# Patient Record
Sex: FEMALE | Race: WHITE | Employment: OTHER | URBAN - METROPOLITAN AREA
[De-identification: names, ages, dates, MRNs, and addresses within clinical notes are randomized per-mention and may not be internally consistent; named-entity substitution may affect disease eponyms.]

---

## 2017-01-09 ENCOUNTER — TELEPHONE (OUTPATIENT)
Dept: NEUROSURGERY | Facility: CLINIC | Age: 82
End: 2017-01-09

## 2017-01-09 ENCOUNTER — OFFICE VISIT (OUTPATIENT)
Dept: NEUROSURGERY | Facility: CLINIC | Age: 82
End: 2017-01-09
Attending: INTERNAL MEDICINE
Payer: COMMERCIAL

## 2017-01-09 VITALS
WEIGHT: 188 LBS | OXYGEN SATURATION: 92 % | HEART RATE: 66 BPM | SYSTOLIC BLOOD PRESSURE: 144 MMHG | DIASTOLIC BLOOD PRESSURE: 66 MMHG | BODY MASS INDEX: 34.38 KG/M2 | TEMPERATURE: 97.9 F

## 2017-01-09 DIAGNOSIS — M54.16 LUMBAR RADICULAR PAIN: Primary | ICD-10-CM

## 2017-01-09 PROCEDURE — 99203 OFFICE O/P NEW LOW 30 MIN: CPT | Performed by: NURSE PRACTITIONER

## 2017-01-09 PROCEDURE — 99211 OFF/OP EST MAY X REQ PHY/QHP: CPT | Performed by: NURSE PRACTITIONER

## 2017-01-09 ASSESSMENT — PAIN SCALES - GENERAL: PAINLEVEL: NO PAIN (0)

## 2017-01-09 NOTE — TELEPHONE ENCOUNTER
Tried to call [facility] to make an appt, but they state they don't have an order.   Can order be faxed to [facility] in [place].   Thanks

## 2017-01-09 NOTE — PATIENT INSTRUCTIONS
1.  Please call [facility] to have lumbar MRI completed. Call [phone].   I will contact you with results.

## 2017-01-09 NOTE — MR AVS SNAPSHOT
After Visit Summary   1/9/2017    Elizabeth Li    MRN: [id number]           Patient Information     Date Of Birth          1935        Visit Information        Provider Department      1/9/2017 11:00 AM Gail Whaley APRN CNP [facility]        Today's Diagnoses     Lumbar radicular pain    -  1       Care Instructions    1.  Please call [facility] to have lumbar MRI completed. Call [phone].   I will contact you with results.         Follow-ups after your visit        Your next 10 appointments already scheduled     Jan 11, 2017   Procedure with Nghia Moss MD   [facility] ([facility])    [address] [place] [address] [phone]           [facility] [place]            Jan 23, 2017 10:10 AM   Return Visit with ISMAEL Franco CNP   [facility] [place] ([facility])    [address] [place] [address] [address]  [facility] [address] [phone]              Future tests that were ordered for you today     Open Future Orders        Priority Expected Expires Ordered    MR Lumbar Spine w/o Contrast Routine  1/9/2018 1/9/2017            Who to contact     If you have questions or need follow up information about today's clinic visit or your schedule please contact [facility] directly at [phone].  Normal or non-critical lab and imaging results will be communicated to you by MyChart, letter or phone within 4 business days after the clinic has received the results. If you do not hear from us within 7 days, please contact the clinic through MyChart or phone. If you have a critical or abnormal lab result, we will notify you by phone as soon as possible.  Submit refill requests through [employer] or call your pharmacy and they will forward the refill request to us. Please allow 3 business days  for your refill to be completed.          Additional Information About Your Visit        [employer] Information     [employer] gives you secure access to your electronic health record. If you see a primary care provider, you can also send messages to your care team and make appointments. If you have questions, please call your primary care clinic.  If you do not have a primary care provider, please call [phone] and they will assist you.        Care EveryWhere ID     This is your Care EveryWhere ID. This could be used by other organizations to access your [facility] medical records  [id number]        Your Vitals Were     Pulse Temperature Pulse Oximetry             66 97.9  F (36.6  C) (Oral) 92%          Blood Pressure from Last 3 Encounters:   01/09/17 144/66   12/29/16 126/54   12/27/16 161/98    Weight from Last 3 Encounters:   01/09/17 188 lb (85.276 kg)   12/29/16 187 lb 6.4 oz (85.004 kg)   12/27/16 185 lb (83.915 kg)               Primary Care Provider Office Phone # Fax #    Kasi Watkins [phone]       [facility] [address] [place] [address]        Thank you!     Thank you for choosing [facility]  for your care. Our goal is always to provide you with excellent care. Hearing back from our patients is one way we can continue to improve our services. Please take a few minutes to complete the written survey that you may receive in the mail after your visit with us. Thank you!             Your Updated Medication List - Protect others around you: Learn how to safely use, store and throw away your medicines at www.disposemymeds.org.          This list is accurate as of: 1/9/17 11:05 AM.  Always use your most recent med list.                   Brand Name Dispense Instructions for use    ascorbic acid 500 MG tablet    VITAMIN C     Take 500 mg by mouth daily.       aspirin 81 MG tablet     90 tablet    Take 1 tablet (81 mg) by mouth daily        folic acid 400 MCG tablet    FOLVITE     Take 400 mcg by mouth daily.       HYDROcodone-acetaminophen 5-325 MG per tablet    NORCO    30 tablet    1 tab every 4-6 hr if needed for pain.       isosorbide mononitrate 30 MG 24 hr tablet    IMDUR    90 tablet    Take 1 tablet (30 mg) by mouth daily       ketoconazole 2 % shampoo    NIZORAL    120 mL    Apply to the affected area and wash off after 5 minutes.       levothyroxine 75 MCG tablet    SYNTHROID/LEVOTHROID    135 tablet    Take 1.5 tablets (112.5 mcg) by mouth daily       metoprolol 25 MG tablet    LOPRESSOR    90 tablet    Take 0.5 tablets (12.5 mg) by mouth 2 times daily       simvastatin 20 MG tablet    ZOCOR    90 tablet    Take 1 tablet (20 mg) by mouth At Bedtime       vitamin D 1000 UNITS capsule      Take 1 capsule by mouth daily.

## 2017-01-09 NOTE — NURSING NOTE
"Elizabeth Li is a 81 year old female who presents for:  Chief Complaint   Patient presents with     Neurologic Problem     Right hip pain and leg pain, low back only hurts when she bends over to much        Initial Vitals:  /66 mmHg  Pulse 66  Temp(Src) 97.9  F (36.6  C) (Oral)  Wt 188 lb (85.276 kg)  SpO2 92% Estimated body mass index is 34.38 kg/(m^2) as calculated from the following:    Height as of 12/29/16: 5' 2\" (1.575 m).    Weight as of this encounter: 188 lb (85.276 kg).. There is no height on file to calculate BSA. BP completed using cuff size: large  No Pain (0)    Do you feel safe in your environment?  Yes  Do you need any refills today? No    Nursing Comments: Right hip pain and leg pain, low back only hurts when she bends over to much.  Patient rates her pain today as 0      5 min. nursing intake time  Lana Valencia MA       Discharge plan: 1.  Please call [facility] to have lumbar MRI completed. Call [phone].   I will contact you with results.   2 min. nursing discharge time  Lana Valencia MA            "

## 2017-01-12 ENCOUNTER — TRANSFERRED RECORDS (OUTPATIENT)
Dept: HEALTH INFORMATION MANAGEMENT | Facility: CLINIC | Age: 82
End: 2017-01-12

## 2017-01-18 DIAGNOSIS — I25.10 CORONARY ARTERY DISEASE INVOLVING NATIVE CORONARY ARTERY WITHOUT ANGINA PECTORIS: Primary | ICD-10-CM

## 2017-01-18 RX ORDER — METOPROLOL TARTRATE 25 MG/1
12.5 TABLET, FILM COATED ORAL 2 TIMES DAILY
Qty: 90 TABLET | Refills: 3 | Status: SHIPPED | OUTPATIENT
Start: 2017-01-18 | End: 2018-01-10

## 2017-01-19 ENCOUNTER — TELEPHONE (OUTPATIENT)
Dept: INTERNAL MEDICINE | Facility: CLINIC | Age: 82
End: 2017-01-19

## 2017-01-19 NOTE — TELEPHONE ENCOUNTER
Reason for Call:  Other call back    Detailed comments: Pt had many things that she wanted to discuss with Dr. Watkins or his nurse (results info/questions, questions about making an appointment or not, thyroid questions, etc) Pt will only be home this morning and wanted us to call her back as soon as possible    Phone Number Patient can be reached at: Home number on file [phone] (home)    Best Time: hoping still this morning    Can we leave a detailed message on this number? YES    Call taken on 1/19/2017 at 8:36 AM by Josselyn Reddy

## 2017-01-20 ENCOUNTER — OFFICE VISIT (OUTPATIENT)
Dept: INTERNAL MEDICINE | Facility: CLINIC | Age: 82
End: 2017-01-20
Payer: COMMERCIAL

## 2017-01-20 VITALS
BODY MASS INDEX: 33.65 KG/M2 | OXYGEN SATURATION: 95 % | HEART RATE: 80 BPM | SYSTOLIC BLOOD PRESSURE: 130 MMHG | DIASTOLIC BLOOD PRESSURE: 60 MMHG | WEIGHT: 184 LBS | TEMPERATURE: 97.7 F

## 2017-01-20 DIAGNOSIS — M25.551 HIP PAIN, RIGHT: Primary | ICD-10-CM

## 2017-01-20 DIAGNOSIS — I25.10 CORONARY ARTERY DISEASE INVOLVING NATIVE CORONARY ARTERY OF NATIVE HEART WITHOUT ANGINA PECTORIS: ICD-10-CM

## 2017-01-20 DIAGNOSIS — E78.5 HYPERLIPIDEMIA LDL GOAL <100: ICD-10-CM

## 2017-01-20 DIAGNOSIS — Z98.61 STATUS POST CORONARY ANGIOPLASTY: ICD-10-CM

## 2017-01-20 DIAGNOSIS — M51.369 DDD (DEGENERATIVE DISC DISEASE), LUMBAR: ICD-10-CM

## 2017-01-20 DIAGNOSIS — E03.9 ACQUIRED HYPOTHYROIDISM: ICD-10-CM

## 2017-01-20 PROCEDURE — 99215 OFFICE O/P EST HI 40 MIN: CPT | Performed by: INTERNAL MEDICINE

## 2017-01-20 PROCEDURE — 84439 ASSAY OF FREE THYROXINE: CPT | Performed by: INTERNAL MEDICINE

## 2017-01-20 PROCEDURE — 84443 ASSAY THYROID STIM HORMONE: CPT | Performed by: INTERNAL MEDICINE

## 2017-01-20 PROCEDURE — 36415 COLL VENOUS BLD VENIPUNCTURE: CPT | Performed by: INTERNAL MEDICINE

## 2017-01-20 RX ORDER — ISOSORBIDE MONONITRATE 30 MG/1
30 TABLET, EXTENDED RELEASE ORAL DAILY
Qty: 90 TABLET | Refills: 3 | Status: SHIPPED | OUTPATIENT
Start: 2017-01-20 | End: 2018-03-09

## 2017-01-20 RX ORDER — LEVOTHYROXINE SODIUM 75 UG/1
112.5 TABLET ORAL DAILY
Qty: 135 TABLET | Refills: 3 | Status: SHIPPED | OUTPATIENT
Start: 2017-01-20 | End: 2017-03-11 | Stop reason: DRUGHIGH

## 2017-01-20 RX ORDER — HYDROCODONE BITARTRATE AND ACETAMINOPHEN 5; 325 MG/1; MG/1
TABLET ORAL
Qty: 80 TABLET | Refills: 0 | Status: SHIPPED | OUTPATIENT
Start: 2017-01-20 | End: 2017-03-06

## 2017-01-20 NOTE — NURSING NOTE
"Chief Complaint   Patient presents with     Musculoskeletal Problem       Initial /60 mmHg  Pulse 80  Temp(Src) 97.7  F (36.5  C) (Oral)  Wt 184 lb (83.462 kg)  SpO2 95% Estimated body mass index is 33.65 kg/(m^2) as calculated from the following:    Height as of 12/29/16: 5' 2\" (1.575 m).    Weight as of this encounter: 184 lb (83.462 kg).  BP completed using cuff size: large    "

## 2017-01-20 NOTE — TELEPHONE ENCOUNTER
Pt is very anxious, she is under so much stress and extremely sleep deprived from here pain in her back and right hip.  She is having a rough time keeping track of anything.  Pt given appt for today at 3:20 pm, need to track down the MRI results.    She saw Dr. Abernathy on 12/29/16 and had some Xray's but she doesn't understand the results.  She was referred to a spine center (not sure of the name).  They sent her for an open sided MRI of Lumbar Spine again not sure where it was done (? at [facility]).  The MRI didn't turn out as she was so painful, she couldn't still.  It was stopped 1/2 way through.    Pt was seen by [facility] at [phone] on 1/9/17.  The OV note from that appt is in this pt chart.    Called to this office to see if I could get MRI results.  They don't have the result either.    Called to [facility] and they are faxing the results to me.    Radu GIRALDO RN

## 2017-01-20 NOTE — TELEPHONE ENCOUNTER
Received copy of MRI results.  Original sent to scan  Copy to MD for appt today.    Radu GIRALDO RN

## 2017-01-20 NOTE — PROGRESS NOTES
SUBJECTIVE:                                                    Elizabeth Li is a 81 year old female who presents to clinic today for the following health issues:      LBP  HTN  CAD  Hypothyroidism   Anxiety / depression     Patient is seen for a follow up visit.  Has had significant LBP with right sided sciatica. Affects ambulation, sleep.   Seen ortho spine, MRI showed DDD and spinal stenosis. Pending follow up. On Norco, Ibuprofen. Helps with pain. No side effects. Had PT.     Has h/o HTN. on medical treatment. BP has been controlled. No side effects from medications. No CP, HA, dizziness. good compliance with medications and low salt diet.  Has h/o ischemic heart disease, asymptomatic regarding chest pains, SOB,palpitations. Has good compliance with treatment, diet and exercise.  Has hypothyroidism. On Synthroid. No heat /cold intolerance, heart palpitations, weight loss/ gain , heart palpitations, change in bowel habits.  Last TSH was suppressed. Needs recheck.   Has h/o depression. On medical treatment, controlled, no side effects. No depressive symptoms or suicidal ideation.      Amount of exercise or physical activity: None    Problems taking medications regularly: No    Medication side effects: none    Diet: low salt and low fat/cholesterol        Problem list and histories reviewed & adjusted, as indicated.  Additional history: as documented    Problem list, Medication list, Allergies, and Medical/Social/Surgical histories reviewed in [facility] and updated as appropriate.    ROS:  Constitutional, HEENT, cardiovascular, pulmonary, GI, , musculoskeletal, neuro, skin, endocrine and psych systems are negative, except as otherwise noted.    OBJECTIVE:                                                    /60 mmHg  Pulse 80  Temp(Src) 97.7  F (36.5  C) (Oral)  Wt 184 lb (83.462 kg)  SpO2 95%  Body mass index is 33.65 kg/(m^2).  GENERAL: healthy, alert and no distress  EYES: Eyes grossly normal to  inspection, PERRL and conjunctivae and sclerae normal  HENT: ear canals and TM's normal, nose and mouth without ulcers or lesions  NECK: no adenopathy, no asymmetry, masses, or scars and thyroid normal to palpation  RESP: lungs clear to auscultation - no rales, rhonchi or wheezes  CV: regular rate and rhythm, normal S1 S2, no S3 or S4, no murmur, click or rub, no peripheral edema and peripheral pulses strong  ABDOMEN: soft, nontender, no hepatosplenomegaly, no masses and bowel sounds normal  MS: no gross musculoskeletal defects noted, no edema, LS paravertebral and right SI area pain , worse with ambulation   SKIN: no suspicious lesions or rashes  NEURO: Normal strength and tone, mentation intact and speech normal    Diagnostic Test Results:  none      ASSESSMENT/PLAN:                                                      Problem List Items Addressed This Visit     Hypothyroidism    Relevant Medications    levothyroxine (SYNTHROID/LEVOTHROID) 75 MCG tablet    Other Relevant Orders    TSH with free T4 reflex (Completed)    Hyperlipidemia LDL goal <100    CAD (coronary artery disease)    Relevant Medications    isosorbide mononitrate (IMDUR) 30 MG 24 hr tablet    DDD (degenerative disc disease), lumbar    Relevant Medications    HYDROcodone-acetaminophen (NORCO) 5-325 MG per tablet      Other Visit Diagnoses     Hip pain, right    -  Primary     Relevant Medications     HYDROcodone-acetaminophen (NORCO) 5-325 MG per tablet     Status post coronary angioplasty         Relevant Medications     isosorbide mononitrate (IMDUR) 30 MG 24 hr tablet            Follow up with ortho spine   Cont treatment   Assess lab work   Adjust treatment   Consider Gabapentin  , reassess in one month     Follow-Up:in 1 month     Kasi Watkins MD  [facility]

## 2017-01-21 LAB
T4 FREE SERPL-MCNC: 1.3 NG/DL (ref 0.76–1.46)
TSH SERPL DL<=0.005 MIU/L-ACNC: 0.19 MU/L (ref 0.4–4)

## 2017-01-21 ASSESSMENT — PATIENT HEALTH QUESTIONNAIRE - PHQ9: SUM OF ALL RESPONSES TO PHQ QUESTIONS 1-9: 11

## 2017-01-23 ENCOUNTER — OFFICE VISIT (OUTPATIENT)
Dept: CARDIOLOGY | Facility: CLINIC | Age: 82
End: 2017-01-23
Attending: INTERNAL MEDICINE
Payer: COMMERCIAL

## 2017-01-23 ENCOUNTER — TELEPHONE (OUTPATIENT)
Dept: NEUROSURGERY | Facility: CLINIC | Age: 82
End: 2017-01-23

## 2017-01-23 VITALS
HEIGHT: 62 IN | HEART RATE: 68 BPM | WEIGHT: 186 LBS | DIASTOLIC BLOOD PRESSURE: 56 MMHG | SYSTOLIC BLOOD PRESSURE: 130 MMHG | BODY MASS INDEX: 34.23 KG/M2

## 2017-01-23 DIAGNOSIS — E78.5 HYPERLIPIDEMIA WITH TARGET LDL LESS THAN 70: Primary | ICD-10-CM

## 2017-01-23 DIAGNOSIS — I25.10 CORONARY ARTERY DISEASE INVOLVING NATIVE CORONARY ARTERY OF NATIVE HEART WITHOUT ANGINA PECTORIS: ICD-10-CM

## 2017-01-23 PROCEDURE — 99214 OFFICE O/P EST MOD 30 MIN: CPT | Performed by: NURSE PRACTITIONER

## 2017-01-23 NOTE — MR AVS SNAPSHOT
After Visit Summary   1/23/2017    Elizabeth Li    MRN: [id number]           Patient Information     Date Of Birth          1935        Visit Information        Provider Department      1/23/2017 10:10 AM Felicita Garcia APRN CNP [facility]        Today's Diagnoses     Hyperlipidemia with target LDL less than 70    -  1     Coronary artery disease involving native coronary artery of native heart without angina pectoris           Care Instructions    Fasting cholesterol labs prior to seeing Dr. Oakley in May        Follow-ups after your visit        Future tests that were ordered for you today     Open Future Orders        Priority Expected Expires Ordered    Lipid Profile Routine 5/23/2017 1/23/2018 1/23/2017    ALT Routine 5/23/2017 1/23/2018 1/23/2017            Who to contact     If you have questions or need follow up information about today's clinic visit or your schedule please contact [facility] directly at [phone].  Normal or non-critical lab and imaging results will be communicated to you by [employer], letter or phone within 4 business days after the clinic has received the results. If you do not hear from us within 7 days, please contact the clinic through [employer] or phone. If you have a critical or abnormal lab result, we will notify you by phone as soon as possible.  Submit refill requests through [employer] or call your pharmacy and they will forward the refill request to us. Please allow 3 business days for your refill to be completed.          Additional Information About Your Visit        MyChart Information     [employer] gives you secure access to your electronic health record. If you see a primary care provider, you can also send messages to your care team and make appointments. If you have questions, please call your primary care clinic.  If you do not have a primary care provider, please call  "[phone] and they will assist you.        Care EveryWhere ID     This is your Care EveryWhere ID. This could be used by other organizations to access your [place] medical records  [id number]        Your Vitals Were     Pulse Height BMI (Body Mass Index)             68 1.575 m (5' 2.01\") 34.01 kg/m2          Blood Pressure from Last 3 Encounters:   01/23/17 130/56   01/20/17 130/60   01/11/17 114/64    Weight from Last 3 Encounters:   01/23/17 84.369 kg (186 lb)   01/20/17 83.462 kg (184 lb)   01/09/17 85.276 kg (188 lb)              We Performed the Following     Follow-Up with Cardiac Advanced Practice Provider        Primary Care Provider Office Phone # Fax #    Kasi Watkins [phone]       [facility] [address] [place] [address]        Thank you!     Thank you for choosing [facility] [place]  for your care. Our goal is always to provide you with excellent care. Hearing back from our patients is one way we can continue to improve our services. Please take a few minutes to complete the written survey that you may receive in the mail after your visit with us. Thank you!             Your Updated Medication List - Protect others around you: Learn how to safely use, store and throw away your medicines at www.disposemymeds.org.          This list is accurate as of: 1/23/17 10:33 AM.  Always use your most recent med list.                   Brand Name Dispense Instructions for use    ascorbic acid 500 MG tablet    VITAMIN C     Take 500 mg by mouth daily.       aspirin 81 MG tablet     90 tablet    Take 1 tablet (81 mg) by mouth daily       folic acid 400 MCG tablet    FOLVITE     Take 400 mcg by mouth daily.       HYDROcodone-acetaminophen 5-325 MG per tablet    NORCO    80 tablet    1 tab every 4-6 hr if needed for pain.       isosorbide mononitrate 30 MG 24 hr tablet    IMDUR    90 tablet    Take 1 tablet (30 mg) by mouth daily    "    ketoconazole 2 % shampoo    NIZORAL    120 mL    Apply to the affected area and wash off after 5 minutes.       levothyroxine 75 MCG tablet    SYNTHROID/LEVOTHROID    135 tablet    Take 1.5 tablets (112.5 mcg) by mouth daily       metoprolol 25 MG tablet    LOPRESSOR    90 tablet    Take 0.5 tablets (12.5 mg) by mouth 2 times daily       simvastatin 20 MG tablet    ZOCOR    90 tablet    Take 1 tablet (20 mg) by mouth At Bedtime       vitamin D 1000 UNITS capsule      Take 1 capsule by mouth daily.

## 2017-01-23 NOTE — PROGRESS NOTES
Cardiology Clinic Progress Note  Elizabeth Li MRN# [id number]   YOB: 1935 Age: 81 year old     Reason for visit: Follow up           Assessment and Plan:     1. Coronary artery disease    Status post drug-eluting stents x 2 to the LAD in March 2015    Continue Metoprolol, ASA, Zocor, Imdur    Follow up with Dr. Oakley in May    2. Hypertension    Stable    Continue current medical therapy    3. Hyperlipidemia, LDL goal <70    Last LDL 98 on Zocor 20 mg daily    Recommend high intensity statin. Patient declined increase in Zocor or changing statin at this time.    Repeat FLP in May prior to office visit with Dr. Oakley         History of Presenting Illness:    Elizabeth Li is a very pleasant 81 year old patient of Dr. Oakley who presents today for a follow up. She has a past medical history of degenerative disc disease, anxiety, depression, hypertension, hyperlipidemia, coronary artery disease status post 2 drug-eluting stenting to the LAD and a  of the RCA.     She initially saw Dr. Oakley for progressive dyspnea on exertion..  She underwent a stress test that showed EKG abnormalities of horizontal ST depression in the inferolateral leads that were concerning of ischemia.  Upon review of the stress test Dr. Oakley ordered a CT coronary angiogram.  That showed subtotal occlusion of the proximal RCA and severe stenosis of the mid LAD and moderate stenosis of the large OM branch.  It was then recommended that she undergo coronary angiography.  This was completed on March 27, 2015 and she had two drug-eluting stents to her mid LAD and was found to have a chronically occluded RCA that was not intervened upon.    Her most recent LDL was 98 in April of 2016. She is currently on Zocor 20 mg daily. Discussed increasing Zocor or changing to a high intensity statin to meet LDL goal. Patient is hesitant to do either as her father had issues with statin therapy.  She unfortunately is limited in her exercising  "abilities at the moment due to sciatica. She denies chest pain, shortness of breath, PND, orthopnea, presyncope, edema, heart racing, or palpitations.          This note was completed in part using Dragon voice recognition software. Although reviewed after completion, some word and grammatical errors may occur       Review of Systems:   Review of Systems:  Skin:  Positive for     Eyes:  Positive for glasses  ENT:  Negative    Respiratory:  Positive for dyspnea on exertion;sleep apnea;cough;CPAP  Cardiovascular:  Negative  (weakness occurs with normal ADLS)  Gastroenterology: Positive for heartburn  Genitourinary:  Positive for urinary frequency  Musculoskeletal:  Positive for joint pain  Neurologic:  Positive for numbness or tingling of hands  Psychiatric:  Positive for sleep disturbances  Heme/Lymph/Imm:  Positive for allergies;easy bruising  Endocrine:  Positive for thyroid disorder              Physical Exam:     Vitals: /56 mmHg  Pulse 68  Ht 1.575 m (5' 2.01\")  Wt 84.369 kg (186 lb)  BMI 34.01 kg/m2  Constitutional:  cooperative;in no acute distress        Skin:  warm and dry to the touch;no apparent skin lesions or masses noted        Head:  normocephalic        Eyes:  conjunctivae and lids unremarkable;sclera white        ENT:  no pallor or cyanosis        Chest:  clear to auscultation;normal symmetry        Cardiac: regular rhythm;normal S1 and S2;apical impulse not displaced                  Abdomen:  abdomen soft;non-tender obese      Extremities and Back:  no edema        Neurological:  no gross motor deficits;affect appropriate, oriented to time, person and place                 Medications:     Current Outpatient Prescriptions   Medication Sig Dispense Refill     HYDROcodone-acetaminophen (NORCO) 5-325 MG per tablet 1 tab every 4-6 hr if needed for pain. 80 tablet 0     isosorbide mononitrate (IMDUR) 30 MG 24 hr tablet Take 1 tablet (30 mg) by mouth daily 90 tablet 3     levothyroxine " (SYNTHROID/LEVOTHROID) 75 MCG tablet Take 1.5 tablets (112.5 mcg) by mouth daily 135 tablet 3     metoprolol (LOPRESSOR) 25 MG tablet Take 0.5 tablets (12.5 mg) by mouth 2 times daily 90 tablet 3     ketoconazole (NIZORAL) 2 % shampoo Apply to the affected area and wash off after 5 minutes. 120 mL 1     simvastatin (ZOCOR) 20 MG tablet Take 1 tablet (20 mg) by mouth At Bedtime 90 tablet 0     aspirin 81 MG tablet Take 1 tablet (81 mg) by mouth daily 90 tablet 3     ascorbic acid (VITAMIN C) 500 MG tablet Take 500 mg by mouth daily.       Cholecalciferol (VITAMIN D) 1000 UNIT capsule Take 1 capsule by mouth daily.       folic acid (FOLVITE) 400 MCG tablet Take 400 mcg by mouth daily.       [DISCONTINUED] gabapentin (NEURONTIN) 300 MG capsule Take 1 capsule (300 mg) by mouth At Bedtime Take 1 tablet (300 mg) every night 30 capsule 0           Family History   Problem Relation Age of Onset     Blood Disease Mother      pernious anemia     HEART DISEASE Father      HEART DISEASE Sister      HEART DISEASE Brother      Colon Cancer No family hx of        Social History     Social History     Marital Status:      Spouse Name: N/A     Number of Children: 4     Years of Education: N/A     Occupational History     Not on file.     Social History Main Topics     Smoking status: Former Smoker     Quit date: 01/01/1984     Smokeless tobacco: Never Used     Alcohol Use: 0.0 oz/week     0 Standard drinks or equivalent per week      Comment: social drinker     Drug Use: No     Sexual Activity: No     Other Topics Concern     Caffeine Concern Yes     coffee      Occupational Exposure No     Hobby Hazards No     Sleep Concern No     Stress Concern No     Weight Concern No     Special Diet Yes     gluten free and dairy free     Back Care No     Exercise No     on hold , 4/7 starting cardiac rehab     Seat Belt Yes     Social History Narrative            Past Medical History:     Past Medical History   Diagnosis Date      Hypothyroidism      Hyperlipidemia LDL goal <100      IHD (ischemic heart disease)      KUSUM (obstructive sleep apnea)      CPAP     Abnormal cardiovascular stress test      EKG changes with exercise on Exercise stress test 1/26/2015     CAD (coronary artery disease)      Severe 2 vessel CAD. PCI with drug-eluting stents x2 to mid LAD on 3/27/15     HTN (hypertension)               Past Surgical History:     Past Surgical History   Procedure Laterality Date     Tubal ligation       Heart cath, angioplasty  3/27/15     2.5 X 18 mm & 3.0 X mm LUCILA to LAD     Colonoscopy       Cardiac surgery       2 heart stents     Tonsillectomy       T&A as a child     Head & neck surgery       wisdom teeth removed     Colonoscopy N/A 1/11/2017     Procedure: COLONOSCOPY;  Surgeon: Nghia Moss MD;  Location:  GI              Allergies:   Flu virus vaccine       Data:   All laboratory data reviewed:    LAST CHOLESTEROL:  CHOL      167   4/4/2016  HDL       52   4/4/2016  LDL       98   4/4/2016  TRIG       85   4/4/2016  CHOLHDLRATIO      2.6   4/30/2015    LAST BMP:  NA      143   11/2/2016   POTASSIUM      4.3   11/2/2016  CHLORIDE      111   11/2/2016  FRANKLIN      9.3   11/2/2016  CO2       25   11/2/2016  BUN       12   11/2/2016  CR     0.75   11/2/2016  GLC      102   11/2/2016    LAST CBC:  WBC      6.4   11/8/2016  RBC     4.13   11/8/2016  HGB     13.3   11/8/2016  HCT     40.7   11/8/2016  MCV       99   11/8/2016  MCH     32.2   11/8/2016  MCHC     32.7   11/8/2016  RDW     12.8   11/8/2016  PLT      183   11/8/2016      QUIRINO KAYE, APRN, CNP

## 2017-01-23 NOTE — Clinical Note
1/23/2017    Kasi Watkins MD  [facility]   [address] [facility] [address]    RE: Elizabeth Li       Dear Colleague,    I had the pleasure of seeing Elizabeth Li in the [facility].    Cardiology Clinic Progress Note  Elizabeth Li MRN# [id number]   YOB: 1935 Age: 81 year old     Reason for visit: Follow up           Assessment and Plan:     1. Coronary artery disease    Status post drug-eluting stents x 2 to the LAD in March 2015    Continue Metoprolol, ASA, Zocor, Imdur    Follow up with Dr. Oakley in May    2. Hypertension    Stable    Continue current medical therapy    3. Hyperlipidemia, LDL goal <70    Last LDL 98 on Zocor 20 mg daily    Recommend high intensity statin. Patient declined increase in Zocor or changing statin at this time.    Repeat FLP in May prior to office visit with Dr. Oakley         History of Presenting Illness:    Elizabeth Li is a very pleasant 81 year old patient of Dr. Oakley who presents today for a follow up. She has a past medical history of degenerative disc disease, anxiety, depression, hypertension, hyperlipidemia, coronary artery disease status post 2 drug-eluting stenting to the LAD and a  of the RCA.     She initially saw Dr. Oakley for progressive dyspnea on exertion..  She underwent a stress test that showed EKG abnormalities of horizontal ST depression in the inferolateral leads that were concerning of ischemia.  Upon review of the stress test Dr. Oakley ordered a CT coronary angiogram.  That showed subtotal occlusion of the proximal RCA and severe stenosis of the mid LAD and moderate stenosis of the large OM branch.  It was then recommended that she undergo coronary angiography.  This was completed on March 27, 2015 and she had two drug-eluting stents to her mid LAD and was found to have a chronically occluded RCA that was not intervened upon.    Her most recent LDL was 98 in April of  "2016. She is currently on Zocor 20 mg daily. Discussed increasing Zocor or changing to a high intensity statin to meet LDL goal. Patient is hesitant to do either as her father had issues with statin therapy.  She unfortunately is limited in her exercising abilities at the moment due to sciatica. She denies chest pain, shortness of breath, PND, orthopnea, presyncope, edema, heart racing, or palpitations.          This note was completed in part using Dragon voice recognition software. Although reviewed after completion, some word and grammatical errors may occur       Review of Systems:   Review of Systems:  Skin:  Positive for     Eyes:  Positive for glasses  ENT:  Negative    Respiratory:  Positive for dyspnea on exertion;sleep apnea;cough;CPAP  Cardiovascular:  Negative  (weakness occurs with normal ADLS)  Gastroenterology: Positive for heartburn  Genitourinary:  Positive for urinary frequency  Musculoskeletal:  Positive for joint pain  Neurologic:  Positive for numbness or tingling of hands  Psychiatric:  Positive for sleep disturbances  Heme/Lymph/Imm:  Positive for allergies;easy bruising  Endocrine:  Positive for thyroid disorder              Physical Exam:     Vitals: /56 mmHg  Pulse 68  Ht 1.575 m (5' 2.01\")  Wt 84.369 kg (186 lb)  BMI 34.01 kg/m2  Constitutional:  cooperative;in no acute distress        Skin:  warm and dry to the touch;no apparent skin lesions or masses noted        Head:  normocephalic        Eyes:  conjunctivae and lids unremarkable;sclera white        ENT:  no pallor or cyanosis        Chest:  clear to auscultation;normal symmetry        Cardiac: regular rhythm;normal S1 and S2;apical impulse not displaced                  Abdomen:  abdomen soft;non-tender obese      Extremities and Back:  no edema        Neurological:  no gross motor deficits;affect appropriate, oriented to time, person and place                 Medications:     Current Outpatient Prescriptions   Medication " Sig Dispense Refill     HYDROcodone-acetaminophen (NORCO) 5-325 MG per tablet 1 tab every 4-6 hr if needed for pain. 80 tablet 0     isosorbide mononitrate (IMDUR) 30 MG 24 hr tablet Take 1 tablet (30 mg) by mouth daily 90 tablet 3     levothyroxine (SYNTHROID/LEVOTHROID) 75 MCG tablet Take 1.5 tablets (112.5 mcg) by mouth daily 135 tablet 3     metoprolol (LOPRESSOR) 25 MG tablet Take 0.5 tablets (12.5 mg) by mouth 2 times daily 90 tablet 3     ketoconazole (NIZORAL) 2 % shampoo Apply to the affected area and wash off after 5 minutes. 120 mL 1     simvastatin (ZOCOR) 20 MG tablet Take 1 tablet (20 mg) by mouth At Bedtime 90 tablet 0     aspirin 81 MG tablet Take 1 tablet (81 mg) by mouth daily 90 tablet 3     ascorbic acid (VITAMIN C) 500 MG tablet Take 500 mg by mouth daily.       Cholecalciferol (VITAMIN D) 1000 UNIT capsule Take 1 capsule by mouth daily.       folic acid (FOLVITE) 400 MCG tablet Take 400 mcg by mouth daily.       [DISCONTINUED] gabapentin (NEURONTIN) 300 MG capsule Take 1 capsule (300 mg) by mouth At Bedtime Take 1 tablet (300 mg) every night 30 capsule 0           Family History   Problem Relation Age of Onset     Blood Disease Mother      pernious anemia     HEART DISEASE Father      HEART DISEASE Sister      HEART DISEASE Brother      Colon Cancer No family hx of        Social History     Social History     Marital Status:      Spouse Name: N/A     Number of Children: 4     Years of Education: N/A     Occupational History     Not on file.     Social History Main Topics     Smoking status: Former Smoker     Quit date: 01/01/1984     Smokeless tobacco: Never Used     Alcohol Use: 0.0 oz/week     0 Standard drinks or equivalent per week      Comment: social drinker     Drug Use: No     Sexual Activity: No     Other Topics Concern     Caffeine Concern Yes     coffee      Occupational Exposure No     Hobby Hazards No     Sleep Concern No     Stress Concern No     Weight Concern No      Special Diet Yes     gluten free and dairy free     Back Care No     Exercise No     on hold , 4/7 starting cardiac rehab     Seat Belt Yes     Social History Narrative            Past Medical History:     Past Medical History   Diagnosis Date     Hypothyroidism      Hyperlipidemia LDL goal <100      IHD (ischemic heart disease)      KUSUM (obstructive sleep apnea)      CPAP     Abnormal cardiovascular stress test      EKG changes with exercise on Exercise stress test 1/26/2015     CAD (coronary artery disease)      Severe 2 vessel CAD. PCI with drug-eluting stents x2 to mid LAD on 3/27/15     HTN (hypertension)               Past Surgical History:     Past Surgical History   Procedure Laterality Date     Tubal ligation       Heart cath, angioplasty  3/27/15     2.5 X 18 mm & 3.0 X mm LUCILA to LAD     Colonoscopy       Cardiac surgery       2 heart stents     Tonsillectomy       T&A as a child     Head & neck surgery       wisdom teeth removed     Colonoscopy N/A 1/11/2017     Procedure: COLONOSCOPY;  Surgeon: Nghia Moss MD;  Location:  GI              Allergies:   Flu virus vaccine       Data:   All laboratory data reviewed:    LAST CHOLESTEROL:  CHOL      167   4/4/2016  HDL       52   4/4/2016  LDL       98   4/4/2016  TRIG       85   4/4/2016  CHOLHDLRATIO      2.6   4/30/2015    LAST BMP:  NA      143   11/2/2016   POTASSIUM      4.3   11/2/2016  CHLORIDE      111   11/2/2016  FRANKLIN      9.3   11/2/2016  CO2       25   11/2/2016  BUN       12   11/2/2016  CR     0.75   11/2/2016  GLC      102   11/2/2016    LAST CBC:  WBC      6.4   11/8/2016  RBC     4.13   11/8/2016  HGB     13.3   11/8/2016  HCT     40.7   11/8/2016  MCV       99   11/8/2016  MCH     32.2   11/8/2016  MCHC     32.7   11/8/2016  RDW     12.8   11/8/2016  PLT      183   11/8/2016    Thank you for allowing me to participate in the care of your patient.    Sincerely,     ISMAEL SANDOVAL [facility]  Care

## 2017-01-24 ENCOUNTER — TELEPHONE (OUTPATIENT)
Dept: NEUROSURGERY | Facility: CLINIC | Age: 82
End: 2017-01-24

## 2017-01-24 ENCOUNTER — TELEPHONE (OUTPATIENT)
Dept: CARDIOLOGY | Facility: CLINIC | Age: 82
End: 2017-01-24

## 2017-01-24 DIAGNOSIS — I25.10 CAD (CORONARY ARTERY DISEASE): Primary | ICD-10-CM

## 2017-01-24 NOTE — TELEPHONE ENCOUNTER
Patient called to state that she had an MRI of the lumbar spine for pain ordered by Gail Whaley CNP through  [facility].  On the MRI is was found the yojanatinet has a mild infrarenal abdominal aortic aneurysm 2.6 cm. 1/23/17 /53, HR 68. Patient is concerned she needs to be seen for this.  Informed patient this is very mild and that Dr. Oakley will likely manage it with echocardiograms but no interventions at this time.  However, writer will check with Dr. Oakley to be sure. MRI in [facility].     Pt saw Felicita GOMES 1/23/17:  1. Coronary artery disease    Status post drug-eluting stents x 2 to the LAD in March 2015    Continue Metoprolol, ASA, Zocor, Imdur    Follow up with Dr. Oakley in May      2. Hypertension    Stable    Continue current medical therapy    Patient is due to f/u with Dr. Oakley May. Will route to Dr. Oakley to see if he has any recommendations.

## 2017-01-24 NOTE — TELEPHONE ENCOUNTER
Spoke to pt. Regarding her lumbar MRI.  It was noted that she has mild abdominal aortic aneurysm measuring up to 2.6 cm.  Recommend she follow up with cardiology at this time. Her lumbar MRI shows spinal stenosis and degeneration. Recommend injection therapy to see if this helps. She would like to know why she is having hip pain. It was explained that her hip pain may be radicular. If she would like to know the cause of the hip pain she would have to get a work up. She would like to talk to her PCP before proceeding.

## 2017-01-25 NOTE — TELEPHONE ENCOUNTER
Called pt back and informed her of Dr. Oakley response. Pt verbalized understanding and has no further questions or concerns at this time.

## 2017-01-25 NOTE — TELEPHONE ENCOUNTER
Attempted to call pt back. Pt  answered. Left number for pt to call team 4 back directly.  Writer ordered US

## 2017-01-31 ENCOUNTER — TELEPHONE (OUTPATIENT)
Dept: INTERNAL MEDICINE | Facility: CLINIC | Age: 82
End: 2017-01-31

## 2017-01-31 NOTE — TELEPHONE ENCOUNTER
Pt states she is having records faxed over from [facility] for her hip and back pain. She states she had MRI done and the Neurosurg NP recommended injection, but she is not sure if she agrees with this. She would like Dr Watkins to look at [facility] records when they come in, along with her MRI, to get his opinion.  She will be out of town for February so can wait until then.

## 2017-03-06 DIAGNOSIS — E78.5 HYPERLIPIDEMIA LDL GOAL <100: ICD-10-CM

## 2017-03-06 DIAGNOSIS — M25.551 HIP PAIN, RIGHT: ICD-10-CM

## 2017-03-06 RX ORDER — HYDROCODONE BITARTRATE AND ACETAMINOPHEN 5; 325 MG/1; MG/1
TABLET ORAL
Qty: 80 TABLET | Refills: 0 | Status: SHIPPED | OUTPATIENT
Start: 2017-03-06 | End: 2017-05-09

## 2017-03-06 NOTE — TELEPHONE ENCOUNTER
Call from pt requesting refill for Zocor. Last OV 1/20/17 stated to f/u in 1 month. Pt also due for lipids beginning of April. Apts scheduled.  Requesting refill for [place]. States she takes 2 tabs at bed time which allows her to sleep and wake up pain free.    Norco      Last Written Prescription Date:  1/20/17  Last Fill Quantity: 80,   # refills: 0  Last Office Visit with [facility] prescribing provider: 1/20/17  Future Office visit:    Next 5 appointments (look out 90 days)     Mar 08, 2017  8:40 AM CST   SHORT with Kasi Watkins MD   [facility]    [address]  [facility] [address] [phone]                   Routing refill request to provider for review/approval because:  Drug not on the [facility], [facility] or [facility] refill protocol or controlled substance

## 2017-03-08 ENCOUNTER — OFFICE VISIT (OUTPATIENT)
Dept: INTERNAL MEDICINE | Facility: CLINIC | Age: 82
End: 2017-03-08
Payer: COMMERCIAL

## 2017-03-08 VITALS
SYSTOLIC BLOOD PRESSURE: 122 MMHG | DIASTOLIC BLOOD PRESSURE: 50 MMHG | HEART RATE: 82 BPM | HEIGHT: 62 IN | WEIGHT: 182.3 LBS | TEMPERATURE: 98.9 F | BODY MASS INDEX: 33.55 KG/M2 | OXYGEN SATURATION: 97 %

## 2017-03-08 DIAGNOSIS — M51.369 DDD (DEGENERATIVE DISC DISEASE), LUMBAR: ICD-10-CM

## 2017-03-08 DIAGNOSIS — I10 BENIGN ESSENTIAL HYPERTENSION: ICD-10-CM

## 2017-03-08 DIAGNOSIS — E03.9 ACQUIRED HYPOTHYROIDISM: Primary | ICD-10-CM

## 2017-03-08 DIAGNOSIS — E78.5 HYPERLIPIDEMIA LDL GOAL <100: ICD-10-CM

## 2017-03-08 PROCEDURE — 99214 OFFICE O/P EST MOD 30 MIN: CPT | Performed by: INTERNAL MEDICINE

## 2017-03-08 PROCEDURE — 80061 LIPID PANEL: CPT | Performed by: INTERNAL MEDICINE

## 2017-03-08 PROCEDURE — 84439 ASSAY OF FREE THYROXINE: CPT | Performed by: INTERNAL MEDICINE

## 2017-03-08 PROCEDURE — 36415 COLL VENOUS BLD VENIPUNCTURE: CPT | Performed by: INTERNAL MEDICINE

## 2017-03-08 PROCEDURE — 84443 ASSAY THYROID STIM HORMONE: CPT | Performed by: INTERNAL MEDICINE

## 2017-03-08 PROCEDURE — 80053 COMPREHEN METABOLIC PANEL: CPT | Performed by: INTERNAL MEDICINE

## 2017-03-08 RX ORDER — GABAPENTIN 100 MG/1
100 CAPSULE ORAL 3 TIMES DAILY
Qty: 90 CAPSULE | Refills: 3 | Status: SHIPPED | OUTPATIENT
Start: 2017-03-08 | End: 2017-05-16

## 2017-03-08 RX ORDER — SIMVASTATIN 20 MG
20 TABLET ORAL AT BEDTIME
Qty: 90 TABLET | Refills: 3 | Status: SHIPPED | OUTPATIENT
Start: 2017-03-08 | End: 2018-03-09

## 2017-03-08 NOTE — MR AVS SNAPSHOT
After Visit Summary   3/8/2017    Elizabeth Li    MRN: [id number]           Patient Information     Date Of Birth          1935        Visit Information        Provider Department      3/8/2017 8:40 AM Kasi Watkins MD [facility]        Today's Diagnoses     Acquired hypothyroidism    -  1    Hyperlipidemia LDL goal <100        DDD (degenerative disc disease), lumbar        Benign essential hypertension           Follow-ups after your visit        Your next 10 appointments already scheduled     Mar 08, 2017  9:30 AM CST   LAB with [facility] LAB   [facility] ([facility])    [address]  [facility] [address]   [phone]           Patient must bring picture ID.  Patient should be prepared to give a urine specimen  Please do not eat 10-12 hours before your appointment if you are coming in fasting for labs on lipids, cholesterol, or glucose (sugar).  Pregnant women should follow their Care Team instructions. Water with medications is okay. Do not drink coffee or other fluids.   If you have concerns about taking  your medications, please ask at office or if scheduling via [employer], send a message by clicking on Secure Messaging, Message Your Care Team.              Who to contact     If you have questions or need follow up information about today's clinic visit or your schedule please contact [facility] directly at [phone].  Normal or non-critical lab and imaging results will be communicated to you by MyChart, letter or phone within 4 business days after the clinic has received the results. If you do not hear from us within 7 days, please contact the clinic through [employer] or phone. If you have a critical or abnormal lab result, we will notify you by phone as soon as possible.  Submit refill requests through [employer] or call your pharmacy and they will forward the refill request to us. Please allow 3 business  "days for your refill to be completed.          Additional Information About Your Visit        [employer] Information     [employer] gives you secure access to your electronic health record. If you see a primary care provider, you can also send messages to your care team and make appointments. If you have questions, please call your primary care clinic.  If you do not have a primary care provider, please call [phone] and they will assist you.        Care EveryWhere ID     This is your Care EveryWhere ID. This could be used by other organizations to access your [place] medical records  [id number]        Your Vitals Were     Pulse Temperature Height Pulse Oximetry Breastfeeding? BMI (Body Mass Index)    82 98.9  F (37.2  C) (Oral) 5' 2\" (1.575 m) 97% No 33.34 kg/m2       Blood Pressure from Last 3 Encounters:   03/08/17 122/50   01/23/17 130/56   01/20/17 130/60    Weight from Last 3 Encounters:   03/08/17 182 lb 4.8 oz (82.7 kg)   01/23/17 186 lb (84.4 kg)   01/20/17 184 lb (83.5 kg)              We Performed the Following     Comprehensive metabolic panel     Lipid panel reflex to direct LDL     TSH with free T4 reflex          Today's Medication Changes          These changes are accurate as of: 3/8/17  9:17 AM.  If you have any questions, ask your nurse or doctor.               Start taking these medicines.        Dose/Directions    gabapentin 100 MG capsule   Commonly known as:  NEURONTIN   Used for:  DDD (degenerative disc disease), lumbar   Started by:  Kasi Watkins MD        Dose:  100 mg   Take 1 capsule (100 mg) by mouth 3 times daily Start one at bedtime , increase by 1 capsule every week up to 4 capsules daily.   Quantity:  90 capsule   Refills:  3            Where to get your medicines      These medications were sent to [facility] [place], [place] - [address]  [address] [facility] [address]     Phone:  [phone]     gabapentin 100 MG capsule    " simvastatin 20 MG tablet                Primary Care Provider Office Phone # Fax #    Kasi Watkins [phone]       [place]  [address] [facility] [address]        Thank you!     Thank you for choosing [facility]  for your care. Our goal is always to provide you with excellent care. Hearing back from our patients is one way we can continue to improve our services. Please take a few minutes to complete the written survey that you may receive in the mail after your visit with us. Thank you!             Your Updated Medication List - Protect others around you: Learn how to safely use, store and throw away your medicines at www.disposemymeds.org.          This list is accurate as of: 3/8/17  9:17 AM.  Always use your most recent med list.                   Brand Name Dispense Instructions for use    ascorbic acid 500 MG tablet    VITAMIN C     Take 500 mg by mouth daily.       aspirin 81 MG tablet     90 tablet    Take 1 tablet (81 mg) by mouth daily       folic acid 400 MCG tablet    FOLVITE     Take 400 mcg by mouth daily.       gabapentin 100 MG capsule    NEURONTIN    90 capsule    Take 1 capsule (100 mg) by mouth 3 times daily Start one at bedtime , increase by 1 capsule every week up to 4 capsules daily.       HYDROcodone-acetaminophen 5-325 MG per tablet    NORCO    80 tablet    1 tab every 4-6 hr if needed for pain.       isosorbide mononitrate 30 MG 24 hr tablet    IMDUR    90 tablet    Take 1 tablet (30 mg) by mouth daily       ketoconazole 2 % shampoo    NIZORAL    120 mL    Apply to the affected area and wash off after 5 minutes.       levothyroxine 75 MCG tablet    SYNTHROID/LEVOTHROID    135 tablet    Take 1.5 tablets (112.5 mcg) by mouth daily       metoprolol 25 MG tablet    LOPRESSOR    90 tablet    Take 0.5 tablets (12.5 mg) by mouth 2 times daily       simvastatin 20 MG tablet    ZOCOR    90 tablet    Take 1 tablet (20 mg) by mouth At  Bedtime       vitamin D 1000 UNITS capsule      Take 1 capsule by mouth daily.

## 2017-03-08 NOTE — NURSING NOTE
"Chief Complaint   Patient presents with     RECHECK       Initial /50 (BP Location: Left arm, Patient Position: Chair, Cuff Size: Adult Large)  Pulse 82  Temp 98.9  F (37.2  C) (Oral)  Ht 5' 2\" (1.575 m)  Wt 182 lb 4.8 oz (82.7 kg)  SpO2 97%  Breastfeeding? No  BMI 33.34 kg/m2 Estimated body mass index is 33.34 kg/(m^2) as calculated from the following:    Height as of this encounter: 5' 2\" (1.575 m).    Weight as of this encounter: 182 lb 4.8 oz (82.7 kg).  Medication Reconciliation: complete   Dwayne Rosenberg MA    "

## 2017-03-08 NOTE — PROGRESS NOTES
SUBJECTIVE:                                                    Elizabeth Li is a 81 year old female who presents to clinic today for the following health issues:      Hyperlipidemia Follow-Up      Rate your low fat/cholesterol diet?: good    Taking statin?  Yes, no muscle aches from statin    Other lipid medications/supplements?:  none   Has H/O hyperlipidemia. On medical treatment and diet. No side effects. No muscle weakness, myalgias or upset stomach.   Has h/o ischemic heart disease, asymptomatic regarding chest pains, SOB,palpitations. Has good compliance with treatment, diet and exercise.  Has h/o HTN. on medical treatment. BP has been controlled. No side effects from medications. No CP, HA, dizziness. good compliance with medications and low salt diet.  Has hypothyroidism. On Synthroid. No heat /cold intolerance, heart palpitations, weight loss/ gain , heart palpitations, change in bowel habits.  Has chronic LBP. Has DDD with lumbar spinal stenosis. Noted some LE weakness developing. No numbness. Pain worse with ambulation. Takes Norco 2 tabs at night to help with sleep.   Seen neurosurgery. MRI is done, but has not been seen after it for follow up.       Amount of exercise or physical activity: 2-3 days/week for an average of 15-30 minutes    Problems taking medications regularly: No    Medication side effects: none  Diet: low salt        Problem list and histories reviewed & adjusted, as indicated.  Additional history: none    Patient Active Problem List   Diagnosis     Advanced directives, counseling/discussion     Hypothyroidism     Hyperlipidemia LDL goal <100     KUSUM (obstructive sleep apnea)     IHD (ischemic heart disease)     Mild major depression (H)     Abnormal cardiovascular stress test     Anxiety     Status post coronary angiogram     CAD (coronary artery disease)     DDD (degenerative disc disease), lumbar     Benign essential hypertension     Past Surgical History   Procedure  "Laterality Date     Tubal ligation       Heart cath, angioplasty  3/27/15     2.5 X 18 mm & 3.0 X mm ULCILA to LAD     Colonoscopy       Cardiac surgery       2 heart stents     Tonsillectomy       T&A as a child     Head & neck surgery       wisdom teeth removed     Colonoscopy N/A 1/11/2017     Procedure: COLONOSCOPY;  Surgeon: Nghia Moss MD;  Location:  GI       Social History   Substance Use Topics     Smoking status: Former Smoker     Quit date: 1/1/1984     Smokeless tobacco: Never Used     Alcohol use 0.0 oz/week     0 Standard drinks or equivalent per week      Comment: social drinker     Family History   Problem Relation Age of Onset     Blood Disease Mother      pernious anemia     HEART DISEASE Father      HEART DISEASE Sister      HEART DISEASE Brother      Colon Cancer No family hx of            Reviewed and updated as needed this visit by clinical staff  Tobacco  Allergies  Meds  Med Hx  Surg Hx  Fam Hx  Soc Hx      Reviewed and updated as needed this visit by Provider         ROS:  Constitutional, HEENT, cardiovascular, pulmonary, gi and gu systems are negative, except as otherwise noted.    OBJECTIVE:                                                    /50 (BP Location: Left arm, Patient Position: Chair, Cuff Size: Adult Large)  Pulse 82  Temp 98.9  F (37.2  C) (Oral)  Ht 5' 2\" (1.575 m)  Wt 182 lb 4.8 oz (82.7 kg)  SpO2 97%  Breastfeeding? No  BMI 33.34 kg/m2  Body mass index is 33.34 kg/(m^2).  GENERAL: healthy, alert and no distress  NECK: no adenopathy, no asymmetry, masses, or scars and thyroid normal to palpation  RESP: lungs clear to auscultation - no rales, rhonchi or wheezes  CV: regular rate and rhythm, normal S1 S2, no S3 or S4, no murmur, click or rub, no peripheral edema and peripheral pulses strong  ABDOMEN: soft, nontender, no hepatosplenomegaly, no masses and bowel sounds normal  MS: no gross musculoskeletal defects noted, no edema. DD changes in the LS spine "     Diagnostic Test Results:  Results for orders placed or performed in visit on 01/20/17   TSH with free T4 reflex   Result Value Ref Range    TSH 0.19 (L) 0.40 - 4.00 mU/L   T4 free   Result Value Ref Range    T4 Free 1.30 0.76 - 1.46 ng/dL        ASSESSMENT/PLAN:                                                      Problem List Items Addressed This Visit     Hypothyroidism - Primary    Relevant Orders    TSH with free T4 reflex    Comprehensive metabolic panel    Hyperlipidemia LDL goal <100    Relevant Medications    simvastatin (ZOCOR) 20 MG tablet    Other Relevant Orders    Lipid panel reflex to direct LDL    Comprehensive metabolic panel    DDD (degenerative disc disease), lumbar    Relevant Medications    gabapentin (NEURONTIN) 100 MG capsule    Other Relevant Orders    Comprehensive metabolic panel    Benign essential hypertension           Recheck lab work   Cont treatment   Start on Gabapentin, advised for side effects   Follow up with neurosurgery     Follow-Up:in 6 months     Kasi Watkins MD  [facility]

## 2017-03-09 LAB
ALBUMIN SERPL-MCNC: 4 G/DL (ref 3.4–5)
ALP SERPL-CCNC: 78 U/L (ref 40–150)
ALT SERPL W P-5'-P-CCNC: 22 U/L (ref 0–50)
ANION GAP SERPL CALCULATED.3IONS-SCNC: 7 MMOL/L (ref 3–14)
AST SERPL W P-5'-P-CCNC: 17 U/L (ref 0–45)
BILIRUB SERPL-MCNC: 0.6 MG/DL (ref 0.2–1.3)
BUN SERPL-MCNC: 10 MG/DL (ref 7–30)
CALCIUM SERPL-MCNC: 9.1 MG/DL (ref 8.5–10.1)
CHLORIDE SERPL-SCNC: 112 MMOL/L (ref 94–109)
CHOLEST SERPL-MCNC: 149 MG/DL
CO2 SERPL-SCNC: 26 MMOL/L (ref 20–32)
CREAT SERPL-MCNC: 0.69 MG/DL (ref 0.52–1.04)
GFR SERPL CREATININE-BSD FRML MDRD: 82 ML/MIN/1.7M2
GLUCOSE SERPL-MCNC: 85 MG/DL (ref 70–99)
HDLC SERPL-MCNC: 53 MG/DL
LDLC SERPL CALC-MCNC: 74 MG/DL
NONHDLC SERPL-MCNC: 96 MG/DL
POTASSIUM SERPL-SCNC: 4.2 MMOL/L (ref 3.4–5.3)
PROT SERPL-MCNC: 6.9 G/DL (ref 6.8–8.8)
SODIUM SERPL-SCNC: 145 MMOL/L (ref 133–144)
T4 FREE SERPL-MCNC: 1.33 NG/DL (ref 0.76–1.46)
TRIGL SERPL-MCNC: 110 MG/DL
TSH SERPL DL<=0.005 MIU/L-ACNC: 0.05 MU/L (ref 0.4–4)

## 2017-03-11 RX ORDER — LEVOTHYROXINE SODIUM 100 UG/1
100 TABLET ORAL DAILY
Qty: 90 TABLET | Refills: 3 | Status: SHIPPED | OUTPATIENT
Start: 2017-03-11 | End: 2018-03-09

## 2017-05-09 DIAGNOSIS — M25.551 HIP PAIN, RIGHT: ICD-10-CM

## 2017-05-09 RX ORDER — HYDROCODONE BITARTRATE AND ACETAMINOPHEN 5; 325 MG/1; MG/1
TABLET ORAL
Qty: 80 TABLET | Refills: 0 | Status: SHIPPED | OUTPATIENT
Start: 2017-05-09 | End: 2017-06-19

## 2017-05-09 NOTE — TELEPHONE ENCOUNTER
Norco      Last Written Prescription Date:  3/6/17  Last Fill Quantity: 80,   # refills: 0  Last Office Visit with [facility] prescribing provider: 3/8/17  Future Office visit:    Next 5 appointments (look out 90 days)     May 16, 2017  8:30 AM CDT   Return Visit with Francisco Oakley MD   [facility] ([facility]    [id number] [place] [address] [phone]                   Routing refill request to provider for review/approval because:  Drug not on the [facility], [facility] refill protocol or controlled substance

## 2017-05-16 ENCOUNTER — OFFICE VISIT (OUTPATIENT)
Dept: CARDIOLOGY | Facility: CLINIC | Age: 82
End: 2017-05-16
Attending: INTERNAL MEDICINE
Payer: COMMERCIAL

## 2017-05-16 VITALS
HEART RATE: 68 BPM | SYSTOLIC BLOOD PRESSURE: 132 MMHG | HEIGHT: 62 IN | BODY MASS INDEX: 35.43 KG/M2 | WEIGHT: 192.5 LBS | DIASTOLIC BLOOD PRESSURE: 64 MMHG

## 2017-05-16 DIAGNOSIS — I25.10 CORONARY ARTERY DISEASE INVOLVING NATIVE CORONARY ARTERY OF NATIVE HEART WITHOUT ANGINA PECTORIS: ICD-10-CM

## 2017-05-16 DIAGNOSIS — E78.5 HYPERLIPIDEMIA WITH TARGET LDL LESS THAN 70: ICD-10-CM

## 2017-05-16 DIAGNOSIS — R06.09 DYSPNEA ON EXERTION: Primary | ICD-10-CM

## 2017-05-16 LAB
ALT SERPL W P-5'-P-CCNC: 18 U/L (ref 0–50)
CHOLEST SERPL-MCNC: 142 MG/DL
HDLC SERPL-MCNC: 54 MG/DL
LDLC SERPL CALC-MCNC: 63 MG/DL
NONHDLC SERPL-MCNC: 88 MG/DL
TRIGL SERPL-MCNC: 127 MG/DL

## 2017-05-16 PROCEDURE — 84460 ALANINE AMINO (ALT) (SGPT): CPT | Performed by: NURSE PRACTITIONER

## 2017-05-16 PROCEDURE — 36415 COLL VENOUS BLD VENIPUNCTURE: CPT | Performed by: INTERNAL MEDICINE

## 2017-05-16 PROCEDURE — 99214 OFFICE O/P EST MOD 30 MIN: CPT | Performed by: INTERNAL MEDICINE

## 2017-05-16 PROCEDURE — 80061 LIPID PANEL: CPT | Performed by: NURSE PRACTITIONER

## 2017-05-16 NOTE — PROGRESS NOTES
REASON FOR VISIT:  Followup for coronary artery disease.      HISTORY OF PRESENT ILLNESS:  I had the pleasure of seeing Elizabeth Li, at the [facility] in [place] this morning.      She is a very pleasant 81-year-old female with coronary artery disease, hypertension and hyperlipidemia.  We saw her a few years ago for an evaluation of progressive dyspnea on exertion.  At that time she had a stress test as well as CTA which were abnormal.  She subsequently had coronary angiogram which demonstrated severe 3-vessel coronary disease.  Specifically, she had a high-grade stenosis in her mid LAD.  The right coronary was chronically occluded.  She subsequently underwent PCI with drug-eluting stent placements in the mid LAD.  Given the chronic nature of the RCA, we decided to not intervene on it.      Her dyspnea on exertion has improved significantly after her PCI.  However, over the last month or so.  She has been noticing more shortness of breath with activity.  This is in the setting of significant hip and lower back pain due to sciatica.  She has been essentially sedentary because of pain related to sciatica.  She was initiated on gabapentin.  She tells me this medication made her legs swell.  She also noticed shortness of breath with this medication.  She thinks the shortness of breath is worse because of the gabapentin.  She reports improved symptoms since she discontinued the gabapentin.  She denies exertional chest pain or chest pressure.  She denies palpitations, presyncope or syncope.      IMPRESSION AND PLAN:   1.  Coronary artery disease, status post prior PCI with drug-eluting stent placement in the LAD.   2.  Chronically occluded right coronary artery.   3.  Hyperlipidemia.   4.  Hypertension.   5.  Chronic dyspnea on exertion, worse over the last month.      She is endorsing a little more shortness of breath over the last month which coincides with the initiation of  gabapentin.  The gabapentin has been discontinued and her symptoms are currently improving.  She also has not been as active lately because of hip and lower back pain.  The dyspnea could be due to deconditioning or perhaps related to the gabapentin, however, given her underlying coronary artery disease, it is possible that this is related to ischemia.     We discussed potential diagnostic and therapeutic options moving forward.  We will obtain an echocardiogram to reassess her LV function and to look for new wall motion abnormality and assess her PA pressures.  She will continue to monitor her symptoms.  If her symptoms completely resolve and if the echo shows no significant abnormality, then no further cardiac testing is warranted.  However, if the symptoms persist or the echo shows significant change, then we would likely proceed with a stress test or coronary angiogram to make sure her coronary disease has not progressed.      Her risk factors are well controlled with the current medical program.  I have recommended that she continue the current medical program.  I appreciate the opportunity to be part of this patient's care.      cc:      Kasi Watkins MD   [facility]   [address]   [place] [id number]         ELIZABETH VAZQUEZ MD             D: 2017 09:48   T: 2017 17:03   MT: KOTA      Name:     AMARI AVILEZ   MRN:      [id number]        Account:      [id number]   :      1935           Service Date: 2017      Document: [id number]

## 2017-05-16 NOTE — PROGRESS NOTES
HPI and Plan:   See dictation    Orders Placed This Encounter   Procedures     Echocardiogram       No orders of the defined types were placed in this encounter.      Medications Discontinued During This Encounter   Medication Reason     folic acid (FOLVITE) 400 MCG tablet Stopped by Patient     gabapentin (NEURONTIN) 100 MG capsule Discontinued by another Health Care Provider         Encounter Diagnoses   Name Primary?     Coronary artery disease involving native coronary artery of native heart without angina pectoris      Dyspnea on exertion Yes       CURRENT MEDICATIONS:  Current Outpatient Prescriptions   Medication Sig Dispense Refill     HYDROcodone-acetaminophen (NORCO) 5-325 MG per tablet 1 tab every 4-6 hr if needed for pain. (Patient taking differently: Take 2 tablets by mouth nightly as needed ) 80 tablet 0     levothyroxine (SYNTHROID/LEVOTHROID) 100 MCG tablet Take 1 tablet (100 mcg) by mouth daily 90 tablet 3     simvastatin (ZOCOR) 20 MG tablet Take 1 tablet (20 mg) by mouth At Bedtime 90 tablet 3     isosorbide mononitrate (IMDUR) 30 MG 24 hr tablet Take 1 tablet (30 mg) by mouth daily 90 tablet 3     metoprolol (LOPRESSOR) 25 MG tablet Take 0.5 tablets (12.5 mg) by mouth 2 times daily 90 tablet 3     ketoconazole (NIZORAL) 2 % shampoo Apply to the affected area and wash off after 5 minutes. 120 mL 1     aspirin 81 MG tablet Take 1 tablet (81 mg) by mouth daily 90 tablet 3     ascorbic acid (VITAMIN C) 500 MG tablet Take 500 mg by mouth daily.       Cholecalciferol (VITAMIN D) 1000 UNIT capsule Take 1 capsule by mouth daily.         ALLERGIES     Allergies   Allergen Reactions     Flu Virus Vaccine Other (See Comments)     Viral SX       PAST MEDICAL HISTORY:  Past Medical History:   Diagnosis Date     Abnormal cardiovascular stress test     EKG changes with exercise on Exercise stress test 1/26/2015     CAD (coronary artery disease)     Severe 2 vessel CAD. PCI with drug-eluting stents x2 to mid LAD  on 3/27/15     HTN (hypertension)      Hyperlipidemia LDL goal <100      Hypothyroidism      IHD (ischemic heart disease)      KUSUM (obstructive sleep apnea)     CPAP       PAST SURGICAL HISTORY:  Past Surgical History:   Procedure Laterality Date     CARDIAC SURGERY      2 heart stents     COLONOSCOPY       COLONOSCOPY N/A 1/11/2017    Procedure: COLONOSCOPY;  Surgeon: Nghia Moss MD;  Location:  GI     HEAD & NECK SURGERY      wisdom teeth removed     HEART CATH, ANGIOPLASTY  3/27/15    2.5 X 18 mm & 3.0 X mm LUCILA to LAD     TONSILLECTOMY      T&A as a child     TUBAL LIGATION         FAMILY HISTORY:  Family History   Problem Relation Age of Onset     Blood Disease Mother      pernious anemia     HEART DISEASE Father      HEART DISEASE Sister      HEART DISEASE Brother      Colon Cancer No family hx of        SOCIAL HISTORY:  Social History     Social History     Marital status:      Spouse name: N/A     Number of children: 4     Years of education: N/A     Social History Main Topics     Smoking status: Former Smoker     Quit date: 1/1/1984     Smokeless tobacco: Never Used     Alcohol use 0.0 oz/week     0 Standard drinks or equivalent per week      Comment: social drinker     Drug use: No     Sexual activity: No     Other Topics Concern     Caffeine Concern Yes     coffee      Occupational Exposure No     Hobby Hazards No     Sleep Concern No     Stress Concern No     Weight Concern No     Special Diet Yes     gluten free and dairy free     Back Care No     Exercise No     on hold , 4/7 starting cardiac rehab     Seat Belt Yes     Social History Narrative       Review of Systems:  Skin:  Positive for itching;rash dry and thin skin; itching/rash on forehead and nose   Eyes:  Positive for glasses    ENT:  Negative      Respiratory:  Positive for dyspnea on exertion;sleep apnea;CPAP dyspnea has improved since stopping gabapentin   Cardiovascular:    Positive for;edema;fatigue    Gastroenterology:  "Positive for excessive gas or bloating    Genitourinary:  Negative      Musculoskeletal:  Positive for joint pain;joint stiffness hips, knees  Neurologic:  Positive for numbness or tingling of hands finger tips  Psychiatric:  Positive for sleep disturbances due to hip pain  Heme/Lymph/Imm:  Positive for allergies;easy bruising    Endocrine:  Positive for thyroid disorder;hot flashes      Physical Exam:  Vitals: /64 (BP Location: Right arm, Patient Position: Chair, Cuff Size: Adult Large)  Pulse 68  Ht 1.575 m (5' 2\")  Wt 87.3 kg (192 lb 8 oz)  BMI 35.21 kg/m2    Constitutional:  cooperative;in no acute distress        Skin:  warm and dry to the touch;no apparent skin lesions or masses noted        Head:  normocephalic        Eyes:           ENT:  no pallor or cyanosis        Neck:  carotid pulses are full and equal bilaterally;JVP normal;no carotid bruit        Chest:  clear to auscultation          Cardiac: regular rhythm;normal S1 and S2;apical impulse not displaced                  Abdomen:  abdomen soft;non-tender obese      Vascular: pulses full and equal;pulses full and equal, no bruits auscultated                                        Extremities and Back:  no edema;no deformities, clubbing, cyanosis, erythema observed              Neurological:  no gross motor deficits;affect appropriate, oriented to time, person and place              CC  Francisco Oakley MD   [facility]  [address]  [place] [address]              "

## 2017-05-16 NOTE — MR AVS SNAPSHOT
After Visit Summary   5/16/2017    Elizabeth Li    MRN: [id number]           Patient Information     Date Of Birth          1935        Visit Information        Provider Department      5/16/2017 8:30 AM Francisco Oakley MD [facility] [place]        Today's Diagnoses     Dyspnea on exertion    -  1    Coronary artery disease involving native coronary artery of native heart without angina pectoris           Follow-ups after your visit        Your next 10 appointments already scheduled     Jun 01, 2017  1:00 PM CDT   Ech Complete with [id number] ([facility])    [address] [facility] [address]  [facility] [id number] [phone]           1.  Please bring or wear a comfortable two-piece outfit. 2.  You may eat, drink and take your normal medicines. 3.  For any questions that cannot be answered, please contact the ordering physician ***Please check-in at the [place] Registration Office located in [address] in the [facility] building. When you are finished registering, please go to [address] and have a seat. The technician will call your name for the test.              Future tests that were ordered for you today     Open Future Orders        Priority Expected Expires Ordered    Echocardiogram Routine 5/23/2017 5/16/2018 5/16/2017            Who to contact     If you have questions or need follow up information about today's clinic visit or your schedule please contact [facility] [place] directly at [phone].  Normal or non-critical lab and imaging results will be communicated to you by MyChart, letter or phone within 4 business days after the clinic has received the results. If you do not hear from us within 7 days, please contact the clinic through MyChart or phone. If you have a critical or abnormal lab result, we will notify you by phone as soon as  "possible.  Submit refill requests through [employer] or call your pharmacy and they will forward the refill request to us. Please allow 3 business days for your refill to be completed.          Additional Information About Your Visit        luma-idhart Information     [employer] gives you secure access to your electronic health record. If you see a primary care provider, you can also send messages to your care team and make appointments. If you have questions, please call your primary care clinic.  If you do not have a primary care provider, please call [phone] and they will assist you.        Care EveryWhere ID     This is your Care EveryWhere ID. This could be used by other organizations to access your [place] medical records  [id number]        Your Vitals Were     Pulse Height BMI (Body Mass Index)             68 1.575 m (5' 2\") 35.21 kg/m2          Blood Pressure from Last 3 Encounters:   05/16/17 132/64   03/08/17 122/50   01/23/17 130/56    Weight from Last 3 Encounters:   05/16/17 87.3 kg (192 lb 8 oz)   03/08/17 82.7 kg (182 lb 4.8 oz)   01/23/17 84.4 kg (186 lb)              We Performed the Following     Follow-Up with Cardiologist          Today's Medication Changes          These changes are accurate as of: 5/16/17  9:07 AM.  If you have any questions, ask your nurse or doctor.               These medicines have changed or have updated prescriptions.        Dose/Directions    HYDROcodone-acetaminophen 5-325 MG per tablet   Commonly known as:  NORCO   This may have changed:    - how much to take  - how to take this  - when to take this  - reasons to take this  - additional instructions   Used for:  Hip pain, right        1 tab every 4-6 hr if needed for pain.   Quantity:  80 tablet   Refills:  0                Primary Care Provider Office Phone # Fax #    Kasi Watkins [phone]       [place] [address] [facility] [address]        Thank you!     Thank you for " choosing [facility] [place]  for your care. Our goal is always to provide you with excellent care. Hearing back from our patients is one way we can continue to improve our services. Please take a few minutes to complete the written survey that you may receive in the mail after your visit with us. Thank you!             Your Updated Medication List - Protect others around you: Learn how to safely use, store and throw away your medicines at www.disposemymeds.org.          This list is accurate as of: 5/16/17  9:07 AM.  Always use your most recent med list.                   Brand Name Dispense Instructions for use    ascorbic acid 500 MG tablet    VITAMIN C     Take 500 mg by mouth daily.       aspirin 81 MG tablet     90 tablet    Take 1 tablet (81 mg) by mouth daily       HYDROcodone-acetaminophen 5-325 MG per tablet    NORCO    80 tablet    1 tab every 4-6 hr if needed for pain.       isosorbide mononitrate 30 MG 24 hr tablet    IMDUR    90 tablet    Take 1 tablet (30 mg) by mouth daily       ketoconazole 2 % shampoo    NIZORAL    120 mL    Apply to the affected area and wash off after 5 minutes.       levothyroxine 100 MCG tablet    SYNTHROID/LEVOTHROID    90 tablet    Take 1 tablet (100 mcg) by mouth daily       metoprolol 25 MG tablet    LOPRESSOR    90 tablet    Take 0.5 tablets (12.5 mg) by mouth 2 times daily       simvastatin 20 MG tablet    ZOCOR    90 tablet    Take 1 tablet (20 mg) by mouth At Bedtime       vitamin D 1000 UNITS capsule      Take 1 capsule by mouth daily.

## 2017-05-16 NOTE — LETTER
5/16/2017    Kasi Watkins MD  [address] [facility] [address]    RE: Elizabeth Li       Dear Colleague,    REASON FOR VISIT:  Followup for coronary artery disease.      I had the pleasure of seeing Elizabeth Li, at the [facility] in [place] this morning.      She is a very pleasant 81-year-old female with coronary artery disease, hypertension and hyperlipidemia.  We saw her a few years ago for an evaluation of progressive dyspnea on exertion.  At that time she had a stress test as well as CTA which were abnormal.  She subsequently had coronary angiogram which demonstrated severe 3-vessel coronary disease.  Specifically, she had a high-grade stenosis in her mid LAD.  The right coronary was chronically occluded.  She subsequently underwent PCI with drug-eluting stent placements in the mid LAD.  Given the chronic nature of the RCA, we decided to not intervene on it.      Her dyspnea on exertion has improved significantly after her PCI.  However, over the last month or so.  She has been noticing more shortness of breath with activity.  This is in the setting of significant hip and lower back pain due to sciatica.  She has been essentially sedentary because of pain related to sciatica.  She was initiated on gabapentin.  She tells me this medication made her legs swell.  She also noticed shortness of breath with this medication.  She thinks the shortness of breath is worse because of the gabapentin.  She reports improved symptoms since she discontinued the gabapentin.  She denies exertional chest pain or chest pressure.  She denies palpitations, presyncope or syncope.     Outpatient Encounter Prescriptions as of 5/16/2017   Medication Sig Dispense Refill     [DISCONTINUED] HYDROcodone-acetaminophen (NORCO) 5-325 MG per tablet 1 tab every 4-6 hr if needed for pain. (Patient taking differently: Take 2 tablets by mouth nightly as needed ) 80 tablet 0     levothyroxine  (SYNTHROID/LEVOTHROID) 100 MCG tablet Take 1 tablet (100 mcg) by mouth daily 90 tablet 3     simvastatin (ZOCOR) 20 MG tablet Take 1 tablet (20 mg) by mouth At Bedtime 90 tablet 3     isosorbide mononitrate (IMDUR) 30 MG 24 hr tablet Take 1 tablet (30 mg) by mouth daily 90 tablet 3     metoprolol (LOPRESSOR) 25 MG tablet Take 0.5 tablets (12.5 mg) by mouth 2 times daily 90 tablet 3     ketoconazole (NIZORAL) 2 % shampoo Apply to the affected area and wash off after 5 minutes. 120 mL 1     aspirin 81 MG tablet Take 1 tablet (81 mg) by mouth daily 90 tablet 3     ascorbic acid (VITAMIN C) 500 MG tablet Take 500 mg by mouth daily.       Cholecalciferol (VITAMIN D) 1000 UNIT capsule Take 1 capsule by mouth daily.       [DISCONTINUED] gabapentin (NEURONTIN) 100 MG capsule Take 1 capsule (100 mg) by mouth 3 times daily Start one at bedtime , increase by 1 capsule every week up to 4 capsules daily. 90 capsule 3     [DISCONTINUED] folic acid (FOLVITE) 400 MCG tablet Take 400 mcg by mouth daily.       No facility-administered encounter medications on file as of 5/16/2017.       IMPRESSION AND PLAN:   1.  Coronary artery disease, status post prior PCI with drug-eluting stent placement in the LAD.   2.  Chronically occluded right coronary artery.   3.  Hyperlipidemia.   4.  Hypertension.   5.  Chronic dyspnea on exertion, worse over the last month.      She is endorsing a little more shortness of breath over the last month which coincides with the initiation of gabapentin.  The gabapentin has been discontinued and her symptoms are currently improving.  She also has not been as active lately because of hip and lower back pain.  The dyspnea could be due to deconditioning or perhaps related to the gabapentin, however, given her underlying coronary artery disease, it is possible that this is related to ischemia.     We discussed potential diagnostic and therapeutic options moving forward.  We will obtain an echocardiogram to  reassess her LV function and to look for new wall motion abnormality and assess her PA pressures.  She will continue to monitor her symptoms.  If her symptoms completely resolve and if the echo shows no significant abnormality, then no further cardiac testing is warranted.  However, if the symptoms persist or the echo shows significant change, then we would likely proceed with a stress test or coronary angiogram to make sure her coronary disease has not progressed.      Her risk factors are well controlled with the current medical program.  I have recommended that she continue the current medical program.  I appreciate the opportunity to be part of this patient's care.     Sincerely,    Francisco Oakley MD    [facility]

## 2017-06-01 ENCOUNTER — HOSPITAL ENCOUNTER (OUTPATIENT)
Dept: CARDIOLOGY | Facility: CLINIC | Age: 82
Discharge: HOME | End: 2017-06-01
Attending: INTERNAL MEDICINE | Admitting: INTERNAL MEDICINE
Payer: COMMERCIAL

## 2017-06-01 DIAGNOSIS — R06.09 DYSPNEA ON EXERTION: ICD-10-CM

## 2017-06-01 DIAGNOSIS — I25.10 CORONARY ARTERY DISEASE INVOLVING NATIVE CORONARY ARTERY OF NATIVE HEART WITHOUT ANGINA PECTORIS: ICD-10-CM

## 2017-06-01 PROCEDURE — 93306 TTE W/DOPPLER COMPLETE: CPT

## 2017-06-01 PROCEDURE — 93306 TTE W/DOPPLER COMPLETE: CPT | Mod: 26 | Performed by: INTERNAL MEDICINE

## 2017-06-19 DIAGNOSIS — M25.551 HIP PAIN, RIGHT: ICD-10-CM

## 2017-06-19 RX ORDER — HYDROCODONE BITARTRATE AND ACETAMINOPHEN 5; 325 MG/1; MG/1
2 TABLET ORAL
Qty: 30 TABLET | Refills: 0 | Status: SHIPPED | OUTPATIENT
Start: 2017-06-19 | End: 2017-08-17

## 2017-06-19 NOTE — TELEPHONE ENCOUNTER
Norco 5-325      Last Written Prescription Date: 05/09/2017  Last Fill Quantity: 80,  # refills: 0   Last Office Visit with [facility], [facility] or [facility] prescribing provider: 03/08/2017    Thanks!    Alia Pranke, Float Technician   [place] for [facility]

## 2017-07-19 ENCOUNTER — TRANSFERRED RECORDS (OUTPATIENT)
Dept: HEALTH INFORMATION MANAGEMENT | Facility: CLINIC | Age: 82
End: 2017-07-19

## 2017-08-01 ENCOUNTER — TELEPHONE (OUTPATIENT)
Dept: INTERNAL MEDICINE | Facility: CLINIC | Age: 82
End: 2017-08-01

## 2017-08-01 DIAGNOSIS — R79.89 LOW TSH LEVEL: Primary | ICD-10-CM

## 2017-08-01 NOTE — TELEPHONE ENCOUNTER
"Per 03/08/17 T4 free and TSH with reflex to T4 free result note: \"recheck in 3 months TSH\". TSH ordered per note. Per note below pt does not request call back.  "

## 2017-08-01 NOTE — TELEPHONE ENCOUNTER
Reason for Call: Request for an order or referral:    Order or referral being requested: thyroid lab orders    Date needed: as soon as possible appt 08/03    Has the patient been seen by the PCP for this problem? YES    Additional comments: no need to call pt    Phone number Patient can be reached at:  Home number on file [phone] (home)    Best Time:  n/a    Can we leave a detailed message on this number?  Not Applicable    Call taken on 8/1/2017 at 10:21 AM by Sharon Valadez

## 2017-08-03 DIAGNOSIS — R79.89 LOW TSH LEVEL: ICD-10-CM

## 2017-08-03 LAB — TSH SERPL DL<=0.005 MIU/L-ACNC: 0.53 MU/L (ref 0.4–4)

## 2017-08-03 PROCEDURE — 36415 COLL VENOUS BLD VENIPUNCTURE: CPT | Performed by: INTERNAL MEDICINE

## 2017-08-03 PROCEDURE — 84443 ASSAY THYROID STIM HORMONE: CPT | Performed by: INTERNAL MEDICINE

## 2017-08-17 DIAGNOSIS — M25.551 HIP PAIN, RIGHT: ICD-10-CM

## 2017-08-17 NOTE — TELEPHONE ENCOUNTER
"Norco 5-325 mg      Last Written Prescription Date: 06/19/2017  Last Fill Quantity: 30,  # refills: 0   Last Office Visit with [facility], P or [facility] prescribing provider: 05/16/2017    Patient states she hasn't been able to get in the for \"pain shot\" and would like a refill on this medication until she can get in at the end of the month    Alia Pranke, PharmacyTechnician   [facility]                                                   "

## 2017-08-18 RX ORDER — HYDROCODONE BITARTRATE AND ACETAMINOPHEN 5; 325 MG/1; MG/1
2 TABLET ORAL
Qty: 30 TABLET | Refills: 0 | Status: SHIPPED | OUTPATIENT
Start: 2017-08-18 | End: 2017-09-12

## 2017-09-12 DIAGNOSIS — M25.551 HIP PAIN, RIGHT: ICD-10-CM

## 2017-09-12 RX ORDER — HYDROCODONE BITARTRATE AND ACETAMINOPHEN 5; 325 MG/1; MG/1
2 TABLET ORAL
Qty: 30 TABLET | Refills: 0 | Status: SHIPPED | OUTPATIENT
Start: 2017-09-12 | End: 2017-11-14

## 2017-09-12 NOTE — TELEPHONE ENCOUNTER
norco 5/325mg      Last Written Prescription Date: 08/18/17  Last Fill Quantity: 30,  # refills: 0   Last Office Visit with G, P or [facility] prescribing provider: 03/08/17

## 2017-10-13 ENCOUNTER — TRANSFERRED RECORDS (OUTPATIENT)
Dept: HEALTH INFORMATION MANAGEMENT | Facility: CLINIC | Age: 82
End: 2017-10-13

## 2017-11-14 ENCOUNTER — OFFICE VISIT (OUTPATIENT)
Dept: INTERNAL MEDICINE | Facility: CLINIC | Age: 82
End: 2017-11-14
Payer: COMMERCIAL

## 2017-11-14 VITALS
BODY MASS INDEX: 36.16 KG/M2 | SYSTOLIC BLOOD PRESSURE: 124 MMHG | TEMPERATURE: 98.3 F | DIASTOLIC BLOOD PRESSURE: 56 MMHG | RESPIRATION RATE: 16 BRPM | HEIGHT: 62 IN | HEART RATE: 71 BPM | OXYGEN SATURATION: 95 % | WEIGHT: 196.5 LBS

## 2017-11-14 DIAGNOSIS — H69.90 DYSFUNCTION OF EUSTACHIAN TUBE, UNSPECIFIED LATERALITY: Primary | ICD-10-CM

## 2017-11-14 PROCEDURE — 99213 OFFICE O/P EST LOW 20 MIN: CPT | Performed by: NURSE PRACTITIONER

## 2017-11-14 RX ORDER — FLUTICASONE PROPIONATE 50 MCG
1-2 SPRAY, SUSPENSION (ML) NASAL DAILY
Qty: 1 BOTTLE | Refills: 11 | Status: SHIPPED | OUTPATIENT
Start: 2017-11-14 | End: 2018-03-09

## 2017-11-14 RX ORDER — CALCIUM CARBONATE 500(1250)
1 TABLET ORAL DAILY
COMMUNITY
End: 2018-03-09

## 2017-11-14 NOTE — PROGRESS NOTES
SUBJECTIVE:   Elizabeth Li is a 82 year old female who presents to clinic today for the following health issues:      Dizziness      Duration: 2 weeks    Description   Feeling faint:  no   Feeling like the surroundings are moving: no,  A moment of dizziness when turning over in bed  Loss of consciousness or falls: no     Intensity:  mild    Accompanying signs and symptoms:   Nausea/vomitting: no   Palpitations: no   Weakness in arms or legs: no   Vision or speech changes: no   Ringing in ears (Tinnitus): no   Hearing loss related to dizziness: no   Other (fevers/chills/sweating/dyspnea): no     History (similar episodes/head trauma/previous evaluation/recent bleeding): None    Precipitating or alleviating factors (new meds/chemicals): None  Worse with activity/head movement: YES    Therapies tried and outcome: None      Patient Active Problem List   Diagnosis     Advanced directives, counseling/discussion     Hypothyroidism     Hyperlipidemia LDL goal <100     KUSUM (obstructive sleep apnea)     IHD (ischemic heart disease)     Mild major depression (H)     Abnormal cardiovascular stress test     Anxiety     Status post coronary angiogram     CAD (coronary artery disease)     DDD (degenerative disc disease), lumbar     Benign essential hypertension     Past Surgical History:   Procedure Laterality Date     CARDIAC SURGERY      2 heart stents     COLONOSCOPY       COLONOSCOPY N/A 1/11/2017    Procedure: COLONOSCOPY;  Surgeon: Nghia Moss MD;  Location:  GI     HEAD & NECK SURGERY      wisdom teeth removed     HEART CATH, ANGIOPLASTY  3/27/15    2.5 X 18 mm & 3.0 X mm LUCILA to LAD     TONSILLECTOMY      T&A as a child     TUBAL LIGATION         Social History   Substance Use Topics     Smoking status: Former Smoker     Quit date: 1/1/1984     Smokeless tobacco: Never Used     Alcohol use 0.0 oz/week     0 Standard drinks or equivalent per week      Comment: social drinker     Family History   Problem  Relation Age of Onset     Blood Disease Mother      pernious anemia     HEART DISEASE Father      HEART DISEASE Sister      HEART DISEASE Brother      Colon Cancer No family hx of          Current Outpatient Prescriptions   Medication Sig Dispense Refill     calcium carbonate (OS-FRANKLIN 500 MG [place]. [place]) 1250 MG tablet Take 1 tablet by mouth daily       fluticasone (FLONASE) 50 MCG/ACT spray Spray 1-2 sprays into both nostrils daily 1 Bottle 11     levothyroxine (SYNTHROID/LEVOTHROID) 100 MCG tablet Take 1 tablet (100 mcg) by mouth daily 90 tablet 3     simvastatin (ZOCOR) 20 MG tablet Take 1 tablet (20 mg) by mouth At Bedtime 90 tablet 3     isosorbide mononitrate (IMDUR) 30 MG 24 hr tablet Take 1 tablet (30 mg) by mouth daily 90 tablet 3     metoprolol (LOPRESSOR) 25 MG tablet Take 0.5 tablets (12.5 mg) by mouth 2 times daily 90 tablet 3     ketoconazole (NIZORAL) 2 % shampoo Apply to the affected area and wash off after 5 minutes. 120 mL 1     aspirin 81 MG tablet Take 1 tablet (81 mg) by mouth daily 90 tablet 3     ascorbic acid (VITAMIN C) 500 MG tablet Take 500 mg by mouth daily.       Cholecalciferol (VITAMIN D) 1000 UNIT capsule Take 1 capsule by mouth 2 times daily        BP Readings from Last 3 Encounters:   11/14/17 124/56   05/16/17 132/64   03/08/17 122/50    Wt Readings from Last 3 Encounters:   11/14/17 196 lb 8 oz (89.1 kg)   05/16/17 192 lb 8 oz (87.3 kg)   03/08/17 182 lb 4.8 oz (82.7 kg)                        Reviewed and updated as needed this visit by clinical staffTobacco  Allergies  Meds  Med Hx  Surg Hx  Fam Hx  Soc Hx      Reviewed and updated as needed this visit by Provider         ROS:  C: NEGATIVE for fever, chills, change in weight  ENT/MOUTH: POSITIVE for nasal congestion  R: NEGATIVE for significant cough or SOB  CV: NEGATIVE for chest pain, palpitations or peripheral edema    OBJECTIVE:     /56 (BP Location: Right arm, Patient Position: Sitting, Cuff Size: Adult Large)   "Pulse 71  Temp 98.3  F (36.8  C) (Oral)  Resp 16  Ht 5' 2\" (1.575 m)  Wt 196 lb 8 oz (89.1 kg)  SpO2 95%  BMI 35.94 kg/m2  Body mass index is 35.94 kg/(m^2).  GENERAL: healthy, alert and no distress  HENT: ear canals and TM's normal, nose and mouth without ulcers or lesions  HENT: nasal mucosa edematous   NECK: no adenopathy, no asymmetry, masses, or scars and thyroid normal to palpation  RESP: lungs clear to auscultation - no rales, rhonchi or wheezes  CV: regular rate and rhythm, normal S1 S2, no S3 or S4, no murmur, click or rub, no peripheral edema and peripheral pulses strong        ASSESSMENT/PLAN:               ICD-10-CM    1. Dysfunction of Eustachian tube, unspecified laterality H69.80 fluticasone (FLONASE) 50 MCG/ACT spray       Patient Instructions   OTC meclazine as needed for dizziness  Bethany Seth, ELISEO  [facility]    "

## 2017-11-14 NOTE — MR AVS SNAPSHOT
"              After Visit Summary   11/14/2017    Elizabeth Li    MRN: [id number]           Patient Information     Date Of Birth          1935        Visit Information        Provider Department      11/14/2017 7:40 AM Bethany Seth NP [facility]        Today's Diagnoses     Dysfunction of Eustachian tube, unspecified laterality    -  1      Care Instructions    OTC meclazine as needed for dizziness  Bethany Seth CNP            Follow-ups after your visit        Who to contact     If you have questions or need follow up information about today's clinic visit or your schedule please contact [facility] directly at [phone].  Normal or non-critical lab and imaging results will be communicated to you by Int[employer] Solutionshart, letter or phone within 4 business days after the clinic has received the results. If you do not hear from us within 7 days, please contact the clinic through [employer] or phone. If you have a critical or abnormal lab result, we will notify you by phone as soon as possible.  Submit refill requests through [employer] or call your pharmacy and they will forward the refill request to us. Please allow 3 business days for your refill to be completed.          Additional Information About Your Visit        MyChart Information     [employer] gives you secure access to your electronic health record. If you see a primary care provider, you can also send messages to your care team and make appointments. If you have questions, please call your primary care clinic.  If you do not have a primary care provider, please call [phone] and they will assist you.        Care EveryWhere ID     This is your Care EveryWhere ID. This could be used by other organizations to access your [place] medical records  [id number]        Your Vitals Were     Pulse Temperature Respirations Height Pulse Oximetry BMI (Body Mass Index)    71 98.3  F (36.8  C) (Oral) 16 5' 2\" (1.575 m) 95% 35.94 " kg/m2       Blood Pressure from Last 3 Encounters:   11/14/17 124/56   05/16/17 132/64   03/08/17 122/50    Weight from Last 3 Encounters:   11/14/17 196 lb 8 oz (89.1 kg)   05/16/17 192 lb 8 oz (87.3 kg)   03/08/17 182 lb 4.8 oz (82.7 kg)              Today, you had the following     No orders found for display         Today's Medication Changes          These changes are accurate as of: 11/14/17  8:08 AM.  If you have any questions, ask your nurse or doctor.               Start taking these medicines.        Dose/Directions    fluticasone 50 MCG/ACT spray   Commonly known as:  FLONASE   Used for:  Dysfunction of Eustachian tube, unspecified laterality   Started by:  Bethany Seth, NP        Dose:  1-2 spray   Spray 1-2 sprays into both nostrils daily   Quantity:  1 Bottle   Refills:  11            Where to get your medicines      These medications were sent to [facility] [place] [address]  [facility] [address] [facility] [address]     Phone:  [phone]     fluticasone 50 MCG/ACT spray                Primary Care Provider Office Phone # Fax #    Kasi Watkins [phone]       [address]  [facility] [address]        Equal Access to Services     ANGELINE ROTH AH: Hadii aad ku hadasho Soomaali, waaxda luqadaha, qaybta kaalmada adeegyada, tommy urbina. So [facility] [phone].    ATENCIÓN: Si habla español, tiene a hopper disposición servicios gratuitos de asistencia lingüística. [place] al [phone].    We comply with applicable federal civil rights laws and [place] laws. We do not discriminate on the basis of race, color, national origin, age, disability, sex, sexual orientation, or gender identity.            Thank you!     Thank you for choosing [facility]  for your care. Our goal is always to provide you with excellent care. Hearing back from our patients is one way we can continue to improve  our services. Please take a few minutes to complete the written survey that you may receive in the mail after your visit with us. Thank you!             Your Updated Medication List - Protect others around you: Learn how to safely use, store and throw away your medicines at www.disposemymeds.org.          This list is accurate as of: 11/14/17  8:08 AM.  Always use your most recent med list.                   Brand Name Dispense Instructions for use Diagnosis    ascorbic acid 500 MG tablet    VITAMIN C     Take 500 mg by mouth daily.        aspirin 81 MG tablet     90 tablet    Take 1 tablet (81 mg) by mouth daily    Abnormal cardiovascular stress test       calcium carbonate 1250 MG tablet    OS-FRANKLIN 500 mg [place]     Take 1 tablet by mouth daily        fluticasone 50 MCG/ACT spray    FLONASE    1 Bottle    Spray 1-2 sprays into both nostrils daily    Dysfunction of Eustachian tube, unspecified laterality       isosorbide mononitrate 30 MG 24 hr tablet    IMDUR    90 tablet    Take 1 tablet (30 mg) by mouth daily    Coronary artery disease involving native coronary artery of native heart without angina pectoris, Status post coronary angioplasty       ketoconazole 2 % shampoo    NIZORAL    120 mL    Apply to the affected area and wash off after 5 minutes.    Dandruff       levothyroxine 100 MCG tablet    SYNTHROID/LEVOTHROID    90 tablet    Take 1 tablet (100 mcg) by mouth daily    Acquired hypothyroidism       metoprolol 25 MG tablet    LOPRESSOR    90 tablet    Take 0.5 tablets (12.5 mg) by mouth 2 times daily    Coronary artery disease involving native coronary artery without angina pectoris       simvastatin 20 MG tablet    ZOCOR    90 tablet    Take 1 tablet (20 mg) by mouth At Bedtime    Hyperlipidemia LDL goal <100       vitamin D 1000 UNITS capsule      Take 1 capsule by mouth 2 times daily

## 2017-11-14 NOTE — NURSING NOTE
"Chief Complaint   Patient presents with     Dizziness     dizzy at night for 2 week now getting worse.       Initial /56 (BP Location: Right arm, Patient Position: Sitting, Cuff Size: Adult Large)  Pulse 71  Temp 98.3  F (36.8  C) (Oral)  Resp 16  Ht 5' 2\" (1.575 m)  Wt 196 lb 8 oz (89.1 kg)  SpO2 95%  BMI 35.94 kg/m2 Estimated body mass index is 35.94 kg/(m^2) as calculated from the following:    Height as of this encounter: 5' 2\" (1.575 m).    Weight as of this encounter: 196 lb 8 oz (89.1 kg).  Medication Reconciliation: complete    "

## 2017-11-21 ENCOUNTER — TELEPHONE (OUTPATIENT)
Dept: NEUROSURGERY | Facility: CLINIC | Age: 82
End: 2017-11-21

## 2017-11-21 DIAGNOSIS — M54.16 LUMBAR RADICULAR PAIN: Primary | ICD-10-CM

## 2017-11-21 NOTE — TELEPHONE ENCOUNTER
REASON FOR CALL:  Pt saw Gail in Jan. 2017; was referred to get an MRI and injection. She went to [facility] for another opinion, and was recommended the same thing. After injections and PT with [facility] - the surgeon at [facility] recommended she have hip surgery and not any kind of back related surgery. She does not agree and would like to come to see Dr. Yost for another second opinion as to if she needs hip or back surgery- she feels she needs back surgery.   Her MRI and injections were done at [facility].     Detailed message can be left:  YES

## 2017-11-21 NOTE — TELEPHONE ENCOUNTER
"Per Gail Whaley, CNP  \" Since its a 2nd opinion she should get new scan and see Dr dolan. Make sure ALL [facility] notes are obtained.\"     New MRI ordered (ordered open since patient is claustrophobic) and faxed to [facility] in [place]. Our  scheduling staff will contact patient to schedule an appt with Dr Dolan and coordinate her getting MRI prior to that appt. Our office will work on obtaining all pertinent records from [facility].   "

## 2017-11-22 DIAGNOSIS — M54.50 BILATERAL LOW BACK PAIN WITHOUT SCIATICA, UNSPECIFIED CHRONICITY: Primary | ICD-10-CM

## 2017-11-22 RX ORDER — HYDROCODONE BITARTRATE AND ACETAMINOPHEN 5; 325 MG/1; MG/1
1-2 TABLET ORAL EVERY 4 HOURS PRN
Qty: 20 TABLET | Refills: 0 | Status: SHIPPED | OUTPATIENT
Start: 2017-11-22 | End: 2018-01-10

## 2017-11-22 NOTE — TELEPHONE ENCOUNTER
Pt called. Stated that she is having an open sided [facility]. Last time pt had an MRI it was so painful pt could not finish. Wants to know if June would give her a couple Vicodin to get her thru the MRI.

## 2017-11-28 ENCOUNTER — TRANSFERRED RECORDS (OUTPATIENT)
Dept: HEALTH INFORMATION MANAGEMENT | Facility: CLINIC | Age: 82
End: 2017-11-28

## 2018-01-02 ENCOUNTER — HOSPITAL ENCOUNTER (OUTPATIENT)
Dept: ULTRASOUND IMAGING | Facility: CLINIC | Age: 83
Discharge: HOME | End: 2018-01-02
Attending: INTERNAL MEDICINE | Admitting: INTERNAL MEDICINE
Payer: MEDICARE

## 2018-01-02 DIAGNOSIS — I25.10 CAD (CORONARY ARTERY DISEASE): ICD-10-CM

## 2018-01-02 PROCEDURE — 76775 US EXAM ABDO BACK WALL LIM: CPT

## 2018-01-10 ENCOUNTER — OFFICE VISIT (OUTPATIENT)
Dept: NEUROSURGERY | Facility: CLINIC | Age: 83
End: 2018-01-10
Attending: NEUROLOGICAL SURGERY
Payer: COMMERCIAL

## 2018-01-10 VITALS
HEIGHT: 62 IN | SYSTOLIC BLOOD PRESSURE: 147 MMHG | DIASTOLIC BLOOD PRESSURE: 73 MMHG | BODY MASS INDEX: 36.1 KG/M2 | OXYGEN SATURATION: 94 % | WEIGHT: 196.2 LBS | HEART RATE: 66 BPM

## 2018-01-10 DIAGNOSIS — M54.16 LUMBAR RADICULOPATHY: Primary | ICD-10-CM

## 2018-01-10 DIAGNOSIS — M43.10 ACQUIRED SPONDYLOLISTHESIS: ICD-10-CM

## 2018-01-10 DIAGNOSIS — M54.41 CHRONIC BILATERAL LOW BACK PAIN WITH RIGHT-SIDED SCIATICA: ICD-10-CM

## 2018-01-10 DIAGNOSIS — G89.29 CHRONIC BILATERAL LOW BACK PAIN WITH RIGHT-SIDED SCIATICA: ICD-10-CM

## 2018-01-10 DIAGNOSIS — M25.551 RIGHT HIP PAIN: ICD-10-CM

## 2018-01-10 DIAGNOSIS — I25.10 CORONARY ARTERY DISEASE INVOLVING NATIVE CORONARY ARTERY WITHOUT ANGINA PECTORIS: ICD-10-CM

## 2018-01-10 PROCEDURE — 99214 OFFICE O/P EST MOD 30 MIN: CPT | Performed by: NEUROLOGICAL SURGERY

## 2018-01-10 PROCEDURE — G0463 HOSPITAL OUTPT CLINIC VISIT: HCPCS | Performed by: NEUROLOGICAL SURGERY

## 2018-01-10 RX ORDER — METOPROLOL TARTRATE 25 MG/1
12.5 TABLET, FILM COATED ORAL 2 TIMES DAILY
Qty: 90 TABLET | Refills: 1 | Status: SHIPPED | OUTPATIENT
Start: 2018-01-10 | End: 2018-07-19

## 2018-01-10 ASSESSMENT — PAIN SCALES - GENERAL: PAINLEVEL: NO PAIN (1)

## 2018-01-10 NOTE — PROGRESS NOTES
Neurosurgery Follow Up Clinic Visit      Elizabeth Li returns for follow up visit regarding her LBP and hip and R > L leg pain    Currently the patient describes having LBP and bilateral hip and leg pain. She has been seen by Dr. Acosta and told that she needed a right hip arthroplasty and possibly lumbar surgery after. She is a poor historian and is unable to clearly describe her pain. She says it is worse at night and goes to her foot and she has had several cortisone injections. She has tried PT and spinal RAJAN have made it worse.    Exam is stable    MRI lumbar L3-4 DDD and L4-5 grade 1 spondylolisthesis without severe stenosis. She also has multilevel lumbar spondylosis      Plan:   Will obtain a EMG/NCV of her RLE  Will call patient with results  I do not recommend surgery at this time

## 2018-01-10 NOTE — LETTER
1/10/2018         RE: Elizabeth Li  [address]  [address]  [place] [address]        Dear Colleague,    Thank you for referring your patient, Elizabeth Li, to the [facility]. Please see a copy of my visit note below.    Neurosurgery Follow Up Clinic Visit      Elizabeth Li returns for follow up visit regarding her LBP and hip and R > L leg pain    Currently the patient describes having LBP and bilateral hip and leg pain. She has been seen by Dr. Acosta and told that she needed a right hip arthroplasty and possibly lumbar surgery after. She is a poor historian and is unable to clearly describe her pain. She says it is worse at night and goes to her foot and she has had several cortisone injections. She has tried PT and spinal RAJAN have made it worse.    Exam is stable    MRI lumbar L3-4 DDD and L4-5 grade 1 spondylolisthesis without severe stenosis. She also has multilevel lumbar spondylosis      Plan:   Will obtain a EMG/NCV of her RLE  Will call patient with results  I do not recommend surgery at this time            Again, thank you for allowing me to participate in the care of your patient.        Sincerely,        Rad Yost MD

## 2018-01-10 NOTE — MR AVS SNAPSHOT
After Visit Summary   1/10/2018    Elizabeth Li    MRN: [id number]           Patient Information     Date Of Birth          1935        Visit Information        Provider Department      1/10/2018 9:00 AM Rad Yost MD [facility]        Today's Diagnoses     Lumbar radiculopathy    -  1    Right hip pain        Low back pain          Care Instructions    -EMG right leg with Dr Cota. His office will contact you to schedule appt.     -Dr. Yost' Nurse Practitioner will contact you with results.               Follow-ups after your visit        Future tests that were ordered for you today     Open Future Orders        Priority Expected Expires Ordered    NEEDLE EMG SINGLE FIBER Routine  1/10/2019 1/10/2018            Who to contact     If you have questions or need follow up information about today's clinic visit or your schedule please contact [facility] directly at [phone].  Normal or non-critical lab and imaging results will be communicated to you by MyChart, letter or phone within 4 business days after the clinic has received the results. If you do not hear from us within 7 days, please contact the clinic through [facility] or phone. If you have a critical or abnormal lab result, we will notify you by phone as soon as possible.  Submit refill requests through [employer] or call your pharmacy and they will forward the refill request to us. Please allow 3 business days for your refill to be completed.          Additional Information About Your Visit        MyChart Information     [employer] gives you secure access to your electronic health record. If you see a primary care provider, you can also send messages to your care team and make appointments. If you have questions, please call your primary care clinic.  If you do not have a primary care provider, please call [phone] and they will assist you.        Care EveryWhere ID     This is  "your Care EveryWhere ID. This could be used by other organizations to access your [place] medical records  [id number]        Your Vitals Were     Pulse Height Pulse Oximetry BMI (Body Mass Index)          66 5' 2\" (1.575 m) 94% 35.89 kg/m2         Blood Pressure from Last 3 Encounters:   01/10/18 147/73   11/14/17 124/56   05/16/17 132/64    Weight from Last 3 Encounters:   01/10/18 196 lb 3.2 oz (89 kg)   11/14/17 196 lb 8 oz (89.1 kg)   05/16/17 192 lb 8 oz (87.3 kg)               Primary Care Provider Office Phone # Fax #    Kasi Watkins [phone]       [address]  [place] [facility] [address]        Equal Access to Services     [facility]: Hadii wanda novoa hadasho Soomaali, waaxda luqadaha, qaybta kaalmada adeegyada, tommy wilson . So [facility] [phone].    ATENCIÓN: Si habla español, tiene a hopper disposición servicios gratuitos de asistencia lingüística. [facility] [phone].    We comply with applicable federal civil rights laws and [place] laws. We do not discriminate on the basis of race, color, national origin, age, disability, sex, sexual orientation, or gender identity.            Thank you!     Thank you for choosing [facility]  for your care. Our goal is always to provide you with excellent care. Hearing back from our patients is one way we can continue to improve our services. Please take a few minutes to complete the written survey that you may receive in the mail after your visit with us. Thank you!             Your Updated Medication List - Protect others around you: Learn how to safely use, store and throw away your medicines at www.disposemymeds.org.          This list is accurate as of: 1/10/18  9:39 AM.  Always use your most recent med list.                   Brand Name Dispense Instructions for use Diagnosis    ascorbic acid 500 MG tablet    VITAMIN C     Take 500 mg by mouth daily.        aspirin 81 MG tablet     90 " tablet    Take 1 tablet (81 mg) by mouth daily    Abnormal cardiovascular stress test       calcium carbonate 1250 MG tablet    OS-FRANKLIN 500 mg [place]. Ca     Take 1 tablet by mouth daily        fluticasone 50 MCG/ACT spray    FLONASE    1 Bottle    Spray 1-2 sprays into both nostrils daily    Dysfunction of Eustachian tube, unspecified laterality       isosorbide mononitrate 30 MG 24 hr tablet    IMDUR    90 tablet    Take 1 tablet (30 mg) by mouth daily    Coronary artery disease involving native coronary artery of native heart without angina pectoris, Status post coronary angioplasty       ketoconazole 2 % shampoo    NIZORAL    120 mL    Apply to the affected area and wash off after 5 minutes.    Dandruff       levothyroxine 100 MCG tablet    SYNTHROID/LEVOTHROID    90 tablet    Take 1 tablet (100 mcg) by mouth daily    Acquired hypothyroidism       metoprolol 25 MG tablet    LOPRESSOR    90 tablet    Take 0.5 tablets (12.5 mg) by mouth 2 times daily    Coronary artery disease involving native coronary artery without angina pectoris       simvastatin 20 MG tablet    ZOCOR    90 tablet    Take 1 tablet (20 mg) by mouth At Bedtime    Hyperlipidemia LDL goal <100       vitamin D 1000 UNITS capsule      Take 1 capsule by mouth 2 times daily

## 2018-01-10 NOTE — PATIENT INSTRUCTIONS
-EMG right leg with Dr Cota. His office will contact you to schedule appt.     -Dr. Yost' Nurse Practitioner will contact you with results.

## 2018-01-10 NOTE — NURSING NOTE
"Elizabeth Li is a 82 year old female who presents for:  Chief Complaint   Patient presents with     Neurologic Problem     Low back pain the pain has been hurting constant since the last injection done in         Initial Vitals:  /73 (BP Location: Right arm, Patient Position: Sitting, Cuff Size: Adult Large)  Pulse 66  Ht 5' 2\" (1.575 m)  Wt 196 lb 3.2 oz (89 kg)  SpO2 94%  BMI 35.89 kg/m2 Estimated body mass index is 35.89 kg/(m^2) as calculated from the following:    Height as of this encounter: 5' 2\" (1.575 m).    Weight as of this encounter: 196 lb 3.2 oz (89 kg).. Body surface area is 1.97 meters squared. BP completed using cuff size: large  No Pain (1)    Do you feel safe in your environment?  Yes  Do you need any refills today? No    Nursing Comments: Low back pain the pain has been hurting constant since the last injection done in.        5 min. nursing intake time  Lana Valencia MA       Discharge plan: -EMG right leg with Dr Cota. His office will contact you to schedule appt.     -Dr. Yost' Nurse Practitioner will contact you with results.  2 min. nursing discharge time  Lana Valencia MA        "

## 2018-01-24 ENCOUNTER — TRANSFERRED RECORDS (OUTPATIENT)
Dept: HEALTH INFORMATION MANAGEMENT | Facility: CLINIC | Age: 83
End: 2018-01-24

## 2018-01-30 ENCOUNTER — TELEPHONE (OUTPATIENT)
Dept: NEUROSURGERY | Facility: CLINIC | Age: 83
End: 2018-01-30

## 2018-01-30 DIAGNOSIS — M54.16 LUMBAR RADICULAR PAIN: Primary | ICD-10-CM

## 2018-01-30 NOTE — TELEPHONE ENCOUNTER
EMG negative. No surgery indicated per Dr. Rad Yost. Referred to PT. Recc. Pain care if pain persists.

## 2018-02-01 ENCOUNTER — THERAPY VISIT (OUTPATIENT)
Dept: PHYSICAL THERAPY | Facility: CLINIC | Age: 83
End: 2018-02-01
Payer: MEDICARE

## 2018-02-01 DIAGNOSIS — M54.50 LUMBAGO: Primary | ICD-10-CM

## 2018-02-01 PROCEDURE — G8981 BODY POS CURRENT STATUS: HCPCS | Mod: GP | Performed by: PHYSICAL THERAPIST

## 2018-02-01 PROCEDURE — 97162 PT EVAL MOD COMPLEX 30 MIN: CPT | Mod: GP | Performed by: PHYSICAL THERAPIST

## 2018-02-01 PROCEDURE — G8982 BODY POS GOAL STATUS: HCPCS | Mod: GP | Performed by: PHYSICAL THERAPIST

## 2018-02-01 PROCEDURE — 97535 SELF CARE MNGMENT TRAINING: CPT | Mod: GP | Performed by: PHYSICAL THERAPIST

## 2018-02-01 PROCEDURE — 97110 THERAPEUTIC EXERCISES: CPT | Mod: GP | Performed by: PHYSICAL THERAPIST

## 2018-02-01 NOTE — MR AVS SNAPSHOT
After Visit Summary   2/1/2018    Elizabeth Li    MRN: [id number]           Patient Information     Date Of Birth          1935        Visit Information        Provider Department      2/1/2018 1:50 PM Claudia Guerra, PT [facility]        Today's Diagnoses     Lumbago    -  1       Follow-ups after your visit        Your next 10 appointments already scheduled     Feb 06, 2018  1:10 PM CST   [facility] Sp[facility] with Claudia Guerra, PT   [facility] ([facility]  )    [id number] [place] [address]   [phone]            Feb 14, 2018  9:45 AM CST   [facility] with Claudia Guerra, PT   [facility] ([facility]  )    [address] [place] [address]   [phone]            Feb 21, 2018 11:05 AM CST   [facility] with Claudia Guerra, PT   [facility] ([facility]  )    [address] [place] [address]   [phone]            Feb 28, 2018  9:45 AM CST   [facility] with Claudia Guerra, PT   [facility] ([facility]  )    [address] [place] [address]  [address] [place] [address]   [phone]            Mar 07, 2018 10:25 AM CST   [facility] with Claudia Guerra, PT   [facility] ([facility]  )    [address] [place] [address]   [phone]              Who to contact     If you have questions or need follow up information about today's clinic visit or your schedule please contact [facility] directly at [phone].  Normal or non-critical lab and imaging results will be communicated to you by MyChart, letter or phone within 4 business days after the clinic has received the results. If you do not hear from us within 7 days, please contact the clinic through MyChart or phone. If you have a critical or abnormal lab result, we will notify you by phone as soon as possible.  Submit refill requests through [facility] or  call your pharmacy and they will forward the refill request to us. Please allow 3 business days for your refill to be completed.          Additional Information About Your Visit        Dog Digital[employer] Information     [facility] [employer] gives you secure access to your electronic health record. If you see a primary care provider, you can also send messages to your care team and make appointments. If you have questions, please call your primary care clinic.  If you do not have a primary care provider, please call [phone] and they will assist you.        Care EveryWhere ID     This is your Care EveryWhere ID. This could be used by other organizations to access your [place] medical records  [id number]         Blood Pressure from Last 3 Encounters:   01/10/18 147/73   11/14/17 124/56   05/16/17 132/64    Weight from Last 3 Encounters:   01/10/18 89 kg (196 lb 3.2 oz)   11/14/17 89.1 kg (196 lb 8 oz)   05/16/17 87.3 kg (192 lb 8 oz)              We Performed the Following     HC PT EVAL, MODERATE COMPLEXITY     [facility] INITIAL EVAL REPORT     SELF CARE MNGMENT TRAINING     THERAPEUTIC EXERCISES        Primary Care Provider Office Phone # Fax #    Kasi Watkins [phone]       [address] [facility] [address]        Equal Access to Services     [facility] : Hadii aad ku hadasho Soomaali, waaxda luqadaha, qaybta kaalmada adeegyada, waxay idiin hayaan adeeg kharasanti la'sharonn ah. So [facility] [phone].    ATENCIÓN: Si habla español, tiene a hopper disposición servicios gratuitos de asistencia lingüística. Llame al [phone].    We comply with applicable federal civil rights laws and [place] laws. We do not discriminate on the basis of race, color, national origin, age, disability, sex, sexual orientation, or gender identity.            Thank you!     Thank you for choosing [facility]  for your care. Our goal is always to provide you with excellent care. Hearing back from our patients is one way we can  continue to improve our services. Please take a few minutes to complete the written survey that you may receive in the mail after your visit with us. Thank you!             Your Updated Medication List - Protect others around you: Learn how to safely use, store and throw away your medicines at www.disposemymeds.org.          This list is accurate as of 2/1/18  3:27 PM.  Always use your most recent med list.                   Brand Name Dispense Instructions for use Diagnosis    ascorbic acid 500 MG tablet    VITAMIN C     Take 500 mg by mouth daily.        aspirin 81 MG tablet     90 tablet    Take 1 tablet (81 mg) by mouth daily    Abnormal cardiovascular stress test       calcium carbonate 1250 MG tablet    OS-FRANKLIN 500 mg [place]. Ca     Take 1 tablet by mouth daily        fluticasone 50 MCG/ACT spray    FLONASE    1 Bottle    Spray 1-2 sprays into both nostrils daily    Dysfunction of Eustachian tube, unspecified laterality       isosorbide mononitrate 30 MG 24 hr tablet    IMDUR    90 tablet    Take 1 tablet (30 mg) by mouth daily    Coronary artery disease involving native coronary artery of native heart without angina pectoris, Status post coronary angioplasty       ketoconazole 2 % shampoo    NIZORAL    120 mL    Apply to the affected area and wash off after 5 minutes.    Dandruff       levothyroxine 100 MCG tablet    SYNTHROID/LEVOTHROID    90 tablet    Take 1 tablet (100 mcg) by mouth daily    Acquired hypothyroidism       metoprolol tartrate 25 MG tablet    LOPRESSOR    90 tablet    Take 0.5 tablets (12.5 mg) by mouth 2 times daily    Coronary artery disease involving native coronary artery without angina pectoris       simvastatin 20 MG tablet    ZOCOR    90 tablet    Take 1 tablet (20 mg) by mouth At Bedtime    Hyperlipidemia LDL goal <100       vitamin D 1000 UNITS capsule      Take 1 capsule by mouth 2 times daily

## 2018-02-01 NOTE — LETTER
DEPARTMENT OF HEALTH AND HUMAN SERVICES  CENTERS FOR MEDICARE & MEDICAID SERVICES    PLAN/UPDATED PLAN OF PROGRESS FOR OUTPATIENT REHABILITATION    PATIENTS NAME:  Elizabeth Li   : 1935  PROVIDER NUMBER:    [id number]  [id number]  PROVIDER NAME: [facility]  MEDICAL RECORD NUMBER: [id number]   START OF CARE DATE:  SOC Date: 18   TYPE:  PT    PRIMARY/TREATMENT DIAGNOSIS: (Pertinent Medical Diagnosis)  Lumbago    VISITS FROM START OF CARE:  Rxs Used: 1     [facility] Initial Evaluation  Subjective:  Patient is a 82 year old female presenting with rehab back hpi. The history is provided by the patient. No  was used.   Elizabeth Li is a 82 year old female with a lumbar condition.  Condition occurred with:  Insidious onset.  Condition occurred: for unknown reasons.  This is a chronic condition  2 years since not slept at night more than 2-3 hours due to LBP and B LE's but sometimes moves from leg to leg.Referred to PT on 18. Describes as deep ache, no numbness/tingling. Starts sleeping on L side but moves to other side, can't sleep supine. History of R hip injection and LB injections but actually got worse in LB. WORSE with sleeping, bending over for a period of time/making bed, prolonged standing, prolonged walking. BETTER with sitting.Has had numerous X-rays, MRI's, scans (see EPIC). Goal is to be able to sleep better, stand and walk bettter. .    Patient reports pain:  Lumbar spine right and lumbar spine left.  Radiates to:  Gluteals right (occasionally lower legs).  Pain is described as aching and is constant and reported as 10/10 and 3/10 (10/10 at night).   Pain is worse during the night.     Since onset symptoms are gradually worsening.  Special tests:  X-ray, MRI and CT scan.  Previous treatment includes physical therapy (R hip injection and lumbar injection no change with either).    General health as reported by patient is  martina.  Pertinent medical history includes:  Overweight, depression, thyroid problems and sleep disorder/apnea.    Other surgeries include:  Heart surgery (2 heart stents in 2015).  Current medications:  Anti-inflammatory and sleep medication.  Current occupation is retired.      Objective:  Gait:  Decreased stride length R LE  Gait Type:  Antalgic     Flexibility/Screens:   Lower Extremity:  Decreased left lower extremity flexibility:Hip Flexors and Hamstrings  Decreased right lower extremity flexibility:  Adductors; Piriformis; Hip Flexors and Hamstrings  Lumbar/SI Evaluation  ROM:    AROM Lumbar:   Flexion:          To knees, decreased LBP and R buttock pain x 5 reps  Ext:                    Max loss, sharp central LBP and R iliac crest, NW   Side Bend:        Left:  Mod loss    Right:  Mod loss  Rotation:           Left:     Right:   Side Glide:        Left:     Right:     Strength: prone on elbows sharp central LBP but resolved nicely. Pull on R anterior hip.   Lumbar Myotomes:  Lumbar myotomes: WNL toe and heel walking B.  Lumbar Dermtomes:  normal  Neural Tension/Mobility:  Lumbar:  Normal    PATIENTS NAME:  Elizabeth Li   : 1935    Lumbar Palpation:  Palpation (lumbar): R posterior hip//buttock.  Tenderness present at Left:    Quadratus Lumborum; Erector Spinae and PSIS  Tenderness present at Right: Quadratus Lumborum; Erector Spinae and PSIS  Hip Evaluation  Hip PROM:  : Flexion ~ 110, IR 10, ER 25. : significant loss PROM (flexion ~ 90, IR 0, ER 10 deg).   Hip Special Testing:   Special tests hip not assessed: Unable to get in proper test position for WAYNE on R. Pulling only R posterior lateral hip with PROM, no groin or anterior hip pain.   Left hip negative for the following special tests:  Kraig or Fadir/Labrum  Right hip negative for the following special tests:  Kraig or Fadir/Labrum      Assessment/Plan:    Patient is a 82 year old female with lumbar and right side hip complaints.     Patient has the following significant findings with corresponding treatment plan.                Diagnosis 1:  LBP/B LE pain and Rhip pain  Pain -  hot/cold therapy, manual therapy, splint/taping/bracing/orthotics, self management, education, directional preference exercise and home program  Decreased ROM/flexibility - manual therapy and therapeutic exercise  Decreased joint mobility - manual therapy and therapeutic exercise  Decreased strength - therapeutic exercise and therapeutic activities  Decreased proprioception - neuro re-education and therapeutic activities  Inflammation - cold therapy and self management/home program  Impaired gait - gait training  Impaired muscle performance - neuro re-education  Decreased function - therapeutic activities  Impaired posture - neuro re-education    Therapy Evaluation Codes:   1) History comprised of:   Personal factors that impact the plan of care:      Time since onset of symptoms.    Comorbidity factors that impact the plan of care are:      Sleep disorder/apnea.     Medications impacting care: Sleep.  2) Examination of Body Systems comprised of:   Body structures and functions that impact the plan of care:      Hip and Lumbar spine.   Activity limitations that impact the plan of care are:      Bending, Sitting, Standing, Walking, Sleeping and Laying down.  3) Clinical presentation characteristics are:   Evolving/Changing.  4) Decision-Making    Moderate complexity using standardized patient assessment instrument and/or measureable assessment of functional outcome.  Cumulative Therapy Evaluation is: Moderate complexity.    Previous and current functional limitations:  (See Goal Flow Sheet for this information)    Short term and Long term goals: (See Goal Flow Sheet for this information)     Communication ability:  Patient appears to be able to clearly communicate and understand verbal and written communication and follow directions correctly.  Treatment Explanation -  "The following has been discussed with the patient:   RX ordered/plan of care  Anticipated outcomes  Possible risks and side effects  PATIENTS NAME:  Elizabeth Li   : 1935    This patient would benefit from PT intervention to resume normal activities.   Rehab potential is questionable.    Frequency:  1 X week, once daily  Duration:  for 6 weeks  Discharge Plan:  Achieve all LTG.  Independent in home treatment program.  Reach maximal therapeutic benefit.    Caregiver Signature/Credentials _____________________________ Date ________      Treating Provider: Claudia Guerra PT, OCS   I have reviewed and certified the need for these services and plan of treatment while under my care.        PHYSICIAN'S SIGNATURE:   _____________________________________  Date___________                    ISMAEL Cowart CNP    Certification period:  Beginning of Cert date period: 18 to  End of Cert period date: 18     Functional Level Progress Report: Please see attached \"Goal Flow sheet for Functional level.\"    ____X____ Continue Services or       ________ DC Services                Service dates: From  SOC Date: 18 date to present                         "

## 2018-02-01 NOTE — PROGRESS NOTES
[facility] Initial Evaluation  Subjective:  Patient is a 82 year old female presenting with rehab back hpi. The history is provided by the patient. No  was used.   Elizabeth Li is a 82 year old female with a lumbar condition.  Condition occurred with:  Insidious onset.  Condition occurred: for unknown reasons.  This is a chronic condition  2 years since not slept at night more than 2-3 hours/night due to LBP and B LE's (R>L).Referred to PT on 01/30/18. 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  Describes pain as deep ache in R buttock/lateral hip and occasional numbness/tingling into B lower legs. Starts sleeping on L side but moves to other side, can't sleep supine. History of R hip injection and LB injections but actually got worse in LB. WORSE with sleeping, bending over for a period of time/making bed, prolonged standing, prolonged walking. BETTER with sitting.Has had numerous X-rays, MRI's, scans (see EPIC). Goal is to be able to sleep better, stand and walk bettter. .    Patient reports pain:  Lumbar spine right and lumbar spine left.  Radiates to:  Gluteals right (occasionally lower legs).  Pain is described as aching and is constant and reported as 10/10 and 3/10 (10/10 at night).   Pain is worse during the night.     Since onset symptoms are gradually worsening.  Special tests:  X-ray, MRI and CT scan.  Previous treatment includes physical therapy (R hip injection and lumbar injection no change with either).    General health as reported by patient is fair.  Pertinent medical history includes:  Overweight, depression, thyroid problems and sleep disorder/apnea.    Other surgeries include:  Heart surgery (2 heart stents in 2015).  Current medications:  Anti-inflammatory and sleep medication.  Current occupation is retired.                                    Objective:  Standing Alignment:                  General deviations alignment: stands in  slight flexed posture with R knee and hip flexed.  Gait:  Decreased stride length R LE  Gait Type:  Antalgic         Flexibility/Screens:       Lower Extremity:  Decreased left lower extremity flexibility:Hip Flexors and Hamstrings    Decreased right lower extremity flexibility:  Adductors; Piriformis; Hip Flexors and Hamstrings               Lumbar/SI Evaluation  ROM:    AROM Lumbar:   Flexion:          To knees, decreased LBP and R buttock pain x 5 reps  Ext:                    Max loss, sharp central LBP and R iliac crest, NW   Side Bend:        Left:  Mod loss    Right:  Mod loss  Rotation:           Left:     Right:   Side Glide:        Left:     Right:         Strength: prone on elbows sharp central LBP but resolved nicely. Pull on R anterior hip.   Lumbar Myotomes:  Lumbar myotomes: WNL toe and heel walking B.                Lumbar Dermtomes:  normal                Neural Tension/Mobility:  Lumbar:  Normal        Lumbar Palpation:  Palpation (lumbar): R posterior hip//buttock.  Tenderness present at Left:    Quadratus Lumborum; Erector Spinae and PSIS  Tenderness present at Right: Quadratus Lumborum; Erector Spinae and PSIS                                          Hip Evaluation  Hip PROM:  : L hip Flexion ~ 110, IR 10, ER 25. : R hip: significant loss PROM (flexion ~80- 90, IR 0, ER 10 deg).                            Hip Special Testing:   Special tests hip not assessed: Unable to get in proper test position for WAYNE on R. Pulling only R posterior lateral hip with PROM, no groin or anterior hip pain.     Left hip negative for the following special tests:  Kraig or Fadir/Labrum  Right hip negative for the following special tests:  Kraig or Fadir/Labrum                 General     ROS    Assessment/Plan:    Patient is a 82 year old female with lumbar and right side hip complaints.    Patient has the following significant findings with corresponding treatment plan.                Diagnosis 1:  LBP/B LE pain  and Rhip pain  Pain -  hot/cold therapy, manual therapy, splint/taping/bracing/orthotics, self management, education, directional preference exercise and home program  Decreased ROM/flexibility - manual therapy and therapeutic exercise  Decreased joint mobility - manual therapy and therapeutic exercise  Decreased strength - therapeutic exercise and therapeutic activities  Decreased proprioception - neuro re-education and therapeutic activities  Inflammation - cold therapy and self management/home program  Impaired gait - gait training  Impaired muscle performance - neuro re-education  Decreased function - therapeutic activities  Impaired posture - neuro re-education    Therapy Evaluation Codes:   1) History comprised of:   Personal factors that impact the plan of care:      Time since onset of symptoms.    Comorbidity factors that impact the plan of care are:      Sleep disorder/apnea.     Medications impacting care: Sleep.  2) Examination of Body Systems comprised of:   Body structures and functions that impact the plan of care:      Hip and Lumbar spine.   Activity limitations that impact the plan of care are:      Bending, Sitting, Standing, Walking, Sleeping and Laying down.  3) Clinical presentation characteristics are:   Evolving/Changing.  4) Decision-Making    Moderate complexity using standardized patient assessment instrument and/or measureable assessment of functional outcome.  Cumulative Therapy Evaluation is: Moderate complexity.    Previous and current functional limitations:  (See Goal Flow Sheet for this information)    Short term and Long term goals: (See Goal Flow Sheet for this information)     Communication ability:  Patient appears to be able to clearly communicate and understand verbal and written communication and follow directions correctly.  Treatment Explanation - The following has been discussed with the patient:   RX ordered/plan of care  Anticipated outcomes  Possible risks and side  effects  This patient would benefit from PT intervention to resume normal activities.   Rehab potential is questionable.    Frequency:  1 X week, once daily  Duration:  for 6 weeks  Discharge Plan:  Achieve all LTG.  Independent in home treatment program.  Reach maximal therapeutic benefit.    Please refer to the daily flowsheet for treatment today, total treatment time and time spent performing 1:1 timed codes.

## 2018-02-06 ENCOUNTER — THERAPY VISIT (OUTPATIENT)
Dept: PHYSICAL THERAPY | Facility: CLINIC | Age: 83
End: 2018-02-06
Payer: MEDICARE

## 2018-02-06 DIAGNOSIS — M54.50 LUMBAGO: ICD-10-CM

## 2018-02-06 PROCEDURE — 97112 NEUROMUSCULAR REEDUCATION: CPT | Mod: GP | Performed by: PHYSICAL THERAPIST

## 2018-02-06 PROCEDURE — 97110 THERAPEUTIC EXERCISES: CPT | Mod: GP | Performed by: PHYSICAL THERAPIST

## 2018-02-14 ENCOUNTER — THERAPY VISIT (OUTPATIENT)
Dept: PHYSICAL THERAPY | Facility: CLINIC | Age: 83
End: 2018-02-14
Payer: MEDICARE

## 2018-02-14 DIAGNOSIS — M54.50 LUMBAGO: ICD-10-CM

## 2018-02-14 PROCEDURE — 97110 THERAPEUTIC EXERCISES: CPT | Mod: GP | Performed by: PHYSICAL THERAPIST

## 2018-02-14 PROCEDURE — 97535 SELF CARE MNGMENT TRAINING: CPT | Mod: GP | Performed by: PHYSICAL THERAPIST

## 2018-02-21 ENCOUNTER — THERAPY VISIT (OUTPATIENT)
Dept: PHYSICAL THERAPY | Facility: CLINIC | Age: 83
End: 2018-02-21
Payer: MEDICARE

## 2018-02-21 DIAGNOSIS — M54.50 LUMBAGO: ICD-10-CM

## 2018-02-21 PROCEDURE — 97110 THERAPEUTIC EXERCISES: CPT | Mod: GP | Performed by: PHYSICAL THERAPIST

## 2018-02-21 PROCEDURE — 97112 NEUROMUSCULAR REEDUCATION: CPT | Mod: GP | Performed by: PHYSICAL THERAPIST

## 2018-02-26 ENCOUNTER — TELEPHONE (OUTPATIENT)
Dept: INTERNAL MEDICINE | Facility: CLINIC | Age: 83
End: 2018-02-26

## 2018-02-26 DIAGNOSIS — E78.5 HYPERLIPIDEMIA LDL GOAL <100: ICD-10-CM

## 2018-02-26 DIAGNOSIS — Z00.00 ROUTINE GENERAL MEDICAL EXAMINATION AT A HEALTH CARE FACILITY: Primary | ICD-10-CM

## 2018-02-26 DIAGNOSIS — I10 BENIGN ESSENTIAL HYPERTENSION: ICD-10-CM

## 2018-02-26 DIAGNOSIS — E03.9 ACQUIRED HYPOTHYROIDISM: ICD-10-CM

## 2018-02-26 NOTE — TELEPHONE ENCOUNTER
Call received from patient asking if she could have blood work done ahead of her appointment. States typically she has it done after the appointment and then she is trying to read the results on the computer and figure out what they mean and she isn't very good at using the computer. States she is due for appointment to have meds refilled. Please advise.

## 2018-02-28 ENCOUNTER — THERAPY VISIT (OUTPATIENT)
Dept: PHYSICAL THERAPY | Facility: CLINIC | Age: 83
End: 2018-02-28
Payer: MEDICARE

## 2018-02-28 DIAGNOSIS — M54.50 LUMBAGO: ICD-10-CM

## 2018-02-28 PROCEDURE — 97110 THERAPEUTIC EXERCISES: CPT | Mod: GP | Performed by: PHYSICAL THERAPIST

## 2018-02-28 PROCEDURE — 97112 NEUROMUSCULAR REEDUCATION: CPT | Mod: GP | Performed by: PHYSICAL THERAPIST

## 2018-03-07 ENCOUNTER — THERAPY VISIT (OUTPATIENT)
Dept: PHYSICAL THERAPY | Facility: CLINIC | Age: 83
End: 2018-03-07
Payer: MEDICARE

## 2018-03-07 DIAGNOSIS — E03.9 ACQUIRED HYPOTHYROIDISM: ICD-10-CM

## 2018-03-07 DIAGNOSIS — Z00.00 ROUTINE GENERAL MEDICAL EXAMINATION AT A HEALTH CARE FACILITY: ICD-10-CM

## 2018-03-07 DIAGNOSIS — M54.50 LUMBAGO: ICD-10-CM

## 2018-03-07 DIAGNOSIS — E78.5 HYPERLIPIDEMIA LDL GOAL <100: ICD-10-CM

## 2018-03-07 DIAGNOSIS — I10 BENIGN ESSENTIAL HYPERTENSION: ICD-10-CM

## 2018-03-07 LAB
ALBUMIN SERPL-MCNC: 3.8 G/DL (ref 3.4–5)
ALP SERPL-CCNC: 71 U/L (ref 40–150)
ALT SERPL W P-5'-P-CCNC: 14 U/L (ref 0–50)
ANION GAP SERPL CALCULATED.3IONS-SCNC: 6 MMOL/L (ref 3–14)
AST SERPL W P-5'-P-CCNC: 19 U/L (ref 0–45)
BILIRUB SERPL-MCNC: 0.5 MG/DL (ref 0.2–1.3)
BUN SERPL-MCNC: 13 MG/DL (ref 7–30)
CALCIUM SERPL-MCNC: 9.4 MG/DL (ref 8.5–10.1)
CHLORIDE SERPL-SCNC: 110 MMOL/L (ref 94–109)
CHOLEST SERPL-MCNC: 138 MG/DL
CO2 SERPL-SCNC: 24 MMOL/L (ref 20–32)
CREAT SERPL-MCNC: 0.81 MG/DL (ref 0.52–1.04)
ERYTHROCYTE [DISTWIDTH] IN BLOOD BY AUTOMATED COUNT: 12.9 % (ref 10–15)
GFR SERPL CREATININE-BSD FRML MDRD: 68 ML/MIN/1.7M2
GLUCOSE SERPL-MCNC: 94 MG/DL (ref 70–99)
HCT VFR BLD AUTO: 40.1 % (ref 35–47)
HDLC SERPL-MCNC: 51 MG/DL
HGB BLD-MCNC: 13.1 G/DL (ref 11.7–15.7)
LDLC SERPL CALC-MCNC: 58 MG/DL
MCH RBC QN AUTO: 31.3 PG (ref 26.5–33)
MCHC RBC AUTO-ENTMCNC: 32.7 G/DL (ref 31.5–36.5)
MCV RBC AUTO: 96 FL (ref 78–100)
NONHDLC SERPL-MCNC: 87 MG/DL
PLATELET # BLD AUTO: 184 10E9/L (ref 150–450)
POTASSIUM SERPL-SCNC: 4.6 MMOL/L (ref 3.4–5.3)
PROT SERPL-MCNC: 7 G/DL (ref 6.8–8.8)
RBC # BLD AUTO: 4.18 10E12/L (ref 3.8–5.2)
SODIUM SERPL-SCNC: 140 MMOL/L (ref 133–144)
T4 FREE SERPL-MCNC: 1.07 NG/DL (ref 0.76–1.46)
TRIGL SERPL-MCNC: 144 MG/DL
TSH SERPL DL<=0.005 MIU/L-ACNC: 0.25 MU/L (ref 0.4–4)
WBC # BLD AUTO: 6.7 10E9/L (ref 4–11)

## 2018-03-07 PROCEDURE — 80053 COMPREHEN METABOLIC PANEL: CPT | Performed by: INTERNAL MEDICINE

## 2018-03-07 PROCEDURE — 36415 COLL VENOUS BLD VENIPUNCTURE: CPT | Performed by: INTERNAL MEDICINE

## 2018-03-07 PROCEDURE — 84443 ASSAY THYROID STIM HORMONE: CPT | Performed by: INTERNAL MEDICINE

## 2018-03-07 PROCEDURE — 97110 THERAPEUTIC EXERCISES: CPT | Mod: GP | Performed by: PHYSICAL THERAPIST

## 2018-03-07 PROCEDURE — 97530 THERAPEUTIC ACTIVITIES: CPT | Mod: GP | Performed by: PHYSICAL THERAPIST

## 2018-03-07 PROCEDURE — 97140 MANUAL THERAPY 1/> REGIONS: CPT | Mod: GP | Performed by: PHYSICAL THERAPIST

## 2018-03-07 PROCEDURE — 84439 ASSAY OF FREE THYROXINE: CPT | Performed by: INTERNAL MEDICINE

## 2018-03-07 PROCEDURE — 85027 COMPLETE CBC AUTOMATED: CPT | Performed by: INTERNAL MEDICINE

## 2018-03-07 PROCEDURE — 80061 LIPID PANEL: CPT | Performed by: INTERNAL MEDICINE

## 2018-03-09 ENCOUNTER — OFFICE VISIT (OUTPATIENT)
Dept: INTERNAL MEDICINE | Facility: CLINIC | Age: 83
End: 2018-03-09
Payer: COMMERCIAL

## 2018-03-09 VITALS
HEART RATE: 72 BPM | TEMPERATURE: 98.3 F | BODY MASS INDEX: 36.73 KG/M2 | DIASTOLIC BLOOD PRESSURE: 66 MMHG | SYSTOLIC BLOOD PRESSURE: 128 MMHG | WEIGHT: 200.8 LBS | RESPIRATION RATE: 16 BRPM | OXYGEN SATURATION: 95 %

## 2018-03-09 DIAGNOSIS — L71.9 ROSACEA: ICD-10-CM

## 2018-03-09 DIAGNOSIS — I25.10 CORONARY ARTERY DISEASE INVOLVING NATIVE CORONARY ARTERY OF NATIVE HEART WITHOUT ANGINA PECTORIS: ICD-10-CM

## 2018-03-09 DIAGNOSIS — E03.9 ACQUIRED HYPOTHYROIDISM: Primary | ICD-10-CM

## 2018-03-09 DIAGNOSIS — E66.01 MORBID OBESITY (H): ICD-10-CM

## 2018-03-09 DIAGNOSIS — E78.5 HYPERLIPIDEMIA LDL GOAL <100: ICD-10-CM

## 2018-03-09 DIAGNOSIS — I10 BENIGN ESSENTIAL HYPERTENSION: ICD-10-CM

## 2018-03-09 DIAGNOSIS — Z98.61 STATUS POST CORONARY ANGIOPLASTY: ICD-10-CM

## 2018-03-09 PROCEDURE — 99214 OFFICE O/P EST MOD 30 MIN: CPT | Performed by: INTERNAL MEDICINE

## 2018-03-09 RX ORDER — IBUPROFEN 200 MG
800 TABLET ORAL 2 TIMES DAILY
Status: ON HOLD | COMMUNITY
End: 2019-01-01

## 2018-03-09 RX ORDER — LEVOTHYROXINE SODIUM 100 UG/1
100 TABLET ORAL DAILY
Qty: 90 TABLET | Refills: 3 | Status: SHIPPED | OUTPATIENT
Start: 2018-03-09 | End: 2018-07-19

## 2018-03-09 RX ORDER — METRONIDAZOLE 10 MG/G
GEL TOPICAL DAILY
Qty: 60 G | Refills: 11 | Status: SHIPPED | OUTPATIENT
Start: 2018-03-09 | End: 2018-07-19

## 2018-03-09 RX ORDER — SIMVASTATIN 20 MG
20 TABLET ORAL AT BEDTIME
Qty: 90 TABLET | Refills: 3 | Status: SHIPPED | OUTPATIENT
Start: 2018-03-09 | End: 2019-03-11

## 2018-03-09 RX ORDER — ISOSORBIDE MONONITRATE 30 MG/1
30 TABLET, EXTENDED RELEASE ORAL DAILY
Qty: 90 TABLET | Refills: 3 | Status: SHIPPED | OUTPATIENT
Start: 2018-03-09 | End: 2018-09-21

## 2018-03-09 NOTE — NURSING NOTE
"Chief Complaint   Patient presents with     RECHECK     LOV 03/08/2017: HTN, LDL. Discuss hypothyroidism weight gain and edema     Referral     rash on fore head       Initial /66  Pulse 72  Temp 98.3  F (36.8  C) (Oral)  Resp 16  Wt 200 lb 12.8 oz (91.1 kg)  SpO2 95%  BMI 36.73 kg/m2 Estimated body mass index is 36.73 kg/(m^2) as calculated from the following:    Height as of 1/10/18: 5' 2\" (1.575 m).    Weight as of this encounter: 200 lb 12.8 oz (91.1 kg).  Medication Reconciliation: complete      Victor Hugo Adam, ARACELY      "

## 2018-03-09 NOTE — PROGRESS NOTES
SUBJECTIVE:   Elizabeth Li is a 82 year old female who presents to clinic today for the following health issues:    Patient is seen for a follow up visit.    Concern for weight gain. 20 lbs for a year. Has not been able to exercise because of back pain. Working with ortho, PT. Improving.       Hyperlipidemia Follow-Up      Rate your low fat/cholesterol diet?: gluten free and lactate     Taking statin?  Yes, muscle aches, possibly from other cause    Other lipid medications/supplements?:  none  Has H/O hyperlipidemia. On medical treatment and diet. No side effects. No muscle weakness, myalgias or upset stomach.       Hypertension Follow-up  Has h/o HTN. on medical treatment. BP has been controlled. No side effects from medications. No CP, HA, dizziness. good compliance with medications and low salt diet.      Outpatient blood pressures are not being checked.    Low Salt Diet: low salt    Hypothyroidism Follow-up  Has hypothyroidism. On Synthroid. No heat /cold intolerance,has had weight  gain ,no heart palpitations, change in bowel habits.      Since last visit, patient describes the following symptoms: weight gain of 170-196 lbs      Amount of exercise or physical activity: limited due to back pain but goes to physical therapy    Problems taking medications regularly: No    Medication side effects: none    Diet: low salt and gluten free and lactate free.         PROBLEMS TO ADD ON...  Has h/o ischemic heart disease, asymptomatic regarding chest pains, SOB,palpitations. Has good compliance with treatment, diet and exercise.  Reports mild ankles edema.   Has had red spots on the face, uses OTC Hydrocortisone     Problem list and histories reviewed & adjusted, as indicated.  Additional history: as documented    Patient Active Problem List   Diagnosis     Advanced directives, counseling/discussion     Hypothyroidism     Hyperlipidemia LDL goal <100     KUSUM (obstructive sleep apnea)     IHD (ischemic heart  disease)     Mild major depression (H)     Abnormal cardiovascular stress test     Anxiety     Status post coronary angiogram     CAD (coronary artery disease)     DDD (degenerative disc disease), lumbar     Benign essential hypertension     Lumbago     Morbid obesity (H)     Past Surgical History:   Procedure Laterality Date     CARDIAC SURGERY      2 heart stents     COLONOSCOPY       COLONOSCOPY N/A 1/11/2017    Procedure: COLONOSCOPY;  Surgeon: Nghia Moss MD;  Location:  GI     HEAD & NECK SURGERY      wisdom teeth removed     HEART CATH, ANGIOPLASTY  3/27/15    2.5 X 18 mm & 3.0 X mm LUCILA to LAD     TONSILLECTOMY      T&A as a child     TUBAL LIGATION         Social History   Substance Use Topics     Smoking status: Former Smoker     Quit date: 1/1/1984     Smokeless tobacco: Never Used     Alcohol use 0.0 oz/week     0 Standard drinks or equivalent per week      Comment: social drinker     Family History   Problem Relation Age of Onset     Blood Disease Mother      pernious anemia     HEART DISEASE Father      HEART DISEASE Sister      HEART DISEASE Brother      Colon Cancer No family hx of          Current Outpatient Prescriptions   Medication Sig Dispense Refill     ibuprofen (ADVIL/MOTRIN) 200 MG tablet Take 200 mg by mouth every 4 hours as needed for mild pain Patient takes 4 tablet at night. Occasionally takes 4 tablet when goes out.       levothyroxine (SYNTHROID/LEVOTHROID) 100 MCG tablet Take 1 tablet (100 mcg) by mouth daily 90 tablet 3     simvastatin (ZOCOR) 20 MG tablet Take 1 tablet (20 mg) by mouth At Bedtime 90 tablet 3     isosorbide mononitrate (IMDUR) 30 MG 24 hr tablet Take 1 tablet (30 mg) by mouth daily 90 tablet 3     metroNIDAZOLE (METROGEL) 1 % gel Apply topically daily 60 g 11     metoprolol (LOPRESSOR) 25 MG tablet Take 0.5 tablets (12.5 mg) by mouth 2 times daily 90 tablet 1     ketoconazole (NIZORAL) 2 % shampoo Apply to the affected area and wash off after 5 minutes. 120  mL 1     aspirin 81 MG tablet Take 1 tablet (81 mg) by mouth daily 90 tablet 3     ascorbic acid (VITAMIN C) 500 MG tablet Take 500 mg by mouth daily.       Cholecalciferol (VITAMIN D) 1000 UNIT capsule Take 1 capsule by mouth 2 times daily        [DISCONTINUED] levothyroxine (SYNTHROID/LEVOTHROID) 100 MCG tablet Take 1 tablet (100 mcg) by mouth daily 90 tablet 3     [DISCONTINUED] simvastatin (ZOCOR) 20 MG tablet Take 1 tablet (20 mg) by mouth At Bedtime 90 tablet 3     [DISCONTINUED] isosorbide mononitrate (IMDUR) 30 MG 24 hr tablet Take 1 tablet (30 mg) by mouth daily 90 tablet 3       Reviewed and updated as needed this visit by clinical staff       Reviewed and updated as needed this visit by Provider         ROS:  Constitutional, HEENT, cardiovascular, pulmonary, gi and gu systems are negative, except as otherwise noted.    OBJECTIVE:     /66  Pulse 72  Temp 98.3  F (36.8  C) (Oral)  Resp 16  Wt 200 lb 12.8 oz (91.1 kg)  SpO2 95%  BMI 36.73 kg/m2  Body mass index is 36.73 kg/(m^2).   GENERAL: obese, alert and no distress  NECK: no adenopathy, no asymmetry, masses, or scars and thyroid normal to palpation  RESP: lungs clear to auscultation - no rales, rhonchi or wheezes  CV: regular rate and rhythm, normal S1 S2, no S3 or S4, no murmur, click or rub, no peripheral edema and peripheral pulses strong  ABDOMEN: soft, nontender, no hepatosplenomegaly, no masses and bowel sounds normal  MS: no gross musculoskeletal defects noted, no edema  Skin : teleangiectasia on the forehead, cheeks and chin     Diagnostic Test Results:  Results for orders placed or performed in visit on 03/07/18   **CBC with platelets FUTURE anytime   Result Value Ref Range    WBC 6.7 4.0 - 11.0 10e9/L    RBC Count 4.18 3.8 - 5.2 10e12/L    Hemoglobin 13.1 11.7 - 15.7 g/dL    Hematocrit 40.1 35.0 - 47.0 %    MCV 96 78 - 100 fl    MCH 31.3 26.5 - 33.0 pg    MCHC 32.7 31.5 - 36.5 g/dL    RDW 12.9 10.0 - 15.0 %    Platelet Count 184 150  - 450 10e9/L   **Comprehensive metabolic panel FUTURE anytime   Result Value Ref Range    Sodium 140 133 - 144 mmol/L    Potassium 4.6 3.4 - 5.3 mmol/L    Chloride 110 (H) 94 - 109 mmol/L    Carbon Dioxide 24 20 - 32 mmol/L    Anion Gap 6 3 - 14 mmol/L    Glucose 94 70 - 99 mg/dL    Urea Nitrogen 13 7 - 30 mg/dL    Creatinine 0.81 0.52 - 1.04 mg/dL    GFR Estimate 68 >60 mL/min/1.7m2    GFR Estimate If Black 82 >60 mL/min/1.7m2    Calcium 9.4 8.5 - 10.1 mg/dL    Bilirubin Total 0.5 0.2 - 1.3 mg/dL    Albumin 3.8 3.4 - 5.0 g/dL    Protein Total 7.0 6.8 - 8.8 g/dL    Alkaline Phosphatase 71 40 - 150 U/L    ALT 14 0 - 50 U/L    AST 19 0 - 45 U/L   Lipid panel reflex to direct LDL Fasting   Result Value Ref Range    Cholesterol 138 <200 mg/dL    Triglycerides 144 <150 mg/dL    HDL Cholesterol 51 >49 mg/dL    LDL Cholesterol Calculated 58 <100 mg/dL    Non HDL Cholesterol 87 <130 mg/dL   **TSH with free T4 reflex FUTURE anytime   Result Value Ref Range    TSH 0.25 (L) 0.40 - 4.00 mU/L   T4 free   Result Value Ref Range    T4 Free 1.07 0.76 - 1.46 ng/dL       ASSESSMENT/PLAN:     Problem List Items Addressed This Visit     Hypothyroidism - Primary    Relevant Medications    levothyroxine (SYNTHROID/LEVOTHROID) 100 MCG tablet    Other Relevant Orders    ENDOCRINOLOGY ADULT REFERRAL    Hyperlipidemia LDL goal <100    Relevant Medications    simvastatin (ZOCOR) 20 MG tablet    CAD (coronary artery disease)    Relevant Medications    isosorbide mononitrate (IMDUR) 30 MG 24 hr tablet    Benign essential hypertension    Morbid obesity (H)      Other Visit Diagnoses     Status post coronary angioplasty        Relevant Medications    isosorbide mononitrate (IMDUR) 30 MG 24 hr tablet    Rosacea        Relevant Medications    metroNIDAZOLE (METROGEL) 1 % gel           Refer to endocrinology - suppressed TSH, has normal FT4, weight gain   Cont medications  Start ambulation as tolerated   Topical metrogel , advised for side effects      Follow-Up:in 6 months     Kasi Watkins MD  [facility]

## 2018-03-09 NOTE — MR AVS SNAPSHOT
After Visit Summary   3/9/2018    Elizabeth Li    MRN: [id number]           Patient Information     Date Of Birth          1935        Visit Information        Provider Department      3/9/2018 2:00 PM Kasi Watkins MD [facility]        Today's Diagnoses     Acquired hypothyroidism    -  1       Follow-ups after your visit        Additional Services     ENDOCRINOLOGY ADULT REFERRAL       Your provider has referred you to: [facility]: [facility] [phone]   [email or web address]      Please be aware that coverage of these services is subject to the terms and limitations of your health insurance plan.  Call member services at your health plan with any benefit or coverage questions.      Please bring the following to your appointment:    >>   Any x-rays, CTs or MRIs which have been performed.  Contact the facility where they were done to arrange for  prior to your scheduled appointment.    >>   List of current medications   >>   This referral request   >>   Any documents/labs given to you for this referral                  Your next 10 appointments already scheduled     Mar 14, 2018 12:00 PM CDT   [facility] Debbie Yee, PT   [facility] ([facility]  )    [address]  [address] [place] [address]   [phone]              Who to contact     If you have questions or need follow up information about today's clinic visit or your schedule please contact [facility] directly at [phone].  Normal or non-critical lab and imaging results will be communicated to you by MyChart, letter or phone within 4 business days after the clinic has received the results. If you do not hear from us within 7 days, please contact the clinic through MyChart or phone. If you have a critical or abnormal lab result, we will notify you by phone as soon as possible.  Submit refill requests through  [employer] or call your pharmacy and they will forward the refill request to us. Please allow 3 business days for your refill to be completed.          Additional Information About Your Visit        [employer] Information     [employer] gives you secure access to your electronic health record. If you see a primary care provider, you can also send messages to your care team and make appointments. If you have questions, please call your primary care clinic.  If you do not have a primary care provider, please call [phone] and they will assist you.        Care EveryWhere ID     This is your Care EveryWhere ID. This could be used by other organizations to access your [facility] medical records  [id number]        Your Vitals Were     Pulse Temperature Respirations Pulse Oximetry BMI (Body Mass Index)       72 98.3  F (36.8  C) (Oral) 16 95% 36.73 kg/m2        Blood Pressure from Last 3 Encounters:   03/09/18 128/66   01/10/18 147/73   11/14/17 124/56    Weight from Last 3 Encounters:   03/09/18 200 lb 12.8 oz (91.1 kg)   01/10/18 196 lb 3.2 oz (89 kg)   11/14/17 196 lb 8 oz (89.1 kg)              We Performed the Following     ENDOCRINOLOGY ADULT REFERRAL          Today's Medication Changes          These changes are accurate as of 3/9/18  2:34 PM.  If you have any questions, ask your nurse or doctor.               Stop taking these medicines if you haven't already. Please contact your care team if you have questions.     calcium carbonate 1250 MG tablet   Commonly known as:  OS-FRANKLIN 500 mg [place]. [place]   Stopped by:  Kasi Watkins MD           fluticasone 50 MCG/ACT spray   Commonly known as:  FLONASE   Stopped by:  Kasi Watkins MD                    Primary Care Provider Office Phone # Fax #    Kasi Watkins [phone]       [address]  [facility] [address]        Equal Access to Services     MARCIAL ROTH AH: Merry Barrera, armen petty, qaallie tim,  tommy gormanfabiana cunningham'aan ah. So [facility] [phone].    ATENCIÓN: Si eldonla mayela, tiene a hopper disposición servicios gratuitos de asistencia lingüística. Murray pretty [phone].    We comply with applicable federal civil rights laws and [place] laws. We do not discriminate on the basis of race, color, national origin, age, disability, sex, sexual orientation, or gender identity.            Thank you!     Thank you for choosing [facility]  for your care. Our goal is always to provide you with excellent care. Hearing back from our patients is one way we can continue to improve our services. Please take a few minutes to complete the written survey that you may receive in the mail after your visit with us. Thank you!             Your Updated Medication List - Protect others around you: Learn how to safely use, store and throw away your medicines at www.disposemymeds.org.          This list is accurate as of 3/9/18  2:34 PM.  Always use your most recent med list.                   Brand Name Dispense Instructions for use Diagnosis    ascorbic acid 500 MG tablet    VITAMIN C     Take 500 mg by mouth daily.        aspirin 81 MG tablet     90 tablet    Take 1 tablet (81 mg) by mouth daily    Abnormal cardiovascular stress test       ibuprofen 200 MG tablet    ADVIL/MOTRIN     Take 200 mg by mouth every 4 hours as needed for mild pain Patient takes 4 tablet at night. Occasionally takes 4 tablet when goes out.        isosorbide mononitrate 30 MG 24 hr tablet    IMDUR    90 tablet    Take 1 tablet (30 mg) by mouth daily    Coronary artery disease involving native coronary artery of native heart without angina pectoris, Status post coronary angioplasty       ketoconazole 2 % shampoo    NIZORAL    120 mL    Apply to the affected area and wash off after 5 minutes.    Dandruff       levothyroxine 100 MCG tablet    SYNTHROID/LEVOTHROID    90 tablet    Take 1 tablet (100 mcg) by mouth daily    Acquired  hypothyroidism       metoprolol tartrate 25 MG tablet    LOPRESSOR    90 tablet    Take 0.5 tablets (12.5 mg) by mouth 2 times daily    Coronary artery disease involving native coronary artery without angina pectoris       simvastatin 20 MG tablet    ZOCOR    90 tablet    Take 1 tablet (20 mg) by mouth At Bedtime    Hyperlipidemia LDL goal <100       vitamin D 1000 UNITS capsule      Take 1 capsule by mouth 2 times daily

## 2018-03-14 ENCOUNTER — THERAPY VISIT (OUTPATIENT)
Dept: PHYSICAL THERAPY | Facility: CLINIC | Age: 83
End: 2018-03-14
Payer: MEDICARE

## 2018-03-14 DIAGNOSIS — M54.50 LUMBAGO: ICD-10-CM

## 2018-03-14 PROCEDURE — G8982 BODY POS GOAL STATUS: HCPCS | Mod: GP | Performed by: PHYSICAL THERAPIST

## 2018-03-14 PROCEDURE — 97530 THERAPEUTIC ACTIVITIES: CPT | Mod: GP | Performed by: PHYSICAL THERAPIST

## 2018-03-14 PROCEDURE — G8983 BODY POS D/C STATUS: HCPCS | Mod: GP | Performed by: PHYSICAL THERAPIST

## 2018-03-14 NOTE — PROGRESS NOTES
Subjective:  HPI                    Objective:  System    Physical Exam    General     ROS    Assessment/Plan:    DISCHARGE REPORT    Progress reporting period is from 2-1-18 to 3-14-18.       SUBJECTIVE  Subjective changes noted by patient:  .  Subjective: Today seems to be better today, however, no significant changes from last visit. Still not sleeping well and still having standing/walking issues.      Current pain level is 3/10 Current Pain level: 3/10.     Previous pain level was  10/10 Initial Pain level: 10/10 (at night).   Changes in function:  None  Adverse reaction to treatment or activity: None    OBJECTIVE  Changes noted in objective findings:  The objective findings below are from DOS 3-14-18.  Objective: no objective changes.  Standing/walking are still limited, sleeping is painful and disturbed.       ASSESSMENT/PLAN  Updated problem list and treatment plan: Diagnosis 1:  LBP/R hip  Pain -  self management, education, directional preference exercise and home program  Decreased ROM/flexibility - manual therapy and therapeutic exercise  Decreased joint mobility - manual therapy and therapeutic exercise  Impaired gait - gait training  Decreased function - therapeutic activities  STG/LTGs have been met or progress has been made towards goals:  None  Assessment of Progress: The patient's condition is unchanged.  Self Management Plans:  Patient has been instructed in a home treatment program.  Patient  has been instructed in self management of symptoms.  I have re-evaluated this patient and find that the nature, scope, duration and intensity of the therapy is appropriate for the medical condition of the patient.  Elizabeth continues to require the following intervention to meet STG and LTG's:  PT intervention is no longer required to meet STG/LTG.    Recommendations:  This patient would benefit from further evaluation.    Please refer to the daily flowsheet for treatment today, total treatment time and time  spent performing 1:1 timed codes.

## 2018-03-14 NOTE — MR AVS SNAPSHOT
After Visit Summary   3/14/2018    Elizabeth Li    MRN: [id number]           Patient Information     Date Of Birth          1935        Visit Information        Provider Department      3/14/2018 12:00 PM Debbie Yee, PT [facility]        Today's Diagnoses     Lumbago           Follow-ups after your visit        Your next 10 appointments already scheduled     Apr 04, 2018 10:00 AM CDT   Return Visit with Rad Yost MD   [facility] ([facility] [place] [facility])    [address]  [place] [id number] [phone]            Apr 11, 2018  2:30 PM CDT   New Visit with Olena Manzanares MD   [facility] ([facility])    [address] [facility] [address]  [place] [id number] [phone]              Who to contact     If you have questions or need follow up information about today's clinic visit or your schedule please contact [facility] directly at [phone].  Normal or non-critical lab and imaging results will be communicated to you by MDconnectMEhart, letter or phone within 4 business days after the clinic has received the results. If you do not hear from us within 7 days, please contact the clinic through MDconnectMEhart or phone. If you have a critical or abnormal lab result, we will notify you by phone as soon as possible.  Submit refill requests through [employer] or call your pharmacy and they will forward the refill request to us. Please allow 3 business days for your refill to be completed.          Additional Information About Your Visit        MyChart Information     [employer] gives you secure access to your electronic health record. If you see a primary care provider, you can also send messages to your care team and make appointments. If you have questions, please call your primary care clinic.  If you do not have a primary care provider, please call [phone] and they will  assist you.        Care EveryWhere ID     This is your Care EveryWhere ID. This could be used by other organizations to access your [place] medical records  [id number]         Blood Pressure from Last 3 Encounters:   03/09/18 128/66   01/10/18 147/73   11/14/17 124/56    Weight from Last 3 Encounters:   03/09/18 91.1 kg (200 lb 12.8 oz)   01/10/18 89 kg (196 lb 3.2 oz)   11/14/17 89.1 kg (196 lb 8 oz)              We Performed the Following     THERAPEUTIC ACTIVITIES        Primary Care Provider Office Phone # Fax #    Kasi Watkins [phone]       [address]  [facility] [address]        Equal Access to Services     ANGELINE ROTH : Hadii aad ku hadasho Soomaali, waaxda luqadaha, qaybta kaalmada adeegyada, waxay idiin haysharonn aury wilson . So [facility] [phone].    ATENCIÓN: Si habla español, tiene a hopper disposición servicios gratuitos de asistencia lingüística. [facility] [phone].    We comply with applicable federal civil rights laws and [place] laws. We do not discriminate on the basis of race, color, national origin, age, disability, sex, sexual orientation, or gender identity.            Thank you!     Thank you for choosing [facility]   for your care. Our goal is always to provide you with excellent care. Hearing back from our patients is one way we can continue to improve our services. Please take a few minutes to complete the written survey that you may receive in the mail after your visit with us. Thank you!             Your Updated Medication List - Protect others around you: Learn how to safely use, store and throw away your medicines at www.disposemymeds.org.          This list is accurate as of 3/14/18  2:34 PM.  Always use your most recent med list.                   Brand Name Dispense Instructions for use Diagnosis    ascorbic acid 500 MG tablet    VITAMIN C     Take 500 mg by mouth daily.        aspirin 81 MG tablet     90 tablet    Take 1 tablet  (81 mg) by mouth daily    Abnormal cardiovascular stress test       ibuprofen 200 MG tablet    ADVIL/MOTRIN     Take 200 mg by mouth every 4 hours as needed for mild pain Patient takes 4 tablet at night. Occasionally takes 4 tablet when goes out.        isosorbide mononitrate 30 MG 24 hr tablet    IMDUR    90 tablet    Take 1 tablet (30 mg) by mouth daily    Coronary artery disease involving native coronary artery of native heart without angina pectoris, Status post coronary angioplasty       ketoconazole 2 % shampoo    NIZORAL    120 mL    Apply to the affected area and wash off after 5 minutes.    Dandruff       levothyroxine 100 MCG tablet    SYNTHROID/LEVOTHROID    90 tablet    Take 1 tablet (100 mcg) by mouth daily    Acquired hypothyroidism       metoprolol tartrate 25 MG tablet    LOPRESSOR    90 tablet    Take 0.5 tablets (12.5 mg) by mouth 2 times daily    Coronary artery disease involving native coronary artery without angina pectoris       metroNIDAZOLE 1 % gel    METROGEL    60 g    Apply topically daily    Rosacea       simvastatin 20 MG tablet    ZOCOR    90 tablet    Take 1 tablet (20 mg) by mouth At Bedtime    Hyperlipidemia LDL goal <100       vitamin D 1000 UNITS capsule      Take 1 capsule by mouth 2 times daily

## 2018-03-29 DIAGNOSIS — M54.16 LUMBAR RADICULOPATHY: Primary | ICD-10-CM

## 2018-03-29 NOTE — PROGRESS NOTES
Per Dr Yost patient needs flex/ext lumbar xray prior to appt with him on 4/4/18. Called and discussed with patient. She verbalized understanding. Order in EPIC. Will have [facility] scheduling call patient to set it up.

## 2018-04-04 ENCOUNTER — RADIANT APPOINTMENT (OUTPATIENT)
Dept: GENERAL RADIOLOGY | Facility: CLINIC | Age: 83
End: 2018-04-04
Attending: NEUROLOGICAL SURGERY
Payer: COMMERCIAL

## 2018-04-04 ENCOUNTER — OFFICE VISIT (OUTPATIENT)
Dept: NEUROSURGERY | Facility: CLINIC | Age: 83
End: 2018-04-04
Attending: NEUROLOGICAL SURGERY
Payer: MEDICARE

## 2018-04-04 VITALS
SYSTOLIC BLOOD PRESSURE: 125 MMHG | WEIGHT: 200 LBS | BODY MASS INDEX: 36.8 KG/M2 | DIASTOLIC BLOOD PRESSURE: 71 MMHG | HEART RATE: 62 BPM | OXYGEN SATURATION: 95 % | HEIGHT: 62 IN

## 2018-04-04 DIAGNOSIS — G89.29 CHRONIC BILATERAL LOW BACK PAIN WITH RIGHT-SIDED SCIATICA: ICD-10-CM

## 2018-04-04 DIAGNOSIS — M54.16 LUMBAR RADICULOPATHY: ICD-10-CM

## 2018-04-04 DIAGNOSIS — M25.551 BILATERAL HIP PAIN: Primary | ICD-10-CM

## 2018-04-04 DIAGNOSIS — M54.41 CHRONIC BILATERAL LOW BACK PAIN WITH RIGHT-SIDED SCIATICA: ICD-10-CM

## 2018-04-04 DIAGNOSIS — M25.552 BILATERAL HIP PAIN: Primary | ICD-10-CM

## 2018-04-04 PROCEDURE — 72100 X-RAY EXAM L-S SPINE 2/3 VWS: CPT

## 2018-04-04 PROCEDURE — G0463 HOSPITAL OUTPT CLINIC VISIT: HCPCS | Performed by: NEUROLOGICAL SURGERY

## 2018-04-04 PROCEDURE — 99213 OFFICE O/P EST LOW 20 MIN: CPT | Performed by: NEUROLOGICAL SURGERY

## 2018-04-04 ASSESSMENT — PAIN SCALES - GENERAL: PAINLEVEL: NO PAIN (0)

## 2018-04-04 NOTE — PROGRESS NOTES
Neurosurgery Follow Up Clinic Visit      Elizabeth Li returns for follow up visit regarding her low back pain and bilateral lower extremity pain right greater than left.    Currently the patient describes having improvement in her pain says her back pain does not hurt nearly as much as did her last visit.  She just describes the pain as being worse at night while laying in bed.  She also has difficulty with prolonged standing she says her right leg bothers her more than her left.  She has difficult time ambulating.    Exam is stable    We reviewed the MRI of her lumbar spine  EMG nerve conduction study of her bilateral lower extremities is normal  Flexion-extension lumbar spine shows grade 1 spondylolisthesis at L3-4 and L4-5 that appears to be stable  Pelvic and hip x-ray show right greater than left severe and moderately severe degenerative changes in the hip      Plan:  I discussed with the patient that I feel she needs to be seen by Dr. Louise to evaluate her right hip prior to considering any surgical intervention.  The patient is somewhat resistant to that because she feels that the pain is more in her back then in her hip, but, she is willing to see Dr. Carroll to discuss her hips.  She may return to clinic if her symptoms are deemed nonsurgical.

## 2018-04-04 NOTE — NURSING NOTE
"Elizabeth Li is a 82 year old female who presents for:  Chief Complaint   Patient presents with     Neurologic Problem     Follow up low back pain review xray         Initial Vitals:  /71 (BP Location: Right arm, Patient Position: Sitting, Cuff Size: Adult Large)  Pulse 62  Ht 5' 2\" (1.575 m)  Wt 200 lb (90.7 kg)  SpO2 95%  BMI 36.58 kg/m2 Estimated body mass index is 36.58 kg/(m^2) as calculated from the following:    Height as of this encounter: 5' 2\" (1.575 m).    Weight as of this encounter: 200 lb (90.7 kg).. Body surface area is 1.99 meters squared. BP completed using cuff size: large  No Pain (0)    Do you feel safe in your environment?  Yes  Do you need any refills today? No    Nursing Comments: Follow up low back pain review xray.        5 min. nursing intake time  Lana Valencia MA       Discharge plan: -Referral to see Dr Zhu for bilateral hip evaluation.   2 min. nursing discharge time  Lana Valencia MA        "

## 2018-04-04 NOTE — LETTER
4/4/2018         RE: Elizabeth Li  [address]  [address]  [place] [address]        Dear Colleague,    Thank you for referring your patient, Elizabeth Li, to the [facility]. Please see a copy of my visit note below.    Neurosurgery Follow Up Clinic Visit      Elizabeth Li returns for follow up visit regarding her low back pain and bilateral lower extremity pain right greater than left.    Currently the patient describes having improvement in her pain says her back pain does not hurt nearly as much as did her last visit.  She just describes the pain as being worse at night while laying in bed.  She also has difficulty with prolonged standing she says her right leg bothers her more than her left.  She has difficult time ambulating.    Exam is stable    We reviewed the MRI of her lumbar spine  EMG nerve conduction study of her bilateral lower extremities is normal  Flexion-extension lumbar spine shows grade 1 spondylolisthesis at L3-4 and L4-5 that appears to be stable  Pelvic and hip x-ray show right greater than left severe and moderately severe degenerative changes in the hip      Plan:  I discussed with the patient that I feel she needs to be seen by Dr. Louise to evaluate her right hip prior to considering any surgical intervention.  The patient is somewhat resistant to that because she feels that the pain is more in her back then in her hip, but, she is willing to see Dr. Carroll to discuss her hips.  She may return to clinic if her symptoms are deemed nonsurgical.              Again, thank you for allowing me to participate in the care of your patient.        Sincerely,        Rad Yost MD

## 2018-04-04 NOTE — MR AVS SNAPSHOT
After Visit Summary   4/4/2018    Elizabeth Li    MRN: [id number]           Patient Information     Date Of Birth          1935        Visit Information        Provider Department      4/4/2018 10:00 AM Rad Yost MD [facility]        Today's Diagnoses     Bilateral hip pain    -  1      Care Instructions    -Referral to see Dr Zhu for bilateral hip evaluation.             Follow-ups after your visit        Additional Services     ORTHOPEDICS ADULT REFERRAL       Your provider has referred you to: [facility]: [facility] -  [facility]  [phone]   [email or web address]    Dr Zhu for bilateral hip eval     Please be aware that coverage of these services is subject to the terms and limitations of your health insurance plan.  Call member services at your health plan with any benefit or coverage questions.      Please bring the following to your appointment:    >>   Any x-rays, CTs or MRIs which have been performed.  Contact the facility where they were done to arrange for  prior to your scheduled appointment.    >>   List of current medications   >>   This referral request   >>   Any documents/labs given to you for this referral                  Your next 10 appointments already scheduled     Apr 11, 2018  2:30 PM CDT   New Visit with Olena Manzanares MD   [facility]    [address]  [facility] [address] [phone]              Who to contact     If you have questions or need follow up information about today's clinic visit or your schedule please contact [facility] directly at [phone].  Normal or non-critical lab and imaging results will be communicated to you by MyChart, letter or phone within 4 business days after the clinic has  received the results. If you do not hear from us within 7 days, please contact the clinic through WhoisEDI or phone. If you have a critical or abnormal lab result, we will notify you by phone as soon as possible.  Submit refill requests through WhoisEDI or call your pharmacy and they will forward the refill request to us. Please allow 3 business days for your refill to be completed.          Additional Information About Your Visit        [employer] Information     Whois[employer]I gives you secure access to your electronic health record. If you see a primary care provider, you can also send messages to your care team and make appointments. If you have questions, please call your primary care clinic.  If you do not have a primary care provider, please call [phone] and they will assist you.        Care EveryWhere ID     This is your Care EveryWhere ID. This could be used by other organizations to access your [place] medical records  [id number]         Blood Pressure from Last 3 Encounters:   03/09/18 128/66   01/10/18 147/73   11/14/17 124/56    Weight from Last 3 Encounters:   03/09/18 200 lb 12.8 oz (91.1 kg)   01/10/18 196 lb 3.2 oz (89 kg)   11/14/17 196 lb 8 oz (89.1 kg)              We Performed the Following     ORTHOPEDICS ADULT REFERRAL        Primary Care Provider Office Phone # Fax #    Kasi Watkins [phone]       [address] [facility] [address]        Equal Access to Services     [facility] Hospital[facility]CIE AH: Hadii aad ku hadasho Soomaali, waaxda luqadaha, qaybta kaalmada adeegyada, tommy wilson . So [facility] [phone].    ATENCIÓN: Si habla español, tiene a hopper disposición servicios gratuitos de asistencia lingüística. Llame al [phone].    We comply with applicable federal civil rights laws and [place] laws. We do not discriminate on the basis of race, color, national origin, age, disability, sex, sexual orientation, or gender identity.            Thank you!      Thank you for choosing [facility]  for your care. Our goal is always to provide you with excellent care. Hearing back from our patients is one way we can continue to improve our services. Please take a few minutes to complete the written survey that you may receive in the mail after your visit with us. Thank you!             Your Updated Medication List - Protect others around you: Learn how to safely use, store and throw away your medicines at www.disposemymeds.org.          This list is accurate as of 4/4/18 10:24 AM.  Always use your most recent med list.                   Brand Name Dispense Instructions for use Diagnosis    ascorbic acid 500 MG tablet    VITAMIN C     Take 500 mg by mouth daily.        aspirin 81 MG tablet     90 tablet    Take 1 tablet (81 mg) by mouth daily    Abnormal cardiovascular stress test       ibuprofen 200 MG tablet    ADVIL/MOTRIN     Take 200 mg by mouth every 4 hours as needed for mild pain Patient takes 4 tablet at night. Occasionally takes 4 tablet when goes out.        isosorbide mononitrate 30 MG 24 hr tablet    IMDUR    90 tablet    Take 1 tablet (30 mg) by mouth daily    Coronary artery disease involving native coronary artery of native heart without angina pectoris, Status post coronary angioplasty       ketoconazole 2 % shampoo    NIZORAL    120 mL    Apply to the affected area and wash off after 5 minutes.    Dandruff       levothyroxine 100 MCG tablet    SYNTHROID/LEVOTHROID    90 tablet    Take 1 tablet (100 mcg) by mouth daily    Acquired hypothyroidism       metoprolol tartrate 25 MG tablet    LOPRESSOR    90 tablet    Take 0.5 tablets (12.5 mg) by mouth 2 times daily    Coronary artery disease involving native coronary artery without angina pectoris       metroNIDAZOLE 1 % gel    METROGEL    60 g    Apply topically daily    Rosacea       simvastatin 20 MG tablet    ZOCOR    90 tablet    Take 1 tablet (20 mg) by mouth At Bedtime     Hyperlipidemia LDL goal <100       vitamin D 1000 UNITS capsule      Take 1 capsule by mouth 2 times daily

## 2018-04-06 ENCOUNTER — OFFICE VISIT (OUTPATIENT)
Dept: ORTHOPEDICS | Facility: CLINIC | Age: 83
End: 2018-04-06
Payer: COMMERCIAL

## 2018-04-06 ENCOUNTER — TELEPHONE (OUTPATIENT)
Dept: ORTHOPEDICS | Facility: CLINIC | Age: 83
End: 2018-04-06

## 2018-04-06 VITALS
SYSTOLIC BLOOD PRESSURE: 132 MMHG | WEIGHT: 200 LBS | HEIGHT: 62 IN | DIASTOLIC BLOOD PRESSURE: 64 MMHG | BODY MASS INDEX: 36.8 KG/M2

## 2018-04-06 DIAGNOSIS — M79.672 LEFT FOOT PAIN: Primary | ICD-10-CM

## 2018-04-06 PROCEDURE — 99203 OFFICE O/P NEW LOW 30 MIN: CPT | Performed by: ORTHOPAEDIC SURGERY

## 2018-04-06 NOTE — PROGRESS NOTES
HISTORY OF PRESENT ILLNESS:    Elizabeth Li is a 82 year old female who is seen in consultation at the request of Dr. Yost for bilateral hip pain. Right is worse than left. Patient notes hip pain for the past 10-12 years with worsening over the past 2 years.     Present symptoms: Pain located posterior, lateral, and occasionally anterior. Pain with laying down, walking, bending. Pain is better with sitting.   Treatments tried to this point: OTC Medication:  Ibuprofen (Advil), wedge between legs at night, formal physical therapy for back pain  Orthopedic PMH: Seeing Dr. Yost for lumbago    Past Medical History:   Diagnosis Date     Abnormal cardiovascular stress test     EKG changes with exercise on Exercise stress test 1/26/2015     CAD (coronary artery disease)     Severe 2 vessel CAD. PCI with drug-eluting stents x2 to mid LAD on 3/27/15     HTN (hypertension)      Hyperlipidemia LDL goal <100      Hypothyroidism      IHD (ischemic heart disease)      KUSUM (obstructive sleep apnea)     CPAP       Past Surgical History:   Procedure Laterality Date     CARDIAC SURGERY      2 heart stents     COLONOSCOPY       COLONOSCOPY N/A 1/11/2017    Procedure: COLONOSCOPY;  Surgeon: Nghia Moss MD;  Location:  GI     HEAD & NECK SURGERY      wisdom teeth removed     HEART CATH, ANGIOPLASTY  3/27/15    2.5 X 18 mm & 3.0 X mm LUCILA to LAD     TONSILLECTOMY      T&A as a child     TUBAL LIGATION         Family History   Problem Relation Age of Onset     Blood Disease Mother      pernious anemia     HEART DISEASE Father      HEART DISEASE Sister      HEART DISEASE Brother      Colon Cancer No family hx of        Social History     Social History     Marital status:      Spouse name: N/A     Number of children: 4     Years of education: N/A     Occupational History     Not on file.     Social History Main Topics     Smoking status: Former Smoker     Quit date: 1/1/1984     Smokeless tobacco: Never Used      Alcohol use 0.0 oz/week     0 Standard drinks or equivalent per week      Comment: social drinker     Drug use: No     Sexual activity: No     Other Topics Concern     Caffeine Concern Yes     coffee      Occupational Exposure No     Hobby Hazards No     Sleep Concern No     Stress Concern No     Weight Concern No     Special Diet Yes     gluten free and dairy free     Back Care No     Exercise No     on hold , 4/7 starting cardiac rehab     Seat Belt Yes     Social History Narrative       Current Outpatient Prescriptions   Medication Sig Dispense Refill     ibuprofen (ADVIL/MOTRIN) 200 MG tablet Take 200 mg by mouth every 4 hours as needed for mild pain Patient takes 4 tablet at night. Occasionally takes 4 tablet when goes out.       levothyroxine (SYNTHROID/LEVOTHROID) 100 MCG tablet Take 1 tablet (100 mcg) by mouth daily 90 tablet 3     simvastatin (ZOCOR) 20 MG tablet Take 1 tablet (20 mg) by mouth At Bedtime 90 tablet 3     isosorbide mononitrate (IMDUR) 30 MG 24 hr tablet Take 1 tablet (30 mg) by mouth daily 90 tablet 3     metroNIDAZOLE (METROGEL) 1 % gel Apply topically daily 60 g 11     metoprolol (LOPRESSOR) 25 MG tablet Take 0.5 tablets (12.5 mg) by mouth 2 times daily 90 tablet 1     ketoconazole (NIZORAL) 2 % shampoo Apply to the affected area and wash off after 5 minutes. 120 mL 1     aspirin 81 MG tablet Take 1 tablet (81 mg) by mouth daily 90 tablet 3     ascorbic acid (VITAMIN C) 500 MG tablet Take 500 mg by mouth daily.       Cholecalciferol (VITAMIN D) 1000 UNIT capsule Take 1 capsule by mouth 2 times daily          Allergies   Allergen Reactions     Flu Virus Vaccine Other (See Comments)     Viral SX       REVIEW OF SYSTEMS:  CONSTITUTIONAL:  NEGATIVE for fever, chills, change in weight  INTEGUMENTARY/SKIN:  NEGATIVE for worrisome rashes, moles or lesions  EYES:  NEGATIVE for vision changes or irritation  ENT/MOUTH:  NEGATIVE for ear, mouth and throat problems  RESP:  NEGATIVE for significant  cough or SOB  BREAST:  NEGATIVE for masses, tenderness or discharge  CV:  NEGATIVE for chest pain, palpitations or peripheral edema  GI:  NEGATIVE for nausea, abdominal pain, heartburn, or change in bowel habits  :  Negative   MUSCULOSKELETAL:  See HPI above  NEURO:  NEGATIVE for weakness, dizziness or paresthesias  ENDOCRINE:  NEGATIVE for temperature intolerance, skin/hair changes  HEME/ALLERGY/IMMUNE:  NEGATIVE for bleeding problems  PSYCHIATRIC:  NEGATIVE for changes in mood or affect      PHYSICAL EXAM:  There were no vitals taken for this visit.  There is no height or weight on file to calculate BMI.   GENERAL APPEARANCE: healthy, alert and no distress   SKIN: no suspicious lesions or rashes  NEURO: Normal strength and tone, mentation intact and speech normal  VASCULAR: Good pulses, and capillary refill   LYMPH: no lymphadenopathy   PSYCH:  mentation appears normal and affect normal/bright    MSK:  A&OX3, NAD  Neck supple, no lymphadenopathy  The patient ambulates without an antalgic gait.  The patient is able to get on and off the exam table without difficulty.    Examination of the spine reveals a normal lordosis to the cervical and lumbar spine, and a normal kyphosis to the thoracic spine.  There is no clinical evidence of scoliosis.    The pelvis is clinically level.  Trendelenberg is negative.  The patient is non- tender to palpation over the greater trochanteric bursal region or piriformis fossa.  With the hip flexed to 90 degrees, internal and external rotation is 15/30 respectively,  with pain at extremes primary osteoarthritis.  The calves and thighs are symmetric, without atrophy and non-tender to palpation. Rachel's sign is negative, bilaterally.   CMS is intact to the toes.      ASSESSMENT / PLAN: Bilateral hips right greater than left.  I had a long discussion with the patient regarding the pathology and treatment options.  I went through the osteoarthritis treatment rhythm with her and she is  going to decide whether to start with an intra-articular corticosteroid injection or proceed to a total hip arthroplasty.    Imaging Interpretation:         Beni Zhu MD  Department of Orthopedic Surgery        Disclaimer: This note consists of symbols derived from keyboarding, dictation and/or voice recognition software. As a result, there may be errors in the script that have gone undetected. Please consider this when interpreting information found in this chart.

## 2018-04-06 NOTE — MR AVS SNAPSHOT
After Visit Summary   4/6/2018    Elizabeth Li    MRN: [id number]           Patient Information     Date Of Birth          1935        Visit Information        Provider Department      4/6/2018 10:00 AM Moise Zhu MD [facility] ORTHOPEDIC SURGERY        Today's Diagnoses     Left foot pain    -  1       Follow-ups after your visit        Additional Services     ORTHO  REFERRAL       [facility] is referring you to the Orthopedic  Services at [facility].       The  Representative will assist you in the coordination of your Orthopedic and Musculoskeletal Care as prescribed by your physician.    The  Representative will call you within 1 business day to help schedule your appointment, or you may contact the  Representative at:    All areas ~ [phone]     Type of Referral : [facility]       Timeframe requested: Routine    Coverage of these services is subject to the terms and limitations of your health insurance plan.  Please call member services at your health plan with any benefit or coverage questions.      If X-rays, CT or MRI's have been performed, please contact the facility where they were done to arrange for , prior to your scheduled appointment.  Please bring this referral request to your appointment and present it to your specialist.                  Your next 10 appointments already scheduled     Jul 09, 2018 10:00 AM CDT   LAB with [facility]   [facility] ([facility])    [address]  [facility] [id number] [phone]           Please do not eat 10-12 hours before your appointment if you are coming in fasting for labs on lipids, cholesterol, or glucose (sugar). This does not apply to pregnant women. Water, hot tea and black coffee (with nothing added) are okay. Do not drink other fluids, diet soda  "or chew gum.            Jul 19, 2018  9:30 AM CDT   Return Visit with Olena Manzanares MD   [facility]    [address]  [facility] [address] [phone]              Who to contact     If you have questions or need follow up information about today's clinic visit or your schedule please contact [facility] ORTHOPEDIC SURGERY directly at [phone].  Normal or non-critical lab and imaging results will be communicated to you by Farehelperhart, letter or phone within 4 business days after the clinic has received the results. If you do not hear from us within 7 days, please contact the clinic through AmigoCATt or phone. If you have a critical or abnormal lab result, we will notify you by phone as soon as possible.  Submit refill requests through HydroNovation or call your pharmacy and they will forward the refill request to us. Please allow 3 business days for your refill to be completed.          Additional Information About Your Visit        HydroNovation Information     [employer] gives you secure access to your electronic health record. If you see a primary care provider, you can also send messages to your care team and make appointments. If you have questions, please call your primary care clinic.  If you do not have a primary care provider, please call [phone] and they will assist you.        Care EveryWhere ID     This is your Care EveryWhere ID. This could be used by other organizations to access your [facility] medical records  [id number]        Your Vitals Were     Height BMI (Body Mass Index)                5' 2\" (1.575 m) 36.58 kg/m2           Blood Pressure from Last 3 Encounters:   04/11/18 132/78   04/10/18 124/64   04/06/18 132/64    Weight from Last 3 Encounters:   04/11/18 200 lb 12.8 oz (91.1 kg)   04/10/18 200 lb (90.7 kg)   04/06/18 200 lb (90.7 kg)              We Performed the Following     ORTHO  REFERRAL        Primary Care " Provider Office Phone # Fax #    Kasi Watkins [phone]       [address] [facility] [address]        Equal Access to Services     GERMAINANGELINE GONZALEZ : Hadelena wanda novoa candido Barrera, waaxda luqadaha, qaybta kaalmada meeta, tommy liflorencio urbina. So [facility] [phone].    ATENCIÓN: Si habla español, tiene a hopper disposición servicios gratuitos de asistencia lingüística. Llame al [phone].    We comply with applicable federal civil rights laws and [place] laws. We do not discriminate on the basis of race, color, national origin, age, disability, sex, sexual orientation, or gender identity.            Thank you!     Thank you for choosing [facility] ORTHOPEDIC SURGERY  for your care. Our goal is always to provide you with excellent care. Hearing back from our patients is one way we can continue to improve our services. Please take a few minutes to complete the written survey that you may receive in the mail after your visit with us. Thank you!             Your Updated Medication List - Protect others around you: Learn how to safely use, store and throw away your medicines at www.disposemymeds.org.          This list is accurate as of 4/6/18 11:59 PM.  Always use your most recent med list.                   Brand Name Dispense Instructions for use Diagnosis    ascorbic acid 500 MG tablet    VITAMIN C     Take 500 mg by mouth daily.        aspirin 81 MG tablet     90 tablet    Take 1 tablet (81 mg) by mouth daily    Abnormal cardiovascular stress test       ibuprofen 200 MG tablet    ADVIL/MOTRIN     Take 200 mg by mouth every 4 hours as needed for mild pain Patient takes 4 tablet at night. Occasionally takes 4 tablet when goes out.        isosorbide mononitrate 30 MG 24 hr tablet    IMDUR    90 tablet    Take 1 tablet (30 mg) by mouth daily    Coronary artery disease involving native coronary artery of native heart without angina pectoris, Status post coronary  angioplasty       ketoconazole 2 % shampoo    NIZORAL    120 mL    Apply to the affected area and wash off after 5 minutes.    Dandruff       levothyroxine 100 MCG tablet    SYNTHROID/LEVOTHROID    90 tablet    Take 1 tablet (100 mcg) by mouth daily    Acquired hypothyroidism       metoprolol tartrate 25 MG tablet    LOPRESSOR    90 tablet    Take 0.5 tablets (12.5 mg) by mouth 2 times daily    Coronary artery disease involving native coronary artery without angina pectoris       metroNIDAZOLE 1 % gel    METROGEL    60 g    Apply topically daily    Rosacea       simvastatin 20 MG tablet    ZOCOR    90 tablet    Take 1 tablet (20 mg) by mouth At Bedtime    Hyperlipidemia LDL goal <100       vitamin D 1000 units capsule      Take 1 capsule by mouth 2 times daily

## 2018-04-06 NOTE — TELEPHONE ENCOUNTER
Called and spoke to patient to schedule US guided corticosteroid injection with Dr. Gaspar on 4/12/18 per Dr. Zhu instructions. Also informed of ortho  referral placed for Podiatry evaluation of left foot pain.

## 2018-04-10 ENCOUNTER — OFFICE VISIT (OUTPATIENT)
Dept: PODIATRY | Facility: CLINIC | Age: 83
End: 2018-04-10
Payer: COMMERCIAL

## 2018-04-10 VITALS
DIASTOLIC BLOOD PRESSURE: 64 MMHG | BODY MASS INDEX: 36.8 KG/M2 | WEIGHT: 200 LBS | HEIGHT: 62 IN | SYSTOLIC BLOOD PRESSURE: 124 MMHG

## 2018-04-10 DIAGNOSIS — G57.62 NEUROMA OF SECOND INTERSPACE OF LEFT FOOT: Primary | ICD-10-CM

## 2018-04-10 PROCEDURE — 64455 NJX AA&/STRD PLTR COM DG NRV: CPT | Mod: T1 | Performed by: PODIATRIST

## 2018-04-10 PROCEDURE — 99203 OFFICE O/P NEW LOW 30 MIN: CPT | Mod: 25 | Performed by: PODIATRIST

## 2018-04-10 NOTE — PATIENT INSTRUCTIONS
Thank you for choosing [facility] / [facility]le [facility]!    DR. TAVARES'S CLINIC LOCATIONS:   MONDAY - [place] TUESDAY - [place]   [address] [facility]  [address]   [place] [address] [place] [address]   [phone]       THURSDAY AM - [place] THURSDAY PM - [place]   [address]   [place] [address] [place], [place] [address] [phone]       FRIDAY AM - [place] SET UP SURGERY: [phone] [address] APPOINTMENTS: [phone]   [place] [address] BILLING QUESTIONS: [phone] FAX NUMBER: [phone]     Follow Up: as needed    PRICE THERAPY  Many aches and pains throughout the foot and ankle can be helped with many simple treatments. This is usually described as PRICE Therapy.      P - Protection - often times, inflammation/pain in the lower extremity is not able to improve simply because the areas involved are never allowed to rest. Every step we take can bother the problematic area. Protecting those areas is an important step in the healing process. This may involve a walking cast boot, a special insert/orthotic device, an ankle brace, or simply avoiding barefoot walking.    R - Rest - in addition to protecting the foot/ankle, resting is an important, but often times difficult, treatment option. Getting off your feet when they bother you, and specifically avoiding activities that cause pain/discomfort, are very beneficial to prevent, and treat, foot/ankle pain.      I - Ice - icing regularly can help to decrease inflammation and swelling in the foot, thus decreasing pain. Using an ice pack or a bag of frozen veggies works very well. Ice for 20 minutes multiple times per day as needed.  Do not place the ice directly on the skin as this can cause tissue damage.    C - Compression - using a compression wrap or an ACE wrap can help to decrease swelling, which can help to decrease pain. Wearing  the wraps is generally not needed at night, but they should be worn on a regular basis when you are going to be on your feet for prolonged periods as gravity tends to pull fluids down to your feet/ankles.    E - Elevation - elevating your lower extremities multiple times daily for 15-20 minutes can help to decrease swelling, which works well in decreasing pain levels.    NSAID/Tylenol - Anti-inflammatories like Aleve or ibuprofen, and/or a pain medication, such as Tylenol, can help to improve pain levels and get the issue resolved sooner rather than later. Anyone with liver issues should be careful with Tylenol, and anyone with high blood pressure or heart, stomach or kidney issues should be careful with anti-inflammatories. Please ask if you have questions about these medications, including dosage.    Over the Counter Inserts    Super Feet are the most common and easiest to find.    Locations include any [employer], StumbleUpon in [place] on [address]  and in [place] on [address], NetMovies in [facility] on [address], [facility] Running Room in [facility] on [address], [facility] Running Room in [place] on [address], [employer] in [place] on [address] and [employer] in [facility] on [address] and in [place] on [address].    Sofsole Fit can be found at [employer]Upon in [place].  You can also find them online at Sofsole.com    Spenco can be found online and at Ruth Kunstadter â€“ [employer] in [facility] on [address], Run N' Fun in [facility] on [address], Gear Running Store in [place] on [address], NetMovies in [place] on [address] and [employer] in [place] on [address].    Power Step can be a little harder to find.  Locations include [employer]' Fun in [place] on [address], [place] in [facility], Stop[employer]over Store in [place] on [address] and online    Walk-Fit - [employer]     [facility]    **  A good  high quality over the counter insert can cost around $40-$60.      MEJIA'S NEUROMA   Mejia's neuroma is an enlargement or thickening of a nerve in the foot. It is also sometimes referred to as an intermetatarsal neuroma, interdigital neuroma, Mejia's metatarsalgia (pain in the metatarsal head area), carissa-neural fibrosis (scar tissue around a nerve) or entrapment neuropathy (abnormal nerve due to compression). A Mejia's neuroma most commonly occurs in the third interspace between the third and fourth toes, followed by the second interspace between the second and third toes. Mejia's neuromas have also occurred in the fourth and first interspaces, but these are rare. If you have a Mejia's neuroma, there is a 15% chance it will occur bilaterally (on both feet). Mejia's neuromas occur most commonly in women who are between 30 to 50 years old. The reason they are more common in women is thought to be due to the shoes women wear.   CAUSES: A Mejia's neuroma is thought to be caused by trauma to the nerve, but scientists are still not sure about the exact cause of the trauma. The trauma may be caused by the metatarsal heads, the deep transverse intermetatarsal ligament (holds the metatarsal heads together) or an intermetatarsal bursa (fluid-filled sac). All of these structures can cause compression/trauma on the nerve which initially causes swelling and injury in the nerve. Over time if the compression/trauma continues, the nerve repairs itself with very fibrous tissue that leads to enlargement and thickening of the nerve. Other causes of trauma to the nerve may include; overpronation (foot rolls inward), hypermobility (too much motion), cavo varus (high arch foot) and excessive dorsiflexion (toes bend upward) of the toes. These biomechanical (howthe foot moves) factors may cause trauma to the nerve with every step. If the nerve becomes irritated and enlarged then it takes up more space and gets even more compressed  "and irritated. It becomes a vicious cycle.   SIGNS & SYMPTOMS  - Pain (sharp, stabbing, throbbing, shooting)   - Numbness   - Tingling or \"pins & needles\"   - Burning   - Cramping   - Feeling that you are stepping on something or that something is in your shoe   - Initially the symptoms may happen once in a while, but as the condition gets     worse, the symptoms may happen all of the time   - It usually feels better by taking off your shoe and massaging your foot     DIAGNOSIS: Your podiatrist will ask many questions about your signs and symptoms and will perform a physical exam. Some of the exams may include a web space compression test. This is done by squeezing the metatarsals together with one hand and using the thumb and index finger of the other hand to compress the affected web space to reproduce the pain/symptoms. A palpable click (Otto's click) is usually present. This test may also cause pain to shoot into the toes and that is called a Tinel's sign. Anu's test involves squeezing the metatarsals together and moving the toes up and down for 30 seconds. This will usually cause pain or it will bring on your other symptoms. Ewing's sign is positive when you stand and the affected toes spread apart. A Mejia's neuroma is usually diagnosed based on the history and physical exam findings, but sometimes other tests such as an x-ray, ultrasound or an MRI are needed.   TREATMENT  1.  Footwear Changes: Wear shoes that are wide and deep in the toe box so they  do not put pressure on your toes and metatarsals. Avoid wearing high heels because they cause increased pressure on the ball of your foot (forefoot).    2.  Metatarsal Pads: These help to lift and separate the metatarsal heads to take pressure off of the nerve. They are placed just behind where you feel the pain, not on top of the painful spot.   3.  Activity modification: For example, you may try swimming instead of running until your symptoms go " away.   4.  Taping   5.  Icing   6.  NSAIDs (anti-inflammatories): aleve, ibuprofen, etc.   7.  Arch Supports or Orthotics: These help to control some of the abnormal motion in your feet. The abnormal motion can lead to extra torque and pressure on the nerve.   8.  Physical Therapy  9.  Cortisone Injection: Helps to decrease the size of the irritated, enlarged nerve.   10.  Sclerosing Alcohol Injection: Helps to destroy the nerve chemically, which causes permanent numbness    SURGERY  If conservative treatment does not help surgery may be needed. Surgery may involve cutting out the nerve or cutting the intermetatarsalligament. Studies have shown surgery has an 80-85% success rate.  Will result in numbness    PREVENTION  -Avoid wearing narrow, pointed toe shoes, or high heels      Body Mass Index (BMI)  Many things can cause foot and ankle problems. Foot structure, activity level, foot mechanics and injuries are common causes of pain. One very important issue that often goes unmentioned, is body weight. Extra weight can cause increased stress on muscles, ligaments, bones and tendons. Sometimes just a few extra pounds is all it takes to put one over her/his threshold. Without reducing that stress, it can be difficult to alleviate pain. Some people are uncomfortable addressing this issue, but we feel it is important for you to think about it. As Foot &  Ankle specialists, our job is addressing the lower extremity problem and possible causes. Regarding extra body weight, we encourage patients to discuss diet and weight management plans with their primary care doctors. It is this team approach that gives you the best opportunity for pain relief and getting you back on your feet.

## 2018-04-10 NOTE — MR AVS SNAPSHOT
After Visit Summary   4/10/2018    Elizabeth Li    MRN: [id number]           Patient Information     Date Of Birth          1935        Visit Information        Provider Department      4/10/2018 1:30 PM Azael Paris DPM [facility] PODIATRY        Care Instructions    Thank you for choosing [facility]!    DR. PARIS'S CLINIC LOCATIONS:   MONDAY - [place] TUESDAY - [place]   [address] [facility]  [address]   [place] [address] [place] [address]   [phone]       THURSDAY AM - [place] THURSDAY PM - [place]   [address]   [place] [address] [place], [place] [address] [phone]       FRIDAY AM - [place] SET UP SURGERY: [phone] [address] APPOINTMENTS: [phone]   [place] [address] BILLING QUESTIONS: [phone] FAX NUMBER: [phone]     Follow Up: as needed    PRICE THERAPY  Many aches and pains throughout the foot and ankle can be helped with many simple treatments. This is usually described as PRICE Therapy.      P - Protection - often times, inflammation/pain in the lower extremity is not able to improve simply because the areas involved are never allowed to rest. Every step we take can bother the problematic area. Protecting those areas is an important step in the healing process. This may involve a walking cast boot, a special insert/orthotic device, an ankle brace, or simply avoiding barefoot walking.    R - Rest - in addition to protecting the foot/ankle, resting is an important, but often times difficult, treatment option. Getting off your feet when they bother you, and specifically avoiding activities that cause pain/discomfort, are very beneficial to prevent, and treat, foot/ankle pain.      I - Ice - icing regularly can help to decrease inflammation and swelling in the foot, thus decreasing pain. Using an ice pack or  a bag of frozen veggies works very well. Ice for 20 minutes multiple times per day as needed.  Do not place the ice directly on the skin as this can cause tissue damage.    C - Compression - using a compression wrap or an ACE wrap can help to decrease swelling, which can help to decrease pain. Wearing the wraps is generally not needed at night, but they should be worn on a regular basis when you are going to be on your feet for prolonged periods as gravity tends to pull fluids down to your feet/ankles.    E - Elevation - elevating your lower extremities multiple times daily for 15-20 minutes can help to decrease swelling, which works well in decreasing pain levels.    NSAID/Tylenol - Anti-inflammatories like Aleve or ibuprofen, and/or a pain medication, such as Tylenol, can help to improve pain levels and get the issue resolved sooner rather than later. Anyone with liver issues should be careful with Tylenol, and anyone with high blood pressure or heart, stomach or kidney issues should be careful with anti-inflammatories. Please ask if you have questions about these medications, including dosage.    Over the Counter Inserts    Super Feet are the most common and easiest to find.    Locations include any [employer] Store, Live[employer] in [place] on [address] [address] and in [place] on [address], [employer] in [facility] on [address], [facility] Running Room in [facility] on [address], [facility] Running Room in [place] on [address], [employer] in [place] on [address] and [employer] in [facility] on [address] and in [place] on [address].    Sofsole Fit can be found at [employer] in [place].  You can also find them online at Sofsole.com    Spenco can be found online and at [employer] in [facility] on [address], [employer]' [employer] in [facility] on [address], Gear Running Store in [place] on [address], [employer]eker in [place] on [address] and  [employer] in [place] on [address].    Power Step can be a little harder to find.  Locations include [employer] in [place] on [address], [place] in [facility], [employer] Store in [place] on [address] and online    Walk-Fit - [employer]     [facility] [place]    **  A good high quality over the counter insert can cost around $40-$60.      MEJIA'S NEUROMA   Mejia's neuroma is an enlargement or thickening of a nerve in the foot. It is also sometimes referred to as an intermetatarsal neuroma, interdigital neuroma, Mejia's metatarsalgia (pain in the metatarsal head area), carissa-neural fibrosis (scar tissue around a nerve) or entrapment neuropathy (abnormal nerve due to compression). A Mejia's neuroma most commonly occurs in the third interspace between the third and fourth toes, followed by the second interspace between the second and third toes. Mejia's neuromas have also occurred in the fourth and first interspaces, but these are rare. If you have a Mejia's neuroma, there is a 15% chance it will occur bilaterally (on both feet). Mejia's neuromas occur most commonly in women who are between 30 to 50 years old. The reason they are more common in women is thought to be due to the shoes women wear.   CAUSES: A Mejia's neuroma is thought to be caused by trauma to the nerve, but scientists are still not sure about the exact cause of the trauma. The trauma may be caused by the metatarsal heads, the deep transverse intermetatarsal ligament (holds the metatarsal heads together) or an intermetatarsal bursa (fluid-filled sac). All of these structures can cause compression/trauma on the nerve which initially causes swelling and injury in the nerve. Over time if the compression/trauma continues, the nerve repairs itself with very fibrous tissue that leads to enlargement and thickening of the nerve. Other causes of trauma to the nerve may include; overpronation (foot rolls inward), hypermobility  "(too much motion), cavo varus (high arch foot) and excessive dorsiflexion (toes bend upward) of the toes. These biomechanical (howthe foot moves) factors may cause trauma to the nerve with every step. If the nerve becomes irritated and enlarged then it takes up more space and gets even more compressed and irritated. It becomes a vicious cycle.   SIGNS & SYMPTOMS  - Pain (sharp, stabbing, throbbing, shooting)   - Numbness   - Tingling or \"pins & needles\"   - Burning   - Cramping   - Feeling that you are stepping on something or that something is in your shoe   - Initially the symptoms may happen once in a while, but as the condition gets     worse, the symptoms may happen all of the time   - It usually feels better by taking off your shoe and massaging your foot     DIAGNOSIS: Your podiatrist will ask many questions about your signs and symptoms and will perform a physical exam. Some of the exams may include a web space compression test. This is done by squeezing the metatarsals together with one hand and using the thumb and index finger of the other hand to compress the affected web space to reproduce the pain/symptoms. A palpable click (Otto's click) is usually present. This test may also cause pain to shoot into the toes and that is called a Tinel's sign. Anu's test involves squeezing the metatarsals together and moving the toes up and down for 30 seconds. This will usually cause pain or it will bring on your other symptoms. Ewing's sign is positive when you stand and the affected toes spread apart. A Mejia's neuroma is usually diagnosed based on the history and physical exam findings, but sometimes other tests such as an x-ray, ultrasound or an MRI are needed.   TREATMENT  1.  Footwear Changes: Wear shoes that are wide and deep in the toe box so they  do not put pressure on your toes and metatarsals. Avoid wearing high heels because they cause increased pressure on the ball of your foot (forefoot). "    2.  Metatarsal Pads: These help to lift and separate the metatarsal heads to take pressure off of the nerve. They are placed just behind where you feel the pain, not on top of the painful spot.   3.  Activity modification: For example, you may try swimming instead of running until your symptoms go away.   4.  Taping   5.  Icing   6.  NSAIDs (anti-inflammatories): aleve, ibuprofen, etc.   7.  Arch Supports or Orthotics: These help to control some of the abnormal motion in your feet. The abnormal motion can lead to extra torque and pressure on the nerve.   8.  Physical Therapy  9.  Cortisone Injection: Helps to decrease the size of the irritated, enlarged nerve.   10.  Sclerosing Alcohol Injection: Helps to destroy the nerve chemically, which causes permanent numbness    SURGERY  If conservative treatment does not help surgery may be needed. Surgery may involve cutting out the nerve or cutting the intermetatarsalligament. Studies have shown surgery has an 80-85% success rate.  Will result in numbness    PREVENTION  -Avoid wearing narrow, pointed toe shoes, or high heels      Body Mass Index (BMI)  Many things can cause foot and ankle problems. Foot structure, activity level, foot mechanics and injuries are common causes of pain. One very important issue that often goes unmentioned, is body weight. Extra weight can cause increased stress on muscles, ligaments, bones and tendons. Sometimes just a few extra pounds is all it takes to put one over her/his threshold. Without reducing that stress, it can be difficult to alleviate pain. Some people are uncomfortable addressing this issue, but we feel it is important for you to think about it. As Foot &  Ankle specialists, our job is addressing the lower extremity problem and possible causes. Regarding extra body weight, we encourage patients to discuss diet and weight management plans with their primary care doctors. It is this team approach that gives you the best  "opportunity for pain relief and getting you back on your feet.            Follow-ups after your visit        Your next 10 appointments already scheduled     Apr 11, 2018  2:30 PM CDT   [facility]    [address]  [facility] [id number] [phone]            Apr 12, 2018  9:20 AM CDT   New Visit with Dorothea Gaspar DO   [facility] ([facility])    [address]  [address]  [facility] [address]   [phone]              Who to contact     If you have questions or need follow up information about today's clinic visit or your schedule please contact [facility] PODIAT[facility] directly at [phone].  Normal or non-critical lab and imaging results will be communicated to you by MyChart, letter or phone within 4 business days after the clinic has received the results. If you do not hear from us within 7 days, please contact the clinic through [employer] or phone. If you have a critical or abnormal lab result, we will notify you by phone as soon as possible.  Submit refill requests through [employer] or call your pharmacy and they will forward the refill request to us. Please allow 3 business days for your refill to be completed.          Additional Information About Your Visit        MyChart Information     [employer] gives you secure access to your electronic health record. If you see a primary care provider, you can also send messages to your care team and make appointments. If you have questions, please call your primary care clinic.  If you do not have a primary care provider, please call [phone] and they will assist you.        Care EveryWhere ID     This is your Care EveryWhere ID. This could be used by other organizations to access your [facility] records  [id number]        Your Vitals Were     Height BMI (Body Mass Index)                5' 2\" (1.575 " m) 36.58 kg/m2           Blood Pressure from Last 3 Encounters:   04/10/18 124/64   04/06/18 132/64   04/04/18 125/71    Weight from Last 3 Encounters:   04/10/18 200 lb (90.7 kg)   04/06/18 200 lb (90.7 kg)   04/04/18 200 lb (90.7 kg)              Today, you had the following     No orders found for display       Primary Care Provider Office Phone # Fax #    Kasi Watkins [phone]       [address] [facility] [address]        Equal Access to Services     [facility]: Hadii aad ku hadasho Soshi, waaxda luqadaha, qaybta kaalmada adefabianayada, tommy wilson . So [facility] [phone].    ATENCIÓN: Si habla español, tiene a hopper disposición servicios gratuitos de asistencia lingüística. Llame al [phone].    We comply with applicable federal civil rights laws and [place] laws. We do not discriminate on the basis of race, color, national origin, age, disability, sex, sexual orientation, or gender identity.            Thank you!     Thank you for choosing [facility]OD[facility]  for your care. Our goal is always to provide you with excellent care. Hearing back from our patients is one way we can continue to improve our services. Please take a few minutes to complete the written survey that you may receive in the mail after your visit with us. Thank you!             Your Updated Medication List - Protect others around you: Learn how to safely use, store and throw away your medicines at www.disposemymeds.org.          This list is accurate as of 4/10/18  1:43 PM.  Always use your most recent med list.                   Brand Name Dispense Instructions for use Diagnosis    ascorbic acid 500 MG tablet    VITAMIN C     Take 500 mg by mouth daily.        aspirin 81 MG tablet     90 tablet    Take 1 tablet (81 mg) by mouth daily    Abnormal cardiovascular stress test       ibuprofen 200 MG tablet    ADVIL/MOTRIN     Take 200 mg by mouth every 4 hours as needed for  mild pain Patient takes 4 tablet at night. Occasionally takes 4 tablet when goes out.        isosorbide mononitrate 30 MG 24 hr tablet    IMDUR    90 tablet    Take 1 tablet (30 mg) by mouth daily    Coronary artery disease involving native coronary artery of native heart without angina pectoris, Status post coronary angioplasty       ketoconazole 2 % shampoo    NIZORAL    120 mL    Apply to the affected area and wash off after 5 minutes.    Dandruff       levothyroxine 100 MCG tablet    SYNTHROID/LEVOTHROID    90 tablet    Take 1 tablet (100 mcg) by mouth daily    Acquired hypothyroidism       metoprolol tartrate 25 MG tablet    LOPRESSOR    90 tablet    Take 0.5 tablets (12.5 mg) by mouth 2 times daily    Coronary artery disease involving native coronary artery without angina pectoris       metroNIDAZOLE 1 % gel    METROGEL    60 g    Apply topically daily    Rosacea       simvastatin 20 MG tablet    ZOCOR    90 tablet    Take 1 tablet (20 mg) by mouth At Bedtime    Hyperlipidemia LDL goal <100       vitamin D 1000 units capsule      Take 1 capsule by mouth 2 times daily

## 2018-04-10 NOTE — PROGRESS NOTES
"Foot & Ankle Surgery  April 10, 2018    CC: \"shot left foot - between toes\"    I was asked to see Elizabeth Li regarding the chief complaint by:  Dr. Zhu    HPI:  Pt is a 82 year old female who presents with above complaint.  Left foot pain x 2-3 years.  Describes deep ache.  Pain 7/10 when standing, worse with standing.  Has had \"previous shots\".  L forefoot pain, had a shot about 2 years ago outside of [place].  doesn't recall reason for shot/diagnosis.  Symptoms just started to return.  No other treatment.  2nd toe wasn't displaced before shot but now is deviated.      ROS:   Pos for CC.  The patient denies current nausea, vomiting, chills, fevers, belly pain, calf pain, chest pain or SOB.  Complete remainder of ROS is otherwise neg.    VITALS:    Vitals:    04/10/18 1327   BP: 124/64   Weight: 200 lb (90.7 kg)   Height: 5' 2\" (1.575 m)       PMH:    Past Medical History:   Diagnosis Date     Abnormal cardiovascular stress test     EKG changes with exercise on Exercise stress test 1/26/2015     CAD (coronary artery disease)     Severe 2 vessel CAD. PCI with drug-eluting stents x2 to mid LAD on 3/27/15     HTN (hypertension)      Hyperlipidemia LDL goal <100      Hypothyroidism      IHD (ischemic heart disease)      KUSUM (obstructive sleep apnea)     CPAP       SXHX:    Past Surgical History:   Procedure Laterality Date     CARDIAC SURGERY      2 heart stents     COLONOSCOPY       COLONOSCOPY N/A 1/11/2017    Procedure: COLONOSCOPY;  Surgeon: Nghia Moss MD;  Location:  GI     HEAD & NECK SURGERY      wisdom teeth removed     HEART CATH, ANGIOPLASTY  3/27/15    2.5 X 18 mm & 3.0 X mm LUCILA to LAD     TONSILLECTOMY      T&A as a child     TUBAL LIGATION          MEDS:    Current Outpatient Prescriptions   Medication     ibuprofen (ADVIL/MOTRIN) 200 MG tablet     levothyroxine (SYNTHROID/LEVOTHROID) 100 MCG tablet     simvastatin (ZOCOR) 20 MG tablet     isosorbide mononitrate (IMDUR) 30 MG 24 hr " tablet     metroNIDAZOLE (METROGEL) 1 % gel     metoprolol (LOPRESSOR) 25 MG tablet     ketoconazole (NIZORAL) 2 % shampoo     aspirin 81 MG tablet     ascorbic acid (VITAMIN C) 500 MG tablet     Cholecalciferol (VITAMIN D) 1000 UNIT capsule     No current facility-administered medications for this visit.        ALL:     Allergies   Allergen Reactions     Flu Virus Vaccine Other (See Comments)     Viral SX       FMH:    Family History   Problem Relation Age of Onset     Blood Disease Mother      pernious anemia     HEART DISEASE Father      HEART DISEASE Sister      HEART DISEASE Brother      Colon Cancer No family hx of        SocHx:    Social History     Social History     Marital status:      Spouse name: N/A     Number of children: 4     Years of education: N/A     Occupational History     Not on file.     Social History Main Topics     Smoking status: Former Smoker     Quit date: 1/1/1984     Smokeless tobacco: Never Used     Alcohol use 0.0 oz/week     0 Standard drinks or equivalent per week      Comment: social drinker     Drug use: No     Sexual activity: No     Other Topics Concern     Caffeine Concern Yes     coffee      Occupational Exposure No     Hobby Hazards No     Sleep Concern No     Stress Concern No     Weight Concern No     Special Diet Yes     gluten free and dairy free     Back Care No     Exercise No     on hold , 4/7 starting cardiac rehab     Seat Belt Yes     Social History Narrative           EXAMINATION:  Gen:   No apparent distress  Neuro:   A&Ox3, no deficits  Psych:    Answering questions appropriately for age and situation with normal affect  Head:    NCAT  Eye:    Visual scanning without deficit  Ear:    Response to auditory stimuli wnl  Lung:    Non-labored breathing on RA noted  Abd:    NTND per patient report  Lymph:    Neg for pitting/non-pitting edema BLE  Vasc:    Pulses palpable, CFT minimally delayed  Neuro:    Light touch sensation intact to all sensory nerve  distributions without paresthesias  Derm:    Neg for nodules, lesions or ulcerations  MSK:    Left forefoot - divergent toes 2 and 3.  tender distal 2nd interspace.  2nd toe displacing dorsally without MPJ pain.  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  Neuroma L 2nd interspace  -steroid injection, see procedure note.  Patient advised to monitor %, location, duration of improvement.  -comfortable accommodative shoe gear  -OTC inserts  -RICE/NSAID prn    After obtaining written consent, the skin was prepped with alcohol.  The needle was advance to the underlying left 2nd common digital plantar nerve, aspiration was done with position change.  1 1/2cc mixture of 2:1 kenalog 40:0.25% marcaine plain was injected.  The patient tolerated the procedure without complication.  Risks that were discussed included possible joint/soft tissue damage, neuritis/numbness, infection, pigment change, steroid flare.       2.  Dislocating 2nd MPJ L foot  -discussed 2nd MPJ pathology and how this can lead to subluxation of toe/MPJ.  Not sure if previous injection was interspace injection for neuroma or MPJ injection for capsulitis  -comfortable accommodative shoe gear  -OTC inserts  -RICE/NSAID prn  -will have her simply monitor for now as this is minimally symptomatic.       Follow up:  Prn based on injection results or sooner with acute issues      Patient's medical history was reviewed today    Body mass index is 36.58 kg/(m^2).  Weight management plan: Patient was referred to their PCP to discuss a diet and exercise plan.        Azael Paris DPM   Podiatric Foot & Ankle Surgeon  [facility] ([place], [place])  [phone]

## 2018-04-11 ENCOUNTER — OFFICE VISIT (OUTPATIENT)
Dept: ENDOCRINOLOGY | Facility: CLINIC | Age: 83
End: 2018-04-11
Payer: COMMERCIAL

## 2018-04-11 VITALS
SYSTOLIC BLOOD PRESSURE: 132 MMHG | DIASTOLIC BLOOD PRESSURE: 78 MMHG | OXYGEN SATURATION: 96 % | HEART RATE: 84 BPM | HEIGHT: 62 IN | WEIGHT: 200.8 LBS | TEMPERATURE: 96 F | BODY MASS INDEX: 36.95 KG/M2

## 2018-04-11 DIAGNOSIS — E03.9 ACQUIRED HYPOTHYROIDISM: Primary | ICD-10-CM

## 2018-04-11 LAB — T3FREE SERPL-MCNC: 1.9 PG/ML (ref 2.3–4.2)

## 2018-04-11 PROCEDURE — 99204 OFFICE O/P NEW MOD 45 MIN: CPT | Performed by: INTERNAL MEDICINE

## 2018-04-11 PROCEDURE — 86376 MICROSOMAL ANTIBODY EACH: CPT | Performed by: INTERNAL MEDICINE

## 2018-04-11 PROCEDURE — 84439 ASSAY OF FREE THYROXINE: CPT | Performed by: INTERNAL MEDICINE

## 2018-04-11 PROCEDURE — 84443 ASSAY THYROID STIM HORMONE: CPT | Performed by: INTERNAL MEDICINE

## 2018-04-11 PROCEDURE — 84481 FREE ASSAY (FT-3): CPT | Performed by: INTERNAL MEDICINE

## 2018-04-11 PROCEDURE — 36415 COLL VENOUS BLD VENIPUNCTURE: CPT | Performed by: INTERNAL MEDICINE

## 2018-04-11 NOTE — PROGRESS NOTES
ENDOCRINOLOGY CLINIC NOTE:    Name: Elizabeth Li  Seen in consultation with Kasi Watkins for Hypothyroidism.  HPI:  Elizabeth Li is a 82 year old female who presents for the evaluation of :    #1 Hypothyroidism.  Diagnosed in 1989 at [place].  Currently taking levothyroxine 100 mcg/day.  Difficult to get stable levels.    Lost wt: lost wt over 2 years. From 231 in 2015.  Lost 40 lbs and was upto 180 lbs.   With gluten free and dairy free.    Then gained back.  Still following diet.  Not much activity 2/2 to pain. Has bloating and has heart burn.    Is wondering if GI s/s, bloating and weight gain related to thyroid problems.    Wt Readings from Last 2 Encounters:   04/11/18 91.1 kg (200 lb 12.8 oz)   04/10/18 90.7 kg (200 lb)     Changes to hair or skin: No  Diarrhea/Constipation:No  Dysphagia or Shortness of breath:No  Tremors:No  Changes in weight: as above  Heat or cold intolerance: + cold intolerance.  History of Lithium or Amiodarone use:No  Head or neck surgery/radiation:No  IV Contrast: No  Family History of Thyroid Problems: No  PMH/PSH:  Past Medical History:   Diagnosis Date     Abnormal cardiovascular stress test     EKG changes with exercise on Exercise stress test 1/26/2015     CAD (coronary artery disease)     Severe 2 vessel CAD. PCI with drug-eluting stents x2 to mid LAD on 3/27/15     HTN (hypertension)      Hyperlipidemia LDL goal <100      Hypothyroidism      IHD (ischemic heart disease)      KUSUM (obstructive sleep apnea)     CPAP     Past Surgical History:   Procedure Laterality Date     CARDIAC SURGERY      2 heart stents     COLONOSCOPY       COLONOSCOPY N/A 1/11/2017    Procedure: COLONOSCOPY;  Surgeon: Nghia Moss MD;  Location:  GI     HEAD & NECK SURGERY      wisdom teeth removed     HEART CATH, ANGIOPLASTY  3/27/15    2.5 X 18 mm & 3.0 X mm LUCILA to LAD     TONSILLECTOMY      T&A as a child     TUBAL LIGATION       Family Hx:  Family History   Problem Relation Age of  "Onset     Blood Disease Mother      pernious anemia     HEART DISEASE Father      HEART DISEASE Sister      HEART DISEASE Brother      Colon Cancer No family hx of      Social Hx:  Social History     Social History     Marital status:      Spouse name: N/A     Number of children: 4     Years of education: N/A     Occupational History     Not on file.     Social History Main Topics     Smoking status: Former Smoker     Quit date: 1/1/1984     Smokeless tobacco: Never Used     Alcohol use 0.0 oz/week     0 Standard drinks or equivalent per week      Comment: social drinker     Drug use: No     Sexual activity: No     Other Topics Concern     Caffeine Concern Yes     coffee      Occupational Exposure No     Hobby Hazards No     Sleep Concern No     Stress Concern No     Weight Concern No     Special Diet Yes     gluten free and dairy free     Back Care No     Exercise No     on hold , 4/7 starting cardiac rehab     Seat Belt Yes     Social History Narrative          MEDICATIONS:  has a current medication list which includes the following prescription(s): ibuprofen, levothyroxine, simvastatin, isosorbide mononitrate, metoprolol tartrate, ketoconazole, aspirin, ascorbic acid, vitamin d, and metronidazole.    ROS   ROS: 10 point ROS neg other than the symptoms noted above in the HPI.    Physical Exam   VS: /78 (BP Location: Right arm, Patient Position: Chair, Cuff Size: Adult Large)  Pulse 84  Temp 96  F (35.6  C) (Oral)  Ht 1.575 m (5' 2\")  Wt 91.1 kg (200 lb 12.8 oz)  SpO2 96%  BMI 36.73 kg/m2  GENERAL: AXOX3, NAD, well dressed, answering questions appropriately, appears stated age.  HEENT: No exopthalmous, no proptosis, EOMI, no lig lag, no retraction  NECK: Thyroid normal in size, non tender, no nodules were palpated.  CV: RRR, no rubs, gallops, no murmurs  LUNGS: CTAB, no wheezes, rales, or ronchi  ABDOMEN:+BS  EXTREMITIES: no edema, +pulses, no rashes, no lesions  NEUROLOGY: CN grossly intact,  + " DTR upper and lower extremity, no tremors  MSK: grossly intact  SKIN: no rashes, no lesions    LABS:  TFTs:  ENDO THYROID LABS-[facility] Latest Ref Rng & Units 3/7/2018 8/3/2017   TSH 0.40 - 4.00 mU/L 0.25 (L) 0.53   T4 FREE 0.76 - 1.46 ng/dL 1.07    FREE T3 2.3 - 4.2 pg/mL       ENDO THYROID LABS-[facility] Latest Ref Rng & Units 3/8/2017 1/20/2017   TSH 0.40 - 4.00 mU/L 0.05 (L) 0.19 (L)   T4 FREE 0.76 - 1.46 ng/dL 1.33 1.30   FREE T3 2.3 - 4.2 pg/mL       ENDO THYROID LABS-[facility] Latest Ref Rng & Units 11/2/2016 4/4/2016   TSH 0.40 - 4.00 mU/L 0.04 (L) 0.02 (L)   T4 FREE 0.76 - 1.46 ng/dL 1.22 1.16   FREE T3 2.3 - 4.2 pg/mL  2.2 (L)     ENDO THYROID LABS-[facility] Latest Ref Rng & Units 1/6/2016 1/20/2015   TSH 0.40 - 4.00 mU/L 0.04 (L) 0.14 (L)   T4 FREE 0.76 - 1.46 ng/dL 1.62 (H) 1.23   FREE T3 2.3 - 4.2 pg/mL 2.3      TG/TPO:    All pertinent notes, labs, and images personally reviewed by me.     A/P  Ms.Arlene OSIRIS Li is a 82 year old here for the evaluation of hypothyroidism:    #1 Hypothyroidism. Differential includes: autoimmune disease (Hashimoto's thyroiditis), treatment for hyperthyroidism, radiation therapy, thyroid surgery, medications, congenital disease, pituitary disorder, pregnancy, and iodine deficiency.  Persons with Hashimoto's thyroiditis have serum antibodies reacting with TG, TPO, and against an unidentified protein present in colloid.   Difficult to get thyroid labs  + wt gain  Clinically has some GI s/s which I do not think are related to thyroid problem as labs are OK. TSH slightly low with normal FT4.  Wt gain can be multi factorail including inactivity.  I discussed that as labs are acceptable dose change is not indicated.  Plan to repeat labs and screen for hashimoto.  Plan: TSH, T4 free, Thyroid peroxidase antibody, T3         Free       Standard treatment for hypothyroidism involves daily use of the synthetic thyroid hormone levothyroxine (Levothroid, Synthroid, others).  The dosage of thyroxine should  normally be that required to bring the serum TSH level to the low normal range, such as 0.3 - 1 uU/ml. This is typically achieved with 1 ug L-T4/lb body weight/day, ranges from 75 - 125 ug/day in women, and 125 - 200 ug/day in men. Once thyroxine treatment is initiated, it is required indefinitely in most patients.     Symptoms should improve one to two weeks after starting treatment. Treatment with levothyroxine is usually lifelong.  Doseage may need to be adjusted based on body weight, medications, or pregnancy.  To determine the right dosage of levothyroxine initially we will repeat TSH and free T4 after two months.     Excessive amounts of the hormone can cause side effects, such as: Increased appetite, insomnia, heart palpitations, and shakiness.  Patients with CAD will be started on a lower dose.  Levothyroxine causes virtually no side effects when used in the appropriate dose.  Certain medications, supplements and foods can affect the body s ability to absorb levothyroxine. Medications include: Iron supplements, Cholestyramine and Calcium supplements.     Follow-up:  Based on labs    Olena Manzanares MD  Endocrinology  [facility]  CC: Kasi Watkins    More than 50% of face to face time spent with Ms. Li on counseling / coordinating her care.       All questions were answered.  The patient indicates understanding of the above issues and agrees with the plan set forth.     Addendum to above note and clinic visit:    Labs reviewed.    See result note/telephone encounter.

## 2018-04-11 NOTE — MR AVS SNAPSHOT
After Visit Summary   2018    Elizabeth Li    MRN: [id number]           Patient Information     Date Of Birth          1935        Visit Information        Provider Department      2018 2:30 PM Olena Manzanares MD [facility]        Today's Diagnoses     Acquired hypothyroidism    -  1      Care Instructions    [facility] & [place] locations   Dr Manzanares, Endocrinology Department      [facility]   [address]  [place] [address]  Appointment [phone]  Fax: [phone]  [place]: Monday and Tuesday         [facility] [address] [facility]  [place] [address]  Appointment [phone]  Fax: [phone]  [place]: Wednesday and Thursday            Continue current dose of levothyroxine  Labs today  Dose change based on that.              Follow-ups after your visit        Your next 10 appointments already scheduled     2018  9:20 AM CDT   New Visit with Dorothea Gaspar DO   [facility] ([facility] [place])    [address]  [address]  [place] [address]   [phone]              Who to contact     If you have questions or need follow up information about today's clinic visit or your schedule please contact [facility] directly at [phone].  Normal or non-critical lab and imaging results will be communicated to you by MyChart, letter or phone within 4 business days after the clinic has received the results. If you do not hear from us within 7 days, please contact the clinic through MyChart or phone. If you have a critical or abnormal lab result, we will notify you by phone as soon as possible.  Submit refill requests through [employer] or call your pharmacy and they will forward the refill request to us. Please allow 3 business days for your refill to be completed.           "Additional Information About Your Visit        MyChart Information     [employer] gives you secure access to your electronic health record. If you see a primary care provider, you can also send messages to your care team and make appointments. If you have questions, please call your primary care clinic.  If you do not have a primary care provider, please call [phone] and they will assist you.        Care EveryWhere ID     This is your Care EveryWhere ID. This could be used by other organizations to access your [facility] medical records  [id number]        Your Vitals Were     Pulse Temperature Height Pulse Oximetry BMI (Body Mass Index)       84 96  F (35.6  C) (Oral) 1.575 m (5' 2\") 96% 36.73 kg/m2        Blood Pressure from Last 3 Encounters:   04/11/18 132/78   04/10/18 124/64   04/06/18 132/64    Weight from Last 3 Encounters:   04/11/18 91.1 kg (200 lb 12.8 oz)   04/10/18 90.7 kg (200 lb)   04/06/18 90.7 kg (200 lb)              We Performed the Following     T3 Free     T4 free     Thyroid peroxidase antibody     TSH        Primary Care Provider Office Phone # Fax #    Kasi Watkins [phone]       [address] [facility] [address]        Equal Access to Services     MARCIAL ROTH : Hadii aad ku hadasho Soomaali, waaxda luqadaha, qaybta kaalmada adeegyada, waxay idiin hayaan aury khnikki laluca . So [facility] [phone].    ATENCIÓN: Si habla español, tiene a hopper disposición servicios gratuitos de asistencia lingüística. Llame al [phone].    We comply with applicable federal civil rights laws and [place] laws. We do not discriminate on the basis of race, color, national origin, age, disability, sex, sexual orientation, or gender identity.            Thank you!     Thank you for choosing [facility]  for your care. Our goal is always to provide you with excellent care. Hearing back from our patients is one way we can continue to improve our services. Please " take a few minutes to complete the written survey that you may receive in the mail after your visit with us. Thank you!             Your Updated Medication List - Protect others around you: Learn how to safely use, store and throw away your medicines at www.disposemymeds.org.          This list is accurate as of 4/11/18  2:58 PM.  Always use your most recent med list.                   Brand Name Dispense Instructions for use Diagnosis    ascorbic acid 500 MG tablet    VITAMIN C     Take 500 mg by mouth daily.        aspirin 81 MG tablet     90 tablet    Take 1 tablet (81 mg) by mouth daily    Abnormal cardiovascular stress test       ibuprofen 200 MG tablet    ADVIL/MOTRIN     Take 200 mg by mouth every 4 hours as needed for mild pain Patient takes 4 tablet at night. Occasionally takes 4 tablet when goes out.        isosorbide mononitrate 30 MG 24 hr tablet    IMDUR    90 tablet    Take 1 tablet (30 mg) by mouth daily    Coronary artery disease involving native coronary artery of native heart without angina pectoris, Status post coronary angioplasty       ketoconazole 2 % shampoo    NIZORAL    120 mL    Apply to the affected area and wash off after 5 minutes.    Dandruff       levothyroxine 100 MCG tablet    SYNTHROID/LEVOTHROID    90 tablet    Take 1 tablet (100 mcg) by mouth daily    Acquired hypothyroidism       metoprolol tartrate 25 MG tablet    LOPRESSOR    90 tablet    Take 0.5 tablets (12.5 mg) by mouth 2 times daily    Coronary artery disease involving native coronary artery without angina pectoris       metroNIDAZOLE 1 % gel    METROGEL    60 g    Apply topically daily    Rosacea       simvastatin 20 MG tablet    ZOCOR    90 tablet    Take 1 tablet (20 mg) by mouth At Bedtime    Hyperlipidemia LDL goal <100       vitamin D 1000 units capsule      Take 1 capsule by mouth 2 times daily

## 2018-04-11 NOTE — NURSING NOTE
"Chief Complaint   Patient presents with     Referral     Dr Wtakins for hypothyroidism        Initial /78 (BP Location: Right arm, Patient Position: Chair, Cuff Size: Adult Large)  Pulse 84  Temp 96  F (35.6  C) (Oral)  Ht 1.575 m (5' 2\")  Wt 91.1 kg (200 lb 12.8 oz)  SpO2 96%  BMI 36.73 kg/m2 Estimated body mass index is 36.73 kg/(m^2) as calculated from the following:    Height as of this encounter: 1.575 m (5' 2\").    Weight as of this encounter: 91.1 kg (200 lb 12.8 oz).  Medication Reconciliation: complete     ENDOCRINOLOGY INTAKE FORM    Patient Name:  Elizabeth Li  :  1935    Is patient Diabetic?   No  Does patient have non-diabetic or other endocrine issues?  Yes: hypothyroidism     Vitals: /78 (BP Location: Right arm, Patient Position: Chair, Cuff Size: Adult Large)  Pulse 84  Temp 96  F (35.6  C) (Oral)  Ht 1.575 m (5' 2\")  Wt 91.1 kg (200 lb 12.8 oz)  SpO2 96%  BMI 36.73 kg/m2  BMI= Body mass index is 36.73 kg/(m^2).    Flu vaccine:  No  Pneumonia vaccine:  Yes: 16, 08    Smoking and Alcohol use:  Social History   Substance Use Topics     Smoking status: Former Smoker     Quit date: 1984     Smokeless tobacco: Never Used     Alcohol use 0.0 oz/week     0 Standard drinks or equivalent per week      Comment: social drinker     Staff Signature:  Madeline Atwood CMA ([facility])        "

## 2018-04-11 NOTE — LETTER
4/11/2018         RE: Elizabeth Li  [address]  [address]  [place] [address]        Dear Colleague,    Thank you for referring your patient, Elizabeth Li, to the [facility]. Please see a copy of my visit note below.    ENDOCRINOLOGY CLINIC NOTE:    Name: Elizabeth Li  Seen in consultation with Kasi Watkins for Hypothyroidism.  HPI:  Elizabeth Li is a 82 year old female who presents for the evaluation of :    #1 Hypothyroidism.  Diagnosed in 1989 at [place].  Currently taking levothyroxine 100 mcg/day.  Difficult to get stable levels.    Lost wt: lost wt over 2 years. From 231 in 2015.  Lost 40 lbs and was upto 180 lbs.   With gluten free and dairy free.    Then gained back.  Still following diet.  Not much activity 2/2 to pain. Has bloating and has heart burn.    Is wondering if GI s/s, bloating and weight gain related to thyroid problems.    Wt Readings from Last 2 Encounters:   04/11/18 91.1 kg (200 lb 12.8 oz)   04/10/18 90.7 kg (200 lb)     Changes to hair or skin: No  Diarrhea/Constipation:No  Dysphagia or Shortness of breath:No  Tremors:No  Changes in weight: as above  Heat or cold intolerance: + cold intolerance.  History of Lithium or Amiodarone use:No  Head or neck surgery/radiation:No  IV Contrast: No  Family History of Thyroid Problems: No  PMH/PSH:  Past Medical History:   Diagnosis Date     Abnormal cardiovascular stress test     EKG changes with exercise on Exercise stress test 1/26/2015     CAD (coronary artery disease)     Severe 2 vessel CAD. PCI with drug-eluting stents x2 to mid LAD on 3/27/15     HTN (hypertension)      Hyperlipidemia LDL goal <100      Hypothyroidism      IHD (ischemic heart disease)      KUSUM (obstructive sleep apnea)     CPAP     Past Surgical History:   Procedure Laterality Date     CARDIAC SURGERY      2 heart stents     COLONOSCOPY       COLONOSCOPY N/A 1/11/2017    Procedure: COLONOSCOPY;  Surgeon: Nghia Moss  "MD;  Location:  GI     HEAD & NECK SURGERY      wisdom teeth removed     HEART CATH, ANGIOPLASTY  3/27/15    2.5 X 18 mm & 3.0 X mm LUCILA to LAD     TONSILLECTOMY      T&A as a child     TUBAL LIGATION       Family Hx:  Family History   Problem Relation Age of Onset     Blood Disease Mother      pernious anemia     HEART DISEASE Father      HEART DISEASE Sister      HEART DISEASE Brother      Colon Cancer No family hx of      Social Hx:  Social History     Social History     Marital status:      Spouse name: N/A     Number of children: 4     Years of education: N/A     Occupational History     Not on file.     Social History Main Topics     Smoking status: Former Smoker     Quit date: 1/1/1984     Smokeless tobacco: Never Used     Alcohol use 0.0 oz/week     0 Standard drinks or equivalent per week      Comment: social drinker     Drug use: No     Sexual activity: No     Other Topics Concern     Caffeine Concern Yes     coffee      Occupational Exposure No     Hobby Hazards No     Sleep Concern No     Stress Concern No     Weight Concern No     Special Diet Yes     gluten free and dairy free     Back Care No     Exercise No     on hold , 4/7 starting cardiac rehab     Seat Belt Yes     Social History Narrative          MEDICATIONS:  has a current medication list which includes the following prescription(s): ibuprofen, levothyroxine, simvastatin, isosorbide mononitrate, metoprolol tartrate, ketoconazole, aspirin, ascorbic acid, vitamin d, and metronidazole.    ROS   ROS: 10 point ROS neg other than the symptoms noted above in the HPI.    Physical Exam   VS: /78 (BP Location: Right arm, Patient Position: Chair, Cuff Size: Adult Large)  Pulse 84  Temp 96  F (35.6  C) (Oral)  Ht 1.575 m (5' 2\")  Wt 91.1 kg (200 lb 12.8 oz)  SpO2 96%  BMI 36.73 kg/m2  GENERAL: AXOX3, NAD, well dressed, answering questions appropriately, appears stated age.  HEENT: No exopthalmous, no proptosis, EOMI, no lig lag, no " retraction  NECK: Thyroid normal in size, non tender, no nodules were palpated.  CV: RRR, no rubs, gallops, no murmurs  LUNGS: CTAB, no wheezes, rales, or ronchi  ABDOMEN:+BS  EXTREMITIES: no edema, +pulses, no rashes, no lesions  NEUROLOGY: CN grossly intact,  + DTR upper and lower extremity, no tremors  MSK: grossly intact  SKIN: no rashes, no lesions    LABS:  TFTs:  ENDO THYROID LABS-[facility] Latest Ref Rng & Units 3/7/2018 8/3/2017   TSH 0.40 - 4.00 mU/L 0.25 (L) 0.53   T4 FREE 0.76 - 1.46 ng/dL 1.07    FREE T3 2.3 - 4.2 pg/mL       ENDO THYROID LABS-[facility] Latest Ref Rng & Units 3/8/2017 1/20/2017   TSH 0.40 - 4.00 mU/L 0.05 (L) 0.19 (L)   T4 FREE 0.76 - 1.46 ng/dL 1.33 1.30   FREE T3 2.3 - 4.2 pg/mL       ENDO THYROID LABS-[facility] Latest Ref Rng & Units 11/2/2016 4/4/2016   TSH 0.40 - 4.00 mU/L 0.04 (L) 0.02 (L)   T4 FREE 0.76 - 1.46 ng/dL 1.22 1.16   FREE T3 2.3 - 4.2 pg/mL  2.2 (L)     ENDO THYROID LABS-[facility] Latest Ref Rng & Units 1/6/2016 1/20/2015   TSH 0.40 - 4.00 mU/L 0.04 (L) 0.14 (L)   T4 FREE 0.76 - 1.46 ng/dL 1.62 (H) 1.23   FREE T3 2.3 - 4.2 pg/mL 2.3      TG/TPO:    All pertinent notes, labs, and images personally reviewed by me.     A/P  Ms.Arlene OSIRIS Li is a 82 year old here for the evaluation of hypothyroidism:    #1 Hypothyroidism. Differential includes: autoimmune disease (Hashimoto's thyroiditis), treatment for hyperthyroidism, radiation therapy, thyroid surgery, medications, congenital disease, pituitary disorder, pregnancy, and iodine deficiency.  Persons with Hashimoto's thyroiditis have serum antibodies reacting with TG, TPO, and against an unidentified protein present in colloid.   Difficult to get thyroid labs  + wt gain  Clinically has some GI s/s which I do not think are related to thyroid problem as labs are OK. TSH slightly low with normal FT4.  Wt gain can be multi factorail including inactivity.  I discussed that as labs are acceptable dose change is not indicated.  Plan to repeat labs  and screen for hashimoto.  Plan: TSH, T4 free, Thyroid peroxidase antibody, T3         Free       Standard treatment for hypothyroidism involves daily use of the synthetic thyroid hormone levothyroxine (Levothroid, Synthroid, others).  The dosage of thyroxine should normally be that required to bring the serum TSH level to the low normal range, such as 0.3 - 1 uU/ml. This is typically achieved with 1 ug L-T4/lb body weight/day, ranges from 75 - 125 ug/day in women, and 125 - 200 ug/day in men. Once thyroxine treatment is initiated, it is required indefinitely in most patients.     Symptoms should improve one to two weeks after starting treatment. Treatment with levothyroxine is usually lifelong.  Doseage may need to be adjusted based on body weight, medications, or pregnancy.  To determine the right dosage of levothyroxine initially we will repeat TSH and free T4 after two months.     Excessive amounts of the hormone can cause side effects, such as: Increased appetite, insomnia, heart palpitations, and shakiness.  Patients with CAD will be started on a lower dose.  Levothyroxine causes virtually no side effects when used in the appropriate dose.  Certain medications, supplements and foods can affect the body s ability to absorb levothyroxine. Medications include: Iron supplements, Cholestyramine and Calcium supplements.     Follow-up:  Based on labs    Olena Manzanares MD  Endocrinology  [facility]  CC: Kasi Watkins    More than 50% of face to face time spent with Ms. Li on counseling / coordinating her care.       All questions were answered.  The patient indicates understanding of the above issues and agrees with the plan set forth.     Addendum to above note and clinic visit:    Labs reviewed.    See result note/telephone encounter.            Again, thank you for allowing me to participate in the care of your patient.        Sincerely,        Olena Manzanares MD

## 2018-04-11 NOTE — PATIENT INSTRUCTIONS
[facility]   Dr Manzanares, Endocrinology Department      [facility]   [address]  [place] [address]  Appointment [phone]  Fax: [phone]  [place]: Monday and Tuesday         [place] [address] [facility] [address]  [place] [address]  Appointment [phone]  Fax: [phone]  [place]: Wednesday and Thursday            Continue current dose of levothyroxine  Labs today  Dose change based on that.

## 2018-04-12 ENCOUNTER — OFFICE VISIT (OUTPATIENT)
Dept: ORTHOPEDICS | Facility: CLINIC | Age: 83
End: 2018-04-12
Payer: COMMERCIAL

## 2018-04-12 DIAGNOSIS — M16.11 PRIMARY OSTEOARTHRITIS OF RIGHT HIP: Primary | ICD-10-CM

## 2018-04-12 LAB
T4 FREE SERPL-MCNC: 1.07 NG/DL (ref 0.76–1.46)
THYROPEROXIDASE AB SERPL-ACNC: <10 IU/ML
TSH SERPL DL<=0.005 MIU/L-ACNC: 0.32 MU/L (ref 0.4–4)

## 2018-04-12 PROCEDURE — 20611 DRAIN/INJ JOINT/BURSA W/US: CPT | Mod: RT | Performed by: FAMILY MEDICINE

## 2018-04-12 RX ORDER — METHYLPREDNISOLONE ACETATE 40 MG/ML
40 INJECTION, SUSPENSION INTRA-ARTICULAR; INTRALESIONAL; INTRAMUSCULAR; SOFT TISSUE ONCE
Qty: 1 ML | Refills: 0 | OUTPATIENT
Start: 2018-04-12 | End: 2018-04-12

## 2018-04-12 NOTE — PATIENT INSTRUCTIONS
1. Primary osteoarthritis of right hip      Steroid injection of the right hip: intra-articular  was performed today in clinic  - Ok to shower  - No bathtub, hot tub or swimming for 2 days  - The lidocaine (what is giving you pain relief right now) will likely stop working in 1-2 hours.  You will then have pain again, similar to before you received the injection. The corticosteroid will not start working until approximately 1-2 weeks from now.  - Ice today and  limit weight bearing exercise for 1-2 weeks. Thereafter you can resume your normal activity.    Follow up with Dr. Zhu if pain is not well controlled in 2-3 weeks.

## 2018-04-12 NOTE — LETTER
4/12/2018         RE: Elizabeth Li  [id number] [address]  [facility] [address]        Dear Colleague,    Thank you for referring your patient, Elizabeth Li, to the [facility]. Please see a copy of my visit note below.    Right Hip Intra-Articular Corticosteroid Injection     Diagnoses (preoperative and postoperative):  right hip pain / Primary osteoarthritis of right hip  (primary encounter diagnosis)  Current Procedure (include preoperative):  Sonographically guided right hip intra-articular corticosteroid injection  Current Indication (include preoperative):  Alleviation of pain  REFERRED BY:  Dr. Zhu  REASON FOR PROCEDURE: Dr. Zhu has requested a right hip joint corticosteroid injection for modulation of pain. Sonographic guidance will be used to ensure accurate placement of the medication within the joint space and avoid nearby neurovascular structures.  PATIENT EDUCATION:  Ready to learn with no apparent learning barriers identified.  Learning preferences include listening. Explained diagnosis and treatment plan as well as treatment alternatives. Patient expressed understanding of the content.  Following denial of allergy and review of potential side effects and complications including but not necessarily limited to infection, bleeding, allergic reaction, post-injection flare, local tissue breakdown (including but not limited to potential for skin depigmentation and/or subcutaneous fat atrophy), systemic effects of corticosteroids, elevation of blood glucose, injury to soft tissue and/or nerves and seizure, patient indicated their understanding and agreed to proceed. Written and signed consent was obtained and is scanned into the chart.  PROCEDURE: Prior to the procedure, the right anterior hip joint was examined with a 4 MHz curvilinear transducer to visualize the joint space and determine the approach for the procedure.  Procedure was carried out using  sterile technique including Chloraprep, a sterile transducer cover, and a sterile transducer gel. A simple surgical tray was used.  PROCEDURAL PAUSE:  Procedural pause conducted to verify correct patient identity, procedure to be performed, and as applicable, correct side/site, correct patient position, availability of implants, special equipment, or special requirements.  Patient position: Supine  Transducer type: 4 MHz curvilinear  Approach: Lateral to medial parallel to long axis of transducer  Local Anesthesia: Sonographically guided 22-gauge 3.5 inch needle was used to anesthetize the skin, subcutaneous tissue and advanced down to the Right hip joint with 5 ml of 1% Lidocaine  Injectate: After confirming needle tip position, syringe was replaced with one containing a solution of 1 ml of 40 mg/ml Depo Medrol and 4 ml of 1% Lidocaine which was injected and seen filling the hip joint.  AFTERCARE:  Patient tolerated the procedure without complication. After a short observation period, patient was discharged under their own power and in excellent condition.    Patient noted to have 2/10 pain before the procedure and 0/10 after completion of the procedure with walking - no further groin pain. Walking more comfortably.  DO [facility]      Again, thank you for allowing me to participate in the care of your patient.        Sincerely,        Dorothea Gaspar DO

## 2018-04-12 NOTE — MR AVS SNAPSHOT
After Visit Summary   4/12/2018    Elizabeth Li    MRN: [id number]           Patient Information     Date Of Birth          1935        Visit Information        Provider Department      4/12/2018 9:20 AM Dorothea Gaspar,  [facility]        Today's Diagnoses     Primary osteoarthritis of right hip    -  1      Care Instructions    1. Primary osteoarthritis of right hip      Steroid injection of the right hip: intra-articular  was performed today in clinic  - Ok to shower  - No bathtub, hot tub or swimming for 2 days  - The lidocaine (what is giving you pain relief right now) will likely stop working in 1-2 hours.  You will then have pain again, similar to before you received the injection. The corticosteroid will not start working until approximately 1-2 weeks from now.  - Ice today and  limit weight bearing exercise for 1-2 weeks. Thereafter you can resume your normal activity.    Follow up with Dr. Zhu if pain is not well controlled in 2-3 weeks.                  Follow-ups after your visit        Who to contact     If you have questions or need follow up information about today's clinic visit or your schedule please contact [facility] directly at [phone].  Normal or non-critical lab and imaging results will be communicated to you by MyChart, letter or phone within 4 business days after the clinic has received the results. If you do not hear from us within 7 days, please contact the clinic through [employer] or phone. If you have a critical or abnormal lab result, we will notify you by phone as soon as possible.  Submit refill requests through [employer] or call your pharmacy and they will forward the refill request to us. Please allow 3 business days for your refill to be completed.          Additional Information About Your Visit        [employer] Information     [employer] gives you secure access to your electronic health record. If you see a  primary care provider, you can also send messages to your care team and make appointments. If you have questions, please call your primary care clinic.  If you do not have a primary care provider, please call [phone] and they will assist you.        Care EveryWhere ID     This is your Care EveryWhere ID. This could be used by other organizations to access your [place] medical records  [id number]         Blood Pressure from Last 3 Encounters:   04/11/18 132/78   04/10/18 124/64   04/06/18 132/64    Weight from Last 3 Encounters:   04/11/18 200 lb 12.8 oz (91.1 kg)   04/10/18 200 lb (90.7 kg)   04/06/18 200 lb (90.7 kg)              Today, you had the following     No orders found for display       Primary Care Provider Office Phone # Fax #    Kasi Watkins [phone]       [address] [facility] [address]        Equal Access to Services     [facility]: Hadii aad ku hadasho Soomaali, waaxda luqadaha, qaybta kaalmada adeegyada, waxay idiin hayaan vitaeg kharasanti la'jackelyn . So [facility] [phone].    ATENCIÓN: Si habla español, tiene a hopper disposición servicios gratuitos de asistencia lingüística. Llame al [phone].    We comply with applicable federal civil rights laws and [place] laws. We do not discriminate on the basis of race, color, national origin, age, disability, sex, sexual orientation, or gender identity.            Thank you!     Thank you for choosing [facility]  for your care. Our goal is always to provide you with excellent care. Hearing back from our patients is one way we can continue to improve our services. Please take a few minutes to complete the written survey that you may receive in the mail after your visit with us. Thank you!             Your Updated Medication List - Protect others around you: Learn how to safely use, store and throw away your medicines at www.disposemymeds.org.          This list is accurate as of 4/12/18  9:55 AM.   Always use your most recent med list.                   Brand Name Dispense Instructions for use Diagnosis    ascorbic acid 500 MG tablet    VITAMIN C     Take 500 mg by mouth daily.        aspirin 81 MG tablet     90 tablet    Take 1 tablet (81 mg) by mouth daily    Abnormal cardiovascular stress test       ibuprofen 200 MG tablet    ADVIL/MOTRIN     Take 200 mg by mouth every 4 hours as needed for mild pain Patient takes 4 tablet at night. Occasionally takes 4 tablet when goes out.        isosorbide mononitrate 30 MG 24 hr tablet    IMDUR    90 tablet    Take 1 tablet (30 mg) by mouth daily    Coronary artery disease involving native coronary artery of native heart without angina pectoris, Status post coronary angioplasty       ketoconazole 2 % shampoo    NIZORAL    120 mL    Apply to the affected area and wash off after 5 minutes.    Dandruff       levothyroxine 100 MCG tablet    SYNTHROID/LEVOTHROID    90 tablet    Take 1 tablet (100 mcg) by mouth daily    Acquired hypothyroidism       metoprolol tartrate 25 MG tablet    LOPRESSOR    90 tablet    Take 0.5 tablets (12.5 mg) by mouth 2 times daily    Coronary artery disease involving native coronary artery without angina pectoris       metroNIDAZOLE 1 % gel    METROGEL    60 g    Apply topically daily    Rosacea       simvastatin 20 MG tablet    ZOCOR    90 tablet    Take 1 tablet (20 mg) by mouth At Bedtime    Hyperlipidemia LDL goal <100       vitamin D 1000 units capsule      Take 1 capsule by mouth 2 times daily

## 2018-04-12 NOTE — PROGRESS NOTES
Right Hip Intra-Articular Corticosteroid Injection     Diagnoses (preoperative and postoperative):  right hip pain / Primary osteoarthritis of right hip  (primary encounter diagnosis)  Current Procedure (include preoperative):  Sonographically guided right hip intra-articular corticosteroid injection  Current Indication (include preoperative):  Alleviation of pain  REFERRED BY:  Dr. Zhu  REASON FOR PROCEDURE: Dr. Zhu has requested a right hip joint corticosteroid injection for modulation of pain. Sonographic guidance will be used to ensure accurate placement of the medication within the joint space and avoid nearby neurovascular structures.  PATIENT EDUCATION:  Ready to learn with no apparent learning barriers identified.  Learning preferences include listening. Explained diagnosis and treatment plan as well as treatment alternatives. Patient expressed understanding of the content.  Following denial of allergy and review of potential side effects and complications including but not necessarily limited to infection, bleeding, allergic reaction, post-injection flare, local tissue breakdown (including but not limited to potential for skin depigmentation and/or subcutaneous fat atrophy), systemic effects of corticosteroids, elevation of blood glucose, injury to soft tissue and/or nerves and seizure, patient indicated their understanding and agreed to proceed. Written and signed consent was obtained and is scanned into the chart.  PROCEDURE: Prior to the procedure, the right anterior hip joint was examined with a 4 MHz curvilinear transducer to visualize the joint space and determine the approach for the procedure.  Procedure was carried out using sterile technique including Chloraprep, a sterile transducer cover, and a sterile transducer gel. A simple surgical tray was used.  PROCEDURAL PAUSE:  Procedural pause conducted to verify correct patient identity, procedure to be performed, and as applicable, correct  side/site, correct patient position, availability of implants, special equipment, or special requirements.  Patient position: Supine  Transducer type: 4 MHz curvilinear  Approach: Lateral to medial parallel to long axis of transducer  Local Anesthesia: Sonographically guided 22-gauge 3.5 inch needle was used to anesthetize the skin, subcutaneous tissue and advanced down to the Right hip joint with 5 ml of 1% Lidocaine  Injectate: After confirming needle tip position, syringe was replaced with one containing a solution of 1 ml of 40 mg/ml Depo Medrol and 4 ml of 1% Lidocaine which was injected and seen filling the hip joint.  AFTERCARE:  Patient tolerated the procedure without complication. After a short observation period, patient was discharged under their own power and in excellent condition.    Patient noted to have 2/10 pain before the procedure and 0/10 after completion of the procedure with walking - no further groin pain. Walking more comfortably.  Dorothea Gaspar DO [facility]

## 2018-04-19 ENCOUNTER — TELEPHONE (OUTPATIENT)
Dept: ENDOCRINOLOGY | Facility: CLINIC | Age: 83
End: 2018-04-19

## 2018-04-19 DIAGNOSIS — E03.9 ACQUIRED HYPOTHYROIDISM: Primary | ICD-10-CM

## 2018-04-19 RX ORDER — TRIAMCINOLONE ACETONIDE 40 MG/ML
40 INJECTION, SUSPENSION INTRA-ARTICULAR; INTRAMUSCULAR ONCE
Qty: 1 ML | Refills: 0 | OUTPATIENT
Start: 2018-04-19 | End: 2018-04-19

## 2018-04-19 NOTE — TELEPHONE ENCOUNTER
ENDO THYROID LABS-[facility] Latest Ref Rng & Units 4/11/2018   TSH 0.40 - 4.00 mU/L 0.32 (L)   T4 FREE 0.76 - 1.46 ng/dL 1.07   FREE T3 2.3 - 4.2 pg/mL 1.9 (L)   THYR PEROXIDASE LAWANDA <35 IU/mL <10   Thyroid labs mildly abnormal.  TPO antibodies are negative. This means that you DO NOT have Hashimots thyroiditis which is associated with underactive thyroid. This is reassuring.    I recommend to continue follow.  Repeat labs in 3-4 months  Please make a lab appointment for blood work and follow up clinic appointment in 1 week after that to discuss results.    Please call patient with above information.    Olena Manzanares MD  Endocrinology   [facility]  April 19, 2018

## 2018-07-11 DIAGNOSIS — E03.9 ACQUIRED HYPOTHYROIDISM: ICD-10-CM

## 2018-07-11 LAB — T3FREE SERPL-MCNC: 2.2 PG/ML (ref 2.3–4.2)

## 2018-07-11 PROCEDURE — 36415 COLL VENOUS BLD VENIPUNCTURE: CPT | Performed by: INTERNAL MEDICINE

## 2018-07-11 PROCEDURE — 84481 FREE ASSAY (FT-3): CPT | Performed by: INTERNAL MEDICINE

## 2018-07-11 PROCEDURE — 84439 ASSAY OF FREE THYROXINE: CPT | Performed by: INTERNAL MEDICINE

## 2018-07-11 PROCEDURE — 84443 ASSAY THYROID STIM HORMONE: CPT | Performed by: INTERNAL MEDICINE

## 2018-07-12 LAB
T4 FREE SERPL-MCNC: 1.29 NG/DL (ref 0.76–1.46)
TSH SERPL DL<=0.005 MIU/L-ACNC: 0.09 MU/L (ref 0.4–4)

## 2018-07-19 ENCOUNTER — OFFICE VISIT (OUTPATIENT)
Dept: ENDOCRINOLOGY | Facility: CLINIC | Age: 83
End: 2018-07-19
Payer: COMMERCIAL

## 2018-07-19 VITALS
TEMPERATURE: 98 F | WEIGHT: 195.3 LBS | RESPIRATION RATE: 18 BRPM | HEIGHT: 62 IN | DIASTOLIC BLOOD PRESSURE: 72 MMHG | HEART RATE: 68 BPM | BODY MASS INDEX: 35.94 KG/M2 | OXYGEN SATURATION: 96 % | SYSTOLIC BLOOD PRESSURE: 132 MMHG

## 2018-07-19 DIAGNOSIS — I25.10 CORONARY ARTERY DISEASE INVOLVING NATIVE CORONARY ARTERY WITHOUT ANGINA PECTORIS: ICD-10-CM

## 2018-07-19 DIAGNOSIS — E03.9 ACQUIRED HYPOTHYROIDISM: Primary | ICD-10-CM

## 2018-07-19 PROCEDURE — 99214 OFFICE O/P EST MOD 30 MIN: CPT | Performed by: INTERNAL MEDICINE

## 2018-07-19 RX ORDER — METOPROLOL TARTRATE 25 MG/1
12.5 TABLET, FILM COATED ORAL 2 TIMES DAILY
Qty: 90 TABLET | Refills: 0 | Status: SHIPPED | OUTPATIENT
Start: 2018-07-19 | End: 2018-10-11

## 2018-07-19 RX ORDER — LEVOTHYROXINE SODIUM 88 UG/1
88 TABLET ORAL DAILY
Qty: 90 TABLET | Refills: 1 | Status: SHIPPED | OUTPATIENT
Start: 2018-07-19 | End: 2018-09-26

## 2018-07-19 NOTE — PROGRESS NOTES
ENDOCRINOLOGY CLINIC NOTE:    Name: Elizabeth Li  Seen in consultation with Kasi Watkins for Hypothyroidism.  HPI:  Elizabeth Li is a 83 year old female who presents for the evaluation of :    #1 Hypothyroidism.  Diagnosed in 1989 at [place].  Currently taking levothyroxine 100 mcg/day.  Difficult to get stable levels.    Lost wt: intenstional- lost wt over 2 years. From 231 in 2015.  Lost 40 lbs and was upto 180 lbs.   Trying  gluten free and dairy free.  Since last clinic visit + wt loss- trying to loose wt.    Wt Readings from Last 2 Encounters:   07/19/18 88.6 kg (195 lb 4.8 oz)   04/11/18 91.1 kg (200 lb 12.8 oz)       Not much activity 2/2 to pain. Has bloating and has heart burn.    Is wondering if GI s/s, bloating and weight gain related to thyroid problems.    No palpitations    Changes to hair or skin: No  Diarrhea/Constipation: + constipation. Sometimes loose stools  Dysphagia or Shortness of breath:No  Tremors; sometimes  Changes in weight: as above  Heat or cold intolerance: + cold intolerance.  History of Lithium or Amiodarone use:No  Head or neck surgery/radiation:No  IV Contrast: No  Family History of Thyroid Problems: No  PMH/PSH:  Past Medical History:   Diagnosis Date     Abnormal cardiovascular stress test     EKG changes with exercise on Exercise stress test 1/26/2015     CAD (coronary artery disease)     Severe 2 vessel CAD. PCI with drug-eluting stents x2 to mid LAD on 3/27/15     HTN (hypertension)      Hyperlipidemia LDL goal <100      Hypothyroidism      IHD (ischemic heart disease)      KUSUM (obstructive sleep apnea)     CPAP     Past Surgical History:   Procedure Laterality Date     CARDIAC SURGERY      2 heart stents     COLONOSCOPY       COLONOSCOPY N/A 1/11/2017    Procedure: COLONOSCOPY;  Surgeon: Nghia Moss MD;  Location:  GI     HEAD & NECK SURGERY      wisdom teeth removed     HEART CATH, ANGIOPLASTY  3/27/15    2.5 X 18 mm & 3.0 X mm LUCILA to LAD      "TONSILLECTOMY      T&A as a child     TUBAL LIGATION       Family Hx:  Family History   Problem Relation Age of Onset     Blood Disease Mother      pernious anemia     HEART DISEASE Father      HEART DISEASE Sister      HEART DISEASE Brother      Colon Cancer No family hx of      Social Hx:  Social History     Social History     Marital status:      Spouse name: N/A     Number of children: 4     Years of education: N/A     Occupational History     Not on file.     Social History Main Topics     Smoking status: Former Smoker     Quit date: 1/1/1984     Smokeless tobacco: Never Used     Alcohol use 0.0 oz/week     0 Standard drinks or equivalent per week      Comment: social drinker     Drug use: No     Sexual activity: No     Other Topics Concern     Caffeine Concern Yes     coffee      Occupational Exposure No     Hobby Hazards No     Sleep Concern No     Stress Concern No     Weight Concern No     Special Diet Yes     gluten free and dairy free     Back Care No     Exercise No     on hold , 4/7 starting cardiac rehab     Seat Belt Yes     Social History Narrative          MEDICATIONS:  has a current medication list which includes the following prescription(s): ascorbic acid, aspirin, cholecalciferol, ibuprofen, isosorbide mononitrate, ketoconazole, levothyroxine, metoprolol tartrate, and simvastatin.    ROS   ROS: 10 point ROS neg other than the symptoms noted above in the HPI.    Physical Exam   VS: /72 (BP Location: Left arm, Patient Position: Chair, Cuff Size: Adult Large)  Pulse 68  Temp 98  F (36.7  C) (Oral)  Resp 18  Ht 1.575 m (5' 2\")  Wt 88.6 kg (195 lb 4.8 oz)  SpO2 96%  BMI 35.72 kg/m2  GENERAL: AXOX3, NAD, well dressed, answering questions appropriately, appears stated age.  HEENT: No exopthalmous, no proptosis, EOMI, no lig lag, no retraction  NECK: Thyroid normal in size, non tender, no nodules were palpated.  CV: RRR  LUNGS: CTAB  ABDOMEN: +BS  NEUROLOGY: CN grossly intact, no " tremors  PSYCH: normal affect and mood      LABS:  TFTs:  ENDO THYROID LABS-[facility] Latest Ref Rng & Units 7/11/2018 4/11/2018   TSH 0.40 - 4.00 mU/L 0.09 (L) 0.32 (L)   T4 FREE 0.76 - 1.46 ng/dL 1.29 1.07   FREE T3 2.3 - 4.2 pg/mL 2.2 (L) 1.9 (L)   THYR PEROXIDASE LAWANDA <35 IU/mL  <10     ENDO THYROID LABS-[facility] Latest Ref Rng & Units 3/7/2018 8/3/2017   TSH 0.40 - 4.00 mU/L 0.25 (L) 0.53   T4 FREE 0.76 - 1.46 ng/dL 1.07    FREE T3 2.3 - 4.2 pg/mL       ENDO THYROID LABS-[facility] Latest Ref Rng & Units 3/8/2017 1/20/2017   TSH 0.40 - 4.00 mU/L 0.05 (L) 0.19 (L)   T4 FREE 0.76 - 1.46 ng/dL 1.33 1.30   FREE T3 2.3 - 4.2 pg/mL       ENDO THYROID LABS-[facility] Latest Ref Rng & Units 11/2/2016 4/4/2016   TSH 0.40 - 4.00 mU/L 0.04 (L) 0.02 (L)   T4 FREE 0.76 - 1.46 ng/dL 1.22 1.16   FREE T3 2.3 - 4.2 pg/mL  2.2 (L)     ENDO THYROID LABS-[facility] Latest Ref Rng & Units 1/6/2016 1/20/2015   TSH 0.40 - 4.00 mU/L 0.04 (L) 0.14 (L)   T4 FREE 0.76 - 1.46 ng/dL 1.62 (H) 1.23   FREE T3 2.3 - 4.2 pg/mL 2.3      All pertinent notes, labs, and images personally reviewed by me.     A/P  Ms.Arlene OSIRIS Li is a 82 year old here for the evaluation of hypothyroidism:    #1 Hypothyroidism (TPO neg):  Recent labs showing suppressed TSH along with normal free T4 and low T3.  TSH has decreased as compared to previous labs  Clinically she has some complaints like weight loss, sometimes loose stools and occasional tremors.  Plan  Decrease dose of levothyroxine to 88 mcg per day (7/19/2018)  Labs in 2 months  Follow-up after that  Discussed s/s of hypothyroidism and hyperthyroidism to watch for.  The patient indicates understanding of these issues and agrees with the plan.    Standard treatment for hypothyroidism involves daily use of the synthetic thyroid hormone levothyroxine (Levothroid, Synthroid, others).  The dosage of thyroxine should normally be that required to bring the serum TSH level to the low normal range, such as 0.3 - 1 uU/ml. This is typically  achieved with 1 ug L-T4/lb body weight/day, ranges from 75 - 125 ug/day in women, and 125 - 200 ug/day in men. Once thyroxine treatment is initiated, it is required indefinitely in most patients.     Symptoms should improve one to two weeks after starting treatment. Treatment with levothyroxine is usually lifelong.  Doseage may need to be adjusted based on body weight, medications, or pregnancy.  To determine the right dosage of levothyroxine initially we will repeat TSH and free T4 after two months.     Excessive amounts of the hormone can cause side effects, such as: Increased appetite, insomnia, heart palpitations, and shakiness.  Patients with CAD will be started on a lower dose.  Levothyroxine causes virtually no side effects when used in the appropriate dose.  Certain medications, supplements and foods can affect the body s ability to absorb levothyroxine. Medications include: Iron supplements, Cholestyramine and Calcium supplements.     Follow-up:  2 months    Olena Manzanares MD  Endocrinology  [facility]  CC: Kasi Watkins    More than 50% of face to face time spent with Ms. Li on counseling / coordinating her care.       All questions were answered.  The patient indicates understanding of the above issues and agrees with the plan set forth.     Addendum to above note and clinic visit:    Labs reviewed.    See result note/telephone encounter.

## 2018-07-19 NOTE — LETTER
7/19/2018         RE: Elizabeth Li  [address]  [address]  [place] [address]        Dear Colleague,    Thank you for referring your patient, Elizabeth Li, to the [facility]. Please see a copy of my visit note below.    ENDOCRINOLOGY CLINIC NOTE:    Name: Elizabeth Li  Seen in consultation with Kasi Watkins for Hypothyroidism.  HPI:  Elizabeth Li is a 83 year old female who presents for the evaluation of :    #1 Hypothyroidism.  Diagnosed in 1989 at [place].  Currently taking levothyroxine 100 mcg/day.  Difficult to get stable levels.    Lost wt: intenstional- lost wt over 2 years. From 231 in 2015.  Lost 40 lbs and was upto 180 lbs.   Trying  gluten free and dairy free.  Since last clinic visit + wt loss- trying to loose wt.    Wt Readings from Last 2 Encounters:   07/19/18 88.6 kg (195 lb 4.8 oz)   04/11/18 91.1 kg (200 lb 12.8 oz)       Not much activity 2/2 to pain. Has bloating and has heart burn.    Is wondering if GI s/s, bloating and weight gain related to thyroid problems.    No palpitations    Changes to hair or skin: No  Diarrhea/Constipation: + constipation. Sometimes loose stools  Dysphagia or Shortness of breath:No  Tremors; sometimes  Changes in weight: as above  Heat or cold intolerance: + cold intolerance.  History of Lithium or Amiodarone use:No  Head or neck surgery/radiation:No  IV Contrast: No  Family History of Thyroid Problems: No  PMH/PSH:  Past Medical History:   Diagnosis Date     Abnormal cardiovascular stress test     EKG changes with exercise on Exercise stress test 1/26/2015     CAD (coronary artery disease)     Severe 2 vessel CAD. PCI with drug-eluting stents x2 to mid LAD on 3/27/15     HTN (hypertension)      Hyperlipidemia LDL goal <100      Hypothyroidism      IHD (ischemic heart disease)      KUSUM (obstructive sleep apnea)     CPAP     Past Surgical History:   Procedure Laterality Date     CARDIAC SURGERY      2 heart stents  "    COLONOSCOPY       COLONOSCOPY N/A 1/11/2017    Procedure: COLONOSCOPY;  Surgeon: Nghia Moss MD;  Location:  GI     HEAD & NECK SURGERY      wisdom teeth removed     HEART CATH, ANGIOPLASTY  3/27/15    2.5 X 18 mm & 3.0 X mm LUCILA to LAD     TONSILLECTOMY      T&A as a child     TUBAL LIGATION       Family Hx:  Family History   Problem Relation Age of Onset     Blood Disease Mother      pernious anemia     HEART DISEASE Father      HEART DISEASE Sister      HEART DISEASE Brother      Colon Cancer No family hx of      Social Hx:  Social History     Social History     Marital status:      Spouse name: N/A     Number of children: 4     Years of education: N/A     Occupational History     Not on file.     Social History Main Topics     Smoking status: Former Smoker     Quit date: 1/1/1984     Smokeless tobacco: Never Used     Alcohol use 0.0 oz/week     0 Standard drinks or equivalent per week      Comment: social drinker     Drug use: No     Sexual activity: No     Other Topics Concern     Caffeine Concern Yes     coffee      Occupational Exposure No     Hobby Hazards No     Sleep Concern No     Stress Concern No     Weight Concern No     Special Diet Yes     gluten free and dairy free     Back Care No     Exercise No     on hold , 4/7 starting cardiac rehab     Seat Belt Yes     Social History Narrative          MEDICATIONS:  has a current medication list which includes the following prescription(s): ascorbic acid, aspirin, cholecalciferol, ibuprofen, isosorbide mononitrate, ketoconazole, levothyroxine, metoprolol tartrate, and simvastatin.    ROS   ROS: 10 point ROS neg other than the symptoms noted above in the HPI.    Physical Exam   VS: /72 (BP Location: Left arm, Patient Position: Chair, Cuff Size: Adult Large)  Pulse 68  Temp 98  F (36.7  C) (Oral)  Resp 18  Ht 1.575 m (5' 2\")  Wt 88.6 kg (195 lb 4.8 oz)  SpO2 96%  BMI 35.72 kg/m2  GENERAL: AXOX3, NAD, well dressed, answering " questions appropriately, appears stated age.  HEENT: No exopthalmous, no proptosis, EOMI, no lig lag, no retraction  NECK: Thyroid normal in size, non tender, no nodules were palpated.  CV: RRR  LUNGS: CTAB  ABDOMEN: +BS  NEUROLOGY: CN grossly intact, no tremors  PSYCH: normal affect and mood      LABS:  TFTs:  ENDO THYROID LABS-[facility] Latest Ref Rng & Units 7/11/2018 4/11/2018   TSH 0.40 - 4.00 mU/L 0.09 (L) 0.32 (L)   T4 FREE 0.76 - 1.46 ng/dL 1.29 1.07   FREE T3 2.3 - 4.2 pg/mL 2.2 (L) 1.9 (L)   THYR PEROXIDASE LAWANDA <35 IU/mL  <10     ENDO THYROID LABS-[facility] Latest Ref Rng & Units 3/7/2018 8/3/2017   TSH 0.40 - 4.00 mU/L 0.25 (L) 0.53   T4 FREE 0.76 - 1.46 ng/dL 1.07    FREE T3 2.3 - 4.2 pg/mL       ENDO THYROID LABS-[facility] Latest Ref Rng & Units 3/8/2017 1/20/2017   TSH 0.40 - 4.00 mU/L 0.05 (L) 0.19 (L)   T4 FREE 0.76 - 1.46 ng/dL 1.33 1.30   FREE T3 2.3 - 4.2 pg/mL       ENDO THYROID LABS-[facility] Latest Ref Rng & Units 11/2/2016 4/4/2016   TSH 0.40 - 4.00 mU/L 0.04 (L) 0.02 (L)   T4 FREE 0.76 - 1.46 ng/dL 1.22 1.16   FREE T3 2.3 - 4.2 pg/mL  2.2 (L)     ENDO THYROID LABS-[facility] Latest Ref Rng & Units 1/6/2016 1/20/2015   TSH 0.40 - 4.00 mU/L 0.04 (L) 0.14 (L)   T4 FREE 0.76 - 1.46 ng/dL 1.62 (H) 1.23   FREE T3 2.3 - 4.2 pg/mL 2.3      All pertinent notes, labs, and images personally reviewed by me.     A/P  Ms.Arlene OSIRIS Li is a 82 year old here for the evaluation of hypothyroidism:    #1 Hypothyroidism (TPO neg):  Recent labs showing suppressed TSH along with normal free T4 and low T3.  TSH has decreased as compared to previous labs  Clinically she has some complaints like weight loss, sometimes loose stools and occasional tremors.  Plan  Decrease dose of levothyroxine to 88 mcg per day (7/19/2018)  Labs in 2 months  Follow-up after that  Discussed s/s of hypothyroidism and hyperthyroidism to watch for.  The patient indicates understanding of these issues and agrees with the plan.    Standard treatment for hypothyroidism  involves daily use of the synthetic thyroid hormone levothyroxine (Levothroid, Synthroid, others).  The dosage of thyroxine should normally be that required to bring the serum TSH level to the low normal range, such as 0.3 - 1 uU/ml. This is typically achieved with 1 ug L-T4/lb body weight/day, ranges from 75 - 125 ug/day in women, and 125 - 200 ug/day in men. Once thyroxine treatment is initiated, it is required indefinitely in most patients.     Symptoms should improve one to two weeks after starting treatment. Treatment with levothyroxine is usually lifelong.  Doseage may need to be adjusted based on body weight, medications, or pregnancy.  To determine the right dosage of levothyroxine initially we will repeat TSH and free T4 after two months.     Excessive amounts of the hormone can cause side effects, such as: Increased appetite, insomnia, heart palpitations, and shakiness.  Patients with CAD will be started on a lower dose.  Levothyroxine causes virtually no side effects when used in the appropriate dose.  Certain medications, supplements and foods can affect the body s ability to absorb levothyroxine. Medications include: Iron supplements, Cholestyramine and Calcium supplements.     Follow-up:  2 months    Olena Manzanares MD  Endocrinology  [facility]  CC: Kasi Watkins    More than 50% of face to face time spent with Ms. Li on counseling / coordinating her care.       All questions were answered.  The patient indicates understanding of the above issues and agrees with the plan set forth.     Addendum to above note and clinic visit:    Labs reviewed.    See result note/telephone encounter.            Again, thank you for allowing me to participate in the care of your patient.        Sincerely,        Olena Manzanares MD

## 2018-07-19 NOTE — MR AVS SNAPSHOT
After Visit Summary   2018    Elizabeth Li    MRN: [id number]           Patient Information     Date Of Birth          1935        Visit Information        Provider Department      2018 9:30 AM Olena Manzanares MD [facility]        Today's Diagnoses     Acquired hypothyroidism    -  1      Care Instructions    [facility] & [place] locations   Dr Manzanares, Endocrinology Department      [facility]   [address]  [place] [address]  Appointment [phone]  Fax: [phone]  [place]: Monday and Tuesday         [facility] [address]  [place] [address]  Appointment [phone]  Fax: [phone]  [place]: Wednesday and Thursday          Decrease dose of levothyroxine to 88 mcg/day  Labs in 2 months  Please make a lab appointment for blood work and follow up clinic appointment in 1 week after that to discuss results.              Follow-ups after your visit        Future tests that were ordered for you today     Open Future Orders        Priority Expected Expires Ordered    T4 free Routine  5/15/2019 2018    T3 Free Routine  5/15/2019 2018    TSH Routine  5/15/2019 2018            Who to contact     If you have questions or need follow up information about today's clinic visit or your schedule please contact [facility] directly at [phone].  Normal or non-critical lab and imaging results will be communicated to you by MyChart, letter or phone within 4 business days after the clinic has received the results. If you do not hear from us within 7 days, please contact the clinic through MyChart or phone. If you have a critical or abnormal lab result, we will notify you by phone as soon as possible.  Submit refill requests through [employer] or call your pharmacy and they will forward the refill request to us.  "Please allow 3 business days for your refill to be completed.          Additional Information About Your Visit        MyChart Information     [email or web address] gives you secure access to your electronic health record. If you see a primary care provider, you can also send messages to your care team and make appointments. If you have questions, please call your primary care clinic.  If you do not have a primary care provider, please call [phone] and they will assist you.        Care EveryWhere ID     This is your Care EveryWhere ID. This could be used by other organizations to access your [facility] medical records  [id number]        Your Vitals Were     Pulse Temperature Respirations Height Pulse Oximetry BMI (Body Mass Index)    68 98  F (36.7  C) (Oral) 18 1.575 m (5' 2\") 96% 35.72 kg/m2       Blood Pressure from Last 3 Encounters:   07/19/18 132/72   04/11/18 132/78   04/10/18 124/64    Weight from Last 3 Encounters:   07/19/18 88.6 kg (195 lb 4.8 oz)   04/11/18 91.1 kg (200 lb 12.8 oz)   04/10/18 90.7 kg (200 lb)                 Today's Medication Changes          These changes are accurate as of 7/19/18  9:54 AM.  If you have any questions, ask your nurse or doctor.               These medicines have changed or have updated prescriptions.        Dose/Directions    levothyroxine 88 MCG tablet   Commonly known as:  SYNTHROID/LEVOTHROID   This may have changed:    - medication strength  - how much to take   Used for:  Acquired hypothyroidism   Changed by:  Olena Manzanares MD        Dose:  88 mcg   Take 1 tablet (88 mcg) by mouth daily   Quantity:  90 tablet   Refills:  1            Where to get your medicines      These medications were sent to [facility] [place], [place] - [address]  [address] [facility] [address]     Phone:  [phone]     levothyroxine 88 MCG tablet                Primary Care Provider Office Phone # Fax #    Kasi Watkins [phone] " [phone]       [address] [facility] [address]        Equal Access to Services     MARCIAL ROTH : Hadii wanda novoa hadloveo Soshi, waaxda luqadaha, qaybta kaalmada meeta, tommy estellain hayaaflorencio gormanfabiana miller katie urbina. So [facility] [phone].    ATENCIÓN: Si habla español, tiene a hopper disposición servicios gratuitos de asistencia lingüística. Llame al [phone].    We comply with applicable federal civil rights laws and [place] laws. We do not discriminate on the basis of race, color, national origin, age, disability, sex, sexual orientation, or gender identity.            Thank you!     Thank you for choosing [facility]  for your care. Our goal is always to provide you with excellent care. Hearing back from our patients is one way we can continue to improve our services. Please take a few minutes to complete the written survey that you may receive in the mail after your visit with us. Thank you!             Your Updated Medication List - Protect others around you: Learn how to safely use, store and throw away your medicines at www.disposemymeds.org.          This list is accurate as of 7/19/18  9:54 AM.  Always use your most recent med list.                   Brand Name Dispense Instructions for use Diagnosis    ascorbic acid 500 MG tablet    VITAMIN C     Take 500 mg by mouth daily.        aspirin 81 MG tablet     90 tablet    Take 1 tablet (81 mg) by mouth daily    Abnormal cardiovascular stress test       cholecalciferol 1000 UNIT tablet    vitamin D3     Take 1,000 Units by mouth 2 times daily        ibuprofen 200 MG tablet    ADVIL/MOTRIN     Take 200 mg by mouth every 4 hours as needed for mild pain Patient takes 4 tablet at night. Occasionally takes 4 tablet when goes out.        isosorbide mononitrate 30 MG 24 hr tablet    IMDUR    90 tablet    Take 1 tablet (30 mg) by mouth daily    Coronary artery disease involving native coronary artery of native heart without angina  pectoris, Status post coronary angioplasty       ketoconazole 2 % shampoo    NIZORAL    120 mL    Apply to the affected area and wash off after 5 minutes.    Dandruff       levothyroxine 88 MCG tablet    SYNTHROID/LEVOTHROID    90 tablet    Take 1 tablet (88 mcg) by mouth daily    Acquired hypothyroidism       metoprolol tartrate 25 MG tablet    LOPRESSOR    90 tablet    Take 0.5 tablets (12.5 mg) by mouth 2 times daily    Coronary artery disease involving native coronary artery without angina pectoris       simvastatin 20 MG tablet    ZOCOR    90 tablet    Take 1 tablet (20 mg) by mouth At Bedtime    Hyperlipidemia LDL goal <100

## 2018-08-08 ENCOUNTER — OFFICE VISIT (OUTPATIENT)
Dept: ORTHOPEDICS | Facility: CLINIC | Age: 83
End: 2018-08-08
Payer: COMMERCIAL

## 2018-08-08 VITALS — SYSTOLIC BLOOD PRESSURE: 128 MMHG | BODY MASS INDEX: 35.67 KG/M2 | WEIGHT: 195 LBS | DIASTOLIC BLOOD PRESSURE: 58 MMHG

## 2018-08-08 DIAGNOSIS — M16.11 PRIMARY OSTEOARTHRITIS OF RIGHT HIP: Primary | ICD-10-CM

## 2018-08-08 PROCEDURE — 99213 OFFICE O/P EST LOW 20 MIN: CPT | Performed by: ORTHOPAEDIC SURGERY

## 2018-08-08 NOTE — PROGRESS NOTES
"HISTORY OF PRESENT ILLNESS:    Elizabeth Li is a 83 year old female who is seen in follow up for bilateral hip pain. Patient reports right hip worse than left.  Patient had US guided right hip intra-articular injction on 4/12/18 offering. Patient reports   Present symptoms: Patient states that she continues to have posterior, lateral hip pains. Patient states she has intermittent pain of tightening or a band around the top of her leg. Patient states she also has intermittent lateral proximal lower leg pain, \"below the knee, but not the knee\".   Patient reports she has been able to sleep since having the injection on 4/12/18, she has been utilizing wedge, but is now noticing lower leg pain. She states that notices tenderness laying on right side if she is positioned just right and also feels warmth in the area while applying topical cream.   Treatments tried to this point: OTC topical cream, Aleve, Ibuprofen as needed.     PHYSICAL EXAM:  /58 (BP Location: Right arm, Patient Position: Chair, Cuff Size: Adult Regular)  Wt 195 lb (88.5 kg)  BMI 35.67 kg/m2  Body mass index is 35.67 kg/(m^2).   GENERAL APPEARANCE: healthy, alert and no distress   SKIN: no suspicious lesions or rashes  NEURO: Normal strength and tone, mentation intact and speech normal  VASCULAR:  good pulses, and cappillary refill   LYMPH: no lymphadenopathy   PSYCH:  mentation appears normal and affect normal/bright    MSK:  The patient ambulates without an antalgic gait.  The patient is able to get on and off the exam table without difficulty.    Examination of the spine reveals a normal lordosis to the cervical and lumbar spine, and a normal kyphosis to the thoracic spine.  There is no clinical evidence of scoliosis.    The pelvis is clinically level.  Trendelenberg is negative.  The patient is none tender to palpation over the greater trochanteric bursal region or piriformis fossa.  With the hip flexed to 90 degrees, internal and " external rotation is 20/40 respectively,  with pain at extremes.  The calves and thighs are symmetric, without atrophy and non-tender to palpation. Rachel's sign is negative, bilaterally.   CMS is intact to the toes.       IMAGING INTERPRETATION:       ASSESSMENT / PLAN: Primary osteoarthritis bilateral hips right more symptomatic than left.  She would like to pursue total hip arthroplasty.  We discussed the potential risks as well as benefits of this and she will schedule this at her convenience.      Beni Zhu MD  Dept. Orthopedic Surgery  [facility]

## 2018-08-08 NOTE — LETTER
"    8/8/2018         RE: Elizabeth Li  [address]  [place] [address]        Dear Colleague,    Thank you for referring your patient, Elizabeth Li, to the [facility] ORTHOPEDIC SURGERY. Please see a copy of my visit note below.    HISTORY OF PRESENT ILLNESS:    Elizabeth Li is a 83 year old female who is seen in follow up for bilateral hip pain. Patient reports right hip worse than left.  Patient had US guided right hip intra-articular injction on 4/12/18 offering. Patient reports   Present symptoms: Patient states that she continues to have posterior, lateral hip pains. Patient states she has intermittent pain of tightening or a band around the top of her leg. Patient states she also has intermittent lateral proximal lower leg pain, \"below the knee, but not the knee\".   Patient reports she has been able to sleep since having the injection on 4/12/18, she has been utilizing wedge, but is now noticing lower leg pain. She states that notices tenderness laying on right side if she is positioned just right and also feels warmth in the area while applying topical cream.   Treatments tried to this point: OTC topical cream, Aleve, Ibuprofen as needed.     PHYSICAL EXAM:  /58 (BP Location: Right arm, Patient Position: Chair, Cuff Size: Adult Regular)  Wt 195 lb (88.5 kg)  BMI 35.67 kg/m2  Body mass index is 35.67 kg/(m^2).   GENERAL APPEARANCE: healthy, alert and no distress   SKIN: no suspicious lesions or rashes  NEURO: Normal strength and tone, mentation intact and speech normal  VASCULAR:  good pulses, and cappillary refill   LYMPH: no lymphadenopathy   PSYCH:  mentation appears normal and affect normal/bright    MSK:  The patient ambulates without an antalgic gait.  The patient is able to get on and off the exam table without difficulty.    Examination of the spine reveals a normal lordosis to the cervical and lumbar spine, and a normal kyphosis to the thoracic spine.  " There is no clinical evidence of scoliosis.    The pelvis is clinically level.  Trendelenberg is negative.  The patient is none tender to palpation over the greater trochanteric bursal region or piriformis fossa.  With the hip flexed to 90 degrees, internal and external rotation is 20/40 respectively,  with pain at extremes.  The calves and thighs are symmetric, without atrophy and non-tender to palpation. Rachel's sign is negative, bilaterally.   CMS is intact to the toes.       IMAGING INTERPRETATION:       ASSESSMENT / PLAN: Primary osteoarthritis bilateral hips right more symptomatic than left.  She would like to pursue total hip arthroplasty.  We discussed the potential risks as well as benefits of this and she will schedule this at her convenience.      Beni Zhu MD  Dept. Orthopedic Surgery  [facility]           Again, thank you for allowing me to participate in the care of your patient.        Sincerely,        Moise Zhu MD

## 2018-09-17 ENCOUNTER — OFFICE VISIT (OUTPATIENT)
Dept: CARDIOLOGY | Facility: CLINIC | Age: 83
End: 2018-09-17
Payer: COMMERCIAL

## 2018-09-17 VITALS
HEIGHT: 62 IN | SYSTOLIC BLOOD PRESSURE: 124 MMHG | WEIGHT: 193 LBS | DIASTOLIC BLOOD PRESSURE: 58 MMHG | BODY MASS INDEX: 35.51 KG/M2 | HEART RATE: 60 BPM

## 2018-09-17 DIAGNOSIS — E03.9 ACQUIRED HYPOTHYROIDISM: ICD-10-CM

## 2018-09-17 DIAGNOSIS — I25.10 CORONARY ARTERY DISEASE INVOLVING NATIVE CORONARY ARTERY OF NATIVE HEART WITHOUT ANGINA PECTORIS: Primary | ICD-10-CM

## 2018-09-17 DIAGNOSIS — R06.09 DYSPNEA ON EXERTION: ICD-10-CM

## 2018-09-17 DIAGNOSIS — M79.605 PAIN OF LEFT LOWER EXTREMITY: ICD-10-CM

## 2018-09-17 LAB
T3FREE SERPL-MCNC: 1.9 PG/ML (ref 2.3–4.2)
T4 FREE SERPL-MCNC: 1.11 NG/DL (ref 0.76–1.46)
TSH SERPL DL<=0.005 MIU/L-ACNC: 1.22 MU/L (ref 0.4–4)

## 2018-09-17 PROCEDURE — 84439 ASSAY OF FREE THYROXINE: CPT | Performed by: INTERNAL MEDICINE

## 2018-09-17 PROCEDURE — 84481 FREE ASSAY (FT-3): CPT | Performed by: INTERNAL MEDICINE

## 2018-09-17 PROCEDURE — 36415 COLL VENOUS BLD VENIPUNCTURE: CPT | Performed by: INTERNAL MEDICINE

## 2018-09-17 PROCEDURE — 99214 OFFICE O/P EST MOD 30 MIN: CPT | Performed by: INTERNAL MEDICINE

## 2018-09-17 PROCEDURE — 84443 ASSAY THYROID STIM HORMONE: CPT | Performed by: INTERNAL MEDICINE

## 2018-09-17 NOTE — LETTER
9/17/2018      Kasi Watkins MD  [address] [facility] [address]      RE: Elizabeth Li       Dear Colleague,    I had the pleasure of seeing Elizabeth Li in the [facility].    Service Date: 09/17/2018      REASON FOR VISIT:  Followup for coronary artery disease.      HISTORY OF PRESENT ILLNESS:  I had the pleasure of seeing Elizabeth Li at the [facility] in [place] this morning.      She is a very pleasant 83-year-old female with coronary disease, hypertension, hyperlipidemia, chronic back and hip pain.  We saw her several years ago for an evaluation of progressive dyspnea on exertion.  At that time, she had a stress test as well as CTA which were abnormal.  She subsequently had coronary angiogram which demonstrated severe 3-vessel coronary disease.  Specifically, she had high-grade stenosis in the mid LAD.  Her right coronary was chronically occluded.  She subsequently underwent PCI with drug-eluting stent placement in the mid LAD.  Given the chronic nature of her RCA, we had decided to not intervene on that vessel.      Her dyspnea has improved significantly after the PCI.  She continues to have baseline dyspnea on exertion.  This is in the setting of significant hip and lower back pain due to sciatica.  She has been essentially sedentary because of pain related to sciatica.  She was offered both hip and back surgery but there was concern of recovery from back surgery due to her inability to walk.  She denies exertional chest pain or chest pressure.  She denies palpitations, presyncope or syncope.      IMPRESSION, REPORT AND PLAN:   1.  Coronary disease status post prior PCI with drug-eluting stent placement in the LAD.   2.  Chronically occluded RCA and moderate circumflex disease.   3.  Hyperlipidemia.   4.  Hypertension.   5.  Chronic dyspnea on exertion, stable.    6.  Chronic low back pain and hip pain.      She is  still having shortness of breath which has not worsened over the last year.  She continues to be inactive because of her hip and lower back pain.  Dyspnea could be due to deconditioning or perhaps related to underlying coronary artery disease plus or minus diastolic dysfunction.      Her echo from a year ago demonstrated preserved LV function.  We discussed potentially pursuing a stress test to rule out significant underlying ischemia.  However, she felt her symptoms are unchanged over the past year and she would rather continue to monitor.  If the dyspnea worsens or if she decides to pursue back and hip surgery, then I believe it would be prudent for us to rule out significant ischemia.      Although her lower extremity symptoms are most likely neurogenic and orthopedic in nature, we will perform baseline ABIs to make sure she does not have a component of PAD.      Her risk factors are currently well controlled.  She will continue her current medical program.      We will see her in 6-12 months but sooner if she develops symptoms.        I appreciate the opportunity to be part of her care.         ELIZABETH VAZQUEZ MD             D: 2018   T: 2018   MT: LIBERTAD      Name:     AMARI AVILEZ   MRN:      [id number]        Account:      [id number]   :      1935           Service Date: 2018      Document: [id number]         Outpatient Encounter Prescriptions as of 2018   Medication Sig Dispense Refill     ascorbic acid (VITAMIN C) 500 MG tablet Take 500 mg by mouth daily.       cholecalciferol (VITAMIN D3) 1000 UNIT tablet Take 1,000 Units by mouth 2 times daily       ibuprofen (ADVIL/MOTRIN) 200 MG tablet Take 200 mg by mouth every 4 hours as needed for mild pain Patient takes 4 tablet at night. Occasionally takes 4 tablet when goes out.       isosorbide mononitrate (IMDUR) 30 MG 24 hr tablet Take 1 tablet (30 mg) by mouth daily 90 tablet 3     ketoconazole (NIZORAL) 2 % shampoo Apply to  the affected area and wash off after 5 minutes. 120 mL 1     levothyroxine (SYNTHROID/LEVOTHROID) 88 MCG tablet Take 1 tablet (88 mcg) by mouth daily 90 tablet 1     metoprolol tartrate (LOPRESSOR) 25 MG tablet Take 0.5 tablets (12.5 mg) by mouth 2 times daily 90 tablet 0     simvastatin (ZOCOR) 20 MG tablet Take 1 tablet (20 mg) by mouth At Bedtime 90 tablet 3     aspirin 81 MG tablet Take 1 tablet (81 mg) by mouth daily 90 tablet 3     No facility-administered encounter medications on file as of 9/17/2018.        Again, thank you for allowing me to participate in the care of your patient.      Sincerely,    Francisco Oakley MD, MD     [facility]

## 2018-09-17 NOTE — MR AVS SNAPSHOT
After Visit Summary   9/17/2018    Elizabeth Li    MRN: [id number]           Patient Information     Date Of Birth          1935        Visit Information        Provider Department      9/17/2018 8:00 AM Francisco Oakley MD [facility]        Today's Diagnoses     Coronary artery disease involving native coronary artery of native heart without angina pectoris    -  1    Dyspnea on exertion        Pain of left lower extremity           Follow-ups after your visit        Your next 10 appointments already scheduled     Sep 17, 2018  9:00 AM CDT   LAB with [facility] LAB   [facility]    [address]  [facility] [address]   [phone]           Please do not eat 10-12 hours before your appointment if you are coming in fasting for labs on lipids, cholesterol, or glucose (sugar). This does not apply to pregnant women. Water, hot tea and black coffee (with nothing added) are okay. Do not drink other fluids, diet soda or chew gum.            Sep 18, 2018 10:30 AM CDT   US JANE DOPPLER NO EXERCISE 1-2 LVLS BILAT with [facility] ([facility])    [address]  [facility] [id number] [phone]           How do I prepare for my exam? (Food and drink instructions) No caffeine or tobacco for 1 hour prior to exam.  What should I wear: Wear comfortable clothes.  How long does the exam take: Most ultrasounds take 30 to 60 minutes.  What should I bring: Bring a list of your medicines, including vitamins, minerals and over-the-counter drugs. It is safest to leave personal items at home.  Do I need a :  No  is needed.  What do I need to tell my doctor: Tell your doctor about any allergies you may have.  What should I do after the exam: No restrictions, You may resume normal activities.  What is this test: An ultrasound uses  sound waves to make pictures of the body. Sound waves do not cause pain. The only discomfort may be the pressure of the wand against your skin or full bladder.  Who should I call with questions: If you have any questions, please call the Imaging Department where you will have your exam. Directions, parking instructions, and other information is available on our website, [email or web address].            Sep 21, 2018 11:20 AM CDT   SHORT with Rebekah Rausch MD   [facility] ([facility])    [address]  [facility] [id number] [phone]            Sep 26, 2018  9:00 AM CDT   Return Visit with Olena Manzanares MD   [facility] ([facility])    [address]  [facility] [id number] [phone]              Future tests that were ordered for you today     Open Future Orders        Priority Expected Expires Ordered    US JANE Doppler No Exercise (JANE at rest) Routine 9/24/2018 9/17/2019 9/17/2018            Who to contact     If you have questions or need follow up information about today's clinic visit or your schedule please contact [facility] directly at [phone].  Normal or non-critical lab and imaging results will be communicated to you by PayPlughart, letter or phone within 4 business days after the clinic has received the results. If you do not hear from us within 7 days, please contact the clinic through PayPlughart or phone. If you have a critical or abnormal lab result, we will notify you by phone as soon as possible.  Submit refill requests through [employer] or call your pharmacy and they will forward the refill request to us. Please allow 3 business days for your refill to be completed.          Additional Information About Your Visit        B[employer] Information     [employer] gives you secure access to your electronic health record. If you see a primary care provider,  "you can also send messages to your care team and make appointments. If you have questions, please call your primary care clinic.  If you do not have a primary care provider, please call [phone] and they will assist you.        Care EveryWhere ID     This is your Care EveryWhere ID. This could be used by other organizations to access your [facility] medical records  [id number]        Your Vitals Were     Pulse Height BMI (Body Mass Index)             60 1.575 m (5' 2\") 35.3 kg/m2          Blood Pressure from Last 3 Encounters:   09/17/18 124/58   08/08/18 128/58   07/19/18 132/72    Weight from Last 3 Encounters:   09/17/18 87.5 kg (193 lb)   08/08/18 88.5 kg (195 lb)   07/19/18 88.6 kg (195 lb 4.8 oz)               Primary Care Provider Office Phone # Fax #    Kasi Watkins [phone]       [address]  [facility] [address]        Equal Access to Services     [facility]: Hadii aad ku hadasho Soomaali, waaxda luqadaha, qaybta kaalmada adeegyada, waxay estellain hayjackelyn wilson . So [facility] [phone].    ATENCIÓN: Si habla español, tiene a hopper disposición servicios gratuitos de asistencia lingüística. Llame al [phone].    We comply with applicable federal civil rights laws and [place] laws. We do not discriminate on the basis of race, color, national origin, age, disability, sex, sexual orientation, or gender identity.            Thank you!     Thank you for choosing [facility]  for your care. Our goal is always to provide you with excellent care. Hearing back from our patients is one way we can continue to improve our services. Please take a few minutes to complete the written survey that you may receive in the mail after your visit with us. Thank you!             Your Updated Medication List - Protect others around you: Learn how to safely use, store and throw away your medicines at www.disposemymeds.org.          This " list is accurate as of 9/17/18  8:39 AM.  Always use your most recent med list.                   Brand Name Dispense Instructions for use Diagnosis    ascorbic acid 500 MG tablet    VITAMIN C     Take 500 mg by mouth daily.        aspirin 81 MG tablet     90 tablet    Take 1 tablet (81 mg) by mouth daily    Abnormal cardiovascular stress test       cholecalciferol 1000 UNIT tablet    vitamin D3     Take 1,000 Units by mouth 2 times daily        ibuprofen 200 MG tablet    ADVIL/MOTRIN     Take 200 mg by mouth every 4 hours as needed for mild pain Patient takes 4 tablet at night. Occasionally takes 4 tablet when goes out.        isosorbide mononitrate 30 MG 24 hr tablet    IMDUR    90 tablet    Take 1 tablet (30 mg) by mouth daily    Coronary artery disease involving native coronary artery of native heart without angina pectoris, Status post coronary angioplasty       ketoconazole 2 % shampoo    NIZORAL    120 mL    Apply to the affected area and wash off after 5 minutes.    Dandruff       levothyroxine 88 MCG tablet    SYNTHROID/LEVOTHROID    90 tablet    Take 1 tablet (88 mcg) by mouth daily    Acquired hypothyroidism       metoprolol tartrate 25 MG tablet    LOPRESSOR    90 tablet    Take 0.5 tablets (12.5 mg) by mouth 2 times daily    Coronary artery disease involving native coronary artery without angina pectoris       simvastatin 20 MG tablet    ZOCOR    90 tablet    Take 1 tablet (20 mg) by mouth At Bedtime    Hyperlipidemia LDL goal <100

## 2018-09-17 NOTE — LETTER
9/17/2018    Kasi Watkins MD  [address] [facility] [address]    RE: Elizabeth Li       Dear Colleague,    I had the pleasure of seeing Elizabeth Li in the [facility].    HPI and Plan:   See dictation    Orders Placed This Encounter   Procedures     US JANE Doppler No Exercise (JANE at rest)       No orders of the defined types were placed in this encounter.      There are no discontinued medications.      Encounter Diagnoses   Name Primary?     Coronary artery disease involving native coronary artery of native heart without angina pectoris Yes     Dyspnea on exertion      Pain of left lower extremity        CURRENT MEDICATIONS:  Current Outpatient Prescriptions   Medication Sig Dispense Refill     ascorbic acid (VITAMIN C) 500 MG tablet Take 500 mg by mouth daily.       cholecalciferol (VITAMIN D3) 1000 UNIT tablet Take 1,000 Units by mouth 2 times daily       ibuprofen (ADVIL/MOTRIN) 200 MG tablet Take 200 mg by mouth every 4 hours as needed for mild pain Patient takes 4 tablet at night. Occasionally takes 4 tablet when goes out.       isosorbide mononitrate (IMDUR) 30 MG 24 hr tablet Take 1 tablet (30 mg) by mouth daily 90 tablet 3     ketoconazole (NIZORAL) 2 % shampoo Apply to the affected area and wash off after 5 minutes. 120 mL 1     levothyroxine (SYNTHROID/LEVOTHROID) 88 MCG tablet Take 1 tablet (88 mcg) by mouth daily 90 tablet 1     metoprolol tartrate (LOPRESSOR) 25 MG tablet Take 0.5 tablets (12.5 mg) by mouth 2 times daily 90 tablet 0     simvastatin (ZOCOR) 20 MG tablet Take 1 tablet (20 mg) by mouth At Bedtime 90 tablet 3     aspirin 81 MG tablet Take 1 tablet (81 mg) by mouth daily 90 tablet 3       ALLERGIES     Allergies   Allergen Reactions     Flu Virus Vaccine Other (See Comments)     Viral SX       PAST MEDICAL HISTORY:  Past Medical History:   Diagnosis Date     Abnormal cardiovascular stress test     EKG changes with exercise on  Exercise stress test 1/26/2015     CAD (coronary artery disease)     Severe 2 vessel CAD. PCI with drug-eluting stents x2 to mid LAD on 3/27/15     HTN (hypertension)      Hyperlipidemia LDL goal <100      Hypothyroidism      IHD (ischemic heart disease)      KUSUM (obstructive sleep apnea)     CPAP       PAST SURGICAL HISTORY:  Past Surgical History:   Procedure Laterality Date     CARDIAC SURGERY      2 heart stents     COLONOSCOPY       COLONOSCOPY N/A 1/11/2017    Procedure: COLONOSCOPY;  Surgeon: Nghia Moss MD;  Location:  GI     HEAD & NECK SURGERY      wisdom teeth removed     HEART CATH, ANGIOPLASTY  3/27/15    2.5 X 18 mm & 3.0 X mm LUCILA to LAD     TONSILLECTOMY      T&A as a child     TUBAL LIGATION         FAMILY HISTORY:  Family History   Problem Relation Age of Onset     Blood Disease Mother      pernious anemia     HEART DISEASE Father      HEART DISEASE Sister      HEART DISEASE Brother      Colon Cancer No family hx of        SOCIAL HISTORY:  Social History     Social History     Marital status:      Spouse name: N/A     Number of children: 4     Years of education: N/A     Social History Main Topics     Smoking status: Former Smoker     Quit date: 1/1/1984     Smokeless tobacco: Never Used     Alcohol use 0.0 oz/week     0 Standard drinks or equivalent per week      Comment: social drinker     Drug use: No     Sexual activity: No     Other Topics Concern     Caffeine Concern Yes     coffee      Occupational Exposure No     Hobby Hazards No     Sleep Concern No     Stress Concern No     Weight Concern No     Special Diet Yes     gluten free and dairy free     Back Care No     Exercise No     on hold , 4/7 starting cardiac rehab     Seat Belt Yes     Social History Narrative       Review of Systems:  Skin:  Positive for   dry and thin skin; itching/rash on forehead and nose   Eyes:  Positive for glasses    ENT:  Negative for      Respiratory:    dyspnea on exertion;sleep apnea;CPAP    "  Cardiovascular:    edema;Positive for    Gastroenterology: Positive for constipation    Genitourinary:  Negative for      Musculoskeletal:  Positive for back pain;neck pain;joint pain    Neurologic:  Positive for numbness or tingling of hands    Psychiatric:  Positive for sleep disturbances    Heme/Lymph/Imm:  Positive for allergies    Endocrine:  Positive for thyroid disorder      Physical Exam:  Vitals: /58  Pulse 60  Ht 1.575 m (5' 2\")  Wt 87.5 kg (193 lb)  BMI 35.3 kg/m2    Constitutional:  cooperative;in no acute distress        Skin:  warm and dry to the touch;no apparent skin lesions or masses noted          Head:  normocephalic        Eyes:  conjunctivae and lids unremarkable;sclera white        Lymph:      ENT:  no pallor or cyanosis        Neck:  carotid pulses are full and equal bilaterally;JVP normal;no carotid bruit        Respiratory:  clear to auscultation         Cardiac: regular rhythm;normal S1 and S2;apical impulse not displaced                pulses full and equal;pulses full and equal, no bruits auscultated                                   Right radial puncture site clean dry and intact without bruit.    GI:  abdomen soft;non-tender obese      Extremities and Muscular Skeletal:  no edema;no deformities, clubbing, cyanosis, erythema observed              Neurological:           Psych:           CC  No referring provider defined for this encounter.                Thank you for allowing me to participate in the care of your patient.      Sincerely,     Francisco Oakley MD, MD     [facility]    cc:   No referring provider defined for this encounter.        "

## 2018-09-18 ENCOUNTER — HOSPITAL ENCOUNTER (OUTPATIENT)
Dept: CARDIOLOGY | Facility: CLINIC | Age: 83
Discharge: HOME | End: 2018-09-18
Attending: INTERNAL MEDICINE | Admitting: INTERNAL MEDICINE
Payer: MEDICARE

## 2018-09-18 PROCEDURE — 93922 UPR/L XTREMITY ART 2 LEVELS: CPT

## 2018-09-18 PROCEDURE — 93922 UPR/L XTREMITY ART 2 LEVELS: CPT | Performed by: INTERNAL MEDICINE

## 2018-09-18 NOTE — PROGRESS NOTES
Service Date: 09/17/2018      REASON FOR VISIT:  Followup for coronary artery disease.      HISTORY OF PRESENT ILLNESS:  I had the pleasure of seeing Elizabeth Li at the [facility] in [place] this morning.      She is a very pleasant 83-year-old female with coronary disease, hypertension, hyperlipidemia, chronic back and hip pain.  We saw her several years ago for an evaluation of progressive dyspnea on exertion.  At that time, she had a stress test as well as CTA which were abnormal.  She subsequently had coronary angiogram which demonstrated severe 3-vessel coronary disease.  Specifically, she had high-grade stenosis in the mid LAD.  Her right coronary was chronically occluded.  She subsequently underwent PCI with drug-eluting stent placement in the mid LAD.  Given the chronic nature of her RCA, we had decided to not intervene on that vessel.      Her dyspnea has improved significantly after the PCI.  She continues to have baseline dyspnea on exertion.  This is in the setting of significant hip and lower back pain due to sciatica.  She has been essentially sedentary because of pain related to sciatica.  She was offered both hip and back surgery but there was concern of recovery from back surgery due to her inability to walk.  She denies exertional chest pain or chest pressure.  She denies palpitations, presyncope or syncope.      IMPRESSION, REPORT AND PLAN:   1.  Coronary disease status post prior PCI with drug-eluting stent placement in the LAD.   2.  Chronically occluded RCA and moderate circumflex disease.   3.  Hyperlipidemia.   4.  Hypertension.   5.  Chronic dyspnea on exertion, stable.    6.  Chronic low back pain and hip pain.      She is still having shortness of breath which has not worsened over the last year.  She continues to be inactive because of her hip and lower back pain.  Dyspnea could be due to deconditioning or perhaps related to underlying coronary artery  disease plus or minus diastolic dysfunction.      Her echo from a year ago demonstrated preserved LV function.  We discussed potentially pursuing a stress test to rule out significant underlying ischemia.  However, she felt her symptoms are unchanged over the past year and she would rather continue to monitor.  If the dyspnea worsens or if she decides to pursue back and hip surgery, then I believe it would be prudent for us to rule out significant ischemia.      Although her lower extremity symptoms are most likely neurogenic and orthopedic in nature, we will perform baseline ABIs to make sure she does not have a component of PAD.      Her risk factors are currently well controlled.  She will continue her current medical program.      We will see her in 6-12 months but sooner if she develops symptoms.        I appreciate the opportunity to be part of her care.         ELIZABETH VAZQUEZ MD             D: 2018   T: 2018   MT: LIBERTAD      Name:     AMARI AVILEZ   MRN:      [id number]        Account:      [id number]   :      1935           Service Date: 2018      Document: [id number]

## 2018-09-18 NOTE — PROGRESS NOTES
HPI and Plan:   See dictation    Orders Placed This Encounter   Procedures     US JANE Doppler No Exercise (JANE at rest)       No orders of the defined types were placed in this encounter.      There are no discontinued medications.      Encounter Diagnoses   Name Primary?     Coronary artery disease involving native coronary artery of native heart without angina pectoris Yes     Dyspnea on exertion      Pain of left lower extremity        CURRENT MEDICATIONS:  Current Outpatient Prescriptions   Medication Sig Dispense Refill     ascorbic acid (VITAMIN C) 500 MG tablet Take 500 mg by mouth daily.       cholecalciferol (VITAMIN D3) 1000 UNIT tablet Take 1,000 Units by mouth 2 times daily       ibuprofen (ADVIL/MOTRIN) 200 MG tablet Take 200 mg by mouth every 4 hours as needed for mild pain Patient takes 4 tablet at night. Occasionally takes 4 tablet when goes out.       isosorbide mononitrate (IMDUR) 30 MG 24 hr tablet Take 1 tablet (30 mg) by mouth daily 90 tablet 3     ketoconazole (NIZORAL) 2 % shampoo Apply to the affected area and wash off after 5 minutes. 120 mL 1     levothyroxine (SYNTHROID/LEVOTHROID) 88 MCG tablet Take 1 tablet (88 mcg) by mouth daily 90 tablet 1     metoprolol tartrate (LOPRESSOR) 25 MG tablet Take 0.5 tablets (12.5 mg) by mouth 2 times daily 90 tablet 0     simvastatin (ZOCOR) 20 MG tablet Take 1 tablet (20 mg) by mouth At Bedtime 90 tablet 3     aspirin 81 MG tablet Take 1 tablet (81 mg) by mouth daily 90 tablet 3       ALLERGIES     Allergies   Allergen Reactions     Flu Virus Vaccine Other (See Comments)     Viral SX       PAST MEDICAL HISTORY:  Past Medical History:   Diagnosis Date     Abnormal cardiovascular stress test     EKG changes with exercise on Exercise stress test 1/26/2015     CAD (coronary artery disease)     Severe 2 vessel CAD. PCI with drug-eluting stents x2 to mid LAD on 3/27/15     HTN (hypertension)      Hyperlipidemia LDL goal <100      Hypothyroidism      IHD  (ischemic heart disease)      KUSUM (obstructive sleep apnea)     CPAP       PAST SURGICAL HISTORY:  Past Surgical History:   Procedure Laterality Date     CARDIAC SURGERY      2 heart stents     COLONOSCOPY       COLONOSCOPY N/A 1/11/2017    Procedure: COLONOSCOPY;  Surgeon: Nghia Moss MD;  Location:  GI     HEAD & NECK SURGERY      wisdom teeth removed     HEART CATH, ANGIOPLASTY  3/27/15    2.5 X 18 mm & 3.0 X mm LUCILA to LAD     TONSILLECTOMY      T&A as a child     TUBAL LIGATION         FAMILY HISTORY:  Family History   Problem Relation Age of Onset     Blood Disease Mother      pernious anemia     HEART DISEASE Father      HEART DISEASE Sister      HEART DISEASE Brother      Colon Cancer No family hx of        SOCIAL HISTORY:  Social History     Social History     Marital status:      Spouse name: N/A     Number of children: 4     Years of education: N/A     Social History Main Topics     Smoking status: Former Smoker     Quit date: 1/1/1984     Smokeless tobacco: Never Used     Alcohol use 0.0 oz/week     0 Standard drinks or equivalent per week      Comment: social drinker     Drug use: No     Sexual activity: No     Other Topics Concern     Caffeine Concern Yes     coffee      Occupational Exposure No     Hobby Hazards No     Sleep Concern No     Stress Concern No     Weight Concern No     Special Diet Yes     gluten free and dairy free     Back Care No     Exercise No     on hold , 4/7 starting cardiac rehab     Seat Belt Yes     Social History Narrative       Review of Systems:  Skin:  Positive for   dry and thin skin; itching/rash on forehead and nose   Eyes:  Positive for glasses    ENT:  Negative for      Respiratory:    dyspnea on exertion;sleep apnea;CPAP     Cardiovascular:    edema;Positive for    Gastroenterology: Positive for constipation    Genitourinary:  Negative for      Musculoskeletal:  Positive for back pain;neck pain;joint pain    Neurologic:  Positive for numbness or  "tingling of hands    Psychiatric:  Positive for sleep disturbances    Heme/Lymph/Imm:  Positive for allergies    Endocrine:  Positive for thyroid disorder      Physical Exam:  Vitals: /58  Pulse 60  Ht 1.575 m (5' 2\")  Wt 87.5 kg (193 lb)  BMI 35.3 kg/m2    Constitutional:  cooperative;in no acute distress        Skin:  warm and dry to the touch;no apparent skin lesions or masses noted          Head:  normocephalic        Eyes:  conjunctivae and lids unremarkable;sclera white        Lymph:      ENT:  no pallor or cyanosis        Neck:  carotid pulses are full and equal bilaterally;JVP normal;no carotid bruit        Respiratory:  clear to auscultation         Cardiac: regular rhythm;normal S1 and S2;apical impulse not displaced                pulses full and equal;pulses full and equal, no bruits auscultated                                   Right radial puncture site clean dry and intact without bruit.    GI:  abdomen soft;non-tender obese      Extremities and Muscular Skeletal:  no edema;no deformities, clubbing, cyanosis, erythema observed              Neurological:           Psych:           CC  No referring provider defined for this encounter.              "

## 2018-09-21 ENCOUNTER — TELEPHONE (OUTPATIENT)
Dept: ORTHOPEDICS | Facility: CLINIC | Age: 83
End: 2018-09-21

## 2018-09-21 ENCOUNTER — OFFICE VISIT (OUTPATIENT)
Dept: INTERNAL MEDICINE | Facility: CLINIC | Age: 83
End: 2018-09-21
Payer: COMMERCIAL

## 2018-09-21 VITALS
DIASTOLIC BLOOD PRESSURE: 72 MMHG | BODY MASS INDEX: 35.51 KG/M2 | HEART RATE: 70 BPM | OXYGEN SATURATION: 95 % | TEMPERATURE: 98 F | HEIGHT: 62 IN | SYSTOLIC BLOOD PRESSURE: 128 MMHG | WEIGHT: 193 LBS

## 2018-09-21 DIAGNOSIS — I25.9 IHD (ISCHEMIC HEART DISEASE): ICD-10-CM

## 2018-09-21 DIAGNOSIS — Z98.61 STATUS POST CORONARY ANGIOPLASTY: ICD-10-CM

## 2018-09-21 DIAGNOSIS — I10 BENIGN ESSENTIAL HYPERTENSION: Primary | ICD-10-CM

## 2018-09-21 DIAGNOSIS — G47.33 OSA (OBSTRUCTIVE SLEEP APNEA): ICD-10-CM

## 2018-09-21 DIAGNOSIS — M16.11 PRIMARY OSTEOARTHRITIS OF RIGHT HIP: Primary | ICD-10-CM

## 2018-09-21 DIAGNOSIS — E78.5 HYPERLIPIDEMIA LDL GOAL <100: ICD-10-CM

## 2018-09-21 DIAGNOSIS — I25.10 CORONARY ARTERY DISEASE INVOLVING NATIVE CORONARY ARTERY OF NATIVE HEART WITHOUT ANGINA PECTORIS: ICD-10-CM

## 2018-09-21 PROBLEM — E66.01 MORBID OBESITY (H): Status: RESOLVED | Noted: 2018-03-09 | Resolved: 2018-09-21

## 2018-09-21 PROCEDURE — 99214 OFFICE O/P EST MOD 30 MIN: CPT | Performed by: INTERNAL MEDICINE

## 2018-09-21 RX ORDER — NAPROXEN SODIUM 220 MG
220 TABLET ORAL PRN
COMMUNITY
End: 2018-11-29

## 2018-09-21 RX ORDER — ISOSORBIDE MONONITRATE 30 MG/1
30 TABLET, EXTENDED RELEASE ORAL DAILY
Qty: 90 TABLET | Refills: 4 | Status: SHIPPED | OUTPATIENT
Start: 2018-09-21 | End: 2019-01-01

## 2018-09-21 NOTE — TELEPHONE ENCOUNTER
Elizabeth Li is a 83 year old female who left a voicemail stating she saw Dr. Zhu on 8/8/18 and was to schedule a shot for her hip. Not sure why it didn't get scheduled.     Phone call to patient and clarified she was asking for an injection of her hip. informd Dr. Zhu's note from 8/8/18 stated he recommended LYUBOV.   She states at the time she was confused and did not realize that is what he meant.   She states she does have back pain and has seen Dr. Yost, who referred her to Dr. Zhu for her hip.   She has been doing well up until the last couple weeks as groin pain has returned and wakes her up at night.   She states it is the same pain as before and moves around a lot.   At times it travel from the groin to the side of her right hip and down her leg to the lateral knee.   She will also have pain in the other leg in different areas at different times.   Denies numbness, tingling, or weakness of legs.  Denies loss of bowel or bladder control.   She would like another ultrasound guided injection for her hip by Dr. Gaspar.     Informed Dr. Zhu is already gone for the day. Will discuss with him on Monday and get back with her. .    Patient expecting call back: YES      Preferred contact number:  [phone] (home)    Can we leave a detailed message on this number: YES     Please advise.     GUZMAN Patel RN

## 2018-09-21 NOTE — PROGRESS NOTES
SUBJECTIVE:   Elizabeth iL is a 83 year old female who presents to clinic today for the following health issues:    Lower back pain radiating down her right leg/right hip pain.   She has seen a spine specialist.  Also has seen ortho.  Hip injections have helped the pain in the past.  She has seen physical therapy in past.     Hyperlipidemia Follow-Up      Rate your low fat/cholesterol diet?: fair    Taking statin?  Yes, no muscle aches from statin    Other lipid medications/supplements?:  none    Hypertension Follow-up      Outpatient blood pressures are not being checked.    Low Salt Diet: low salt    Vascular Disease Follow-up:  Coronary Artery Disease (CAD)-sees cardiology(last OV 9/17/18)      Chest pain or pressure, left side neck or arm pain: No    Shortness of breath/increased sweats/nausea with exertion: Yes-discussed w/ cardiologist 9/17/18    Pain in calves walking 1-2 blocks: No    Worsened or new symptoms since last visit: Yes     Nitroglycerin use: no    Daily aspirin use: Yes    Depression Followup    Status since last visit: Worsened, pt noted related to how she's feeling with thyroid/health issues    See PHQ-9 for current symptoms.  Other associated symptoms: None    Complicating factors:   Significant life event:  No   Current substance abuse:  None  Anxiety or Panic symptoms:  No    PHQ-9 1/20/2017   Total Score 11   Q9: Suicide Ideation Not at all       PHQ-9  English  PHQ-9   Any Language  Suicide Assessment Five-step Evaluation and Treatment (SAFE-T)  Hypothyroidism Follow-up      Since last visit, patient describes the following symptoms: Weight stable, no hair loss, no skin changes, no constipation, no loose stools      Amount of exercise or physical activity: unable to due to back/hip pain    Problems taking medications regularly: No    Medication side effects: none    Diet: gluten and dairy free          Problem list and histories reviewed & adjusted, as indicated.  Additional  "history: as documented    Labs reviewed in EPIC    Reviewed and updated as needed this visit by clinical staff  Tobacco  Allergies  Meds  Med Hx  Surg Hx  Fam Hx  Soc Hx      Reviewed and updated as needed this visit by Provider         ROS:  CONSTITUTIONAL: NEGATIVE for fever, chills, change in weight  RESP: NEGATIVE for significant cough or SOB  CV: NEGATIVE for chest pain, palpitations or peripheral edema    OBJECTIVE:     /72  Pulse 70  Temp 98  F (36.7  C) (Oral)  Ht 5' 2\" (1.575 m)  Wt 193 lb (87.5 kg)  SpO2 95%  Breastfeeding? No  BMI 35.3 kg/m2  Body mass index is 35.3 kg/(m^2).  GENERAL: healthy, alert and no distress  RESP: lungs clear to auscultation - no rales, rhonchi or wheezes  CV: regular rate and rhythm, normal S1 S2, no S3 or S4, no murmur, click or rub  Extr: no edema bilaterally    ASSESSMENT/PLAN:       (I10) Benign essential hypertension  Comment: at goal  Plan: continue on current regimen    (E78.5) Hyperlipidemia LDL goal <100  Comment:  Plan: statin    (G47.33) KUSUM (obstructive sleep apnea)  Comment:   Plan: CPAP    (I25.9) IHD (ischemic heart disease)  Comment: stable  Plan: follows with cardiology    (I25.10) Coronary artery disease involving native coronary artery of native heart without angina pectoris  Comment:needs refill  Plan: isosorbide mononitrate (IMDUR) 30 MG 24 hr         tablet            (Z98.61) Status post coronary angioplasty  Comment:   Plan: isosorbide mononitrate (IMDUR) 30 MG 24 hr         tablet          Right lower back and hip pain.   Patient to follow with orthopedics, as planned    Rebekah Rausch MD  [facility]    "

## 2018-09-21 NOTE — NURSING NOTE
"/72  Pulse 70  Temp 98  F (36.7  C) (Oral)  Ht 5' 2\" (1.575 m)  Wt 193 lb (87.5 kg)  SpO2 95%  Breastfeeding? No  BMI 35.3 kg/m2    "

## 2018-09-21 NOTE — MR AVS SNAPSHOT
After Visit Summary   9/21/2018    Elizabeth Li    MRN: [id number]           Patient Information     Date Of Birth          1935        Visit Information        Provider Department      9/21/2018 11:20 AM Rebekah Rausch MD [facility]        Today's Diagnoses     Morbid obesity (H)    -  1    Benign essential hypertension        Hyperlipidemia LDL goal <100        KUSUM (obstructive sleep apnea)        IHD (ischemic heart disease)           Follow-ups after your visit        Follow-up notes from your care team     Return in about 6 months (around 3/21/2019) for Routine Visit.      Your next 10 appointments already scheduled     Sep 26, 2018  9:00 AM CDT   Return Visit with Olena Manzanares MD   [facility]    [address] [facility] [address]  [facility] [address] [phone]              Who to contact     If you have questions or need follow up information about today's clinic visit or your schedule please contact [facility] directly at [phone].  Normal or non-critical lab and imaging results will be communicated to you by Solxhart, letter or phone within 4 business days after the clinic has received the results. If you do not hear from us within 7 days, please contact the clinic through Solxhart or phone. If you have a critical or abnormal lab result, we will notify you by phone as soon as possible.  Submit refill requests through ZummZumm or call your pharmacy and they will forward the refill request to us. Please allow 3 business days for your refill to be completed.          Additional Information About Your Visit        Solxhart Information     ZummZumm gives you secure access to your electronic health record. If you see a primary care provider, you can also send messages to your care team and make appointments. If you have questions, please call your primary care clinic.  If you do not have a  "primary care provider, please call [phone] and they will assist you.        Care EveryWhere ID     This is your Care EveryWhere ID. This could be used by other organizations to access your [place] medical records  [id number]        Your Vitals Were     Pulse Temperature Height Pulse Oximetry Breastfeeding? BMI (Body Mass Index)    70 98  F (36.7  C) (Oral) 5' 2\" (1.575 m) 95% No 35.3 kg/m2       Blood Pressure from Last 3 Encounters:   09/21/18 128/72   09/17/18 124/58   08/08/18 128/58    Weight from Last 3 Encounters:   09/21/18 193 lb (87.5 kg)   09/17/18 193 lb (87.5 kg)   08/08/18 195 lb (88.5 kg)              Today, you had the following     No orders found for display       Primary Care Provider Office Phone # Fax #    Kasi Watkins [phone]       [address] [facility] [address]        Equal Access to Services     [facility]: Hadii wanda ku hadasho Soomaali, waaxda luqadaha, qaybta kaalmada adeegyada, tommy wilson . So [facility] [phone].    ATENCIÓN: Si habla español, tiene a hopper disposición servicios gratuitos de asistencia lingüística. Llame al [phone].    We comply with applicable federal civil rights laws and [place] laws. We do not discriminate on the basis of race, color, national origin, age, disability, sex, sexual orientation, or gender identity.            Thank you!     Thank you for choosing [facility]  for your care. Our goal is always to provide you with excellent care. Hearing back from our patients is one way we can continue to improve our services. Please take a few minutes to complete the written survey that you may receive in the mail after your visit with us. Thank you!             Your Updated Medication List - Protect others around you: Learn how to safely use, store and throw away your medicines at www.disposemymeds.org.          This list is accurate as of 9/21/18 12:01 PM.  Always use your " most recent med list.                   Brand Name Dispense Instructions for use Diagnosis    ALEVE 220 MG tablet   Generic drug:  naproxen sodium      Take 220 mg by mouth as needed for moderate pain        ascorbic acid 500 MG tablet    VITAMIN C     Take 500 mg by mouth daily.        aspirin 81 MG tablet     90 tablet    Take 1 tablet (81 mg) by mouth daily    Abnormal cardiovascular stress test       cholecalciferol 1000 UNIT tablet    vitamin D3     Take 1,000 Units by mouth 2 times daily        ibuprofen 200 MG tablet    ADVIL/MOTRIN     Take 200 mg by mouth every 4 hours as needed for mild pain Patient takes 4 tablet at night. Occasionally takes 4 tablet when goes out.        isosorbide mononitrate 30 MG 24 hr tablet    IMDUR    90 tablet    Take 1 tablet (30 mg) by mouth daily    Coronary artery disease involving native coronary artery of native heart without angina pectoris, Status post coronary angioplasty       ketoconazole 2 % shampoo    NIZORAL    120 mL    Apply to the affected area and wash off after 5 minutes.    Dandruff       levothyroxine 88 MCG tablet    SYNTHROID/LEVOTHROID    90 tablet    Take 1 tablet (88 mcg) by mouth daily    Acquired hypothyroidism       metoprolol tartrate 25 MG tablet    LOPRESSOR    90 tablet    Take 0.5 tablets (12.5 mg) by mouth 2 times daily    Coronary artery disease involving native coronary artery without angina pectoris       simvastatin 20 MG tablet    ZOCOR    90 tablet    Take 1 tablet (20 mg) by mouth At Bedtime    Hyperlipidemia LDL goal <100

## 2018-09-22 ASSESSMENT — PATIENT HEALTH QUESTIONNAIRE - PHQ9: SUM OF ALL RESPONSES TO PHQ QUESTIONS 1-9: 6

## 2018-09-26 ENCOUNTER — OFFICE VISIT (OUTPATIENT)
Dept: ENDOCRINOLOGY | Facility: CLINIC | Age: 83
End: 2018-09-26
Payer: COMMERCIAL

## 2018-09-26 VITALS
OXYGEN SATURATION: 95 % | DIASTOLIC BLOOD PRESSURE: 62 MMHG | HEART RATE: 77 BPM | BODY MASS INDEX: 35.79 KG/M2 | HEIGHT: 62 IN | TEMPERATURE: 97.7 F | SYSTOLIC BLOOD PRESSURE: 136 MMHG | WEIGHT: 194.5 LBS

## 2018-09-26 DIAGNOSIS — E03.9 ACQUIRED HYPOTHYROIDISM: ICD-10-CM

## 2018-09-26 PROCEDURE — 99214 OFFICE O/P EST MOD 30 MIN: CPT | Performed by: INTERNAL MEDICINE

## 2018-09-26 RX ORDER — LIOTHYRONINE SODIUM 5 UG/1
10 TABLET ORAL DAILY
Qty: 180 TABLET | Refills: 1 | Status: SHIPPED | OUTPATIENT
Start: 2018-09-26 | End: 2018-11-29

## 2018-09-26 RX ORDER — LEVOTHYROXINE SODIUM 88 UG/1
88 TABLET ORAL DAILY
Qty: 90 TABLET | Refills: 1 | Status: SHIPPED | OUTPATIENT
Start: 2018-09-26 | End: 2018-11-29

## 2018-09-26 NOTE — NURSING NOTE
"ENDOCRINOLOGY INTAKE FORM    Patient Name:  Elizabeth Li  :  1935    Is patient Diabetic?   No  Does patient have non-diabetic or other endocrine issues?  Yes: hypothyroidism    Vitals: /62 (BP Location: Left arm, Patient Position: Chair, Cuff Size: Adult Large)  Pulse 77  Temp 97.7  F (36.5  C) (Oral)  Ht 1.575 m (5' 2\")  Wt 88.2 kg (194 lb 8 oz)  SpO2 95%  Breastfeeding? No  BMI 35.57 kg/m2  BMI= Body mass index is 35.57 kg/(m^2).    Flu vaccine:  No  Pneumonia vaccine:  Yes: both    Smoking and Alcohol use:  Social History   Substance Use Topics     Smoking status: Former Smoker     Quit date: 1984     Smokeless tobacco: Never Used     Alcohol use 0.0 oz/week     0 Standard drinks or equivalent per week      Comment: social drinker       Patience Arellano [facility]    "

## 2018-09-26 NOTE — PATIENT INSTRUCTIONS
[facility]   Dr Manzanares, Endocrinology Department      [facility]   [address]  [place] [address]  Appointment [phone]  Fax: [phone]  [place]: Monday and Tuesday         [place] [address] [facility]  [place] [address]  Appointment [phone]  Fax: [phone]  [place]: Wednesday and Thursday            Continue levothyroxine 88 mcg/day  Start Cytomel 10 mcg/day  Labs in 2 months  Please make a lab appointment for blood work and follow up clinic appointment in 1 week after that to discuss results.

## 2018-09-26 NOTE — LETTER
9/26/2018         RE: Elizabeth Li  [address] [facility] [address]  [address]  [facility] [address]        Dear Colleague,    Thank you for referring your patient, Elizabeth Li, to the [facility]. Please see a copy of my visit note below.    ENDOCRINOLOGY CLINIC NOTE:    Name: Elizabeth Li  Seen in consultation with Kasi Watkins for Hypothyroidism.  HPI:  Elizabeth Li is a 83 year old female who presents for the evaluation of :    #1 Hypothyroidism.  Diagnosed in 1989 at [place].  Currently taking levothyroxine 88 mcg/day. On this dose since 6/2018. At that time dose was decreased based on labs.  Difficult to get stable levels.    Lost wt: intenstional- lost wt over 2 years. From 231 in 2015.  Lost 40 lbs and was upto 180 lbs.   Trying  gluten free and dairy free.  Since last clinic visit + wt loss- trying to loose wt.    Wt Readings from Last 2 Encounters:   09/26/18 88.2 kg (194 lb 8 oz)   09/21/18 87.5 kg (193 lb)     Not much activity 2/2 to pain. Has bloating and has heart burn.    Is wondering if GI s/s, bloating and weight gain related to thyroid problems.    No palpitations.    Changes to hair or skin: No  Diarrhea/Constipation: + constipation. Takes miralax.  Dysphagia or Shortness of breath:No  Tremors; sometimes  Changes in weight: as above  Heat or cold intolerance: + cold intolerance.  History of Lithium or Amiodarone use:No  Head or neck surgery/radiation:No  IV Contrast: No  Family History of Thyroid Problems: No  PMH/PSH:  Past Medical History:   Diagnosis Date     Abnormal cardiovascular stress test     EKG changes with exercise on Exercise stress test 1/26/2015     CAD (coronary artery disease)     Severe 2 vessel CAD. PCI with drug-eluting stents x2 to mid LAD on 3/27/15     HTN (hypertension)      Hyperlipidemia LDL goal <100      Hypothyroidism      IHD (ischemic heart disease)      KUSUM (obstructive sleep apnea)     CPAP     Past Surgical History:   Procedure  "Laterality Date     CARDIAC SURGERY      2 heart stents     COLONOSCOPY       COLONOSCOPY N/A 1/11/2017    Procedure: COLONOSCOPY;  Surgeon: Nghia Moss MD;  Location:  GI     HEAD & NECK SURGERY      wisdom teeth removed     HEART CATH, ANGIOPLASTY  3/27/15    2.5 X 18 mm & 3.0 X mm LUCILA to LAD     TONSILLECTOMY      T&A as a child     TUBAL LIGATION       Family Hx:  Family History   Problem Relation Age of Onset     Blood Disease Mother      pernious anemia     HEART DISEASE Father      HEART DISEASE Sister      HEART DISEASE Brother      Colon Cancer No family hx of      Social Hx:  Social History     Social History     Marital status:      Spouse name: N/A     Number of children: 4     Years of education: N/A     Occupational History     Not on file.     Social History Main Topics     Smoking status: Former Smoker     Quit date: 1/1/1984     Smokeless tobacco: Never Used     Alcohol use 0.0 oz/week     0 Standard drinks or equivalent per week      Comment: social drinker     Drug use: No     Sexual activity: No     Other Topics Concern     Caffeine Concern Yes     coffee      Occupational Exposure No     Hobby Hazards No     Sleep Concern No     Stress Concern No     Weight Concern No     Special Diet Yes     gluten free and dairy free     Back Care No     Exercise No     on hold , 4/7 starting cardiac rehab     Seat Belt Yes     Social History Narrative          MEDICATIONS:  has a current medication list which includes the following prescription(s): ascorbic acid, aspirin, cholecalciferol, ibuprofen, isosorbide mononitrate, ketoconazole, levothyroxine, metoprolol tartrate, naproxen sodium, and simvastatin.    ROS   ROS: 10 point ROS neg other than the symptoms noted above in the HPI.    Physical Exam   VS: /62 (BP Location: Left arm, Patient Position: Chair, Cuff Size: Adult Large)  Pulse 77  Temp 97.7  F (36.5  C) (Oral)  Ht 1.575 m (5' 2\")  Wt 88.2 kg (194 lb 8 oz)  SpO2 95%  " Breastfeeding? No  BMI 35.57 kg/m2  GENERAL: AXOX3, NAD, well dressed, answering questions appropriately, appears stated age.  HEENT: No exopthalmous, no proptosis, EOMI, no lig lag, no retraction  NECK: Thyroid normal in size, non tender, no nodules were palpated.  CV: RRR  LUNGS: CTAB  ABDOMEN: +BS  NEUROLOGY: CN grossly intact, no tremors  PSYCH: normal affect and mood      LABS:  TFTs:  ENDO THYROID LABS-[facility] Latest Ref Rng & Units 9/17/2018   TSH 0.40 - 4.00 mU/L 1.22   T4 FREE 0.76 - 1.46 ng/dL 1.11   FREE T3 2.3 - 4.2 pg/mL 1.9 (L)     ENDO THYROID LABS-[facility] Latest Ref Rng & Units 7/11/2018 4/11/2018   TSH 0.40 - 4.00 mU/L 0.09 (L) 0.32 (L)   T4 FREE 0.76 - 1.46 ng/dL 1.29 1.07   FREE T3 2.3 - 4.2 pg/mL 2.2 (L) 1.9 (L)   THYR PEROXIDASE LAWANDA <35 IU/mL  <10     ENDO THYROID LABS-[facility] Latest Ref Rng & Units 3/7/2018 8/3/2017   TSH 0.40 - 4.00 mU/L 0.25 (L) 0.53   T4 FREE 0.76 - 1.46 ng/dL 1.07    FREE T3 2.3 - 4.2 pg/mL       ENDO THYROID LABS-[facility] Latest Ref Rng & Units 3/8/2017 1/20/2017   TSH 0.40 - 4.00 mU/L 0.05 (L) 0.19 (L)   T4 FREE 0.76 - 1.46 ng/dL 1.33 1.30   FREE T3 2.3 - 4.2 pg/mL       ENDO THYROID LABS-[facility] Latest Ref Rng & Units 11/2/2016 4/4/2016   TSH 0.40 - 4.00 mU/L 0.04 (L) 0.02 (L)   T4 FREE 0.76 - 1.46 ng/dL 1.22 1.16   FREE T3 2.3 - 4.2 pg/mL  2.2 (L)     ENDO THYROID LABS-[facility] Latest Ref Rng & Units 1/6/2016 1/20/2015   TSH 0.40 - 4.00 mU/L 0.04 (L) 0.14 (L)   T4 FREE 0.76 - 1.46 ng/dL 1.62 (H) 1.23   FREE T3 2.3 - 4.2 pg/mL 2.3      All pertinent notes, labs, and images personally reviewed by me.     A/P  Ms.Arlene OSIRIS Li is a 82 year old here for the evaluation of hypothyroidism:    #1 Hypothyroidism (TPO neg):  Recent labs showing normal TSH along with normal free T4 and low T3.  Has multiple questions about labs and T3.  Plan:  Discussed diagnosis, pathophysiology, management and treatment options of condition with pt.  Contineu levothyroxine to 88 mcg per day (on this dose since  7/19/2018)  Add cytomel 10 mcg/day (9/26/2018)  Labs in 2 months  Follow-up after that  Discussed s/s of hypothyroidism and hyperthyroidism to watch for.  The patient indicates understanding of these issues and agrees with the plan.  Discussed indications, risks and benefits of all medications prescribed, and answered questions to patient's satisfaction.    Standard treatment for hypothyroidism involves daily use of the synthetic thyroid hormone levothyroxine (Levothroid, Synthroid, others).  The dosage of thyroxine should normally be that required to bring the serum TSH level to the low normal range, such as 0.3 - 1 uU/ml. This is typically achieved with 1 ug L-T4/lb body weight/day, ranges from 75 - 125 ug/day in women, and 125 - 200 ug/day in men. Once thyroxine treatment is initiated, it is required indefinitely in most patients.     Symptoms should improve one to two weeks after starting treatment. Treatment with levothyroxine is usually lifelong.  Doseage may need to be adjusted based on body weight, medications, or pregnancy.  To determine the right dosage of levothyroxine initially we will repeat TSH and free T4 after two months.     Excessive amounts of the hormone can cause side effects, such as: Increased appetite, insomnia, heart palpitations, and shakiness.  Patients with CAD will be started on a lower dose.  Levothyroxine causes virtually no side effects when used in the appropriate dose.  Certain medications, supplements and foods can affect the body s ability to absorb levothyroxine. Medications include: Iron supplements, Cholestyramine and Calcium supplements.     Follow-up:  2 months    Olena Manzanares MD  Endocrinology  [facility]  CC: Kasi Watkins    More than 50% of face to face time spent with Ms. Li on counseling / coordinating her care.       All questions were answered.  The patient indicates understanding of the above issues and agrees with the plan set  forth.     Addendum to above note and clinic visit:    Labs reviewed.    See result note/telephone encounter.            Again, thank you for allowing me to participate in the care of your patient.        Sincerely,        Olena Manzanares MD

## 2018-09-26 NOTE — PROGRESS NOTES
ENDOCRINOLOGY CLINIC NOTE:    Name: Elizabeth Li  Seen in consultation with Kasi Watkins for Hypothyroidism.  HPI:  Elizabeth Li is a 83 year old female who presents for the evaluation of :    #1 Hypothyroidism.  Diagnosed in 1989 at [place].  Currently taking levothyroxine 88 mcg/day. On this dose since 6/2018. At that time dose was decreased based on labs.  Difficult to get stable levels.    Lost wt: intenstional- lost wt over 2 years. From 231 in 2015.  Lost 40 lbs and was upto 180 lbs.   Trying  gluten free and dairy free.  Since last clinic visit + wt loss- trying to loose wt.    Wt Readings from Last 2 Encounters:   09/26/18 88.2 kg (194 lb 8 oz)   09/21/18 87.5 kg (193 lb)     Not much activity 2/2 to pain. Has bloating and has heart burn.    Is wondering if GI s/s, bloating and weight gain related to thyroid problems.    No palpitations.    Changes to hair or skin: No  Diarrhea/Constipation: + constipation. Takes miralax.  Dysphagia or Shortness of breath:No  Tremors; sometimes  Changes in weight: as above  Heat or cold intolerance: + cold intolerance.  History of Lithium or Amiodarone use:No  Head or neck surgery/radiation:No  IV Contrast: No  Family History of Thyroid Problems: No  PMH/PSH:  Past Medical History:   Diagnosis Date     Abnormal cardiovascular stress test     EKG changes with exercise on Exercise stress test 1/26/2015     CAD (coronary artery disease)     Severe 2 vessel CAD. PCI with drug-eluting stents x2 to mid LAD on 3/27/15     HTN (hypertension)      Hyperlipidemia LDL goal <100      Hypothyroidism      IHD (ischemic heart disease)      KUSUM (obstructive sleep apnea)     CPAP     Past Surgical History:   Procedure Laterality Date     CARDIAC SURGERY      2 heart stents     COLONOSCOPY       COLONOSCOPY N/A 1/11/2017    Procedure: COLONOSCOPY;  Surgeon: Nghia Moss MD;  Location:  GI     HEAD & NECK SURGERY      wisdom teeth removed     HEART CATH, ANGIOPLASTY   "3/27/15    2.5 X 18 mm & 3.0 X mm LUCILA to LAD     TONSILLECTOMY      T&A as a child     TUBAL LIGATION       Family Hx:  Family History   Problem Relation Age of Onset     Blood Disease Mother      pernious anemia     HEART DISEASE Father      HEART DISEASE Sister      HEART DISEASE Brother      Colon Cancer No family hx of      Social Hx:  Social History     Social History     Marital status:      Spouse name: N/A     Number of children: 4     Years of education: N/A     Occupational History     Not on file.     Social History Main Topics     Smoking status: Former Smoker     Quit date: 1/1/1984     Smokeless tobacco: Never Used     Alcohol use 0.0 oz/week     0 Standard drinks or equivalent per week      Comment: social drinker     Drug use: No     Sexual activity: No     Other Topics Concern     Caffeine Concern Yes     coffee      Occupational Exposure No     Hobby Hazards No     Sleep Concern No     Stress Concern No     Weight Concern No     Special Diet Yes     gluten free and dairy free     Back Care No     Exercise No     on hold , 4/7 starting cardiac rehab     Seat Belt Yes     Social History Narrative          MEDICATIONS:  has a current medication list which includes the following prescription(s): ascorbic acid, aspirin, cholecalciferol, ibuprofen, isosorbide mononitrate, ketoconazole, levothyroxine, metoprolol tartrate, naproxen sodium, and simvastatin.    ROS   ROS: 10 point ROS neg other than the symptoms noted above in the HPI.    Physical Exam   VS: /62 (BP Location: Left arm, Patient Position: Chair, Cuff Size: Adult Large)  Pulse 77  Temp 97.7  F (36.5  C) (Oral)  Ht 1.575 m (5' 2\")  Wt 88.2 kg (194 lb 8 oz)  SpO2 95%  Breastfeeding? No  BMI 35.57 kg/m2  GENERAL: AXOX3, NAD, well dressed, answering questions appropriately, appears stated age.  HEENT: No exopthalmous, no proptosis, EOMI, no lig lag, no retraction  NECK: Thyroid normal in size, non tender, no nodules were " palpated.  CV: RRR  LUNGS: CTAB  ABDOMEN: +BS  NEUROLOGY: CN grossly intact, no tremors  PSYCH: normal affect and mood      LABS:  TFTs:  ENDO THYROID LABS-[facility] Latest Ref Rng & Units 9/17/2018   TSH 0.40 - 4.00 mU/L 1.22   T4 FREE 0.76 - 1.46 ng/dL 1.11   FREE T3 2.3 - 4.2 pg/mL 1.9 (L)     ENDO THYROID LABS-[facility] Latest Ref Rng & Units 7/11/2018 4/11/2018   TSH 0.40 - 4.00 mU/L 0.09 (L) 0.32 (L)   T4 FREE 0.76 - 1.46 ng/dL 1.29 1.07   FREE T3 2.3 - 4.2 pg/mL 2.2 (L) 1.9 (L)   THYR PEROXIDASE LAWANDA <35 IU/mL  <10     ENDO THYROID LABS-[facility] Latest Ref Rng & Units 3/7/2018 8/3/2017   TSH 0.40 - 4.00 mU/L 0.25 (L) 0.53   T4 FREE 0.76 - 1.46 ng/dL 1.07    FREE T3 2.3 - 4.2 pg/mL       ENDO THYROID LABS-[facility] Latest Ref Rng & Units 3/8/2017 1/20/2017   TSH 0.40 - 4.00 mU/L 0.05 (L) 0.19 (L)   T4 FREE 0.76 - 1.46 ng/dL 1.33 1.30   FREE T3 2.3 - 4.2 pg/mL       ENDO THYROID LABS-[facility] Latest Ref Rng & Units 11/2/2016 4/4/2016   TSH 0.40 - 4.00 mU/L 0.04 (L) 0.02 (L)   T4 FREE 0.76 - 1.46 ng/dL 1.22 1.16   FREE T3 2.3 - 4.2 pg/mL  2.2 (L)     ENDO THYROID LABS-[facility] Latest Ref Rng & Units 1/6/2016 1/20/2015   TSH 0.40 - 4.00 mU/L 0.04 (L) 0.14 (L)   T4 FREE 0.76 - 1.46 ng/dL 1.62 (H) 1.23   FREE T3 2.3 - 4.2 pg/mL 2.3      All pertinent notes, labs, and images personally reviewed by me.     A/P  Ms.Arlene OSIRIS Li is a 82 year old here for the evaluation of hypothyroidism:    #1 Hypothyroidism (TPO neg):  Recent labs showing normal TSH along with normal free T4 and low T3.  Has multiple questions about labs and T3.  Plan:  Discussed diagnosis, pathophysiology, management and treatment options of condition with pt.  Contineu levothyroxine to 88 mcg per day (on this dose since 7/19/2018)  Add cytomel 10 mcg/day (9/26/2018)  Labs in 2 months  Follow-up after that  Discussed s/s of hypothyroidism and hyperthyroidism to watch for.  The patient indicates understanding of these issues and agrees with the plan.  Discussed indications, risks  and benefits of all medications prescribed, and answered questions to patient's satisfaction.    Standard treatment for hypothyroidism involves daily use of the synthetic thyroid hormone levothyroxine (Levothroid, Synthroid, others).  The dosage of thyroxine should normally be that required to bring the serum TSH level to the low normal range, such as 0.3 - 1 uU/ml. This is typically achieved with 1 ug L-T4/lb body weight/day, ranges from 75 - 125 ug/day in women, and 125 - 200 ug/day in men. Once thyroxine treatment is initiated, it is required indefinitely in most patients.     Symptoms should improve one to two weeks after starting treatment. Treatment with levothyroxine is usually lifelong.  Doseage may need to be adjusted based on body weight, medications, or pregnancy.  To determine the right dosage of levothyroxine initially we will repeat TSH and free T4 after two months.     Excessive amounts of the hormone can cause side effects, such as: Increased appetite, insomnia, heart palpitations, and shakiness.  Patients with CAD will be started on a lower dose.  Levothyroxine causes virtually no side effects when used in the appropriate dose.  Certain medications, supplements and foods can affect the body s ability to absorb levothyroxine. Medications include: Iron supplements, Cholestyramine and Calcium supplements.     Follow-up:  2 months    Olena Manzanares MD  Endocrinology  [facility]  CC: Kasi Watkins    More than 50% of face to face time spent with Ms. Li on counseling / coordinating her care.       All questions were answered.  The patient indicates understanding of the above issues and agrees with the plan set forth.     Addendum to above note and clinic visit:    Labs reviewed.    See result note/telephone encounter.

## 2018-09-26 NOTE — TELEPHONE ENCOUNTER
Per Dr. Zhu, ok for patient to have repeat right hip US guided intra-articular injection. Patient would like to work on strengthening before surgery.  Patient was called and notified that Ortho  will call her to schedule the procedure with Dr. Gaspar.   She verbalized understanding and wanted to note that she will be out of town on 9/29/18-10/6/18.       Gely Rowell, ATC

## 2018-09-26 NOTE — MR AVS SNAPSHOT
After Visit Summary   2018    Elizabeth Li    MRN: [id number]           Patient Information     Date Of Birth          1935        Visit Information        Provider Department      2018 9:00 AM Olena Manzanares MD [facility]        Today's Diagnoses     Acquired hypothyroidism          Care Instructions    [facility] & [place] locations   Dr Manzanares, Endocrinology Department      [facility]   [address]  [place] [address]  Appointment [phone]  Fax: [phone]  [place]: Monday and Tuesday         [facility]   [address]  [place] [address]  Appointment [phone]  Fax: [phone]  [place]: Wednesday and Thursday            Continue levothyroxine 88 mcg/day  Start Cytomel 10 mcg/day  Labs in 2 months  Please make a lab appointment for blood work and follow up clinic appointment in 1 week after that to discuss results.              Follow-ups after your visit        Future tests that were ordered for you today     Open Future Orders        Priority Expected Expires Ordered    T4 free Routine  2019    T3 Free Routine  2019    TSH Routine  2019            Who to contact     If you have questions or need follow up information about today's clinic visit or your schedule please contact [facility] directly at [phone].  Normal or non-critical lab and imaging results will be communicated to you by MyChart, letter or phone within 4 business days after the clinic has received the results. If you do not hear from us within 7 days, please contact the clinic through MyChart or phone. If you have a critical or abnormal lab result, we will notify you by phone as soon as possible.  Submit refill requests through [employer] or call your pharmacy and they will forward the refill  "request to us. Please allow 3 business days for your refill to be completed.          Additional Information About Your Visit        [employer] Information     [employer] gives you secure access to your electronic health record. If you see a primary care provider, you can also send messages to your care team and make appointments. If you have questions, please call your primary care clinic.  If you do not have a primary care provider, please call [phone] and they will assist you.        Care EveryWhere ID     This is your Care EveryWhere ID. This could be used by other organizations to access your [facility] records  [id number]        Your Vitals Were     Pulse Temperature Height Pulse Oximetry Breastfeeding? BMI (Body Mass Index)    77 97.7  F (36.5  C) (Oral) 1.575 m (5' 2\") 95% No 35.57 kg/m2       Blood Pressure from Last 3 Encounters:   09/26/18 136/62   09/21/18 128/72   09/17/18 124/58    Weight from Last 3 Encounters:   09/26/18 88.2 kg (194 lb 8 oz)   09/21/18 87.5 kg (193 lb)   09/17/18 87.5 kg (193 lb)                 Today's Medication Changes          These changes are accurate as of 9/26/18  9:19 AM.  If you have any questions, ask your nurse or doctor.               Start taking these medicines.        Dose/Directions    liothyronine 5 MCG tablet   Commonly known as:  CYTOMEL   Used for:  Acquired hypothyroidism   Started by:  Olena Manzanares MD        Dose:  10 mcg   Take 2 tablets (10 mcg) by mouth daily   Quantity:  180 tablet   Refills:  1            Where to get your medicines      These medications were sent to [facility] [place], [place] - [address]  [address] [facility] [address]     Phone:  [phone]     levothyroxine 88 MCG tablet    liothyronine 5 MCG tablet                Primary Care Provider Office Phone # Fax #    Rebekah Rausch [phone]       [address]  [facility] [address]        Equal Access to " Services     [facility]: Hadii aad ku hadlovedonnie Barrera, waaxda luqadaha, qaybta kaalmada meeta, tommy urbina. So [facility] [phone].    ATENCIÓN: Si habla mayela, tiene a hopper disposición servicios gratuitos de asistencia lingüística. Llame al [phone].    We comply with applicable federal civil rights laws and [place] laws. We do not discriminate on the basis of race, color, national origin, age, disability, sex, sexual orientation, or gender identity.            Thank you!     Thank you for choosing [facility]  for your care. Our goal is always to provide you with excellent care. Hearing back from our patients is one way we can continue to improve our services. Please take a few minutes to complete the written survey that you may receive in the mail after your visit with us. Thank you!             Your Updated Medication List - Protect others around you: Learn how to safely use, store and throw away your medicines at www.disposemymeds.org.          This list is accurate as of 9/26/18  9:19 AM.  Always use your most recent med list.                   Brand Name Dispense Instructions for use Diagnosis    ALEVE 220 MG tablet   Generic drug:  naproxen sodium      Take 220 mg by mouth as needed for moderate pain        ascorbic acid 500 MG tablet    VITAMIN C     Take 500 mg by mouth daily.        aspirin 81 MG tablet     90 tablet    Take 1 tablet (81 mg) by mouth daily    Abnormal cardiovascular stress test       cholecalciferol 1000 UNIT tablet    vitamin D3     Take 1,000 Units by mouth 2 times daily        ibuprofen 200 MG tablet    ADVIL/MOTRIN     Take 200 mg by mouth every 4 hours as needed for mild pain Patient takes 4 tablet at night. Occasionally takes 4 tablet when goes out.        isosorbide mononitrate 30 MG 24 hr tablet    IMDUR    90 tablet    Take 1 tablet (30 mg) by mouth daily    Coronary artery disease involving native coronary artery of  native heart without angina pectoris, Status post coronary angioplasty       ketoconazole 2 % shampoo    NIZORAL    120 mL    Apply to the affected area and wash off after 5 minutes.    Dandruff       levothyroxine 88 MCG tablet    SYNTHROID/LEVOTHROID    90 tablet    Take 1 tablet (88 mcg) by mouth daily    Acquired hypothyroidism       liothyronine 5 MCG tablet    CYTOMEL    180 tablet    Take 2 tablets (10 mcg) by mouth daily    Acquired hypothyroidism       metoprolol tartrate 25 MG tablet    LOPRESSOR    90 tablet    Take 0.5 tablets (12.5 mg) by mouth 2 times daily    Coronary artery disease involving native coronary artery without angina pectoris       simvastatin 20 MG tablet    ZOCOR    90 tablet    Take 1 tablet (20 mg) by mouth At Bedtime    Hyperlipidemia LDL goal <100

## 2018-10-10 ENCOUNTER — OFFICE VISIT (OUTPATIENT)
Dept: ORTHOPEDICS | Facility: CLINIC | Age: 83
End: 2018-10-10
Payer: COMMERCIAL

## 2018-10-10 DIAGNOSIS — M16.11 PRIMARY OSTEOARTHRITIS OF RIGHT HIP: Primary | ICD-10-CM

## 2018-10-10 PROCEDURE — 20611 DRAIN/INJ JOINT/BURSA W/US: CPT | Mod: RT | Performed by: FAMILY MEDICINE

## 2018-10-10 RX ADMIN — METHYLPREDNISOLONE ACETATE 40 MG: 40 INJECTION, SUSPENSION INTRA-ARTICULAR; INTRALESIONAL; INTRAMUSCULAR; SOFT TISSUE at 09:00

## 2018-10-10 NOTE — PROGRESS NOTES
SUBJECTIVE:  Elizabeth Li is a 83 year old female who is seen in follow-up for right hip intra-articular  corticosteroid injection as ordered by Dr. Henderson.patient had previously had a right hip intra-articular corticosteroid injection completed on April 12, 2018 and reports that the injection gave her 5 months of moderate relief.    Resting hip flexion ~ 15 degrees. Xrays from 12/2016 shows advanced arthritis.    The patient is seen by themselves.    Large Joint Injection/Arthocentesis  Date/Time: 10/10/2018 9:00 AM  Performed by: WOO GUZMÁN  Authorized by: CASSANDRA HENDERSON     Indications:  Pain and osteoarthritis  Needle Size:  22 G  Guidance: ultrasound    Approach:  Anterior  Location:  Hip  Site:  R hip joint  Medications:  40 mg methylPREDNISolone acetate 40 MG/ML  Outcome:  Tolerated well, no immediate complications  Procedure discussed: discussed risks, benefits, and alternatives    Consent Given by:  Patient  Timeout: timeout called immediately prior to procedure    Prep: patient was prepped and draped in usual sterile fashion       Pain noted to be a 4/10 before completion of the procedure and 2/10 after completion of the procedure.    Woo Guzmán DO [facility]

## 2018-10-10 NOTE — MR AVS SNAPSHOT
After Visit Summary   10/10/2018    Elizabeth Li    MRN: [id number]           Patient Information     Date Of Birth          1935        Visit Information        Provider Department      10/10/2018 9:00 AM Dorothea Gaspar DO [facility]        Today's Diagnoses     Primary osteoarthritis of right hip    -  1      Care Instructions    1. Primary osteoarthritis of right hip      Steroid injection of the right hip: intra-articular  was performed today in clinic    - Would not soak in a hot tub, bath or swimming pool for 48 hours  - Ok to shower  - The lidocaine (what is giving you pain relief right now) will likely stop working in 1-2 hours.  You will then have pain again, similar to before you received the injection. The corticosteroid will not start working until approximately 1-2 weeks from now.  - Ice today and only do your normal amounts of activity    Follow up with Dr. Zhu in 2-3 weeks if not getting relief from the cortisone injection.    Official Walk with a Doc Chapter  Join me downstairs in the lobby of the [facility] the 1st Saturday of every month at 1pm for a free health talk. Come, listen and walk at your own pace!            Follow-ups after your visit        Your next 10 appointments already scheduled     Nov 21, 2018  1:00 PM CST   [facility]    [address]  [facility] [id number] [phone]           Please do not eat 10-12 hours before your appointment if you are coming in fasting for labs on lipids, cholesterol, or glucose (sugar). This does not apply to pregnant women. Water, hot tea and black coffee (with nothing added) are okay. Do not drink other fluids, diet soda or chew gum.            Nov 29, 2018  9:30 AM CST   [facility]   [facility]    [address]  [facility] [address] [phone]            Jan 04, 2019  9:20 AM CST   SHORT with Rebekah Rausch MD   [facility]    [address]  [facility] [address] [phone]              Who to contact     If you have questions or need follow up information about today's clinic visit or your schedule please contact [facility] directly at [phone].  Normal or non-critical lab and imaging results will be communicated to you by Whisbihart, letter or phone within 4 business days after the clinic has received the results. If you do not hear from us within 7 days, please contact the clinic through .[employer] Network[employer] or phone. If you have a critical or abnormal lab result, we will notify you by phone as soon as possible.  Submit refill requests through myseekit or call your pharmacy and they will forward the refill request to us. Please allow 3 business days for your refill to be completed.          Additional Information About Your Visit        myseekit Information     myseekit gives you secure access to your electronic health record. If you see a primary care provider, you can also send messages to your care team and make appointments. If you have questions, please call your primary care clinic.  If you do not have a primary care provider, please call [phone] and they will assist you.        Care EveryWhere ID     This is your Care EveryWhere ID. This could be used by other organizations to access your [facility] medical records  [id number]         Blood Pressure from Last 3 Encounters:   09/26/18 136/62   09/21/18 128/72   09/17/18 124/58    Weight from Last 3 Encounters:   09/26/18 194 lb 8 oz (88.2 kg)   09/21/18 193 lb (87.5 kg)   09/17/18 193 lb (87.5 kg)              We Performed the Following     Large Joint Injection/Arthocentesis        Primary Care Provider Office Phone # Fax #    Rebekah Rausch [phone]        [address] [facility] [place] [address]        Equal Access to Services     MARCIAL ROTH : Hadii wanda ku hadlovedonnie Somedhatali, waaxda luqadaha, qaybta kaalmada aurycharlesdelio, waxay idiin haysharonflorencio zhangradhasanti urbina. So [facility] [phone].    ATENCIÓN: Si habla español, tiene a hopper disposición servicios gratuitos de asistencia lingüística. Llame al [phone].    We comply with applicable federal civil rights laws and [place] laws. We do not discriminate on the basis of race, color, national origin, age, disability, sex, sexual orientation, or gender identity.            Thank you!     Thank you for choosing [facility]  for your care. Our goal is always to provide you with excellent care. Hearing back from our patients is one way we can continue to improve our services. Please take a few minutes to complete the written survey that you may receive in the mail after your visit with us. Thank you!             Your Updated Medication List - Protect others around you: Learn how to safely use, store and throw away your medicines at www.disposemymeds.org.          This list is accurate as of 10/10/18  9:25 AM.  Always use your most recent med list.                   Brand Name Dispense Instructions for use Diagnosis    ALEVE 220 MG tablet   Generic drug:  naproxen sodium      Take 220 mg by mouth as needed for moderate pain        ascorbic acid 500 MG tablet    VITAMIN C     Take 500 mg by mouth daily.        aspirin 81 MG tablet     90 tablet    Take 1 tablet (81 mg) by mouth daily    Abnormal cardiovascular stress test       cholecalciferol 1000 UNIT tablet    vitamin D3     Take 1,000 Units by mouth 2 times daily        ibuprofen 200 MG tablet    ADVIL/MOTRIN     Take 200 mg by mouth every 4 hours as needed for mild pain Patient takes 4 tablet at night. Occasionally takes 4 tablet when goes out.        isosorbide mononitrate 30 MG 24 hr tablet    IMDUR    90 tablet    Take 1 tablet (30 mg) by mouth  daily    Coronary artery disease involving native coronary artery of native heart without angina pectoris, Status post coronary angioplasty       ketoconazole 2 % shampoo    NIZORAL    120 mL    Apply to the affected area and wash off after 5 minutes.    Dandruff       levothyroxine 88 MCG tablet    SYNTHROID/LEVOTHROID    90 tablet    Take 1 tablet (88 mcg) by mouth daily    Acquired hypothyroidism       liothyronine 5 MCG tablet    CYTOMEL    180 tablet    Take 2 tablets (10 mcg) by mouth daily    Acquired hypothyroidism       metoprolol tartrate 25 MG tablet    LOPRESSOR    90 tablet    Take 0.5 tablets (12.5 mg) by mouth 2 times daily    Coronary artery disease involving native coronary artery without angina pectoris       simvastatin 20 MG tablet    ZOCOR    90 tablet    Take 1 tablet (20 mg) by mouth At Bedtime    Hyperlipidemia LDL goal <100

## 2018-10-10 NOTE — LETTER
10/10/2018         RE: Elizabeth Li  [address]  [place] [address]        Dear Colleague,    Thank you for referring your patient, Elizabeth Li, to the [facility]. Please see a copy of my visit note below.    SUBJECTIVE:  Elizabeth Li is a 83 year old female who is seen in follow-up for right hip intra-articular  corticosteroid injection as ordered by Dr. Henderson.patient had previously had a right hip intra-articular corticosteroid injection completed on April 12, 2018 and reports that the injection gave her 5 months of moderate relief.    Resting hip flexion ~ 15 degrees. Xrays from 12/2016 shows advanced arthritis.    The patient is seen by themselves.    Large Joint Injection/Arthocentesis  Date/Time: 10/10/2018 9:00 AM  Performed by: WOO GUZMÁN  Authorized by: CASSANDRA HENDERSON     Indications:  Pain and osteoarthritis  Needle Size:  22 G  Guidance: ultrasound    Approach:  Anterior  Location:  Hip  Site:  R hip joint  Medications:  40 mg methylPREDNISolone acetate 40 MG/ML  Outcome:  Tolerated well, no immediate complications  Procedure discussed: discussed risks, benefits, and alternatives    Consent Given by:  Patient  Timeout: timeout called immediately prior to procedure    Prep: patient was prepped and draped in usual sterile fashion       Pain noted to be a 4/10 before completion of the procedure and 2/10 after completion of the procedure.    Woo Guzmán DO [facility]              Again, thank you for allowing me to participate in the care of your patient.        Sincerely,        Woo Guzmán DO

## 2018-10-10 NOTE — PATIENT INSTRUCTIONS
1. Primary osteoarthritis of right hip      Steroid injection of the right hip: intra-articular  was performed today in clinic    - Would not soak in a hot tub, bath or swimming pool for 48 hours  - Ok to shower  - The lidocaine (what is giving you pain relief right now) will likely stop working in 1-2 hours.  You will then have pain again, similar to before you received the injection. The corticosteroid will not start working until approximately 1-2 weeks from now.  - Ice today and only do your normal amounts of activity    Follow up with Dr. Zhu in 2-3 weeks if not getting relief from the cortisone injection.    Official Walk with a Doc Chapter  Join me downstairs in the lobby of the [facility] the 1st Saturday of every month at 1pm for a free health talk. Come, listen and walk at your own pace!

## 2018-10-11 DIAGNOSIS — I25.10 CORONARY ARTERY DISEASE INVOLVING NATIVE CORONARY ARTERY WITHOUT ANGINA PECTORIS: ICD-10-CM

## 2018-10-11 RX ORDER — METOPROLOL TARTRATE 25 MG/1
12.5 TABLET, FILM COATED ORAL 2 TIMES DAILY
Qty: 90 TABLET | Refills: 1 | Status: SHIPPED | OUTPATIENT
Start: 2018-10-11 | End: 2019-01-04

## 2018-10-11 RX ORDER — METHYLPREDNISOLONE ACETATE 40 MG/ML
40 INJECTION, SUSPENSION INTRA-ARTICULAR; INTRALESIONAL; INTRAMUSCULAR; SOFT TISSUE
Status: DISCONTINUED | OUTPATIENT
Start: 2018-10-10 | End: 2019-01-18

## 2018-10-17 NOTE — PROGRESS NOTES
Dr. Rad Yost  [facility]  Neurosurgery Clinic Visit      CC: low back and right leg pain     Primary care Provider: Kasi Watkins      Reason For Visit:   I was asked by Dr. Watkins to consult on the patient for lumbar radicular pain.      HPI: Elizabeth Li is a 81 year old female with lumbar radicular pain.  She notes right hip pain and pain that radiates to her right lateral thigh and calf to the foot. She notes some knee pain and notes back pain only with bending.  She states that her right hip pain is the worse.  She has had PT in July of 2016.  She has had hip injections but has not had back injections.  She has been treating with [facility] for her hip pain. She did not have a good experience with the last visit and does not want to go back.      Pain at its worst 10  Pain right now:  0    Past Medical History   Diagnosis Date     Hypothyroidism      Hyperlipidemia LDL goal <100      IHD (ischemic heart disease)      KUSUM (obstructive sleep apnea)      CPAP     Abnormal cardiovascular stress test      EKG changes with exercise on Exercise stress test 1/26/2015     CAD (coronary artery disease)      Severe 2 vessel CAD. PCI with drug-eluting stents x2 to mid LAD on 3/27/15     HTN (hypertension)        Past Medical History reviewed with patient during visit.    Past Surgical History   Procedure Laterality Date     Tonsillectomy       Tubal ligation       Heart cath, angioplasty  3/27/15     2.5 X 18 mm & 3.0 X mm LUCILA to LAD     Past Surgical History reviewed with patient during visit.    Current Outpatient Prescriptions   Medication     HYDROcodone-acetaminophen (NORCO) 5-325 MG per tablet     ketoconazole (NIZORAL) 2 % shampoo     simvastatin (ZOCOR) 20 MG tablet     isosorbide mononitrate (IMDUR) 30 MG 24 hr tablet     levothyroxine (SYNTHROID, LEVOTHROID) 75 MCG tablet     metoprolol (LOPRESSOR) 25 MG tablet     aspirin 81 MG tablet     ascorbic acid (VITAMIN C) 500 MG tablet      Cholecalciferol (VITAMIN D) 1000 UNIT capsule     folic acid (FOLVITE) 400 MCG tablet     [DISCONTINUED] gabapentin (NEURONTIN) 300 MG capsule     No current facility-administered medications for this visit.       Allergies   Allergen Reactions     Flu Virus Vaccine Other (See Comments)     Viral SX       Social History     Social History     Marital Status:      Spouse Name: N/A     Number of Children: 4     Years of Education: N/A     Social History Main Topics     Smoking status: Former Smoker     Quit date: 01/01/1984     Smokeless tobacco: Never Used     Alcohol Use: 0.0 oz/week     0 Standard drinks or equivalent per week      Comment: social drinker     Drug Use: No     Sexual Activity: No     Other Topics Concern     Caffeine Concern Yes     coffee      Occupational Exposure No     Hobby Hazards No     Sleep Concern No     Stress Concern No     Weight Concern No     Special Diet Yes     gluten free and dairy free     Back Care No     Exercise No     on hold , 4/7 starting cardiac rehab     Seat Belt Yes     Social History Narrative       Family History   Problem Relation Age of Onset     Blood Disease Mother      pernious anemia     HEART DISEASE Father      HEART DISEASE Sister      HEART DISEASE Brother          Review Of Systems  Skin: negative  Eyes: negative  Ears/Nose/Throat: negative  Respiratory: No shortness of breath, dyspnea on exertion, cough, or hemoptysis  Cardiovascular: negative  Gastrointestinal: negative  Genitourinary: negative  Musculoskeletal: back pain and hip pain   Neurologic: right leg pain   Psychiatric: negative  Hematologic/Lymphatic/Immunologic: negative  Endocrine: negative     ROS: 10 point ROS neg other than the symptoms noted above in the HPI.      Vital Signs: /66 mmHg  Pulse 66  Temp(Src) 97.9  F (36.6  C) (Oral)  Wt 188 lb (85.276 kg)  SpO2 92%    Examination:  Constitutional:  Alert, well nourished, NAD.  HEENT: Normocephalic, atraumatic.   Pulm:   Without shortness of breath   CV:  No pitting edema of BLE.    Neurological:  Awake  Alert  Oriented x 3  Speech clear  Cranial nerves II - XII intact  PERRL  EOMI  Face symmetric  Tongue midline  Motor exam   Shoulder Abduction:  Right:  5/5   Left:  5/5  Biceps:                      Right:  5/5   Left:  5/5  Triceps:                     Right:  5/5   Left:  5/5  Wrist Extensors:       Right:  5/5   Left:  5/5  Wrist Flexors:           Right:  5/5   Left:  5/5  Intrinsics:                   Right:  5/5   Left:  5/5   Hip Flexor:                Right: 5/5  Left:  5/5  Hip Adductor:             Right:  5/5  Left:  5/5  Hip Abductor:             Right:  5/5  Left:  5/5  Gastroc Soleus:        Right:  5/5  Left:  5/5  Tib/Ant:                      Right:  5/5  Left:  5/5  EHL:                          Right:  5/5  Left:  5/5   Sensation normal to bilateral upper and lower extremities    Gait: Able to stand from a seated position. Normal non-antalgic, non-myelopathic gait.      Lumbar examination reveals no tenderness of the spine or paraspinous muscles.  Hip height is symmetrical. Negative SI joint, sciatic notch or greater trochanteric tenderness to palpation bilaterally.  Straight leg raise is negative bilaterally.      Imaging: NONE    Assessment/Plan:   Elizabeth Li is a 81 year old female with lumbar radicular pain.  She notes right hip pain and pain that radiates to her right lateral thigh and calf to the foot. She notes some knee pain and notes back pain only with bending.  She states that her right hip pain is the worse.  She has had PT in July of 2016.  She has had hip injections but has not had back injections.  She has been treating with [facility] for her hip pain. She did not have a good experience with the last visit and does not want to go back.  The pt is neurologically intact and I could not reproduce her pain to the hip. It was explained that this may be more of a radicular pain. We will obtain a  lumbar MRI at this time.     Patient Instructions   1.  Please call [facility] to have lumbar MRI completed. Call [phone].   I will contact you with results.                  Gail Whaley [facility]  [facility]  [address] [address]    Tel [phone]  Pager [phone]       No joint pain, swelling or deformity; no limitation of movement

## 2018-11-02 ENCOUNTER — MYC MEDICAL ADVICE (OUTPATIENT)
Dept: ORTHOPEDICS | Facility: CLINIC | Age: 83
End: 2018-11-02

## 2018-11-21 DIAGNOSIS — E03.9 ACQUIRED HYPOTHYROIDISM: ICD-10-CM

## 2018-11-21 LAB
T3FREE SERPL-MCNC: 2.4 PG/ML (ref 2.3–4.2)
T4 FREE SERPL-MCNC: 1.12 NG/DL (ref 0.76–1.46)
TSH SERPL DL<=0.005 MIU/L-ACNC: 0.08 MU/L (ref 0.4–4)

## 2018-11-21 PROCEDURE — 84439 ASSAY OF FREE THYROXINE: CPT | Performed by: INTERNAL MEDICINE

## 2018-11-21 PROCEDURE — 84481 FREE ASSAY (FT-3): CPT | Performed by: INTERNAL MEDICINE

## 2018-11-21 PROCEDURE — 36415 COLL VENOUS BLD VENIPUNCTURE: CPT | Performed by: INTERNAL MEDICINE

## 2018-11-21 PROCEDURE — 84443 ASSAY THYROID STIM HORMONE: CPT | Performed by: INTERNAL MEDICINE

## 2018-11-29 ENCOUNTER — OFFICE VISIT (OUTPATIENT)
Dept: ENDOCRINOLOGY | Facility: CLINIC | Age: 83
End: 2018-11-29
Payer: COMMERCIAL

## 2018-11-29 VITALS
WEIGHT: 191.8 LBS | HEART RATE: 78 BPM | TEMPERATURE: 97.9 F | HEIGHT: 62 IN | DIASTOLIC BLOOD PRESSURE: 54 MMHG | OXYGEN SATURATION: 94 % | BODY MASS INDEX: 35.3 KG/M2 | SYSTOLIC BLOOD PRESSURE: 124 MMHG

## 2018-11-29 DIAGNOSIS — E03.9 ACQUIRED HYPOTHYROIDISM: ICD-10-CM

## 2018-11-29 PROBLEM — E66.01 MORBID OBESITY (H): Status: ACTIVE | Noted: 2018-11-29

## 2018-11-29 PROCEDURE — 99214 OFFICE O/P EST MOD 30 MIN: CPT | Performed by: INTERNAL MEDICINE

## 2018-11-29 RX ORDER — LEVOTHYROXINE SODIUM 75 UG/1
75 TABLET ORAL DAILY
Qty: 90 TABLET | Refills: 1 | Status: SHIPPED | OUTPATIENT
Start: 2018-11-29 | End: 2019-01-24

## 2018-11-29 RX ORDER — LIOTHYRONINE SODIUM 5 UG/1
5 TABLET ORAL DAILY
Qty: 180 TABLET | Refills: 1
Start: 2018-11-29 | End: 2018-11-29

## 2018-11-29 RX ORDER — LIOTHYRONINE SODIUM 5 UG/1
5 TABLET ORAL DAILY
Qty: 90 TABLET | Refills: 1 | Status: SHIPPED | OUTPATIENT
Start: 2018-11-29 | End: 2019-01-24

## 2018-11-29 NOTE — PATIENT INSTRUCTIONS
[facility]   Dr Manzanares, Endocrinology Department      [facility]   [address]  [place] [address]  Appointment [phone]  Fax: [phone]  [place]: Monday and Tuesday         [facility]   [address]  [place] [address]  Appointment [phone]  Fax: [phone]  [place]: Wednesday and Thursday            Decrease dose of cytomel to 5 mcg/day  Decrease levothyroxine to 75 mcg/day  Labs in 2 months  Please make a lab appointment for blood work and follow up clinic appointment in 1 week after that to discuss results.    Take Levothyroxine on an empty stomach. Take it with a full glass of water at least 30 minutes to 1 hour before eating breakfast.   This medicine should be taken at least 4 hours before or 4 hours after these medicines: antacids (Maalox , Mylanta , Tums ), calcium supplements, cholestyramine (Prevalite , Questran ), colestipol (Colestid ), iron supplements, orlistat (Golden , Xenical ), simethicone (Gas-X , Mylicon ), and sucralfate (Carafate ).   Swallow the capsule whole. Do not cut or crush it.       Common Thyroid Problems    The thyroid is a gland in the neck. It makes thyroid hormone. A hormone is a chemical messenger for the body. If there is a problem with the thyroid, the level of thyroid hormone may change. This can lead to symptoms.  Hypothyroidism  This is when the thyroid gland doesn t make enough hormone. The most common cause of hypothyroidism is Hashimoto thyroiditis. This occurs when the body s immune system attacks the thyroid gland. Hypothyroidism may also occur if there s a severe deficiency of iodine in the body. The thyroid needs iodine to make hormone. Problems with the pituitary gland can lead to not enough production of thyroid hormone. Hypothyroidism will also occur if the thyroid gland is removed during surgery.  Common  symptoms include:    Low energy and tiredness    Depression    Feeling cold    Muscle pain    Slowed thinking        Constipation    Heavier menstrual periods with prolonged bleeding     Weight gain    Dry and brittle skin, hair, nails    Excessive sleepiness  Hyperthyroidism  This is when the thyroid gland makes too much hormone. The most common cause is Graves disease. This is due to the body s immune system telling the thyroid to make too much hormone. Another cause is a small bump (nodule) in the thyroid gland. This can cause hyperthyroidism if the cells in the nodule make too much thyroid hormone and stop hormone production in the rest of the thyroid gland.  Common symptoms include:    Shaking, nervousness, irritability    Feeling hot    A rapid, irregular heartbeat    Muscle weakness, fatigue    More frequent bowel movements    [place], lighter menstrual periods    Weight loss    Hair loss    Bulging eyes  Nodules  Nodules are lumps of tissue in the thyroid gland. Most often, the cause of nodules isn t known. But they may be more common in people who ve had medical radiation to the head or neck. Sometimes nodules can be felt on the outside of the neck. Most of the time, cause no symptoms and don t affect the amount of thyroid hormone. Most nodules are not cancer. But sometimes a nodule may be cancer.  What is a goiter?  A goiter is the enlargement of the thyroid gland. When the gland enlarges, you may see or feel a swelling on your neck. A goiter can develop in someone with a normal thyroid, overactive thyroid, or underactive thyroid.   Date Last Reviewed: 11/1/2016 2000-2018 [employer]. [address] [place] [address], [place] [address]. All rights reserved. This information is not intended as a substitute for professional medical care. Always follow your healthcare professional's instructions.        Treating Thyroid Problems    Your healthcare provider has diagnosed a thyroid problem and will work  with you to create a treatment plan. Even if you don t have symptoms, getting proper care is important. These are the most common types of thyroid disorders and their treatments.  Treating hypothyroidism  Hypothyroidism is an underactive thyroid. There is no cure for this condition. But treatment can relieve most or all of your symptoms caused by the low thyroid hormone levels. Treatment is done with daily thyroid hormone pills. Thyroid hormone pills replace the hormone your thyroid doesn t make. You will likely need to take a daily pill for the rest of your life.  Your healthcare provider will adjust your dose to achieve the right hormone levels. Take the thyroid hormone pill on an empty stomach, without other medicines. This is to make sure it works as it should.  Over time, your dose may be adjusted. The medicine has minimal side effects if the dosage is correct. But if the dosage is too high, you may have symptoms of an overactive thyroid. These include nervousness, irritability, fast heartbeat, tremors, trouble sleeping, and brittle hair. If the dosage is too low, you may have symptoms of an underactive thyroid. These include dry skin, low energy, sleepiness, and memory problems. Tell your healthcare provider if you notice any symptoms of thyroid problems.  Treating hyperthyroidism  Hyperthyroidism is an overactive thyroid. The treatments include:    Anti-thyroid medicine. This can reduce the amount of thyroid hormone made by your thyroid gland. Reactions from this medicine are rare. But in some cases, it can cause a dangerous drop in white blood cells, and liver damage. Talk with your healthcare provider for more information. If you have a fever or sore throat while taking this medicine, tell your healthcare provider right away.    Radioactive iodine ablation. This is the most common treatment for hyperthyroidism. It s done by taking a pill or liquid dose of radioactive iodine. This treatment destroys the  thyroid cells that are making too much hormone. You may need daily thyroid hormone pills after this treatment.    Surgery. This is done by removing part or all of your thyroid gland. After surgery, you may need to take daily thyroid hormone pills.    Beta-blockers. The treatments above may be used alone or with beta-blockers. These are medicines that can reduce symptoms caused by too much thyroid hormone. In some case, symptoms from too much thyroid hormone can be controlled by beta-blockers alone.  Treating nodules  If you have benign nodules, you may not need treatment right away. Instead, your healthcare provider may advise regular exams and ultrasound tests to see if the nodules grow. If treatment is needed, it may include:    Anti-thyroid medicine. If a benign thyroid nodule is causing an overactive thyroid, your healthcare provider may first treat it with anti-thyroid medicine. If this doesn t work, you may need one of the below treatments.    Radioactive iodine ablation. This is the most common treatment for hyperthyroidism. It s done by taking a pill or liquid dose of radioactive iodine. This treatment destroys the thyroid cells that are making too much hormone. You may need daily thyroid hormone pills after this treatment.    Surgery. This may be done to treat nodules that are causing symptoms, such as choking, or trouble swallowing. Surgery is done by removing part or all of your thyroid gland. Surgery to remove cancerous nodules may be followed by radioiodine iodine ablation. After surgery, you may need to take daily thyroid hormone pills.  If you have radioactive iodine ablation  Even though it uses tiny amounts of radiation, radioactive iodine ablation is a safe treatment. Your healthcare provider will talk with you about any risks and possible complications. You will likely receive the iodine at the hospital and go home the same day. The risk from the radiation to yourself and others is very small.  However, you may need to stay away from other people for several days. It is most important to avoid children and pregnant women during this time.   Date Last Reviewed: 11/1/2016 2000-2018 [employer]. [address] [place] [address], [place] [address]. All rights reserved. This information is not intended as a substitute for professional medical care. Always follow your healthcare professional's instructions.

## 2018-11-29 NOTE — MR AVS SNAPSHOT
After Visit Summary   2018    Elizabeth Li    MRN: [id number]           Patient Information     Date Of Birth          1935        Visit Information        Provider Department      2018 9:30 AM Olena Manzanares MD [facility]        Today's Diagnoses     Acquired hypothyroidism          Care Instructions    [facility] & [place] locations   Dr Manzanares, Endocrinology Department      [facility]   [address]  [place] [address]  Appointment [phone]  Fax: [phone]  [place]: Monday and Tuesday         [facility] [address] [facility]  [place] [address]  Appointment [phone]  Fax: [phone]  [place]: Wednesday and Thursday            Decrease dose of cytomel to 5 mcg/day  Decrease levothyroxine to 75 mcg/day  Labs in 2 months  Please make a lab appointment for blood work and follow up clinic appointment in 1 week after that to discuss results.    Take Levothyroxine on an empty stomach. Take it with a full glass of water at least 30 minutes to 1 hour before eating breakfast.   This medicine should be taken at least 4 hours before or 4 hours after these medicines: antacids (Maalox , Mylanta , Tums ), calcium supplements, cholestyramine (Prevalite , Questran ), colestipol (Colestid ), iron supplements, orlistat (Golden , Xenical ), simethicone (Gas-X , Mylicon ), and sucralfate (Carafate ).   Swallow the capsule whole. Do not cut or crush it.       Common Thyroid Problems    The thyroid is a gland in the neck. It makes thyroid hormone. A hormone is a chemical messenger for the body. If there is a problem with the thyroid, the level of thyroid hormone may change. This can lead to symptoms.  Hypothyroidism  This is when the thyroid gland doesn t make enough hormone. The most common cause of hypothyroidism is Hashimoto thyroiditis. This occurs  when the body s immune system attacks the thyroid gland. Hypothyroidism may also occur if there s a severe deficiency of iodine in the body. The thyroid needs iodine to make hormone. Problems with the pituitary gland can lead to not enough production of thyroid hormone. Hypothyroidism will also occur if the thyroid gland is removed during surgery.  Common symptoms include:    Low energy and tiredness    Depression    Feeling cold    Muscle pain    Slowed thinking        Constipation    Heavier menstrual periods with prolonged bleeding     Weight gain    Dry and brittle skin, hair, nails    Excessive sleepiness  Hyperthyroidism  This is when the thyroid gland makes too much hormone. The most common cause is Graves disease. This is due to the body s immune system telling the thyroid to make too much hormone. Another cause is a small bump (nodule) in the thyroid gland. This can cause hyperthyroidism if the cells in the nodule make too much thyroid hormone and stop hormone production in the rest of the thyroid gland.  Common symptoms include:    Shaking, nervousness, irritability    Feeling hot    A rapid, irregular heartbeat    Muscle weakness, fatigue    More frequent bowel movements    [place], lighter menstrual periods    Weight loss    Hair loss    Bulging eyes  Nodules  Nodules are lumps of tissue in the thyroid gland. Most often, the cause of nodules isn t known. But they may be more common in people who ve had medical radiation to the head or neck. Sometimes nodules can be felt on the outside of the neck. Most of the time, cause no symptoms and don t affect the amount of thyroid hormone. Most nodules are not cancer. But sometimes a nodule may be cancer.  What is a goiter?  A goiter is the enlargement of the thyroid gland. When the gland enlarges, you may see or feel a swelling on your neck. A goiter can develop in someone with a normal thyroid, overactive thyroid, or underactive thyroid.   Date Last Reviewed:  11/1/2016 2000-2018 [employer]. [address] [place] [address], [place] [address]. All rights reserved. This information is not intended as a substitute for professional medical care. Always follow your healthcare professional's instructions.        Treating Thyroid Problems    Your healthcare provider has diagnosed a thyroid problem and will work with you to create a treatment plan. Even if you don t have symptoms, getting proper care is important. These are the most common types of thyroid disorders and their treatments.  Treating hypothyroidism  Hypothyroidism is an underactive thyroid. There is no cure for this condition. But treatment can relieve most or all of your symptoms caused by the low thyroid hormone levels. Treatment is done with daily thyroid hormone pills. Thyroid hormone pills replace the hormone your thyroid doesn t make. You will likely need to take a daily pill for the rest of your life.  Your healthcare provider will adjust your dose to achieve the right hormone levels. Take the thyroid hormone pill on an empty stomach, without other medicines. This is to make sure it works as it should.  Over time, your dose may be adjusted. The medicine has minimal side effects if the dosage is correct. But if the dosage is too high, you may have symptoms of an overactive thyroid. These include nervousness, irritability, fast heartbeat, tremors, trouble sleeping, and brittle hair. If the dosage is too low, you may have symptoms of an underactive thyroid. These include dry skin, low energy, sleepiness, and memory problems. Tell your healthcare provider if you notice any symptoms of thyroid problems.  Treating hyperthyroidism  Hyperthyroidism is an overactive thyroid. The treatments include:    Anti-thyroid medicine. This can reduce the amount of thyroid hormone made by your thyroid gland. Reactions from this medicine are rare. But in some cases, it can cause a dangerous drop in white blood cells, and  liver damage. Talk with your healthcare provider for more information. If you have a fever or sore throat while taking this medicine, tell your healthcare provider right away.    Radioactive iodine ablation. This is the most common treatment for hyperthyroidism. It s done by taking a pill or liquid dose of radioactive iodine. This treatment destroys the thyroid cells that are making too much hormone. You may need daily thyroid hormone pills after this treatment.    Surgery. This is done by removing part or all of your thyroid gland. After surgery, you may need to take daily thyroid hormone pills.    Beta-blockers. The treatments above may be used alone or with beta-blockers. These are medicines that can reduce symptoms caused by too much thyroid hormone. In some case, symptoms from too much thyroid hormone can be controlled by beta-blockers alone.  Treating nodules  If you have benign nodules, you may not need treatment right away. Instead, your healthcare provider may advise regular exams and ultrasound tests to see if the nodules grow. If treatment is needed, it may include:    Anti-thyroid medicine. If a benign thyroid nodule is causing an overactive thyroid, your healthcare provider may first treat it with anti-thyroid medicine. If this doesn t work, you may need one of the below treatments.    Radioactive iodine ablation. This is the most common treatment for hyperthyroidism. It s done by taking a pill or liquid dose of radioactive iodine. This treatment destroys the thyroid cells that are making too much hormone. You may need daily thyroid hormone pills after this treatment.    Surgery. This may be done to treat nodules that are causing symptoms, such as choking, or trouble swallowing. Surgery is done by removing part or all of your thyroid gland. Surgery to remove cancerous nodules may be followed by radioiodine iodine ablation. After surgery, you may need to take daily thyroid hormone pills.  If you have  radioactive iodine ablation  Even though it uses tiny amounts of radiation, radioactive iodine ablation is a safe treatment. Your healthcare provider will talk with you about any risks and possible complications. You will likely receive the iodine at the hospital and go home the same day. The risk from the radiation to yourself and others is very small. However, you may need to stay away from other people for several days. It is most important to avoid children and pregnant women during this time.   Date Last Reviewed: 11/1/2016 2000-2018 [employer]. [address] [place] [address]. All rights reserved. This information is not intended as a substitute for professional medical care. Always follow your healthcare professional's instructions.                Follow-ups after your visit        Your next 10 appointments already scheduled     Jan 04, 2019  9:20 AM CST   SHORT with Rebekah Rausch MD   [facility] ([facility])    [address] [facility] [address]   [phone]              Future tests that were ordered for you today     Open Future Orders        Priority Expected Expires Ordered    T3 Free Routine  9/25/2019 11/29/2018    T4 free Routine  9/25/2019 11/29/2018    TSH Routine  9/25/2019 11/29/2018            Who to contact     If you have questions or need follow up information about today's clinic visit or your schedule please contact [facility] directly at [phone].  Normal or non-critical lab and imaging results will be communicated to you by MyChart, letter or phone within 4 business days after the clinic has received the results. If you do not hear from us within 7 days, please contact the clinic through MyChart or phone. If you have a critical or abnormal lab result, we will notify you by phone as soon as possible.  Submit refill requests through [employer] or call your pharmacy and they will forward the  "refill request to us. Please allow 3 business days for your refill to be completed.          Additional Information About Your Visit        [employer] Information     [employer] gives you secure access to your electronic health record. If you see a primary care provider, you can also send messages to your care team and make appointments. If you have questions, please call your primary care clinic.  If you do not have a primary care provider, please call [phone] and they will assist you.        Care EveryWhere ID     This is your Care EveryWhere ID. This could be used by other organizations to access your [facility] medical records  [id number]        Your Vitals Were     Pulse Temperature Height Pulse Oximetry Breastfeeding? BMI (Body Mass Index)    78 97.9  F (36.6  C) (Oral) 1.575 m (5' 2\") 94% No 35.08 kg/m2       Blood Pressure from Last 3 Encounters:   11/29/18 124/54   09/26/18 136/62   09/21/18 128/72    Weight from Last 3 Encounters:   11/29/18 87 kg (191 lb 12.8 oz)   09/26/18 88.2 kg (194 lb 8 oz)   09/21/18 87.5 kg (193 lb)                 Today's Medication Changes          These changes are accurate as of 11/29/18 10:11 AM.  If you have any questions, ask your nurse or doctor.               Start taking these medicines.        Dose/Directions    liothyronine 5 MCG tablet   Commonly known as:  CYTOMEL   Used for:  Acquired hypothyroidism   Started by:  Olena Manzanares MD        Dose:  5 mcg   Take 1 tablet (5 mcg) by mouth daily   Quantity:  90 tablet   Refills:  1         These medicines have changed or have updated prescriptions.        Dose/Directions    levothyroxine 75 MCG tablet   Commonly known as:  SYNTHROID/LEVOTHROID   This may have changed:    - medication strength  - how much to take   Used for:  Acquired hypothyroidism   Changed by:  Olena Manzanares MD        Dose:  75 mcg   Take 1 tablet (75 mcg) by mouth daily   Quantity:  90 tablet   Refills:  1         Stop taking these " medicines if you haven't already. Please contact your care team if you have questions.     ALEVE 220 MG tablet   Generic drug:  naproxen sodium   Stopped by:  Olena Manzanares MD                Where to get your medicines      These medications were sent to [facility] [place], [place] - [address]  [address] [address]     Phone:  [phone]     levothyroxine 75 MCG tablet    liothyronine 5 MCG tablet                Primary Care Provider Office Phone # Fax #    Rebekah Francisco Javier Rausch [phone]       [address] [facility] [address]        Equal Access to Services     [facility]: Hadii aad ku hadasho Soomaali, waaxda luqadaha, qaybta kaalmada adeegyada, waxay estellain shahriarn aury wilson . So [facility] [phone].    ATENCIÓN: Si habla español, tiene a hopper disposición servicios gratuitos de asistencia lingüística. [facility] [phone].    We comply with applicable federal civil rights laws and [place] laws. We do not discriminate on the basis of race, color, national origin, age, disability, sex, sexual orientation, or gender identity.            Thank you!     Thank you for choosing [facility]  for your care. Our goal is always to provide you with excellent care. Hearing back from our patients is one way we can continue to improve our services. Please take a few minutes to complete the written survey that you may receive in the mail after your visit with us. Thank you!             Your Updated Medication List - Protect others around you: Learn how to safely use, store and throw away your medicines at www.disposemymeds.org.          This list is accurate as of 11/29/18 10:11 AM.  Always use your most recent med list.                   Brand Name Dispense Instructions for use Diagnosis    aspirin 81 MG tablet    ASA    90 tablet    Take 1 tablet (81 mg) by mouth daily    Abnormal cardiovascular stress test        ibuprofen 200 MG tablet    ADVIL/MOTRIN     Take 200 mg by mouth every 4 hours as needed for mild pain Patient takes 4 tablet at night. Occasionally takes 4 tablet when goes out.        isosorbide mononitrate 30 MG 24 hr tablet    IMDUR    90 tablet    Take 1 tablet (30 mg) by mouth daily    Coronary artery disease involving native coronary artery of native heart without angina pectoris, Status post coronary angioplasty       ketoconazole 2 % external shampoo    NIZORAL    120 mL    Apply to the affected area and wash off after 5 minutes.    Dandruff       levothyroxine 75 MCG tablet    SYNTHROID/LEVOTHROID    90 tablet    Take 1 tablet (75 mcg) by mouth daily    Acquired hypothyroidism       liothyronine 5 MCG tablet    CYTOMEL    90 tablet    Take 1 tablet (5 mcg) by mouth daily    Acquired hypothyroidism       metoprolol tartrate 25 MG tablet    LOPRESSOR    90 tablet    Take 0.5 tablets (12.5 mg) by mouth 2 times daily    Coronary artery disease involving native coronary artery without angina pectoris       order for DME      Equipment being ordered: CPAP        simvastatin 20 MG tablet    ZOCOR    90 tablet    Take 1 tablet (20 mg) by mouth At Bedtime    Hyperlipidemia LDL goal <100       vitamin C 500 MG tablet    ASCORBIC ACID     Take 500 mg by mouth daily.        vitamin D3 1000 units (25 mcg) tablet    CHOLECALCIFEROL     Take 1,000 Units by mouth 2 times daily

## 2018-11-29 NOTE — LETTER
11/29/2018         RE: Elizabeth Li  [id number]  [facility] [address]        Dear Colleague,    Thank you for referring your patient, Elizabeth Li, to the [facility]. Please see a copy of my visit note below.    ENDOCRINOLOGY CLINIC NOTE:    Name: Elizabeth Li  Seen for f/u of hypothyroidism.    HPI:  Elizabeth Li is a 83 year old female who presents for the evaluation of :    #1 Hypothyroidism.  Diagnosed in 1989 at [place].  Currently taking levothyroxine 88 mcg/day. On this dose since 6/2018. At that time dose was decreased based on labs.  Difficult to get stable levels.  In last clinic visit in September 2018 labs were checked and based on labs Cytomel 10 mcg was initiated.  She reports that since starting Cytomel she is having breakouts over the face  And also joint pain  She is wondering if this is secondary to Cytomel  Follow-up labs November 2018 showing low TSH, normal free T4 and normal T3    Not much activity 2/2 to pain. Has bloating and has heart burn.    Is wondering if GI s/s, bloating and weight gain related to thyroid problems.    No palpitations.    Changes to hair or skin: No  Diarrhea/Constipation: + constipation. Takes miralax.  Dysphagia or Shortness of breath:No  Tremors; sometimes  Changes in weight: Trying to lose weight.  Intentionally.  Wt Readings from Last 2 Encounters:   11/29/18 87 kg (191 lb 12.8 oz)   09/26/18 88.2 kg (194 lb 8 oz)     Heat or cold intolerance: + cold intolerance.  History of Lithium or Amiodarone use:No  Head or neck surgery/radiation:No  IV Contrast: No  Family History of Thyroid Problems: No  PMH/PSH:  Past Medical History:   Diagnosis Date     Abnormal cardiovascular stress test     EKG changes with exercise on Exercise stress test 1/26/2015     CAD (coronary artery disease)     Severe 2 vessel CAD. PCI with drug-eluting stents x2 to mid LAD on 3/27/15     HTN (hypertension)      Hyperlipidemia LDL goal  <100      Hypothyroidism      IHD (ischemic heart disease)      KUSUM (obstructive sleep apnea)     CPAP     Past Surgical History:   Procedure Laterality Date     CARDIAC SURGERY      2 heart stents     COLONOSCOPY       COLONOSCOPY N/A 1/11/2017    Procedure: COLONOSCOPY;  Surgeon: Nghia Moss MD;  Location:  GI     HEAD & NECK SURGERY      wisdom teeth removed     HEART CATH, ANGIOPLASTY  3/27/15    2.5 X 18 mm & 3.0 X mm LUCILA to LAD     TONSILLECTOMY      T&A as a child     TUBAL LIGATION       Family Hx:  Family History   Problem Relation Age of Onset     Blood Disease Mother      pernious anemia     Heart Disease Father      Heart Disease Sister      Heart Disease Brother      Colon Cancer No family hx of      Social Hx:  Social History     Social History     Marital status:      Spouse name: N/A     Number of children: 4     Years of education: N/A     Occupational History     Not on file.     Social History Main Topics     Smoking status: Former Smoker     Quit date: 1/1/1984     Smokeless tobacco: Never Used     Alcohol use 0.0 oz/week     0 Standard drinks or equivalent per week      Comment: social drinker     Drug use: No     Sexual activity: No     Other Topics Concern     Caffeine Concern Yes     coffee      Occupational Exposure No     Hobby Hazards No     Sleep Concern No     Stress Concern No     Weight Concern No     Special Diet Yes     gluten free and dairy free     Back Care No     Exercise No     on hold , 4/7 starting cardiac rehab     Seat Belt Yes     Social History Narrative          MEDICATIONS:  has a current medication list which includes the following prescription(s): vitamin c, aspirin, vitamin d3, ibuprofen, isosorbide mononitrate, ketoconazole, levothyroxine, liothyronine, metoprolol tartrate, order for dme, and simvastatin, and the following Facility-Administered Medications: methylprednisolone.    ROS   ROS: 10 point ROS neg other than the symptoms noted above in the  "HPI.    Physical Exam   VS: /54 (BP Location: Left arm, Patient Position: Chair, Cuff Size: Adult Large)  Pulse 78  Temp 97.9  F (36.6  C) (Oral)  Ht 1.575 m (5' 2\")  Wt 87 kg (191 lb 12.8 oz)  SpO2 94%  Breastfeeding? No  BMI 35.08 kg/m2  GENERAL: AXOX3, NAD, well dressed, answering questions appropriately, appears stated age.  HEENT: No exopthalmous, no proptosis, EOMI, no lig lag, no retraction  NECK: Thyroid normal in size, non tender, no nodules were palpated.  CV: RRR  LUNGS: CTAB  ABDOMEN: +BS  NEUROLOGY: CN grossly intact, no tremors  PSYCH: normal affect and mood      LABS:  TFTs:  ENDO THYROID LABS-[facility] Latest Ref Rng & Units 11/21/2018   TSH 0.40 - 4.00 mU/L 0.08 (L)   T4 FREE 0.76 - 1.46 ng/dL 1.12   FREE T3 2.3 - 4.2 pg/mL 2.4     ENDO THYROID LABS-[facility] Latest Ref Rng & Units 9/17/2018   TSH 0.40 - 4.00 mU/L 1.22   T4 FREE 0.76 - 1.46 ng/dL 1.11   FREE T3 2.3 - 4.2 pg/mL 1.9 (L)     ENDO THYROID LABS-[facility] Latest Ref Rng & Units 7/11/2018 4/11/2018   TSH 0.40 - 4.00 mU/L 0.09 (L) 0.32 (L)   T4 FREE 0.76 - 1.46 ng/dL 1.29 1.07   FREE T3 2.3 - 4.2 pg/mL 2.2 (L) 1.9 (L)   THYR PEROXIDASE LAWANDA <35 IU/mL  <10     ENDO THYROID LABS-[facility] Latest Ref Rng & Units 3/7/2018 8/3/2017   TSH 0.40 - 4.00 mU/L 0.25 (L) 0.53   T4 FREE 0.76 - 1.46 ng/dL 1.07    FREE T3 2.3 - 4.2 pg/mL       ENDO THYROID LABS-[facility] Latest Ref Rng & Units 3/8/2017 1/20/2017   TSH 0.40 - 4.00 mU/L 0.05 (L) 0.19 (L)   T4 FREE 0.76 - 1.46 ng/dL 1.33 1.30   FREE T3 2.3 - 4.2 pg/mL       ENDO THYROID LABS-[facility] Latest Ref Rng & Units 11/2/2016 4/4/2016   TSH 0.40 - 4.00 mU/L 0.04 (L) 0.02 (L)   T4 FREE 0.76 - 1.46 ng/dL 1.22 1.16   FREE T3 2.3 - 4.2 pg/mL  2.2 (L)     ENDO THYROID LABS-[facility] Latest Ref Rng & Units 1/6/2016 1/20/2015   TSH 0.40 - 4.00 mU/L 0.04 (L) 0.14 (L)   T4 FREE 0.76 - 1.46 ng/dL 1.62 (H) 1.23   FREE T3 2.3 - 4.2 pg/mL 2.3      All pertinent notes, labs, and images personally reviewed by me.     A/P  Ms.Arlene OSIRIS Li is " a 82 year old here for the evaluation of hypothyroidism:    #1 Hypothyroidism (TPO neg):  Recent labs showing Suppressed TSH, free T4 is normal and T3 is normal.  Currently she is taking levothyroxine 88 mcg and Cytomel 10 mcg/day  Cytomel was started in September 2018  She is wondering if her dose of levothyroxine is appropriate or if she needs higher dose.  She developed some skin breakout and joint pain after starting Cytomel and is wondering if this is secondary to Cytomel.  Plan:  Discussed diagnosis, pathophysiology, management and treatment options of condition with pt.  Based on recent labs recommend to decrease levothyroxine to 75 mcg/day and decrease Cytomel to 5 mcg/day (11/29/2018)  Labs in 2 months.   Follow-up after that.  Discussed s/s of hypothyroidism and hyperthyroidism to watch for.  The patient indicates understanding of these issues and agrees with the plan.  Discussed indications, risks and benefits of all medications prescribed, and answered questions to patient's satisfaction.  I discussed that based on labs it is appropriate to decrease dose of levothyroxine.  Given her age and risk for arrhythmia associated with over replacement as well as osteoporosis associated with over replacement it is important to keep labs in normal range.    Standard treatment for hypothyroidism involves daily use of the synthetic thyroid hormone levothyroxine (Levothroid, Synthroid, others).  The dosage of thyroxine should normally be that required to bring the serum TSH level to the low normal range, such as 0.3 - 1 uU/ml. This is typically achieved with 1 ug L-T4/lb body weight/day, ranges from 75 - 125 ug/day in women, and 125 - 200 ug/day in men. Once thyroxine treatment is initiated, it is required indefinitely in most patients.     Symptoms should improve one to two weeks after starting treatment. Treatment with levothyroxine is usually lifelong.  Doseage may need to be adjusted based on body weight,  medications, or pregnancy.  To determine the right dosage of levothyroxine initially we will repeat TSH and free T4 after two months.     Excessive amounts of the hormone can cause side effects, such as: Increased appetite, insomnia, heart palpitations, and shakiness.  Patients with CAD will be started on a lower dose.  Levothyroxine causes virtually no side effects when used in the appropriate dose.  Certain medications, supplements and foods can affect the body s ability to absorb levothyroxine. Medications include: Iron supplements, Cholestyramine and Calcium supplements.     Follow-up:  2 months    Olena Manzanares MD  Endocrinology  [facility]  CC: Kasi Watkins    More than 50% of face to face time spent with Ms. Li on counseling / coordinating her care.       All questions were answered.  The patient indicates understanding of the above issues and agrees with the plan set forth.     Addendum to above note and clinic visit:    Labs reviewed.    See result note/telephone encounter.            Again, thank you for allowing me to participate in the care of your patient.        Sincerely,        Olena Manzanares MD

## 2018-11-29 NOTE — PROGRESS NOTES
ENDOCRINOLOGY CLINIC NOTE:    Name: Elizabeth Li  Seen for f/u of hypothyroidism.    HPI:  Elizabeth Li is a 83 year old female who presents for the evaluation of :    #1 Hypothyroidism.  Diagnosed in 1989 at [place].  Currently taking levothyroxine 88 mcg/day. On this dose since 6/2018. At that time dose was decreased based on labs.  Difficult to get stable levels.  In last clinic visit in September 2018 labs were checked and based on labs Cytomel 10 mcg was initiated.  She reports that since starting Cytomel she is having breakouts over the face  And also joint pain  She is wondering if this is secondary to Cytomel  Follow-up labs November 2018 showing low TSH, normal free T4 and normal T3    Not much activity 2/2 to pain. Has bloating and has heart burn.    Is wondering if GI s/s, bloating and weight gain related to thyroid problems.    No palpitations.    Changes to hair or skin: No  Diarrhea/Constipation: + constipation. Takes miralax.  Dysphagia or Shortness of breath:No  Tremors; sometimes  Changes in weight: Trying to lose weight.  Intentionally.  Wt Readings from Last 2 Encounters:   11/29/18 87 kg (191 lb 12.8 oz)   09/26/18 88.2 kg (194 lb 8 oz)     Heat or cold intolerance: + cold intolerance.  History of Lithium or Amiodarone use:No  Head or neck surgery/radiation:No  IV Contrast: No  Family History of Thyroid Problems: No  PMH/PSH:  Past Medical History:   Diagnosis Date     Abnormal cardiovascular stress test     EKG changes with exercise on Exercise stress test 1/26/2015     CAD (coronary artery disease)     Severe 2 vessel CAD. PCI with drug-eluting stents x2 to mid LAD on 3/27/15     HTN (hypertension)      Hyperlipidemia LDL goal <100      Hypothyroidism      IHD (ischemic heart disease)      KUSUM (obstructive sleep apnea)     CPAP     Past Surgical History:   Procedure Laterality Date     CARDIAC SURGERY      2 heart stents     COLONOSCOPY       COLONOSCOPY N/A 1/11/2017    Procedure:  "COLONOSCOPY;  Surgeon: Nghia Moss MD;  Location:  GI     HEAD & NECK SURGERY      wisdom teeth removed     HEART CATH, ANGIOPLASTY  3/27/15    2.5 X 18 mm & 3.0 X mm LUCILA to LAD     TONSILLECTOMY      T&A as a child     TUBAL LIGATION       Family Hx:  Family History   Problem Relation Age of Onset     Blood Disease Mother      pernious anemia     Heart Disease Father      Heart Disease Sister      Heart Disease Brother      Colon Cancer No family hx of      Social Hx:  Social History     Social History     Marital status:      Spouse name: N/A     Number of children: 4     Years of education: N/A     Occupational History     Not on file.     Social History Main Topics     Smoking status: Former Smoker     Quit date: 1/1/1984     Smokeless tobacco: Never Used     Alcohol use 0.0 oz/week     0 Standard drinks or equivalent per week      Comment: social drinker     Drug use: No     Sexual activity: No     Other Topics Concern     Caffeine Concern Yes     coffee      Occupational Exposure No     Hobby Hazards No     Sleep Concern No     Stress Concern No     Weight Concern No     Special Diet Yes     gluten free and dairy free     Back Care No     Exercise No     on hold , 4/7 starting cardiac rehab     Seat Belt Yes     Social History Narrative          MEDICATIONS:  has a current medication list which includes the following prescription(s): vitamin c, aspirin, vitamin d3, ibuprofen, isosorbide mononitrate, ketoconazole, levothyroxine, liothyronine, metoprolol tartrate, order for dme, and simvastatin, and the following Facility-Administered Medications: methylprednisolone.    ROS   ROS: 10 point ROS neg other than the symptoms noted above in the HPI.    Physical Exam   VS: /54 (BP Location: Left arm, Patient Position: Chair, Cuff Size: Adult Large)  Pulse 78  Temp 97.9  F (36.6  C) (Oral)  Ht 1.575 m (5' 2\")  Wt 87 kg (191 lb 12.8 oz)  SpO2 94%  Breastfeeding? No  BMI 35.08 kg/m2  GENERAL: " AXOX3, NAD, well dressed, answering questions appropriately, appears stated age.  HEENT: No exopthalmous, no proptosis, EOMI, no lig lag, no retraction  NECK: Thyroid normal in size, non tender, no nodules were palpated.  CV: RRR  LUNGS: CTAB  ABDOMEN: +BS  NEUROLOGY: CN grossly intact, no tremors  PSYCH: normal affect and mood      LABS:  TFTs:  ENDO THYROID LABS-[facility] Latest Ref Rng & Units 11/21/2018   TSH 0.40 - 4.00 mU/L 0.08 (L)   T4 FREE 0.76 - 1.46 ng/dL 1.12   FREE T3 2.3 - 4.2 pg/mL 2.4     ENDO THYROID LABS-[facility] Latest Ref Rng & Units 9/17/2018   TSH 0.40 - 4.00 mU/L 1.22   T4 FREE 0.76 - 1.46 ng/dL 1.11   FREE T3 2.3 - 4.2 pg/mL 1.9 (L)     ENDO THYROID LABS-[facility] Latest Ref Rng & Units 7/11/2018 4/11/2018   TSH 0.40 - 4.00 mU/L 0.09 (L) 0.32 (L)   T4 FREE 0.76 - 1.46 ng/dL 1.29 1.07   FREE T3 2.3 - 4.2 pg/mL 2.2 (L) 1.9 (L)   THYR PEROXIDASE LAWANDA <35 IU/mL  <10     [facility] 3/7/2018 8/3/2017   TSH 0.40 - 4.00 mU/L 0.25 (L) 0.53   T4 FREE 0.76 - 1.46 ng/dL 1.07    FREE T3 2.3 - 4.2 pg/mL       [facility] Units 3/8/2017 1/20/2017   TSH 0.40 - 4.00 mU/L 0.05 (L) 0.19 (L)   T4 FREE 0.76 - 1.46 ng/dL 1.33 1.30   FREE T3 2.3 - 4.2 pg/mL       ENDO THYROID LABS-[facility] Latest Ref Rng & Units 11/2/2016 4/4/2016   TSH 0.40 - 4.00 mU/L 0.04 (L) 0.02 (L)   T4 FREE 0.76 - 1.46 ng/dL 1.22 1.16   FREE T3 2.3 - 4.2 pg/mL  2.2 (L)     ENDO THYROID LABS-[facility] Latest Ref Rng & Units 1/6/2016 1/20/2015   TSH 0.40 - 4.00 mU/L 0.04 (L) 0.14 (L)   T4 FREE 0.76 - 1.46 ng/dL 1.62 (H) 1.23   FREE T3 2.3 - 4.2 pg/mL 2.3      All pertinent notes, labs, and images personally reviewed by me.     A/P  Ms.Arlene OSIRIS Li is a 82 year old here for the evaluation of hypothyroidism:    #1 Hypothyroidism (TPO neg):  Recent labs showing Suppressed TSH, free T4 is normal and T3 is normal.  Currently she is taking levothyroxine 88 mcg and Cytomel 10 mcg/day  Cytomel was started in  September 2018  She is wondering if her dose of levothyroxine is appropriate or if she needs higher dose.  She developed some skin breakout and joint pain after starting Cytomel and is wondering if this is secondary to Cytomel.  Plan:  Discussed diagnosis, pathophysiology, management and treatment options of condition with pt.  Based on recent labs recommend to decrease levothyroxine to 75 mcg/day and decrease Cytomel to 5 mcg/day (11/29/2018)  Labs in 2 months.   Follow-up after that.  Discussed s/s of hypothyroidism and hyperthyroidism to watch for.  The patient indicates understanding of these issues and agrees with the plan.  Discussed indications, risks and benefits of all medications prescribed, and answered questions to patient's satisfaction.  I discussed that based on labs it is appropriate to decrease dose of levothyroxine.  Given her age and risk for arrhythmia associated with over replacement as well as osteoporosis associated with over replacement it is important to keep labs in normal range.    Standard treatment for hypothyroidism involves daily use of the synthetic thyroid hormone levothyroxine (Levothroid, Synthroid, others).  The dosage of thyroxine should normally be that required to bring the serum TSH level to the low normal range, such as 0.3 - 1 uU/ml. This is typically achieved with 1 ug L-T4/lb body weight/day, ranges from 75 - 125 ug/day in women, and 125 - 200 ug/day in men. Once thyroxine treatment is initiated, it is required indefinitely in most patients.     Symptoms should improve one to two weeks after starting treatment. Treatment with levothyroxine is usually lifelong.  Doseage may need to be adjusted based on body weight, medications, or pregnancy.  To determine the right dosage of levothyroxine initially we will repeat TSH and free T4 after two months.     Excessive amounts of the hormone can cause side effects, such as: Increased appetite, insomnia, heart palpitations, and  shakiness.  Patients with CAD will be started on a lower dose.  Levothyroxine causes virtually no side effects when used in the appropriate dose.  Certain medications, supplements and foods can affect the body s ability to absorb levothyroxine. Medications include: Iron supplements, Cholestyramine and Calcium supplements.     Follow-up:  2 months    Olena Manzanares MD  Endocrinology  [facility]  CC: Kasi Watkins    More than 50% of face to face time spent with Ms. Li on counseling / coordinating her care.       All questions were answered.  The patient indicates understanding of the above issues and agrees with the plan set forth.     Addendum to above note and clinic visit:    Labs reviewed.    See result note/telephone encounter.

## 2018-11-29 NOTE — NURSING NOTE
"ENDOCRINOLOGY INTAKE FORM    Patient Name:  Elizabeth Li  :  1935    Is patient Diabetic?   No  Does patient have non-diabetic or other endocrine issues?  Yes: hypothyroidism    Vitals: /54 (BP Location: Left arm, Patient Position: Chair, Cuff Size: Adult Large)  Pulse 78  Temp 97.9  F (36.6  C) (Oral)  Ht 1.575 m (5' 2\")  Wt 87 kg (191 lb 12.8 oz)  SpO2 94%  Breastfeeding? No  BMI 35.08 kg/m2  BMI= Body mass index is 35.08 kg/(m^2).    Flu vaccine:  No  Pneumonia vaccine:  Yes: both    Smoking and Alcohol use:  Social History   Substance Use Topics     Smoking status: Former Smoker     Quit date: 1984     Smokeless tobacco: Never Used     Alcohol use 0.0 oz/week     0 Standard drinks or equivalent per week      Comment: social drinker       Patience Arellano [facility]    "

## 2018-12-06 ENCOUNTER — MYC MEDICAL ADVICE (OUTPATIENT)
Dept: ORTHOPEDICS | Facility: CLINIC | Age: 83
End: 2018-12-06

## 2018-12-07 DIAGNOSIS — M54.16 LUMBAR RADICULAR PAIN: Primary | ICD-10-CM

## 2018-12-08 ENCOUNTER — TELEPHONE (OUTPATIENT)
Dept: PALLIATIVE MEDICINE | Facility: CLINIC | Age: 83
End: 2018-12-08

## 2018-12-08 NOTE — TELEPHONE ENCOUNTER
Pre-screening Questions for Radiology Injections:    Injection to be done at which interventional clinic site? [facility]    Instruct patient to arrive as directed prior to the scheduled appointment time:    [place] AND [place]: 30 minutes before      Procedure ordered by DESMOND ATKINS    Procedure ordered? Lumbar Epidural Steroid Injection    What insurance would patient like us to bill for this procedure? BCBS      Worker's comp or MVA (motor vehicle accident) -Any injection DO NOT SCHEDULE and route to Janelle Banuelos.      [employer] - For SI joint injections, DO NOT SCHEDULE and route Janelle Banuelos. Little Borrowed Dress FREEDOM NO PA REQUIRED EFFECTIVE 11/1/2017      [employer]- MBB's must be scheduled at LEAST two weeks apart      [employer] - Any injection besides hip/shoulder/knee joint DO NOT SCHEDULE and route to Janelle Banuelos. She will obtain PA and call pt back to schedule procedure or notify pt of denial.       [employer]-Route to Janelle for review    Any chance of pregnancy? NO   If YES, do NOT schedule and route to RN pool    Is an  needed? No     Patient has a drive home? (mandatory) YES:     Is patient taking any blood thinners (plavix, coumadin, jantoven, warfarin, heparin, pradaxa or dabigatran )? No   If hold needed, do NOT schedule, route to RN pool     Is patient taking any aspirin products (includes Excedrin and Fiorinal)? Yes - Pt takes 81mg daily; instructed to hold 0 day(s) prior to procedure.      If more than 325mg/day do NOT schedule; route to RN pool     For CERVICAL procedures, hold all aspirin products for 6 days.     Tell pt that if aspirin product is not held for 6 days, the procedure WILL BE cancelled.      Does the patient have a bleeding or clotting disorder? No     If YES, okay to schedule AND route to RN nurse pool    For any patients with platelet count <100, must be forwarded to provider    Is patient diabetic?  No  If YES, have them bring their  glucometer.    Does patient have an active infection or treated for one within the past week? No     Is patient currently taking any antibiotics?  No     For patients on chronic, preventative, or prophylactic antibiotics, procedures may be scheduled.     For patients on antibiotics for active or recent infection:    Alex Chahal Burton, Snitzer-antibiotic course must have been completed for 4 days    Is patient currently taking any steroid medications? (i.e. Prednisone, Medrol)  No     For patients on steroid medications:    Alex hCahal Burton, Snitzer-steroid course must have been completed for 4 days    Reviewed with patient:  If you are started on any steroids or antibiotics between now and your appointment, you must contact us because the procedure may need to be cancelled.  Yes    Is patient actively being treated for cancer or immunocompromised? No  If YES, do NOT schedule and route to RN pool     Are you able to get on and off an exam table with minimal or no assistance? Yes  If NO, do NOT schedule and route to RN pool    Are you able to roll over and lay on your stomach with minimal or no assistance? Yes  If NO, do NOT schedule and route to RN pool     Any allergies to contrast dye, iodine, shellfish, or numbing and steroid medications? No  If YES, route to RN pool AND add allergy information to appointment notes    Allergies: Flu virus vaccine      Has the patient had a flu shot or any other vaccinations within 7 days before or after the procedure.  No     Does patient have an MRI/CT?  YES:   (SI joint, hip injections, lumbar sympathetic blocks, and stellate ganglion blocks do not require an MRI)    Was the MRI done w/in the last 3 years?  Yes    Was MRI done at [place]? Yes      If not, where was it done? N/A       If MRI was not done at [place], [facility] or [facility] do NOT schedule and route to nursing.  If pt has an imaging disc, the injection may be scheduled but pt has  to bring disc to appt. If they show up w/out disc the injection cannot be done    Reminders (please tell patient if applicable):       Instructed pt to arrive 30 minutes early for IV start if this is for a cervical procedure, ALL sympathetic (stellate ganglion, hypogastric, or lumbar sympathetic block) and all sedation procedures (RFA, spinal cord stimulation trials).  Not Applicable   -IVs are not routinely placed for Dr. Duarte cervical cases   -Dr. Colón: IVs for cervical ESIs and cervical TBDs (not CMBBs/facet inj)      If NPO for sedation, informed patient that it is okay to take medications with sips of water (except if they are to hold blood thinners).  Not Applicable   *DO take blood pressure medication if it is prescribed*      If this is for a cervical RAJAN, informed patient that aspirin needs to be held for 6 days.   Not Applicable      For all patients not having spinal cord stimulator (SCS) trials or radiofrequency ablations (RFAs), informed patient:    IV sedation is not provided for this procedure.  If you feel that an oral anti-anxiety medication is needed, you can discuss this further with your referring provider or primary care provider.  The Pain Clinic provider will discuss specifics of what the procedure includes at your appointment.  Most procedures last 10-20 minutes.  We use numbing medications to help with any discomfort during the procedure.  Not Applicable      Do not schedule procedures requiring IV placement in the first appointment of the day or first appointment after lunch. Do NOT schedule at 0745, 0815 or 1245.       For patients 85 or older we recommend having an adult stay w/ them for the remainder of the day.       Does the patient have any questions?    Janelle Banuelos  [facility]

## 2018-12-10 ENCOUNTER — ANCILLARY PROCEDURE (OUTPATIENT)
Dept: GENERAL RADIOLOGY | Facility: CLINIC | Age: 83
End: 2018-12-10
Attending: PHYSICAL MEDICINE & REHABILITATION
Payer: COMMERCIAL

## 2018-12-10 ENCOUNTER — TELEPHONE (OUTPATIENT)
Dept: PALLIATIVE MEDICINE | Facility: CLINIC | Age: 83
End: 2018-12-10

## 2018-12-10 ENCOUNTER — RADIOLOGY INJECTION OFFICE VISIT (OUTPATIENT)
Dept: PALLIATIVE MEDICINE | Facility: CLINIC | Age: 83
End: 2018-12-10
Payer: COMMERCIAL

## 2018-12-10 VITALS — SYSTOLIC BLOOD PRESSURE: 134 MMHG | DIASTOLIC BLOOD PRESSURE: 68 MMHG | OXYGEN SATURATION: 96 % | HEART RATE: 70 BPM

## 2018-12-10 DIAGNOSIS — M54.16 LUMBAR RADICULOPATHY: Primary | ICD-10-CM

## 2018-12-10 DIAGNOSIS — M54.16 LUMBAR RADICULAR PAIN: ICD-10-CM

## 2018-12-10 PROCEDURE — 62323 NJX INTERLAMINAR LMBR/SAC: CPT | Performed by: PHYSICAL MEDICINE & REHABILITATION

## 2018-12-10 NOTE — TELEPHONE ENCOUNTER
Received call from patient's , Oswaldo, who states that the patient had a Lumbar RAJAN this morning and is now experiencing a headache and the chills. They would like a call back. Phone #[phone]      Bita Ventura    [facility]

## 2018-12-10 NOTE — NURSING NOTE
Discharge Information    IV Discontiued Time:  NA    Amount of Fluid Infused:  NA    Discharge Criteria = When patient returns to baseline or as per MD order    Consciousness:  Pt is fully awake    Circulation:  BP +/- 20% of pre-procedure level    Respiration:  Patient is able to breathe deeply    O2 Sat:  Patient is able to maintain O2 Sat >92% on room air    Activity:  Moves 4 extremities on command    Ambulation:  Patient is able to stand and walk or stand and pivot into wheelchair    Dressing:  Clean/dry or No Dressing    Notes:   Discharge instructions and AVS given to patient    Patient meets criteria for discharge?  YES    Admitted to PCU?  No    Responsible adult present to accompany patient home?  Yes    Signature/Title:    Tiffanie Lawson  RN Care Coordinator  [facility]

## 2018-12-10 NOTE — PROGRESS NOTES
[facility] - Procedure Note    Date of Visit: 12/10/2018    Procedure performed: Lumbar L5-S1 interlaminar epidural steroid injection  Diagnosis: Lumbar spondylosis; Lumbar radiculitis/radiculopathy  : Nicole Houston MD  Anesthesia: None  Complications: None    Indications: Elizabeth Li is a 83 year old female who is seen at the request of ISMAEL Ambrosio CNP for a lumbar epidural steroid injection. The patient describes pain in bilateral lower extremities which is most bothersome at night. Also has pain in the right leg which is more constant. The patient has been exhibiting symptoms consistent with lumbar intraspinal inflammation and radiculopathy. Symptoms have been persistent, disabling, and intermittently severe. The patient reports minimal improvement with conservative treatment, including oral medications.    Lumbar MRI was done on 11/28/2017 which showed:       Allergies:      Allergies   Allergen Reactions     Flu Virus Vaccine Other (See Comments)     Viral SX        Vitals:  /71   Pulse 72   SpO2 95%     Review of Systems: The patient denies recent fever, chills, illness, use of antibiotics or anticoagulants. All other 10-point review of systems negative.     Procedure: The procedure and risks were explained, and informed written consent was obtained from the patient. Risks include but are not limited to: infection, bleeding, increased pain, and damage to soft tissue, nerve, muscle, and vasculature structures. After getting informed consent, patient was brought into the procedure suite and was placed in a prone position on the procedure table. A Pause for the Cause was performed. Patient was prepped and draped in sterile fashion.     The L5-S1 interspace was identified with use of fluoroscopy in AP view. A 25-gauge, 1.5 inch needle was used to anesthetize the skin and subcutaneous tissue entry site with a total of 2 ml of 1% lidocaine. Under fluoroscopic  visualization, a 22-gauge, 4.5 inch Tuohy epidural needle was slowly advanced towards the epidural space a few millimeters left of midline. The latter part of the needle advancement was guided with fluoroscopy in the lateral view. The epidural space was identified using loss of resistance technique. After negative aspiration for heme and cerebrospinal fluid, a total of 1.5 mL of non-ionic contrast was injected to confirm needle placement with 8.5 mL of contrast wasted. Epidurogram confirmed spread within the posterior epidural space. 1 ml of 40mg/ml of triamcinolone, 2 ml of 1% lidocaine, and 2 ml of preservative free saline was injected. The needle was removed.  Images were saved to PACS.    The patient tolerated the procedure well, and there was no evidence of procedural complications. No new sensory or motor deficits were noted following the procedure. The patient was stable and able to ambulate on discharge home. Post-procedure instructions were provided.     Pre-procedure pain score: 9-10/10 in the back, 8/10 in the leg  Post-procedure pain score: 3/10 in the back, 5/10 in the leg    Assessment/Plan: Elizabeth Li is a 83 year old female s/p lumbar interlaminar epidural steroid injection today for lumbar spondylosis and radiculitis/radiculopathy.     1. Following today's procedure, the patient was advised to contact the [facility] for any of the following:   Fever, chills, or night sweats   New onset of pain, numbness, or weakness   Any questions/concerns regarding the procedure  If unable to contact the Pain Center, the patient was instructed to go to a local Emergency Room for any complications.   2. The patient will receive a follow-up call in 1 week.   3. Follow-up with the referring provider in 2 weeks for post-procedure evaluation.    Nicole Houston MD  [facility]

## 2018-12-10 NOTE — TELEPHONE ENCOUNTER
Called patient. She reported that when she got home from injection that she started shaking and having chills for 20 minutes, took temp-afebrile. She also developed a headache at this time. States that now her headache is gone and she stopped shaking. She does not have a BP cuff at home.   Explained it would be extremely early to develop fever/chills from infection. But to continue to monitor. Advised that her BP was higher than normal for herself during appt. Possibly this and her anxiety about injection prompted this response. Advised to contact us back if this were to occur again      Nursing to reach out to patient tomorrow for follow up.   FYI. To provider.    Tiffanie MATSONN, RN Care Coordinator  [facility]

## 2018-12-10 NOTE — PATIENT INSTRUCTIONS
[facility] Procedure Discharge Instructions    Today you saw:  Dr. Nicole Houston     Your procedure:  Epidural steroid injection      Medications used:  Lidocaine (anesthetic)  Kenalog (steroid)  Omnipaque (contrast)            Be cautious when walking as numbness and/or weakness in the legs may occur up to 6-8 hours after the procedure due to effect of the local anesthetic    Do not drive for 6 hours. The effect of the local anesthetic could slow your reflexes.     Avoid strenuous activity for the first 24 hours. You may resume your regular activities after that.     You may shower, however avoid swimming, tub baths or hot tubs for 24 hours following your procedure    You may have a mild to moderate increase in pain for several days following the injection.      You may use ice packs for 10-15 minutes, 3 to 4 times a day at the injection site for comfort    Do not use heat to painful areas for 6 to 8 hours. This will give the local anesthetic time to wear off and prevent you from accidentally burning your skin.    You may use anti-inflammatory medications (such as Ibuprofen/Advil or Aleve) or Tylenol for pain control if necessary    With diabetes, check your blood sugar more frequently than usual as your blood sugar may be higher than normal for 10-14 days following a steroid injection. Contact your doctor who manages your diabetes if your blood sugar is higher than usual    It may take up to 14 days for the steroid medication to start working although you may feel the effect as early as a few days after the procedure.     Follow up with your referring provider in 2-3 weeks      If you experience any of the following, call the pain center line during work hours at [phone] or on-call physician after hours at [phone]:  -Fever over 100 degree F  -Swelling, bleeding, redness, drainage, warmth at the injection site  -Progressive weakness or numbness in your legs or arms  -Loss of bowel or bladder  function  -Unusual headache that is not relieved by Tylenol or your regular headache medication  -Unusual new onset of pain that is not improving

## 2018-12-11 NOTE — TELEPHONE ENCOUNTER
Called patient. She already has had some of her pain relieved this morning. Has not had any episodes of headaches or shakiness. States that she is walking much better than before the injection.   Advised to call us back if there were any other concerns.     Tiffanie BLACKWOOD, RN Care Coordinator  [facility]

## 2019-01-01 ENCOUNTER — APPOINTMENT (OUTPATIENT)
Dept: PHYSICAL THERAPY | Facility: CLINIC | Age: 84
DRG: 455 | End: 2019-01-01
Attending: NEUROLOGICAL SURGERY
Payer: COMMERCIAL

## 2019-01-01 ENCOUNTER — THERAPY VISIT (OUTPATIENT)
Dept: PHYSICAL THERAPY | Facility: CLINIC | Age: 84
End: 2019-01-01
Payer: COMMERCIAL

## 2019-01-01 ENCOUNTER — HOSPITAL ENCOUNTER (OUTPATIENT)
Dept: CARDIOLOGY | Facility: CLINIC | Age: 84
Discharge: HOME | End: 2019-11-08
Attending: NURSE PRACTITIONER | Admitting: NURSE PRACTITIONER
Payer: COMMERCIAL

## 2019-01-01 ENCOUNTER — TELEPHONE (OUTPATIENT)
Dept: NEUROSURGERY | Facility: CLINIC | Age: 84
End: 2019-01-01

## 2019-01-01 ENCOUNTER — OFFICE VISIT (OUTPATIENT)
Dept: NEUROSURGERY | Facility: CLINIC | Age: 84
End: 2019-01-01
Attending: PHYSICIAN ASSISTANT
Payer: COMMERCIAL

## 2019-01-01 ENCOUNTER — APPOINTMENT (OUTPATIENT)
Dept: OCCUPATIONAL THERAPY | Facility: CLINIC | Age: 84
DRG: 455 | End: 2019-01-01
Attending: NURSE PRACTITIONER
Payer: COMMERCIAL

## 2019-01-01 ENCOUNTER — ANCILLARY PROCEDURE (OUTPATIENT)
Dept: GENERAL RADIOLOGY | Facility: CLINIC | Age: 84
End: 2019-01-01
Attending: NEUROLOGICAL SURGERY
Payer: COMMERCIAL

## 2019-01-01 ENCOUNTER — APPOINTMENT (OUTPATIENT)
Dept: PHYSICAL THERAPY | Facility: CLINIC | Age: 84
DRG: 455 | End: 2019-01-01
Attending: NURSE PRACTITIONER
Payer: COMMERCIAL

## 2019-01-01 ENCOUNTER — THERAPY VISIT (OUTPATIENT)
Dept: PHYSICAL THERAPY | Facility: CLINIC | Age: 84
End: 2019-01-01
Attending: PHYSICIAN ASSISTANT
Payer: COMMERCIAL

## 2019-01-01 ENCOUNTER — OFFICE VISIT (OUTPATIENT)
Dept: INTERNAL MEDICINE | Facility: CLINIC | Age: 84
End: 2019-01-01
Payer: COMMERCIAL

## 2019-01-01 ENCOUNTER — NURSING HOME VISIT (OUTPATIENT)
Dept: GERIATRICS | Facility: CLINIC | Age: 84
End: 2019-01-01
Payer: COMMERCIAL

## 2019-01-01 ENCOUNTER — HOSPITAL ENCOUNTER (INPATIENT)
Facility: CLINIC | Age: 84
LOS: 3 days | Discharge: NURSING, REHAB OR LONG-TERM CARE | DRG: 455 | End: 2019-07-20
Attending: NEUROLOGICAL SURGERY | Admitting: NEUROLOGICAL SURGERY
Payer: COMMERCIAL

## 2019-01-01 ENCOUNTER — TRANSFERRED RECORDS (OUTPATIENT)
Dept: HEALTH INFORMATION MANAGEMENT | Facility: CLINIC | Age: 84
End: 2019-01-01

## 2019-01-01 ENCOUNTER — TELEPHONE (OUTPATIENT)
Dept: INTERNAL MEDICINE | Facility: CLINIC | Age: 84
End: 2019-01-01

## 2019-01-01 ENCOUNTER — DISCHARGE SUMMARY NURSING HOME (OUTPATIENT)
Dept: GERIATRICS | Facility: CLINIC | Age: 84
End: 2019-01-01
Payer: COMMERCIAL

## 2019-01-01 ENCOUNTER — OFFICE VISIT (OUTPATIENT)
Dept: CARDIOLOGY | Facility: CLINIC | Age: 84
End: 2019-01-01
Attending: INTERNAL MEDICINE
Payer: COMMERCIAL

## 2019-01-01 ENCOUNTER — ANESTHESIA (OUTPATIENT)
Dept: SURGERY | Facility: CLINIC | Age: 84
DRG: 455 | End: 2019-01-01
Payer: COMMERCIAL

## 2019-01-01 ENCOUNTER — OFFICE VISIT (OUTPATIENT)
Dept: NEUROSURGERY | Facility: CLINIC | Age: 84
End: 2019-01-01
Attending: NEUROLOGICAL SURGERY
Payer: COMMERCIAL

## 2019-01-01 ENCOUNTER — OFFICE VISIT (OUTPATIENT)
Dept: ORTHOPEDICS | Facility: CLINIC | Age: 84
End: 2019-01-01
Payer: COMMERCIAL

## 2019-01-01 ENCOUNTER — ANCILLARY PROCEDURE (OUTPATIENT)
Dept: GENERAL RADIOLOGY | Facility: CLINIC | Age: 84
End: 2019-01-01
Attending: PHYSICIAN ASSISTANT
Payer: COMMERCIAL

## 2019-01-01 ENCOUNTER — ANESTHESIA EVENT (OUTPATIENT)
Dept: SURGERY | Facility: CLINIC | Age: 84
DRG: 455 | End: 2019-01-01
Payer: COMMERCIAL

## 2019-01-01 ENCOUNTER — OFFICE VISIT (OUTPATIENT)
Dept: CARDIOLOGY | Facility: CLINIC | Age: 84
End: 2019-01-01
Payer: COMMERCIAL

## 2019-01-01 ENCOUNTER — HEALTH MAINTENANCE LETTER (OUTPATIENT)
Age: 84
End: 2019-01-01

## 2019-01-01 ENCOUNTER — APPOINTMENT (OUTPATIENT)
Dept: GENERAL RADIOLOGY | Facility: CLINIC | Age: 84
DRG: 455 | End: 2019-01-01
Attending: NEUROLOGICAL SURGERY
Payer: COMMERCIAL

## 2019-01-01 VITALS
OXYGEN SATURATION: 97 % | BODY MASS INDEX: 31.1 KG/M2 | WEIGHT: 169 LBS | HEART RATE: 70 BPM | HEIGHT: 62 IN | DIASTOLIC BLOOD PRESSURE: 60 MMHG | SYSTOLIC BLOOD PRESSURE: 138 MMHG

## 2019-01-01 VITALS
RESPIRATION RATE: 16 BRPM | BODY MASS INDEX: 31.65 KG/M2 | TEMPERATURE: 98 F | HEART RATE: 76 BPM | OXYGEN SATURATION: 94 % | WEIGHT: 172 LBS | DIASTOLIC BLOOD PRESSURE: 65 MMHG | HEIGHT: 62 IN | SYSTOLIC BLOOD PRESSURE: 147 MMHG

## 2019-01-01 VITALS
OXYGEN SATURATION: 94 % | HEART RATE: 73 BPM | WEIGHT: 173 LBS | TEMPERATURE: 98.2 F | BODY MASS INDEX: 32.16 KG/M2 | SYSTOLIC BLOOD PRESSURE: 130 MMHG | DIASTOLIC BLOOD PRESSURE: 59 MMHG

## 2019-01-01 VITALS
DIASTOLIC BLOOD PRESSURE: 73 MMHG | HEART RATE: 91 BPM | SYSTOLIC BLOOD PRESSURE: 151 MMHG | TEMPERATURE: 99.1 F | WEIGHT: 178 LBS | BODY MASS INDEX: 33.09 KG/M2 | RESPIRATION RATE: 19 BRPM | OXYGEN SATURATION: 94 %

## 2019-01-01 VITALS
WEIGHT: 179 LBS | DIASTOLIC BLOOD PRESSURE: 67 MMHG | HEART RATE: 63 BPM | BODY MASS INDEX: 32.94 KG/M2 | HEIGHT: 62 IN | SYSTOLIC BLOOD PRESSURE: 137 MMHG

## 2019-01-01 VITALS
OXYGEN SATURATION: 97 % | TEMPERATURE: 97.8 F | BODY MASS INDEX: 30.91 KG/M2 | HEART RATE: 87 BPM | WEIGHT: 168 LBS | SYSTOLIC BLOOD PRESSURE: 130 MMHG | RESPIRATION RATE: 14 BRPM | DIASTOLIC BLOOD PRESSURE: 70 MMHG | HEIGHT: 62 IN

## 2019-01-01 VITALS
WEIGHT: 166 LBS | TEMPERATURE: 98.3 F | DIASTOLIC BLOOD PRESSURE: 61 MMHG | BODY MASS INDEX: 30.55 KG/M2 | SYSTOLIC BLOOD PRESSURE: 133 MMHG | HEIGHT: 62 IN | HEART RATE: 76 BPM | OXYGEN SATURATION: 97 %

## 2019-01-01 VITALS
RESPIRATION RATE: 16 BRPM | BODY MASS INDEX: 32.39 KG/M2 | WEIGHT: 176 LBS | SYSTOLIC BLOOD PRESSURE: 131 MMHG | HEIGHT: 62 IN | HEART RATE: 68 BPM | TEMPERATURE: 97.2 F | DIASTOLIC BLOOD PRESSURE: 57 MMHG | OXYGEN SATURATION: 97 %

## 2019-01-01 VITALS
HEIGHT: 61 IN | WEIGHT: 169 LBS | DIASTOLIC BLOOD PRESSURE: 60 MMHG | SYSTOLIC BLOOD PRESSURE: 148 MMHG | BODY MASS INDEX: 31.91 KG/M2

## 2019-01-01 VITALS
OXYGEN SATURATION: 93 % | DIASTOLIC BLOOD PRESSURE: 68 MMHG | SYSTOLIC BLOOD PRESSURE: 136 MMHG | HEIGHT: 62 IN | TEMPERATURE: 97.5 F | WEIGHT: 180 LBS | BODY MASS INDEX: 33.13 KG/M2 | HEART RATE: 71 BPM | RESPIRATION RATE: 16 BRPM

## 2019-01-01 VITALS
TEMPERATURE: 97.1 F | OXYGEN SATURATION: 94 % | SYSTOLIC BLOOD PRESSURE: 141 MMHG | DIASTOLIC BLOOD PRESSURE: 72 MMHG | BODY MASS INDEX: 31.84 KG/M2 | RESPIRATION RATE: 16 BRPM | HEART RATE: 81 BPM | WEIGHT: 171.3 LBS

## 2019-01-01 VITALS
BODY MASS INDEX: 31.91 KG/M2 | DIASTOLIC BLOOD PRESSURE: 72 MMHG | SYSTOLIC BLOOD PRESSURE: 134 MMHG | OXYGEN SATURATION: 94 % | HEART RATE: 64 BPM | RESPIRATION RATE: 16 BRPM | WEIGHT: 169 LBS | TEMPERATURE: 98 F | HEIGHT: 61 IN

## 2019-01-01 DIAGNOSIS — D62 ANEMIA DUE TO BLOOD LOSS, ACUTE: ICD-10-CM

## 2019-01-01 DIAGNOSIS — Z01.810 PRE-OPERATIVE CARDIOVASCULAR EXAMINATION: ICD-10-CM

## 2019-01-01 DIAGNOSIS — Z98.1 S/P LUMBAR SPINAL FUSION: ICD-10-CM

## 2019-01-01 DIAGNOSIS — Z98.1 S/P LUMBAR SPINAL FUSION: Primary | ICD-10-CM

## 2019-01-01 DIAGNOSIS — I25.10 CORONARY ARTERY DISEASE INVOLVING NATIVE CORONARY ARTERY OF NATIVE HEART WITHOUT ANGINA PECTORIS: ICD-10-CM

## 2019-01-01 DIAGNOSIS — Z98.1 S/P LUMBAR FUSION: ICD-10-CM

## 2019-01-01 DIAGNOSIS — M70.61 TROCHANTERIC BURSITIS OF RIGHT HIP: ICD-10-CM

## 2019-01-01 DIAGNOSIS — I10 BENIGN ESSENTIAL HYPERTENSION: ICD-10-CM

## 2019-01-01 DIAGNOSIS — M54.42 ACUTE BILATERAL LOW BACK PAIN WITH BILATERAL SCIATICA: ICD-10-CM

## 2019-01-01 DIAGNOSIS — K59.03 DRUG-INDUCED CONSTIPATION: ICD-10-CM

## 2019-01-01 DIAGNOSIS — Z01.818 PRE-OP EXAM: Primary | ICD-10-CM

## 2019-01-01 DIAGNOSIS — M16.11 PRIMARY OSTEOARTHRITIS OF RIGHT HIP: Primary | ICD-10-CM

## 2019-01-01 DIAGNOSIS — K21.9 GASTROESOPHAGEAL REFLUX DISEASE WITHOUT ESOPHAGITIS: ICD-10-CM

## 2019-01-01 DIAGNOSIS — R94.39 ABNORMAL CARDIOVASCULAR STRESS TEST: ICD-10-CM

## 2019-01-01 DIAGNOSIS — M54.41 ACUTE BILATERAL LOW BACK PAIN WITH BILATERAL SCIATICA: ICD-10-CM

## 2019-01-01 DIAGNOSIS — R06.09 DYSPNEA ON EXERTION: ICD-10-CM

## 2019-01-01 DIAGNOSIS — I25.10 CORONARY ARTERY DISEASE INVOLVING NATIVE CORONARY ARTERY OF NATIVE HEART WITHOUT ANGINA PECTORIS: Primary | ICD-10-CM

## 2019-01-01 DIAGNOSIS — M25.552 HIP PAIN, BILATERAL: Primary | ICD-10-CM

## 2019-01-01 DIAGNOSIS — M51.369 DDD (DEGENERATIVE DISC DISEASE), LUMBAR: Primary | ICD-10-CM

## 2019-01-01 DIAGNOSIS — M25.551 HIP PAIN, BILATERAL: Primary | ICD-10-CM

## 2019-01-01 DIAGNOSIS — E66.01 MORBID OBESITY (H): ICD-10-CM

## 2019-01-01 DIAGNOSIS — E03.9 ACQUIRED HYPOTHYROIDISM: ICD-10-CM

## 2019-01-01 DIAGNOSIS — I25.9 IHD (ISCHEMIC HEART DISEASE): ICD-10-CM

## 2019-01-01 DIAGNOSIS — Z98.1 S/P LUMBAR FUSION: Primary | ICD-10-CM

## 2019-01-01 DIAGNOSIS — Z98.61 STATUS POST CORONARY ANGIOPLASTY: ICD-10-CM

## 2019-01-01 DIAGNOSIS — M43.16 SPONDYLOLISTHESIS OF LUMBAR REGION: Primary | ICD-10-CM

## 2019-01-01 DIAGNOSIS — R06.09 DYSPNEA ON EXERTION: Primary | ICD-10-CM

## 2019-01-01 DIAGNOSIS — F32.0 MILD MAJOR DEPRESSION (H): Primary | ICD-10-CM

## 2019-01-01 DIAGNOSIS — F32.0 MILD MAJOR DEPRESSION (H): ICD-10-CM

## 2019-01-01 DIAGNOSIS — Z01.818 PREOP GENERAL PHYSICAL EXAM: ICD-10-CM

## 2019-01-01 LAB
ANION GAP SERPL CALCULATED.3IONS-SCNC: 13 MMOL/L (ref 7–16)
ANION GAP SERPL CALCULATED.3IONS-SCNC: 8 MMOL/L (ref 3–14)
BASOPHILS # BLD AUTO: 0 10E9/L (ref 0–0.2)
BASOPHILS NFR BLD AUTO: 0.3 %
BUN SERPL-MCNC: 10 MG/DL (ref 7–30)
BUN SERPL-MCNC: 8 MG/DL (ref 7–26)
CALCIUM SERPL-MCNC: 9.4 MG/DL (ref 8.5–10.1)
CALCIUM SERPL-MCNC: 9.5 MG/DL (ref 8.4–10.4)
CHLORIDE SERPL-SCNC: 106 MMOL/L (ref 94–109)
CHLORIDE SERPLBLD-SCNC: 107 MMOL/L (ref 98–109)
CO2 SERPL-SCNC: 21 MMOL/L (ref 20–29)
CO2 SERPL-SCNC: 24 MMOL/L (ref 20–32)
CREAT SERPL-MCNC: 0.67 MG/DL (ref 0.55–1.02)
CREAT SERPL-MCNC: 0.73 MG/DL (ref 0.52–1.04)
DIFFERENTIAL METHOD BLD: NORMAL
EOSINOPHIL # BLD AUTO: 0.1 10E9/L (ref 0–0.7)
EOSINOPHIL NFR BLD AUTO: 1.6 %
ERYTHROCYTE [DISTWIDTH] IN BLOOD BY AUTOMATED COUNT: 12.5 % (ref 10–15)
GFR SERPL CREATININE-BSD FRML MDRD: 77 ML/MIN/{1.73_M2}
GFR SERPL CREATININE-BSD FRML MDRD: >60 ML/MIN/1.73M2
GLUCOSE BLDC GLUCOMTR-MCNC: 114 MG/DL (ref 70–99)
GLUCOSE BLDC GLUCOMTR-MCNC: 119 MG/DL (ref 70–99)
GLUCOSE SERPL-MCNC: 81 MG/DL (ref 70–99)
GLUCOSE SERPL-MCNC: 90 MG/DL (ref 70–100)
HCT VFR BLD AUTO: 39.3 % (ref 35–47)
HEMOGLOBIN: 9.5 G/DL (ref 12–15.5)
HGB BLD-MCNC: 10.1 G/DL (ref 11.7–15.7)
HGB BLD-MCNC: 10.2 G/DL (ref 11.7–15.7)
HGB BLD-MCNC: 13.1 G/DL (ref 11.7–15.7)
LYMPHOCYTES # BLD AUTO: 1.8 10E9/L (ref 0.8–5.3)
LYMPHOCYTES NFR BLD AUTO: 24.3 %
MCH RBC QN AUTO: 31.1 PG (ref 26.5–33)
MCHC RBC AUTO-ENTMCNC: 33.3 G/DL (ref 31.5–36.5)
MCV RBC AUTO: 93 FL (ref 78–100)
MONOCYTES # BLD AUTO: 0.5 10E9/L (ref 0–1.3)
MONOCYTES NFR BLD AUTO: 7.3 %
NEUTROPHILS # BLD AUTO: 4.9 10E9/L (ref 1.6–8.3)
NEUTROPHILS NFR BLD AUTO: 66.5 %
PLATELET # BLD AUTO: 214 10E9/L (ref 150–450)
POTASSIUM SERPL-SCNC: 4.1 MMOL/L (ref 3.4–5.3)
POTASSIUM SERPL-SCNC: 4.1 MMOL/L (ref 3.5–5.1)
RBC # BLD AUTO: 4.21 10E12/L (ref 3.8–5.2)
SODIUM SERPL-SCNC: 138 MMOL/L (ref 133–144)
SODIUM SERPL-SCNC: 141 MMOL/L (ref 136–145)
WBC # BLD AUTO: 7.4 10E9/L (ref 4–11)

## 2019-01-01 PROCEDURE — 22853 INSJ BIOMECHANICAL DEVICE: CPT | Mod: AS | Performed by: PHYSICIAN ASSISTANT

## 2019-01-01 PROCEDURE — 36415 COLL VENOUS BLD VENIPUNCTURE: CPT | Performed by: NURSE PRACTITIONER

## 2019-01-01 PROCEDURE — 25000125 ZZHC RX 250: Performed by: ANESTHESIOLOGY

## 2019-01-01 PROCEDURE — 97110 THERAPEUTIC EXERCISES: CPT | Mod: GP | Performed by: PHYSICAL THERAPIST

## 2019-01-01 PROCEDURE — 97116 GAIT TRAINING THERAPY: CPT | Mod: GP | Performed by: PHYSICAL THERAPIST

## 2019-01-01 PROCEDURE — 25000128 H RX IP 250 OP 636: Performed by: NURSE ANESTHETIST, CERTIFIED REGISTERED

## 2019-01-01 PROCEDURE — 99213 OFFICE O/P EST LOW 20 MIN: CPT | Mod: 25 | Performed by: FAMILY MEDICINE

## 2019-01-01 PROCEDURE — 97140 MANUAL THERAPY 1/> REGIONS: CPT | Mod: GP | Performed by: PHYSICAL THERAPIST

## 2019-01-01 PROCEDURE — 97530 THERAPEUTIC ACTIVITIES: CPT | Mod: GP

## 2019-01-01 PROCEDURE — 25000132 ZZH RX MED GY IP 250 OP 250 PS 637: Performed by: PHYSICIAN ASSISTANT

## 2019-01-01 PROCEDURE — 40000170 ZZH STATISTIC PRE-PROCEDURE ASSESSMENT II: Performed by: NEUROLOGICAL SURGERY

## 2019-01-01 PROCEDURE — 25800030 ZZH RX IP 258 OP 636: Performed by: NURSE PRACTITIONER

## 2019-01-01 PROCEDURE — 25800030 ZZH RX IP 258 OP 636: Performed by: NURSE ANESTHETIST, CERTIFIED REGISTERED

## 2019-01-01 PROCEDURE — 22853 INSJ BIOMECHANICAL DEVICE: CPT | Performed by: NEUROLOGICAL SURGERY

## 2019-01-01 PROCEDURE — 99215 OFFICE O/P EST HI 40 MIN: CPT | Performed by: INTERNAL MEDICINE

## 2019-01-01 PROCEDURE — 25800030 ZZH RX IP 258 OP 636: Performed by: ANESTHESIOLOGY

## 2019-01-01 PROCEDURE — 22840 INSERT SPINE FIXATION DEVICE: CPT | Performed by: NEUROLOGICAL SURGERY

## 2019-01-01 PROCEDURE — 80048 BASIC METABOLIC PNL TOTAL CA: CPT | Performed by: INTERNAL MEDICINE

## 2019-01-01 PROCEDURE — 85018 HEMOGLOBIN: CPT | Performed by: NURSE PRACTITIONER

## 2019-01-01 PROCEDURE — 00000146 ZZHCL STATISTIC GLUCOSE BY METER IP

## 2019-01-01 PROCEDURE — 61783 SCAN PROC SPINAL: CPT | Performed by: NEUROLOGICAL SURGERY

## 2019-01-01 PROCEDURE — 97112 NEUROMUSCULAR REEDUCATION: CPT | Mod: GP | Performed by: PHYSICAL THERAPIST

## 2019-01-01 PROCEDURE — 22633 ARTHRD CMBN 1NTRSPC LUMBAR: CPT | Mod: AS | Performed by: PHYSICIAN ASSISTANT

## 2019-01-01 PROCEDURE — 72100 X-RAY EXAM L-S SPINE 2/3 VWS: CPT

## 2019-01-01 PROCEDURE — 22840 INSERT SPINE FIXATION DEVICE: CPT | Mod: AS | Performed by: PHYSICIAN ASSISTANT

## 2019-01-01 PROCEDURE — 01NB0ZZ RELEASE LUMBAR NERVE, OPEN APPROACH: ICD-10-PCS | Performed by: NEUROLOGICAL SURGERY

## 2019-01-01 PROCEDURE — 97530 THERAPEUTIC ACTIVITIES: CPT | Mod: GP | Performed by: PHYSICAL THERAPIST

## 2019-01-01 PROCEDURE — G0463 HOSPITAL OUTPT CLINIC VISIT: HCPCS

## 2019-01-01 PROCEDURE — 99214 OFFICE O/P EST MOD 30 MIN: CPT | Performed by: INTERNAL MEDICINE

## 2019-01-01 PROCEDURE — 36000077 ZZH SURGERY LEVEL 6 W FLUORO 1ST 30 MIN: Performed by: NEUROLOGICAL SURGERY

## 2019-01-01 PROCEDURE — 36000075 ZZH SURGERY LEVEL 6 EA 15 ADDTL MIN: Performed by: NEUROLOGICAL SURGERY

## 2019-01-01 PROCEDURE — C1713 ANCHOR/SCREW BN/BN,TIS/BN: HCPCS | Performed by: NEUROLOGICAL SURGERY

## 2019-01-01 PROCEDURE — 25800025 ZZH RX 258: Performed by: NEUROLOGICAL SURGERY

## 2019-01-01 PROCEDURE — 12000000 ZZH R&B MED SURG/OB

## 2019-01-01 PROCEDURE — 93306 TTE W/DOPPLER COMPLETE: CPT | Mod: 26 | Performed by: INTERNAL MEDICINE

## 2019-01-01 PROCEDURE — 25000132 ZZH RX MED GY IP 250 OP 250 PS 637: Performed by: NURSE PRACTITIONER

## 2019-01-01 PROCEDURE — 20930 SP BONE ALGRFT MORSEL ADD-ON: CPT | Performed by: NEUROLOGICAL SURGERY

## 2019-01-01 PROCEDURE — 25000128 H RX IP 250 OP 636: Performed by: PHYSICIAN ASSISTANT

## 2019-01-01 PROCEDURE — 97162 PT EVAL MOD COMPLEX 30 MIN: CPT | Mod: GP | Performed by: PHYSICAL THERAPIST

## 2019-01-01 PROCEDURE — 25000132 ZZH RX MED GY IP 250 OP 250 PS 637: Performed by: ANESTHESIOLOGY

## 2019-01-01 PROCEDURE — 0SG00AJ FUSION OF LUMBAR VERTEBRAL JOINT WITH INTERBODY FUSION DEVICE, POSTERIOR APPROACH, ANTERIOR COLUMN, OPEN APPROACH: ICD-10-PCS | Performed by: NEUROLOGICAL SURGERY

## 2019-01-01 PROCEDURE — 93306 TTE W/DOPPLER COMPLETE: CPT

## 2019-01-01 PROCEDURE — 71000012 ZZH RECOVERY PHASE 1 LEVEL 1 FIRST HR: Performed by: NEUROLOGICAL SURGERY

## 2019-01-01 PROCEDURE — 97165 OT EVAL LOW COMPLEX 30 MIN: CPT | Mod: GO

## 2019-01-01 PROCEDURE — 27210794 ZZH OR GENERAL SUPPLY STERILE: Performed by: NEUROLOGICAL SURGERY

## 2019-01-01 PROCEDURE — 25000128 H RX IP 250 OP 636: Performed by: ANESTHESIOLOGY

## 2019-01-01 PROCEDURE — 25000125 ZZHC RX 250: Performed by: NURSE ANESTHETIST, CERTIFIED REGISTERED

## 2019-01-01 PROCEDURE — 99316 NF DSCHRG MGMT 30 MIN+: CPT | Performed by: NURSE PRACTITIONER

## 2019-01-01 PROCEDURE — C1762 CONN TISS, HUMAN(INC FASCIA): HCPCS | Performed by: NEUROLOGICAL SURGERY

## 2019-01-01 PROCEDURE — 20611 DRAIN/INJ JOINT/BURSA W/US: CPT | Mod: 59 | Performed by: FAMILY MEDICINE

## 2019-01-01 PROCEDURE — 97161 PT EVAL LOW COMPLEX 20 MIN: CPT | Mod: GP

## 2019-01-01 PROCEDURE — 25000128 H RX IP 250 OP 636: Performed by: NURSE PRACTITIONER

## 2019-01-01 PROCEDURE — 99214 OFFICE O/P EST MOD 30 MIN: CPT | Performed by: NEUROLOGICAL SURGERY

## 2019-01-01 PROCEDURE — 0SG0071 FUSION OF LUMBAR VERTEBRAL JOINT WITH AUTOLOGOUS TISSUE SUBSTITUTE, POSTERIOR APPROACH, POSTERIOR COLUMN, OPEN APPROACH: ICD-10-PCS | Performed by: NEUROLOGICAL SURGERY

## 2019-01-01 PROCEDURE — 99214 OFFICE O/P EST MOD 30 MIN: CPT | Mod: 24 | Performed by: NURSE PRACTITIONER

## 2019-01-01 PROCEDURE — 99024 POSTOP FOLLOW-UP VISIT: CPT | Performed by: PHYSICIAN ASSISTANT

## 2019-01-01 PROCEDURE — 0ST20ZZ RESECTION OF LUMBAR VERTEBRAL DISC, OPEN APPROACH: ICD-10-PCS | Performed by: NEUROLOGICAL SURGERY

## 2019-01-01 PROCEDURE — 36415 COLL VENOUS BLD VENIPUNCTURE: CPT | Performed by: INTERNAL MEDICINE

## 2019-01-01 PROCEDURE — 97116 GAIT TRAINING THERAPY: CPT | Mod: GP

## 2019-01-01 PROCEDURE — 22633 ARTHRD CMBN 1NTRSPC LUMBAR: CPT | Performed by: NEUROLOGICAL SURGERY

## 2019-01-01 PROCEDURE — 37000008 ZZH ANESTHESIA TECHNICAL FEE, 1ST 30 MIN: Performed by: NEUROLOGICAL SURGERY

## 2019-01-01 PROCEDURE — 8E0WXBZ COMPUTER ASSISTED PROCEDURE OF TRUNK REGION: ICD-10-PCS | Performed by: NEUROLOGICAL SURGERY

## 2019-01-01 PROCEDURE — 20936 SP BONE AGRFT LOCAL ADD-ON: CPT | Performed by: NEUROLOGICAL SURGERY

## 2019-01-01 PROCEDURE — 40000277 XR SURGERY CARM FLUORO LESS THAN 5 MIN W STILLS

## 2019-01-01 PROCEDURE — 25000128 H RX IP 250 OP 636: Performed by: NEUROLOGICAL SURGERY

## 2019-01-01 PROCEDURE — 85025 COMPLETE CBC W/AUTO DIFF WBC: CPT | Performed by: INTERNAL MEDICINE

## 2019-01-01 PROCEDURE — 97535 SELF CARE MNGMENT TRAINING: CPT | Mod: GO

## 2019-01-01 PROCEDURE — 99304 1ST NF CARE SF/LOW MDM 25: CPT | Performed by: INTERNAL MEDICINE

## 2019-01-01 PROCEDURE — 93000 ELECTROCARDIOGRAM COMPLETE: CPT | Performed by: INTERNAL MEDICINE

## 2019-01-01 PROCEDURE — 25000125 ZZHC RX 250: Performed by: NEUROLOGICAL SURGERY

## 2019-01-01 PROCEDURE — 37000009 ZZH ANESTHESIA TECHNICAL FEE, EACH ADDTL 15 MIN: Performed by: NEUROLOGICAL SURGERY

## 2019-01-01 PROCEDURE — 99207 ZZC NO CHARGE NURSE ONLY: CPT

## 2019-01-01 PROCEDURE — 20611 DRAIN/INJ JOINT/BURSA W/US: CPT | Mod: RT | Performed by: FAMILY MEDICINE

## 2019-01-01 PROCEDURE — 99310 SBSQ NF CARE HIGH MDM 45: CPT | Performed by: NURSE PRACTITIONER

## 2019-01-01 PROCEDURE — 25000566 ZZH SEVOFLURANE, EA 15 MIN: Performed by: NEUROLOGICAL SURGERY

## 2019-01-01 DEVICE — IMPLANTABLE DEVICE: Type: IMPLANTABLE DEVICE | Site: SPINE LUMBAR | Status: FUNCTIONAL

## 2019-01-01 DEVICE — IMP ROD MEDT SOLERA CVD 5.5X35MM TI 1553201035: Type: IMPLANTABLE DEVICE | Site: SPINE LUMBAR | Status: FUNCTIONAL

## 2019-01-01 DEVICE — IMP SCR SET MEDT SOLERA BREAK OFF 5.5MM TI 5540030: Type: IMPLANTABLE DEVICE | Site: SPINE LUMBAR | Status: FUNCTIONAL

## 2019-01-01 DEVICE — IMP SCR MEDT 5.5/6.0MM SOLERA 6.5X45MM MA 55840006545: Type: IMPLANTABLE DEVICE | Site: SPINE LUMBAR | Status: FUNCTIONAL

## 2019-01-01 RX ORDER — HYDROMORPHONE HYDROCHLORIDE 2 MG/1
2 TABLET ORAL 2 TIMES DAILY PRN
Qty: 30 TABLET | Refills: 0 | Status: SHIPPED | OUTPATIENT
Start: 2019-01-01 | End: 2020-01-01

## 2019-01-01 RX ORDER — LIOTHYRONINE SODIUM 5 UG/1
TABLET ORAL
Qty: 90 TABLET | Refills: 0 | Status: SHIPPED | OUTPATIENT
Start: 2019-01-01 | End: 2020-01-01

## 2019-01-01 RX ORDER — NALOXONE HYDROCHLORIDE 0.4 MG/ML
.1-.4 INJECTION, SOLUTION INTRAMUSCULAR; INTRAVENOUS; SUBCUTANEOUS
Status: DISCONTINUED | OUTPATIENT
Start: 2019-01-01 | End: 2019-01-01

## 2019-01-01 RX ORDER — FENTANYL CITRATE 50 UG/ML
INJECTION, SOLUTION INTRAMUSCULAR; INTRAVENOUS PRN
Status: DISCONTINUED | OUTPATIENT
Start: 2019-01-01 | End: 2019-01-01

## 2019-01-01 RX ORDER — OXYCODONE HYDROCHLORIDE 5 MG/1
5-10 TABLET ORAL EVERY 4 HOURS PRN
Status: DISCONTINUED | OUTPATIENT
Start: 2019-01-01 | End: 2019-01-01

## 2019-01-01 RX ORDER — OXYCODONE HYDROCHLORIDE 5 MG/1
5 TABLET ORAL EVERY 4 HOURS PRN
Status: DISCONTINUED | OUTPATIENT
Start: 2019-01-01 | End: 2019-01-01

## 2019-01-01 RX ORDER — HYDROMORPHONE HYDROCHLORIDE 2 MG/1
2 TABLET ORAL
Status: DISCONTINUED | OUTPATIENT
Start: 2019-01-01 | End: 2019-01-01 | Stop reason: HOSPADM

## 2019-01-01 RX ORDER — METHYLPREDNISOLONE 4 MG
TABLET, DOSE PACK ORAL
Qty: 21 TABLET | Refills: 0 | Status: SHIPPED | OUTPATIENT
Start: 2019-01-01 | End: 2020-01-01

## 2019-01-01 RX ORDER — ONDANSETRON 4 MG/1
4 TABLET, ORALLY DISINTEGRATING ORAL EVERY 30 MIN PRN
Status: DISCONTINUED | OUTPATIENT
Start: 2019-01-01 | End: 2019-01-01 | Stop reason: HOSPADM

## 2019-01-01 RX ORDER — PROPOFOL 10 MG/ML
INJECTION, EMULSION INTRAVENOUS PRN
Status: DISCONTINUED | OUTPATIENT
Start: 2019-01-01 | End: 2019-01-01

## 2019-01-01 RX ORDER — METOCLOPRAMIDE 5 MG/1
5 TABLET ORAL EVERY 6 HOURS PRN
Status: DISCONTINUED | OUTPATIENT
Start: 2019-01-01 | End: 2019-01-01 | Stop reason: HOSPADM

## 2019-01-01 RX ORDER — LIDOCAINE HYDROCHLORIDE 20 MG/ML
INJECTION, SOLUTION INFILTRATION; PERINEURAL PRN
Status: DISCONTINUED | OUTPATIENT
Start: 2019-01-01 | End: 2019-01-01

## 2019-01-01 RX ORDER — ONDANSETRON 2 MG/ML
INJECTION INTRAMUSCULAR; INTRAVENOUS PRN
Status: DISCONTINUED | OUTPATIENT
Start: 2019-01-01 | End: 2019-01-01

## 2019-01-01 RX ORDER — LIOTHYRONINE SODIUM 5 UG/1
5 TABLET ORAL
Status: DISCONTINUED | OUTPATIENT
Start: 2019-01-01 | End: 2019-01-01 | Stop reason: HOSPADM

## 2019-01-01 RX ORDER — CEFAZOLIN SODIUM 1 G/3ML
1 INJECTION, POWDER, FOR SOLUTION INTRAMUSCULAR; INTRAVENOUS EVERY 8 HOURS
Status: DISCONTINUED | OUTPATIENT
Start: 2019-01-01 | End: 2019-01-01 | Stop reason: CLARIF

## 2019-01-01 RX ORDER — HYDROMORPHONE HYDROCHLORIDE 2 MG/1
2 TABLET ORAL EVERY 4 HOURS PRN
Qty: 40 TABLET | Refills: 0 | Status: SHIPPED | OUTPATIENT
Start: 2019-01-01 | End: 2019-01-01

## 2019-01-01 RX ORDER — ACETAMINOPHEN 325 MG/1
650 TABLET ORAL EVERY 4 HOURS PRN
Status: DISCONTINUED | OUTPATIENT
Start: 2019-01-01 | End: 2019-01-01 | Stop reason: HOSPADM

## 2019-01-01 RX ORDER — NEOSTIGMINE METHYLSULFATE 1 MG/ML
VIAL (ML) INJECTION PRN
Status: DISCONTINUED | OUTPATIENT
Start: 2019-01-01 | End: 2019-01-01

## 2019-01-01 RX ORDER — EPHEDRINE SULFATE 50 MG/ML
INJECTION, SOLUTION INTRAMUSCULAR; INTRAVENOUS; SUBCUTANEOUS PRN
Status: DISCONTINUED | OUTPATIENT
Start: 2019-01-01 | End: 2019-01-01

## 2019-01-01 RX ORDER — METOCLOPRAMIDE HYDROCHLORIDE 5 MG/ML
5 INJECTION INTRAMUSCULAR; INTRAVENOUS EVERY 6 HOURS PRN
Status: DISCONTINUED | OUTPATIENT
Start: 2019-01-01 | End: 2019-01-01 | Stop reason: HOSPADM

## 2019-01-01 RX ORDER — AMOXICILLIN 250 MG
1 CAPSULE ORAL 2 TIMES DAILY
Status: DISCONTINUED | OUTPATIENT
Start: 2019-01-01 | End: 2019-01-01 | Stop reason: HOSPADM

## 2019-01-01 RX ORDER — GABAPENTIN 100 MG/1
100 CAPSULE ORAL
Status: COMPLETED | OUTPATIENT
Start: 2019-01-01 | End: 2019-01-01

## 2019-01-01 RX ORDER — ISOSORBIDE MONONITRATE 30 MG/1
TABLET, EXTENDED RELEASE ORAL
Qty: 90 TABLET | Refills: 1 | Status: SHIPPED | OUTPATIENT
Start: 2019-01-01 | End: 2020-01-01

## 2019-01-01 RX ORDER — GLYCOPYRROLATE 0.2 MG/ML
INJECTION, SOLUTION INTRAMUSCULAR; INTRAVENOUS PRN
Status: DISCONTINUED | OUTPATIENT
Start: 2019-01-01 | End: 2019-01-01

## 2019-01-01 RX ORDER — METHYLPREDNISOLONE ACETATE 40 MG/ML
40 INJECTION, SUSPENSION INTRA-ARTICULAR; INTRALESIONAL; INTRAMUSCULAR; SOFT TISSUE
Status: DISCONTINUED | OUTPATIENT
Start: 2019-01-01 | End: 2020-01-01

## 2019-01-01 RX ORDER — ONDANSETRON 2 MG/ML
4 INJECTION INTRAMUSCULAR; INTRAVENOUS EVERY 6 HOURS PRN
Status: DISCONTINUED | OUTPATIENT
Start: 2019-01-01 | End: 2019-01-01 | Stop reason: HOSPADM

## 2019-01-01 RX ORDER — CEFAZOLIN SODIUM 2 G/100ML
2 INJECTION, SOLUTION INTRAVENOUS
Status: COMPLETED | OUTPATIENT
Start: 2019-01-01 | End: 2019-01-01

## 2019-01-01 RX ORDER — ACETAMINOPHEN 325 MG/1
975 TABLET ORAL ONCE
Status: COMPLETED | OUTPATIENT
Start: 2019-01-01 | End: 2019-01-01

## 2019-01-01 RX ORDER — HYDROMORPHONE HYDROCHLORIDE 1 MG/ML
.3-.5 INJECTION, SOLUTION INTRAMUSCULAR; INTRAVENOUS; SUBCUTANEOUS
Status: DISCONTINUED | OUTPATIENT
Start: 2019-01-01 | End: 2019-01-01 | Stop reason: HOSPADM

## 2019-01-01 RX ORDER — GABAPENTIN 300 MG/1
300 CAPSULE ORAL ONCE
Status: DISCONTINUED | OUTPATIENT
Start: 2019-01-01 | End: 2019-01-01 | Stop reason: HOSPADM

## 2019-01-01 RX ORDER — CALCIUM CARBONATE 500 MG/1
1000 TABLET, CHEWABLE ORAL 4 TIMES DAILY PRN
Status: DISCONTINUED | OUTPATIENT
Start: 2019-01-01 | End: 2019-01-01 | Stop reason: HOSPADM

## 2019-01-01 RX ORDER — FENTANYL CITRATE 50 UG/ML
25-50 INJECTION, SOLUTION INTRAMUSCULAR; INTRAVENOUS
Status: DISCONTINUED | OUTPATIENT
Start: 2019-01-01 | End: 2019-01-01 | Stop reason: HOSPADM

## 2019-01-01 RX ORDER — METHOCARBAMOL 750 MG/1
750 TABLET, FILM COATED ORAL EVERY 6 HOURS PRN
Status: DISCONTINUED | OUTPATIENT
Start: 2019-01-01 | End: 2019-01-01 | Stop reason: HOSPADM

## 2019-01-01 RX ORDER — SODIUM CHLORIDE, SODIUM LACTATE, POTASSIUM CHLORIDE, CALCIUM CHLORIDE 600; 310; 30; 20 MG/100ML; MG/100ML; MG/100ML; MG/100ML
INJECTION, SOLUTION INTRAVENOUS CONTINUOUS
Status: DISCONTINUED | OUTPATIENT
Start: 2019-01-01 | End: 2019-01-01 | Stop reason: HOSPADM

## 2019-01-01 RX ORDER — SENNOSIDES 8.6 MG
TABLET ORAL
COMMUNITY
End: 2019-01-01

## 2019-01-01 RX ORDER — HYDROMORPHONE HYDROCHLORIDE 2 MG/1
2 TABLET ORAL
Qty: 40 TABLET | Refills: 0 | Status: SHIPPED | OUTPATIENT
Start: 2019-01-01 | End: 2019-01-01

## 2019-01-01 RX ORDER — POLYETHYLENE GLYCOL 3350 17 G/17G
1 POWDER, FOR SOLUTION ORAL
COMMUNITY

## 2019-01-01 RX ORDER — ACETAMINOPHEN 500 MG
1000 TABLET ORAL 3 TIMES DAILY
COMMUNITY
End: 2020-01-01

## 2019-01-01 RX ORDER — HYDROXYZINE HYDROCHLORIDE 10 MG/1
10 TABLET, FILM COATED ORAL EVERY 6 HOURS PRN
Status: DISCONTINUED | OUTPATIENT
Start: 2019-01-01 | End: 2019-01-01 | Stop reason: HOSPADM

## 2019-01-01 RX ORDER — HYDROMORPHONE HYDROCHLORIDE 1 MG/ML
.3-.5 INJECTION, SOLUTION INTRAMUSCULAR; INTRAVENOUS; SUBCUTANEOUS EVERY 5 MIN PRN
Status: DISCONTINUED | OUTPATIENT
Start: 2019-01-01 | End: 2019-01-01 | Stop reason: HOSPADM

## 2019-01-01 RX ORDER — LIDOCAINE 40 MG/G
CREAM TOPICAL
Status: DISCONTINUED | OUTPATIENT
Start: 2019-01-01 | End: 2019-01-01 | Stop reason: HOSPADM

## 2019-01-01 RX ORDER — NALOXONE HYDROCHLORIDE 0.4 MG/ML
.1-.4 INJECTION, SOLUTION INTRAMUSCULAR; INTRAVENOUS; SUBCUTANEOUS
Status: DISCONTINUED | OUTPATIENT
Start: 2019-01-01 | End: 2019-01-01 | Stop reason: HOSPADM

## 2019-01-01 RX ORDER — ONDANSETRON 4 MG/1
4 TABLET, ORALLY DISINTEGRATING ORAL EVERY 6 HOURS PRN
Status: DISCONTINUED | OUTPATIENT
Start: 2019-01-01 | End: 2019-01-01 | Stop reason: HOSPADM

## 2019-01-01 RX ORDER — VECURONIUM BROMIDE 1 MG/ML
INJECTION, POWDER, LYOPHILIZED, FOR SOLUTION INTRAVENOUS PRN
Status: DISCONTINUED | OUTPATIENT
Start: 2019-01-01 | End: 2019-01-01

## 2019-01-01 RX ORDER — PROPOFOL 10 MG/ML
INJECTION, EMULSION INTRAVENOUS CONTINUOUS PRN
Status: DISCONTINUED | OUTPATIENT
Start: 2019-01-01 | End: 2019-01-01

## 2019-01-01 RX ORDER — PROCHLORPERAZINE MALEATE 5 MG
5 TABLET ORAL EVERY 6 HOURS PRN
Status: DISCONTINUED | OUTPATIENT
Start: 2019-01-01 | End: 2019-01-01 | Stop reason: HOSPADM

## 2019-01-01 RX ORDER — CEFAZOLIN SODIUM 1 G/3ML
1 INJECTION, POWDER, FOR SOLUTION INTRAMUSCULAR; INTRAVENOUS SEE ADMIN INSTRUCTIONS
Status: DISCONTINUED | OUTPATIENT
Start: 2019-01-01 | End: 2019-01-01 | Stop reason: HOSPADM

## 2019-01-01 RX ORDER — ISOSORBIDE MONONITRATE 30 MG/1
30 TABLET, EXTENDED RELEASE ORAL
Status: DISCONTINUED | OUTPATIENT
Start: 2019-01-01 | End: 2019-01-01 | Stop reason: HOSPADM

## 2019-01-01 RX ORDER — ACETAMINOPHEN 325 MG/1
975 TABLET ORAL ONCE
Status: DISCONTINUED | OUTPATIENT
Start: 2019-01-01 | End: 2019-01-01 | Stop reason: HOSPADM

## 2019-01-01 RX ORDER — HYDROXYZINE HYDROCHLORIDE 10 MG/1
10 TABLET, FILM COATED ORAL EVERY 6 HOURS PRN
Qty: 30 TABLET | Refills: 1 | Status: SHIPPED | OUTPATIENT
Start: 2019-01-01 | End: 2019-01-01

## 2019-01-01 RX ORDER — SODIUM CHLORIDE 9 MG/ML
INJECTION, SOLUTION INTRAVENOUS CONTINUOUS
Status: DISCONTINUED | OUTPATIENT
Start: 2019-01-01 | End: 2019-01-01 | Stop reason: CLARIF

## 2019-01-01 RX ORDER — SIMVASTATIN 10 MG
10 TABLET ORAL AT BEDTIME
Status: DISCONTINUED | OUTPATIENT
Start: 2019-01-01 | End: 2019-01-01 | Stop reason: HOSPADM

## 2019-01-01 RX ORDER — AMOXICILLIN 250 MG
2 CAPSULE ORAL 2 TIMES DAILY
Status: DISCONTINUED | OUTPATIENT
Start: 2019-01-01 | End: 2019-01-01 | Stop reason: HOSPADM

## 2019-01-01 RX ORDER — ISOSORBIDE MONONITRATE 30 MG/1
30 TABLET, EXTENDED RELEASE ORAL ONCE
Status: COMPLETED | OUTPATIENT
Start: 2019-01-01 | End: 2019-01-01

## 2019-01-01 RX ORDER — METHOCARBAMOL 750 MG/1
750 TABLET, FILM COATED ORAL EVERY 6 HOURS PRN
Qty: 60 TABLET | Refills: 1 | Status: SHIPPED | OUTPATIENT
Start: 2019-01-01 | End: 2019-01-01

## 2019-01-01 RX ORDER — LEVOTHYROXINE SODIUM 75 UG/1
TABLET ORAL
Qty: 90 TABLET | Refills: 0 | Status: SHIPPED | OUTPATIENT
Start: 2019-01-01 | End: 2020-01-01

## 2019-01-01 RX ORDER — LEVOTHYROXINE SODIUM 75 UG/1
75 TABLET ORAL
Status: DISCONTINUED | OUTPATIENT
Start: 2019-01-01 | End: 2019-01-01 | Stop reason: HOSPADM

## 2019-01-01 RX ORDER — DEXAMETHASONE SODIUM PHOSPHATE 4 MG/ML
INJECTION, SOLUTION INTRA-ARTICULAR; INTRALESIONAL; INTRAMUSCULAR; INTRAVENOUS; SOFT TISSUE PRN
Status: DISCONTINUED | OUTPATIENT
Start: 2019-01-01 | End: 2019-01-01

## 2019-01-01 RX ORDER — BUPIVACAINE HYDROCHLORIDE AND EPINEPHRINE 5; 5 MG/ML; UG/ML
INJECTION, SOLUTION PERINEURAL PRN
Status: DISCONTINUED | OUTPATIENT
Start: 2019-01-01 | End: 2019-01-01 | Stop reason: HOSPADM

## 2019-01-01 RX ORDER — BACITRACIN ZINC 500 [USP'U]/G
OINTMENT TOPICAL PRN
Status: DISCONTINUED | OUTPATIENT
Start: 2019-01-01 | End: 2019-01-01 | Stop reason: HOSPADM

## 2019-01-01 RX ORDER — ACETAMINOPHEN 325 MG/1
975 TABLET ORAL EVERY 8 HOURS
Status: COMPLETED | OUTPATIENT
Start: 2019-01-01 | End: 2019-01-01

## 2019-01-01 RX ORDER — ONDANSETRON 2 MG/ML
4 INJECTION INTRAMUSCULAR; INTRAVENOUS EVERY 30 MIN PRN
Status: DISCONTINUED | OUTPATIENT
Start: 2019-01-01 | End: 2019-01-01 | Stop reason: HOSPADM

## 2019-01-01 RX ORDER — LABETALOL 20 MG/4 ML (5 MG/ML) INTRAVENOUS SYRINGE
10
Status: DISCONTINUED | OUTPATIENT
Start: 2019-01-01 | End: 2019-01-01 | Stop reason: HOSPADM

## 2019-01-01 RX ORDER — AMOXICILLIN 250 MG
1 CAPSULE ORAL DAILY PRN
Qty: 30 TABLET | Refills: 1 | Status: SHIPPED | OUTPATIENT
Start: 2019-01-01 | End: 2019-01-01 | Stop reason: DRUGHIGH

## 2019-01-01 RX ADMIN — SODIUM CHLORIDE, POTASSIUM CHLORIDE, SODIUM LACTATE AND CALCIUM CHLORIDE: 600; 310; 30; 20 INJECTION, SOLUTION INTRAVENOUS at 13:46

## 2019-01-01 RX ADMIN — SENNOSIDES AND DOCUSATE SODIUM 2 TABLET: 8.6; 5 TABLET ORAL at 20:19

## 2019-01-01 RX ADMIN — VECURONIUM BROMIDE 2 MG: 1 INJECTION, POWDER, LYOPHILIZED, FOR SOLUTION INTRAVENOUS at 10:52

## 2019-01-01 RX ADMIN — ACETAMINOPHEN 975 MG: 325 TABLET, FILM COATED ORAL at 00:20

## 2019-01-01 RX ADMIN — SODIUM CHLORIDE: 9 INJECTION, SOLUTION INTRAVENOUS at 17:07

## 2019-01-01 RX ADMIN — CEFAZOLIN 1 G: 1 INJECTION, POWDER, FOR SOLUTION INTRAMUSCULAR; INTRAVENOUS at 18:23

## 2019-01-01 RX ADMIN — HYDROMORPHONE HYDROCHLORIDE 0.4 MG: 1 INJECTION, SOLUTION INTRAMUSCULAR; INTRAVENOUS; SUBCUTANEOUS at 22:37

## 2019-01-01 RX ADMIN — LIDOCAINE HYDROCHLORIDE 80 MG: 20 INJECTION, SOLUTION INFILTRATION; PERINEURAL at 10:20

## 2019-01-01 RX ADMIN — METHOCARBAMOL TABLETS 750 MG: 750 TABLET, COATED ORAL at 00:00

## 2019-01-01 RX ADMIN — HYDROMORPHONE HYDROCHLORIDE 0.3 MG: 1 INJECTION, SOLUTION INTRAMUSCULAR; INTRAVENOUS; SUBCUTANEOUS at 20:03

## 2019-01-01 RX ADMIN — HYDROMORPHONE HYDROCHLORIDE 2 MG: 2 TABLET ORAL at 01:56

## 2019-01-01 RX ADMIN — LEVOTHYROXINE SODIUM 75 MCG: 75 TABLET ORAL at 06:34

## 2019-01-01 RX ADMIN — FENTANYL CITRATE 100 MCG: 50 INJECTION, SOLUTION INTRAMUSCULAR; INTRAVENOUS at 10:20

## 2019-01-01 RX ADMIN — GLYCOPYRROLATE 0.6 MG: 0.2 INJECTION, SOLUTION INTRAMUSCULAR; INTRAVENOUS at 12:57

## 2019-01-01 RX ADMIN — NEOSTIGMINE METHYLSULFATE 4 MG: 1 INJECTION, SOLUTION INTRAVENOUS at 12:58

## 2019-01-01 RX ADMIN — Medication 5 MG: at 12:34

## 2019-01-01 RX ADMIN — FENTANYL CITRATE 50 MCG: 50 INJECTION, SOLUTION INTRAMUSCULAR; INTRAVENOUS at 11:43

## 2019-01-01 RX ADMIN — ACETAMINOPHEN 975 MG: 325 TABLET, FILM COATED ORAL at 09:15

## 2019-01-01 RX ADMIN — ACETAMINOPHEN 975 MG: 325 TABLET, FILM COATED ORAL at 23:59

## 2019-01-01 RX ADMIN — PROPOFOL 25 MCG/KG/MIN: 10 INJECTION, EMULSION INTRAVENOUS at 11:08

## 2019-01-01 RX ADMIN — METHYLPREDNISOLONE ACETATE 40 MG: 40 INJECTION, SUSPENSION INTRA-ARTICULAR; INTRALESIONAL; INTRAMUSCULAR; SOFT TISSUE at 11:30

## 2019-01-01 RX ADMIN — PHENYLEPHRINE HYDROCHLORIDE 50 MCG: 10 INJECTION INTRAVENOUS at 11:16

## 2019-01-01 RX ADMIN — METOPROLOL TARTRATE 12.5 MG: 25 TABLET, FILM COATED ORAL at 09:39

## 2019-01-01 RX ADMIN — METOPROLOL TARTRATE 12.5 MG: 25 TABLET, FILM COATED ORAL at 20:25

## 2019-01-01 RX ADMIN — HYDROMORPHONE HYDROCHLORIDE 0.4 MG: 1 INJECTION, SOLUTION INTRAMUSCULAR; INTRAVENOUS; SUBCUTANEOUS at 03:32

## 2019-01-01 RX ADMIN — SENNOSIDES AND DOCUSATE SODIUM 1 TABLET: 8.6; 5 TABLET ORAL at 08:50

## 2019-01-01 RX ADMIN — HYDROMORPHONE HYDROCHLORIDE 2 MG: 2 TABLET ORAL at 09:33

## 2019-01-01 RX ADMIN — METOPROLOL TARTRATE 12.5 MG: 25 TABLET, FILM COATED ORAL at 08:50

## 2019-01-01 RX ADMIN — HYDROMORPHONE HYDROCHLORIDE 2 MG: 2 TABLET ORAL at 05:07

## 2019-01-01 RX ADMIN — PROPOFOL 50 MG: 10 INJECTION, EMULSION INTRAVENOUS at 10:23

## 2019-01-01 RX ADMIN — HYDROMORPHONE HYDROCHLORIDE 0.4 MG: 1 INJECTION, SOLUTION INTRAMUSCULAR; INTRAVENOUS; SUBCUTANEOUS at 09:50

## 2019-01-01 RX ADMIN — SENNOSIDES AND DOCUSATE SODIUM 1 TABLET: 8.6; 5 TABLET ORAL at 20:15

## 2019-01-01 RX ADMIN — HYDROMORPHONE HYDROCHLORIDE 2 MG: 2 TABLET ORAL at 17:28

## 2019-01-01 RX ADMIN — ONDANSETRON 4 MG: 2 INJECTION INTRAMUSCULAR; INTRAVENOUS at 12:30

## 2019-01-01 RX ADMIN — ISOSORBIDE MONONITRATE 30 MG: 30 TABLET, EXTENDED RELEASE ORAL at 10:08

## 2019-01-01 RX ADMIN — SENNOSIDES AND DOCUSATE SODIUM 1 TABLET: 8.6; 5 TABLET ORAL at 09:39

## 2019-01-01 RX ADMIN — LEVOTHYROXINE SODIUM 75 MCG: 75 TABLET ORAL at 05:51

## 2019-01-01 RX ADMIN — FENTANYL CITRATE 50 MCG: 50 INJECTION, SOLUTION INTRAMUSCULAR; INTRAVENOUS at 10:52

## 2019-01-01 RX ADMIN — ACETAMINOPHEN 975 MG: 325 TABLET, FILM COATED ORAL at 01:58

## 2019-01-01 RX ADMIN — HYDROMORPHONE HYDROCHLORIDE 2 MG: 2 TABLET ORAL at 20:19

## 2019-01-01 RX ADMIN — SENNOSIDES AND DOCUSATE SODIUM 1 TABLET: 8.6; 5 TABLET ORAL at 20:24

## 2019-01-01 RX ADMIN — SODIUM CHLORIDE, POTASSIUM CHLORIDE, SODIUM LACTATE AND CALCIUM CHLORIDE: 600; 310; 30; 20 INJECTION, SOLUTION INTRAVENOUS at 11:34

## 2019-01-01 RX ADMIN — METOPROLOL TARTRATE 12.5 MG: 25 TABLET, FILM COATED ORAL at 20:19

## 2019-01-01 RX ADMIN — SODIUM CHLORIDE, POTASSIUM CHLORIDE, SODIUM LACTATE AND CALCIUM CHLORIDE: 600; 310; 30; 20 INJECTION, SOLUTION INTRAVENOUS at 09:18

## 2019-01-01 RX ADMIN — SENNOSIDES AND DOCUSATE SODIUM 1 TABLET: 8.6; 5 TABLET ORAL at 08:31

## 2019-01-01 RX ADMIN — PHENYLEPHRINE HYDROCHLORIDE 100 MCG: 10 INJECTION INTRAVENOUS at 10:20

## 2019-01-01 RX ADMIN — HYDROMORPHONE HYDROCHLORIDE 2 MG: 2 TABLET ORAL at 22:20

## 2019-01-01 RX ADMIN — ISOSORBIDE MONONITRATE 30 MG: 30 TABLET, EXTENDED RELEASE ORAL at 09:39

## 2019-01-01 RX ADMIN — HYDROMORPHONE HYDROCHLORIDE 0.5 MG: 1 INJECTION, SOLUTION INTRAMUSCULAR; INTRAVENOUS; SUBCUTANEOUS at 23:58

## 2019-01-01 RX ADMIN — HYDROMORPHONE HYDROCHLORIDE 2 MG: 2 TABLET ORAL at 15:37

## 2019-01-01 RX ADMIN — HYDROMORPHONE HYDROCHLORIDE 0.4 MG: 1 INJECTION, SOLUTION INTRAMUSCULAR; INTRAVENOUS; SUBCUTANEOUS at 13:31

## 2019-01-01 RX ADMIN — SODIUM CHLORIDE: 9 INJECTION, SOLUTION INTRAVENOUS at 05:51

## 2019-01-01 RX ADMIN — SIMVASTATIN 10 MG: 10 TABLET, FILM COATED ORAL at 20:33

## 2019-01-01 RX ADMIN — HYDROMORPHONE HYDROCHLORIDE 2 MG: 2 TABLET ORAL at 11:02

## 2019-01-01 RX ADMIN — LEVOTHYROXINE SODIUM 75 MCG: 75 TABLET ORAL at 07:27

## 2019-01-01 RX ADMIN — CEFAZOLIN 1 G: 1 INJECTION, POWDER, FOR SOLUTION INTRAMUSCULAR; INTRAVENOUS at 02:46

## 2019-01-01 RX ADMIN — HYDROMORPHONE HYDROCHLORIDE 2 MG: 2 TABLET ORAL at 03:35

## 2019-01-01 RX ADMIN — HYDROMORPHONE HYDROCHLORIDE 0.5 MG: 1 INJECTION, SOLUTION INTRAMUSCULAR; INTRAVENOUS; SUBCUTANEOUS at 13:26

## 2019-01-01 RX ADMIN — LIOTHYRONINE SODIUM 5 MCG: 5 TABLET ORAL at 08:32

## 2019-01-01 RX ADMIN — ACETAMINOPHEN 975 MG: 325 TABLET, FILM COATED ORAL at 09:39

## 2019-01-01 RX ADMIN — SIMVASTATIN 10 MG: 10 TABLET, FILM COATED ORAL at 20:19

## 2019-01-01 RX ADMIN — VECURONIUM BROMIDE 1 MG: 1 INJECTION, POWDER, LYOPHILIZED, FOR SOLUTION INTRAVENOUS at 11:45

## 2019-01-01 RX ADMIN — OMEPRAZOLE 20 MG: 20 CAPSULE, DELAYED RELEASE ORAL at 08:00

## 2019-01-01 RX ADMIN — ISOSORBIDE MONONITRATE 30 MG: 30 TABLET, EXTENDED RELEASE ORAL at 08:31

## 2019-01-01 RX ADMIN — HYDROMORPHONE HYDROCHLORIDE 2 MG: 2 TABLET ORAL at 17:26

## 2019-01-01 RX ADMIN — HYDROMORPHONE HYDROCHLORIDE 0.4 MG: 1 INJECTION, SOLUTION INTRAMUSCULAR; INTRAVENOUS; SUBCUTANEOUS at 06:08

## 2019-01-01 RX ADMIN — ISOSORBIDE MONONITRATE 30 MG: 30 TABLET, EXTENDED RELEASE ORAL at 08:50

## 2019-01-01 RX ADMIN — CEFAZOLIN 1 G: 1 INJECTION, POWDER, FOR SOLUTION INTRAMUSCULAR; INTRAVENOUS at 02:43

## 2019-01-01 RX ADMIN — GABAPENTIN 100 MG: 100 CAPSULE ORAL at 09:15

## 2019-01-01 RX ADMIN — DEXMEDETOMIDINE HYDROCHLORIDE 8 MCG: 100 INJECTION, SOLUTION INTRAVENOUS at 10:56

## 2019-01-01 RX ADMIN — HYDROMORPHONE HYDROCHLORIDE 2 MG: 2 TABLET ORAL at 06:29

## 2019-01-01 RX ADMIN — HYDROMORPHONE HYDROCHLORIDE 2 MG: 2 TABLET ORAL at 12:49

## 2019-01-01 RX ADMIN — HYDROMORPHONE HYDROCHLORIDE 2 MG: 2 TABLET ORAL at 00:20

## 2019-01-01 RX ADMIN — CEFAZOLIN 1 G: 1 INJECTION, POWDER, FOR SOLUTION INTRAMUSCULAR; INTRAVENOUS at 10:38

## 2019-01-01 RX ADMIN — HYDROMORPHONE HYDROCHLORIDE 2 MG: 2 TABLET ORAL at 19:12

## 2019-01-01 RX ADMIN — ACETAMINOPHEN 975 MG: 325 TABLET, FILM COATED ORAL at 08:50

## 2019-01-01 RX ADMIN — LIOTHYRONINE SODIUM 5 MCG: 5 TABLET ORAL at 09:39

## 2019-01-01 RX ADMIN — PROPOFOL 150 MG: 10 INJECTION, EMULSION INTRAVENOUS at 10:20

## 2019-01-01 RX ADMIN — DEXAMETHASONE SODIUM PHOSPHATE 4 MG: 4 INJECTION, SOLUTION INTRA-ARTICULAR; INTRALESIONAL; INTRAMUSCULAR; INTRAVENOUS; SOFT TISSUE at 10:44

## 2019-01-01 RX ADMIN — HYDROMORPHONE HYDROCHLORIDE 2 MG: 2 TABLET ORAL at 08:12

## 2019-01-01 RX ADMIN — CEFAZOLIN 1 G: 1 INJECTION, POWDER, FOR SOLUTION INTRAMUSCULAR; INTRAVENOUS at 19:04

## 2019-01-01 RX ADMIN — CEFAZOLIN SODIUM 2 G: 2 INJECTION, SOLUTION INTRAVENOUS at 10:35

## 2019-01-01 RX ADMIN — VECURONIUM BROMIDE 1 MG: 1 INJECTION, POWDER, LYOPHILIZED, FOR SOLUTION INTRAVENOUS at 11:30

## 2019-01-01 RX ADMIN — VECURONIUM BROMIDE 1 MG: 1 INJECTION, POWDER, LYOPHILIZED, FOR SOLUTION INTRAVENOUS at 12:22

## 2019-01-01 RX ADMIN — ROCURONIUM BROMIDE 50 MG: 10 INJECTION INTRAVENOUS at 10:20

## 2019-01-01 RX ADMIN — ACETAMINOPHEN 975 MG: 325 TABLET, FILM COATED ORAL at 08:31

## 2019-01-01 RX ADMIN — OMEPRAZOLE 20 MG: 20 CAPSULE, DELAYED RELEASE ORAL at 07:33

## 2019-01-01 RX ADMIN — LIOTHYRONINE SODIUM 5 MCG: 5 TABLET ORAL at 08:50

## 2019-01-01 RX ADMIN — OMEPRAZOLE 20 MG: 20 CAPSULE, DELAYED RELEASE ORAL at 08:12

## 2019-01-01 RX ADMIN — ACETAMINOPHEN 975 MG: 325 TABLET, FILM COATED ORAL at 16:47

## 2019-01-01 RX ADMIN — ACETAMINOPHEN 975 MG: 325 TABLET, FILM COATED ORAL at 16:52

## 2019-01-01 RX ADMIN — METHOCARBAMOL TABLETS 750 MG: 750 TABLET, COATED ORAL at 06:29

## 2019-01-01 RX ADMIN — LIDOCAINE HYDROCHLORIDE 0.2 ML: 10 INJECTION, SOLUTION EPIDURAL; INFILTRATION; INTRACAUDAL; PERINEURAL at 09:19

## 2019-01-01 RX ADMIN — SIMVASTATIN 10 MG: 10 TABLET, FILM COATED ORAL at 20:15

## 2019-01-01 RX ADMIN — Medication 5 MG: at 11:21

## 2019-01-01 RX ADMIN — LABETALOL 20 MG/4 ML (5 MG/ML) INTRAVENOUS SYRINGE 10 MG: at 13:24

## 2019-01-01 RX ADMIN — CEFAZOLIN 1 G: 1 INJECTION, POWDER, FOR SOLUTION INTRAMUSCULAR; INTRAVENOUS at 11:10

## 2019-01-01 RX ADMIN — HYDROMORPHONE HYDROCHLORIDE 0.5 MG: 1 INJECTION, SOLUTION INTRAMUSCULAR; INTRAVENOUS; SUBCUTANEOUS at 22:54

## 2019-01-01 RX ADMIN — METOPROLOL TARTRATE 12.5 MG: 25 TABLET, FILM COATED ORAL at 08:31

## 2019-01-01 RX ADMIN — HYDROMORPHONE HYDROCHLORIDE 0.5 MG: 1 INJECTION, SOLUTION INTRAMUSCULAR; INTRAVENOUS; SUBCUTANEOUS at 11:00

## 2019-01-01 RX ADMIN — METHOCARBAMOL TABLETS 750 MG: 750 TABLET, COATED ORAL at 23:05

## 2019-01-01 RX ADMIN — CEFAZOLIN 1 G: 1 INJECTION, POWDER, FOR SOLUTION INTRAMUSCULAR; INTRAVENOUS at 18:26

## 2019-01-01 RX ADMIN — PHENYLEPHRINE HYDROCHLORIDE 100 MCG: 10 INJECTION INTRAVENOUS at 11:26

## 2019-01-01 RX ADMIN — PHENYLEPHRINE HYDROCHLORIDE 0.3 MCG/KG/MIN: 10 INJECTION INTRAVENOUS at 11:25

## 2019-01-01 RX ADMIN — OXYCODONE HYDROCHLORIDE 5 MG: 5 TABLET ORAL at 21:20

## 2019-01-01 RX ADMIN — CEFAZOLIN 1 G: 1 INJECTION, POWDER, FOR SOLUTION INTRAMUSCULAR; INTRAVENOUS at 01:58

## 2019-01-01 RX ADMIN — ACETAMINOPHEN 975 MG: 325 TABLET, FILM COATED ORAL at 17:12

## 2019-01-01 RX ADMIN — HYDROMORPHONE HYDROCHLORIDE 2 MG: 2 TABLET ORAL at 14:01

## 2019-01-01 RX ADMIN — HYDROMORPHONE HYDROCHLORIDE 0.5 MG: 1 INJECTION, SOLUTION INTRAMUSCULAR; INTRAVENOUS; SUBCUTANEOUS at 13:47

## 2019-01-01 RX ADMIN — Medication 5 MG: at 11:52

## 2019-01-01 ASSESSMENT — ACTIVITIES OF DAILY LIVING (ADL)
ADLS_ACUITY_SCORE: 19
ADLS_ACUITY_SCORE: 22
ADLS_ACUITY_SCORE: 19
ADLS_ACUITY_SCORE: 21
ADLS_ACUITY_SCORE: 20
ADLS_ACUITY_SCORE: 20
ADLS_ACUITY_SCORE: 19
ADLS_ACUITY_SCORE: 20
ADLS_ACUITY_SCORE: 19
IADL_COMMENTS: SPOUSE A WITH IADL'S
ADLS_ACUITY_SCORE: 19
ADLS_ACUITY_SCORE: 20
ADLS_ACUITY_SCORE: 21
ADLS_ACUITY_SCORE: 20
ADLS_ACUITY_SCORE: 22
ADLS_ACUITY_SCORE: 19
ADLS_ACUITY_SCORE: 20
ADLS_ACUITY_SCORE: 19

## 2019-01-01 ASSESSMENT — MIFFLIN-ST. JEOR
SCORE: 1222.25
SCORE: 1158.28
SCORE: 1163.96
SCORE: 1167.35
SCORE: 1208.64
SCORE: 1171.89
SCORE: 1185.5
SCORE: 1226.78
SCORE: 1163.96

## 2019-01-01 ASSESSMENT — PATIENT HEALTH QUESTIONNAIRE - PHQ9: SUM OF ALL RESPONSES TO PHQ QUESTIONS 1-9: 4

## 2019-01-01 ASSESSMENT — PAIN SCALES - GENERAL
PAINLEVEL: MILD PAIN (2)
PAINLEVEL: MODERATE PAIN (5)
PAINLEVEL: EXTREME PAIN (8)

## 2019-01-01 ASSESSMENT — ENCOUNTER SYMPTOMS: SEIZURES: 0

## 2019-01-01 NOTE — PATIENT INSTRUCTIONS
[facility]   Dr Manzanares, Endocrinology Department      [facility]   [address]  [place] [address]  Appointment [phone]  Fax: [phone]  [place]: Monday and Tuesday         [place] [address] [facility]  [place] [address]  Appointment [phone]  Fax: [phone]  [place]: Wednesday and Thursday          Decrease dose of levothyroxine to 88 mcg/day  Labs in 2 months  Please make a lab appointment for blood work and follow up clinic appointment in 1 week after that to discuss results.       1. I was told the name of the doctor(s) who took care of my child while in the hospital.    2. I have been told about any important findings on my child's plan of care.    3. The doctor clearly explained my child's diagnosis and other possible diagnoses that were considered.    4. My child's doctor explained all the tests that were done and their results (if available). I understand that some of the test results may not be ready before we go home and I was told how I can get these results. I understand that a summary of my child's hospitalization and important test results will be shared with my child's outpatient doctor.    5. My child's doctor talked to me about what I need to do when we go home.    6. I understand what signs and symptoms to watch for. I understand what symptoms I would need to call my doctor for and/or return to the hospital.    7. I have the phone number to call the hospital for results and/or questions after I leave the hospital.

## 2019-01-02 ENCOUNTER — TELEPHONE (OUTPATIENT)
Dept: NEUROSURGERY | Facility: CLINIC | Age: 84
End: 2019-01-02

## 2019-01-02 NOTE — TELEPHONE ENCOUNTER
Returned call to patient she states that she still continues to have intermittent BLE/hip pain 10/10 at times. She states the pain is worse at night and she has been having trouble sleeping. She is seeing Dr. Gaspar for her hips and is exercising with her at the [facility] once a month. She denies N/T or weakness in her LE. She wants to know what her next steps would be since the injection didn't help. Will forward message to the care team for review.

## 2019-01-02 NOTE — TELEPHONE ENCOUNTER
REASON FOR CALL: Pt called stating that she had her INJ done on 12/10. States that she felt relief for about two days and the numbness had helped her. Numbness has now wore off and she is in pain again. Pt says that it feels like it is getting worst, causing her to be unable to sleep. Pt is having hip and leg pain. She saw the hip doctor, and she states that he said her back is causing her to have those pain. Pt would like a call back to discuss what her next steps should be.

## 2019-01-04 ENCOUNTER — OFFICE VISIT (OUTPATIENT)
Dept: INTERNAL MEDICINE | Facility: CLINIC | Age: 84
End: 2019-01-04
Payer: COMMERCIAL

## 2019-01-04 VITALS
RESPIRATION RATE: 16 BRPM | SYSTOLIC BLOOD PRESSURE: 138 MMHG | HEIGHT: 62 IN | TEMPERATURE: 98 F | BODY MASS INDEX: 34.69 KG/M2 | WEIGHT: 188.5 LBS | HEART RATE: 76 BPM | OXYGEN SATURATION: 95 % | DIASTOLIC BLOOD PRESSURE: 60 MMHG

## 2019-01-04 DIAGNOSIS — K21.9 GASTROESOPHAGEAL REFLUX DISEASE WITHOUT ESOPHAGITIS: ICD-10-CM

## 2019-01-04 DIAGNOSIS — E78.5 HYPERLIPIDEMIA LDL GOAL <100: Primary | ICD-10-CM

## 2019-01-04 DIAGNOSIS — F32.0 MILD MAJOR DEPRESSION (H): ICD-10-CM

## 2019-01-04 DIAGNOSIS — I10 BENIGN ESSENTIAL HYPERTENSION: ICD-10-CM

## 2019-01-04 LAB
BASOPHILS # BLD AUTO: 0 10E9/L (ref 0–0.2)
BASOPHILS NFR BLD AUTO: 0.2 %
DIFFERENTIAL METHOD BLD: NORMAL
EOSINOPHIL # BLD AUTO: 0.1 10E9/L (ref 0–0.7)
EOSINOPHIL NFR BLD AUTO: 1.3 %
ERYTHROCYTE [DISTWIDTH] IN BLOOD BY AUTOMATED COUNT: 13.3 % (ref 10–15)
HCT VFR BLD AUTO: 43.4 % (ref 35–47)
HGB BLD-MCNC: 14.2 G/DL (ref 11.7–15.7)
LYMPHOCYTES # BLD AUTO: 1.3 10E9/L (ref 0.8–5.3)
LYMPHOCYTES NFR BLD AUTO: 14.8 %
MCH RBC QN AUTO: 31.6 PG (ref 26.5–33)
MCHC RBC AUTO-ENTMCNC: 32.7 G/DL (ref 31.5–36.5)
MCV RBC AUTO: 96 FL (ref 78–100)
MONOCYTES # BLD AUTO: 0.5 10E9/L (ref 0–1.3)
MONOCYTES NFR BLD AUTO: 5.7 %
NEUTROPHILS # BLD AUTO: 6.9 10E9/L (ref 1.6–8.3)
NEUTROPHILS NFR BLD AUTO: 78 %
PLATELET # BLD AUTO: 204 10E9/L (ref 150–450)
RBC # BLD AUTO: 4.5 10E12/L (ref 3.8–5.2)
WBC # BLD AUTO: 8.8 10E9/L (ref 4–11)

## 2019-01-04 PROCEDURE — 85025 COMPLETE CBC W/AUTO DIFF WBC: CPT | Performed by: INTERNAL MEDICINE

## 2019-01-04 PROCEDURE — 99214 OFFICE O/P EST MOD 30 MIN: CPT | Performed by: INTERNAL MEDICINE

## 2019-01-04 PROCEDURE — 80061 LIPID PANEL: CPT | Performed by: INTERNAL MEDICINE

## 2019-01-04 PROCEDURE — 80053 COMPREHEN METABOLIC PANEL: CPT | Performed by: INTERNAL MEDICINE

## 2019-01-04 PROCEDURE — 36415 COLL VENOUS BLD VENIPUNCTURE: CPT | Performed by: INTERNAL MEDICINE

## 2019-01-04 RX ORDER — METOPROLOL TARTRATE 25 MG/1
12.5 TABLET, FILM COATED ORAL 2 TIMES DAILY
Qty: 90 TABLET | Refills: 1 | Status: SHIPPED | OUTPATIENT
Start: 2019-01-04 | End: 2019-06-18

## 2019-01-04 ASSESSMENT — MIFFLIN-ST. JEOR: SCORE: 1273.28

## 2019-01-04 NOTE — PATIENT INSTRUCTIONS
Trial of omeprazole for 2 weeks 1 month.  30 minutes prior to largest meal.     Foods that worsen heartburn - caffeine, alcohol, pepperment,  Spicy foods, acidic foods, and fatty foods    Consider GI referral if no improvement.

## 2019-01-04 NOTE — PROGRESS NOTES
"  SUBJECTIVE:   Elizabeth Li is a 83 year old female who presents to clinic today for the following health issues:      Hypothyroidism. She is seeing Dr. Manzanares this month and having her thyroid tests performed then.      Hyperlipidemia Follow-Up      Rate your low fat/cholesterol diet?: good    Taking statin?  yes    Other lipid medications/supplements?:  None    Depression.  She has been stable with a 12 step problem.    PHQ-9 SCORE 1/17/2014 1/20/2017 9/21/2018   PHQ-9 Total Score 3 - -   PHQ-9 Total Score - 11 6       CAD. S/p stents.  She is on aspirin, statin, beta blocker.  The patient has not had any chest pain.    Back pain/hip pain- osteoarthritis.  She is following with Dr. Gaspar.  She has had an injection in her hip and in her back.     RUQ pain.  She has had RUQ pain for a couple of months.   This occurs every other night.   It becomes 10/10 pain after laying in bed.  The pain lasts until she is upright.  After she burps she feels better.   She is trying zantac.   No nausea or vomiting.  No diarrhea.    No blood in stool.   She takes miralax for constipation.  No new shortness of breath.       Amount of exercise or physical activity: None    Problems taking medications regularly: No    Medication side effects: none    Diet: gluten and dairy free diet            Problem list and histories reviewed & adjusted, as indicated.  Additional history: as documented    Labs reviewed in EPIC    Reviewed and updated as needed this visit by clinical staff  Allergies  Meds       Reviewed and updated as needed this visit by Provider         ROS:  CONSTITUTIONAL: NEGATIVE for fever, chills, change in weight  RESP: NEGATIVE for significant cough or SOB  CV: NEGATIVE for chest pain, palpitations or peripheral edema  Psych: depression    OBJECTIVE:     /60 (BP Location: Right arm, Patient Position: Sitting, Cuff Size: Adult Large)   Pulse 76   Temp 98  F (36.7  C) (Oral)   Resp 16   Ht 1.575 m (5' 2\") "   Wt 85.5 kg (188 lb 8 oz)   SpO2 95%   BMI 34.48 kg/m    Body mass index is 34.48 kg/m .  GENERAL: healthy, alert and no distress  RESP: lungs clear to auscultation - no rales, rhonchi or wheezes  CV: regular rate and rhythm, normal S1 S2, no S3 or S4, no murmur, click or rub  Psych: normal affect    ASSESSMENT/PLAN:     (E78.5) Hyperlipidemia LDL goal <100  (primary encounter diagnosis)  Comment: assess  Plan: Comprehensive metabolic panel, Lipid panel         reflex to direct LDL Fasting           (F32.0) Mild major depression (H)  Comment: stable  Plan:  Patient does not want meds.        (I25.10) Coronary artery disease involving native coronary artery without angina pectoris  Comment: stable  Plan: monitor    (K21.9) Gastroesophageal reflux disease without esophagitis  Comment: possibly causing RUQ and burping at night when laying down  Plan: omeprazole (PRILOSEC) 20 MG DR capsule        -GI consult if no improvement    (I10) Benign essential hypertension  Comment: at goal  Plan: metoprolol tartrate (LOPRESSOR) 25 MG tablet,         Lipid panel reflex to direct LDL Fasting, CBC         with platelets differential          Back pain.  Patient plans on following up with Dr. Gaspar.        Rebekah Rausch MD  [facility]

## 2019-01-05 LAB
ALBUMIN SERPL-MCNC: 3.9 G/DL (ref 3.4–5)
ALP SERPL-CCNC: 78 U/L (ref 40–150)
ALT SERPL W P-5'-P-CCNC: 19 U/L (ref 0–50)
ANION GAP SERPL CALCULATED.3IONS-SCNC: 6 MMOL/L (ref 3–14)
AST SERPL W P-5'-P-CCNC: 13 U/L (ref 0–45)
BILIRUB SERPL-MCNC: 0.4 MG/DL (ref 0.2–1.3)
BUN SERPL-MCNC: 13 MG/DL (ref 7–30)
CALCIUM SERPL-MCNC: 9.9 MG/DL (ref 8.5–10.1)
CHLORIDE SERPL-SCNC: 108 MMOL/L (ref 94–109)
CHOLEST SERPL-MCNC: 163 MG/DL
CO2 SERPL-SCNC: 25 MMOL/L (ref 20–32)
CREAT SERPL-MCNC: 0.82 MG/DL (ref 0.52–1.04)
GFR SERPL CREATININE-BSD FRML MDRD: 66 ML/MIN/{1.73_M2}
GLUCOSE SERPL-MCNC: 92 MG/DL (ref 70–99)
HDLC SERPL-MCNC: 57 MG/DL
LDLC SERPL CALC-MCNC: 88 MG/DL
NONHDLC SERPL-MCNC: 106 MG/DL
POTASSIUM SERPL-SCNC: 4.2 MMOL/L (ref 3.4–5.3)
PROT SERPL-MCNC: 7.2 G/DL (ref 6.8–8.8)
SODIUM SERPL-SCNC: 139 MMOL/L (ref 133–144)
TRIGL SERPL-MCNC: 88 MG/DL

## 2019-01-16 ENCOUNTER — TELEPHONE (OUTPATIENT)
Dept: PALLIATIVE MEDICINE | Facility: CLINIC | Age: 84
End: 2019-01-16

## 2019-01-16 NOTE — TELEPHONE ENCOUNTER
Pt called and said she was suppose to get something on My Chart in regards to her Dec 10th Inj.  Pt would like something sent to her on MyChart.      Corby Perera  [facility]

## 2019-01-17 ENCOUNTER — TRANSFERRED RECORDS (OUTPATIENT)
Dept: HEALTH INFORMATION MANAGEMENT | Facility: CLINIC | Age: 84
End: 2019-01-17

## 2019-01-17 DIAGNOSIS — E03.9 ACQUIRED HYPOTHYROIDISM: ICD-10-CM

## 2019-01-17 LAB — T3FREE SERPL-MCNC: 1.2 PG/ML (ref 2.3–4.2)

## 2019-01-17 PROCEDURE — 84443 ASSAY THYROID STIM HORMONE: CPT | Performed by: INTERNAL MEDICINE

## 2019-01-17 PROCEDURE — 36415 COLL VENOUS BLD VENIPUNCTURE: CPT | Performed by: INTERNAL MEDICINE

## 2019-01-17 PROCEDURE — 84481 FREE ASSAY (FT-3): CPT | Performed by: INTERNAL MEDICINE

## 2019-01-17 PROCEDURE — 84439 ASSAY OF FREE THYROXINE: CPT | Performed by: INTERNAL MEDICINE

## 2019-01-17 NOTE — TELEPHONE ENCOUNTER
Called patient. She states that she was told at her injection that we would Mychart her afterwards and that if she had not heard anything she should call back.    Advised that I was the RN who d/c'ed her and I have never advised a patient that I would Mychart their vitals after an injection so there must be a miscommunication. Advised that I likely told her that her vitals would entered into her chart. This is maybe where confusion occurred. She states that she was also told that she would be getting a call to follow up about her injection, advised that we do one week follow up calls as a courtesy, possibly this is what she was thinking was a call from a nurse, but advised that I had spoken to her after her injection.  also was not sure who referred her, advised to follow up with Gail Whaley.     Will follow up with referring    Tiffanie BLACKWOOD, RN Care Coordinator  [facility]

## 2019-01-18 ENCOUNTER — HOSPITAL ENCOUNTER (INPATIENT)
Facility: CLINIC | Age: 84
LOS: 3 days | Discharge: HOME | DRG: 689 | End: 2019-01-21
Attending: INTERNAL MEDICINE | Admitting: INTERNAL MEDICINE
Payer: COMMERCIAL

## 2019-01-18 DIAGNOSIS — J18.9 PNEUMONIA DUE TO INFECTIOUS ORGANISM, UNSPECIFIED LATERALITY, UNSPECIFIED PART OF LUNG: Primary | ICD-10-CM

## 2019-01-18 LAB
CREAT SERPL-MCNC: 0.86 MG/DL (ref 0.52–1.04)
GFR SERPL CREATININE-BSD FRML MDRD: 63 ML/MIN/{1.73_M2}
PLATELET # BLD AUTO: 157 10E9/L (ref 150–450)
T4 FREE SERPL-MCNC: 1.33 NG/DL (ref 0.76–1.46)
TSH SERPL DL<=0.005 MIU/L-ACNC: 0.23 MU/L (ref 0.4–4)

## 2019-01-18 PROCEDURE — 12000000 ZZH R&B MED SURG/OB

## 2019-01-18 PROCEDURE — 36415 COLL VENOUS BLD VENIPUNCTURE: CPT | Performed by: INTERNAL MEDICINE

## 2019-01-18 PROCEDURE — 85049 AUTOMATED PLATELET COUNT: CPT | Performed by: INTERNAL MEDICINE

## 2019-01-18 PROCEDURE — 25000128 H RX IP 250 OP 636: Performed by: INTERNAL MEDICINE

## 2019-01-18 PROCEDURE — 99223 1ST HOSP IP/OBS HIGH 75: CPT | Mod: AI | Performed by: INTERNAL MEDICINE

## 2019-01-18 PROCEDURE — 82565 ASSAY OF CREATININE: CPT | Performed by: INTERNAL MEDICINE

## 2019-01-18 RX ORDER — ASPIRIN 81 MG/1
81 TABLET ORAL DAILY
Status: DISCONTINUED | OUTPATIENT
Start: 2019-01-19 | End: 2019-01-21 | Stop reason: HOSPADM

## 2019-01-18 RX ORDER — LEVOTHYROXINE SODIUM 75 UG/1
75 TABLET ORAL
Status: DISCONTINUED | OUTPATIENT
Start: 2019-01-19 | End: 2019-01-21 | Stop reason: HOSPADM

## 2019-01-18 RX ORDER — NITROGLYCERIN 0.4 MG/1
0.4 TABLET SUBLINGUAL EVERY 5 MIN PRN
Status: DISCONTINUED | OUTPATIENT
Start: 2019-01-18 | End: 2019-01-21 | Stop reason: HOSPADM

## 2019-01-18 RX ORDER — IBUPROFEN 400 MG/1
400 TABLET, FILM COATED ORAL EVERY 6 HOURS PRN
Status: DISCONTINUED | OUTPATIENT
Start: 2019-01-18 | End: 2019-01-21 | Stop reason: HOSPADM

## 2019-01-18 RX ORDER — ONDANSETRON 4 MG/1
4 TABLET, ORALLY DISINTEGRATING ORAL EVERY 6 HOURS PRN
Status: DISCONTINUED | OUTPATIENT
Start: 2019-01-18 | End: 2019-01-21 | Stop reason: HOSPADM

## 2019-01-18 RX ORDER — CEFTRIAXONE 2 G/1
2 INJECTION, POWDER, FOR SOLUTION INTRAMUSCULAR; INTRAVENOUS EVERY 24 HOURS
Status: DISCONTINUED | OUTPATIENT
Start: 2019-01-18 | End: 2019-01-21 | Stop reason: HOSPADM

## 2019-01-18 RX ORDER — BISACODYL 10 MG
10 SUPPOSITORY, RECTAL RECTAL DAILY PRN
Status: DISCONTINUED | OUTPATIENT
Start: 2019-01-18 | End: 2019-01-21 | Stop reason: HOSPADM

## 2019-01-18 RX ORDER — ONDANSETRON 2 MG/ML
4 INJECTION INTRAMUSCULAR; INTRAVENOUS EVERY 6 HOURS PRN
Status: DISCONTINUED | OUTPATIENT
Start: 2019-01-18 | End: 2019-01-21 | Stop reason: HOSPADM

## 2019-01-18 RX ORDER — AMOXICILLIN 250 MG
1 CAPSULE ORAL 2 TIMES DAILY PRN
Status: DISCONTINUED | OUTPATIENT
Start: 2019-01-18 | End: 2019-01-21 | Stop reason: HOSPADM

## 2019-01-18 RX ORDER — SODIUM CHLORIDE 9 MG/ML
INJECTION, SOLUTION INTRAVENOUS CONTINUOUS
Status: DISCONTINUED | OUTPATIENT
Start: 2019-01-18 | End: 2019-01-20

## 2019-01-18 RX ORDER — ISOSORBIDE MONONITRATE 30 MG/1
30 TABLET, EXTENDED RELEASE ORAL DAILY
Status: DISCONTINUED | OUTPATIENT
Start: 2019-01-19 | End: 2019-01-21 | Stop reason: HOSPADM

## 2019-01-18 RX ORDER — LIDOCAINE 40 MG/G
CREAM TOPICAL
Status: DISCONTINUED | OUTPATIENT
Start: 2019-01-18 | End: 2019-01-21 | Stop reason: HOSPADM

## 2019-01-18 RX ORDER — POLYETHYLENE GLYCOL 3350 17 G/17G
17 POWDER, FOR SOLUTION ORAL DAILY PRN
Status: DISCONTINUED | OUTPATIENT
Start: 2019-01-18 | End: 2019-01-21 | Stop reason: HOSPADM

## 2019-01-18 RX ORDER — PROCHLORPERAZINE MALEATE 5 MG
5 TABLET ORAL EVERY 6 HOURS PRN
Status: DISCONTINUED | OUTPATIENT
Start: 2019-01-18 | End: 2019-01-21 | Stop reason: HOSPADM

## 2019-01-18 RX ORDER — PROCHLORPERAZINE 25 MG
12.5 SUPPOSITORY, RECTAL RECTAL EVERY 12 HOURS PRN
Status: DISCONTINUED | OUTPATIENT
Start: 2019-01-18 | End: 2019-01-21 | Stop reason: HOSPADM

## 2019-01-18 RX ORDER — SIMVASTATIN 10 MG
10 TABLET ORAL AT BEDTIME
Status: DISCONTINUED | OUTPATIENT
Start: 2019-01-19 | End: 2019-01-21 | Stop reason: HOSPADM

## 2019-01-18 RX ORDER — AMOXICILLIN 250 MG
2 CAPSULE ORAL 2 TIMES DAILY PRN
Status: DISCONTINUED | OUTPATIENT
Start: 2019-01-18 | End: 2019-01-21 | Stop reason: HOSPADM

## 2019-01-18 RX ORDER — NALOXONE HYDROCHLORIDE 0.4 MG/ML
.1-.4 INJECTION, SOLUTION INTRAMUSCULAR; INTRAVENOUS; SUBCUTANEOUS
Status: DISCONTINUED | OUTPATIENT
Start: 2019-01-18 | End: 2019-01-21 | Stop reason: HOSPADM

## 2019-01-18 RX ADMIN — SODIUM CHLORIDE: 9 INJECTION, SOLUTION INTRAVENOUS at 21:34

## 2019-01-18 RX ADMIN — CEFTRIAXONE SODIUM 2 G: 2 INJECTION, POWDER, FOR SOLUTION INTRAMUSCULAR; INTRAVENOUS at 21:34

## 2019-01-18 RX ADMIN — AZITHROMYCIN 500 MG: 500 INJECTION, POWDER, LYOPHILIZED, FOR SOLUTION INTRAVENOUS at 22:09

## 2019-01-18 ASSESSMENT — ACTIVITIES OF DAILY LIVING (ADL): ADLS_ACUITY_SCORE: 11

## 2019-01-18 ASSESSMENT — MIFFLIN-ST. JEOR: SCORE: 1245.16

## 2019-01-19 ENCOUNTER — APPOINTMENT (OUTPATIENT)
Dept: PHYSICAL THERAPY | Facility: CLINIC | Age: 84
DRG: 689 | End: 2019-01-19
Attending: INTERNAL MEDICINE
Payer: COMMERCIAL

## 2019-01-19 LAB
ANION GAP SERPL CALCULATED.3IONS-SCNC: 8 MMOL/L (ref 3–14)
BUN SERPL-MCNC: 21 MG/DL (ref 7–30)
CALCIUM SERPL-MCNC: 8.8 MG/DL (ref 8.5–10.1)
CHLORIDE SERPL-SCNC: 111 MMOL/L (ref 94–109)
CO2 SERPL-SCNC: 22 MMOL/L (ref 20–32)
CREAT SERPL-MCNC: 0.83 MG/DL (ref 0.52–1.04)
ERYTHROCYTE [DISTWIDTH] IN BLOOD BY AUTOMATED COUNT: 12.9 % (ref 10–15)
GFR SERPL CREATININE-BSD FRML MDRD: 66 ML/MIN/{1.73_M2}
GLUCOSE SERPL-MCNC: 95 MG/DL (ref 70–99)
HCT VFR BLD AUTO: 34.2 % (ref 35–47)
HGB BLD-MCNC: 11.2 G/DL (ref 11.7–15.7)
MCH RBC QN AUTO: 31.3 PG (ref 26.5–33)
MCHC RBC AUTO-ENTMCNC: 32.7 G/DL (ref 31.5–36.5)
MCV RBC AUTO: 96 FL (ref 78–100)
PLATELET # BLD AUTO: 144 10E9/L (ref 150–450)
POTASSIUM SERPL-SCNC: 3.6 MMOL/L (ref 3.4–5.3)
RBC # BLD AUTO: 3.58 10E12/L (ref 3.8–5.2)
SODIUM SERPL-SCNC: 141 MMOL/L (ref 133–144)
WBC # BLD AUTO: 6.7 10E9/L (ref 4–11)

## 2019-01-19 PROCEDURE — 99233 SBSQ HOSP IP/OBS HIGH 50: CPT | Performed by: INTERNAL MEDICINE

## 2019-01-19 PROCEDURE — 40000193 ZZH STATISTIC PT WARD VISIT

## 2019-01-19 PROCEDURE — 25000132 ZZH RX MED GY IP 250 OP 250 PS 637: Mod: GY | Performed by: INTERNAL MEDICINE

## 2019-01-19 PROCEDURE — 85027 COMPLETE CBC AUTOMATED: CPT | Performed by: INTERNAL MEDICINE

## 2019-01-19 PROCEDURE — 97530 THERAPEUTIC ACTIVITIES: CPT | Mod: GP

## 2019-01-19 PROCEDURE — 97161 PT EVAL LOW COMPLEX 20 MIN: CPT | Mod: GP

## 2019-01-19 PROCEDURE — A9270 NON-COVERED ITEM OR SERVICE: HCPCS | Mod: GY | Performed by: INTERNAL MEDICINE

## 2019-01-19 PROCEDURE — 36415 COLL VENOUS BLD VENIPUNCTURE: CPT | Performed by: INTERNAL MEDICINE

## 2019-01-19 PROCEDURE — 80048 BASIC METABOLIC PNL TOTAL CA: CPT | Performed by: INTERNAL MEDICINE

## 2019-01-19 PROCEDURE — 97116 GAIT TRAINING THERAPY: CPT | Mod: GP

## 2019-01-19 PROCEDURE — 25000128 H RX IP 250 OP 636: Performed by: INTERNAL MEDICINE

## 2019-01-19 PROCEDURE — 12000000 ZZH R&B MED SURG/OB

## 2019-01-19 RX ORDER — LIOTHYRONINE SODIUM 5 UG/1
5 TABLET ORAL
Status: DISCONTINUED | OUTPATIENT
Start: 2019-01-19 | End: 2019-01-21 | Stop reason: HOSPADM

## 2019-01-19 RX ORDER — ASCORBIC ACID 500 MG
500 TABLET ORAL DAILY
Status: DISCONTINUED | OUTPATIENT
Start: 2019-01-19 | End: 2019-01-21 | Stop reason: HOSPADM

## 2019-01-19 RX ORDER — AZITHROMYCIN 250 MG/1
250 TABLET, FILM COATED ORAL DAILY
Status: DISCONTINUED | OUTPATIENT
Start: 2019-01-19 | End: 2019-01-21 | Stop reason: HOSPADM

## 2019-01-19 RX ADMIN — LEVOTHYROXINE SODIUM 75 MCG: 75 TABLET ORAL at 06:39

## 2019-01-19 RX ADMIN — LIOTHYRONINE SODIUM 5 MCG: 5 TABLET ORAL at 09:47

## 2019-01-19 RX ADMIN — ISOSORBIDE MONONITRATE 30 MG: 30 TABLET, EXTENDED RELEASE ORAL at 08:12

## 2019-01-19 RX ADMIN — AZITHROMYCIN 250 MG: 250 TABLET, FILM COATED ORAL at 20:29

## 2019-01-19 RX ADMIN — SODIUM CHLORIDE: 9 INJECTION, SOLUTION INTRAVENOUS at 18:04

## 2019-01-19 RX ADMIN — SIMVASTATIN 10 MG: 10 TABLET, FILM COATED ORAL at 00:36

## 2019-01-19 RX ADMIN — Medication 12.5 MG: at 00:36

## 2019-01-19 RX ADMIN — OXYCODONE HYDROCHLORIDE AND ACETAMINOPHEN 500 MG: 500 TABLET ORAL at 12:43

## 2019-01-19 RX ADMIN — SODIUM CHLORIDE: 9 INJECTION, SOLUTION INTRAVENOUS at 08:28

## 2019-01-19 RX ADMIN — Medication 12.5 MG: at 08:12

## 2019-01-19 RX ADMIN — Medication 1 MG: at 21:49

## 2019-01-19 RX ADMIN — IBUPROFEN 400 MG: 400 TABLET ORAL at 02:27

## 2019-01-19 RX ADMIN — Medication 12.5 MG: at 20:29

## 2019-01-19 RX ADMIN — VITAMIN D, TAB 1000IU (100/BT) 1000 UNITS: 25 TAB at 20:30

## 2019-01-19 RX ADMIN — SIMVASTATIN 10 MG: 10 TABLET, FILM COATED ORAL at 21:37

## 2019-01-19 RX ADMIN — CEFTRIAXONE SODIUM 2 G: 2 INJECTION, POWDER, FOR SOLUTION INTRAMUSCULAR; INTRAVENOUS at 20:32

## 2019-01-19 RX ADMIN — ASPIRIN 81 MG: 81 TABLET, COATED ORAL at 08:12

## 2019-01-19 RX ADMIN — VITAMIN D, TAB 1000IU (100/BT) 1000 UNITS: 25 TAB at 08:18

## 2019-01-19 RX ADMIN — Medication 1 MG: at 02:27

## 2019-01-19 ASSESSMENT — ACTIVITIES OF DAILY LIVING (ADL)
ADLS_ACUITY_SCORE: 15
ADLS_ACUITY_SCORE: 11

## 2019-01-19 ASSESSMENT — MIFFLIN-ST. JEOR: SCORE: 1247.88

## 2019-01-19 NOTE — PROGRESS NOTES
01/19/19 0917   Quick Adds   Type of Visit Initial PT Evaluation   Living Environment   Lives With spouse   Living Arrangements condominium   Home Accessibility no concerns   Living Environment Comment Patient reports that she lives with her spouse in a first floor condo, has elevator access from garage, has been using stairs for increased exercise.     Self-Care   Usual Activity Tolerance moderate   Current Activity Tolerance fair   Equipment Currently Used at Home none   Functional Level Prior   Ambulation 0-->independent   Transferring 0-->independent   Toileting 0-->independent   Bathing 0-->independent   Communication 0-->understands/communicates without difficulty   Swallowing 0-->swallows foods/liquids without difficulty   Cognition 0 - no cognition issues reported   Fall history within last six months no   Which of the above functional risks had a recent onset or change? ambulation;transferring   General Information   Onset of Illness/Injury or Date of Surgery - Date 01/18/19   Referring Physician Romario Schmid, DO   Patient/Family Goals Statement return to home   Pertinent History of Current Problem (include personal factors and/or comorbidities that impact the POC) Patient with PMH including CAD, HTN, HLD, hypothyroidism and GERD now admitted with UTI and pneumonia.     Precautions/Limitations fall precautions   Cognitive Status Examination   Orientation orientation to person, place and time   Pain Assessment   Patient Currently in Pain Yes, see Vital Sign flowsheet   Integumentary/Edema   Integumentary/Edema no deficits were identifed   Posture    Posture Forward head position;Protracted shoulders;Kyphosis   Range of Motion (ROM)   ROM Comment Patient reports that she is unable to reach toes   Strength   Strength Comments Patient with mild strength deficits, decreased eccentric control noted during transfers   Bed Mobility   Bed Mobility Comments independent   Transfer Skills   Transfer Comments  "SBA, patient holding onto IV pole for support   Gait   Gait Comments Amb 150 feet with SBA, patient holding onto IV pole reporting back pain   Balance   Balance Comments significant trunk flexion, reaching out for support surfaces secondary to back pain   General Therapy Interventions   Planned Therapy Interventions balance training;bed mobility training;gait training;strengthening;transfer training;home program guidelines;progressive activity/exercise   Clinical Impression   Criteria for Skilled Therapeutic Intervention yes, treatment indicated   PT Diagnosis decreased independence with mobility   Influenced by the following impairments back pain   Clinical Presentation Stable/Uncomplicated   Clinical Presentation Rationale moderately complex pmh, stable presentation, fair social support   Clinical Decision Making (Complexity) Low complexity   Therapy Frequency` daily   Predicted Duration of Therapy Intervention (days/wks) 3 days   Anticipated Discharge Disposition Home   Risk & Benefits of therapy have been explained Yes   Patient, Family & other staff in agreement with plan of care Yes   [facility] TM \"6 Clicks\"   2016, Trustees of [facility], under license to [employer].  All rights reserved.   6 Clicks Short Forms Basic Mobility Inpatient Short Form   [facility]  \"6 Clicks\" V.2 Basic Mobility Inpatient Short Form   1. Turning from your back to your side while in a flat bed without using bedrails? 4 - None   2. Moving from lying on your back to sitting on the side of a flat bed without using bedrails? 4 - None   3. Moving to and from a bed to a chair (including a wheelchair)? 3 - A Little   4. Standing up from a chair using your arms (e.g., wheelchair, or bedside chair)? 3 - A Little   5. To walk in hospital room? 3 - A Little   6. Climbing 3-5 steps with a railing? 3 - A Little   Basic Mobility Raw Score (Score out of 24.Lower scores equate to lower levels of function) 20 "   Total Evaluation Time   Total Evaluation Time (Minutes) 5

## 2019-01-19 NOTE — PROGRESS NOTES
[facility]    Medicine Progress Note - Hospitalist Service       Date of Admission:  1/18/2019  Assessment & Plan      1.  Community-acquired pneumonia. continue azithromycin (change to PO) and IV ceftriaxone. Chest x-ray reviewed. She had negative influenza A and B and strep A at  urgent care, no need to repeat it. Sputum culture pending.     2.  Urinary tract infection.  IV ceftriaxone pending UC.     3.  Hypothyroidism. continue levothyroxine and resume liothyronine.     4.  Coronary artery disease, continue aspirin, Imdur, metoprolol, and simvastatin.     5.  Hypertension. Continue  Imdur and metoprolol.     6.  Hyperlipidemia.  Restart simvastatin.     7.  GERD. continue omeprazole.     8.  Deconditioning.  Physical therapy consult.      Diet: Regular Diet Adult    DVT Prophylaxis: Enoxaparin (Lovenox) SQ  Reynoso Catheter: not present  Code Status: Full Code      Disposition Plan   Expected discharge: 2 - 3 days, recommended to prior living arrangement once antibiotic plan established and UC resilt.  Entered: Jose Torres MD 01/19/2019, 12:03 PM       The patient's care was discussed with the Bedside Nurse.    Jose Torres MD  Hospitalist Service  [facility]    ______________________________________________________________________    Interval History   She feels weak and tired, has intermittent cough.  Her appetite is improving.    Data reviewed today: I reviewed all medications, new labs and imaging results over the last 24 hours. I personally reviewed no images or EKG's today.    Physical Exam   Vital Signs: Temp: 97.2  F (36.2  C) Temp src: Oral BP: 143/65 Pulse: 67   Resp: 18 SpO2: 97 % O2 Device: None (Room air)    Weight: 186 lbs 6.4 oz  GENERAL:  No acute distress.  SKIN:  Dry and warm.  There is no rash, lesions, ulcers or juandice at area of skin examined.  HEENT:  Head without trauma.  Pupils round, reactive. Exam of conjunctiva and lids are normal. Sclera  without icterus. There is no oral thrush.  NECK:  Supple.  There is no cervical adenopathy, no thyromegaly. No jugular venous distension.  CHEST: No reproducible chest tenderness.   LUNGS:  Normal respiratory effort. Lungs reveal decreased breath sounds at bases. few rales at left base but no wheezes.  HEART:  Regular rate and rhythm.  No murmur, gallops or rubs auscultated.  ABDOMEN:  Soft, bowel sounds positive.  There is no tenderness or guarding.   EXTREMITIES: No edema. Normal range of motion. No calf swelling or tenderness.  NEUROLOGIC:  Alert and oriented x3.  Moving all ext. Gait not tested.      Data   Recent Labs   Lab 01/19/19  0721 01/18/19 2037   WBC 6.7  --    HGB 11.2*  --    MCV 96  --    * 157     --    POTASSIUM 3.6  --    CHLORIDE 111*  --    CO2 22  --    BUN 21  --    CR 0.83 0.86   ANIONGAP 8  --    FRANKLIN 8.8  --    GLC 95  --      Medications     sodium chloride 100 mL/hr at 01/19/19 0828       aspirin  81 mg Oral Daily     azithromycin  250 mg Intravenous Q24H     cefTRIAXone  2 g Intravenous Q24H     enoxaparin  40 mg Subcutaneous Q24H     isosorbide mononitrate  30 mg Oral Daily     levothyroxine  75 mcg Oral QAM AC     liothyronine  5 mcg Oral Daily with breakfast     metoprolol tartrate  12.5 mg Oral BID     simvastatin  10 mg Oral At Bedtime     sodium chloride (PF)  3 mL Intracatheter Q8H     vitamin C  500 mg Oral Daily     vitamin D3  1,000 Units Oral BID        XR Chest 2 Views1/17/2019  [email or web address]  Result Narrative   COMPARISON:  01/18/2008    FINDINGS: Mild left basilar opacity may represent atelectasis or pneumonia. Lungs are otherwise clear. No pneumothorax or pleural effusion. Cardiac silhouette and pulmonary vascularity are within normal limits. Senescent osseous changes.   Other Result Information   [place], Rad Results In - 01/17/2019 12:36 PM CST  COMPARISON:  01/18/2008    FINDINGS: Mild left basilar opacity may represent atelectasis or pneumonia. Lungs are  otherwise clear. No pneumothorax or pleural effusion. Cardiac silhouette and pulmonary vascularity are within normal limits. Senescent osseous changes

## 2019-01-19 NOTE — PHARMACY-ADMISSION MEDICATION HISTORY
Admission medication history interview status for this patient is complete. See [facility] admission navigator for allergy information, prior to admission medications and immunization status.     Medication history interview source(s):Patient  Medication history resources (including written lists, pill bottles, clinic record):None  Primary pharmacy: Not ID'd    Changes made to PTA medication list:  Added: None  Deleted: Methylprednisolone, Omeprazole  Changed: Vitamin C (500 mg PO qAFTERNOON), Aspirin (81mg PO DAILY WITH BREAKFAST), Ibuprofen (600mg PO Q4H PRN back/hip pain, Imdur (30mg PO DAILY WITH BREAKFAST), Ketoconazole (applies weekly as shampoo), Levothyroxine (75 mcg PO QAM after waking), Liothyronine (5 mcg PO DAILY WITH BREAKFAST), Metoprolol (12.5mg PO BID with breakfast and dinner), Zocor (10mg PO at bedtime)    Actions taken by pharmacist (provider contacted, etc):None     Additional medication history information:None    Medication reconciliation/reorder completed by provider prior to medication history? Yes    Prior to Admission medications    Medication Sig Last Dose Taking? Auth Provider   ascorbic acid (VITAMIN C) 500 MG tablet Take 500 mg by mouth daily Pt takes in afternoon 1/17/2019 at Afternoon Yes Reported, Patient   aspirin 81 MG tablet Take 1 tablet (81 mg) by mouth daily  Patient taking differently: Take 81 mg by mouth daily (with breakfast)  1/18/2019 at AM Yes Francisco Oakley MD   cholecalciferol (VITAMIN D3) 1000 UNIT tablet Take 1,000 Units by mouth 2 times daily 1/18/2019 at AM Yes Reported, Patient   ibuprofen (ADVIL/MOTRIN) 200 MG tablet Take 600 mg by mouth every 4 hours as needed for mild pain Patient takes 4 tablet at night. Occasionally takes 4 tablet when goes out.  Past Week at Unknown time Yes Reported, Patient   isosorbide mononitrate (IMDUR) 30 MG 24 hr tablet Take 1 tablet (30 mg) by mouth daily  Patient taking differently: Take 30 mg by mouth daily (with breakfast)  1/18/2019  at AM Yes Rebekah Rausch MD   ketoconazole (NIZORAL) 2 % shampoo Apply to the affected area and wash off after 5 minutes.  Patient taking differently: Apply topically once a week Apply as a shampoo and then wash off after 5 minutes. Past Week at Unknown time Yes Kasi Watkins MD   levothyroxine (SYNTHROID/LEVOTHROID) 75 MCG tablet Take 1 tablet (75 mcg) by mouth daily  Patient taking differently: Take 75 mcg by mouth every morning (before breakfast)  1/18/2019 at 0700 Yes Olena Manzanares MD   liothyronine (CYTOMEL) 5 MCG tablet Take 1 tablet (5 mcg) by mouth daily  Patient taking differently: Take 5 mcg by mouth daily (with breakfast)  1/18/2019 at AM Yes Olena Manzanares MD   metoprolol tartrate (LOPRESSOR) 25 MG tablet Take 0.5 tablets (12.5 mg) by mouth 2 times daily  Patient taking differently: Take 12.5 mg by mouth 2 times daily Takes with breakfast and @ 1900 1/18/2019 at breakfast Yes Rebekah Rausch MD   simvastatin (ZOCOR) 20 MG tablet Take 1 tablet (20 mg) by mouth At Bedtime  Patient taking differently: Take 10 mg by mouth At Bedtime  1/17/2019 at HS Yes Kasi Watkins MD

## 2019-01-19 NOTE — CONSULTS
Care Transition Initial Assessment - RN        Met with: Patient and Family.  DATA   Active Problems:    Pneumonia       Cognitive Status: awake, alert and oriented.  Primary Care Clinic Name: [facility]  Primary Care MD Name: Dr. Rebekah Rausch  Contact information and PCP information verified: Yes  Lives With: spouse   Living Arrangements: condominium  Quality of Family Relationships: supportive, involved  Description of Support System: Supportive, Involved   Who is your support system?:    Support Assessment: Adequate family and caregiver support, Patient communicates needs well met   Insurance concerns: No Insurance issues identified  ASSESSMENT  Patient currently receives the following services:  None        Identified issues/concerns regarding health management: Patient admitted with pneumonia and UTI. Pneumonia action plan given and reviewed with patient. Patient lives in a condo with her . Patient has a CPAP at home and states that current mask/ head gear does not fit properly. Patient has an appointment on Feb 11th to follow up with CPAP.     PLAN  Financial costs for the patient not discussed .  Patient given options and choices for discharge Yes .  Patient/family is agreeable to the plan?  Yes.   Patient anticipates discharging to Home .        Patient anticipates needs for home equipment: No  Plan/Disposition: Home   Appointments: Scheduled PCP post hospitalization appointment and updated to S.       Care  (CTS) will continue to follow as needed.    Bita RASMUSSEN  Care Transition Services  [phone]

## 2019-01-19 NOTE — PLAN OF CARE
PT: Patient seen by physical therapy for evaluation and treatment.  Patient with PMH including CAD, HTN, HLD, hypothyroidism and GERD now admitted with UTI and pneumonia.  Patient reports that she lives with her spouse in a first floor condo, has elevator access from garage, has been using stairs for increased exercise.  Patient reports that she has chronic back pain that has increased over the past week, walks with increased trunk flexion at baseline.  Patient reports that she does not use walker or cane for mobility.    Discharge Planner PT   Patient plan for discharge: home  Current status: Patient supine in bed.  Patient is independent with bed mobility.  Patient transferred to between sitting and standing with SBA and patient using support surface (bed/IV pole) secondary to back pain.  Patient declined walker, does not use at baseline.  Patient amb 150 feet with SBA, holding onto IV pole.  Patient up in bedside chair at end of session.  Barriers to return to prior living situation: currently requiring supervision for mobility, decreased activity tolerance, using IV pole for support  Recommendations for discharge: home  Rationale for recommendations: Patient presents below baseline for functional mobility.  Anticipate that with medical management and skilled PT, patient will be able to discharge to home with safe, independent mobility.       Entered by: Jenny Moscoso 01/19/2019 9:28 AM

## 2019-01-19 NOTE — H&P
[facility]    History and Physical - Hospitalist Service       Date of Admission:  1/18/2019    Assessment & Plan   Elizabeth Li is a 83 year old female admitted on 1/18/2019. She has a past medical history significant for coronary artery disease, hypertension, hyperlipidemia, hypothyroidism, and GERD.  She presented to an outside urgent care with fevers, cough, and dysuria.  She was found to have evidence of urinary tract infection and pneumonia.  Sent to hospital for further treatment.    1.  Pneumonia.  Community-acquired.  Start antibiotics with azithromycin and IV ceftriaxone.    2.  Urinary tract infection.  IV ceftriaxone.    3.  Hypothyroidism.  Restart levothyroxine.    4.  Coronary artery disease, restart aspirin, Imdur, metoprolol, and simvastatin.    5.  Hypertension.  Restart.  Imdur and metoprolol.    6.  Hyperlipidemia.  Restart simvastatin.    7.  GERD.  Restart omeprazole.    8.  Deconditioning.  Physical therapy consult.     Diet: Regular Diet Adult    DVT Prophylaxis: Enoxaparin (Lovenox) SQ  Reynoso Catheter: not present  Code Status: Full Code      Disposition Plan   Expected discharge: 2 - 3 days, recommended to prior living arrangement   Entered: Romario Schmid DO 01/18/2019, 7:53 PM         Romario Schmid DO  [facility]    ______________________________________________________________________    Chief Complaint   Fever and cough.    History is obtained from the patient    History of Present Illness   Elizabeth Li is a 83 year old female who has a past medical history significant for coronary artery disease, hypertension, hyperlipidemia, hypothyroidism, and GERD.  She had developed a fever 4 days ago.  Has also had a cough.  Cough is occasionally productive of white sputum.  Has been short of breath at times.  Has had some generalized body aches.  Also been having some back pain that was similar to when she had a urinary tract infection  previously.  Presented to urgent care yesterday for further evaluation.  Urine sample was sent which appears to show evidence of infection.  Chest x-ray was done which also shows evidence of pneumonia.  She had been given a dose of ceftriaxone at urgent care and then sent home with Augmentin.  Return to urgent care today because symptoms were worse.  Was directed to hospital for further treatment.  Does not know of any sick contacts.  No other complaints.    Review of Systems    The 10 point Review of Systems is negative other than noted in the HPI     Past Medical History    I have reviewed this patient's medical history and updated it with pertinent information if needed.   Past Medical History:   Diagnosis Date     Abnormal cardiovascular stress test     EKG changes with exercise on Exercise stress test 2015     CAD (coronary artery disease)     Severe 2 vessel CAD. PCI with drug-eluting stents x2 to mid LAD on 3/27/15     HTN (hypertension)      Hyperlipidemia LDL goal <100      Hypothyroidism      IHD (ischemic heart disease)      KUSUM (obstructive sleep apnea)     CPAP       Past Surgical History   I have reviewed this patient's surgical history and updated it with pertinent information if needed.  Past Surgical History:   Procedure Laterality Date     CARDIAC SURGERY      2 heart stents     COLONOSCOPY       COLONOSCOPY N/A 2017    Procedure: COLONOSCOPY;  Surgeon: Nghia Moss MD;  Location:  GI     HEAD & NECK SURGERY      wisdom teeth removed     HEART CATH, ANGIOPLASTY  3/27/15    2.5 X 18 mm & 3.0 X mm LUCILA to LAD     TONSILLECTOMY      T&A as a child     TUBAL LIGATION         Social History   I have reviewed this patient's social history and updated it with pertinent information if needed.  Social History     Tobacco Use     Smoking status: Former Smoker     Last attempt to quit: 1984     Years since quittin.0     Smokeless tobacco: Never Used   Substance Use Topics     Alcohol  use: Yes     Alcohol/week: 0.0 oz     Comment: social drinker     Drug use: No       Family History   I have reviewed this patient's family history and updated it with pertinent information if needed.   Family History   Problem Relation Age of Onset     Blood Disease Mother         pernious anemia     Heart Disease Father      Heart Disease Sister      Heart Disease Brother      Colon Cancer No family hx of        Prior to Admission Medications   Prior to Admission Medications   Prescriptions Last Dose Informant Patient Reported? Taking?   ascorbic acid (VITAMIN C) 500 MG tablet   Yes No   Sig: Take 500 mg by mouth daily.   aspirin 81 MG tablet   No No   Sig: Take 1 tablet (81 mg) by mouth daily   cholecalciferol (VITAMIN D3) 1000 UNIT tablet   Yes No   Sig: Take 1,000 Units by mouth 2 times daily   ibuprofen (ADVIL/MOTRIN) 200 MG tablet   Yes No   Sig: Take 200 mg by mouth every 4 hours as needed for mild pain Patient takes 4 tablet at night. Occasionally takes 4 tablet when goes out.   isosorbide mononitrate (IMDUR) 30 MG 24 hr tablet   No No   Sig: Take 1 tablet (30 mg) by mouth daily   ketoconazole (NIZORAL) 2 % shampoo   No No   Sig: Apply to the affected area and wash off after 5 minutes.   levothyroxine (SYNTHROID/LEVOTHROID) 75 MCG tablet   No No   Sig: Take 1 tablet (75 mcg) by mouth daily   liothyronine (CYTOMEL) 5 MCG tablet   No No   Sig: Take 1 tablet (5 mcg) by mouth daily   metoprolol tartrate (LOPRESSOR) 25 MG tablet   No No   Sig: Take 0.5 tablets (12.5 mg) by mouth 2 times daily   omeprazole (PRILOSEC) 20 MG DR capsule   No No   Sig: Take 1 capsule (20 mg) by mouth daily   order for DME   Yes No   Sig: Equipment being ordered: CPAP   simvastatin (ZOCOR) 20 MG tablet   No No   Sig: Take 1 tablet (20 mg) by mouth At Bedtime      Facility-Administered Medications Last Administration Doses Remaining   methylPREDNISolone acetate (DEPO-MEDROL) injection 40 mg 10/10/2018  9:00 AM         Allergies    Allergies   Allergen Reactions     Flu Virus Vaccine Other (See Comments)     Viral SX       Physical Exam   Vital Signs: Temp: 97.6  F (36.4  C) Temp src: Oral BP: 149/81 Pulse: 100   Resp: 18 SpO2: 96 % O2 Device: None (Room air)    Weight: 185 lbs 12.8 oz    Gen:  NAD, A&Ox3.  Eyes:  PERRL, sclera anicteric.  OP:  MMM, no lesions.  Neck:  Supple.  CV:  Regular, no murmurs.  Lung:  CTA b/l, normal effort.  Ab:  +BS, soft.  Skin:  Warm, dry to touch.  No rash.  Ext:  No pitting edema LE b/l.      Data   Data reviewed today: I reviewed all medications, new labs and imaging results over the last 24 hours.   No lab results found in last 7 days.

## 2019-01-19 NOTE — PLAN OF CARE
"A&O, forgetful at times. Tmax 99.1, other VSS. Tele SR. LS diminished. C/o 10/10 intermittent back pain, PRN ibuprofen given. C/o difficulty sleeping, PRN melatonin given. Patient refused lovenox injection, educated on risks/benefits but patient stated she is \"scared of blood thinners and will not take it\". Patient refused gripper socks with ambulation, educated on fall prevention. NS infusing at 100 mL/hr. Up with assist of 1. PT following.     /53 (BP Location: Right arm)   Pulse 68   Temp 98.4  F (36.9  C) (Oral)   Resp 16   Ht 1.549 m (5' 1\")   Wt 84.3 kg (185 lb 12.8 oz)   SpO2 95%   BMI 35.11 kg/m     "

## 2019-01-19 NOTE — PLAN OF CARE
VSS. Tele - SR. Lung sounds clear. Zithromax (switched to PO) and rocephin for pneumonia and UTI. NS at 100ml/hr. Tolerating diet. PT following. Up SBA. Plan: continue antibiotics. Possibly discharge home Monday.

## 2019-01-19 NOTE — PROGRESS NOTES
Discharge Planner   Discharge Plans in progress: Yes, patient admitted with pneumonia and UTI.   Barriers to discharge plan: None, anticipate patient to discharge home when medically stable.   Follow up plan: CM available for further discharge needs.        Entered by: Bita Hurley 01/19/2019 10:54 AM

## 2019-01-20 ENCOUNTER — APPOINTMENT (OUTPATIENT)
Dept: PHYSICAL THERAPY | Facility: CLINIC | Age: 84
DRG: 689 | End: 2019-01-20
Attending: INTERNAL MEDICINE
Payer: COMMERCIAL

## 2019-01-20 PROCEDURE — 12000000 ZZH R&B MED SURG/OB

## 2019-01-20 PROCEDURE — 25000132 ZZH RX MED GY IP 250 OP 250 PS 637: Performed by: INTERNAL MEDICINE

## 2019-01-20 PROCEDURE — 97116 GAIT TRAINING THERAPY: CPT | Mod: GP

## 2019-01-20 PROCEDURE — 99232 SBSQ HOSP IP/OBS MODERATE 35: CPT | Performed by: INTERNAL MEDICINE

## 2019-01-20 PROCEDURE — 25000128 H RX IP 250 OP 636: Performed by: INTERNAL MEDICINE

## 2019-01-20 PROCEDURE — 40000193 ZZH STATISTIC PT WARD VISIT

## 2019-01-20 RX ORDER — LACTOBACILLUS RHAMNOSUS GG 10B CELL
1 CAPSULE ORAL 2 TIMES DAILY
Status: DISCONTINUED | OUTPATIENT
Start: 2019-01-20 | End: 2019-01-21 | Stop reason: HOSPADM

## 2019-01-20 RX ADMIN — IBUPROFEN 400 MG: 400 TABLET ORAL at 00:01

## 2019-01-20 RX ADMIN — AZITHROMYCIN 250 MG: 250 TABLET, FILM COATED ORAL at 21:15

## 2019-01-20 RX ADMIN — Medication 1 CAPSULE: at 18:52

## 2019-01-20 RX ADMIN — SIMVASTATIN 10 MG: 10 TABLET, FILM COATED ORAL at 21:16

## 2019-01-20 RX ADMIN — SODIUM CHLORIDE: 9 INJECTION, SOLUTION INTRAVENOUS at 04:31

## 2019-01-20 RX ADMIN — LEVOTHYROXINE SODIUM 75 MCG: 75 TABLET ORAL at 06:42

## 2019-01-20 RX ADMIN — ASPIRIN 81 MG: 81 TABLET, COATED ORAL at 08:04

## 2019-01-20 RX ADMIN — Medication 12.5 MG: at 18:52

## 2019-01-20 RX ADMIN — Medication 12.5 MG: at 08:04

## 2019-01-20 RX ADMIN — CEFTRIAXONE SODIUM 2 G: 2 INJECTION, POWDER, FOR SOLUTION INTRAMUSCULAR; INTRAVENOUS at 21:15

## 2019-01-20 RX ADMIN — ISOSORBIDE MONONITRATE 30 MG: 30 TABLET, EXTENDED RELEASE ORAL at 08:05

## 2019-01-20 RX ADMIN — LIOTHYRONINE SODIUM 5 MCG: 5 TABLET ORAL at 08:04

## 2019-01-20 RX ADMIN — OXYCODONE HYDROCHLORIDE AND ACETAMINOPHEN 500 MG: 500 TABLET ORAL at 12:45

## 2019-01-20 RX ADMIN — VITAMIN D, TAB 1000IU (100/BT) 1000 UNITS: 25 TAB at 21:16

## 2019-01-20 RX ADMIN — VITAMIN D, TAB 1000IU (100/BT) 1000 UNITS: 25 TAB at 08:05

## 2019-01-20 ASSESSMENT — ACTIVITIES OF DAILY LIVING (ADL)
ADLS_ACUITY_SCORE: 15

## 2019-01-20 ASSESSMENT — MIFFLIN-ST. JEOR: SCORE: 1256.05

## 2019-01-20 NOTE — PROVIDER NOTIFICATION
pt is concerned about getting diarrhea from antibiotics as she has in the past. can we give her lactobacillus? Thanks  Sent to Dr. Torres via web based page.

## 2019-01-20 NOTE — SIGNIFICANT EVENT
Urine culture from 1/17/2019 from [facility]  Reveal e coli.    Sensitive to cefazolin-->patient on it.    Informed primary RN, Keira rankin web paged Dr. Torres with this information.

## 2019-01-20 NOTE — PLAN OF CARE
VSS. Tele - SB/SR. Lung sounds diminished. NS discontinued, IV SL. Continuing zithromax PO and rocephin for UTI and PNA. Up with 1 and walker. Tolerating diet. Ambulated in halls x1. Plan: continue antibiotics. Possibly discharge home tomorrow on PO ceftin and zithromax.

## 2019-01-20 NOTE — PLAN OF CARE
Pt A/O- forgetful at times, VSS, denies pain, Tele: SR, continues IV rocephin and PO zithro,  IVF NS 100ml/hr, transfers Ax1 w/ walker, regular diet, alarms on, discharge plan 2-3 days, continue POC.

## 2019-01-20 NOTE — PROGRESS NOTES
[facility]    Medicine Progress Note - Hospitalist Service       Date of Admission:  1/18/2019    Elizabeth Li is a 83 year old female with history of hypertension, coronary artery disease, hypothyroidism, hyperlipidemia, GERD who fell from [facility] urgent care for pneumonia and UTI.      Assessment & Plan      1.  Community-acquired pneumonia. continue azithromycin (changed to PO on 1/19) and IV ceftriaxone one more day. Chest x-ray reviewed. She had negative influenza A and B and strep A at  urgent care, no need to repeat it. Sputum culture not collected. Plan discharge home with Ceftin for week and complete Zithromax.     2.  Urinary tract infection.  IV ceftriaxone. Urinalysis was abnormal and UC collected at urgent care grew E coli sensitive to Ceftriaxone. Plan discharge home with Ceftin for week in both UTI and pneumonia.     3.  Hypothyroidism. continue levothyroxine and resume liothyronine.     4.  Coronary artery disease, continue aspirin, Imdur, metoprolol, and simvastatin.     5.  Hypertension. Continue  Imdur and metoprolol.     6.  Hyperlipidemia.  Restart simvastatin.     7.  GERD. continue omeprazole.     8.  Deconditioning.  Physical therapy consult. Lives with . He is forgetful at times      Diet: Regular Diet Adult    DVT Prophylaxis: Enoxaparin (Lovenox) SQ  Reynoso Catheter: not present  Code Status: Full Code      Disposition Plan   Expected discharge: tomorrow if stable, recommended to prior living arrangement once antibiotic plan established and UC resilt.  Entered: Jose Torres MD 01/20/2019, 10:28 AM       The patient's care was discussed with the Bedside Nurse.    Jose Torres MD  Hospitalist Service  [facility]    ______________________________________________________________________    Interval History   She feels little better, has intermittent cough.  Her appetite is improving. Forgetful and weak.    Data reviewed today: I  reviewed all medications, new labs and imaging results over the last 24 hours. I personally reviewed no images or EKG's today.    Physical Exam   Vital Signs: Temp: 97.8  F (36.6  C) Temp src: Oral BP: 156/67 Pulse: 63 Heart Rate: 59 Resp: 16 SpO2: 97 % O2 Device: None (Room air)    Weight: 188 lbs 3.2 oz  GENERAL:  No acute distress.  SKIN:  Dry and warm.  There is no rash, lesions, ulcers or juandice at area of skin examined.  HEENT:  Head without trauma.  Pupils round, reactive. Exam of conjunctiva and lids are normal. Sclera without icterus. There is no oral thrush.  NECK:  Supple.  There is no cervical adenopathy, no thyromegaly. No jugular venous distension.  CHEST: No reproducible chest tenderness.   LUNGS:  Normal respiratory effort. Lungs reveal decreased breath sounds at bases. few rales at left base but no wheezes.  HEART:  Regular rate and rhythm.  No murmur, gallops or rubs auscultated.  ABDOMEN:  Soft, bowel sounds positive.  There is no tenderness or guarding.   EXTREMITIES: No edema. Normal range of motion. No calf swelling or tenderness.  NEUROLOGIC:  Alert and oriented x3.  Moving all ext. Gait not tested.    No results for input(s): CULT in the last 168 hours.    Data   Recent Labs   Lab 01/19/19  0721 01/18/19 2037   WBC 6.7  --    HGB 11.2*  --    MCV 96  --    * 157     --    POTASSIUM 3.6  --    CHLORIDE 111*  --    CO2 22  --    BUN 21  --    CR 0.83 0.86   ANIONGAP 8  --    FRANKLIN 8.8  --    GLC 95  --      Medications       aspirin  81 mg Oral Daily     azithromycin  250 mg Oral Daily     cefTRIAXone  2 g Intravenous Q24H     enoxaparin  40 mg Subcutaneous Q24H     isosorbide mononitrate  30 mg Oral Daily     levothyroxine  75 mcg Oral QAM AC     liothyronine  5 mcg Oral Daily with breakfast     metoprolol tartrate  12.5 mg Oral BID     simvastatin  10 mg Oral At Bedtime     sodium chloride (PF)  3 mL Intracatheter Q8H     vitamin C  500 mg Oral Daily     vitamin D3  1,000 Units  Oral BID        XR Chest 2 Views1/17/2019  [facility]  Result Narrative   COMPARISON:  01/18/2008    FINDINGS: Mild left basilar opacity may represent atelectasis or pneumonia. Lungs are otherwise clear. No pneumothorax or pleural effusion. Cardiac silhouette and pulmonary vascularity are within normal limits. Senescent osseous changes.   Other Result Information   [place], Rad Results In - 01/17/2019 12:36 PM CST  COMPARISON:  01/18/2008    FINDINGS: Mild left basilar opacity may represent atelectasis or pneumonia. Lungs are otherwise clear. No pneumothorax or pleural effusion. Cardiac silhouette and pulmonary vascularity are within normal limits. Senescent osseous changes

## 2019-01-20 NOTE — PLAN OF CARE
Discharge Planner PT   Patient plan for discharge: home  Current status: Patient seated in bedside chair.  Performed transfers with SBA.  Patient agreeable to ambulation in hallway, asked to bring IV pole along (was not connected at the time); therapist stated that preference would be either using a cane/walker or ambulating without any support (as patient would not have IV pole at discharge to home).  Patient again firmly declined cane or walker.  Patient amb 200 feet with SBA with increased fatigue, trunk flexion.  Patient with no loss of balance, demonstrated safe transfers to bedside chair.    Barriers to return to prior living situation: currently requiring supervision for mobility, decreased activity tolerance, using IV pole for support  Recommendations for discharge: home  Rationale for recommendations: Patient has met all goals, presents at baseline or close to baseline.  Recommend continued daily ambulation during admission, however no further need for skilled therapy services.  Will complete order, please re-consult if mobility needs change.       Entered by: Jenny Moscoso 01/20/2019 1:00 PM     Physical Therapy Discharge Summary    Reason for therapy discharge:    All goals and outcomes met, no further needs identified.    Progress towards therapy goal(s). See [facility] on [facility] in [facility] record for goal details.  Goals met    Therapy recommendation(s):    Continue daily walking program prior to discharge

## 2019-01-20 NOTE — PLAN OF CARE
"A&O, forgetful at times. BP elevated, other VSS on RA. Tele SR. LS diminished, ARELLANO. C/o 10/10 intermittent back pain, PRN ibuprofen given. NS infusing at 100 mL/hr. Up with assist of 1 with gait belt.     /63 (BP Location: Right arm)   Pulse 71   Temp 97.1  F (36.2  C) (Oral)   Resp 16   Ht 1.549 m (5' 1\")   Wt 85.4 kg (188 lb 3.2 oz)   SpO2 98%   BMI 35.56 kg/m     "

## 2019-01-21 VITALS
HEIGHT: 61 IN | BODY MASS INDEX: 34.89 KG/M2 | DIASTOLIC BLOOD PRESSURE: 64 MMHG | OXYGEN SATURATION: 97 % | SYSTOLIC BLOOD PRESSURE: 132 MMHG | RESPIRATION RATE: 18 BRPM | WEIGHT: 184.8 LBS | HEART RATE: 68 BPM | TEMPERATURE: 98.3 F

## 2019-01-21 LAB
CREAT SERPL-MCNC: 0.82 MG/DL (ref 0.52–1.04)
GFR SERPL CREATININE-BSD FRML MDRD: 67 ML/MIN/{1.73_M2}
PLATELET # BLD AUTO: 238 10E9/L (ref 150–450)

## 2019-01-21 PROCEDURE — 85049 AUTOMATED PLATELET COUNT: CPT | Performed by: INTERNAL MEDICINE

## 2019-01-21 PROCEDURE — 36415 COLL VENOUS BLD VENIPUNCTURE: CPT | Performed by: INTERNAL MEDICINE

## 2019-01-21 PROCEDURE — 99239 HOSP IP/OBS DSCHRG MGMT >30: CPT | Performed by: STUDENT IN AN ORGANIZED HEALTH CARE EDUCATION/TRAINING PROGRAM

## 2019-01-21 PROCEDURE — 25000132 ZZH RX MED GY IP 250 OP 250 PS 637: Performed by: INTERNAL MEDICINE

## 2019-01-21 PROCEDURE — 82565 ASSAY OF CREATININE: CPT | Performed by: INTERNAL MEDICINE

## 2019-01-21 RX ORDER — CEFUROXIME AXETIL 500 MG/1
500 TABLET ORAL 2 TIMES DAILY
Qty: 8 TABLET | Refills: 0 | Status: SHIPPED | OUTPATIENT
Start: 2019-01-21 | End: 2019-03-11

## 2019-01-21 RX ORDER — AZITHROMYCIN 250 MG/1
250 TABLET, FILM COATED ORAL DAILY
Qty: 4 TABLET | Refills: 0 | Status: SHIPPED | OUTPATIENT
Start: 2019-01-21 | End: 2019-03-11

## 2019-01-21 RX ADMIN — LEVOTHYROXINE SODIUM 75 MCG: 75 TABLET ORAL at 07:55

## 2019-01-21 RX ADMIN — Medication 1 CAPSULE: at 10:07

## 2019-01-21 RX ADMIN — LIOTHYRONINE SODIUM 5 MCG: 5 TABLET ORAL at 10:07

## 2019-01-21 RX ADMIN — OXYCODONE HYDROCHLORIDE AND ACETAMINOPHEN 500 MG: 500 TABLET ORAL at 13:25

## 2019-01-21 RX ADMIN — ASPIRIN 81 MG: 81 TABLET, COATED ORAL at 10:07

## 2019-01-21 RX ADMIN — IBUPROFEN 400 MG: 400 TABLET ORAL at 00:12

## 2019-01-21 RX ADMIN — Medication 12.5 MG: at 10:07

## 2019-01-21 RX ADMIN — ISOSORBIDE MONONITRATE 30 MG: 30 TABLET, EXTENDED RELEASE ORAL at 10:07

## 2019-01-21 RX ADMIN — VITAMIN D, TAB 1000IU (100/BT) 1000 UNITS: 25 TAB at 10:07

## 2019-01-21 ASSESSMENT — ACTIVITIES OF DAILY LIVING (ADL)
ADLS_ACUITY_SCORE: 15

## 2019-01-21 ASSESSMENT — MIFFLIN-ST. JEOR: SCORE: 1240.63

## 2019-01-21 NOTE — PLAN OF CARE
VS stable. A & O x 4, although forgetful at times. Bed alarms on for safety.   Tele:sinus rhythm.   Diet:gluten free, patient refused breakfast this morning.   Ambulates: SBA.   IV meds:saline locked.  Pain:denies .    Intake & output: bowel movement this shift, patient reported. Continent of both bowel & bladder.   Discharge plan: likely to discharge today. Will continue to monitor and review plan of care.

## 2019-01-21 NOTE — PLAN OF CARE
Vitals stable and afebrile. Up in chair to eat with grandson. Amb with assist of one, refuses to use walker, although it would be helpful as she shuffles in bent over position and states she is stiff from sitting too long. Refused to take lovenox shot although this writer reiterated benefit of it and that MD had ordered it for her to prevent blood clots.

## 2019-01-21 NOTE — DISCHARGE SUMMARY
[facility]  Hospitalist Discharge Summary       Date of Admission:  1/18/2019  Date of Discharge:  1/21/2019  Discharging Provider: Hussein Novoa MD      Discharge Diagnoses     CAP due to unspecified organisms  UTI    Follow-ups Needed After Discharge   Follow-up Appointments     Follow-up and recommended labs and tests       Follow up with primary care provider, Rebekah Rausch, within 7 days for hospital follow- up.  The following labs/tests are recommended: None.             Unresulted Labs Ordered in the Past 30 Days of this Admission     No orders found from 11/19/2018 to 1/19/2019.        Hospital Course      1.  Community-acquired pneumonia. continue azithromycin (changed to PO on 1/19) and IV ceftriaxone one more day. Chest x-ray reviewed. She had negative influenza A and B and strep A at  urgent care, no need to repeat it. Sputum culture not collected. Plan discharge home with Ceftin with Zithromax for total of 7 days.     2.  Urinary tract infection.  IV ceftriaxone. Urinalysis was abnormal and UC collected at urgent care grew E coli sensitive to Ceftriaxone. Plan discharge home with Ceftin for total of 7 days     3.  Hypothyroidism. Continue PTA levothyroxine and resume liothyronine.     4.  Coronary artery disease, continue PTA aspirin, Imdur, metoprolol, and simvastatin.     5.  Hypertension. Continue PTA  Imdur and metoprolol discharge     6.  Hyperlipidemia. Resume simvastatin.     7.  GERD. continue omeprazole.     Consultations This Hospital Stay   PHYSICAL THERAPY ADULT IP CONSULT  CARE COORDINATOR IP CONSULT  PHYSICAL THERAPY ADULT IP CONSULT    Code Status   Full Code    Time Spent on this Encounter   I, Hussein Novoa, personally saw the patient today and spent greater than 30 minutes discharging this patient.       Hussein Novoa MD  [facility]  ______________________________________________________________________    Physical Exam   Vital Signs: Temp: 98.3  F (36.8  C)  Temp src: Oral BP: 132/64 Pulse: 68 Heart Rate: 68 Resp: 18 SpO2: 97 % O2 Device: None (Room air)    Weight: 184 lbs 12.8 oz    GENERAL:  No acute distress.  SKIN:  Dry and warm.  There is no rash, lesions, ulcers or juandice at area of skin examined.  CHEST: No reproducible chest tenderness.   LUNGS:  Normal respiratory effort. Lungs reveal decreased breath sounds at bases. few rales at left base but no wheezes.  HEART:  Regular rate and rhythm.  No murmur, gallops or rubs auscultated.  ABDOMEN:  Soft, bowel sounds positive.  There is no tenderness or guarding.   EXTREMITIES: No edema. Normal range of motion. No calf swelling or tenderness.  NEUROLOGIC:  Alert and oriented x3.  Moving all ext. Gait not tested.     Primary Care Physician   Rebekah Rausch    Discharge Disposition   Discharged to home  Condition at discharge: Stable    Significant Results and Procedures   Most Recent 3 CBC's:  Recent Labs   Lab Test 01/21/19  0745 01/19/19  0721 01/18/19 2037 01/04/19  1045 03/07/18  0840   WBC  --  6.7  --  8.8 6.7   HGB  --  11.2*  --  14.2 13.1   MCV  --  96  --  96 96    144* 157 204 184     Most Recent 3 BMP's:  Recent Labs   Lab Test 01/21/19  0745 01/19/19  0721 01/18/19 2037 01/04/19  1045 03/07/18  0840   NA  --  141  --  139 140   POTASSIUM  --  3.6  --  4.2 4.6   CHLORIDE  --  111*  --  108 110*   CO2  --  22  --  25 24   BUN  --  21  --  13 13   CR 0.82 0.83 0.86 0.82 0.81   ANIONGAP  --  8  --  6 6   FRANKLIN  --  8.8  --  9.9 9.4   GLC  --  95  --  92 94     Most Recent 2 LFT's:  Recent Labs   Lab Test 01/04/19  1045 03/07/18  0840   AST 13 19   ALT 19 14   ALKPHOS 78 71   BILITOTAL 0.4 0.5   ,   Results for orders placed or performed in visit on 12/10/18   XR Lumbar Epidural Injection Incl Imaging    Narrative    This exam was marked as non-reportable because it will not be read by a   radiologist or a [place] non-radiologist provider.               Discharge Orders      Reason for your  hospital stay    You had pneumonia and urinary tract infection     Follow-up and recommended labs and tests     Follow up with primary care provider, Rebekah Rausch, within 7 days for hospital follow- up.  The following labs/tests are recommended: None.     Activity    Your activity upon discharge: activity as tolerated     Diet    Follow this diet upon discharge: Orders Placed This Encounter      Regular Diet Adult     Discharge Medications   Current Discharge Medication List      START taking these medications    Details   azithromycin (ZITHROMAX) 250 MG tablet Take 1 tablet (250 mg) by mouth daily for 4 days  Qty: 4 tablet, Refills: 0    Associated Diagnoses: Pneumonia due to infectious organism, unspecified laterality, unspecified part of lung      cefuroxime (CEFTIN) 500 MG tablet Take 1 tablet (500 mg) by mouth 2 times daily for 4 days  Qty: 8 tablet, Refills: 0    Associated Diagnoses: Pneumonia due to infectious organism, unspecified laterality, unspecified part of lung         CONTINUE these medications which have NOT CHANGED    Details   ascorbic acid (VITAMIN C) 500 MG tablet Take 500 mg by mouth daily Pt takes in afternoon      aspirin 81 MG tablet Take 1 tablet (81 mg) by mouth daily  Qty: 90 tablet, Refills: 3    Associated Diagnoses: Abnormal cardiovascular stress test      cholecalciferol (VITAMIN D3) 1000 UNIT tablet Take 1,000 Units by mouth 2 times daily      ibuprofen (ADVIL/MOTRIN) 200 MG tablet Take 600 mg by mouth every 4 hours as needed for mild pain Patient takes 4 tablet at night. Occasionally takes 4 tablet when goes out.       isosorbide mononitrate (IMDUR) 30 MG 24 hr tablet Take 1 tablet (30 mg) by mouth daily  Qty: 90 tablet, Refills: 4    Comments: Profile Rx: patient will contact pharmacy when needed  Associated Diagnoses: Coronary artery disease involving native coronary artery of native heart without angina pectoris; Status post coronary angioplasty      ketoconazole (NIZORAL)  2 % shampoo Apply to the affected area and wash off after 5 minutes.  Qty: 120 mL, Refills: 1    Associated Diagnoses: Dandruff      levothyroxine (SYNTHROID/LEVOTHROID) 75 MCG tablet Take 1 tablet (75 mcg) by mouth daily  Qty: 90 tablet, Refills: 1    Associated Diagnoses: Acquired hypothyroidism      liothyronine (CYTOMEL) 5 MCG tablet Take 1 tablet (5 mcg) by mouth daily  Qty: 90 tablet, Refills: 1    Associated Diagnoses: Acquired hypothyroidism      metoprolol tartrate (LOPRESSOR) 25 MG tablet Take 0.5 tablets (12.5 mg) by mouth 2 times daily  Qty: 90 tablet, Refills: 1    Associated Diagnoses: Benign essential hypertension      simvastatin (ZOCOR) 20 MG tablet Take 1 tablet (20 mg) by mouth At Bedtime  Qty: 90 tablet, Refills: 3    Associated Diagnoses: Hyperlipidemia LDL goal <100           Allergies   Allergies   Allergen Reactions     Flu Virus Vaccine Other (See Comments)     Viral SX     Gluten Meal      Milk Protein Extract

## 2019-01-21 NOTE — PROGRESS NOTES
"discharge to home with spouse \"Gene\"  At 1430 per w/c. Has filled RX's  For antibiotics for her Pneumonia & UTI . TELE= SR . . Reviewed AVS with pt & spouse & both voice good understanding   "

## 2019-01-22 ENCOUNTER — TELEPHONE (OUTPATIENT)
Dept: INTERNAL MEDICINE | Facility: CLINIC | Age: 84
End: 2019-01-22

## 2019-01-22 NOTE — TELEPHONE ENCOUNTER
IP F/U    Date: 01/21/19  Diagnosis: Pneumonia Due To Infectious Organism, Unspecified Lataterality, Unspecified part of Lung  Is patient active in care coordination? No  Was patient in TCU? No    Next 5 appointments (look out 90 days)    Jan 24, 2019  9:30 AM CST  Return Visit with Olena Manzanares MD  [facility] ([facility]) [address]  [facility] [address]  [phone]   Jan 28, 2019 11:00 AM CST  Office Visit with Rebekah Rausch MD  [facility] ([facility]) [address]  [facility] [id number] [phone]   Feb 04, 2019  9:00 AM CST  Office Visit with Rebekah Rausch MD  [facility] ([facility]) [address]  [facility] [id number] [phone]

## 2019-01-24 ENCOUNTER — OFFICE VISIT (OUTPATIENT)
Dept: ENDOCRINOLOGY | Facility: CLINIC | Age: 84
End: 2019-01-24
Payer: COMMERCIAL

## 2019-01-24 VITALS
DIASTOLIC BLOOD PRESSURE: 62 MMHG | SYSTOLIC BLOOD PRESSURE: 136 MMHG | HEART RATE: 93 BPM | OXYGEN SATURATION: 95 % | HEIGHT: 61 IN | WEIGHT: 182.4 LBS | BODY MASS INDEX: 34.44 KG/M2 | TEMPERATURE: 97.6 F

## 2019-01-24 DIAGNOSIS — E03.9 ACQUIRED HYPOTHYROIDISM: ICD-10-CM

## 2019-01-24 PROCEDURE — 99214 OFFICE O/P EST MOD 30 MIN: CPT | Performed by: INTERNAL MEDICINE

## 2019-01-24 RX ORDER — LIOTHYRONINE SODIUM 5 UG/1
5 TABLET ORAL
Qty: 90 TABLET | Refills: 1 | Status: SHIPPED | OUTPATIENT
Start: 2019-01-24 | End: 2019-01-01

## 2019-01-24 RX ORDER — LEVOTHYROXINE SODIUM 75 UG/1
75 TABLET ORAL
Qty: 90 TABLET | Refills: 1 | Status: SHIPPED | OUTPATIENT
Start: 2019-01-24 | End: 2019-01-01

## 2019-01-24 ASSESSMENT — MIFFLIN-ST. JEOR: SCORE: 1229.74

## 2019-01-24 NOTE — PATIENT INSTRUCTIONS
[facility]   Dr Manzanares, Endocrinology Department      [facility]   [address]  [place] [address]  Appointment [phone]  Fax: [phone]  [place]: Monday and Tuesday         [facility]   [address]  [place] [address]  Appointment [phone]  Fax: [phone]  [place]: Wednesday and Thursday            Continue current regimen  Rx sent.  Labs in early March.  Please make a lab appointment for blood work and follow up clinic appointment in 1 week after that to discuss results.    When TSH is low- it means that your dose is high and we need to decrease the dose.  2018- 100 mcg/day- and was continued as labs were mildly low    2018- dose decreased to 88 mcg/day as TSH was low at 0.09    2018- added cytomel 10 mcg as T3 was persistently low    2018- decreased levothyroxine to 75 mcg/day and decrease Cytomel to 5 mcg/day based on labs and as you were having breakouts on cytomel 10 mcg dose. TSH was low 0.08 and normal T3.    2019- labs done- improved but labs done in the setting of illness.

## 2019-01-24 NOTE — NURSING NOTE
"ENDOCRINOLOGY INTAKE FORM    Patient Name:  Elizabeth Li  :  1935    Is patient Diabetic?   No  Does patient have non-diabetic or other endocrine issues?  Yes: acquired hypothyroidism    Vitals: /62 (BP Location: Left arm, Patient Position: Chair, Cuff Size: Adult Regular)   Pulse 93   Temp 97.6  F (36.4  C) (Oral)   Ht 1.549 m (5' 1\")   Wt 82.7 kg (182 lb 6.4 oz)   SpO2 95%   Breastfeeding? No   BMI 34.46 kg/m    BMI= Body mass index is 34.46 kg/m .    Flu vaccine:  No  Pneumonia vaccine:  Yes: both    Smoking and Alcohol use:  Social History     Tobacco Use     Smoking status: Former Smoker     Last attempt to quit: 1984     Years since quittin.0     Smokeless tobacco: Never Used   Substance Use Topics     Alcohol use: Yes     Alcohol/week: 0.0 oz     Comment: social drinker     Drug use: No       Patience Arellano CMA    "

## 2019-01-24 NOTE — LETTER
1/24/2019         RE: Elizabeth Li  [address] [address]  [address]  [place] [id number]        Dear Colleague,    Thank you for referring your patient, Elizabeth Li, to the [facility]. Please see a copy of my visit note below.    ENDOCRINOLOGY CLINIC NOTE:    Name: Elizabeth Li  Seen for f/u of hypothyroidism.    HPI:  Elizabeth Li is a 83 year old female who presents for the evaluation of above.  Today she has multiple questions about thyroid medication and dose changes.    #1 Hypothyroidism (TPO neg):  Diagnosed in 1989 at [place].  Currently taking levothyroxine to 75 mcg/day and Cytomel to 5 mcg/day (11/29/2018)  Difficult to get stable levels.  TFTs slightly improved as compared to 11/2018 labs.    In September 2018 labs were checked and based on labs Cytomel 10 mcg was initiated.  She reports that since starting Cytomel she is having breakouts over the face  And also joint pain so dose was decreased to 5 mcg/day  Since then s/s are better.  Not much activity 2/2 to pain. Has bloating and has heart burn.    Is wondering if GI s/s, bloating and weight gain related to thyroid problems.    No palpitations.    Changes to hair or skin: No  Diarrhea/Constipation: + constipation. Takes miralax.  Dysphagia or Shortness of breath:No  Tremors; sometimes  Changes in weight: Trying to lose weight.  Intentionally.  Wt Readings from Last 2 Encounters:   01/28/19 80.7 kg (178 lb)   01/24/19 82.7 kg (182 lb 6.4 oz)     Heat or cold intolerance: + cold intolerance.  History of Lithium or Amiodarone use:No  Head or neck surgery/radiation:No  IV Contrast: No  Family History of Thyroid Problems: No  PMH/PSH:  Past Medical History:   Diagnosis Date     Abnormal cardiovascular stress test     EKG changes with exercise on Exercise stress test 1/26/2015     CAD (coronary artery disease)     Severe 2 vessel CAD. PCI with drug-eluting stents x2 to mid LAD on 3/27/15     HTN (hypertension)       Hyperlipidemia LDL goal <100      Hypothyroidism      IHD (ischemic heart disease)      KUSUM (obstructive sleep apnea)     CPAP     Past Surgical History:   Procedure Laterality Date     CARDIAC SURGERY      2 heart stents     COLONOSCOPY       COLONOSCOPY N/A 2017    Procedure: COLONOSCOPY;  Surgeon: Nghia Moss MD;  Location:  GI     HEAD & NECK SURGERY      wisdom teeth removed     HEART CATH, ANGIOPLASTY  3/27/15    2.5 X 18 mm & 3.0 X mm LUCILA to LAD     TONSILLECTOMY      T&A as a child     TUBAL LIGATION       Family Hx:  Family History   Problem Relation Age of Onset     Blood Disease Mother         pernious anemia     Heart Disease Father      Heart Disease Sister      Heart Disease Brother      Colon Cancer No family hx of      Social Hx:  Social History     Socioeconomic History     Marital status:      Spouse name: Not on file     Number of children: 4     Years of education: Not on file     Highest education level: Not on file   Occupational History     Not on file   Social Needs     Financial resource strain: Not on file     Food insecurity:     Worry: Not on file     Inability: Not on file     Transportation needs:     Medical: Not on file     Non-medical: Not on file   Tobacco Use     Smoking status: Former Smoker     Last attempt to quit: 1984     Years since quittin.1     Smokeless tobacco: Never Used   Substance and Sexual Activity     Alcohol use: Yes     Alcohol/week: 0.0 oz     Comment: social drinker     Drug use: No     Sexual activity: No     Partners: Male   Lifestyle     Physical activity:     Days per week: Not on file     Minutes per session: Not on file     Stress: Not on file   Relationships     Social connections:     Talks on phone: Not on file     Gets together: Not on file     Attends Yazidism service: Not on file     Active member of club or organization: Not on file     Attends meetings of clubs or organizations: Not on file     Relationship status:  "Not on file     Intimate partner violence:     Fear of current or ex partner: Not on file     Emotionally abused: Not on file     Physically abused: Not on file     Forced sexual activity: Not on file   Other Topics Concern     Parent/sibling w/ CABG, MI or angioplasty before 65F 55M? Not Asked      Service Not Asked     Blood Transfusions Not Asked     Caffeine Concern Yes     Comment: coffee      Occupational Exposure No     Hobby Hazards No     Sleep Concern No     Stress Concern No     Weight Concern No     Special Diet Yes     Comment: gluten free and dairy free     Back Care No     Exercise No     Comment: on hold , 4/7 starting cardiac rehab     Bike Helmet Not Asked     Seat Belt Yes     Self-Exams Not Asked   Social History Narrative     Not on file          MEDICATIONS:  has a current medication list which includes the following prescription(s): vitamin c, aspirin, vitamin d3, ibuprofen, isosorbide mononitrate, ketoconazole, levothyroxine, liothyronine, metoprolol tartrate, simvastatin, and order for dme.    ROS   ROS: 10 point ROS neg other than the symptoms noted above in the HPI.    Physical Exam   VS: /62 (BP Location: Left arm, Patient Position: Chair, Cuff Size: Adult Regular)   Pulse 93   Temp 97.6  F (36.4  C) (Oral)   Ht 1.549 m (5' 1\")   Wt 82.7 kg (182 lb 6.4 oz)   SpO2 95%   Breastfeeding? No   BMI 34.46 kg/m     GENERAL: AXOX3, NAD, well dressed, answering questions appropriately, appears stated age.  HEENT: No exopthalmous, no proptosis, EOMI, no lig lag, no retraction  NECK: Thyroid normal in size, non tender, no nodules were palpated.  CV: RRR  LUNGS: CTAB  ABDOMEN: +BS  NEUROLOGY: CN grossly intact, no tremors  PSYCH: normal affect and mood      LABS:  TFTs:  ENDO THYROID LABS-[facility] Latest Ref Rng & Units 1/17/2019   TSH 0.40 - 4.00 mU/L 0.23 (L)   T4 FREE 0.76 - 1.46 ng/dL 1.33   FREE T3 2.3 - 4.2 pg/mL 1.2 (L)     ENDO THYROID LABS-[facility] Latest Ref Rng & Units 11/21/2018 "   TSH 0.40 - 4.00 mU/L 0.08 (L)   T4 FREE 0.76 - 1.46 ng/dL 1.12   FREE T3 2.3 - 4.2 pg/mL 2.4     ENDO THYROID LABS-[facility] Latest Ref Rng & Units 9/17/2018   TSH 0.40 - 4.00 mU/L 1.22   T4 FREE 0.76 - 1.46 ng/dL 1.11   FREE T3 2.3 - 4.2 pg/mL 1.9 (L)     ENDO THYROID LABS-[facility] Latest Ref Rng & Units 7/11/2018 4/11/2018   TSH 0.40 - 4.00 mU/L 0.09 (L) 0.32 (L)   T4 FREE 0.76 - 1.46 ng/dL 1.29 1.07   FREE T3 2.3 - 4.2 pg/mL 2.2 (L) 1.9 (L)   THYR PEROXIDASE LAWANDA <35 IU/mL  <10     ENDO THYROID LABS-[facility] Latest Ref Rng & Units 3/7/2018 8/3/2017   TSH 0.40 - 4.00 mU/L 0.25 (L) 0.53   T4 FREE 0.76 - 1.46 ng/dL 1.07    FREE T3 2.3 - 4.2 pg/mL       ENDO THYROID LABS-[facility] Latest Ref Rng & Units 3/8/2017 1/20/2017   TSH 0.40 - 4.00 mU/L 0.05 (L) 0.19 (L)   T4 FREE 0.76 - 1.46 ng/dL 1.33 1.30   FREE T3 2.3 - 4.2 pg/mL       ENDO THYROID LABS-[facility] Latest Ref Rng & Units 11/2/2016 4/4/2016   TSH 0.40 - 4.00 mU/L 0.04 (L) 0.02 (L)   T4 FREE 0.76 - 1.46 ng/dL 1.22 1.16   FREE T3 2.3 - 4.2 pg/mL  2.2 (L)     ENDO THYROID LABS-[facility] Latest Ref Rng & Units 1/6/2016 1/20/2015   TSH 0.40 - 4.00 mU/L 0.04 (L) 0.14 (L)   T4 FREE 0.76 - 1.46 ng/dL 1.62 (H) 1.23   FREE T3 2.3 - 4.2 pg/mL 2.3      All pertinent notes, labs, and images personally reviewed by me.     A/P  Ms.Arlene OSIRIS Li is a 82 year old here for the evaluation of hypothyroidism:    #1 Hypothyroidism (TPO neg):  Recent labs showing Suppressed TSH, free T4 is normal and low T3   Earlier not able to tolerate cytomel 10 mcg/day- skin breakout.    Currently she is taking  75 mcg/day and Cytomel to 5 mcg/day (since 11/29/2018)    Labs are improving.    Plan:  Discussed diagnosis, pathophysiology, management and treatment options of condition with pt.  Based on recent labs recommend to continue current dose of levothyroxine to 75 mcg/day and Cytomel to 5 mcg/day (11/29/2018)  Labs in 2-3 months.   Follow-up after that.    Discussed s/s of hypothyroidism and hyperthyroidism to watch  for.  The patient indicates understanding of these issues and agrees with the plan.  Discussed indications, risks and benefits of all medications prescribed, and answered questions to patient's satisfaction.    Explained to pt:    When TSH is low- it means that your dose is high and we need to decrease the dose.  4/2018- 100 mcg/day- and was continued as labs were mildly low    7/2018- dose decreased to 88 mcg/day as TSH was low at 0.09    9/2018- added cytomel 10 mcg as T3 was persistently low    11/2018- decreased levothyroxine to 75 mcg/day and decrease Cytomel to 5 mcg/day based on labs and as you were having breakouts on cytomel 10 mcg dose. TSH was low 0.08 and normal T3.    1/2019- labs done- improved but labs done in the setting of illness.        Standard treatment for hypothyroidism involves daily use of the synthetic thyroid hormone levothyroxine (Levothroid, Synthroid, others).  The dosage of thyroxine should normally be that required to bring the serum TSH level to the low normal range, such as 0.3 - 1 uU/ml. This is typically achieved with 1 ug L-T4/lb body weight/day, ranges from 75 - 125 ug/day in women, and 125 - 200 ug/day in men. Once thyroxine treatment is initiated, it is required indefinitely in most patients.     Symptoms should improve one to two weeks after starting treatment. Treatment with levothyroxine is usually lifelong.  Doseage may need to be adjusted based on body weight, medications, or pregnancy.  To determine the right dosage of levothyroxine initially we will repeat TSH and free T4 after two months.     Excessive amounts of the hormone can cause side effects, such as: Increased appetite, insomnia, heart palpitations, and shakiness.  Patients with CAD will be started on a lower dose.  Levothyroxine causes virtually no side effects when used in the appropriate dose.  Certain medications, supplements and foods can affect the body s ability to absorb levothyroxine. Medications  include: Iron supplements, Cholestyramine and Calcium supplements.     Follow-up:  2-3 months    Olena Manzanares MD  Endocrinology  [facility]/[place]  CC: Kasi Watkins    More than 50% of face to face time spent with Ms. Li on counseling / coordinating her care.       All questions were answered.  The patient indicates understanding of the above issues and agrees with the plan set forth.     Addendum to above note and clinic visit:    Labs reviewed.    See result note/telephone encounter.            Again, thank you for allowing me to participate in the care of your patient.        Sincerely,        Olena Manzanares MD

## 2019-01-28 ENCOUNTER — OFFICE VISIT (OUTPATIENT)
Dept: INTERNAL MEDICINE | Facility: CLINIC | Age: 84
End: 2019-01-28
Payer: COMMERCIAL

## 2019-01-28 VITALS
RESPIRATION RATE: 14 BRPM | HEIGHT: 61 IN | HEART RATE: 75 BPM | BODY MASS INDEX: 33.61 KG/M2 | TEMPERATURE: 97.8 F | WEIGHT: 178 LBS | OXYGEN SATURATION: 96 % | SYSTOLIC BLOOD PRESSURE: 120 MMHG | DIASTOLIC BLOOD PRESSURE: 60 MMHG

## 2019-01-28 DIAGNOSIS — J18.9 PNEUMONIA OF LEFT LOWER LOBE DUE TO INFECTIOUS ORGANISM: Primary | ICD-10-CM

## 2019-01-28 PROCEDURE — 99214 OFFICE O/P EST MOD 30 MIN: CPT | Performed by: INTERNAL MEDICINE

## 2019-01-28 ASSESSMENT — MIFFLIN-ST. JEOR: SCORE: 1209.78

## 2019-01-28 NOTE — PROGRESS NOTES
SUBJECTIVE:   Elizabeth Li is a 83 year old female who presents to clinic today for the following health issues:          Hospital Follow-up Visit:    Hospital/Nursing Home/IP Rehab Facility: [facility]  Date of Admission: 1/18/19  Date of Discharge: 1/21/19  Reason(s) for Admission: pneumonia and kidney infection       She was diagnosed with pneumonia and UTI in urgent care.    Chest xray had revealed left basilar opacity.  She had a mild cough and some shortness of breath at the time.  The patient returned to urgent care the next day, and still felt weak and was unable to drink or eat much.  She then was directly admitted to the hospital.   She was placed on IV ceftriaxone and zpak.  Discharged with ceftin for UTI.    The patient is feeling much better.  Appetite has improved.  Eating and drinking well.  No fevers or chills.         Problems taking medications regularly:  None       Medication changes since discharge: was on 10 day abx, completed       Problems adhering to non-medication therapy:  None    Summary of hospitalization:  [facility] discharge summary reviewed  [facility] discharge summary reviewed  Diagnostic Tests/Treatments reviewed.  Follow up needed: none  Other Healthcare Providers Involved in Patient s Care:         None  Update since discharge: improved.     Post Discharge Medication Reconciliation: discharge medications reconciled, continue medications without change.  Plan of care communicated with patient     Coding guidelines for this visit:  Type of Medical   Decision Making Face-to-Face Visit       within 7 Days of discharge Face-to-Face Visit        within 14 days of discharge   Moderate Complexity [id number]   High Complexity [id number]              PROBLEMS TO ADD ON...    Problem list and histories reviewed & adjusted, as indicated.  Additional history: as documented    Labs reviewed in EPIC    Reviewed and updated as needed this visit by clinical  "staff  Tobacco  Allergies  Meds  Med Hx  Surg Hx  Fam Hx  Soc Hx      Reviewed and updated as needed this visit by Provider         ROS:  CONSTITUTIONAL: NEGATIVE for fever, chills, change in weight  RESP: NEGATIVE for significant cough or SOB  CV: NEGATIVE for chest pain, palpitations or peripheral edema    OBJECTIVE:     /60   Pulse 75   Temp 97.8  F (36.6  C) (Oral)   Resp 14   Ht 1.549 m (5' 1\")   Wt 80.7 kg (178 lb)   SpO2 96%   BMI 33.63 kg/m    Body mass index is 33.63 kg/m .  GENERAL: healthy, alert and no distress  RESP: lungs clear to auscultation - no rales, rhonchi or wheezes  CV: regular rate and rhythm, normal S1 S2, no S3 or S4, no murmur, click or rub      ASSESSMENT/PLAN:         (J18.1) Pneumonia of left lower lobe due to infectious organism (H)  (primary encounter diagnosis)  Comment:   Plan: XR Chest 2 Views        -f/u chest xray in 6 months        Rebekah Rausch MD  [facility]    "

## 2019-01-28 NOTE — NURSING NOTE
"/60   Pulse 75   Temp 97.8  F (36.6  C) (Oral)   Resp 14   Ht 1.549 m (5' 1\")   Wt 80.7 kg (178 lb)   SpO2 96%   BMI 33.63 kg/m      "

## 2019-02-21 NOTE — PROGRESS NOTES
ENDOCRINOLOGY CLINIC NOTE:    Name: Elizabeth Li  Seen for f/u of hypothyroidism.    HPI:  Elizabeth Li is a 83 year old female who presents for the evaluation of above.  Today she has multiple questions about thyroid medication and dose changes.    #1 Hypothyroidism (TPO neg):  Diagnosed in 1989 at [place].  Currently taking levothyroxine to 75 mcg/day and Cytomel to 5 mcg/day (11/29/2018)  Difficult to get stable levels.  TFTs slightly improved as compared to 11/2018 labs.    In September 2018 labs were checked and based on labs Cytomel 10 mcg was initiated.  She reports that since starting Cytomel she is having breakouts over the face  And also joint pain so dose was decreased to 5 mcg/day  Since then s/s are better.  Not much activity 2/2 to pain. Has bloating and has heart burn.    Is wondering if GI s/s, bloating and weight gain related to thyroid problems.    No palpitations.    Changes to hair or skin: No  Diarrhea/Constipation: + constipation. Takes miralax.  Dysphagia or Shortness of breath:No  Tremors; sometimes  Changes in weight: Trying to lose weight.  Intentionally.  Wt Readings from Last 2 Encounters:   01/28/19 80.7 kg (178 lb)   01/24/19 82.7 kg (182 lb 6.4 oz)     Heat or cold intolerance: + cold intolerance.  History of Lithium or Amiodarone use:No  Head or neck surgery/radiation:No  IV Contrast: No  Family History of Thyroid Problems: No  PMH/PSH:  Past Medical History:   Diagnosis Date     Abnormal cardiovascular stress test     EKG changes with exercise on Exercise stress test 1/26/2015     CAD (coronary artery disease)     Severe 2 vessel CAD. PCI with drug-eluting stents x2 to mid LAD on 3/27/15     HTN (hypertension)      Hyperlipidemia LDL goal <100      Hypothyroidism      IHD (ischemic heart disease)      KUSUM (obstructive sleep apnea)     CPAP     Past Surgical History:   Procedure Laterality Date     CARDIAC SURGERY      2 heart stents     COLONOSCOPY       COLONOSCOPY N/A  2017    Procedure: COLONOSCOPY;  Surgeon: Nghia Moss MD;  Location:  GI     HEAD & NECK SURGERY      wisdom teeth removed     HEART CATH, ANGIOPLASTY  3/27/15    2.5 X 18 mm & 3.0 X mm LUCILA to LAD     TONSILLECTOMY      T&A as a child     TUBAL LIGATION       Family Hx:  Family History   Problem Relation Age of Onset     Blood Disease Mother         pernious anemia     Heart Disease Father      Heart Disease Sister      Heart Disease Brother      Colon Cancer No family hx of      Social Hx:  Social History     Socioeconomic History     Marital status:      Spouse name: Not on file     Number of children: 4     Years of education: Not on file     Highest education level: Not on file   Occupational History     Not on file   Social Needs     Financial resource strain: Not on file     Food insecurity:     Worry: Not on file     Inability: Not on file     Transportation needs:     Medical: Not on file     Non-medical: Not on file   Tobacco Use     Smoking status: Former Smoker     Last attempt to quit: 1984     Years since quittin.1     Smokeless tobacco: Never Used   Substance and Sexual Activity     Alcohol use: Yes     Alcohol/week: 0.0 oz     Comment: social drinker     Drug use: No     Sexual activity: No     Partners: Male   Lifestyle     Physical activity:     Days per week: Not on file     Minutes per session: Not on file     Stress: Not on file   Relationships     Social connections:     Talks on phone: Not on file     Gets together: Not on file     Attends Mormonism service: Not on file     Active member of club or organization: Not on file     Attends meetings of clubs or organizations: Not on file     Relationship status: Not on file     Intimate partner violence:     Fear of current or ex partner: Not on file     Emotionally abused: Not on file     Physically abused: Not on file     Forced sexual activity: Not on file   Other Topics Concern     Parent/sibling w/ CABG, MI or  "angioplasty before 65F 55M? Not Asked      Service Not Asked     Blood Transfusions Not Asked     Caffeine Concern Yes     Comment: coffee      Occupational Exposure No     Hobby Hazards No     Sleep Concern No     Stress Concern No     Weight Concern No     Special Diet Yes     Comment: gluten free and dairy free     Back Care No     Exercise No     Comment: on hold , 4/7 starting cardiac rehab     Bike Helmet Not Asked     Seat Belt Yes     Self-Exams Not Asked   Social History Narrative     Not on file          MEDICATIONS:  has a current medication list which includes the following prescription(s): vitamin c, aspirin, vitamin d3, ibuprofen, isosorbide mononitrate, ketoconazole, levothyroxine, liothyronine, metoprolol tartrate, simvastatin, and order for dme.    ROS   ROS: 10 point ROS neg other than the symptoms noted above in the HPI.    Physical Exam   VS: /62 (BP Location: Left arm, Patient Position: Chair, Cuff Size: Adult Regular)   Pulse 93   Temp 97.6  F (36.4  C) (Oral)   Ht 1.549 m (5' 1\")   Wt 82.7 kg (182 lb 6.4 oz)   SpO2 95%   Breastfeeding? No   BMI 34.46 kg/m    GENERAL: AXOX3, NAD, well dressed, answering questions appropriately, appears stated age.  HEENT: No exopthalmous, no proptosis, EOMI, no lig lag, no retraction  NECK: Thyroid normal in size, non tender, no nodules were palpated.  CV: RRR  LUNGS: CTAB  ABDOMEN: +BS  NEUROLOGY: CN grossly intact, no tremors  PSYCH: normal affect and mood      LABS:  TFTs:  ENDO THYROID LABS-[facility] Latest Ref Rng & Units 1/17/2019   TSH 0.40 - 4.00 mU/L 0.23 (L)   T4 FREE 0.76 - 1.46 ng/dL 1.33   FREE T3 2.3 - 4.2 pg/mL 1.2 (L)     ENDO THYROID LABS-[facility] Latest Ref Rng & Units 11/21/2018   TSH 0.40 - 4.00 mU/L 0.08 (L)   T4 FREE 0.76 - 1.46 ng/dL 1.12   FREE T3 2.3 - 4.2 pg/mL 2.4     ENDO THYROID LABS-[facility] Latest Ref Rng & Units 9/17/2018   TSH 0.40 - 4.00 mU/L 1.22   T4 FREE 0.76 - 1.46 ng/dL 1.11   FREE T3 2.3 - 4.2 pg/mL 1.9 (L)     ENDO " THYROID LABS-[facility] Latest Ref Rng & Units 7/11/2018 4/11/2018   TSH 0.40 - 4.00 mU/L 0.09 (L) 0.32 (L)   T4 FREE 0.76 - 1.46 ng/dL 1.29 1.07   FREE T3 2.3 - 4.2 pg/mL 2.2 (L) 1.9 (L)   THYR PEROXIDASE LAWANDA <35 IU/mL  <10     ENDO THYROID LABS-[facility] Latest Ref Rng & Units 3/7/2018 8/3/2017   TSH 0.40 - 4.00 mU/L 0.25 (L) 0.53   T4 FREE 0.76 - 1.46 ng/dL 1.07    FREE T3 2.3 - 4.2 pg/mL       ENDO THYROID LABS-[facility] Latest Ref Rng & Units 3/8/2017 1/20/2017   TSH 0.40 - 4.00 mU/L 0.05 (L) 0.19 (L)   T4 FREE 0.76 - 1.46 ng/dL 1.33 1.30   FREE T3 2.3 - 4.2 pg/mL       ENDO THYROID LABS-[facility] Latest Ref Rng & Units 11/2/2016 4/4/2016   TSH 0.40 - 4.00 mU/L 0.04 (L) 0.02 (L)   T4 FREE 0.76 - 1.46 ng/dL 1.22 1.16   FREE T3 2.3 - 4.2 pg/mL  2.2 (L)     ENDO THYROID LABS-[facility] Latest Ref Rng & Units 1/6/2016 1/20/2015   TSH 0.40 - 4.00 mU/L 0.04 (L) 0.14 (L)   T4 FREE 0.76 - 1.46 ng/dL 1.62 (H) 1.23   FREE T3 2.3 - 4.2 pg/mL 2.3      All pertinent notes, labs, and images personally reviewed by me.     A/P  Ms.Arlene OSIRIS Li is a 82 year old here for the evaluation of hypothyroidism:    #1 Hypothyroidism (TPO neg):  Recent labs showing Suppressed TSH, free T4 is normal and low T3   Earlier not able to tolerate cytomel 10 mcg/day- skin breakout.    Currently she is taking  75 mcg/day and Cytomel to 5 mcg/day (since 11/29/2018)    Labs are improving.    Plan:  Discussed diagnosis, pathophysiology, management and treatment options of condition with pt.  Based on recent labs recommend to continue current dose of levothyroxine to 75 mcg/day and Cytomel to 5 mcg/day (11/29/2018)  Labs in 2-3 months.   Follow-up after that.    Discussed s/s of hypothyroidism and hyperthyroidism to watch for.  The patient indicates understanding of these issues and agrees with the plan.  Discussed indications, risks and benefits of all medications prescribed, and answered questions to patient's satisfaction.    Explained to pt:    When TSH is low- it means  that your dose is high and we need to decrease the dose.  4/2018- 100 mcg/day- and was continued as labs were mildly low    7/2018- dose decreased to 88 mcg/day as TSH was low at 0.09    9/2018- added cytomel 10 mcg as T3 was persistently low    11/2018- decreased levothyroxine to 75 mcg/day and decrease Cytomel to 5 mcg/day based on labs and as you were having breakouts on cytomel 10 mcg dose. TSH was low 0.08 and normal T3.    1/2019- labs done- improved but labs done in the setting of illness.        Standard treatment for hypothyroidism involves daily use of the synthetic thyroid hormone levothyroxine (Levothroid, Synthroid, others).  The dosage of thyroxine should normally be that required to bring the serum TSH level to the low normal range, such as 0.3 - 1 uU/ml. This is typically achieved with 1 ug L-T4/lb body weight/day, ranges from 75 - 125 ug/day in women, and 125 - 200 ug/day in men. Once thyroxine treatment is initiated, it is required indefinitely in most patients.     Symptoms should improve one to two weeks after starting treatment. Treatment with levothyroxine is usually lifelong.  Doseage may need to be adjusted based on body weight, medications, or pregnancy.  To determine the right dosage of levothyroxine initially we will repeat TSH and free T4 after two months.     Excessive amounts of the hormone can cause side effects, such as: Increased appetite, insomnia, heart palpitations, and shakiness.  Patients with CAD will be started on a lower dose.  Levothyroxine causes virtually no side effects when used in the appropriate dose.  Certain medications, supplements and foods can affect the body s ability to absorb levothyroxine. Medications include: Iron supplements, Cholestyramine and Calcium supplements.     Follow-up:  2-3 months    Olena Manzanares MD  Endocrinology  [facility]  CC: Kasi Watkins    More than 50% of face to face time spent with Ms. Li on counseling  / coordinating her care.       All questions were answered.  The patient indicates understanding of the above issues and agrees with the plan set forth.     Addendum to above note and clinic visit:    Labs reviewed.    See result note/telephone encounter.

## 2019-03-05 DIAGNOSIS — E03.9 ACQUIRED HYPOTHYROIDISM: ICD-10-CM

## 2019-03-05 LAB — T3FREE SERPL-MCNC: 2.1 PG/ML (ref 2.3–4.2)

## 2019-03-05 PROCEDURE — 84443 ASSAY THYROID STIM HORMONE: CPT | Performed by: INTERNAL MEDICINE

## 2019-03-05 PROCEDURE — 36415 COLL VENOUS BLD VENIPUNCTURE: CPT | Performed by: INTERNAL MEDICINE

## 2019-03-05 PROCEDURE — 84481 FREE ASSAY (FT-3): CPT | Performed by: INTERNAL MEDICINE

## 2019-03-05 PROCEDURE — 84439 ASSAY OF FREE THYROXINE: CPT | Performed by: INTERNAL MEDICINE

## 2019-03-06 LAB
T4 FREE SERPL-MCNC: 1.1 NG/DL (ref 0.76–1.46)
TSH SERPL DL<=0.005 MIU/L-ACNC: 0.44 MU/L (ref 0.4–4)

## 2019-03-11 ENCOUNTER — OFFICE VISIT (OUTPATIENT)
Dept: INTERNAL MEDICINE | Facility: CLINIC | Age: 84
End: 2019-03-11
Payer: COMMERCIAL

## 2019-03-11 VITALS
BODY MASS INDEX: 34.27 KG/M2 | DIASTOLIC BLOOD PRESSURE: 68 MMHG | WEIGHT: 181.5 LBS | SYSTOLIC BLOOD PRESSURE: 130 MMHG | OXYGEN SATURATION: 94 % | TEMPERATURE: 97.5 F | RESPIRATION RATE: 18 BRPM | HEART RATE: 76 BPM | HEIGHT: 61 IN

## 2019-03-11 DIAGNOSIS — Z78.0 MENOPAUSE: ICD-10-CM

## 2019-03-11 DIAGNOSIS — G89.29 CHRONIC BILATERAL LOW BACK PAIN, WITH SCIATICA PRESENCE UNSPECIFIED: ICD-10-CM

## 2019-03-11 DIAGNOSIS — M54.5 CHRONIC BILATERAL LOW BACK PAIN, WITH SCIATICA PRESENCE UNSPECIFIED: ICD-10-CM

## 2019-03-11 DIAGNOSIS — I10 BENIGN ESSENTIAL HYPERTENSION: ICD-10-CM

## 2019-03-11 DIAGNOSIS — E78.5 HYPERLIPIDEMIA LDL GOAL <100: ICD-10-CM

## 2019-03-11 DIAGNOSIS — Z00.00 ROUTINE GENERAL MEDICAL EXAMINATION AT A HEALTH CARE FACILITY: Primary | ICD-10-CM

## 2019-03-11 PROCEDURE — G0439 PPPS, SUBSEQ VISIT: HCPCS | Performed by: INTERNAL MEDICINE

## 2019-03-11 RX ORDER — PHENAZOPYRIDINE HYDROCHLORIDE 95 MG/1
190 TABLET ORAL 3 TIMES DAILY
Status: ON HOLD | COMMUNITY
End: 2019-01-01

## 2019-03-11 RX ORDER — SIMVASTATIN 10 MG
10 TABLET ORAL AT BEDTIME
Qty: 90 TABLET | Refills: 4 | Status: SHIPPED | OUTPATIENT
Start: 2019-03-11 | End: 2020-01-01

## 2019-03-11 RX ORDER — LACTOBACILLUS RHAMNOSUS GG 10B CELL
1 CAPSULE ORAL 2 TIMES DAILY
COMMUNITY
End: 2019-01-01

## 2019-03-11 RX ORDER — TRAMADOL HYDROCHLORIDE 50 MG/1
50 TABLET ORAL 2 TIMES DAILY PRN
Qty: 30 TABLET | Refills: 0 | Status: SHIPPED | OUTPATIENT
Start: 2019-03-11 | End: 2019-04-23

## 2019-03-11 ASSESSMENT — ENCOUNTER SYMPTOMS
ABDOMINAL PAIN: 0
CONSTIPATION: 1
PALPITATIONS: 0
DYSURIA: 0
SORE THROAT: 0
EYE PAIN: 0
SHORTNESS OF BREATH: 1
WEAKNESS: 1
NAUSEA: 0
HEMATOCHEZIA: 0
MYALGIAS: 1
FREQUENCY: 0
FEVER: 0
COUGH: 0
NERVOUS/ANXIOUS: 1
DIARRHEA: 0
HEADACHES: 0
ARTHRALGIAS: 1
HEMATURIA: 0
BREAST MASS: 0
PARESTHESIAS: 0
DIZZINESS: 0
CHILLS: 0
HEARTBURN: 0
JOINT SWELLING: 0

## 2019-03-11 ASSESSMENT — PATIENT HEALTH QUESTIONNAIRE - PHQ9
10. IF YOU CHECKED OFF ANY PROBLEMS, HOW DIFFICULT HAVE THESE PROBLEMS MADE IT FOR YOU TO DO YOUR WORK, TAKE CARE OF THINGS AT HOME, OR GET ALONG WITH OTHER PEOPLE: SOMEWHAT DIFFICULT
SUM OF ALL RESPONSES TO PHQ QUESTIONS 1-9: 8
SUM OF ALL RESPONSES TO PHQ QUESTIONS 1-9: 8

## 2019-03-11 ASSESSMENT — ACTIVITIES OF DAILY LIVING (ADL): CURRENT_FUNCTION: SHOPPING REQUIRES ASSISTANCE

## 2019-03-11 ASSESSMENT — MIFFLIN-ST. JEOR: SCORE: 1225.66

## 2019-03-11 NOTE — NURSING NOTE
"/71   Pulse 76   Temp 97.5  F (36.4  C) (Oral)   Resp 18   Ht 1.549 m (5' 1\")   Wt 82.3 kg (181 lb 8 oz)   SpO2 94%   BMI 34.29 kg/m    Patient here for a physical and is fasting.  "

## 2019-03-11 NOTE — PROGRESS NOTES
"SUBJECTIVE:   Elizabeth Li is a 83 year old female who presents for Preventive Visit.  Patient here for a physical and is not fasting.    Are you in the first 12 months of your Medicare coverage?  No    Annual Wellness Visit     In general, how would you rate your overall health?  Good    Frequency of exercise:  None    Do you usually eat at least 4 servings of fruit and vegetables a day, include whole grains    & fiber and avoid regularly eating high fat or \"junk\" foods?  Yes    Taking medications regularly:  Yes    Medication side effects:  None    Ability to successfully perform activities of daily living:  Shopping requires assistance    Home Safety:  No safety concerns identified    Hearing Impairment:  No hearing concerns    In the past 6 months, have you been bothered by leaking of urine? Yes    In general, how would you rate your overall mental or emotional health?  Good    PHQ-2 Total Score: 3    Additional concerns today:  Yes    Do you feel safe in your environment? Yes    Do you have a Health Care Directive? No: Advance care planning was reviewed with patient; patient declined at this time.      Fall risk     PHQ-9 SCORE 1/20/2017 9/21/2018 3/11/2019   PHQ-9 Total Score - - -   PHQ-9 Total Score MyChart - - 8 (Mild depression)   PHQ-9 Total Score 11 6 8     Cognitive Screening   1) Repeat 3 items (Leader, Season, Table)    2) Clock draw: NORMAL  3) 3 item recall: Recalls 1 object   Results: NORMAL clock, 1-2 items recalled: COGNITIVE IMPAIRMENT LESS LIKELY    Mini-CogTM Copyright S Zander. Licensed by the author for use in [facility]; reprinted with permission (aure@.[facility]). All rights reserved.      Do you have sleep apnea, excessive snoring or daytime drowsiness?: sleep apnea currently on CPAP    Reviewed and updated as needed this visit by clinical staff  Tobacco  Allergies  Meds  Med Hx  Surg Hx  Fam Hx  Soc Hx        Reviewed and updated as needed this visit by " Provider  Meds        Social History     Tobacco Use     Smoking status: Former Smoker     Last attempt to quit: 1984     Years since quittin.2     Smokeless tobacco: Never Used   Substance Use Topics     Alcohol use: Yes     Alcohol/week: 0.0 oz     Comment: social drinker       Alcohol Use 3/11/2019   If you drink alcohol do you typically have greater than 3 drinks per day OR greater than 7 drinks per week? No       Current providers sharing in care for this patient include:   Patient Care Team:  Rebekah Rausch MD as PCP - General (Internal Medicine)  Rebekah Rausch MD as Assigned PCP    The following health maintenance items are reviewed in Epic and correct as of today:  Health Maintenance   Topic Date Due     URINE DRUG SCREEN Q1 YR  1950     DEPRESSION ACTION PLAN  1955     ZOSTER IMMUNIZATION (1 of 2) 1987     MEDICARE ANNUAL WELLNESS VISIT  2017     FALL RISK ASSESSMENT  2019     PHQ-9 Q6 MONTHS  2019     DTAP/TDAP/TD IMMUNIZATION (2 - Td) 2021     ADVANCE DIRECTIVE PLANNING Q5 YRS  2024     DEXA SCAN SCREENING (SYSTEM ASSIGNED)  Completed     IPV IMMUNIZATION  Aged Out     MENINGITIS IMMUNIZATION  Aged Out         Review of Systems   Constitutional: Negative for chills and fever.   HENT: Positive for congestion. Negative for ear pain, hearing loss and sore throat.    Eyes: Negative for pain and visual disturbance.   Respiratory: Positive for shortness of breath. Negative for cough.    Cardiovascular: Negative for chest pain, palpitations and peripheral edema.   Gastrointestinal: Positive for constipation. Negative for abdominal pain, diarrhea, heartburn, hematochezia and nausea.   Breasts:  Negative for tenderness, breast mass and discharge.   Genitourinary: Positive for pelvic pain and urgency. Negative for dysuria, frequency, genital sores, hematuria, vaginal bleeding and vaginal discharge.   Musculoskeletal: Positive for arthralgias and  "myalgias. Negative for joint swelling.   Skin: Positive for rash.   Neurological: Positive for weakness. Negative for dizziness, headaches and paresthesias.   Psychiatric/Behavioral: Negative for mood changes. The patient is nervous/anxious.        OBJECTIVE:   /68   Pulse 76   Temp 97.5  F (36.4  C) (Oral)   Resp 18   Ht 1.549 m (5' 1\")   Wt 82.3 kg (181 lb 8 oz)   SpO2 94%   BMI 34.29 kg/m   Estimated body mass index is 34.29 kg/m  as calculated from the following:    Height as of this encounter: 1.549 m (5' 1\").    Weight as of this encounter: 82.3 kg (181 lb 8 oz).  Physical Exam  GENERAL APPEARANCE: healthy, alert and no distress  EYES: Eyes grossly normal to inspection, PERRL and conjunctivae and sclerae normal  HENT: ear canals and TM's normal, nose and mouth without ulcers or lesions, oropharynx clear and oral mucous membranes moist  NECK: no adenopathy, no asymmetry, masses, or scars and thyroid normal to palpation  RESP: lungs clear to auscultation - no rales, rhonchi or wheezes  BREAST: normal without masses, tenderness or nipple discharge and no palpable axillary masses or adenopathy  CV: regular rate and rhythm, normal S1 S2, no S3 or S4, no murmur, click or rub, no peripheral edema and peripheral pulses strong  ABDOMEN: soft, nontender, no hepatosplenomegaly, no masses and bowel sounds normal  MS: no musculoskeletal defects are noted and gait is age appropriate without ataxia  SKIN: no suspicious lesions or rashes  NEURO: Normal strength and tone, sensory exam grossly normal, mentation intact and speech normal  PSYCH: mentation appears normal and affect normal/bright        ASSESSMENT / PLAN:       (Z00.00) Routine general medical examination at a health care facility  (primary encounter diagnosis)  Comment:   Plan: labs already performed    (E78.5) Hyperlipidemia LDL goal <100  Comment: at goal  Plan: simvastatin (ZOCOR) 10 MG tablet            (I10) Benign essential " "hypertension  Comment: at goal  Plan: metoprolol    (Z78.0) Menopause  Comment:   Plan: DX Hip/Pelvis/Spine            (M54.5,  G89.29) Chronic bilateral low back pain, with sciatica presence unspecified  Comment:  Plan: traMADol (ULTRAM) 50 MG tablet                End of Life Planning:  Patient currently has an advanced directive: Yes.  Practitioner is supportive of decision.    COUNSELING:  Reviewed preventive health counseling, as reflected in patient instructions       Regular exercise       Healthy diet/nutrition    BP Readings from Last 1 Encounters:   03/11/19 130/68     Estimated body mass index is 34.29 kg/m  as calculated from the following:    Height as of this encounter: 1.549 m (5' 1\").    Weight as of this encounter: 82.3 kg (181 lb 8 oz).           reports that she quit smoking about 35 years ago. she has never used smokeless tobacco.      Appropriate preventive services were discussed with this patient, including applicable screening as appropriate for cardiovascular disease, diabetes, osteopenia/osteoporosis, and glaucoma.  As appropriate for age/gender, discussed screening for colorectal cancer, prostate cancer, breast cancer, and cervical cancer. Checklist reviewing preventive services available has been given to the patient.    Reviewed patients plan of care and provided an AVS. The Basic Care Plan (routine screening as documented in Health Maintenance) for Elizabeth meets the Care Plan requirement. This Care Plan has been established and reviewed with the Patient.    Counseling Resources:  ATP IV Guidelines  Pooled Cohorts Equation Calculator  Breast Cancer Risk Calculator  FRAX Risk Assessment  ICSI Preventive Guidelines  Dietary Guidelines for Americans, 2010  USDA's MyPlate  ASA Prophylaxis  Lung CA Screening    Rebekah Rausch MD  [facility]  Answers for HPI/ROS submitted by the patient on 3/11/2019   Annual Exam:  If you checked off any problems, how difficult have these " problems made it for you to do your work, take care of things at home, or get along with other people?: Somewhat difficult  PHQ9 TOTAL SCORE: 8

## 2019-03-11 NOTE — PATIENT INSTRUCTIONS

## 2019-03-12 ASSESSMENT — PATIENT HEALTH QUESTIONNAIRE - PHQ9: SUM OF ALL RESPONSES TO PHQ QUESTIONS 1-9: 8

## 2019-03-18 ENCOUNTER — ANCILLARY PROCEDURE (OUTPATIENT)
Dept: GENERAL RADIOLOGY | Facility: CLINIC | Age: 84
End: 2019-03-18
Payer: COMMERCIAL

## 2019-03-18 PROCEDURE — 71046 X-RAY EXAM CHEST 2 VIEWS: CPT

## 2019-03-25 ENCOUNTER — OFFICE VISIT (OUTPATIENT)
Dept: ORTHOPEDICS | Facility: CLINIC | Age: 84
End: 2019-03-25
Payer: COMMERCIAL

## 2019-03-25 VITALS
SYSTOLIC BLOOD PRESSURE: 138 MMHG | BODY MASS INDEX: 34.17 KG/M2 | HEIGHT: 61 IN | DIASTOLIC BLOOD PRESSURE: 64 MMHG | WEIGHT: 181 LBS

## 2019-03-25 DIAGNOSIS — M51.369 LUMBAR DEGENERATIVE DISC DISEASE: ICD-10-CM

## 2019-03-25 DIAGNOSIS — M47.816 FACET ARTHROPATHY, LUMBAR: ICD-10-CM

## 2019-03-25 DIAGNOSIS — M48.061 SPINAL STENOSIS OF LUMBAR REGION WITHOUT NEUROGENIC CLAUDICATION: ICD-10-CM

## 2019-03-25 DIAGNOSIS — M16.11 PRIMARY OSTEOARTHRITIS OF RIGHT HIP: Primary | ICD-10-CM

## 2019-03-25 PROCEDURE — 99214 OFFICE O/P EST MOD 30 MIN: CPT | Performed by: FAMILY MEDICINE

## 2019-03-25 ASSESSMENT — MIFFLIN-ST. JEOR: SCORE: 1223.39

## 2019-03-25 NOTE — PATIENT INSTRUCTIONS
1. Primary osteoarthritis of right hip    2. Lumbar degenerative disc disease    3. Spinal stenosis of lumbar region without neurogenic claudication    4. Facet arthropathy, lumbar      Suspect most of your pain is coming from your back  You do have hip arthritis but this would cause pain in your right groin, which currently is not the most problematic thing for you  Stay active / walk if able  MRI of your lower back has been ordered. Schedule with [facility] ([phone]). Have ordered an open MRI but I would also mention this when they call to schedule.    Follow-up in the office to review MRI results and discuss next steps.

## 2019-03-25 NOTE — LETTER
3/25/2019         RE: Elizabeth Li  [address]  [place] [address]        Dear Colleague,    Thank you for referring your patient, Elizabeth Li, to the [facility]. Please see a copy of my visit note below.      ASSESSMENT & PLAN    1. Primary osteoarthritis of right hip    2. Lumbar degenerative disc disease    3. Spinal stenosis of lumbar region without neurogenic claudication    4. Facet arthropathy, lumbar      Presents to discuss her chronic back pain and right hip pain  She has been doctoring with Dr. Yost's team in regards to her back pain and had an RAJAN in April 2018 with 3-4 months of relief  I have also seen her in the past for just a right hip injection per the direction of Dr. Zhu for which she got some relief but short-lived.  In my opinion her right hip/leg pain is more lumbar in origin  She does have hip arthritis but does not report groin pain therefore at this time I do not feel that that is most problematic-causative  Stay active / walk if able  Recommend to repeat MRI to assess for worsening stenosis or rightward bulge/osteophyte that correlates to her radicular symptoms  MRI of your lower back has been ordered. Schedule with [facility] ([phone]). Have ordered an open MRI but encouraged her to also mention this when they call to schedule.    Follow-up in the office to review MRI results and discuss next steps.    -----    SUBJECTIVE  Elizabeth Li is a/an 83 year old female who is seen as a self referral for discussion of stem cell treatment as a possible solution for back/hip pain. She states that she previously discussed this with Dr. Gaspar at a walk with the doc meeting and wanted to get more information on this as a form of treatment.     Patient has been treating her back issues with spine and brain and had an injection 12/10/18 and didn't feel like it helped with her pain. She does note that around that same time she was  "battling a kidney infection that might have been causing some of her pain. Additionally, she has been under treatment of Dr. Zhu for right hip DJD and patient was referred to Dr. Gaspar for hip injections. First injection was 4/12/18 and reports that she got 3-4 months of relief from the injection. She then had a repeat right hip injection on 10/10/2018 and didn't seem to help with her pain.     Patient has been dealing with chronic back, hips and leg pain. She isn't able to differentiate where her pain is coming from. She states that it moves around and has been keeping her up at night.      Rating of Pain at worst: 10/10  Rating of Pain Currently: 1/10  Worsened by: walking, getting up from sitting, sleeping  Better with: resting  Treatments tried: Ibuprofen, Tramdol, lumbar epidural corticosteroid injection (L5-S1 12/10/18) 2 previous right hip corticosteroid injection's (last injection 10/10/2018)  Associated symptoms: denies any red flag low back issues, radiation of pain down both legs    Patient's past medical, surgical, social, and family histories were reviewed today and no changes are noted.    REVIEW OF SYSTEMS:  10 point ROS is negative other than symptoms noted above in HPI, Past Medical History or as stated below  Constitutional: NEGATIVE for fever, chills, change in weight  Skin: NEGATIVE for worrisome rashes, moles or lesions  GI/: NEGATIVE for bowel or bladder changes  Neuro: NEGATIVE for weakness, dizziness or paresthesias    OBJECTIVE:  /64   Ht 1.549 m (5' 1\")   Wt 82.1 kg (181 lb)   BMI 34.20 kg/m      General: healthy, alert and in no distress  HEENT: no scleral icterus or conjunctival erythema  Skin: no suspicious lesions or rash. No jaundice.  CV: no pedal edema  Resp: normal respiratory effort without conversational dyspnea   Psych: normal mood and affect  Gait: Slow to rise from chair, resting hip flexion, fair coordination and balance  Neuro: Normal light sensory exam of " lower extremity  MSK:  THORACIC/LUMBAR SPINE  Inspection:    No gross deformity/asymmetry, level pelvis  Palpation:    Tender about the lumbar facet joints, left SI joint, right SI joint, left sciatic notch and right sciatic notch. Otherwise remainder of landmarks are nontender.  Range of Motion:     Lumbar flexion limited by pain    Lumbar extension limited by pain  Strength:    quadriceps 5-/5, hamstrings 5-/5, gastrocsoleus 5-/5, tibialis anterior 5-/5, extensor hallicus longus 5-/5    Independent visualization of the below image:    XR PELVIS 1/2 VW  12/29/2016 11:36 AM     HISTORY:  Pain in right hip     COMPARISON:  None.                                                                      IMPRESSION:  Moderate degenerative changes of the right hip with  medial joint space loss. Mild degenerative changes of the left hip.  Upper pelvis not entirely included on this image.      VANDANA BOLAÑOS MD    LUMBAR SPINE TWO TO THREE VIEWS   4/4/2018 9:22 AM      HISTORY:  Lumbar radiculopathy.     COMPARISON: 12/29/2016     FINDINGS: Again there are multilevel degenerative disc disease changes  and multilevel hyperostosis. Grade 1 anterolisthesis at L3-L4, L4-L5.                                                                      IMPRESSION:  No evidence of instability with flexion/extension.     KENTON AYALA MD      Patient's conditions were thoroughly discussed during today's visit with greater than 50% of the visit spent counseling the patient with total time spent face-to-face with the patient being 30 minutes.    Dorothea Gaspar DO [facility]      Again, thank you for allowing me to participate in the care of your patient.        Sincerely,        Dorothea Gaspar DO

## 2019-03-25 NOTE — PROGRESS NOTES
ASSESSMENT & PLAN    1. Primary osteoarthritis of right hip    2. Lumbar degenerative disc disease    3. Spinal stenosis of lumbar region without neurogenic claudication    4. Facet arthropathy, lumbar      Presents to discuss her chronic back pain and right hip pain  She has been doctoring with Dr. Yost's team in regards to her back pain and had an RAJAN in April 2018 with 3-4 months of relief  I have also seen her in the past for just a right hip injection per the direction of Dr. Zhu for which she got some relief but short-lived.  In my opinion her right hip/leg pain is more lumbar in origin  She does have hip arthritis but does not report groin pain therefore at this time I do not feel that that is most problematic-causative  Stay active / walk if able  Recommend to repeat MRI to assess for worsening stenosis or rightward bulge/osteophyte that correlates to her radicular symptoms  MRI of your lower back has been ordered. Schedule with [facility] ([phone]). Have ordered an open MRI but encouraged her to also mention this when they call to schedule.    Follow-up in the office to review MRI results and discuss next steps.    -----    SUBJECTIVE  Elizabeth Li is a/an 83 year old female who is seen as a self referral for discussion of stem cell treatment as a possible solution for back/hip pain. She states that she previously discussed this with Dr. Gaspar at a walk with the doc meeting and wanted to get more information on this as a form of treatment.     Patient has been treating her back issues with spine and brain and had an injection 12/10/18 and didn't feel like it helped with her pain. She does note that around that same time she was battling a kidney infection that might have been causing some of her pain. Additionally, she has been under treatment of Dr. Zhu for right hip DJD and patient was referred to Dr. Gaspar for hip injections. First injection was 4/12/18 and reports that she got 3-4  "months of relief from the injection. She then had a repeat right hip injection on 10/10/2018 and didn't seem to help with her pain.     Patient has been dealing with chronic back, hips and leg pain. She isn't able to differentiate where her pain is coming from. She states that it moves around and has been keeping her up at night.      Rating of Pain at worst: 10/10  Rating of Pain Currently: 1/10  Worsened by: walking, getting up from sitting, sleeping  Better with: resting  Treatments tried: Ibuprofen, Tramdol, lumbar epidural corticosteroid injection (L5-S1 12/10/18) 2 previous right hip corticosteroid injection's (last injection 10/10/2018)  Associated symptoms: denies any red flag low back issues, radiation of pain down both legs    Patient's past medical, surgical, social, and family histories were reviewed today and no changes are noted.    REVIEW OF SYSTEMS:  10 point ROS is negative other than symptoms noted above in HPI, Past Medical History or as stated below  Constitutional: NEGATIVE for fever, chills, change in weight  Skin: NEGATIVE for worrisome rashes, moles or lesions  GI/: NEGATIVE for bowel or bladder changes  Neuro: NEGATIVE for weakness, dizziness or paresthesias    OBJECTIVE:  /64   Ht 1.549 m (5' 1\")   Wt 82.1 kg (181 lb)   BMI 34.20 kg/m     General: healthy, alert and in no distress  HEENT: no scleral icterus or conjunctival erythema  Skin: no suspicious lesions or rash. No jaundice.  CV: no pedal edema  Resp: normal respiratory effort without conversational dyspnea   Psych: normal mood and affect  Gait: Slow to rise from chair, resting hip flexion, fair coordination and balance  Neuro: Normal light sensory exam of lower extremity  MSK:  THORACIC/LUMBAR SPINE  Inspection:    No gross deformity/asymmetry, level pelvis  Palpation:    Tender about the lumbar facet joints, left SI joint, right SI joint, left sciatic notch and right sciatic notch. Otherwise remainder of landmarks are " nontender.  Range of Motion:     Lumbar flexion limited by pain    Lumbar extension limited by pain  Strength:    quadriceps 5-/5, hamstrings 5-/5, gastrocsoleus 5-/5, tibialis anterior 5-/5, extensor hallicus longus 5-/5    Independent visualization of the below image:    XR PELVIS 1/2 VW  12/29/2016 11:36 AM     HISTORY:  Pain in right hip     COMPARISON:  None.                                                                      IMPRESSION:  Moderate degenerative changes of the right hip with  medial joint space loss. Mild degenerative changes of the left hip.  Upper pelvis not entirely included on this image.      VANDANA BOLAÑOS MD    LUMBAR SPINE TWO TO THREE VIEWS   4/4/2018 9:22 AM      HISTORY:  Lumbar radiculopathy.     COMPARISON: 12/29/2016     FINDINGS: Again there are multilevel degenerative disc disease changes  and multilevel hyperostosis. Grade 1 anterolisthesis at L3-L4, L4-L5.                                                                      IMPRESSION:  No evidence of instability with flexion/extension.     KENTON AYALA MD      Patient's conditions were thoroughly discussed during today's visit with greater than 50% of the visit spent counseling the patient with total time spent face-to-face with the patient being 30 minutes.    Dorothea Gaspar,  [facility]

## 2019-04-02 ENCOUNTER — TRANSFERRED RECORDS (OUTPATIENT)
Dept: HEALTH INFORMATION MANAGEMENT | Facility: CLINIC | Age: 84
End: 2019-04-02

## 2019-04-02 ENCOUNTER — TELEPHONE (OUTPATIENT)
Dept: ORTHOPEDICS | Facility: CLINIC | Age: 84
End: 2019-04-02

## 2019-04-02 NOTE — TELEPHONE ENCOUNTER
Reason for call:  [facility] called to inform Dr. Gaspar that the patient was scheduled for an MRI today under sedation. She was not able to complete it but they did get some images that they are sending to the radiologist. Can call if you have any questions.    Valorie Bautista ATC

## 2019-04-04 ENCOUNTER — TRANSFERRED RECORDS (OUTPATIENT)
Dept: HEALTH INFORMATION MANAGEMENT | Facility: CLINIC | Age: 84
End: 2019-04-04

## 2019-04-05 ENCOUNTER — TELEPHONE (OUTPATIENT)
Dept: ORTHOPEDICS | Facility: CLINIC | Age: 84
End: 2019-04-05

## 2019-04-05 ENCOUNTER — OFFICE VISIT (OUTPATIENT)
Dept: ORTHOPEDICS | Facility: CLINIC | Age: 84
End: 2019-04-05
Payer: COMMERCIAL

## 2019-04-05 VITALS
SYSTOLIC BLOOD PRESSURE: 132 MMHG | WEIGHT: 181 LBS | HEIGHT: 61 IN | BODY MASS INDEX: 34.17 KG/M2 | DIASTOLIC BLOOD PRESSURE: 62 MMHG

## 2019-04-05 DIAGNOSIS — M48.061 SPINAL STENOSIS OF LUMBAR REGION WITHOUT NEUROGENIC CLAUDICATION: ICD-10-CM

## 2019-04-05 DIAGNOSIS — M47.816 FACET ARTHROPATHY, LUMBAR: ICD-10-CM

## 2019-04-05 DIAGNOSIS — M51.369 LUMBAR DEGENERATIVE DISC DISEASE: Primary | ICD-10-CM

## 2019-04-05 DIAGNOSIS — M54.16 RIGHT LUMBAR RADICULOPATHY: ICD-10-CM

## 2019-04-05 PROCEDURE — 99214 OFFICE O/P EST MOD 30 MIN: CPT | Performed by: FAMILY MEDICINE

## 2019-04-05 RX ORDER — HYDROCODONE BITARTRATE AND ACETAMINOPHEN 5; 325 MG/1; MG/1
TABLET ORAL
Qty: 10 TABLET | Refills: 0 | Status: SHIPPED | OUTPATIENT
Start: 2019-04-05 | End: 2019-04-23

## 2019-04-05 ASSESSMENT — MIFFLIN-ST. JEOR: SCORE: 1223.39

## 2019-04-05 NOTE — LETTER
4/5/2019         RE: Elizabeth Li  [id number] [address]  [address]  [place] [id number]        Dear Colleague,    Thank you for referring your patient, Elizabeth Li, to the [facility]. Please see a copy of my visit note below.    ASSESSMENT & PLAN  1. Lumbar degenerative disc disease    2. Spinal stenosis of lumbar region without neurogenic claudication    3. Facet arthropathy, lumbar    4. Right lumbar radiculopathy      MRI reviewed - pain is multi-factorial. Suspect axial back pain related to central stenosis (L3-4) with right leg pain coming from rightward herniation at L2-3.  Will order sedated injection through [facility]. Ordered both injections seperately ILESI L3-4 and TFESI L2-3.  If unable to do both injections at the same visit, the ILESI should be done first/take precedence. This was specified on my order.   Explained that at some point will need to repeat, as injections do not fix/change any pathology  Would not recommend surgery / lumbar fusion  Given her degree of pain, affecting ADL's and sleep will do low dose narcotic for night time use only.  Rx for Hydrocodone. Take 1/2 tab at night as needed for pain  Also placed order for TLSO per patient request but stated the goal is not to rely on a brace.    Follow-up 7-10 days after your injection in the office    -----    SUBJECTIVE:  Elizabeth Li is a 83 year old female who is seen in follow-up for MRI results of lumbar spine.They were last seen on 3/25/19. Patient was unable to complete the first MRI on 4/2/19 due to increased pain in her lower back when lying down. Patient reports she was able to complete a full MRI of the lumbar spine with a seated MRI yesterday (4/4/19) at [facility] in [place].    They indicate that their current pain level is 1/10 in a seated position. Patient rates pain is 10/10 with walking. Patient notes that pain in her lower back also wakes her up at night so she is not getting restful sleep. They  "have tried rest/activity avoidance, heat, ibuprofen and previous imaging (MRI 4/2/19 (incomplete) and 4/4/19 (complete)).      The patient is seen by themselves.    Patient's past medical, surgical, social, and family histories were reviewed today and no changes are noted.    REVIEW OF SYSTEMS:  Constitutional: NEGATIVE for fever, chills, change in weight  Skin: NEGATIVE for worrisome rashes, moles or lesions  GI/: NEGATIVE for bowel or bladder changes  Neuro: NEGATIVE for weakness, dizziness or paresthesias    OBJECTIVE:  /62   Ht 1.549 m (5' 1\")   Wt 82.1 kg (181 lb)   BMI 34.20 kg/m      General: healthy, alert and in no distress  HEENT: no scleral icterus or conjunctival erythema  Skin: no suspicious lesions or rash. No jaundice.  CV: regular rhythm by palpation, no pedal edema  Resp: normal respiratory effort without conversational dyspnea   Psych: normal mood and affect  Gait: normal steady gait with appropriate coordination and balance  Neuro: normal light touch sensory exam of the extremities.    MSK:  Deferred    Independent visualization of the below image:  EXAM: INCOMPLETE MR LUMBAR SPINE WITHOUT CONTRAST 1.2T OPEN    CLINICAL INFORMATION: Low back and bilateral leg pain (L5 radiculopathy). Lumbar spinal stenosis. No injury.    TECHNICAL INFORMATION: T1, T2 and STIR sagittal sections through the lumbar spine. Sagittal STIR images are degraded somewhat by motion artifact. The patient was unable to continue with the examination because of severe pain and no axial images were obtained.    The patient received 5 mg of oral liquid Valium for claustrophobia. The patient's O2 saturation and heart rate were monitored throughout the procedure by an oximeter, and values remained within normal ranges.    COMPARISON IMAGES: No comparisons.    PRELIMINARY INTERPRETATION: Segmentation and Alignment: Lordotic alignment of five lumbar-type vertebrae.    Sacrum and Sacroiliac joints: Upper sacrum and " sacroiliac joints unremarkable.    L5-S1: Moderate disc degeneration with facet autofusion or a solid-appearing posterior fusion mass on the left. No stenosis or impingement.    L4-5: Moderate disc degeneration with facet autofusion or solid-appearing posterior fusion masses bilaterally, minimal spondylolisthesis and mild foraminal stenosis on the left.    L3-4: Moderate disc degeneration with a 5 mm broad-based dorsal disc protrusion and moderate to marked narrowing of the central canal. Moderate to advanced facet arthropathy is seen bilaterally and the neural foramina are patent.    L2-3: Moderate disc degeneration with a 4 mm broad-based right paracentral disc protrusion, normal facet joints and no stenosis or impingement.    L1-2 and T12-L1: Moderate disc degeneration with facet joints unremarkable and no stenosis or impingement.    Conus: Normal signal intensity within the conus medullaris. No intradural mass or arachnoidal adhesions.    Osseous and paraspinous structures: No fracture or avulsion. No destructive bone lesion and no paraspinous mass. There is mild dilatation of the abdominal aorta to 24 mm at L3.    CONCLUSION: Incomplete MRI of the lumbar spine with specific findings as follows:    1. Moderate to marked narrowing of the central canal at L3-4 with a 5 mm broad-based dorsal disc protrusion.    2. 4 mm broad-based right paracentral disc protrusion at L2-3 without neural impingement.    3. Moderate diffuse disc degeneration with facet autofusion or solid posterior fusion masses at L5-S1 and L4-5.    4. Patient is being recalled for axial imaging. If the patient is unable to complete the examination, consideration might be given to a CT of the lumbar spine.    Electronically signed on 4/3/2019 2:15:00 PM by Piotr Hannah M.D.     EXAM: MR LUMBAR SPINE WITHOUT CONTRAST    CLINICAL INFORMATION: Patient returns for axial imaging.    COMPARISON: 4/2/2019.    CONTRAST: None.    SEDATION:  None.    TECHNICAL INFORMATION: This examination was performed on the 0.63T open upright scanner. Sagittal T2 and axial T2/T1 imaging was performed.    INTERPRETATION:    L4-5 and L5-S1: Fused bilateral L4-5 and left L5-S1 facet joints, residual spondylolisthesis at L4-5 with no recurrent central or foraminal stenosis.    L3-4: Moderate disc degeneration, 2 mm spondylolisthesis, central to left-sided disc herniation and facet hypertrophy cause moderate central stenosis with mild left L4 root impingement. Right foraminal bulge and patent left nerve root canal.    L2-3: Moderate disc degeneration, broad-based 4 mm AP disc protrusion abuts dural sac without central stenosis. Foramina appear patent and mild right facet degeneration.    L1-2: Mild disc degeneration and bulge without stenosis or impingement. Facet joints are unremarkable.    CONCLUSION: Patient returns for axial images to complete the study initially performed on 4/2/2019. Additional findings based on axial images as follows:    1. Moderate central stenosis at L3-4 with mild left L4 impingement.    2. Disc protrusion at L2-3 and bulge at L1-2 without spinal stenosis or impingement.    3. Fused facet joints at L4-5 and L5-S1 without significant stenosis.    Electronically signed on 4/5/2019 8:14:00 AM by Coleman Lovett M.D.     Patient's conditions were thoroughly discussed during today's visit with 100% of the visit spent counseling the patient with total time spent face-to-face with the patient being 35 minutes.    Dorothea Gaspar DO [facility]          Again, thank you for allowing me to participate in the care of your patient.        Sincerely,        Dorothea Gaspar DO

## 2019-04-05 NOTE — PATIENT INSTRUCTIONS
1. Lumbar degenerative disc disease    2. Spinal stenosis of lumbar region without neurogenic claudication    3. Facet arthropathy, lumbar    4. Right lumbar radiculopathy      Will order sedated injection through [facility]  Will likely need more than on injection and at some point will need to repeat as injections do not fix/change anything in your back  Would not recommend surgery / lumbar fusion  Rx for Hydrocodone. Take 1/2 tab at night as needed for pain    Follow-up 7-10 days after your injection in the office

## 2019-04-05 NOTE — PROGRESS NOTES
ASSESSMENT & PLAN  1. Lumbar degenerative disc disease    2. Spinal stenosis of lumbar region without neurogenic claudication    3. Facet arthropathy, lumbar    4. Right lumbar radiculopathy      MRI reviewed - pain is multi-factorial. Suspect axial back pain related to central stenosis (L3-4) with right leg pain coming from rightward herniation at L2-3.  Will order sedated injection through [facility]. Ordered both injections seperately ILESI L3-4 and TFESI L2-3.  If unable to do both injections at the same visit, the ILESI should be done first/take precedence. This was specified on my order.   Explained that at some point will need to repeat, as injections do not fix/change any pathology  Would not recommend surgery / lumbar fusion  Given her degree of pain, affecting ADL's and sleep will do low dose narcotic for night time use only.  Rx for Hydrocodone. Take 1/2 tab at night as needed for pain  Also placed order for TLSO per patient request but stated the goal is not to rely on a brace.    Follow-up 7-10 days after your injection in the office    -----    SUBJECTIVE:  Elizabeth Li is a 83 year old female who is seen in follow-up for MRI results of lumbar spine.They were last seen on 3/25/19. Patient was unable to complete the first MRI on 4/2/19 due to increased pain in her lower back when lying down. Patient reports she was able to complete a full MRI of the lumbar spine with a seated MRI yesterday (4/4/19) at [facility] in [place].    They indicate that their current pain level is 1/10 in a seated position. Patient rates pain is 10/10 with walking. Patient notes that pain in her lower back also wakes her up at night so she is not getting restful sleep. They have tried rest/activity avoidance, heat, ibuprofen and previous imaging (MRI 4/2/19 (incomplete) and 4/4/19 (complete)).      The patient is seen by themselves.    Patient's past medical, surgical, social, and family histories were reviewed today and no changes are  "noted.    REVIEW OF SYSTEMS:  Constitutional: NEGATIVE for fever, chills, change in weight  Skin: NEGATIVE for worrisome rashes, moles or lesions  GI/: NEGATIVE for bowel or bladder changes  Neuro: NEGATIVE for weakness, dizziness or paresthesias    OBJECTIVE:  /62   Ht 1.549 m (5' 1\")   Wt 82.1 kg (181 lb)   BMI 34.20 kg/m     General: healthy, alert and in no distress  HEENT: no scleral icterus or conjunctival erythema  Skin: no suspicious lesions or rash. No jaundice.  CV: regular rhythm by palpation, no pedal edema  Resp: normal respiratory effort without conversational dyspnea   Psych: normal mood and affect  Gait: normal steady gait with appropriate coordination and balance  Neuro: normal light touch sensory exam of the extremities.    MSK:  Deferred    Independent visualization of the below image:  EXAM: INCOMPLETE MR LUMBAR SPINE WITHOUT CONTRAST 1.2T OPEN    CLINICAL INFORMATION: Low back and bilateral leg pain (L5 radiculopathy). Lumbar spinal stenosis. No injury.    TECHNICAL INFORMATION: T1, T2 and STIR sagittal sections through the lumbar spine. Sagittal STIR images are degraded somewhat by motion artifact. The patient was unable to continue with the examination because of severe pain and no axial images were obtained.    The patient received 5 mg of oral liquid Valium for claustrophobia. The patient's O2 saturation and heart rate were monitored throughout the procedure by an oximeter, and values remained within normal ranges.    COMPARISON IMAGES: No comparisons.    PRELIMINARY INTERPRETATION: Segmentation and Alignment: Lordotic alignment of five lumbar-type vertebrae.    Sacrum and Sacroiliac joints: Upper sacrum and sacroiliac joints unremarkable.    L5-S1: Moderate disc degeneration with facet autofusion or a solid-appearing posterior fusion mass on the left. No stenosis or impingement.    L4-5: Moderate disc degeneration with facet autofusion or solid-appearing posterior fusion masses " bilaterally, minimal spondylolisthesis and mild foraminal stenosis on the left.    L3-4: Moderate disc degeneration with a 5 mm broad-based dorsal disc protrusion and moderate to marked narrowing of the central canal. Moderate to advanced facet arthropathy is seen bilaterally and the neural foramina are patent.    L2-3: Moderate disc degeneration with a 4 mm broad-based right paracentral disc protrusion, normal facet joints and no stenosis or impingement.    L1-2 and T12-L1: Moderate disc degeneration with facet joints unremarkable and no stenosis or impingement.    Conus: Normal signal intensity within the conus medullaris. No intradural mass or arachnoidal adhesions.    Osseous and paraspinous structures: No fracture or avulsion. No destructive bone lesion and no paraspinous mass. There is mild dilatation of the abdominal aorta to 24 mm at L3.    CONCLUSION: Incomplete MRI of the lumbar spine with specific findings as follows:    1. Moderate to marked narrowing of the central canal at L3-4 with a 5 mm broad-based dorsal disc protrusion.    2. 4 mm broad-based right paracentral disc protrusion at L2-3 without neural impingement.    3. Moderate diffuse disc degeneration with facet autofusion or solid posterior fusion masses at L5-S1 and L4-5.    4. Patient is being recalled for axial imaging. If the patient is unable to complete the examination, consideration might be given to a CT of the lumbar spine.    Electronically signed on 4/3/2019 2:15:00 PM by Piotr Hannah M.D.     EXAM: MR LUMBAR SPINE WITHOUT CONTRAST    CLINICAL INFORMATION: Patient returns for axial imaging.    COMPARISON: 4/2/2019.    CONTRAST: None.    SEDATION: None.    TECHNICAL INFORMATION: This examination was performed on the 0.63T open upright scanner. Sagittal T2 and axial T2/T1 imaging was performed.    INTERPRETATION:    L4-5 and L5-S1: Fused bilateral L4-5 and left L5-S1 facet joints, residual spondylolisthesis at L4-5 with no recurrent  central or foraminal stenosis.    L3-4: Moderate disc degeneration, 2 mm spondylolisthesis, central to left-sided disc herniation and facet hypertrophy cause moderate central stenosis with mild left L4 root impingement. Right foraminal bulge and patent left nerve root canal.    L2-3: Moderate disc degeneration, broad-based 4 mm AP disc protrusion abuts dural sac without central stenosis. Foramina appear patent and mild right facet degeneration.    L1-2: Mild disc degeneration and bulge without stenosis or impingement. Facet joints are unremarkable.    CONCLUSION: Patient returns for axial images to complete the study initially performed on 4/2/2019. Additional findings based on axial images as follows:    1. Moderate central stenosis at L3-4 with mild left L4 impingement.    2. Disc protrusion at L2-3 and bulge at L1-2 without spinal stenosis or impingement.    3. Fused facet joints at L4-5 and L5-S1 without significant stenosis.    Electronically signed on 4/5/2019 8:14:00 AM by Coleman Lovett M.D.     Patient's conditions were thoroughly discussed during today's visit with 100% of the visit spent counseling the patient with total time spent face-to-face with the patient being 35 minutes.    Dorothea Gaspar, DO [facility]

## 2019-04-12 ENCOUNTER — TELEPHONE (OUTPATIENT)
Dept: ORTHOPEDICS | Facility: CLINIC | Age: 84
End: 2019-04-12

## 2019-04-12 ENCOUNTER — TRANSFERRED RECORDS (OUTPATIENT)
Dept: HEALTH INFORMATION MANAGEMENT | Facility: CLINIC | Age: 84
End: 2019-04-12

## 2019-04-12 NOTE — TELEPHONE ENCOUNTER
[employer] called stating that [facility] denied approval for patients injection due to not doing physical therapy. Asking that Dr. Gaspar do a peer to peer review because patient has injection scheduled at 12 pm today.    [employer]: [phone]  Case# [id number]    ROMAN would like a call back with outcome so the patient can keep her appointment  Ph: [phone]    Valorie Bautista ATC

## 2019-04-12 NOTE — TELEPHONE ENCOUNTER
Called and spoke to [employer] and gave them the approval code and that it allowed for 3 injections within 6 months, after May the 6 months restarts.       Valorie Bautista ATC

## 2019-04-12 NOTE — TELEPHONE ENCOUNTER
Notified by [employer] that injection requires peer-peer due to lack of PT first.    Mary [phone] option 1  Case # [id number]    Chart reviewed. Chronic back pain. Seen by Neurosurgery within [facility] and another provider outside of our system. 2 separate rounds of physical therapy (2018 and 2017).  One ER visit (2016).  Previous ILESI L5-S1 (12/2018). No relief.      Spoke to [employer]. Injection scheduled for 12pm for ILESI. Insurance department submitted my office note from 4/5 and my orders (ILESI and TFESI with IL taking precedence).    Dr. Bailey 11:30am CST.  Will call my cell phone. Spoke to Dr. Bailey    Her plans only allows I level if IL. Can do 2 levels if TF.  Ok to do 3 inj in 6 months. After May 6 months restart.    Approval #[id number]    Routing to office to call [employer] to notify of approval code. Pt appt at 12pm should have been kept.    Dorothea Gaspar DO, CAQSM  [facility]

## 2019-04-17 ENCOUNTER — TELEPHONE (OUTPATIENT)
Dept: ORTHOPEDICS | Facility: CLINIC | Age: 84
End: 2019-04-17

## 2019-04-17 NOTE — TELEPHONE ENCOUNTER
"EXAM: FLUOROSCOPICALLY GUIDED INTERLAMINAR LUMBAR EPIDURAL THERAPEUTIC INJECTION, L3-4    CLINICAL INFORMATION: 83-year-old female with back, hip and leg pain. MRI lumbar spine dated 4/4/2019 and 4/2/2019 was reviewed demonstrating multilevel lumbar spondylosis with L3-4 degenerative spondylolisthesis and central trefoil and subarticular stenosis.    TECHNICAL INFORMATION: Following a discussion of the procedure, its benefits, and potential complications, the patient elected to proceed, and written informed consent was obtained.    With the patient in the prone position, the skin of the lower back was prepped and draped in sterile fashion and anesthetized with 2.0 mL lidocaine 1%. Under fluoroscopic guidance, a 5\" 22-gauge Tuohy needle was carefully advanced through the L3-4 interlaminar space into the dorsal epidural space via a right posterior approach. 4.0 mL iohexol 240 mg/mL were injected under direct live fluoroscopic observation, confirming correct needle placement. After careful review of the epidurogram, it was determined that a therapeutic epidural steroid injection is appropriate and could be safely performed.    Following filming, a therapeutic mixture consisting of 3.0 mL lidocaine 1% and 1.0 mL dexamethasone 10mg/mL was injected.    INTERPRETATION: Pre-injection radiographs reveal dorsal epidural contrast opacification extending from L3-4 superiorly. Following injection, there is further dispersal of injected materials in the dorsal epidural space.    The patient was monitored for response and described 75-80% relief 20 minutes postinjection.    Total fluoroscopic time for the procedure was 25 seconds. Total number of fluoroscopic images saved and archived into the patient's permanent medical record:5.    CONCLUSION:    1. Fluoroscopic-guided epidural injection of steroid and local anesthetic through the L3-4 interlaminar gap, without complication.    2. Initial therapeutic response to injected local " anesthetic is rated R1 (partial relief of pain symptoms rated at 75-80%).    MWJ        Electronically signed on 4/12/2019 5:02:00 PM by Tiffanie Munroe M.D.

## 2019-04-18 NOTE — TELEPHONE ENCOUNTER
Reviewed records of IL RAJAN L3-4 done at [facility]. 75-80% pain relief. Will await outcomes at 2 weeks.     Dorothea Gaspar DO, CAM  [facility]

## 2019-04-23 ENCOUNTER — OFFICE VISIT (OUTPATIENT)
Dept: ORTHOPEDICS | Facility: CLINIC | Age: 84
End: 2019-04-23
Payer: COMMERCIAL

## 2019-04-23 VITALS
WEIGHT: 181 LBS | HEIGHT: 61 IN | SYSTOLIC BLOOD PRESSURE: 134 MMHG | BODY MASS INDEX: 34.17 KG/M2 | DIASTOLIC BLOOD PRESSURE: 66 MMHG

## 2019-04-23 DIAGNOSIS — M47.816 FACET ARTHROPATHY, LUMBAR: ICD-10-CM

## 2019-04-23 DIAGNOSIS — M48.061 SPINAL STENOSIS OF LUMBAR REGION WITHOUT NEUROGENIC CLAUDICATION: ICD-10-CM

## 2019-04-23 DIAGNOSIS — M51.369 LUMBAR DEGENERATIVE DISC DISEASE: Primary | ICD-10-CM

## 2019-04-23 DIAGNOSIS — M16.11 PRIMARY OSTEOARTHRITIS OF RIGHT HIP: ICD-10-CM

## 2019-04-23 PROCEDURE — 99214 OFFICE O/P EST MOD 30 MIN: CPT | Performed by: FAMILY MEDICINE

## 2019-04-23 ASSESSMENT — MIFFLIN-ST. JEOR: SCORE: 1223.39

## 2019-04-23 NOTE — LETTER
4/23/2019         RE: Elizabeth Li  [address]  [facility] [address]        Dear Colleague,    Thank you for referring your patient, Elizabeth Li, to the [facility]. Please see a copy of my visit note below.    ASSESSMENT & PLAN    1. Lumbar degenerative disc disease    2. Spinal stenosis of lumbar region without neurogenic claudication    3. Facet arthropathy, lumbar    4. Primary osteoarthritis of right hip      Returns after IL RAJAN for chronic back pain/stenosis and right hip pain  Very difficult to discern how much relief she got from the injection. With extensive questioning it appears she had improvement in her ambulatory pain after the RAJAN during the expected timeframe for lido.  Unfortunately pain control was not sustained.  Still suspect some of her pain is related to her right hip OA  Recommend Physical therapy: [place] [facility] - [phone]. Work with Debbie Yee who she has worked with in the past and at the conclusion her pain was 3/10.  Depending on progress, next steps could be referral to pain management for ongoing care and/or repeat injection    Follow-up after 3-4 therapy visits.    -----    SUBJECTIVE:  Elizabeth Li is a 83 year old female who is seen in follow-up for low back pain.They were last seen 4/5/2019. Patient had a lumbar interlaminar epidural at L3-4 completed at [facility] on 4/12/2019. Patient notes that since the injection she feels like she is able to get more restful sleep and is able to stand up more straight while walking. However, she would not states that her pain level is improved at all.     They indicate that their current pain level is 10/10. Pain is in the right hip and right lateral knee. Pain is made worse with walking. Today she is walking with a noticeable limp. They have tried ibuprofen, other medications: Hydrocodone/Acetaminophen (doesn't feel like 1/2 tab was giving her much relief- ran out  "last night) and lumbar corticosteroid injection     The patient is seen by themselves.    Patient's past medical, surgical, social, and family histories were reviewed today and no changes are noted.    REVIEW OF SYSTEMS:  Constitutional: NEGATIVE for fever, chills, change in weight  Skin: NEGATIVE for worrisome rashes, moles or lesions  GI/: NEGATIVE for bowel or bladder changes  Neuro: NEGATIVE for weakness, dizziness or paresthesias    OBJECTIVE:  /66   Ht 1.549 m (5' 1\")   Wt 82.1 kg (181 lb)   BMI 34.20 kg/m      General: healthy, alert and in no distress  HEENT: no scleral icterus or conjunctival erythema  Skin: no suspicious lesions or rash. No jaundice.  CV: regular rhythm by palpation, no pedal edema  Resp: normal respiratory effort without conversational dyspnea   Psych: normal mood and affect  Gait: normal steady gait with appropriate coordination and balance  Neuro: normal light touch sensory exam of the extremities.    MSK:  Deferred    Independent visualization of the below image:  EXAM: MR LUMBAR SPINE WITHOUT CONTRAST     CONCLUSION: Patient returns for axial images to complete the study initially performed on 4/2/2019. Additional findings based on axial images as follows:  1. Moderate central stenosis at L3-4 with mild left L4 impingement.  2. Disc protrusion at L2-3 and bulge at L1-2 without spinal stenosis or impingement.  3. Fused facet joints at L4-5 and L5-S1 without significant stenosis.      Electronically signed on 4/5/2019 8:14:00 AM by Coleman Lovett M.D.     EXAM: INCOMPLETE MR LUMBAR SPINE WITHOUT CONTRAST 1.2T OPEN    CONCLUSION: Incomplete MRI of the lumbar spine with specific findings as follows:  1. Moderate to marked narrowing of the central canal at L3-4 with a 5 mm broad-based dorsal disc protrusion.  2. 4 mm broad-based right paracentral disc protrusion at L2-3 without neural impingement.  3. Moderate diffuse disc degeneration with facet autofusion or solid posterior " fusion masses at L5-S1 and L4-5.  4. Patient is being recalled for axial imaging. If the patient is unable to complete the examination, consideration might be given to a CT of the lumbar spine.      Electronically signed on 4/3/2019 2:15:00 PM by Piotr Hannah M.D.     EXAM: FLUOROSCOPICALLY GUIDED INTERLAMINAR LUMBAR EPIDURAL THERAPEUTIC INJECTION, L3-4    CONCLUSION:  1. Fluoroscopic-guided epidural injection of steroid and local anesthetic through the L3-4 interlaminar gap, without complication.  2. Initial therapeutic response to injected local anesthetic is rated R1 (partial relief of pain symptoms rated at 75-80%).  MWJ    Electronically signed on 4/12/2019 5:02:00 PM by Tiffanie Munroe M.D.     Patient's conditions were thoroughly discussed during today's visit with greater than 50% of the visit spent counseling the patient with total time spent face-to-face with the patient being 35 minutes.    Dorothea Gaspar DO [facility]        Again, thank you for allowing me to participate in the care of your patient.        Sincerely,        Dorothea Gaspar DO

## 2019-04-23 NOTE — Clinical Note
Tony Lewis,I've asked Elizabeth to work with you for a few sessions to see if we can address her back pain - help determine how much is related to her stenosis vs her hip OA. She has had IA injection before with no sustained relief. Has also had IL RAJAN with no sustained relief. Very difficult to tease out what is the primary pain . Thanks for your help.  Let's touch base after 3-4 sessions or sooner if there is clarity.Dorothea

## 2019-04-23 NOTE — PATIENT INSTRUCTIONS
1. Lumbar degenerative disc disease    2. Spinal stenosis of lumbar region without neurogenic claudication    3. Facet arthropathy, lumbar    4. Primary osteoarthritis of right hip      Recommend Physical therapy: [place] [facility] - [phone]. Work with Debbie Yee  Depending on progress, next steps could be referral to pain management for ongoing care and/or repeat injection    Follow-up after 3-4 therapy visits.

## 2019-04-23 NOTE — PROGRESS NOTES
ASSESSMENT & PLAN    1. Lumbar degenerative disc disease    2. Spinal stenosis of lumbar region without neurogenic claudication    3. Facet arthropathy, lumbar    4. Primary osteoarthritis of right hip      Returns after IL RAJAN for chronic back pain/stenosis and right hip pain  Very difficult to discern how much relief she got from the injection. With extensive questioning it appears she had improvement in her ambulatory pain after the RAJAN during the expected timeframe for lido.  Unfortunately pain control was not sustained.  Still suspect some of her pain is related to her right hip OA  Recommend Physical therapy: [place] [facility] - [phone]. Work with Debbie Yee who she has worked with in the past and at the conclusion her pain was 3/10.  Depending on progress, next steps could be referral to pain management for ongoing care and/or repeat injection    Follow-up after 3-4 therapy visits.    -----    SUBJECTIVE:  Elizabeth Li is a 83 year old female who is seen in follow-up for low back pain.They were last seen 4/5/2019. Patient had a lumbar interlaminar epidural at L3-4 completed at [facility] on 4/12/2019. Patient notes that since the injection she feels like she is able to get more restful sleep and is able to stand up more straight while walking. However, she would not states that her pain level is improved at all.     They indicate that their current pain level is 10/10. Pain is in the right hip and right lateral knee. Pain is made worse with walking. Today she is walking with a noticeable limp. They have tried ibuprofen, other medications: Hydrocodone/Acetaminophen (doesn't feel like 1/2 tab was giving her much relief- ran out last night) and lumbar corticosteroid injection     The patient is seen by themselves.    Patient's past medical, surgical, social, and family histories were reviewed today and no changes are noted.    REVIEW OF SYSTEMS:  Constitutional: NEGATIVE for fever, chills,  "change in weight  Skin: NEGATIVE for worrisome rashes, moles or lesions  GI/: NEGATIVE for bowel or bladder changes  Neuro: NEGATIVE for weakness, dizziness or paresthesias    OBJECTIVE:  /66   Ht 1.549 m (5' 1\")   Wt 82.1 kg (181 lb)   BMI 34.20 kg/m     General: healthy, alert and in no distress  HEENT: no scleral icterus or conjunctival erythema  Skin: no suspicious lesions or rash. No jaundice.  CV: regular rhythm by palpation, no pedal edema  Resp: normal respiratory effort without conversational dyspnea   Psych: normal mood and affect  Gait: normal steady gait with appropriate coordination and balance  Neuro: normal light touch sensory exam of the extremities.    MSK:  Deferred    Independent visualization of the below image:  EXAM: MR LUMBAR SPINE WITHOUT CONTRAST     CONCLUSION: Patient returns for axial images to complete the study initially performed on 4/2/2019. Additional findings based on axial images as follows:  1. Moderate central stenosis at L3-4 with mild left L4 impingement.  2. Disc protrusion at L2-3 and bulge at L1-2 without spinal stenosis or impingement.  3. Fused facet joints at L4-5 and L5-S1 without significant stenosis.      Electronically signed on 4/5/2019 8:14:00 AM by Coleman Lovett M.D.     EXAM: INCOMPLETE MR LUMBAR SPINE WITHOUT CONTRAST 1.2T OPEN    CONCLUSION: Incomplete MRI of the lumbar spine with specific findings as follows:  1. Moderate to marked narrowing of the central canal at L3-4 with a 5 mm broad-based dorsal disc protrusion.  2. 4 mm broad-based right paracentral disc protrusion at L2-3 without neural impingement.  3. Moderate diffuse disc degeneration with facet autofusion or solid posterior fusion masses at L5-S1 and L4-5.  4. Patient is being recalled for axial imaging. If the patient is unable to complete the examination, consideration might be given to a CT of the lumbar spine.      Electronically signed on 4/3/2019 2:15:00 PM by Piotr Hannah M.D. "     EXAM: FLUOROSCOPICALLY GUIDED INTERLAMINAR LUMBAR EPIDURAL THERAPEUTIC INJECTION, L3-4    CONCLUSION:  1. Fluoroscopic-guided epidural injection of steroid and local anesthetic through the L3-4 interlaminar gap, without complication.  2. Initial therapeutic response to injected local anesthetic is rated R1 (partial relief of pain symptoms rated at 75-80%).  MWJ    Electronically signed on 4/12/2019 5:02:00 PM by Tiffanie Munroe M.D.     Patient's conditions were thoroughly discussed during today's visit with greater than 50% of the visit spent counseling the patient with total time spent face-to-face with the patient being 35 minutes.    Dorothea Gaspar DO [facility] [place] [facility]

## 2019-04-25 ENCOUNTER — TELEPHONE (OUTPATIENT)
Dept: ORTHOPEDICS | Facility: CLINIC | Age: 84
End: 2019-04-25

## 2019-04-25 ENCOUNTER — THERAPY VISIT (OUTPATIENT)
Dept: PHYSICAL THERAPY | Facility: CLINIC | Age: 84
End: 2019-04-25
Payer: COMMERCIAL

## 2019-04-25 DIAGNOSIS — M16.11 PRIMARY OSTEOARTHRITIS OF RIGHT HIP: ICD-10-CM

## 2019-04-25 DIAGNOSIS — M54.16 RIGHT LUMBAR RADICULOPATHY: ICD-10-CM

## 2019-04-25 DIAGNOSIS — M47.816 FACET ARTHROPATHY, LUMBAR: ICD-10-CM

## 2019-04-25 DIAGNOSIS — M48.061 SPINAL STENOSIS OF LUMBAR REGION WITHOUT NEUROGENIC CLAUDICATION: ICD-10-CM

## 2019-04-25 DIAGNOSIS — M51.369 LUMBAR DEGENERATIVE DISC DISEASE: ICD-10-CM

## 2019-04-25 DIAGNOSIS — M51.369 LUMBAR DEGENERATIVE DISC DISEASE: Primary | ICD-10-CM

## 2019-04-25 PROCEDURE — 97110 THERAPEUTIC EXERCISES: CPT | Mod: GP | Performed by: PHYSICAL THERAPIST

## 2019-04-25 PROCEDURE — 97530 THERAPEUTIC ACTIVITIES: CPT | Mod: GP | Performed by: PHYSICAL THERAPIST

## 2019-04-25 PROCEDURE — 97162 PT EVAL MOD COMPLEX 30 MIN: CPT | Mod: GP | Performed by: PHYSICAL THERAPIST

## 2019-04-25 NOTE — TELEPHONE ENCOUNTER
Order faxed to [facility].   Phone call to Fadumo at [facility] and she received fax. She needs clarification if the walker is 4 wheeled or not. She is able to dispense the walker without the clarification but would like it as soon as possible.   She can be reached at: [phone].     Phone call to patient and informed she may  walker tomorrow. She asked if insurance covers. Informed she will need to discuss that with [facility]. She verbalized understanding.     Please advise on what kind of walker.    GUZMAN Patel RN

## 2019-04-25 NOTE — PROGRESS NOTES
"[place] [facility] Initial Evaluation -- Lumbar    Date: April 25, 2019  Elizabeth Li is a 83 year old female with a lumbar and R hip condition.   Referral: Dr. Gaspar  Work mechanical stresses:  retired  Employment status:  none  Leisure mechanical stresses: not able  Functional disability score (DEYSI/STarT Back):  See flow sheet  VAS score (0-10): 4/10  Patient goals/expectations:  \"to get some relief\"    HISTORY:    Present symptoms: R lateral hip, central low back  Pain quality (sharp/shooting/stabbing/aching/burning/cramping):  Aching, sharp   Paresthesia (yes/no):  no    Present since (onset date): chronic, worsened in February 2019 after walking around [employer] parking lot.     Symptoms (improving/unchanging/worsening):  worsening.     Symptoms commenced as a result of: no apparent reason   Condition occurred in the following environment:   community     Symptoms at onset (back/thigh/leg): back/hip  Constant symptoms (back/thigh/leg): back  Intermittent symptoms (back/thigh/leg): hip    Symptoms are made worse with the following: Always Standing, Always Walking, Sometimes Lying and Always On the move   Symptoms are made better with the following: Always Sitting and Always When still    Disturbed sleep (yes/no):  yes Sleeping postures (prone/sup/side R/L): elevated    Previous episodes (0/1-5/6-10/11+): 5/6 Year of first episode:     Previous history: chronic history of back and severe R hip OA.  Previous treatments: RAJAN hip/back; temporary relief      Specific Questions:  Cough/Sneeze/Strain (pos/neg): neg  Bowel/Bladder (normal/abnormal): normal  Gait (normal/abnormal): abnormal (ambulates with poor  Medications (nil/NSAIDS/analg/steroids/anticoag/other):  Other - Cardiac and Thyroid  Medical allergies:  none  General health (excellent/good/fair/poor):  good  Pertinent medical history:  Depression, Heart problems, Implanted device, Overweight, Smoking and Thyroid problems  Imaging " (None/Xray/MRI/Other):  MRI lumbar  Recent or major surgery (yes/no):  no  Night pain (yes/no): yes  Accidents (yes/no): none  Unexplained weight loss (yes/no): none  Barriers at home: none  Other red flags: none    EXAMINATION    Posture:   Sitting (good/fair/poor): fair  Standing (good/fair/poor):poor  Lordosis (red/acc/normal): red  Correction of posture (better/worse/no effect): no effect    Lateral Shift (right/left/nil): nil  Relevant (yes/no):  no  Other Observations: patient stands with lumbar flexion, R hip flexion, R knee flexion    Neurological:    Motor deficit:  Myotomes wnl  Reflexes:  Not tested  Sensory deficit:  intact  Dural signs:  Not assessed.     Movement Loss:   Rodolfo Mod Min Nil Pain   Flexion    x    Extension x x   pdm R hip   Side Gliding R  x   pdm R hip   Side Gliding L  x   pdm R hip     Test Movements:   During: produces, abolishes, increases, decreases, no effect, centralizing, peripheralizing   After: better, worse, no better, no worse, no effect, centralized, peripheralized    Pretest symptoms standing: unable to tolerate standing   Symptoms During Symptoms After ROM increased ROM decreased No Effect   FIS        Rep FIS        EIS        Rep EIS        Pretest symptoms lying: sitting: painfree    Symptoms During Symptoms After ROM increased ROM decreased No Effect   FISit No Effect    No Effect         Rep FISit No Effect    No Effect      x   EIL--unable to tolerated        Rep EIL        If required, pretest symptoms:    Symptoms During Symptoms After ROM increased ROM decreased No Effect   SGIS - R        Rep SGIS - R        SGIS - L        Rep SGIS - L          Static Tests:  Sitting slouched:    Sitting erect:    Standing slouched   Standing erect:    Lying prone in extension:   Long sitting:      Other Tests: R hip PROM: Flex=100, pdm, ABD=35, pdm, EXT=-20, pdm ER=30, erp, IR=25, pdm;   R hip distraction: decr/better/NE ROM  R hip pendulum: decr/better/decr intensity of pain  with ambulation    Provisional Classification:  Inconclusive/Other - Mechanically Inconclusive    Principle of Management:  Education:  Differential diagnosis, therapeutic dose of exercise, traffic light,    Equipment provided:    Mechanical therapy (Y/N):  ?   Extension principle:    Lateral Principle:    Flexion principle:    Other:  R hip pendulum, bridging x10/4-6 x day    ASSESSMENT/PLAN:    Patient is a 83 year old female with lumbar and right side hip complaints.    Patient has the following significant findings with corresponding treatment plan.                Diagnosis 1:  Chronic LBP w/radiculopathy  Pain -  manual therapy, self management, education, directional preference exercise and home program  Decreased ROM/flexibility - manual therapy and therapeutic exercise  Decreased strength - therapeutic exercise and therapeutic activities  Impaired gait - gait training  Decreased function - therapeutic activities  Impaired posture - neuro re-education  Diagnosis 2:  R hip pain   Pain -  hot/cold therapy, manual therapy, self management, education, directional preference exercise and home program  Decreased ROM/flexibility - manual therapy and therapeutic exercise  Decreased joint mobility - manual therapy and therapeutic exercise  Decreased strength - therapeutic exercise and therapeutic activities  Impaired gait - gait training  Decreased function - therapeutic activities    Therapy Evaluation Codes:   1) History comprised of:   Personal factors that impact the plan of care:      None.    Comorbidity factors that impact the plan of care are:      Depression, Heart problems, Implanted device, Overweight, Sleep disorder/apnea, Smoking and Thyroid.     Medications impacting care: Cardiac and Thyroid.  2) Examination of Body Systems comprised of:   Body structures and functions that impact the plan of care:      Hip and Lumbar spine.   Activity limitations that impact the plan of care are:      Cooking, Driving,  Dressing, Squatting/kneeling, Stairs, Standing and Walking.  3) Clinical presentation characteristics are:   Evolving/Changing.  4) Decision-Making    Moderate complexity using standardized patient assessment instrument and/or measureable assessment of functional outcome.  Cumulative Therapy Evaluation is: Moderate complexity.    Previous and current functional limitations:  (See Goal Flow Sheet for this information)    Short term and Long term goals: (See Goal Flow Sheet for this information)     Communication ability:  Patient appears to be able to clearly communicate and understand verbal and written communication and follow directions correctly.  Treatment Explanation - The following has been discussed with the patient:   RX ordered/plan of care  Anticipated outcomes  Possible risks and side effects  This patient would benefit from PT intervention to resume normal activities.   Rehab potential is fair.    Frequency:  1 X week, once daily  Duration:  for 8 weeks  Discharge Plan:  Achieve all LTG.  Independent in home treatment program.  Reach maximal therapeutic benefit.    Please refer to the daily flowsheet for treatment today, total treatment time and time spent performing 1:1 timed codes.

## 2019-04-25 NOTE — TELEPHONE ENCOUNTER
4 wheeled walker please. Called and left a message on voice mail.  Routing to triage to help facilitate in time for patient to  walker tomorrow.    Dorothea Gaspar DO, CAM  [place] [facility]

## 2019-04-25 NOTE — TELEPHONE ENCOUNTER
Discussed case/care with Debbie Yee.    Referral placed for wheeled walker to be picked up at DME. Printed and will request that it is faxed to DME department.    Routing to triage to notify patient as she will be in the building tomorrow and can  walker at that time.    Dorothea Gaspar DO, CAM  [place] [facility]

## 2019-04-26 NOTE — TELEPHONE ENCOUNTER
Fadumo returned call. She states she faxed an order for Dr. Gaspar to sign. Will get signature and fax back to her.     Order signed by provider and faxed to [facility]. Confirmed it went through by Nguyễn.    GUZMAN Patel RN

## 2019-04-26 NOTE — TELEPHONE ENCOUNTER
Left message for [facility] with verbal order for 4 wheeled walker. Informed that Dr. Gaspar also left voicemail yesterday. Asked for a return call to let us know they received so that walker can be available for  by patient today.     GUZMAN Patel RN

## 2019-05-01 ENCOUNTER — TELEPHONE (OUTPATIENT)
Dept: ORTHOPEDICS | Facility: CLINIC | Age: 84
End: 2019-05-01

## 2019-05-01 NOTE — TELEPHONE ENCOUNTER
Received notification from [employer] stating that a lumbar sacral brace that was ordered for patient on April 5, 2019 requires more information.  Office visit notes were submitted to insurance and they require additional information prior to covering the brace.  Form with specifics of what is needed left in Dr. Gaspar's in basket.    Form is to be completed by May 7 to avoid insurance denial.    Juan Manuel Soto ATC

## 2019-05-08 NOTE — TELEPHONE ENCOUNTER
Form reviewed.  Insurance is not accepting my office visit yet the additional information that is requested can be found in my office notes. If needed can send all my notes, including the notes from Neurosurgery who has also evaluated the patient.     1. History of condition  2. Functional limitations  3. Recent physical exam and condition necessitating orthosis    Information was required by 5/7 (today) and therefore unlikely to get approval.    Patient is currently undergoing PT and I have also ordered a wheeled walker to assist with pain and significant limitations with ADL's. Will see how she progresses and brace may not be needed.    Dorothea Gaspar DO, CAM  [place] [facility]

## 2019-05-10 NOTE — TELEPHONE ENCOUNTER
Received notification of the denial for lumbar brace.  Dr. Gaspar elects to continue with previous treatment plan as detailed below.    Denial submitted for scanning    Juan Manuel Soto ATC

## 2019-05-24 ENCOUNTER — THERAPY VISIT (OUTPATIENT)
Dept: PHYSICAL THERAPY | Facility: CLINIC | Age: 84
End: 2019-05-24
Payer: COMMERCIAL

## 2019-05-24 DIAGNOSIS — M51.369 DDD (DEGENERATIVE DISC DISEASE), LUMBAR: ICD-10-CM

## 2019-05-24 PROCEDURE — 97110 THERAPEUTIC EXERCISES: CPT | Mod: GP | Performed by: PHYSICAL THERAPIST

## 2019-05-24 PROCEDURE — 97530 THERAPEUTIC ACTIVITIES: CPT | Mod: GP | Performed by: PHYSICAL THERAPIST

## 2019-06-05 ENCOUNTER — HOSPITAL ENCOUNTER (EMERGENCY)
Facility: CLINIC | Age: 84
Discharge: HOME | End: 2019-06-05
Admitting: PHYSICIAN ASSISTANT
Payer: COMMERCIAL

## 2019-06-05 VITALS
WEIGHT: 177 LBS | BODY MASS INDEX: 33.44 KG/M2 | OXYGEN SATURATION: 92 % | DIASTOLIC BLOOD PRESSURE: 73 MMHG | SYSTOLIC BLOOD PRESSURE: 126 MMHG | HEART RATE: 72 BPM | TEMPERATURE: 97.2 F

## 2019-06-05 DIAGNOSIS — M54.42 BILATERAL LOW BACK PAIN WITH LEFT-SIDED SCIATICA, UNSPECIFIED CHRONICITY: ICD-10-CM

## 2019-06-05 PROCEDURE — 25000132 ZZH RX MED GY IP 250 OP 250 PS 637: Performed by: PHYSICIAN ASSISTANT

## 2019-06-05 PROCEDURE — 99283 EMERGENCY DEPT VISIT LOW MDM: CPT

## 2019-06-05 RX ORDER — OXYCODONE HYDROCHLORIDE 5 MG/1
5 TABLET ORAL EVERY 4 HOURS PRN
Qty: 12 TABLET | Refills: 0 | Status: ON HOLD | OUTPATIENT
Start: 2019-06-05 | End: 2019-01-01

## 2019-06-05 RX ORDER — ACETAMINOPHEN 500 MG
1000 TABLET ORAL ONCE
Status: COMPLETED | OUTPATIENT
Start: 2019-06-05 | End: 2019-06-05

## 2019-06-05 RX ORDER — METHYLPREDNISOLONE 4 MG
TABLET, DOSE PACK ORAL
Qty: 21 TABLET | Refills: 0 | Status: SHIPPED | OUTPATIENT
Start: 2019-06-05 | End: 2019-01-01

## 2019-06-05 RX ORDER — ACETAMINOPHEN 500 MG
1000 TABLET ORAL EVERY 8 HOURS PRN
Qty: 60 TABLET | Refills: 0 | Status: ON HOLD | OUTPATIENT
Start: 2019-06-05 | End: 2019-01-01

## 2019-06-05 RX ORDER — OXYCODONE HYDROCHLORIDE 5 MG/1
5 TABLET ORAL ONCE
Status: COMPLETED | OUTPATIENT
Start: 2019-06-05 | End: 2019-06-05

## 2019-06-05 RX ADMIN — ACETAMINOPHEN 1000 MG: 500 TABLET, FILM COATED ORAL at 12:23

## 2019-06-05 RX ADMIN — OXYCODONE HYDROCHLORIDE 5 MG: 5 TABLET ORAL at 14:43

## 2019-06-05 RX ADMIN — OXYCODONE HYDROCHLORIDE 5 MG: 5 TABLET ORAL at 12:23

## 2019-06-05 ASSESSMENT — ENCOUNTER SYMPTOMS
DYSURIA: 0
FEVER: 0
BACK PAIN: 1
MYALGIAS: 1
DIFFICULTY URINATING: 0
NUMBNESS: 0
ABDOMINAL PAIN: 0

## 2019-06-05 NOTE — ED NOTES
Bed: ED08  Expected date: 6/5/19  Expected time: 11:47 AM  Means of arrival: Ambulance  Comments:  BSV 1 back pain

## 2019-06-05 NOTE — ED NOTES
"Pt ambulated in the hallway with RN and walker. Pt states that there has only been \"slight improvement in her discomfort\". \"I am not going to holler when I move but it still really hurts\"  "

## 2019-06-05 NOTE — ED AVS SNAPSHOT
[facility] Emergency Department  [address]  [facility] [address]  Phone:  [phone]  Fax:  [phone]                                    Elizabeth Li   MRN: [id number]    Department:  [facility] Emergency Department   Date of Visit:  6/5/2019           After Visit Summary Signature Page    I have received my discharge instructions, and my questions have been answered. I have discussed any challenges I see with this plan with the nurse or doctor.    ..........................................................................................................................................  Patient/Patient Representative Signature      ..........................................................................................................................................  Patient Representative Print Name and Relationship to Patient    ..................................................               ................................................  Date                                   Time    ..........................................................................................................................................  Reviewed by Signature/Title    ...................................................              ..............................................  Date                                               Time          22EPIC Rev 08/18

## 2019-06-05 NOTE — ED TRIAGE NOTES
Pt aox4, abcs intact. Pt c/o left leg and hip pain that has made her unable to get up from the toilet without the assistance of her . Pain 10/10 with movement.

## 2019-06-05 NOTE — ED PROVIDER NOTES
History     Chief Complaint:    Leg Pain      HPI   Elizabeth Li is a 83 year old female with a past medical history significant for lumbar degenerative disc disease, disc herniation and spinal stenosis, who presents to the ED via EMS for evaluation of low back pain radiating down left leg. The patient states that she has been having severe lower back pain for quite some time with intermittent radiation of her pain to her left leg. This morning, she reported severe pain in her low back when getting up from a seated position. She required assistance getting up secondary to the pain. This ultimately prompted her visit to the ED. She otherwise denies any bowel/bladder incontinence, difficulty urinating, saddle anesthesia, fever, abdominal pain, or dysuria.  No history of malignancy, weight loss or night sweats.  Patient has known history of low back, and did recently have an MRI as shown below.  She has been using a walker for the last several weeks.    MRI Lumbar spine w & w/o contrast (4/4/19):   1. Moderate central stenosis at L3-4 with mild left L4 impingement.  2. Disc protrusion at L2-3 and bulge at L1-2 without spinal stenosis or impingement.  3. Fused facet joints at L4-5 and L5-S1 without significant stenosis, as per radiology.      Allergies:  Flu virus vaccine    Medications:    Aspirin  Synthroid  Metoprolol  Zocor  Phenazopyridine     Past Medical History:    CAD  HTN  HLD  Hypothyroidism  Ischemic heart disease  KUSUM  DDD    Past Surgical History:    Cardiac surgery, 2 stents  [place] teeth removed  T&A  Heart cath, angioplasty  Tubal ligation    Family History:    Pernious anemia  Heart disease    Social History:  Former smoker, quit 1/1/1984.  Positive for alcohol use.   Marital Status:       Review of Systems   Constitutional: Negative for fever.   Gastrointestinal: Negative for abdominal pain.   Genitourinary: Negative for difficulty urinating, dysuria and enuresis.   Musculoskeletal:  Positive for back pain and myalgias.   Neurological: Negative for numbness.   All other systems reviewed and are negative.    Physical Exam   First Vitals:  BP: 126/73  Pulse: 72  Heart Rate: 73  Temp: 97.2  F (36.2  C)  Weight: 80.3 kg (177 lb)  SpO2: 98 %      Physical Exam  General: Alert and cooperative with exam. Resting comfortably on gurney  Head:  Scalp is NC/AT  Eyes:  No scleral icterus, PERRL  ENT:  The external nose and ears are normal.   Neck:  Normal range of motion without rigidity.  CV:  Regular rate and rhythm    No pathologic murmur, rubs, or gallops.  Resp:  Breath sounds are clear bilaterally.  No crackles, wheezes, rhonchi.    Non-labored, no retractions or accessory muscle use  GI:  Abdomen is soft, no distension, no tenderness, no masses. No peritoneal signs.  :  No suprapubic or flank tenderness  MS:  No lower extremity edema or asymmetric calf swelling.    No midline cervical, thoracic, or lumbar tenderness    Normal range of motion in hips bilaterally without pain.  No pain with axial loading.  Normal straight leg raise.  Skin:  Warm and dry, No rash or lesions noted.  Neuro: Oriented. No gross motor deficits.    Strength and sensation grossly intact bilaterally in lower extremities.  Normal patellar and Achilles reflexes.   Psych: Awake. Alert. Normal affect. Appropriate interactions.      Emergency Department Course   Interventions:  1223 Oxycodone 5 mg PO   Tylenol 1,000 mg PO  1443 Oxycodone 5 mg PO    Emergency Department Course:  Nursing notes and vitals reviewed. (1203) I performed an exam of the patient as documented above.     Medicine administered as documented above.    1435 I rechecked the patient and discussed the results of her workup thus far. Patient passed a road challenge.     Findings and plan explained to the Patient. Patient discharged home with instructions regarding supportive care, medications, and reasons to return. The importance of close follow-up was reviewed.  The patient was prescribed Medrol Dosepak, Tylenol, and oxycodone.     Impression & Plan    Medical Decision Making:  This patient presented with low back pain.  Patient is well appearing with normal vitals.  Pain has improved with interventions in the emergency department.  The patient did not sustain any trauma and has no midline spinous tenderness.  The patient has a long history of low back issues, and did recently undergo an MRI in April with findings as above.  Surgery was recommended, however there was some concern about the patient weakness and suitability as a surgical candidate.  The patient has not had fever, chills, IV drug use, saddle anesthesia, or bowel or bladder dysfunction.  Reflexes and strength are grossly intact bilaterally in the lower extremities, and I do not feel there is an indication for repeat MRI or imaging today.  No history or symptoms of malignancy and no recent surgical intervention.  There is no clinical evidence of cauda equina syndrome, spinal epidural abscess, osteomyelitis, cord compression. No evidence of abdominal pain/tenderness or urinary symptoms and doubt referred pain from GI or  cause.    The patient's pain was improved following symptomatic treatment and she was able to ambulate here in the emergency department at baseline.  The patient will be discharged with medications as below to use as directed, and will refer back to spine for close outpatient follow-up and definitive management.  Ice or heat to the back and stretching exercises.  No heavy lifting, bending or twisting.  I did agree to prescribe a short course of pain medication to be used with high-dose Tylenol, and given her symptoms radiating down the leg we did discuss the risks versus benefits associated with steroids and will try a short course of oral steroids.  Return if increasing pain, numbness, weakness, fever, chills, or bowel or bladder dysfunction.     Diagnosis:    ICD-10-CM    1. Bilateral low  back pain with left-sided sciatica, unspecified chronicity M54.42        Disposition:  discharged to home    Discharge Medications:     Medication List      Started    acetaminophen 500 MG tablet  Commonly known as:  TYLENOL  1,000 mg, Oral, EVERY 8 HOURS PRN     methylPREDNISolone 4 MG tablet therapy pack  Commonly known as:  MEDROL DOSEPAK  Follow Package Directions     oxyCODONE 5 MG tablet  Commonly known as:  ROXICODONE  5 mg, Oral, EVERY 4 HOURS PRN          Scribe Disclosure:  I,  Chava Wild, am serving as a scribe on 6/5/2019 at 12:03 PM to personally document services performed by Oscar Ryan PA-C, found based on my observations and the provider's statements to me.        Chava Wild  6/5/2019   [facility] EMERGENCY DEPARTMENT       Oscar Ryan PA-C  06/05/19 1826       Oscar Ryan PA-C  06/05/19 1827

## 2019-06-06 ENCOUNTER — TELEPHONE (OUTPATIENT)
Dept: ORTHOPEDICS | Facility: CLINIC | Age: 84
End: 2019-06-06

## 2019-06-06 NOTE — TELEPHONE ENCOUNTER
Elizabeth Li is a 83 year old female who left voicemail for Dr. Gaspar. She wants her to know she was seen in the ER yesterday. They referred her to Dr. Jansen and she has an appointment scheduled for 6/17/19. She does not need a call back but wanted Dr. Gaspar to be aware.     Routing to provider as GIDEON Patel RN

## 2019-06-10 ENCOUNTER — OFFICE VISIT (OUTPATIENT)
Dept: NEUROSURGERY | Facility: CLINIC | Age: 84
End: 2019-06-10
Attending: NEUROLOGICAL SURGERY
Payer: COMMERCIAL

## 2019-06-10 VITALS
HEART RATE: 61 BPM | HEIGHT: 62 IN | OXYGEN SATURATION: 95 % | DIASTOLIC BLOOD PRESSURE: 63 MMHG | WEIGHT: 173 LBS | TEMPERATURE: 97.6 F | BODY MASS INDEX: 31.83 KG/M2 | SYSTOLIC BLOOD PRESSURE: 152 MMHG

## 2019-06-10 DIAGNOSIS — M43.16 SPONDYLOLISTHESIS OF LUMBAR REGION: Primary | ICD-10-CM

## 2019-06-10 PROCEDURE — 99204 OFFICE O/P NEW MOD 45 MIN: CPT | Performed by: NEUROLOGICAL SURGERY

## 2019-06-10 PROCEDURE — G0463 HOSPITAL OUTPT CLINIC VISIT: HCPCS

## 2019-06-10 ASSESSMENT — MIFFLIN-ST. JEOR: SCORE: 1195.03

## 2019-06-10 ASSESSMENT — PAIN SCALES - GENERAL: PAINLEVEL: MODERATE PAIN (5)

## 2019-06-10 NOTE — PATIENT INSTRUCTIONS
1. Orders placed today: lumbar CT, flex/ex lumbar Xray, and bilateral leg EMG. You can schedule with our  or call [facility] to set up appts.  2. We will call you with the results and next steps.  3. Follow up with Primary Care provider regarding elevated blood pressure.    Please call our clinic with any questions or concerns: [phone]

## 2019-06-10 NOTE — PROGRESS NOTES
83F w/ L3-4 spondylolisthesis, bilateral leg pain.  Many years of chronic pain, worse in the last 3 months.  Has been seeing Dr. Gaspar for work-up.  Has been doing physical therapy, and underwent L3-4 epidural steroid injection.  Now with 2 weeks of dramatic worsening of pain, and recent ER visit due to inability to stand up due to pain.  9 out of 10 sharp pain, radiating to the bilateral lateral thighs and knees.  Associated numbness and weakness.  MRI lumbar spine reported as showing L3-4 spondylolisthesis with right foraminal disc bulge, and L4-S1 facet fusion.       Past Medical History:   Diagnosis Date     Abnormal cardiovascular stress test     EKG changes with exercise on Exercise stress test 1/26/2015     CAD (coronary artery disease)     Severe 2 vessel CAD. PCI with drug-eluting stents x2 to mid LAD on 3/27/15     HTN (hypertension)      Hyperlipidemia LDL goal <100      Hypothyroidism      IHD (ischemic heart disease)      KUSUM (obstructive sleep apnea)     CPAP     Past Surgical History:   Procedure Laterality Date     CARDIAC SURGERY      2 heart stents     COLONOSCOPY       COLONOSCOPY N/A 1/11/2017    Procedure: COLONOSCOPY;  Surgeon: Nghia Moss MD;  Location:  GI     HEAD & NECK SURGERY      wisdom teeth removed     HEART CATH, ANGIOPLASTY  3/27/15    2.5 X 18 mm & 3.0 X mm LUCILA to LAD     TONSILLECTOMY      T&A as a child     TUBAL LIGATION       Social History     Socioeconomic History     Marital status:      Spouse name: Not on file     Number of children: 4     Years of education: Not on file     Highest education level: Not on file   Occupational History     Not on file   Social Needs     Financial resource strain: Not on file     Food insecurity:     Worry: Not on file     Inability: Not on file     Transportation needs:     Medical: Not on file     Non-medical: Not on file   Tobacco Use     Smoking status: Former Smoker     Last attempt to quit: 1/1/1984     Years since  "quittin.4     Smokeless tobacco: Never Used   Substance and Sexual Activity     Alcohol use: Yes     Alcohol/week: 0.0 oz     Comment: social drinker     Drug use: No     Sexual activity: Never     Partners: Male   Lifestyle     Physical activity:     Days per week: Not on file     Minutes per session: Not on file     Stress: Not on file   Relationships     Social connections:     Talks on phone: Not on file     Gets together: Not on file     Attends Bahai service: Not on file     Active member of club or organization: Not on file     Attends meetings of clubs or organizations: Not on file     Relationship status: Not on file     Intimate partner violence:     Fear of current or ex partner: Not on file     Emotionally abused: Not on file     Physically abused: Not on file     Forced sexual activity: Not on file   Other Topics Concern     Parent/sibling w/ CABG, MI or angioplasty before 65F 55M? Not Asked      Service Not Asked     Blood Transfusions Not Asked     Caffeine Concern Yes     Comment: coffee      Occupational Exposure No     Hobby Hazards No     Sleep Concern No     Stress Concern No     Weight Concern No     Special Diet Yes     Comment: gluten free and dairy free     Back Care No     Exercise No     Comment: on hold ,  starting cardiac rehab     Bike Helmet Not Asked     Seat Belt Yes     Self-Exams Not Asked   Social History Narrative     Not on file     Family History   Problem Relation Age of Onset     Blood Disease Mother         pernious anemia     Heart Disease Father      Heart Disease Sister      Heart Disease Brother      Colon Cancer No family hx of         ROS: 10 point ROS neg other than the symptoms noted above in the HPI.    Physical Exam  /63 (BP Location: Right arm, Patient Position: Sitting, Cuff Size: Adult Large)   Pulse 61   Temp 97.6  F (36.4  C) (Oral)   Ht 1.562 m (5' 1.5\")   Wt 78.5 kg (173 lb)   SpO2 95%   BMI 32.16 kg/m    HEENT:  Normocephalic, " atraumatic.  PERRLA.  EOM s intact.  Visual fields full to gross exam  Neck:  Supple, non-tender, without lymphadenopathy.  Heart:  No peripheral edema  Lungs:  No SOB  Abdomen:  Non-distended.   Skin:  Warm and dry.  Extremities:  No edema, cyanosis or clubbing.  Psychiatric:  No apparent distress  Musculoskeletal:  Normal bulk and tone    NEUROLOGICAL EXAMINATION:     Mental status:  Alert and Oriented x 3, speech is fluent.  Cranial nerves:  II-XII intact.   Motor:    Shoulder Abduction:  Right:  5/5   Left:  5/5  Biceps:                      Right:  5/5   Left:  5/5  Triceps:                     Right:  5/5   Left:  5/5  Wrist Extensors:       Right:  5/5   Left:  5/5  Wrist Flexors:           Right:  5/5   Left:  5/5  interosseus :            Right:  5/5   Left:  5/5  Hip Flexion:                Right: 4+/5  Left:  4/5  Quadriceps:             Right:  5/5  Left:  4+/5  Hamstrings:             Right:  5/5  Left:  5/5  Gastroc Soleus:        Right:  5/5  Left:  5/5  Tib/Ant:                      Right:  5/5  Left:  5/5  EHL:                     Right:  5/5  Left:  5/5  Sensation:  Bilateral thigh numbness  Reflexes:  Negative Babinski.  Negative Clonus.  Negative Good's.  Coordination:  Smooth finger to nose testing.   Negative pronator drift.  Smooth tandem walking.    A/P:  83F w/ L3-4 spondylolisthesis, bilateral leg pain    I had a discussion with the patient, reviewing the history, symptoms, and imaging  Will obtain CT Lumbar  Will obtain XR F/E  Bilateral LE EMG  Will contact subsequently to determine next steps

## 2019-06-10 NOTE — LETTER
6/10/2019         RE: Elizabeth Li  [address]  [address]  [place] [address]        Dear Colleague,    Thank you for referring your patient, Elizabeth Li, to the [facility] NEUROS[facility]ERY [facility]. Please see a copy of my visit note below.    83F w/ L3-4 spondylolisthesis, bilateral leg pain.  Many years of chronic pain, worse in the last 3 months.  Has been seeing Dr. Gaspar for work-up.  Has been doing physical therapy, and underwent L3-4 epidural steroid injection.  Now with 2 weeks of dramatic worsening of pain, and recent ER visit due to inability to stand up due to pain.  9 out of 10 sharp pain, radiating to the bilateral lateral thighs and knees.  Associated numbness and weakness.  MRI lumbar spine reported as showing L3-4 spondylolisthesis with right foraminal disc bulge, and L4-S1 facet fusion.       Past Medical History:   Diagnosis Date     Abnormal cardiovascular stress test     EKG changes with exercise on Exercise stress test 1/26/2015     CAD (coronary artery disease)     Severe 2 vessel CAD. PCI with drug-eluting stents x2 to mid LAD on 3/27/15     HTN (hypertension)      Hyperlipidemia LDL goal <100      Hypothyroidism      IHD (ischemic heart disease)      KUSUM (obstructive sleep apnea)     CPAP     Past Surgical History:   Procedure Laterality Date     CARDIAC SURGERY      2 heart stents     COLONOSCOPY       COLONOSCOPY N/A 1/11/2017    Procedure: COLONOSCOPY;  Surgeon: Nghia Moss MD;  Location:  GI     HEAD & NECK SURGERY      wisdom teeth removed     HEART CATH, ANGIOPLASTY  3/27/15    2.5 X 18 mm & 3.0 X mm LUCILA to LAD     TONSILLECTOMY      T&A as a child     TUBAL LIGATION       Social History     Socioeconomic History     Marital status:      Spouse name: Not on file     Number of children: 4     Years of education: Not on file     Highest education level: Not on file   Occupational History     Not on file   Social Needs     Financial resource  strain: Not on file     Food insecurity:     Worry: Not on file     Inability: Not on file     Transportation needs:     Medical: Not on file     Non-medical: Not on file   Tobacco Use     Smoking status: Former Smoker     Last attempt to quit: 1984     Years since quittin.4     Smokeless tobacco: Never Used   Substance and Sexual Activity     Alcohol use: Yes     Alcohol/week: 0.0 oz     Comment: social drinker     Drug use: No     Sexual activity: Never     Partners: Male   Lifestyle     Physical activity:     Days per week: Not on file     Minutes per session: Not on file     Stress: Not on file   Relationships     Social connections:     Talks on phone: Not on file     Gets together: Not on file     Attends Holiness service: Not on file     Active member of club or organization: Not on file     Attends meetings of clubs or organizations: Not on file     Relationship status: Not on file     Intimate partner violence:     Fear of current or ex partner: Not on file     Emotionally abused: Not on file     Physically abused: Not on file     Forced sexual activity: Not on file   Other Topics Concern     Parent/sibling w/ CABG, MI or angioplasty before 65F 55M? Not Asked      Service Not Asked     Blood Transfusions Not Asked     Caffeine Concern Yes     Comment: coffee      Occupational Exposure No     Hobby Hazards No     Sleep Concern No     Stress Concern No     Weight Concern No     Special Diet Yes     Comment: gluten free and dairy free     Back Care No     Exercise No     Comment: on hold ,  starting cardiac rehab     Bike Helmet Not Asked     Seat Belt Yes     Self-Exams Not Asked   Social History Narrative     Not on file     Family History   Problem Relation Age of Onset     Blood Disease Mother         pernious anemia     Heart Disease Father      Heart Disease Sister      Heart Disease Brother      Colon Cancer No family hx of         ROS: 10 point ROS neg other than the symptoms noted  "above in the HPI.    Physical Exam  /63 (BP Location: Right arm, Patient Position: Sitting, Cuff Size: Adult Large)   Pulse 61   Temp 97.6  F (36.4  C) (Oral)   Ht 1.562 m (5' 1.5\")   Wt 78.5 kg (173 lb)   SpO2 95%   BMI 32.16 kg/m     HEENT:  Normocephalic, atraumatic.  PERRLA.  EOM s intact.  Visual fields full to gross exam  Neck:  Supple, non-tender, without lymphadenopathy.  Heart:  No peripheral edema  Lungs:  No SOB  Abdomen:  Non-distended.   Skin:  Warm and dry.  Extremities:  No edema, cyanosis or clubbing.  Psychiatric:  No apparent distress  Musculoskeletal:  Normal bulk and tone    NEUROLOGICAL EXAMINATION:     Mental status:  Alert and Oriented x 3, speech is fluent.  Cranial nerves:  II-XII intact.   Motor:    Shoulder Abduction:  Right:  5/5   Left:  5/5  Biceps:                      Right:  5/5   Left:  5/5  Triceps:                     Right:  5/5   Left:  5/5  Wrist Extensors:       Right:  5/5   Left:  5/5  Wrist Flexors:           Right:  5/5   Left:  5/5  interosseus :            Right:  5/5   Left:  5/5  Hip Flexion:                Right: 4+/5  Left:  4/5  Quadriceps:             Right:  5/5  Left:  4+/5  Hamstrings:             Right:  5/5  Left:  5/5  Gastroc Soleus:        Right:  5/5  Left:  5/5  Tib/Ant:                      Right:  5/5  Left:  5/5  EHL:                     Right:  5/5  Left:  5/5  Sensation:  Bilateral thigh numbness  Reflexes:  Negative Babinski.  Negative Clonus.  Negative Good's.  Coordination:  Smooth finger to nose testing.   Negative pronator drift.  Smooth tandem walking.    A/P:  83F w/ L3-4 spondylolisthesis, bilateral leg pain    I had a discussion with the patient, reviewing the history, symptoms, and imaging  Will obtain CT Lumbar  Will obtain XR F/E  Bilateral LE EMG  Will contact subsequently to determine next steps         Again, thank you for allowing me to participate in the care of your patient.        Sincerely,        Cole Jansen, " MD

## 2019-06-10 NOTE — NURSING NOTE
"Elizabeth Li is a 83 year old female who presents for:  Chief Complaint   Patient presents with     Pain     Pt had gone to the ED due to increased leg pain and weakness.         Initial Vitals:  /63 (BP Location: Right arm, Patient Position: Sitting, Cuff Size: Adult Large)   Pulse 61   Temp 97.6  F (36.4  C) (Oral)   Ht 5' 1.5\" (1.562 m)   Wt 173 lb (78.5 kg)   SpO2 95%   BMI 32.16 kg/m   Estimated body mass index is 32.16 kg/m  as calculated from the following:    Height as of this encounter: 5' 1.5\" (1.562 m).    Weight as of this encounter: 173 lb (78.5 kg).. Body surface area is 1.85 meters squared. BP completed using cuff size: large  Moderate Pain (5)        Nursing Comments: Pain level of a 5 today.         Felicita French    "

## 2019-06-11 DIAGNOSIS — K21.9 GASTROESOPHAGEAL REFLUX DISEASE WITHOUT ESOPHAGITIS: ICD-10-CM

## 2019-06-11 DIAGNOSIS — I10 BENIGN ESSENTIAL HYPERTENSION: ICD-10-CM

## 2019-06-11 NOTE — TELEPHONE ENCOUNTER
Metoprolol  Routing refill request to provider for review/approval because:  Blood pressures out of range    Omeprazole  Routing refill request to provider for review/approval because:  Drug not active on patient's medication list  Last prescription from primary care provider 1/4/19

## 2019-06-11 NOTE — TELEPHONE ENCOUNTER
"Requested Prescriptions   Pending Prescriptions Disp Refills     metoprolol tartrate (LOPRESSOR) 25 MG tablet [Pharmacy Med Name:   METOPROLOL TARTRATE 25MG TABS]    Last Written Prescription Date:  1/4/19  Last Fill Quantity: 90,  # refills: 1   Last office visit: 3/11/2019 with prescribing provider:  Shalom   Future Office Visit:     90 tablet 1     Sig: TAKE ONE-HALF TABLET (12.5MG) BY MOUTH TWO TIMES A DAY       Beta-Blockers Protocol Failed - 6/11/2019 12:21 PM        Failed - Blood pressure under 140/90 in past 12 months     BP Readings from Last 3 Encounters:   06/10/19 152/63   06/05/19 126/73   04/23/19 134/66                 Passed - Patient is age 6 or older        Passed - Recent (12 mo) or future (30 days) visit within the authorizing provider's specialty     Patient had office visit in the last 12 months or has a visit in the next 30 days with authorizing provider or within the authorizing provider's specialty.  See \"Patient Info\" tab in inbasket, or \"Choose Columns\" in Meds & Orders section of the refill encounter.              Passed - Medication is active on med list        omeprazole (PRILOSEC) 20 MG DR capsule [Pharmacy Med Name:   OMEPRAZOLE 20MG CPDR]    Last Written Prescription Date:  NOT IN MED LIST  Last Fill Quantity: -,  # refills: -   Last office visit: 3/11/2019 with prescribing provider:  Shalom   Future Office Visit:     90 capsule 0     Sig: TAKE ONE CAPSULE BY MOUTH ONCE DAILY       PPI Protocol Failed - 6/11/2019 12:21 PM        Failed - Medication is active on med list        Passed - Not on Clopidogrel (unless Pantoprazole ordered)        Passed - No diagnosis of osteoporosis on record        Passed - Recent (12 mo) or future (30 days) visit within the authorizing provider's specialty     Patient had office visit in the last 12 months or has a visit in the next 30 days with authorizing provider or within the authorizing provider's specialty.  See \"Patient Info\" tab in " "inbasket, or \"Choose Columns\" in Meds & Orders section of the refill encounter.              Passed - Patient is age 18 or older        Passed - No active pregnacy on record        Passed - No positive pregnancy test in past 12 months          "

## 2019-06-13 ENCOUNTER — TELEPHONE (OUTPATIENT)
Dept: NEUROSURGERY | Facility: CLINIC | Age: 84
End: 2019-06-13

## 2019-06-13 NOTE — TELEPHONE ENCOUNTER
Patient called to say that she has not received a call to schedule her EMG at [facility] yet. Refaxed order.     Faxed EMG order to [facility] location  June 13, 2019 to fax number [phone]    Right Fax confirmed at 11:40 AM    Brittney Tanner RN

## 2019-06-14 ENCOUNTER — HOSPITAL ENCOUNTER (OUTPATIENT)
Dept: CT IMAGING | Facility: CLINIC | Age: 84
Discharge: HOME | End: 2019-06-14
Attending: NEUROLOGICAL SURGERY | Admitting: NEUROLOGICAL SURGERY
Payer: COMMERCIAL

## 2019-06-14 ENCOUNTER — ANCILLARY PROCEDURE (OUTPATIENT)
Dept: GENERAL RADIOLOGY | Facility: CLINIC | Age: 84
End: 2019-06-14
Attending: NEUROLOGICAL SURGERY
Payer: COMMERCIAL

## 2019-06-14 DIAGNOSIS — M43.16 SPONDYLOLISTHESIS OF LUMBAR REGION: ICD-10-CM

## 2019-06-14 PROCEDURE — 72131 CT LUMBAR SPINE W/O DYE: CPT

## 2019-06-14 PROCEDURE — 72100 X-RAY EXAM L-S SPINE 2/3 VWS: CPT

## 2019-06-17 ENCOUNTER — TELEPHONE (OUTPATIENT)
Dept: NEUROSURGERY | Facility: CLINIC | Age: 84
End: 2019-06-17

## 2019-06-17 NOTE — TELEPHONE ENCOUNTER
Called and informed patient that Dr. Jansen reviewed her XR and CT images. Per Dr. Jansen No motion of F/E, L4-S1 facet fusion on CT, EMG pending.  Final plan pending EMG results. Patient is scheduled for her EMG this Friday, we will call her with plan once Dr. Jansen reviews. Patient is in agreement with plan.

## 2019-06-18 DIAGNOSIS — I10 BENIGN ESSENTIAL HYPERTENSION: ICD-10-CM

## 2019-06-18 RX ORDER — METOPROLOL TARTRATE 25 MG/1
12.5 TABLET, FILM COATED ORAL 2 TIMES DAILY
Qty: 90 TABLET | Refills: 0 | Status: SHIPPED | OUTPATIENT
Start: 2019-06-18 | End: 2019-01-01

## 2019-06-19 RX ORDER — METOPROLOL TARTRATE 25 MG/1
TABLET, FILM COATED ORAL
Qty: 90 TABLET | Refills: 1 | Status: SHIPPED | OUTPATIENT
Start: 2019-06-19 | End: 2020-01-01

## 2019-06-21 ENCOUNTER — TRANSFERRED RECORDS (OUTPATIENT)
Dept: HEALTH INFORMATION MANAGEMENT | Facility: CLINIC | Age: 84
End: 2019-06-21

## 2019-06-27 NOTE — TELEPHONE ENCOUNTER
Called patient and informed her that Dr. Jansen would like for her to return to clinic to discuss EMG and plan, appointment scheduled.

## 2019-07-01 NOTE — LETTER
"    7/1/2019         RE: Elizabeth Li  [address]  [place] [address]        Dear Colleague,    Thank you for referring your patient, Elizabeth Li, to the [facility]. Please see a copy of my visit note below.    Elizabeth Li is a 83 year old female who presents for:  Chief Complaint   Patient presents with     Follow Up     discuss imaging and EMG        Initial Vitals:  /55 (BP Location: Right arm, Patient Position: Sitting, Cuff Size: Adult Large)   Pulse 68   Temp 97.2  F (36.2  C) (Oral)   Resp 16   Ht 5' 1.5\" (1.562 m)   Wt 176 lb (79.8 kg)   SpO2 97%   BMI 32.72 kg/m    Estimated body mass index is 32.72 kg/m  as calculated from the following:    Height as of this encounter: 5' 1.5\" (1.562 m).    Weight as of this encounter: 176 lb (79.8 kg).. Body surface area is 1.86 meters squared. BP completed using cuff size: large  Mild Pain (2)        Nursing Comments: no        Jessie Eugene MA         Patient Education    Education included but not limited to:  - Surgical risks: blood clots, urinating difficulties, nerve damage, infection.  - Pre-operative physical with primary care physician within 30 days of surgical date.   - Pre-operative clearance from other pertaining specialties.   - Discontinue NSAIDS x 7 days prior to surgical date.   -Do not begin taking NSAIDs (Advil, Motrin, Ibuprofen, Nuprin, Diclofenac, Meloxicam, Aleve, Celebrex, Aspirin, etc.) until 6 weeks after surgery if you had a fusion. May cause bleeding and interfere with bone healing.    -May try Tylenol for pain.    -Discussed being off work after surgery, short term disability, FMLA, etc.   -Forms to be completed    -Pre-op timeline: NPO, shower, medications    -Hospital stay: Checking in, surgery, recovery room, hospital room.    - Post operative pain management: narcotics, muscle relaxants, ice, etc.   -No driving while taking narcotics     -Post operative " incision care:   Keep your incision clean and dry.   Okay to shower. No submerging in water until incision healed.   Watch for signs of infection and notify clinic if drainage or fever develops.   - Post operative activity limitations recommended until follow up appointment: no lifting > 10 pounds; limited bending, twisting, overhead reaching.  -If a brace is required per Dr. Jansen, [facility] will fit you for the brace in the hospital.  - Follow up appointments: Suture/staple removal at 2 weeks post op. 6 week post op, 3 months post op, 6 months post op, 1 year post op. You will need to an xray before each appointment. Please call to schedule follow up appointment at [phone].   - Education book was also given to the patient for further review.      Patient verbalized understanding of above instructions. All questions were answered to the best of my ability and the patient's satisfaction. Patient advised to call with any additional questions or concerns.                83F w/ L3-4 spondylolisthesis, bilateral leg pain.  Many years of chronic pain, worse in the last 3 months.  Has been seeing Dr. Gaspar for work-up.  Has been doing physical therapy, and underwent L3-4 epidural steroid injection.  Now with 2 weeks of dramatic worsening of pain, and recent ER visit due to inability to stand up due to pain.  9 out of 10 sharp pain, radiating to the bilateral lateral thighs and knees.  Associated numbness and weakness.  MRI lumbar spine reported as showing L3-4 spondylolisthesis with right foraminal disc bulge, and L4-S1 facet fusion.    Returns for follow up.  Continued severe pain as above.  CT confirmed L4-S1 facet fusion and L3-4 severe stenosis.  F/E did not show subluxation.  EMG was challenging to complete due to patient pain, and showed possible tibial mononeuropathy and lumbar radiculopathy.       Past Medical History:   Diagnosis Date     Abnormal cardiovascular stress test     EKG changes with exercise on  Exercise stress test 2015     CAD (coronary artery disease)     Severe 2 vessel CAD. PCI with drug-eluting stents x2 to mid LAD on 3/27/15     HTN (hypertension)      Hyperlipidemia LDL goal <100      Hypothyroidism      IHD (ischemic heart disease)      KUSUM (obstructive sleep apnea)     CPAP     Past Surgical History:   Procedure Laterality Date     CARDIAC SURGERY      2 heart stents     COLONOSCOPY       COLONOSCOPY N/A 2017    Procedure: COLONOSCOPY;  Surgeon: Nghia Moss MD;  Location:  GI     HEAD & NECK SURGERY      wisdom teeth removed     HEART CATH, ANGIOPLASTY  3/27/15    2.5 X 18 mm & 3.0 X mm LUCILA to LAD     TONSILLECTOMY      T&A as a child     TUBAL LIGATION       Social History     Socioeconomic History     Marital status:      Spouse name: Not on file     Number of children: 4     Years of education: Not on file     Highest education level: Not on file   Occupational History     Not on file   Social Needs     Financial resource strain: Not on file     Food insecurity:     Worry: Not on file     Inability: Not on file     Transportation needs:     Medical: Not on file     Non-medical: Not on file   Tobacco Use     Smoking status: Former Smoker     Last attempt to quit: 1984     Years since quittin.5     Smokeless tobacco: Never Used   Substance and Sexual Activity     Alcohol use: Yes     Alcohol/week: 0.0 oz     Comment: social drinker     Drug use: No     Sexual activity: Never     Partners: Male   Lifestyle     Physical activity:     Days per week: Not on file     Minutes per session: Not on file     Stress: Not on file   Relationships     Social connections:     Talks on phone: Not on file     Gets together: Not on file     Attends [employer] service: Not on file     Active member of club or organization: Not on file     Attends meetings of clubs or organizations: Not on file     Relationship status: Not on file     Intimate partner violence:     Fear of current  "or ex partner: Not on file     Emotionally abused: Not on file     Physically abused: Not on file     Forced sexual activity: Not on file   Other Topics Concern     Parent/sibling w/ CABG, MI or angioplasty before 65F 55M? Not Asked      Service Not Asked     Blood Transfusions Not Asked     Caffeine Concern Yes     Comment: coffee      Occupational Exposure No     Hobby Hazards No     Sleep Concern No     Stress Concern No     Weight Concern No     Special Diet Yes     Comment: gluten free and dairy free     Back Care No     Exercise No     Comment: on hold , 4/7 starting cardiac rehab     Bike Helmet Not Asked     Seat Belt Yes     Self-Exams Not Asked   Social History Narrative     Not on file     Family History   Problem Relation Age of Onset     Blood Disease Mother         pernious anemia     Heart Disease Father      Heart Disease Sister      Heart Disease Brother      Colon Cancer No family hx of         ROS: 10 point ROS neg other than the symptoms noted above in the HPI.    Physical Exam  /57 (BP Location: Right arm, Patient Position: Sitting, Cuff Size: Adult Large)   Pulse 68   Temp 97.2  F (36.2  C) (Oral)   Resp 16   Ht 1.562 m (5' 1.5\")   Wt 79.8 kg (176 lb)   SpO2 97%   BMI 32.72 kg/m     HEENT:  Normocephalic, atraumatic.  PERRLA.  EOM s intact.  Visual fields full to gross exam  Neck:  Supple, non-tender, without lymphadenopathy.  Heart:  No peripheral edema  Lungs:  No SOB  Abdomen:  Non-distended.   Skin:  Warm and dry.  Extremities:  No edema, cyanosis or clubbing.  Psychiatric:  No apparent distress  Musculoskeletal:  Normal bulk and tone    NEUROLOGICAL EXAMINATION:     Mental status:  Alert and Oriented x 3, speech is fluent.  Cranial nerves:  II-XII intact.   Motor:    Shoulder Abduction:  Right:  5/5   Left:  5/5  Biceps:                      Right:  5/5   Left:  5/5  Triceps:                     Right:  5/5   Left:  5/5  Wrist Extensors:       Right:  5/5   Left:  " 5/5  Wrist Flexors:           Right:  5/5   Left:  5/5  interosseus :            Right:  5/5   Left:  5/5  Hip Flexion:                Right: 4+/5  Left:  4/5  Quadriceps:             Right:  5/5  Left:  4+/5  Hamstrings:             Right:  5/5  Left:  5/5  Gastroc Soleus:        Right:  5/5  Left:  5/5  Tib/Ant:                      Right:  5/5  Left:  5/5  EHL:                     Right:  5/5  Left:  5/5  Sensation:  Bilateral thigh numbness  Reflexes:  Negative Babinski.  Negative Clonus.  Negative Good's.  Coordination:  Smooth finger to nose testing.   Negative pronator drift.  Smooth tandem walking.    A/P:  83F w/ L3-4 spondylolisthesis, bilateral leg pain    Discussed option of L3-4 TLIF  Also discussed option of Neurology referral for medical management of peripheral neuropathy  Patient wishes to proceed with surgery  Risks and benefits discussed         Again, thank you for allowing me to participate in the care of your patient.        Sincerely,        Cole Jansen MD

## 2019-07-01 NOTE — PATIENT INSTRUCTIONS
Surgery scheduled at [facility] for L3-4 TLIF (transforaminal lumbar interbody fusion)     Pre-Operative:  -Surgical risks: blood clots in the leg or lung, problems urinating, nerve damage, drainage from the incision, infection, stiffness  - Pre-operative physical with primary care physician within 30 days of surgical date.   -Stop all solid foods and liquids 8 hours prior to surgery.  -Shower procedure: Please shower with antibacterial soap the night before surgery and morning of surgery. Refer to information sheet in folder.  - Discontinue Aspirin, NSAIDs (Advil, Ibuprofen, Naproxen, Nuprin, Diclofenac, Meloxicam, Aleve, Celebrex) x 7 days prior to surgical date. After surgery, do not begin taking these medications until given clearance. May cause bleeding and interfere with healing.  - May try Tylenol for pain.    Post-Operative:  - 2-4 night hospitalization stay  - Post operative pain may require pain medications and muscle relaxants. You will receive medications upon discharge.  -Do NOT drive while taking narcotic pain medication.  -Post operative incision care- Watch for signs of infection: redness, swelling, warmth, drainage, and fever of 101 degrees or higher. Notify clinic [phone].  -Keep incision clean and dry. You may shower. No submerging incision in water such as pools, hot tubs, baths for at least 8 weeks or until incision is healed.   - Post operative activity limitations for 6 weeks after surgery: no lifting > 10 pounds, no bending, twisting, or overhead reaching. You will be re-evaluated at your follow up appointments.   -If a brace is required per Dr. Jansen, [facility] will fit you for the brace in the hospital.  -If you are currently employed, you will need to be off work for recovery and healing. Please fax any FMLA/short term disability paperwork to [phone]. You may call our clinic when you'd like to return to work and we can provide a work letter.   - Follow up  appointments: 2 weeks post op for suture/staple removal. Then 6 week post op, 3 months post op, 6 months post op, 1 year post op with an xray before each appointment. Please call to schedule these appointments at [phone].

## 2019-07-01 NOTE — PROGRESS NOTES
"Elizabeth Li is a 83 year old female who presents for:  Chief Complaint   Patient presents with     Follow Up     discuss imaging and EMG        Initial Vitals:  /55 (BP Location: Right arm, Patient Position: Sitting, Cuff Size: Adult Large)   Pulse 68   Temp 97.2  F (36.2  C) (Oral)   Resp 16   Ht 5' 1.5\" (1.562 m)   Wt 176 lb (79.8 kg)   SpO2 97%   BMI 32.72 kg/m   Estimated body mass index is 32.72 kg/m  as calculated from the following:    Height as of this encounter: 5' 1.5\" (1.562 m).    Weight as of this encounter: 176 lb (79.8 kg).. Body surface area is 1.86 meters squared. BP completed using cuff size: large  Mild Pain (2)        Nursing Comments: jayesh Eugene MA    "

## 2019-07-01 NOTE — TELEPHONE ENCOUNTER
Called patient back and assisted making an appointment for a pre-op with Dr. Rausch per request of patient to only see her primary care provider.

## 2019-07-01 NOTE — TELEPHONE ENCOUNTER
Patient calling. Is having surgery 7/19/2019 and needs a pre op. Patient refuses to see another provider and wants to be double booked. Ok to call and  [phone]

## 2019-07-01 NOTE — PROGRESS NOTES
Patient Education    Education included but not limited to:  - Surgical risks: blood clots, urinating difficulties, nerve damage, infection.  - Pre-operative physical with primary care physician within 30 days of surgical date.   - Pre-operative clearance from other pertaining specialties.   - Discontinue NSAIDS x 7 days prior to surgical date.   -Do not begin taking NSAIDs (Advil, Motrin, Ibuprofen, Nuprin, Diclofenac, Meloxicam, Aleve, Celebrex, Aspirin, etc.) until 6 weeks after surgery if you had a fusion. May cause bleeding and interfere with bone healing.    -May try Tylenol for pain.    -Discussed being off work after surgery, short term disability, FMLA, etc.   -Forms to be completed    -Pre-op timeline: NPO, shower, medications    -Hospital stay: Checking in, surgery, recovery room, hospital room.    - Post operative pain management: narcotics, muscle relaxants, ice, etc.   -No driving while taking narcotics     -Post operative incision care:   Keep your incision clean and dry.   Okay to shower. No submerging in water until incision healed.   Watch for signs of infection and notify clinic if drainage or fever develops.   - Post operative activity limitations recommended until follow up appointment: no lifting > 10 pounds; limited bending, twisting, overhead reaching.  -If a brace is required per Dr. Jansen, [facility] will fit you for the brace in the hospital.  - Follow up appointments: Suture/staple removal at 2 weeks post op. 6 week post op, 3 months post op, 6 months post op, 1 year post op. You will need to an xray before each appointment. Please call to schedule follow up appointment at [phone].   - Education book was also given to the patient for further review.      Patient verbalized understanding of above instructions. All questions were answered to the best of my ability and the patient's satisfaction. Patient advised to call with any additional questions or concerns.

## 2019-07-01 NOTE — PROGRESS NOTES
83F w/ L3-4 spondylolisthesis, bilateral leg pain.  Many years of chronic pain, worse in the last 3 months.  Has been seeing Dr. Gaspar for work-up.  Has been doing physical therapy, and underwent L3-4 epidural steroid injection.  Now with 2 weeks of dramatic worsening of pain, and recent ER visit due to inability to stand up due to pain.  9 out of 10 sharp pain, radiating to the bilateral lateral thighs and knees.  Associated numbness and weakness.  MRI lumbar spine reported as showing L3-4 spondylolisthesis with right foraminal disc bulge, and L4-S1 facet fusion.    Returns for follow up.  Continued severe pain as above.  CT confirmed L4-S1 facet fusion and L3-4 severe stenosis.  F/E did not show subluxation.  EMG was challenging to complete due to patient pain, and showed possible tibial mononeuropathy and lumbar radiculopathy.       Past Medical History:   Diagnosis Date     Abnormal cardiovascular stress test     EKG changes with exercise on Exercise stress test 1/26/2015     CAD (coronary artery disease)     Severe 2 vessel CAD. PCI with drug-eluting stents x2 to mid LAD on 3/27/15     HTN (hypertension)      Hyperlipidemia LDL goal <100      Hypothyroidism      IHD (ischemic heart disease)      KUSUM (obstructive sleep apnea)     CPAP     Past Surgical History:   Procedure Laterality Date     CARDIAC SURGERY      2 heart stents     COLONOSCOPY       COLONOSCOPY N/A 1/11/2017    Procedure: COLONOSCOPY;  Surgeon: Nghia Moss MD;  Location:  GI     HEAD & NECK SURGERY      wisdom teeth removed     HEART CATH, ANGIOPLASTY  3/27/15    2.5 X 18 mm & 3.0 X mm LUCILA to LAD     TONSILLECTOMY      T&A as a child     TUBAL LIGATION       Social History     Socioeconomic History     Marital status:      Spouse name: Not on file     Number of children: 4     Years of education: Not on file     Highest education level: Not on file   Occupational History     Not on file   Social Needs     Financial resource  strain: Not on file     Food insecurity:     Worry: Not on file     Inability: Not on file     Transportation needs:     Medical: Not on file     Non-medical: Not on file   Tobacco Use     Smoking status: Former Smoker     Last attempt to quit: 1984     Years since quittin.5     Smokeless tobacco: Never Used   Substance and Sexual Activity     Alcohol use: Yes     Alcohol/week: 0.0 oz     Comment: social drinker     Drug use: No     Sexual activity: Never     Partners: Male   Lifestyle     Physical activity:     Days per week: Not on file     Minutes per session: Not on file     Stress: Not on file   Relationships     Social connections:     Talks on phone: Not on file     Gets together: Not on file     Attends Jew service: Not on file     Active member of club or organization: Not on file     Attends meetings of clubs or organizations: Not on file     Relationship status: Not on file     Intimate partner violence:     Fear of current or ex partner: Not on file     Emotionally abused: Not on file     Physically abused: Not on file     Forced sexual activity: Not on file   Other Topics Concern     Parent/sibling w/ CABG, MI or angioplasty before 65F 55M? Not Asked      Service Not Asked     Blood Transfusions Not Asked     Caffeine Concern Yes     Comment: coffee      Occupational Exposure No     Hobby Hazards No     Sleep Concern No     Stress Concern No     Weight Concern No     Special Diet Yes     Comment: gluten free and dairy free     Back Care No     Exercise No     Comment: on hold ,  starting cardiac rehab     Bike Helmet Not Asked     Seat Belt Yes     Self-Exams Not Asked   Social History Narrative     Not on file     Family History   Problem Relation Age of Onset     Blood Disease Mother         pernious anemia     Heart Disease Father      Heart Disease Sister      Heart Disease Brother      Colon Cancer No family hx of         ROS: 10 point ROS neg other than the symptoms noted  "above in the HPI.    Physical Exam  /57 (BP Location: Right arm, Patient Position: Sitting, Cuff Size: Adult Large)   Pulse 68   Temp 97.2  F (36.2  C) (Oral)   Resp 16   Ht 1.562 m (5' 1.5\")   Wt 79.8 kg (176 lb)   SpO2 97%   BMI 32.72 kg/m    HEENT:  Normocephalic, atraumatic.  PERRLA.  EOM s intact.  Visual fields full to gross exam  Neck:  Supple, non-tender, without lymphadenopathy.  Heart:  No peripheral edema  Lungs:  No SOB  Abdomen:  Non-distended.   Skin:  Warm and dry.  Extremities:  No edema, cyanosis or clubbing.  Psychiatric:  No apparent distress  Musculoskeletal:  Normal bulk and tone    NEUROLOGICAL EXAMINATION:     Mental status:  Alert and Oriented x 3, speech is fluent.  Cranial nerves:  II-XII intact.   Motor:    Shoulder Abduction:  Right:  5/5   Left:  5/5  Biceps:                      Right:  5/5   Left:  5/5  Triceps:                     Right:  5/5   Left:  5/5  Wrist Extensors:       Right:  5/5   Left:  5/5  Wrist Flexors:           Right:  5/5   Left:  5/5  interosseus :            Right:  5/5   Left:  5/5  Hip Flexion:                Right: 4+/5  Left:  4/5  Quadriceps:             Right:  5/5  Left:  4+/5  Hamstrings:             Right:  5/5  Left:  5/5  Gastroc Soleus:        Right:  5/5  Left:  5/5  Tib/Ant:                      Right:  5/5  Left:  5/5  EHL:                     Right:  5/5  Left:  5/5  Sensation:  Bilateral thigh numbness  Reflexes:  Negative Babinski.  Negative Clonus.  Negative Good's.  Coordination:  Smooth finger to nose testing.   Negative pronator drift.  Smooth tandem walking.    A/P:  83F w/ L3-4 spondylolisthesis, bilateral leg pain    Discussed option of L3-4 TLIF  Also discussed option of Neurology referral for medical management of peripheral neuropathy  Patient wishes to proceed with surgery  Risks and benefits discussed     "

## 2019-07-08 NOTE — TELEPHONE ENCOUNTER
Type of surgery:L3-L4 TRANSFORAMINAL LUMBAR  INTRABODY FUSION   Location of surgery: [facility]  Date and time of surgery: 07/19/2019 @ 10:30am  Surgeon: MD Justyna  Pre-Op Appt Date: 07/09/2019 syed/ Rebekah Rausch  Post-Op Appt Date: 08/02/2019, 10:30am    Packet sent out: Yes  Pre-cert/Authorization completed:  Yes  Date: 07/08/2019 DONELL

## 2019-07-09 NOTE — PROGRESS NOTES
[place] [address]  [facility] [address]  [phone]  Dept: [phone]    PRE-OP EVALUATION:  Today's date: 2019    Elizabeth Li (: 1935) presents for pre-operative evaluation assessment as requested by Dr. Jansen.  She requires evaluation and anesthesia risk assessment prior to undergoing surgery/procedure for treatment of spine/L3-L4 TRANSFORAMINAL LUMBAR  INTRABODY FUSION, USING OPTICAL TRACKING SYSTEM, (JAMES FRAME, MEDTRONIC SOLARA, MIDAS TOM, LEICA MICROSCOPE).    Date of Surgery/ Procedure: 19  Time of Surgery/ Procedure: 200 pm    Primary Physician: Rebekah Rausch  Type of Anesthesia Anticipated: General    Patient has a Health Care Directive or Living Will:  NO    1. YES - DO YOU HAVE A HISTORY OF HEART ATTACK, STROKE, STENT, BYPASS OR SURGERY ON AN ARTERY IN THE HEAD, NECK, HEART OR LEG? stent  2. NO - Do you ever have any pain or discomfort in your chest?  3. NO - Do you have a history of  Heart Failure?  4. YES - ARE YOUR TROUBLED BY SHORTNESS OF BREATH WHEN WALKING ON THE LEVEL, UP A SLIGHT HILL OR AT NIGHT? Positive for chronic SOB since she was in high school  5. NO - Do you currently have a cold, bronchitis or other respiratory infection?  6. NO - Do you have a cough, shortness of breath or wheezing?  7. NO - Do you sometimes get pains in the calves of your legs when you walk?  8. YES - DO YOU OR ANYONE IN YOUR FAMILY HAVE PREVIOUS HISTORY OF BLOOD CLOTS? Father after open heart surgery  9. NO - Do you or does anyone in your family have a serious bleeding problem such as prolonged bleeding following surgeries or cuts?  10. NO - Have you ever had problems with anemia or been told to take iron pills?  11. NO - Have you had any abnormal blood loss such as black, tarry or bloody stools, or abnormal vaginal bleeding?  12. NO - Have you ever had a blood transfusion?  13. NO - Have you or any of your relatives ever had problems with  "anesthesia?  14. YES - DO YOU HAVE SLEEP APNEA, EXCESSIVE SNORING OR DAYTIME DROWSINESS? KUSUM  15. NO - Do you have any prosthetic heart valves?  16. NO - Do you have prosthetic joints?  17. NO - Is there any chance that you may be pregnant?      HPI:     HPI related to upcoming procedure:   Patient will be having a \"L3-L4 TRANSFORAMINAL LUMBAR  INTRABODY FUSION, USING OPTICAL TRACKING SYSTEM, (JAMES FRAME, MEDTRONIC SOLARA, MIDAS TOM, LEICA MICROSCOPE).\" on 7/19/2019. She reports that she has tired steroid injection which has not improved her condition.    Ischemic heart disease   Patient is taking Aspirin 81 mg. Reports that she has two heart stents. States that she has chronic SOB, which she attributes to tobacco usage in high school. Patient notes that her SOB has gotten worse during January, 2019 due to being sick and and being in pain. However, she notes that her SOB has improved. Denies having SOB with movement or at rest. Denies having chest pain, chills, or fevers.  She reports that she had a stress related heart attack when she was in her  40's. She follows up with cardiology on 7/15/2019.     Benign essential hypertension    Patient is taking Metoprolol 25 mg.     KUSUM  Patient reports that she wears a C-PAP machine. However, notes that she is sleep deprived due to the pain in her back.    Mild major depression  Patient reports that her depression has been stable.     Hypothyroidism   Patient last thyroid check was on 3/05/2019 and was normal.    Hyperlipidemia LDL   Her last LDL was on 1/4/2019 and it was unremarkable.     Other problems...  -Patient was informed to hold the Aspirin 81 mg and ibuprofen 200 mg a week before surgery.   -She notes that she is not having trouble going to the bathroom. Reports that she takes Miralax per needed.        MEDICAL HISTORY:     Patient Active Problem List    Diagnosis Date Noted     Pneumonia 01/18/2019     Priority: Medium     Obesity (BMI 35.0-39.9) with " comorbidity (H) 11/29/2018     Priority: Medium     Benign essential hypertension 03/08/2017     Priority: Medium     DDD (degenerative disc disease), lumbar 01/20/2017     Priority: Medium     CAD (coronary artery disease)      Priority: Medium     Severe 2 vessel CAD. PCI with drug-eluting stents x 2 to mid LAD on 3/27/15.  Chronic total occlusion of RCA, and moderate circumflex disease.  Stable dyspnea on exertion.       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         Hypothyroidism 11/28/2011     Priority: Medium     Hyperlipidemia LDL goal <100 11/28/2011     Priority: Medium     KUSUM (obstructive sleep apnea) 11/28/2011     Priority: Medium      Past Medical History:   Diagnosis Date     Abnormal cardiovascular stress test     EKG changes with exercise on Exercise stress test 1/26/2015     CAD (coronary artery disease)     Severe 2 vessel CAD. PCI with drug-eluting stents x2 to mid LAD on 3/27/15     HTN (hypertension)      Hyperlipidemia LDL goal <100      Hypothyroidism      IHD (ischemic heart disease)      KUSUM (obstructive sleep apnea)     CPAP     Past Surgical History:   Procedure Laterality Date     CARDIAC SURGERY      2 heart stents     COLONOSCOPY       COLONOSCOPY N/A 1/11/2017    Procedure: COLONOSCOPY;  Surgeon: Nghia Moss MD;  Location:  GI     HEAD & NECK SURGERY      wisdom teeth removed     HEART CATH, ANGIOPLASTY  3/27/15    2.5 X 18 mm & 3.0 X mm LUCILA to LAD     TONSILLECTOMY      T&A as a child     TUBAL LIGATION        Current Outpatient Medications   Medication Sig Dispense Refill     ascorbic acid (VITAMIN C) 500 MG tablet Take 500 mg by mouth daily Pt takes in afternoon       aspirin 81 MG tablet Take 1 tablet (81 mg) by mouth daily (Patient taking differently: Take 81 mg by mouth daily (with breakfast) ) 90 tablet 3     cholecalciferol (VITAMIN D3) 1000 UNIT tablet Take 1,000 Units by mouth 2 times daily       ibuprofen (ADVIL/MOTRIN) 200 MG tablet Take 600 mg by mouth every 4 hours as needed for mild pain Patient takes 4 tablet at night. Occasionally takes 4 tablet when goes out.        isosorbide mononitrate (IMDUR) 30 MG 24 hr tablet Take 1 tablet (30 mg) by mouth daily (Patient taking differently: Take 30 mg by mouth daily (with breakfast) ) 90 tablet 4     ketoconazole (NIZORAL) 2 % shampoo Apply to the affected area and wash off after 5 minutes. (Patient taking differently: Apply topically once a week Apply as a shampoo and then wash off after 5 minutes.) 120 mL 1     levothyroxine (SYNTHROID/LEVOTHROID) 75 MCG tablet Take 1 tablet (75 mcg) by mouth every morning (before breakfast) 90 tablet 1     liothyronine (CYTOMEL) 5 MCG tablet Take 1 tablet (5 mcg) by mouth daily (with breakfast) 90 tablet 1     metoprolol tartrate (LOPRESSOR) 25 MG tablet TAKE ONE-HALF TABLET (12.5MG) BY MOUTH TWO TIMES A DAY 90 tablet 1     omeprazole (PRILOSEC) 20 MG DR capsule TAKE ONE CAPSULE BY MOUTH ONCE DAILY 90 capsule 0     order for DME Equipment being ordered: wheeled walker 1 Device 0     order for DME Equipment being ordered: CPAP       oxyCODONE (ROXICODONE) 5 MG tablet Take 1 tablet (5 mg) by mouth every 4 hours as needed for pain or severe pain 12 tablet 0     phenazopyridine (AZO URINARY PAIN RELIEF) 95 MG tablet Take 190 mg by mouth 3 times daily       simvastatin (ZOCOR) 10 MG tablet Take 1 tablet (10 mg) by mouth At Bedtime 90 tablet 4     OTC products: None, except as noted above    Allergies   Allergen Reactions     Flu  "Virus Vaccine Other (See Comments)     Viral SX     Gluten Meal      Milk Protein Extract       Latex Allergy: NO    Social History     Tobacco Use     Smoking status: Former Smoker     Last attempt to quit: 1984     Years since quittin.5     Smokeless tobacco: Never Used   Substance Use Topics     Alcohol use: Yes     Alcohol/week: 0.0 oz     Comment: social drinker     History   Drug Use No       REVIEW OF SYSTEMS:   CONSTITUTIONAL: NEGATIVE for fever, chills, change in weight  INTEGUMENTARY/SKIN: NEGATIVE for worrisome rashes, moles or lesions  EYES: NEGATIVE for vision changes or irritation  ENT/MOUTH: NEGATIVE for ear, mouth and throat problems  RESP: NEGATIVE for significant cough or SOB  BREAST: NEGATIVE for masses, tenderness or discharge  CV: NEGATIVE for chest pain, palpitations or peripheral edema  GI: NEGATIVE for nausea, abdominal pain, heartburn, or change in bowel habits  : NEGATIVE for frequency, dysuria, or hematuria  MUSCULOSKELETAL: NEGATIVE for significant arthralgias or myalgia  NEURO: NEGATIVE for weakness, dizziness or paresthesias  ENDOCRINE: NEGATIVE for temperature intolerance, skin/hair changes  HEME: NEGATIVE for bleeding problems  PSYCHIATRIC: NEGATIVE for changes in mood or affect    This document serves as a record of the services and decisions personally performed and made by Rebekah Rausch MD. It was created on his behalf by Dayanara Kong, a trained medical scribe. The creation of this document is based on the provider's statements to the medical scribe.  Dayanara Kong 2019 2:34 PM   EXAM:   /68   Pulse 71   Temp 97.5  F (36.4  C) (Oral)   Resp 16   Ht 1.562 m (5' 1.5\")   Wt 81.6 kg (180 lb)   SpO2 93%   BMI 33.46 kg/m      GENERAL APPEARANCE: healthy, alert and no distress     HENT: ear canals and TM's normal and nose and mouth without ulcers or lesions     NECK: no adenopathy, no asymmetry, masses, or scars and thyroid normal to palpation     RESP: " lungs clear to auscultation - no rales, rhonchi or wheezes     CV: regular rates and rhythm, normal S1 S2, no S3 or S4 and no murmur, click or rub     ABDOMEN:  soft, nontender, no HSM or masses and bowel sounds normal     MS: extremities normal- no gross deformities noted, no evidence of inflammation in joints, FROM in all extremities.     SKIN: no suspicious lesions or rashes     NEURO: Normal strength and tone, sensory exam grossly normal, mentation intact and speech normal     PSYCH: mentation appears normal. and affect normal/bright         DIAGNOSTICS:   EKG: appears normal, NSR, normal axis, normal intervals, no acute ST/T changes c/w ischemia, no LVH by voltage criteria, unchanged from previous tracings    Recent Labs   Lab Test 01/21/19  0745 01/19/19  0721  01/04/19  1045  03/27/15  1156   HGB  --  11.2*  --  14.2   < > 13.5    144*   < > 204   < > 174   INR  --   --   --   --   --  0.95   NA  --  141  --  139   < > 146*   POTASSIUM  --  3.6  --  4.2   < > 4.2   CR 0.82 0.83   < > 0.82   < > 0.72    < > = values in this interval not displayed.        IMPRESSION:   Reason for surgery/procedure: spine/L3-L4 TRANSFORAMINAL LUMBAR  INTRABODY FUSION, USING OPTICAL TRACKING SYSTEM, (JAMES FRAME, MEDTRONIC SOLARA, MIDAS TOM, LEICA MICROSCOPE)  Diagnosis/reason for consult: risk assessment    The proposed surgical procedure is considered INTERMEDIATE risk.    REVISED CARDIAC RISK INDEX  The patient has the following serious cardiovascular risks for perioperative complications such as (MI, PE, VFib and 3  AV Block):  Coronary Artery Disease (MI, positive stress test, angina, Qs on EKG)  INTERPRETATION: 1 risks: Class II (low risk - 0.9% complication rate)    The patient has the following additional risks for perioperative complications:  No identified additional risks      ICD-10-CM    1. Pre-op exam Z01.818 EKG 12-lead complete w/read - Clinics     CBC with platelets differential     Basic metabolic panel    2. Preop general physical exam Z01.818    3. Mild major depression (H) F32.0    4. Benign essential hypertension I10    5. Acquired hypothyroidism E03.9    6. IHD (ischemic heart disease) I25.9        RECOMMENDATIONS:       --Patient is to take all scheduled medications on the day of surgery EXCEPT for modifications listed below.  -Hold aspirin one week prior to surgery    APPROVAL GIVEN to proceed with proposed procedure, without further diagnostic evaluation     The information in this document, created by the medical scribe for me, accurately reflects the services I personally performed and the decisions made by me. I have reviewed and approved this document for accuracy.   July 9, 2019 2:51 PM   Signed Electronically by: Rebekah Rausch MD    Copy of this evaluation report is provided to requesting physician.    [facility] Preop Guidelines    Revised Cardiac Risk Index

## 2019-07-15 NOTE — PROGRESS NOTES
Service Date: 07/15/2019      REASON FOR VISIT:  Followup for coronary artery disease and preoperative cardiovascular evaluation.      HISTORY OF PRESENT ILLNESS:  I had the pleasure of seeing Mariluz Li at the [facility] in [place] this morning.  She is a very pleasant 83-year-old female with known coronary artery disease, hypertension, hyperlipidemia, obstructive sleep apnea and chronic back and hip pain due to sciatica.  We saw her approximately 4 years ago for an evaluation of progressive dyspnea on exertion.  At that time she had a stress test as well as CTA which were abnormal.  She subsequently had coronary angiogram which demonstrated severe 3-vessel coronary artery disease.  Specifically, she had high-grade stenosis in the mid LAD.  Right coronary was chronically occluded.  She subsequently underwent PCI with drug-eluting stent placement in the mid LAD.  Given the chronic nature of the RCA, we decided to not intervene on that vessel.  Her circumflex had moderate disease.      After that intervention, her dyspnea improved significantly.  However, she continues to have baseline mild dyspnea on exertion.  This is in the setting of significant hip and lower back pain due to sciatica.  She has been essentially sedentary because of her pain related to sciatica.  We did an JANE last year which showed no evidence of arterial insufficiency.  Since my last visit with her in September of last year, her dyspnea has remained stable.  She thinks it might have actually gotten better since then.  She denies chest pain.  She denies palpitations, presyncope or syncope.      She was recently seen by a back specialist.  She was offered surgery and she is scheduled to undergo back surgery this coming Wednesday.  Overall, she is very excited about the opportunity to have her pain alleviated by the surgery.      IMPRESSION, REPORT AND PLAN:   1.  Coronary artery disease, status post prior PCI  with drug-eluting stent placement in the LAD.   2.  Chronically occluded RCA and moderate circumflex disease, stable.   3.  Preoperative cardiovascular evaluation.   4.  Hyperlipidemia   5.  Hypertension.   6.  Dyspnea on exertion, chronic and stable.   7.  Chronic low back and hip pain.   8.  Obstructive sleep apnea.      Ms. Avilez remains quite stable from a cardiopulmonary point of view.  She has not developed any worsening symptoms over the past year.  She denies chest pain.  Her dyspnea is stable and might have improved slightly over the last year.  Her last echocardiogram from 2017 demonstrated preserved LV function.  Her risk factors including her blood pressure are well controlled.  She is on a reasonable medical program which includes aspirin and statin therapy as well as a beta blocker.      From a preoperative evaluation, although her functional capacity is difficult to assess due to her inability to walk, I believe she is stable.  As such, it is reasonable for her to proceed with her upcoming back surgery with an acceptable risk.  She should be continued on all of her cardiac medications including her beta blocker perioperatively.      We will see her in approximately 3 months for followup.  At that time, we will most likely obtain a repeat echocardiogram.        I appreciate the opportunity to be part of her care.         ELIZABETH VAZQUEZ MD             D: 07/15/2019   T: 07/15/2019   MT: TRUDI      Name:     AMARI AVILEZ   MRN:      [id number]        Account:      [id number]   :      1935           Service Date: 07/15/2019      Document: [id number]

## 2019-07-15 NOTE — PROGRESS NOTES
HPI and Plan:   See dictation    Orders Placed This Encounter   Procedures     Follow-Up with Cardiac Advanced Practice Provider       No orders of the defined types were placed in this encounter.      There are no discontinued medications.      Encounter Diagnoses   Name Primary?     Coronary artery disease involving native coronary artery of native heart without angina pectoris Yes     Pre-operative cardiovascular examination        CURRENT MEDICATIONS:  Current Outpatient Medications   Medication Sig Dispense Refill     ascorbic acid (VITAMIN C) 500 MG tablet Take 500 mg by mouth daily Pt takes in afternoon       cholecalciferol (VITAMIN D3) 1000 UNIT tablet Take 1,000 Units by mouth 2 times daily       isosorbide mononitrate (IMDUR) 30 MG 24 hr tablet Take 1 tablet (30 mg) by mouth daily (Patient taking differently: Take 30 mg by mouth daily (with breakfast) ) 90 tablet 4     ketoconazole (NIZORAL) 2 % shampoo Apply to the affected area and wash off after 5 minutes. (Patient taking differently: Apply topically once a week Apply as a shampoo and then wash off after 5 minutes.) 120 mL 1     levothyroxine (SYNTHROID/LEVOTHROID) 75 MCG tablet Take 1 tablet (75 mcg) by mouth every morning (before breakfast) 90 tablet 1     liothyronine (CYTOMEL) 5 MCG tablet Take 1 tablet (5 mcg) by mouth daily (with breakfast) 90 tablet 1     metoprolol tartrate (LOPRESSOR) 25 MG tablet TAKE ONE-HALF TABLET (12.5MG) BY MOUTH TWO TIMES A DAY 90 tablet 1     omeprazole (PRILOSEC) 20 MG DR capsule TAKE ONE CAPSULE BY MOUTH ONCE DAILY 90 capsule 0     order for DME Equipment being ordered: wheeled walker 1 Device 0     order for DME Equipment being ordered: CPAP       phenazopyridine (AZO URINARY PAIN RELIEF) 95 MG tablet Take 190 mg by mouth 3 times daily       simvastatin (ZOCOR) 10 MG tablet Take 1 tablet (10 mg) by mouth At Bedtime 90 tablet 4     aspirin 81 MG tablet Take 1 tablet (81 mg) by mouth daily (Patient not taking:  Reported on 7/15/2019) 90 tablet 3     ibuprofen (ADVIL/MOTRIN) 200 MG tablet Take 600 mg by mouth every 4 hours as needed for mild pain Patient takes 4 tablet at night. Occasionally takes 4 tablet when goes out.        oxyCODONE (ROXICODONE) 5 MG tablet Take 1 tablet (5 mg) by mouth every 4 hours as needed for pain or severe pain (Patient not taking: Reported on 7/15/2019) 12 tablet 0       ALLERGIES     Allergies   Allergen Reactions     Flu Virus Vaccine Other (See Comments)     Viral SX     Gluten Meal      Milk Protein Extract        PAST MEDICAL HISTORY:  Past Medical History:   Diagnosis Date     Abnormal cardiovascular stress test     EKG changes with exercise on Exercise stress test 1/26/2015     CAD (coronary artery disease)     Severe 2 vessel CAD. PCI with drug-eluting stents x2 to mid LAD on 3/27/15     HTN (hypertension)      Hyperlipidemia LDL goal <100      Hypothyroidism      IHD (ischemic heart disease)      KUSUM (obstructive sleep apnea)     CPAP       PAST SURGICAL HISTORY:  Past Surgical History:   Procedure Laterality Date     CARDIAC SURGERY      2 heart stents     COLONOSCOPY       COLONOSCOPY N/A 1/11/2017    Procedure: COLONOSCOPY;  Surgeon: Nghia Moss MD;  Location:  GI     HEAD & NECK SURGERY      wisdom teeth removed     HEART CATH, ANGIOPLASTY  3/27/15    2.5 X 18 mm & 3.0 X mm LUCILA to LAD     TONSILLECTOMY      T&A as a child     TUBAL LIGATION         FAMILY HISTORY:  Family History   Problem Relation Age of Onset     Blood Disease Mother         pernious anemia     Heart Disease Father      Heart Disease Sister      Heart Disease Brother      Colon Cancer No family hx of        SOCIAL HISTORY:  Social History     Socioeconomic History     Marital status:      Spouse name: None     Number of children: 4     Years of education: None     Highest education level: None   Occupational History     None   Social Needs     Financial resource strain: None     Food insecurity:      Worry: None     Inability: None     Transportation needs:     Medical: None     Non-medical: None   Tobacco Use     Smoking status: Former Smoker     Last attempt to quit: 1984     Years since quittin.5     Smokeless tobacco: Never Used   Substance and Sexual Activity     Alcohol use: Yes     Alcohol/week: 0.0 oz     Comment: social drinker     Drug use: No     Sexual activity: Never     Partners: Male   Lifestyle     Physical activity:     Days per week: None     Minutes per session: None     Stress: None   Relationships     Social connections:     Talks on phone: None     Gets together: None     Attends Shinto service: None     Active member of club or organization: None     Attends meetings of clubs or organizations: None     Relationship status: None     Intimate partner violence:     Fear of current or ex partner: None     Emotionally abused: None     Physically abused: None     Forced sexual activity: None   Other Topics Concern     Parent/sibling w/ CABG, MI or angioplasty before 65F 55M? Not Asked      Service Not Asked     Blood Transfusions Not Asked     Caffeine Concern Yes     Comment: coffee      Occupational Exposure No     Hobby Hazards No     Sleep Concern No     Stress Concern No     Weight Concern No     Special Diet Yes     Comment: gluten free and dairy free     Back Care No     Exercise No     Comment: on hold ,  starting cardiac rehab     Bike Helmet Not Asked     Seat Belt Yes     Self-Exams Not Asked   Social History Narrative     None       Review of Systems:  Skin:  Negative for       Eyes:  Positive for      ENT:  Negative for      Respiratory:  Positive for sleep apnea;CPAP     Cardiovascular:  Negative fatigue    Gastroenterology: Negative for      Genitourinary:  Positive for nocturia    Musculoskeletal:  Positive for back pain;neck pain;joint pain    Neurologic:  Positive for numbness or tingling of hands    Psychiatric:  Positive for sleep disturbances   "  Heme/Lymph/Imm:  Negative      Endocrine:  Positive for thyroid disorder      Physical Exam:  Vitals: /67   Pulse 63   Ht 1.562 m (5' 1.5\")   Wt 81.2 kg (179 lb)   BMI 33.27 kg/m      Constitutional:  cooperative;in no acute distress        Skin:  warm and dry to the touch;no apparent skin lesions or masses noted          Head:  normocephalic        Eyes:  conjunctivae and lids unremarkable;sclera white        Lymph:      ENT:  no pallor or cyanosis        Neck:  carotid pulses are full and equal bilaterally;JVP normal;no carotid bruit        Respiratory:  clear to auscultation         Cardiac: regular rhythm;normal S1 and S2;apical impulse not displaced                pulses full and equal;pulses full and equal, no bruits auscultated                                   Right radial puncture site clean dry and intact without bruit.    GI:  abdomen soft;non-tender obese      Extremities and Muscular Skeletal:  no edema;no deformities, clubbing, cyanosis, erythema observed              Neurological:  no gross motor deficits        Psych:  Alert and Oriented x 3        CC  Rebekah Rausch MD  [address] [place] [address]              "

## 2019-07-15 NOTE — LETTER
7/15/2019    Rebekah Rausch MD  [address] [facility] [address]    RE: Elizabeth Li       Dear Colleague,    I had the pleasure of seeing Elizabeth Li in the [facility].    HPI and Plan:   See dictation    Orders Placed This Encounter   Procedures     Follow-Up with Cardiac Advanced Practice Provider       No orders of the defined types were placed in this encounter.      There are no discontinued medications.      Encounter Diagnoses   Name Primary?     Coronary artery disease involving native coronary artery of native heart without angina pectoris Yes     Pre-operative cardiovascular examination        CURRENT MEDICATIONS:  Current Outpatient Medications   Medication Sig Dispense Refill     ascorbic acid (VITAMIN C) 500 MG tablet Take 500 mg by mouth daily Pt takes in afternoon       cholecalciferol (VITAMIN D3) 1000 UNIT tablet Take 1,000 Units by mouth 2 times daily       isosorbide mononitrate (IMDUR) 30 MG 24 hr tablet Take 1 tablet (30 mg) by mouth daily (Patient taking differently: Take 30 mg by mouth daily (with breakfast) ) 90 tablet 4     ketoconazole (NIZORAL) 2 % shampoo Apply to the affected area and wash off after 5 minutes. (Patient taking differently: Apply topically once a week Apply as a shampoo and then wash off after 5 minutes.) 120 mL 1     levothyroxine (SYNTHROID/LEVOTHROID) 75 MCG tablet Take 1 tablet (75 mcg) by mouth every morning (before breakfast) 90 tablet 1     liothyronine (CYTOMEL) 5 MCG tablet Take 1 tablet (5 mcg) by mouth daily (with breakfast) 90 tablet 1     metoprolol tartrate (LOPRESSOR) 25 MG tablet TAKE ONE-HALF TABLET (12.5MG) BY MOUTH TWO TIMES A DAY 90 tablet 1     omeprazole (PRILOSEC) 20 MG DR capsule TAKE ONE CAPSULE BY MOUTH ONCE DAILY 90 capsule 0     order for DME Equipment being ordered: wheeled walker 1 Device 0     order for DME Equipment being ordered: CPAP       phenazopyridine (AZO URINARY PAIN RELIEF) 95  MG tablet Take 190 mg by mouth 3 times daily       simvastatin (ZOCOR) 10 MG tablet Take 1 tablet (10 mg) by mouth At Bedtime 90 tablet 4     aspirin 81 MG tablet Take 1 tablet (81 mg) by mouth daily (Patient not taking: Reported on 7/15/2019) 90 tablet 3     ibuprofen (ADVIL/MOTRIN) 200 MG tablet Take 600 mg by mouth every 4 hours as needed for mild pain Patient takes 4 tablet at night. Occasionally takes 4 tablet when goes out.        oxyCODONE (ROXICODONE) 5 MG tablet Take 1 tablet (5 mg) by mouth every 4 hours as needed for pain or severe pain (Patient not taking: Reported on 7/15/2019) 12 tablet 0       ALLERGIES     Allergies   Allergen Reactions     Flu Virus Vaccine Other (See Comments)     Viral SX     Gluten Meal      Milk Protein Extract        PAST MEDICAL HISTORY:  Past Medical History:   Diagnosis Date     Abnormal cardiovascular stress test     EKG changes with exercise on Exercise stress test 1/26/2015     CAD (coronary artery disease)     Severe 2 vessel CAD. PCI with drug-eluting stents x2 to mid LAD on 3/27/15     HTN (hypertension)      Hyperlipidemia LDL goal <100      Hypothyroidism      IHD (ischemic heart disease)      KUSUM (obstructive sleep apnea)     CPAP       PAST SURGICAL HISTORY:  Past Surgical History:   Procedure Laterality Date     CARDIAC SURGERY      2 heart stents     COLONOSCOPY       COLONOSCOPY N/A 1/11/2017    Procedure: COLONOSCOPY;  Surgeon: Nghia Moss MD;  Location:  GI     HEAD & NECK SURGERY      wisdom teeth removed     HEART CATH, ANGIOPLASTY  3/27/15    2.5 X 18 mm & 3.0 X mm LUCILA to LAD     TONSILLECTOMY      T&A as a child     TUBAL LIGATION         FAMILY HISTORY:  Family History   Problem Relation Age of Onset     Blood Disease Mother         pernious anemia     Heart Disease Father      Heart Disease Sister      Heart Disease Brother      Colon Cancer No family hx of        SOCIAL HISTORY:  Social History     Socioeconomic History     Marital status:       Spouse name: None     Number of children: 4     Years of education: None     Highest education level: None   Occupational History     None   Social Needs     Financial resource strain: None     Food insecurity:     Worry: None     Inability: None     Transportation needs:     Medical: None     Non-medical: None   Tobacco Use     Smoking status: Former Smoker     Last attempt to quit: 1984     Years since quittin.5     Smokeless tobacco: Never Used   Substance and Sexual Activity     Alcohol use: Yes     Alcohol/week: 0.0 oz     Comment: social drinker     Drug use: No     Sexual activity: Never     Partners: Male   Lifestyle     Physical activity:     Days per week: None     Minutes per session: None     Stress: None   Relationships     Social connections:     Talks on phone: None     Gets together: None     Attends [employer] service: None     Active member of club or organization: None     Attends meetings of clubs or organizations: None     Relationship status: None     Intimate partner violence:     Fear of current or ex partner: None     Emotionally abused: None     Physically abused: None     Forced sexual activity: None   Other Topics Concern     Parent/sibling w/ CABG, MI or angioplasty before 65F 55M? Not Asked      Service Not Asked     Blood Transfusions Not Asked     Caffeine Concern Yes     Comment: coffee      Occupational Exposure No     Hobby Hazards No     Sleep Concern No     Stress Concern No     Weight Concern No     Special Diet Yes     Comment: gluten free and dairy free     Back Care No     Exercise No     Comment: on hold ,  starting cardiac rehab     Bike Helmet Not Asked     Seat Belt Yes     Self-Exams Not Asked   Social History Narrative     None       Review of Systems:  Skin:  Negative for       Eyes:  Positive for      ENT:  Negative for      Respiratory:  Positive for sleep apnea;CPAP     Cardiovascular:  Negative fatigue    Gastroenterology: Negative  "for      Genitourinary:  Positive for nocturia    Musculoskeletal:  Positive for back pain;neck pain;joint pain    Neurologic:  Positive for numbness or tingling of hands    Psychiatric:  Positive for sleep disturbances    Heme/Lymph/Imm:  Negative      Endocrine:  Positive for thyroid disorder      Physical Exam:  Vitals: /67   Pulse 63   Ht 1.562 m (5' 1.5\")   Wt 81.2 kg (179 lb)   BMI 33.27 kg/m       Constitutional:  cooperative;in no acute distress        Skin:  warm and dry to the touch;no apparent skin lesions or masses noted          Head:  normocephalic        Eyes:  conjunctivae and lids unremarkable;sclera white        Lymph:      ENT:  no pallor or cyanosis        Neck:  carotid pulses are full and equal bilaterally;JVP normal;no carotid bruit        Respiratory:  clear to auscultation         Cardiac: regular rhythm;normal S1 and S2;apical impulse not displaced                pulses full and equal;pulses full and equal, no bruits auscultated                                   Right radial puncture site clean dry and intact without bruit.    GI:  abdomen soft;non-tender obese      Extremities and Muscular Skeletal:  no edema;no deformities, clubbing, cyanosis, erythema observed              Neurological:  no gross motor deficits        Psych:  Alert and Oriented x 3        CC  Rebekah Rausch MD  [address] [place] [address]                Thank you for allowing me to participate in the care of your patient.      Sincerely,     Francisco Oakley MD, MD     [facility]    cc:   Rebekah Rausch MD  [address]  [place] [id number]        "

## 2019-07-15 NOTE — LETTER
7/15/2019      Rebekah Rausch MD  [address] [facility] [address]      RE: Elizabeth NEWELL Jen       Dear Colleague,    I had the pleasure of seeing Elizabeth Li in the [facility].    Service Date: 07/15/2019      REASON FOR VISIT:  Followup for coronary artery disease and preoperative cardiovascular evaluation.      HISTORY OF PRESENT ILLNESS:  I had the pleasure of seeing Mariluz Li at the [facility] in [place] this morning.  She is a very pleasant 83-year-old female with known coronary artery disease, hypertension, hyperlipidemia, obstructive sleep apnea and chronic back and hip pain due to sciatica.  We saw her approximately 4 years ago for an evaluation of progressive dyspnea on exertion.  At that time she had a stress test as well as CTA which were abnormal.  She subsequently had coronary angiogram which demonstrated severe 3-vessel coronary artery disease.  Specifically, she had high-grade stenosis in the mid LAD.  Right coronary was chronically occluded.  She subsequently underwent PCI with drug-eluting stent placement in the mid LAD.  Given the chronic nature of the RCA, we decided to not intervene on that vessel.  Her circumflex had moderate disease.      After that intervention, her dyspnea improved significantly.  However, she continues to have baseline mild dyspnea on exertion.  This is in the setting of significant hip and lower back pain due to sciatica.  She has been essentially sedentary because of her pain related to sciatica.  We did an JANE last year which showed no evidence of arterial insufficiency.  Since my last visit with her in September of last year, her dyspnea has remained stable.  She thinks it might have actually gotten better since then.  She denies chest pain.  She denies palpitations, presyncope or syncope.      She was recently seen by a back specialist.  She was offered surgery and she is  scheduled to undergo back surgery this coming Wednesday.  Overall, she is very excited about the opportunity to have her pain alleviated by the surgery.      IMPRESSION, REPORT AND PLAN:   1.  Coronary artery disease, status post prior PCI with drug-eluting stent placement in the LAD.   2.  Chronically occluded RCA and moderate circumflex disease, stable.   3.  Preoperative cardiovascular evaluation.   4.  Hyperlipidemia   5.  Hypertension.   6.  Dyspnea on exertion, chronic and stable.   7.  Chronic low back and hip pain.   8.  Obstructive sleep apnea.      Ms. Avilez remains quite stable from a cardiopulmonary point of view.  She has not developed any worsening symptoms over the past year.  She denies chest pain.  Her dyspnea is stable and might have improved slightly over the last year.  Her last echocardiogram from 2017 demonstrated preserved LV function.  Her risk factors including her blood pressure are well controlled.  She is on a reasonable medical program which includes aspirin and statin therapy as well as a beta blocker.      From a preoperative evaluation, although her functional capacity is difficult to assess due to her inability to walk, I believe she is stable.  As such, it is reasonable for her to proceed with her upcoming back surgery with an acceptable risk.  She should be continued on all of her cardiac medications including her beta blocker perioperatively.      We will see her in approximately 3 months for followup.  At that time, we will most likely obtain a repeat echocardiogram.        I appreciate the opportunity to be part of her care.         ELIZABETH VAZUQEZ MD             D: 07/15/2019   T: 07/15/2019   MT: TRUDI      Name:     AMARI AVILEZ   MRN:      [id number]        Account:      [id number]   :      1935           Service Date: 07/15/2019      Document: [id number]         No facility-administered encounter medications on file as of 7/15/2019.      Outpatient Encounter  Medications as of 7/15/2019   Medication Sig Dispense Refill     ascorbic acid (VITAMIN C) 500 MG tablet Take 500 mg by mouth daily (with lunch) Pt takes in afternoon       cholecalciferol (VITAMIN D3) 1000 UNIT tablet Take 1,000 Units by mouth 2 times daily       isosorbide mononitrate (IMDUR) 30 MG 24 hr tablet Take 1 tablet (30 mg) by mouth daily (Patient taking differently: Take 30 mg by mouth daily (with breakfast) ) 90 tablet 4     ketoconazole (NIZORAL) 2 % shampoo Apply to the affected area and wash off after 5 minutes. (Patient taking differently: Apply topically once a week Apply as a shampoo and then wash off after 5 minutes.) 120 mL 1     levothyroxine (SYNTHROID/LEVOTHROID) 75 MCG tablet Take 1 tablet (75 mcg) by mouth every morning (before breakfast) 90 tablet 1     liothyronine (CYTOMEL) 5 MCG tablet Take 1 tablet (5 mcg) by mouth daily (with breakfast) 90 tablet 1     metoprolol tartrate (LOPRESSOR) 25 MG tablet TAKE ONE-HALF TABLET (12.5MG) BY MOUTH TWO TIMES A DAY 90 tablet 1     omeprazole (PRILOSEC) 20 MG DR capsule TAKE ONE CAPSULE BY MOUTH ONCE DAILY 90 capsule 0     order for DME Equipment being ordered: wheeled walker 1 Device 0     order for DME Equipment being ordered: CPAP       simvastatin (ZOCOR) 10 MG tablet Take 1 tablet (10 mg) by mouth At Bedtime 90 tablet 4     [DISCONTINUED] phenazopyridine (AZO URINARY PAIN RELIEF) 95 MG tablet Take 190 mg by mouth 3 times daily       aspirin 81 MG tablet Take 1 tablet (81 mg) by mouth daily 90 tablet 3     ibuprofen (ADVIL/MOTRIN) 200 MG tablet Take 800 mg by mouth 2 times daily        [DISCONTINUED] acetaminophen (TYLENOL) 500 MG tablet Take 2 tablets (1,000 mg) by mouth every 8 hours as needed for pain 60 tablet 0     [DISCONTINUED] oxyCODONE (ROXICODONE) 5 MG tablet Take 1 tablet (5 mg) by mouth every 4 hours as needed for pain or severe pain (Patient not taking: Reported on 7/15/2019) 12 tablet 0       Again, thank you for allowing me to  participate in the care of your patient.      Sincerely,    Francisco Oakley MD, MD     [facility]

## 2019-07-17 PROBLEM — Z98.1 S/P LUMBAR SPINAL FUSION: Status: ACTIVE | Noted: 2019-01-01

## 2019-07-17 NOTE — PROVIDER NOTIFICATION
MD Notification    Notified Person: NP    Notified Person Name: Ruby WILSON ELISEO     Notification Date/Time: 07/17/19 @ 5:47 PM     Notification Interaction: web paged    Purpose of Notification: JARON drain output has been 245 mL out since surgery.    Orders Received:    Comments: RN spoke with Jarek eubanks to just monitor for now.

## 2019-07-17 NOTE — ANESTHESIA POSTPROCEDURE EVALUATION
Patient: Elizabeth Li    Procedure(s):  L3-L4 TRANSFORAMINAL LUMBAR  INTRABODY FUSION WITH ALLOGRAFT, OPTICAL TRACKING SYSTEM AND MEDTRONIC SOLARA INSTRUMENTATION    Diagnosis:LUMBAR SPONDYLOSIS  Diagnosis Additional Information: No value filed.    Anesthesia Type:  General, ETT    Note:  Anesthesia Post Evaluation    Patient location during evaluation: PACU  Patient participation: Able to fully participate in evaluation  Level of consciousness: awake  Pain management: adequate  Airway patency: patent  Cardiovascular status: acceptable  Respiratory status: acceptable  Hydration status: acceptable  PONV: none     Anesthetic complications: None          Last vitals:  Vitals:    07/17/19 1420 07/17/19 1430 07/17/19 1500   BP: 137/57 116/60 115/57   Pulse: (!) 49 59    Resp: 12 10 12   Temp:      SpO2: 99% 99% 90%         Electronically Signed By: Triston Ramirez MD  July 17, 2019  3:25 PM

## 2019-07-17 NOTE — PROGRESS NOTES
Admission medication history interview status for the 7/17/2019  admission is complete. See EPIC admission navigator for prior to admission medications     Medication history source reliability:Good    Medication history interview source(s):Patient    Medication history resources (including written lists, pill bottles, clinic record):None    Primary pharmacy.[facility]    Additional medication history information not noted on PTA med list :None    Time spent in this activity: 45 minutes    Prior to Admission medications    Medication Sig Last Dose Taking? Auth Provider   acetaminophen (TYLENOL) 500 MG tablet Take 1,000 mg by mouth 3 times daily as needed for mild pain 7/16/2019 at HS Yes Reported, Patient   ascorbic acid (VITAMIN C) 500 MG tablet Take 500 mg by mouth daily (with lunch) Pt takes in afternoon 7/16/2019 at Afternoon Yes Reported, Patient   aspirin 81 MG tablet Take 1 tablet (81 mg) by mouth daily 7/10/2019 at AM Yes Francisco Oakley MD   Capsaicin-Methyl Nicotinate (ARTH ARREST EX) Externally apply topically daily as needed (to Knees) Past Week at PRN Yes Reported, Patient   cholecalciferol (VITAMIN D3) 1000 UNIT tablet Take 1,000 Units by mouth 2 times daily 7/16/2019 at AM Yes Reported, Patient   CRANBERRY PO Take 1 tablet by mouth 2 times daily 7/16/2019 at AM Yes Reported, Patient   ibuprofen (ADVIL/MOTRIN) 200 MG tablet Take 800 mg by mouth 2 times daily  7/10/2019 at PM Yes Reported, Patient   isosorbide mononitrate (IMDUR) 30 MG 24 hr tablet Take 1 tablet (30 mg) by mouth daily  Patient taking differently: Take 30 mg by mouth daily (with breakfast)  7/16/2019 at AM Yes Rebekah Rausch MD   ketoconazole (NIZORAL) 2 % shampoo Apply to the affected area and wash off after 5 minutes.  Patient taking differently: Apply topically once a week Apply as a shampoo and then wash off after 5 minutes. 7/16/2019 at Unknown time Yes Kasi Watkins MD   levothyroxine (SYNTHROID/LEVOTHROID) 75 MCG tablet Take  1 tablet (75 mcg) by mouth every morning (before breakfast) 7/17/2019 at 0500 Yes Olena Manzanares MD   liothyronine (CYTOMEL) 5 MCG tablet Take 1 tablet (5 mcg) by mouth daily (with breakfast) 7/16/2019 at AM Yes Olena Manzanares MD   metoprolol tartrate (LOPRESSOR) 25 MG tablet TAKE ONE-HALF TABLET (12.5MG) BY MOUTH TWO TIMES A DAY 7/17/2019 at 0500 Yes Rebekah Rausch MD   omeprazole (PRILOSEC) 20 MG DR capsule TAKE ONE CAPSULE BY MOUTH ONCE DAILY 7/17/2019 at 0600 Yes Rebekah Rausch MD   simvastatin (ZOCOR) 10 MG tablet Take 1 tablet (10 mg) by mouth At Bedtime 7/16/2019 at PM Yes Rebekah Rausch MD   order for DME Equipment being ordered: Dorothea Pascual DO   order for DME Equipment being ordered: CPAP   Reported, Patient

## 2019-07-17 NOTE — OR NURSING
Dr. Jansen at bedside to see pt.    Dr. Jansen informed of JARON output of 145ccs. No new orders or instructions.

## 2019-07-17 NOTE — ANESTHESIA PREPROCEDURE EVALUATION
Anesthesia Pre-Procedure Evaluation    Patient: Elizabeth Li   MRN: [id number] : 1935          Preoperative Diagnosis: LUMBAR SPONDYLOSIS    Procedure(s):  L3-L4 TRANSFORAMINAL LUMBAR  INTRABODY FUSION, USING OPTICAL TRACKING SYSTEM, (JAMES FRAME, MEDTRONIC SOLARA, MIDAS TOM, LEICA MICROSCOPE)^    Past Medical History:   Diagnosis Date     Abnormal cardiovascular stress test     EKG changes with exercise on Exercise stress test 2015     CAD (coronary artery disease)     Severe 2 vessel CAD. PCI with drug-eluting stents x2 to mid LAD on 3/27/15     HTN (hypertension)      Hyperlipidemia LDL goal <100      Hypothyroidism      IHD (ischemic heart disease)      KUSUM (obstructive sleep apnea)     CPAP     Past Surgical History:   Procedure Laterality Date     CARDIAC SURGERY      2 heart stents     COLONOSCOPY       COLONOSCOPY N/A 2017    Procedure: COLONOSCOPY;  Surgeon: Nghia Moss MD;  Location:  GI     HEAD & NECK SURGERY      wisdom teeth removed     HEART CATH, ANGIOPLASTY  3/27/15    2.5 X 18 mm & 3.0 X mm LUCILA to LAD     TONSILLECTOMY      T&A as a child     TUBAL LIGATION         Anesthesia Evaluation     .             ROS/MED HX    ENT/Pulmonary:     (+)sleep apnea, uses CPAP , . .    Neurologic:     (+)other neuro bilateral sciatica   (-) seizures and CVA   Cardiovascular:     (+) Dyslipidemia, hypertension--CAD (chronically occluded RCA, stented LAD, moderate stenosis of circumflex), --stent,  2 . : . . ARELLANO (mild), . :. . Previous cardiac testing Echodate:2017results:nml LV function, no WMA, EF55-60, mild LVH, trace to mild AI, nml RV functiondate: results:ECG reviewed date: results:NSR date: results:          METS/Exercise Tolerance:     Hematologic:        (-) anemia   Musculoskeletal:         GI/Hepatic:        (-) GERD   Renal/Genitourinary:      (-) renal disease   Endo:     (+) thyroid problem hypothyroidism, Obesity, .      Psychiatric:         Infectious Disease:    "      Malignancy:         Other:    (+) H/O Chronic Pain,                        Physical Exam  Normal systems: dental    Airway   Mallampati: II  TM distance: >3 FB  Neck ROM: full    Dental     Cardiovascular   Rhythm and rate: regular      Pulmonary             Lab Results   Component Value Date    WBC 7.4 07/09/2019    HGB 13.1 07/09/2019    HCT 39.3 07/09/2019     07/09/2019     07/09/2019    POTASSIUM 4.1 07/09/2019    CHLORIDE 106 07/09/2019    CO2 24 07/09/2019    BUN 10 07/09/2019    CR 0.73 07/09/2019    GLC 81 07/09/2019    FRANKLIN 9.4 07/09/2019    ALBUMIN 3.9 01/04/2019    PROTTOTAL 7.2 01/04/2019    ALT 19 01/04/2019    AST 13 01/04/2019    ALKPHOS 78 01/04/2019    BILITOTAL 0.4 01/04/2019    PTT 34 03/27/2015    INR 0.95 03/27/2015    TSH 0.44 03/05/2019    T4 1.10 03/05/2019       Preop Vitals  BP Readings from Last 3 Encounters:   07/17/19 172/78   07/15/19 137/67   07/09/19 136/68    Pulse Readings from Last 3 Encounters:   07/15/19 63   07/09/19 71   07/01/19 68      Resp Readings from Last 3 Encounters:   07/17/19 16   07/09/19 16   07/01/19 16    SpO2 Readings from Last 3 Encounters:   07/17/19 96%   07/09/19 93%   07/01/19 97%      Temp Readings from Last 1 Encounters:   07/17/19 36.9  C (98.4  F) (Temporal)    Ht Readings from Last 1 Encounters:   07/17/19 1.562 m (5' 1.5\")      Wt Readings from Last 1 Encounters:   07/17/19 78 kg (172 lb)    Estimated body mass index is 31.97 kg/m  as calculated from the following:    Height as of this encounter: 1.562 m (5' 1.5\").    Weight as of this encounter: 78 kg (172 lb).       Anesthesia Plan      History & Physical Review  History and physical reviewed and following examination; no interval change.    ASA Status:  3 .    NPO Status:  > 8 hours    Plan for General and ETT with Propofol induction. Maintenance will be Balanced.    PONV prophylaxis:  Ondansetron (or other 5HT-3) and Dexamethasone or Solumedrol  Additional equipment: " Videolaryngoscope and 2nd IV Metoprolol and imdur this AM  Zofran, decadron, low dose propofol infusion  MAP goal: >80mmHg  Phenylephrine gtt as needed for afterload augmentation      Postoperative Care  Postoperative pain management:  IV analgesics and Oral pain medications.      Consents  Anesthetic plan, risks, benefits and alternatives discussed with:  Patient..                 Willard Rodriguez MD

## 2019-07-17 NOTE — PLAN OF CARE
Patient arrived from PACU around 1500. VS stable on O2 @ 2LPM. Assist x2 with bed mobility. Dressing clean, dry, and intact. JARON drain in place, dressing clean, dry and intact; drain output since surgery 245 mL, provider notified. CMS intact. Bowel sounds hypoactive. Plan for patient to work with PT/OT tomorrow.

## 2019-07-17 NOTE — PROVIDER NOTIFICATION
"Call placed to Ruby GALVEZ NP: \"there are no post-op orders on this patient.  Please place stat, thanks!\"  "

## 2019-07-17 NOTE — OP NOTE
Date of surgery: 7/17/2019  Surgeon: Cole Jansen MD  Assistant: MEEK Cleaning  Note: Alfredo Worthy was present for and assisted with the entire surgery, and his/her role as an assistant was crucial for aid in positioning, exposure, suctioning, retraction, and closure     Preoperative diagnosis: L3-4 spondylolisthesis and stenosis  Postoperative diagnosis: L3-4 spondylolisthesis and stenosis     Procedure:  1.  L3-4 insertion of bilateral pedicle screws and rods (Medtronic Solera)  2.  L3-4 bilateral laminectomies for decompression of stenosis  3.  Right L3-4 complete facetectomy, transforaminal discectomy, and interbody arthrodesis  4.  L3-4 insertion of intervertebral graft with autograft and allograft  5.  L3-4 posterolateral arthrodesis and fusion with autograft and allograft  6.  Use of O-Arm intraoperative CT Scan  7.  Use of Medtronic Stealth intraoperative neuro-navigation  8.  Use of intraoperative microscope and fluoroscopy     EBL: 400 mL     Indications: 84-year-old female with progressive back pain, leg pain.  MRI showed L3-4 spondylolisthesis and stenosis.  Risks, benefits, indications, and alternatives were discussed with the patient and family in detail, and they wished to proceed.      Description of surgery: The patient was positioned prone.  Sterile prepping and draping procedures were performed.  Antibiotics were administered and timeout was performed.  A midline lumbar incision was performed.  The monopolar was used to expose the spinous processes, lamina, facets, and transverse processes at L3 and L4.  The reference frame was attached, and an O-arm intra-operative CT scan was performed.  Medtronic Stealth neuro-navigation was registered.  Under navigation, bilateral pedicle screws were inserted at L3 and L4.  The Leksell rongeurs was used to remove the spinous processes at L3-4.  The high speed drill was then used to perform bilateral laminectomies at L3-4.  The Kerrison rongeurs were then used  to remove the ligamentum flavum with decompression of the nerve roots.  The drill was then used to perform a left complete facetectomy. The 15 blade was used to perform an annulotomy in the disk space at L3-4.  A complete discectomy was performed with jaylon, the pituitary rongeurs, and curets.  Interbody arthrodesis was performed with jaylon and curets.  An intervertebral graft was inserted into the disk space at L3-4 with autograft and allograft.  Rods and set screws were inserted.  Antibiotic irrigation was performed.  Hemostasis was achieved.  The high speed drill was used to perform posterolateral arthrodesis and fusion.  Fluoroscopy demonstrated excellent positioning of the hardware.  The fascia was closed with 0-Vicryl sutures, and the dermal layer was closed with 2-0 vicryl sutures.  The skin was closed with staples.  There were no intraprocedural complications.

## 2019-07-17 NOTE — ANESTHESIA CARE TRANSFER NOTE
Patient: Elizabeth Li    Procedure(s):  L3-L4 TRANSFORAMINAL LUMBAR  INTRABODY FUSION WITH ALLOGRAFT, OPTICAL TRACKING SYSTEM AND MEDTRONIC SOLARA INSTRUMENTATION    Diagnosis: LUMBAR SPONDYLOSIS  Diagnosis Additional Information: No value filed.    Anesthesia Type:   General, ETT     Note:  Airway :Face Mask  Patient transferred to:PACU  Comments: Neuromuscular blockade reversed after TOF 4/4, spontaneous respirations, adequate tidal volumes, followed commands to voice, oropharynx suctioned with soft flexible catheter, extubated atraumatically, extubated with suction, airway patent after extubation.  Oxygen via facemask at 8 liters per minute to PACU. Oxygen tubing connected to wall O2 in PACU, SpO2, NiBP, and EKG monitors and alarms on and functioning, Larry Hugger warmer connected to patient gown, report on patient's clinical status given to PACU RN, RN questions answered. Handoff Report: Identifed the Patient, Identified the Reponsible Provider, Reviewed the pertinent medical history, Discussed the surgical course, Reviewed Intra-OP anesthesia mangement and issues during anesthesia, Set expectations for post-procedure period and Allowed opportunity for questions and acknowledgement of understanding      Vitals: (Last set prior to Anesthesia Care Transfer)    CRNA VITALS  7/17/2019 1237 - 7/17/2019 1317      7/17/2019             Pulse:  101    SpO2:  99 %    Resp Rate (set):  10                Electronically Signed By: ISMAEL Beltran CRNA  July 17, 2019  1:17 PM

## 2019-07-18 NOTE — PLAN OF CARE
POD 1 from a L3-4 fusion. A&O. CMS intact except for right leg pain which was present prior to surgery and is back, but not as bad as before surgery. Bowel sounds audible, passing flatus, tolerating gluten and dairy free diet. VSS. Dressing CDI. JARON with 90cc output, had been fairly high and MD aware, slowing down. Bedrest overnight. C/o 6-8/10 pain with movement, decreased with IV dilaudid. Attempted oxycodone once, patient did not have any relief of pain. States oxycodone has not worked for her with past procedures, would like a different option. Due to high pain, still needs to dangle but bed mobility is improving. Reynoso also left in due to pain. Plan for therapies and new pain control plan today.

## 2019-07-18 NOTE — PROGRESS NOTES
07/18/19 1000   Quick Adds   Type of Visit Initial PT Evaluation   Living Environment   Lives With spouse   Living Arrangements condominium   Home Accessibility no concerns   Living Environment Comment First floor condo with elevator access. Reports very tight spaces in bathroom etc and would have difficult time accomodating DME   Self-Care   Usual Activity Tolerance moderate   Current Activity Tolerance fair   Regular Exercise No   Equipment Currently Used at Home walker, rolling   Activity/Exercise/Self-Care Comment Very recent 4WW used due to pain.  has been increasing assist with ADLs, but reported limited physical ability to assist   Functional Level Prior   Ambulation 1-->assistive equipment   Transferring 1-->assistive equipment   Toileting 0-->independent   Bathing 2-->assistive person   Fall history within last six months no   Which of the above functional risks had a recent onset or change? ambulation;transferring;bathing;dressing   Prior Functional Level Comment Mod-I with 4WW, but limited gait tolerance due to pain. Spouse assists with LE cleaning and dressing due to inability to bend   General Information   Onset of Illness/Injury or Date of Surgery - Date 07/17/19   Referring Physician Dr. Jansen   Patient/Family Goals Statement TCU   Pertinent History of Current Problem (include personal factors and/or comorbidities that impact the POC) POD1 L3-L4 TRANSFORAMINAL LUMBAR  INTRABODY FUSION   Precautions/Limitations fall precautions;spinal precautions   Cognitive Status Examination   Orientation orientation to person, place and time   Level of Consciousness alert   Follows Commands and Answers Questions 100% of the time   Pain Assessment   Patient Currently in Pain Yes, see Vital Sign flowsheet  (Low back, R leg to knee)   Integumentary/Edema   Integumentary/Edema Comments Dressing CDI   Posture    Posture Forward head position   Range of Motion (ROM)   ROM Comment R LE limited by pain, does not  "tolerate full extension   Strength   Strength Comments B LE 5/5   Bed Mobility   Bed Mobility Comments Mod-A supine>sit   Transfer Skills   Transfer Comments Mod-A sit<>stand   Gait   Gait Comments CGA with 4WW. Good posture. Slow pace with step-to gait   Balance   Balance Comments Requires UE support   Sensory Examination   Sensory Perception Comments LT intact   Modality Interventions   Planned Modality Interventions Cryotherapy   General Therapy Interventions   Planned Therapy Interventions progressive activity/exercise;home program guidelines;risk factor education;transfer training;strengthening;gait training;bed mobility training   Clinical Impression   Criteria for Skilled Therapeutic Intervention yes, treatment indicated   PT Diagnosis Impaired functional mobility   Influenced by the following impairments Pain, lumbar fusion, precautions   Functional limitations due to impairments Bed mobility, transfers, gait   Clinical Presentation Stable/Uncomplicated   Clinical Presentation Rationale Medically stable   Clinical Decision Making (Complexity) Low complexity   Therapy Frequency 2x/day   Predicted Duration of Therapy Intervention (days/wks) 3 days   Anticipated Discharge Disposition Transitional Care Facility   Risk & Benefits of therapy have been explained Yes   Patient, Family & other staff in agreement with plan of care Yes   [facility] [employer] TM \"6 Clicks\"   2016, Trustees of [facility], under license to [employer].  All rights reserved.   6 Clicks Short Forms Basic Mobility Inpatient Short Form   [facility] AMAd [employer]AC  \"6 Clicks\" V.2 Basic Mobility Inpatient Short Form   1. Turning from your back to your side while in a flat bed without using bedrails? 3 - A Little   2. Moving from lying on your back to sitting on the side of a flat bed without using bedrails? 2 - A Lot   3. Moving to and from a bed to a chair (including a wheelchair)? 2 - A Lot   4. Standing up from a chair using your " arms (e.g., wheelchair, or bedside chair)? 2 - A Lot   5. To walk in hospital room? 3 - A Little   6. Climbing 3-5 steps with a railing? 2 - A Lot   Basic Mobility Raw Score (Score out of 24.Lower scores equate to lower levels of function) 14   Total Evaluation Time   Total Evaluation Time (Minutes) 10

## 2019-07-18 NOTE — PROGRESS NOTES
SPIRITUAL HEALTH SERVICES Progress Note  [facility]    Initial visit per pt request.  Pt reports that the volunteer [employer]  visited her this morning w communion and prayer.  Pt also requests a visit from Fr. Sanchez w an expressed interest in Confession.  SH has written a referral for Fr. Sanchez to visit pt.   In this visit w SH, pt shared stories about her past (dating back to childhood) and reflected on how helpful it's been for her to participate in [employer]-[place] and practice its principles. Pt adds that she's found a good way to blend her Religion robbie and meditative practice with the wisdom of the 12-Steps and Al-Anon. Pt said this has helped her move from resentment/anger (toward those who have hurt her) to forgiveness (of them) and gratitude for all God has done. Pt notes that she's also grateful that this initial stage of her healing process is going well and feels promising.  SH affirmed pt's robbie and 12-Step practices, and provided emotional/spiritual support.                                                                                                                                            Olu Rivera M.Div., [facility]  Staff   Pager [phone]

## 2019-07-18 NOTE — PLAN OF CARE
VSS.  Pain managed by IV dilaudid.  IV medication changed to oral medication.   Pt denies neuropathy except for baseline numbness in her fingers.  Back incision is covered, C/D/I.  Drain has bloody output, to bulb suction.  120ccs out this shift.  R PIV is WDL, infusing.  Reynoso removed at 1200.  Pt voided, missed hat.   Gluten free diet, tolerating well.  Pt up with A1 and walker.  Plan of care: continue to monitor.

## 2019-07-18 NOTE — PLAN OF CARE
"Discharge Planner PT     PT: Orders received, eval completed, tx initiated.  Pt lives in an accessible condo with her spouse. At baseline ambulates with a 4WW (recently started to use due to pain). Spouse assist with LE bathing dressing as pt has not tolerated bending. Back pain has worsened over the years and progressively limited mobility.    Patient plan for discharge: TCU  Current status: Educated on PT role/poc, spinal precautions with demo. B LE 5/5. Pt is very nervous about \"ruining the surgery\" and re-assured multiple times. /53. Mod-A supine>sit, requires manual assist throughout for technique. Mod-A sit<>stand, difficultly extended LE to rise. Ambulates 400' CGA with FWW, pain through R LE with gait, cues to lead with R LE and performing step-to pattern. Pain eases with time. Up in chair with all needs at end of tx, encouraged AP and LAQ when up.  Barriers to return to prior living situation: Limited physical assist available, current pain and mobility status  Recommendations for discharge: TCU  Rationale for recommendations: Will need ongoing skilled PT intervention to improve independence and safety with functional mobility skills prior to returning home. Anticipate pt will progress well over IP stay, but pt concerned over limited physical assist that will be provided and caregiver burden.       Entered by: Raul Barksdale 07/18/2019 11:03 AM       "

## 2019-07-18 NOTE — PROGRESS NOTES
[facility]    Neurosurgery  Daily Post-Op Note    Assessment & Plan   Procedure(s):  L3-L4 TRANSFORAMINAL LUMBAR  INTRABODY FUSION WITH ALLOGRAFT, OPTICAL TRACKING SYSTEM AND MEDTRONIC SOLARA INSTRUMENTATION   -1 Day Post-Op L3-4 TLIF  Doing well.  Clean wound without signs of infection.  Ambulating in halls, no problems.   Tolerating diet.     Plan:  -Advance activity as tolerated  -Continue supportive and symptomatic treatment  -Start or continue physical therapy  -TCU for dispo when ready.   JARON had 90 mL overnight.       Alfredo Worthy    Interval History   Stable.  Doing well.  Improving slowly.  Pain is reasonably controlled.  No fevers.     Physical Exam   Temp: 98  F (36.7  C) Temp src: Oral BP: 107/47   Heart Rate: 79 Resp: 18 SpO2: 91 % O2 Device: BiPAP/CPAP Oxygen Delivery: 2 LPM  Vitals:    07/17/19 0900   Weight: 78 kg (172 lb)     Vital Signs with Ranges  Temp:  [97.6  F (36.4  C)-98.7  F (37.1  C)] 98  F (36.7  C)  Heart Rate:  [67-84] 79  Resp:  [12-23] 18  BP: (101-128)/(47-60) 107/47  SpO2:  [90 %-99 %] 91 %  I/O last 3 completed shifts:  In: 3246 [P.O.:480; I.V.:2766]  Out: 1805 [Urine:925; Drains:480; Blood:400]    Alert and oriented.  Moves all extremities equally.      Incision: CDI  JARON with 90 overnight.       Medications     sodium chloride 75 mL/hr at 07/18/19 0551        acetaminophen  975 mg Oral Q8H     ceFAZolin  1 g Intravenous Q8H     isosorbide mononitrate  30 mg Oral Daily with breakfast     levothyroxine  75 mcg Oral QAM AC     liothyronine  5 mcg Oral Daily with breakfast     metoprolol tartrate  12.5 mg Oral BID     omeprazole  20 mg Oral QAM AC     senna-docusate  1 tablet Oral BID    Or     senna-docusate  2 tablet Oral BID     simvastatin  10 mg Oral At Bedtime     sodium chloride (PF)  3 mL Intracatheter Q8H             Alfredo Worthy PA-C  [facility]

## 2019-07-19 NOTE — PLAN OF CARE
POD 1 from a L3-4 fusion. A&O. CMS intact except for right leg pain which was present prior to surgery and is back, but not as bad as before surgery. Bowel sounds audible, passing flatus, tolerating gluten and dairy free diet. VSS. Dressing CDI. JARON with 70cc output. Up with 1A gb ww to bathroom. One good post void, needs another. C/o 2-15/10 pain with movement especially RLE, decreased with oral dilaudid and robaxin and one breakthrough IV dilaudid. Plan for continued therapies and pain control.

## 2019-07-19 NOTE — PLAN OF CARE
Discharge Planner OT   Patient plan for discharge: TCU -  prefers [facility] or one near [place]     Current status: order received, chart reviewed, eval completed, tx initiated. Pt lives in condo with spouse, spouse has been assisting with LE dressing, bathing, and all IADL's. Pt has been using 4ww recently.     Pt demonstrates ability to don/doff socks and pants with SBA and verbal cues for use of AE. Pt required CGA for sit <> stand with 4ww and cues for safety. Pt reports signficiant inc in pain with movement. CGA with 4ww for mobility to bathroom. CGA for toilet transfer with grab bars. Limited by pain. Pt reports spouse cannot physically A at home and prefers to go to TCU for continued rehab.     Barriers to return to prior living situation: pain, current level of A, fall risk, spouse cannot physically A     Recommendations for discharge: TCU   Rationale for recommendations: Pt would benefit from continued skilled OT services to improve independence and safety with ADL's and functional transfers as pt is not at baseline.           Entered by: Gladis Torre 07/19/2019 8:52 AM

## 2019-07-19 NOTE — PROGRESS NOTES
Care Transition Initial Assessment - SW     Met with: PATIENT   Active Problems:    S/P lumbar spinal fusion       DATA  Lives With: spouse   Living Arrangements: condominium  Quality of Family Relationships: supportive, involved  Description of Support System: Supportive, Involved  Who is your support system?:   Support Assessment: Adequate family and caregiver support.   Identified issues/concerns regarding health management: SW following for discharge planning needs  Quality of Family Relationships: supportive, involved     ASSESSMENT  Cognitive Status: Alert and oriented  Concerns to be addressed: Patient is a 84 year old female who was admitted to the hospital for lumbar spinal fusion. Prior to hospitalization, patient was living at home with spouse where he provided assistance to her as needed. Patient is aware that she will require TCU prior to returning home and requested a referral be sent to [facility] and [facility]. SW will send referrals and update patient once assessments are complete. Patient will require prior auth for BCBS medicare advantage. SW will complete paperwork and fax.     [facility] can accept.  [facility] cannot accept.    BCBS Medicare advantage auth pursued. BCBS ID # [id number].  SW spoke with [facility] representative and they are transferring to medical director to get approval. They will contact SW when decision has been made via call or fax (coy's office).     PLAN  Financial costs for the patient includes   Patient given options and choices for discharge to TCU  Patient/family is agreeable to the plan?  YES  Transportation/person available to transport on day of discharge  is  and have they been notified/set up   Patient Goals and Preferences: [facility] and [place]  Patient anticipates discharging to:  TCU    Needs Prior auth for BCBS Medicare advantage.    Shante Winters, DB   *[phone]

## 2019-07-19 NOTE — PLAN OF CARE
"Discharge Planner PT   Patient plan for discharge: \"rehab before I go back home\"  Current status: Pt requires Jacquelyn for bed mobility with cues for log roll and breathing; CGA for transfers and gait 250' x 2 with her own 4WW with cues for upright posture. Pt reports less pain today and improved ability to stand upright compared to before surgery.  Barriers to return to prior living situation: limited assist available, level of assist currently, pain  Recommendations for discharge: TCU  Rationale for recommendations: Pt would benefit from PT at TCU to progress strength, balance and mobility so pt can return home safely.        Entered by: Claudia Jackson 07/19/2019 10:33 AM       "

## 2019-07-19 NOTE — PROGRESS NOTES
SW:   D: Met with patient and her  with update that [facility] has bed available and [facility] does not. Patient and  in agreement with [facility] and discharge plans. Patient confirmed that  will provide transportation to [facility]. PAS completed and faxed to [facility].      PAS-RR    D: Per DHS regulation, SW completed and submitted PAS-RR to [place] [employer] Direct Connect via the Senior LinkAge Line.  PAS-RR confirmation # is : [id number]    I: MELISA spoke with patient and  Oswaldo and they are aware a PAS-RR has been submitted.  MELISA reviewed with patient and  Oswaldo that they may be contacted for a follow up appointment within 10 days of hospital discharge if their SNF stay is < 30 days.  Contact information for [facility] LinkAge Line was also provided.    A: Patient and  verbalized understanding.    P: Further questions may be directed to [facility] LinkAge Line at #[phone], option #4 for PAS-RR staff.      DB Romo

## 2019-07-19 NOTE — PLAN OF CARE
Patient alert x 4, mild bilateral lower extremity weakness when up, pain better controlled today with dilaudid po q 3hr, up with 1 assist GB/W, plan is for patient to go to TCU at discharge.

## 2019-07-19 NOTE — PROGRESS NOTES
07/19/19 0828   Quick Adds   Type of Visit Initial Occupational Therapy Evaluation   Living Environment   Lives With spouse   Living Arrangements condominium   Home Accessibility no concerns   Living Environment Comment pt uses walk in shower with bar with seat.    Self-Care   Usual Activity Tolerance moderate   Current Activity Tolerance fair   Regular Exercise No   Equipment Currently Used at Home walker, rolling;shower chair;grab bar, tub/shower   Activity/Exercise/Self-Care Comment uses 4ww as of recently.  can A with ADL/IADL's but limited ability to A    Functional Level   Ambulation 1-->assistive equipment   Transferring 1-->assistive equipment   Toileting 0-->independent   Bathing 3-->assistive equipment and person   Dressing 2-->assistive person   Eating 0-->independent   Communication 0-->understands/communicates without difficulty   Swallowing 0-->swallows foods/liquids without difficulty   Cognition 0 - no cognition issues reported   Fall history within last six months no   Which of the above functional risks had a recent onset or change? ambulation;transferring;bathing;toileting;dressing   Prior Functional Level Comment sposue has been putting on socks and assisting with shower transfer.    General Information   Onset of Illness/Injury or Date of Surgery - Date 07/17/19   Referring Physician Justyna   Patient/Family Goals Statement wants to go to TCU   Additional Occupational Profile Info/Pertinent History of Current Problem L3-L4 TRANSFORAMINAL LUMBAR  INTRABODY FUSION WITH ALLOGRAFT   Precautions/Limitations fall precautions;spinal precautions   Cognitive Status Examination   Orientation orientation to person, place and time   Level of Consciousness alert   Follows Commands (Cognition) WNL   Memory intact   Attention No deficits were identified   Visual Perception   Visual Perception Wears glasses   Sensory Examination   Sensory Quick Adds No deficits were identified   Pain Assessment   Patient  Currently in Pain Yes, see Vital Sign flowsheet  (3/10)   Range of Motion (ROM)   ROM Quick Adds No deficits were identified   Strength   Manual Muscle Testing Quick Adds No deficits were identified   Muscle Tone Assessment   Muscle Tone Quick Adds No deficits were identified   Coordination   Upper Extremity Coordination No deficits were identified   Transfer Skill: Sit to Stand   Level of [place]: Sit/Stand contact guard   Assistive Device for Transfer: Sit/Stand standard walker   Transfer Skill: Toilet Transfer   Level of [place]: Toilet minimum assist (75% patients effort)   Assistive Device standard walker   Upper Body Dressing   Level of [place]: Dress Upper Body moderate assist (50% patients effort)   Lower Body Dressing   Level of [place]: Dress Lower Body maximum assist (25% patients effort)   Toileting   Level of [place]: Toilet moderate assist (50% patients effort)   Grooming   Level of [place]: Grooming contact guard   Eating/Self Feeding   Level of [place]: Eating independent   Instrumental Activities of Daily Living (IADL)   IADL Comments spouse A with IADL's    Activities of Daily Living Analysis   Impairments Contributing to Impaired Activities of Daily Living pain;post surgical precautions   General Therapy Interventions   Planned Therapy Interventions ADL retraining;transfer training   Clinical Impression   Criteria for Skilled Therapeutic Interventions Met yes, treatment indicated   OT Diagnosis dec IND with ADL's and transfers   Influenced by the following impairments pain, post op precautions   Assessment of Occupational Performance 3-5 Performance Deficits   Identified Performance Deficits LE dressing, toileting, toilet transfer, UE dressing   Clinical Decision Making (Complexity) Low complexity   Therapy Frequency 3x/week   Predicted Duration of Therapy Intervention (days/wks) 5 days   Anticipated Discharge Disposition Transitional Care Facility   Risks and  "Benefits of Treatment have been explained. Yes   Patient, Family & other staff in agreement with plan of care Yes   [facility] TM \"6 Clicks\"   2016, Trustees of [facility] [place], under license to [email or web address].  All rights reserved.   6 Clicks Short Forms Daily Activity Inpatient Short Form   [facility]  \"6 Clicks\" Daily Activity Inpatient Short Form   1. Putting on and taking off regular lower body clothing? 2 - A Lot   2. Bathing (including washing, rinsing, drying)? 2 - A Lot   3. Toileting, which includes using toilet, bedpan or urinal? 2 - A Lot   4. Putting on and taking off regular upper body clothing? 3 - A Little   5. Taking care of personal grooming such as brushing teeth? 4 - None   6. Eating meals? 4 - None   Daily Activity Raw Score (Score out of 24.Lower scores equate to lower levels of function) 17   Total Evaluation Time   Total Evaluation Time (Minutes) 8     "

## 2019-07-19 NOTE — PLAN OF CARE
POD#2 of L3-L4 fusion. A&Ox4. VSS on RA. Up A1 with GB and walker. Regular diet, gluten free. . C/o pain with movement especially RLE, Pain management well controlled overnight with Dilaudid PO givenx3, effective and Robaxin givenx1, effective. CMS intact. Dressing covered, CDI. JARON drain patent, 50 output. IV-SL. Continue to monitor.

## 2019-07-19 NOTE — PROGRESS NOTES
[facility]    Neurosurgery  Daily Post-Op Note    Assessment & Plan   Procedure(s):  L3-L4 TRANSFORAMINAL LUMBAR  INTRABODY FUSION WITH ALLOGRAFT, OPTICAL TRACKING SYSTEM AND MEDTRONIC SOLARA INSTRUMENTATION   -2 Days Post-Op  Doing well.  Pain well-controlled.  Tolerating physical therapy and rehabilitation well.    Plan:  -Advance activity as tolerated  -Continue supportive and symptomatic treatment  -stay tonight, JARON with 50 mL overnight. TCU likely tomorrow am after JARON is out.   -orders in for f/u.     Alfredo Worthy    Interval History   Stable.  Doing well.  Improving slowly.  Pain is reasonably controlled.  No fevers.     Physical Exam   Temp: 98.9  F (37.2  C) Temp src: Oral BP: 108/55 Pulse: 75 Heart Rate: 98 Resp: 16 SpO2: 90 % O2 Device: None (Room air)    Vitals:    07/17/19 0900   Weight: 78 kg (172 lb)     Vital Signs with Ranges  Temp:  [98.9  F (37.2  C)-99.9  F (37.7  C)] 98.9  F (37.2  C)  Pulse:  [75] 75  Heart Rate:  [74-98] 98  Resp:  [16-18] 16  BP: (101-136)/(51-55) 108/55  SpO2:  [90 %-94 %] 90 %  I/O last 3 completed shifts:  In: 1609 [P.O.:240; I.V.:1369]  Out: 1370 [Urine:1125; Drains:245]    Alert and oriented.  Moves all extremities equally.    Incision: CDI. JARON with 50 mL overnight.       Medications     sodium chloride 75 mL/hr at 07/18/19 0551        acetaminophen  975 mg Oral Q8H     ceFAZolin  1 g Intravenous Q8H     isosorbide mononitrate  30 mg Oral Daily with breakfast     levothyroxine  75 mcg Oral QAM AC     liothyronine  5 mcg Oral Daily with breakfast     metoprolol tartrate  12.5 mg Oral BID     omeprazole  20 mg Oral QAM AC     senna-docusate  1 tablet Oral BID    Or     senna-docusate  2 tablet Oral BID     simvastatin  10 mg Oral At Bedtime     sodium chloride (PF)  3 mL Intracatheter Q8H             Alfredo XIAOC  [facility]

## 2019-07-20 NOTE — PROGRESS NOTES
MELISA      D: MELISA following for discharge needs. Received a letter from [employer] stating that patient's request to receive Post-Acute Care, SNF has been reviewed by medical director who is unable to approve the request. MELISA placed call to [employer] ([phone]) and spoke with Denise POC . Denise unable to confirm if PT/OT notes were received before decision was made on patient's request.   MELISA faxed most recent PT/OT notes to [employer] (fax # [phone])    P: Will continue to follow     Update    MELISA received notice of coverage approval from [facility]. Authorization number #[id number]. Provide auth # to [facility] admission. Faxed discharge orders via DOD. MELISA updated patient. Patient's spouse to transport patient at 1800. MELISA updated [facility] on transport time.

## 2019-07-20 NOTE — PLAN OF CARE
Pt here S/P L4-L5 spinal fusion, post op day 3. CMS intact, except for numbness in R+LUE fingertips-baseline per pt. Dressings clean, dry, intact. JARON patent. Up with Ax1 with GB and walker. Gf, regular diet. Passing gas, + bowel sounds, voiding fine. Dilaudid PO PRN X3 for pain and Robaxin X1. discharge to [facility] with  to transport possibly today.

## 2019-07-20 NOTE — DISCHARGE SUMMARY
[facility]    Discharge Summary  Neurosurgery    Date of Admission:  7/17/2019  Date of Discharge:  7/20/2019  Discharging Provider: Cole Jansen  Date of Service (when I saw the patient): 07/20/19    Discharge Diagnoses   Active Problems:    S/P lumbar spinal fusion      History of Present Illness   Elizabeth Li is an 84 year old female who presented with back and leg pain.    Hospital Course   Elizabeth Li was admitted on 7/17/2019.  The following problems were addressed during her hospitalization:    Active Problems:    S/P lumbar spinal fusion    Assessment: Recovering well    Plan: Routine post-op care        Significant Results and Procedures   L3-4 TLIF    Pending Results   These results will be followed up by myself  Unresulted Labs Ordered in the Past 30 Days of this Admission     No orders found from 6/17/2019 to 7/18/2019.          Code Status   Full Code    Primary Care Physician   Rebekah Rausch    Physical Exam   Temp: 98.5  F (36.9  C) Temp src: Oral BP: 130/62 Pulse: 76 Heart Rate: 80 Resp: 18 SpO2: 95 % O2 Device: None (Room air)    Vitals:    07/17/19 0900   Weight: 78 kg (172 lb)     Vital Signs with Ranges  Temp:  [97.3  F (36.3  C)-99.1  F (37.3  C)] 98.5  F (36.9  C)  Pulse:  [76-77] 76  Heart Rate:  [73-83] 80  Resp:  [14-18] 18  BP: (117-130)/(55-65) 130/62  SpO2:  [91 %-96 %] 95 %  I/O last 3 completed shifts:  In: 860 [P.O.:860]  Out: 1875 [Urine:1750; Drains:125]    HEENT:  Normocephalic, atraumatic.  PERRLA.  EOM s intact.  Visual fields full to gross exam  Neck:  Supple, non-tender, without lymphadenopathy.  Heart:  No peripheral edema  Lungs:  No SOB  Abdomen:  Non-distended.   Skin:  Warm and dry.  Extremities:  No edema, cyanosis or clubbing.    NEUROLOGICAL EXAMINATION:     Mental status:  Alert and Oriented x 3, speech is fluent.  Cranial nerves:  II-XII intact.   Motor:  Strength is 5/5 throughout the upper and lower extremities  Shoulder Abduction:   Right:  5/5   Left:  5/5  Biceps:                      Right:  5/5   Left:  5/5  Triceps:                     Right:  5/5   Left:  5/5  Wrist Extensors:       Right:  5/5   Left:  5/5  Wrist Flexors:           Right:  5/5   Left:  5/5  interosseus :            Right:  5/5   Left:  5/5   Hip Flexor:                Right: 5/5  Left:  5/5  Hip Adductor:             Right:  5/5  Left:  5/5  Hip Abductor:             Right:  5/5  Left:  5/5  Gastroc Soleus:        Right:  5/5  Left:  5/5  Tib/Ant:                      Right:  5/5  Left:  5/5  EHL:                     Right:  5/5  Left:  5/5  Sensation:  Intact  Reflexes:  Negative Babinski.  Negative Clonus.  Negative Good's.  Coordination:  Smooth finger to nose testing.   Negative pronator drift.  Smooth tandem walking  Incision c/d/i    Time Spent on this Encounter   I, Cloe Jansen, personally saw the patient today and spent greater than 30 minutes discharging this patient.    Discharge Disposition   Discharged to home  Condition at discharge: Good    Consultations This Hospital Stay   OCCUPATIONAL THERAPY ADULT IP CONSULT  PHYSICAL THERAPY ADULT IP CONSULT  CARE TRANSITION RN/SW IP CONSULT  PHYSICAL THERAPY ADULT IP CONSULT  OCCUPATIONAL THERAPY ADULT IP CONSULT    Discharge Orders      AntiEmbolism Stockings    Bilateral below knee length.On in the morning, off at night     General info for SNF    Length of Stay Estimate: Short Term Care: Estimated # of Days <30  Condition at Discharge: Improving  Level of care:skilled   Rehabilitation Potential: Good  Admission H&P remains valid and up-to-date: Yes  Recent Chemotherapy: N/A  Use Nursing Home Standing Orders: Yes     Mantoux instructions    Give two-step Mantoux (PPD) Per Facility Policy Yes     Reason for your hospital stay    Back surgery     Intake and output    Every shift     Wound care (specify)    Site:   Lumbar   Instructions:  Keep clean and dry. Do not soak or scrub. Ok to shower.     Additional  Discharge Instructions    Discharge instructions:  No lifting of more than 10 pounds until follow up visit.  Ok to shampoo hair, shower or bathe, but, do not scrub or submerge incision under water until evaluated post operatively in clinic.    Ok to walk as tolerated, avoid bedrest.    No contact sports until after follow up visit  No high impact activities such as; running/jogging, snowmobile or 4 foster riding or any other recreational vehicles    Call my office at [phone] for increasing redness, swelling or pus draining from the incision, increased pain or any other questions and concerns.    Go to ER with any seizure activity, mental status change (increasing confusion), difficulty with speech or increasing or acute weakness     Activity - Up ad roxanne     Follow Up and recommended labs and tests    F/u at 2 weeks post op for staple removal    [phone]     Physical Therapy Adult Consult    Evaluate and treat as clinically indicated.    Reason:  Post op lumbar fusion     Occupational Therapy Adult Consult    Evaluate and treat as clinically indicated.    Reason:  Post op lumbar fusion     Advance Diet as Tolerated    Follow this diet upon discharge: Advance to a regular diet as tolerated     Discharge Medications   Current Discharge Medication List      START taking these medications    Details   HYDROmorphone (DILAUDID) 2 MG tablet Take 1 tablet (2 mg) by mouth every 3 hours as needed for moderate to severe pain  Qty: 40 tablet, Refills: 0    Associated Diagnoses: S/P lumbar spinal fusion      hydrOXYzine (ATARAX) 10 MG tablet Take 1 tablet (10 mg) by mouth every 6 hours as needed for itching  Qty: 30 tablet, Refills: 1    Associated Diagnoses: S/P lumbar spinal fusion      methocarbamol (ROBAXIN) 750 MG tablet Take 1 tablet (750 mg) by mouth every 6 hours as needed for muscle spasms  Qty: 60 tablet, Refills: 1    Associated Diagnoses: S/P lumbar spinal fusion      senna-docusate (SENOKOT-S/PERICOLACE)  8.6-50 MG tablet Take 1 tablet by mouth daily as needed for constipation  Qty: 30 tablet, Refills: 1    Associated Diagnoses: S/P lumbar spinal fusion         CONTINUE these medications which have CHANGED    Details   aspirin (ASA) 81 MG tablet Take 1 tablet (81 mg) by mouth daily  Qty: 90 tablet, Refills: 3    Associated Diagnoses: Abnormal cardiovascular stress test         CONTINUE these medications which have NOT CHANGED    Details   acetaminophen (TYLENOL) 500 MG tablet Take 1,000 mg by mouth 3 times daily as needed for mild pain      ascorbic acid (VITAMIN C) 500 MG tablet Take 500 mg by mouth daily (with lunch) Pt takes in afternoon      Capsaicin-Methyl Nicotinate (ARTH ARREST EX) Externally apply topically daily as needed (to Knees)      cholecalciferol (VITAMIN D3) 1000 UNIT tablet Take 1,000 Units by mouth 2 times daily      CRANBERRY PO Take 1 tablet by mouth 2 times daily      isosorbide mononitrate (IMDUR) 30 MG 24 hr tablet Take 1 tablet (30 mg) by mouth daily  Qty: 90 tablet, Refills: 4    Comments: Profile Rx: patient will contact pharmacy when needed  Associated Diagnoses: Coronary artery disease involving native coronary artery of native heart without angina pectoris; Status post coronary angioplasty      ketoconazole (NIZORAL) 2 % shampoo Apply to the affected area and wash off after 5 minutes.  Qty: 120 mL, Refills: 1    Associated Diagnoses: Dandruff      levothyroxine (SYNTHROID/LEVOTHROID) 75 MCG tablet Take 1 tablet (75 mcg) by mouth every morning (before breakfast)  Qty: 90 tablet, Refills: 1    Associated Diagnoses: Acquired hypothyroidism      liothyronine (CYTOMEL) 5 MCG tablet Take 1 tablet (5 mcg) by mouth daily (with breakfast)  Qty: 90 tablet, Refills: 1    Associated Diagnoses: Acquired hypothyroidism      metoprolol tartrate (LOPRESSOR) 25 MG tablet TAKE ONE-HALF TABLET (12.5MG) BY MOUTH TWO TIMES A DAY  Qty: 90 tablet, Refills: 1    Associated Diagnoses: Benign essential  hypertension      omeprazole (PRILOSEC) 20 MG DR capsule TAKE ONE CAPSULE BY MOUTH ONCE DAILY  Qty: 90 capsule, Refills: 0    Associated Diagnoses: Gastroesophageal reflux disease without esophagitis      simvastatin (ZOCOR) 10 MG tablet Take 1 tablet (10 mg) by mouth At Bedtime  Qty: 90 tablet, Refills: 4    Associated Diagnoses: Hyperlipidemia LDL goal <100      !! order for DME Equipment being ordered: wheeled walker  Qty: 1 Device, Refills: 0    Associated Diagnoses: Lumbar degenerative disc disease; Spinal stenosis of lumbar region without neurogenic claudication; Facet arthropathy, lumbar; Primary osteoarthritis of right hip; Right lumbar radiculopathy      !! order for DME Equipment being ordered: CPAP       !! - Potential duplicate medications found. Please discuss with provider.      STOP taking these medications       ibuprofen (ADVIL/MOTRIN) 200 MG tablet Comments:   Reason for Stopping:             Allergies   Allergies   Allergen Reactions     Flu Virus Vaccine Other (See Comments)     Viral SX     Gluten Meal      Milk Protein Extract

## 2019-07-20 NOTE — PLAN OF CARE
Patient alert x 4, left leg pain improving, dilaudid PO for pain relief , up 1 assist GB/W, drain pulled and dressing changed, plan for patient to go to [facility] once approved by insurance.

## 2019-07-20 NOTE — PROGRESS NOTES
[facility]    Neurosurgery  Daily Post-Op Note    Assessment & Plan   Procedure(s):  L3-L4 TRANSFORAMINAL LUMBAR  INTRABODY FUSION WITH ALLOGRAFT, OPTICAL TRACKING SYSTEM AND MEDTRONIC SOLARA INSTRUMENTATION   -3 Days Post-Op  Doing well.  Pain well-controlled.  Tolerating physical therapy and rehabilitation well.    Plan:  -Advance activity as tolerated  -OK for discharge to TCU when bed available    Interval History   Stable.  Doing well.  Improving slowly.  Pain is reasonably controlled.  No fevers.     Physical Exam   Temp: 98.5  F (36.9  C) Temp src: Oral BP: 130/62 Pulse: 76 Heart Rate: 80 Resp: 18 SpO2: 95 % O2 Device: None (Room air)    Vitals:    07/17/19 0900   Weight: 78 kg (172 lb)     Vital Signs with Ranges  Temp:  [97.3  F (36.3  C)-99.1  F (37.3  C)] 98.5  F (36.9  C)  Pulse:  [76-77] 76  Heart Rate:  [73-83] 80  Resp:  [14-18] 18  BP: (117-130)/(55-65) 130/62  SpO2:  [91 %-96 %] 95 %  I/O last 3 completed shifts:  In: 860 [P.O.:860]  Out: 1875 [Urine:1750; Drains:125]    Alert and oriented.  Moves all extremities equally.    Incision: CDI.       Medications        acetaminophen  975 mg Oral Q8H     ceFAZolin  1 g Intravenous Q8H     isosorbide mononitrate  30 mg Oral Daily with breakfast     levothyroxine  75 mcg Oral QAM AC     liothyronine  5 mcg Oral Daily with breakfast     metoprolol tartrate  12.5 mg Oral BID     omeprazole  20 mg Oral QAM AC     senna-docusate  1 tablet Oral BID    Or     senna-docusate  2 tablet Oral BID     simvastatin  10 mg Oral At Bedtime     sodium chloride (PF)  3 mL Intracatheter Q8H

## 2019-07-20 NOTE — PLAN OF CARE
"Discharge Planner PT   Patient plan for discharge: \"I'm going to go to a rehab today probably\"  Current status: Pt progressing steadily with skilled PT intervention. Pt transferring supine<>sit SBA, sit<>stand SBA with 4WW and ambulates 240ft x2 with 4WW and CGA with progressed step-through gait pattern. Progressing to stair training up/down 3 with bilat rails and CGA.    Barriers to return to prior living situation: spouse only able to assist in limited amounts per pt, level of assist, pain  Recommendations for discharge: TCU  Rationale for recommendations: Pt would benefit from PT at TCU to progress strength, balance and mobility so pt can return home safely. Anticipate short stay as pt progressing well.       Entered by: Destiny Schreiber 07/20/2019 9:48 AM      "

## 2019-07-21 NOTE — PLAN OF CARE
Physical Therapy Discharge Summary    Reason for therapy discharge:    Discharged to transitional care facility.    Progress towards therapy goal(s). See goals on Care Plan in [facility] electronic health record for goal details.  Goals met    Therapy recommendation(s):    Continued therapy is recommended.  Rationale/Recommendations:  Goals met for inpatient status; will need further skilled PT intervention to progress to PLOF including independence with all transfers, gait and ADLs.

## 2019-07-21 NOTE — PLAN OF CARE
Occupational Therapy Discharge Summary    Reason for therapy discharge:    Discharged to transitional care facility.    Progress towards therapy goal(s). See goals on Care Plan in [facility] electronic health record for goal details.  Goals partially met.  Barriers to achieving goals:   discharge from facility.    Therapy recommendation(s):    Continued therapy is recommended.  Rationale/Recommendations:  at TCU.

## 2019-07-22 NOTE — PROGRESS NOTES
"[place] [facility]  PRIMARY CARE PROVIDER AND CLINIC:  Rebekah Rausch MD, [address] / [facility] [address]  Chief Complaint   Patient presents with     Hospital F/U     [place] Medical Record Number:  [id number]  Place of Service where encounter took place:  [facility] ([facility]) [[id number]]    Elizabeth Li  is a 84 year old  (1935), PMH of obesity, HTN, CAD, KUSUM, HLD, depression who presents for elective spinal fusion  admitted to the above facility from  [facility]. Hospital stay 7/17/19 through 7/20/19..  Admitted to this facility for  rehab, medical management and nursing care.    HPI:    HPI information obtained from: facility chart records, facility staff, patient report and [facility] chart review.   Brief Summary of Hospital Course:   Lumbar spinal stenosis s/p lumbar spinal fusion Dr. Jansen   Anemia: Hgb at discharge was 10.2, no transfusion required    Updates on Status Since Skilled nursing Admission: On exam today patient is alert, pleasant, just finished walking with therapy, states she was a \"little winded\" with walking, denies SOB at rest, cough, congestion, CP, papitations, states low back pain is rated 3/10 with movement, very little pain at rest, states she has some right thigh and lower leg \"aching\" but pain was in both legs and worse prior to surgery, denies fever, chills, N/V/D states she has not had a BM since surgery but has no c/o abdominal discomfort.     CODE STATUS/ADVANCE DIRECTIVES DISCUSSION:   CPR/Full code   Patient's living condition: lives alone  ALLERGIES: Flu virus vaccine; Gluten meal; and Milk protein extract  PAST MEDICAL HISTORY:  has a past medical history of Abnormal cardiovascular stress test, CAD (coronary artery disease), HTN (hypertension), Hyperlipidemia LDL goal <100, Hypothyroidism, IHD (ischemic heart disease), and KUSUM (obstructive sleep apnea).  PAST SURGICAL HISTORY:   has a past surgical history that " includes tubal ligation; Heart Cath, Angioplasty (3/27/15); colonoscopy; Cardiac surgery; tonsillectomy; Head and neck surgery; Colonoscopy (N/A, 1/11/2017); and Optical tracking system fusion spine posterior lumbar one level (N/A, 7/17/2019).  FAMILY HISTORY: family history includes Blood Disease in her mother; Heart Disease in her brother, father, and sister.  SOCIAL HISTORY:   reports that she quit smoking about 35 years ago. She has never used smokeless tobacco. She reports that she drinks alcohol. She reports that she does not use drugs.    Post Discharge Medication Reconciliation Status: discharge medications reconciled, continue medications without change    Current Outpatient Medications   Medication Sig Dispense Refill     acetaminophen (TYLENOL) 500 MG tablet Take 1,000 mg by mouth 3 times daily as needed for mild pain       ascorbic acid (VITAMIN C) 500 MG tablet Take 500 mg by mouth daily (with lunch) Pt takes in afternoon       [START ON 7/31/2019] aspirin (ASA) 81 MG tablet Take 1 tablet (81 mg) by mouth daily 90 tablet 3     Capsaicin-Methyl Nicotinate (ARTH ARREST EX) Externally apply topically daily as needed (to Knees)       cholecalciferol (VITAMIN D3) 1000 UNIT tablet Take 1,000 Units by mouth 2 times daily       CRANBERRY PO Take 1 tablet by mouth 2 times daily       HYDROmorphone (DILAUDID) 2 MG tablet Take 1 tablet (2 mg) by mouth every 3 hours as needed for moderate to severe pain 40 tablet 0     hydrOXYzine (ATARAX) 10 MG tablet Take 1 tablet (10 mg) by mouth every 6 hours as needed for itching 30 tablet 1     isosorbide mononitrate (IMDUR) 30 MG 24 hr tablet Take 1 tablet (30 mg) by mouth daily (Patient taking differently: Take 30 mg by mouth daily (with breakfast) ) 90 tablet 4     ketoconazole (NIZORAL) 2 % shampoo Apply to the affected area and wash off after 5 minutes. (Patient taking differently: Apply topically once a week Apply as a shampoo and then wash off after 5 minutes.) 120 mL  1     levothyroxine (SYNTHROID/LEVOTHROID) 75 MCG tablet Take 1 tablet (75 mcg) by mouth every morning (before breakfast) 90 tablet 1     liothyronine (CYTOMEL) 5 MCG tablet Take 1 tablet (5 mcg) by mouth daily (with breakfast) 90 tablet 1     methocarbamol (ROBAXIN) 750 MG tablet Take 1 tablet (750 mg) by mouth every 6 hours as needed for muscle spasms 60 tablet 1     metoprolol tartrate (LOPRESSOR) 25 MG tablet TAKE ONE-HALF TABLET (12.5MG) BY MOUTH TWO TIMES A DAY 90 tablet 1     omeprazole (PRILOSEC) 20 MG DR capsule TAKE ONE CAPSULE BY MOUTH ONCE DAILY 90 capsule 0     order for DME Equipment being ordered: wheeled walker 1 Device 0     order for DME Equipment being ordered: CPAP       senna-docusate (SENOKOT-S/PERICOLACE) 8.6-50 MG tablet Take 1 tablet by mouth daily as needed for constipation 30 tablet 1     simvastatin (ZOCOR) 10 MG tablet Take 1 tablet (10 mg) by mouth At Bedtime 90 tablet 4       ROS:  10 point ROS of systems including Constitutional, Eyes, Respiratory, Cardiovascular, Gastroenterology, Genitourinary, Integumentary, Musculoskeletal, Psychiatric were all negative except for pertinent positives noted in my HPI.    Vitals:  /73   Pulse 91   Temp 99.1  F (37.3  C)   Resp 19   Wt 80.7 kg (178 lb)   SpO2 94%   BMI 33.09 kg/m    Exam:  GENERAL APPEARANCE:  Alert, in no distress, morbidly obese  ENT:  Mouth and posterior oropharynx normal, moist mucous membranes, normal hearing acuity  EYES:  EOM, conjunctivae, lids, pupils and irises normal, PERRL  RESP:  respiratory effort and palpation of chest normal, lungs clear to auscultation , no respiratory distress  CV:  Palpation and auscultation of heart done , regular rate and rhythm, no murmur, rub, or gallop, peripheral edema trac+ in LE bilaterally  ABDOMEN:  normal bowel sounds, soft, nontender, no hepatosplenomegaly or other masses  M/S:   Examination of:   right upper extremity, left upper extremity, right lower extremity and left  lower extremity  Inspection, ROM, stability and muscle strength normal  SKIN:  Inspection of skin and subcutaneous tissue baseline, did not visualize posterior spine incision  NEURO:   Cranial nerves 2-12 are normal tested and grossly at patient's baseline, speech WNL  PSYCH:  affect and mood normal    Lab/Diagnostic data:  Recent labs in [facility] reviewed by me today.     ASSESSMENT/PLAN:  DDD (degenerative disc disease), lumbar  S/P lumbar spinal fusion  Obesity (BMI 35.0-39.9) with comorbidity (H)  Acute/ongoing: s/p lumbar fusion Dr. Jansen  PT and OT for strengthening, schedule tylenol 1000mg TID, continue dilaudid 2mg q 3 hours prn, robaxin 750mg q 6 hours prn, atarax 10mg q 6 hours prn,   F/u with Dr. Jansen as directed     Anemia due to blood loss, acute  Acute/ongoing: Hgb weekly, follow    Drug-induced constipation  Acute/ongoing: schedule senna s 1 PO QD and 1 PO BID prn, miralax 17gm QD prn    Benign essential hypertension  Coronary artery disease involving native coronary artery of native heart without angina pectoris  Acute/ongoing: vitals daily and prn, BMP follow, continue metoprolol 12.5mg BID, zocor 10mg qhs, ASA 81mg QD       Orders written by provider at facility  BMP and Hgb on Wed  Tylenol 1000mg TID scheduled  Senna s 1 PO QD scheduled and 1 PO BID prn  miralax 17gm QD prn      Total time spent with patient visit at the skilled nursing facility was 45 min including patient visit and review of past records. Greater than 50% of total time spent with counseling and coordinating care due to coordinating care with nurse on admission orders, coordinating care with follow up labs and appointments, the reason for their hospitalization and what the treatment is, the plan of SNF stay and projected length of stay, options of code status and their current code status order, current medication reconciled from the hospital and recent past lab and imaging results and subsequent treatment plan.  Electronically signed  by:  Tonya Lynn Haase, APRN CNP

## 2019-07-22 NOTE — LETTER
"    7/22/2019        RE: Elizabeth Li  [address]  [address]  [place] [address]        [place] [facility]  PRIMARY CARE PROVIDER AND CLINIC:  Rebekah Rausch MD, [address] / [facility] [address]  Chief Complaint   Patient presents with     Hospital F/U     [place] Medical Record Number:  [id number]  Place of Service where encounter took place:  [facility] ([facility] [[id number]]    Elizabeth Li  is a 84 year old  (1935), PMH of obesity, HTN, CAD, KUSUM, HLD, depression who presents for elective spinal fusion  admitted to the above facility from  [facility]. Hospital stay 7/17/19 through 7/20/19..  Admitted to this facility for  rehab, medical management and nursing care.    HPI:    HPI information obtained from: facility chart records, facility staff, patient report and [facility] chart review.   Brief Summary of Hospital Course:   Lumbar spinal stenosis s/p lumbar spinal fusion Dr. Jansen   Anemia: Hgb at discharge was 10.2, no transfusion required    Updates on Status Since Skilled nursing Admission: On exam today patient is alert, pleasant, just finished walking with therapy, states she was a \"little winded\" with walking, denies SOB at rest, cough, congestion, CP, papitations, states low back pain is rated 3/10 with movement, very little pain at rest, states she has some right thigh and lower leg \"aching\" but pain was in both legs and worse prior to surgery, denies fever, chills, N/V/D states she has not had a BM since surgery but has no c/o abdominal discomfort.     CODE STATUS/ADVANCE DIRECTIVES DISCUSSION:   CPR/Full code   Patient's living condition: lives alone  ALLERGIES: Flu virus vaccine; Gluten meal; and Milk protein extract  PAST MEDICAL HISTORY:  has a past medical history of Abnormal cardiovascular stress test, CAD (coronary artery disease), HTN (hypertension), Hyperlipidemia LDL goal <100, Hypothyroidism, IHD (ischemic heart " disease), and KUSUM (obstructive sleep apnea).  PAST SURGICAL HISTORY:   has a past surgical history that includes tubal ligation; Heart Cath, Angioplasty (3/27/15); colonoscopy; Cardiac surgery; tonsillectomy; Head and neck surgery; Colonoscopy (N/A, 1/11/2017); and Optical tracking system fusion spine posterior lumbar one level (N/A, 7/17/2019).  FAMILY HISTORY: family history includes Blood Disease in her mother; Heart Disease in her brother, father, and sister.  SOCIAL HISTORY:   reports that she quit smoking about 35 years ago. She has never used smokeless tobacco. She reports that she drinks alcohol. She reports that she does not use drugs.    Post Discharge Medication Reconciliation Status: discharge medications reconciled, continue medications without change    Current Outpatient Medications   Medication Sig Dispense Refill     acetaminophen (TYLENOL) 500 MG tablet Take 1,000 mg by mouth 3 times daily as needed for mild pain       ascorbic acid (VITAMIN C) 500 MG tablet Take 500 mg by mouth daily (with lunch) Pt takes in afternoon       [START ON 7/31/2019] aspirin (ASA) 81 MG tablet Take 1 tablet (81 mg) by mouth daily 90 tablet 3     Capsaicin-Methyl Nicotinate (ARTH ARREST EX) Externally apply topically daily as needed (to Knees)       cholecalciferol (VITAMIN D3) 1000 UNIT tablet Take 1,000 Units by mouth 2 times daily       CRANBERRY PO Take 1 tablet by mouth 2 times daily       HYDROmorphone (DILAUDID) 2 MG tablet Take 1 tablet (2 mg) by mouth every 3 hours as needed for moderate to severe pain 40 tablet 0     hydrOXYzine (ATARAX) 10 MG tablet Take 1 tablet (10 mg) by mouth every 6 hours as needed for itching 30 tablet 1     isosorbide mononitrate (IMDUR) 30 MG 24 hr tablet Take 1 tablet (30 mg) by mouth daily (Patient taking differently: Take 30 mg by mouth daily (with breakfast) ) 90 tablet 4     ketoconazole (NIZORAL) 2 % shampoo Apply to the affected area and wash off after 5 minutes. (Patient taking  differently: Apply topically once a week Apply as a shampoo and then wash off after 5 minutes.) 120 mL 1     levothyroxine (SYNTHROID/LEVOTHROID) 75 MCG tablet Take 1 tablet (75 mcg) by mouth every morning (before breakfast) 90 tablet 1     liothyronine (CYTOMEL) 5 MCG tablet Take 1 tablet (5 mcg) by mouth daily (with breakfast) 90 tablet 1     methocarbamol (ROBAXIN) 750 MG tablet Take 1 tablet (750 mg) by mouth every 6 hours as needed for muscle spasms 60 tablet 1     metoprolol tartrate (LOPRESSOR) 25 MG tablet TAKE ONE-HALF TABLET (12.5MG) BY MOUTH TWO TIMES A DAY 90 tablet 1     omeprazole (PRILOSEC) 20 MG DR capsule TAKE ONE CAPSULE BY MOUTH ONCE DAILY 90 capsule 0     order for DME Equipment being ordered: wheeled walker 1 Device 0     order for DME Equipment being ordered: CPAP       senna-docusate (SENOKOT-S/PERICOLACE) 8.6-50 MG tablet Take 1 tablet by mouth daily as needed for constipation 30 tablet 1     simvastatin (ZOCOR) 10 MG tablet Take 1 tablet (10 mg) by mouth At Bedtime 90 tablet 4       ROS:  10 point ROS of systems including Constitutional, Eyes, Respiratory, Cardiovascular, Gastroenterology, Genitourinary, Integumentary, Musculoskeletal, Psychiatric were all negative except for pertinent positives noted in my HPI.    Vitals:  /73   Pulse 91   Temp 99.1  F (37.3  C)   Resp 19   Wt 80.7 kg (178 lb)   SpO2 94%   BMI 33.09 kg/m     Exam:  GENERAL APPEARANCE:  Alert, in no distress, morbidly obese  ENT:  Mouth and posterior oropharynx normal, moist mucous membranes, normal hearing acuity  EYES:  EOM, conjunctivae, lids, pupils and irises normal, PERRL  RESP:  respiratory effort and palpation of chest normal, lungs clear to auscultation , no respiratory distress  CV:  Palpation and auscultation of heart done , regular rate and rhythm, no murmur, rub, or gallop, peripheral edema trac+ in LE bilaterally  ABDOMEN:  normal bowel sounds, soft, nontender, no hepatosplenomegaly or other  masses  M/S:   Examination of:   right upper extremity, left upper extremity, right lower extremity and left lower extremity  Inspection, ROM, stability and muscle strength normal  SKIN:  Inspection of skin and subcutaneous tissue baseline, did not visualize posterior spine incision  NEURO:   Cranial nerves 2-12 are normal tested and grossly at patient's baseline, speech WNL  PSYCH:  affect and mood normal    Lab/Diagnostic data:  Recent labs in [facility] reviewed by me today.     ASSESSMENT/PLAN:  DDD (degenerative disc disease), lumbar  S/P lumbar spinal fusion  Obesity (BMI 35.0-39.9) with comorbidity (H)  Acute/ongoing: s/p lumbar fusion Dr. Jansen  PT and OT for strengthening, schedule tylenol 1000mg TID, continue dilaudid 2mg q 3 hours prn, robaxin 750mg q 6 hours prn, atarax 10mg q 6 hours prn,   F/u with Dr. Jansen as directed     Anemia due to blood loss, acute  Acute/ongoing: Hgb weekly, follow    Drug-induced constipation  Acute/ongoing: schedule senna s 1 PO QD and 1 PO BID prn, miralax 17gm QD prn    Benign essential hypertension  Coronary artery disease involving native coronary artery of native heart without angina pectoris  Acute/ongoing: vitals daily and prn, BMP follow, continue metoprolol 12.5mg BID, zocor 10mg qhs, ASA 81mg QD       Orders written by provider at facility  BMP and Hgb on Wed  Tylenol 1000mg TID scheduled  Senna s 1 PO QD scheduled and 1 PO BID prn  miralax 17gm QD prn      Total time spent with patient visit at the skilled nursing facility was 45 min including patient visit and review of past records. Greater than 50% of total time spent with counseling and coordinating care due to coordinating care with nurse on admission orders, coordinating care with follow up labs and appointments, the reason for their hospitalization and what the treatment is, the plan of SNF stay and projected length of stay, options of code status and their current code status order, current medication reconciled  from the hospital and recent past lab and imaging results and subsequent treatment plan.  Electronically signed by:  Tonya Lynn Haase, APRN CNP                         Sincerely,        Tonya Lynn Haase, APRN CNP

## 2019-07-28 NOTE — PROGRESS NOTES
[place] [facility]  PRIMARY CARE PROVIDER AND CLINIC:  Rebekah Rausch MD, [address] / [facility] [address]      Patient was seen by Dr. Domingo at the [facility] on July 27, 2019, for an initial TCU visit.      Patient is a 84 year old  (1935), PMH of obesity, HTN, CAD, KUSUM, who underwent elective L3-L4 transforaminal lumbar interbody fusion with allograft at [facility] on July 17, 2019.     HLD, depression who presents for elective spinal fusion  admitted to the above facility from  [facility]. Hospital stay 7/17/19 through 7/20/19..  Admitted to this facility for  rehab, medical management and nursing care.    Postoperative course was medically uncomplicated.  Hemoglobin is 10.2 the time of discharge.    Patient reports feeling fairly well.  Her last day or so she is developed left lateral hip and thigh pain which was not present during her hospitalization.  She is progressing in therapy, and ablating greater distances.  She notes good strength in both legs.  She denies bowel or bladder difficulties.  She has been constipated postop but has had bowel movement since admission to the TCU.    She denies cough, chest pain, shortness of breath, abdominal pain, nausea, fevers, chills, dysuria.      CODE STATUS/ADVANCE DIRECTIVES DISCUSSION:   CPR/Full code   Patient's living condition: lives alone  ALLERGIES: Flu virus vaccine; Gluten meal; and Milk protein extract  PAST MEDICAL HISTORY:  has a past medical history of Abnormal cardiovascular stress test, CAD (coronary artery disease), HTN (hypertension), Hyperlipidemia LDL goal <100, Hypothyroidism, IHD (ischemic heart disease), and KUSUM (obstructive sleep apnea).  PAST SURGICAL HISTORY:   has a past surgical history that includes tubal ligation; Heart Cath, Angioplasty (3/27/15); colonoscopy; Cardiac surgery; tonsillectomy; Head and neck surgery; Colonoscopy (N/A, 1/11/2017); and Optical tracking  system fusion spine posterior lumbar one level (N/A, 7/17/2019).  FAMILY HISTORY: family history includes Blood Disease in her mother; Heart Disease in her brother, father, and sister.  SOCIAL HISTORY:   reports that she quit smoking about 35 years ago. She has never used smokeless tobacco. She reports that she drinks alcohol. She reports that she does not use drugs.      Current Outpatient Medications   Medication Sig Dispense Refill     acetaminophen (TYLENOL) 500 MG tablet Take 1,000 mg by mouth 3 times daily as needed for mild pain       ascorbic acid (VITAMIN C) 500 MG tablet Take 500 mg by mouth daily (with lunch) Pt takes in afternoon       [START ON 7/31/2019] aspirin (ASA) 81 MG tablet Take 1 tablet (81 mg) by mouth daily 90 tablet 3     Capsaicin-Methyl Nicotinate (ARTH ARREST EX) Externally apply topically daily as needed (to Knees)       cholecalciferol (VITAMIN D3) 1000 UNIT tablet Take 1,000 Units by mouth 2 times daily       CRANBERRY PO Take 1 tablet by mouth 2 times daily       HYDROmorphone (DILAUDID) 2 MG tablet Take 1 tablet (2 mg) by mouth every 3 hours as needed for moderate to severe pain 40 tablet 0     hydrOXYzine (ATARAX) 10 MG tablet Take 1 tablet (10 mg) by mouth every 6 hours as needed for itching 30 tablet 1     isosorbide mononitrate (IMDUR) 30 MG 24 hr tablet Take 1 tablet (30 mg) by mouth daily (Patient taking differently: Take 30 mg by mouth daily (with breakfast) ) 90 tablet 4     ketoconazole (NIZORAL) 2 % shampoo Apply to the affected area and wash off after 5 minutes. (Patient taking differently: Apply topically once a week Apply as a shampoo and then wash off after 5 minutes.) 120 mL 1     levothyroxine (SYNTHROID/LEVOTHROID) 75 MCG tablet Take 1 tablet (75 mcg) by mouth every morning (before breakfast) 90 tablet 1     liothyronine (CYTOMEL) 5 MCG tablet Take 1 tablet (5 mcg) by mouth daily (with breakfast) 90 tablet 1     methocarbamol (ROBAXIN) 750 MG tablet Take 1 tablet  (750 mg) by mouth every 6 hours as needed for muscle spasms 60 tablet 1     metoprolol tartrate (LOPRESSOR) 25 MG tablet TAKE ONE-HALF TABLET (12.5MG) BY MOUTH TWO TIMES A DAY 90 tablet 1     omeprazole (PRILOSEC) 20 MG DR capsule TAKE ONE CAPSULE BY MOUTH ONCE DAILY 90 capsule 0     order for DME Equipment being ordered: wheeled walker 1 Device 0     order for DME Equipment being ordered: CPAP       senna-docusate (SENOKOT-S/PERICOLACE) 8.6-50 MG tablet Take 1 tablet by mouth daily as needed for constipation 30 tablet 1     simvastatin (ZOCOR) 10 MG tablet Take 1 tablet (10 mg) by mouth At Bedtime 90 tablet 4       ROS:  10 point ROS negative except as noted above      Exam:  GENERAL APPEARANCE:  Alert, in no distress, sitting up in bed  ENT: No oral lesions  EYES: No eye redness or drainage  RESP: Lungs clear  CV: RRR  ABDOMEN: Soft, nontender, nondistended, obese  M/S: No lower extremity edema  SKIN:spine incision was not examined by me  NEURO: Alert, fully oriented.  Face symmetric.  Lower extremity strength intact as assessed with patient lying in bed.  PSYCH:  affect and mood normal    Hemoglobin 7/24/2019 was 9.5, BMP normal      ASSESSMENT/PLAN:  DDD (degenerative disc disease), lumbar  S/P lumbar spinal fusion  Patient is doing well overall.  She has developed left lateral hip and thigh pain without symptoms or signs of motor weakness.  Plan continue therapies.  Monitor pain, monitor progress in therapy.  Continue Dilaudid and Robaxin.  Neurosurgery follow-up.  Bowel regimen    Anemia due to blood loss, acute  Asymptomatic  Plan: Monitor hemoglobin, symptoms    Benign essential hypertension  Coronary artery disease involving native coronary artery of native heart without angina pectoris  CV status appears stable  Plan: Continue beta-blocker, aspirin, Zocor.  Monitor blood pressure and heart rate at rest with activity.          Nikita Domingo MD

## 2019-07-31 NOTE — PROGRESS NOTES
[facility] DISCHARGE SUMMARY  PATIENT'S NAME: Elizabeth Li  YOB: 1935  MEDICAL RECORD NUMBER:  [id number]  Place of Service where encounter took place:  [facility] ([facility]) [[id number]]    PRIMARY CARE PROVIDER AND CLINIC RESPONSIBLE AFTER TRANSFER:   Rebekah Rausch MD, [address] [facility] [address]    [facility]     Transferring providers: Tonya Lynn Haase, APRN CNP, Dr. Jose L MD  Recent Hospitalization/ED:  [facility] stay 7/17/19 to 7/20/19.  Date of SNF Admission: July / 20 / 2019  Date of SNF (anticipated) Discharge: August / 02 / 2019  Discharged to: previous independent home  Cognitive Scores: BIMS=15/15, SBT=4/28  Physical Function: Ambulating > 150 feet using a RW ft with indep  DME: Walker    CODE STATUS/ADVANCE DIRECTIVES DISCUSSION:  Full Code     ALLERGIES: Flu virus vaccine; Gluten meal; and Milk protein extract    DISCHARGE DIAGNOSIS/NURSING FACILITY COURSE:   Patient progressed in therapy to walking > 150 feet using a RW indep, indep with ADL's will DC home with home PT, OT and HHA through [facility].     Past Medical History:  has a past medical history of Abnormal cardiovascular stress test, CAD (coronary artery disease), HTN (hypertension), Hyperlipidemia LDL goal <100, Hypothyroidism, IHD (ischemic heart disease), and KUSUM (obstructive sleep apnea).    Discharge Medications:  Current Outpatient Medications   Medication Sig Dispense Refill     acetaminophen (TYLENOL) 500 MG tablet Take 1,000 mg by mouth 3 times daily        ascorbic acid (VITAMIN C) 500 MG tablet Take 500 mg by mouth daily (with lunch) Pt takes in afternoon       aspirin (ASA) 81 MG tablet Take 1 tablet (81 mg) by mouth daily 90 tablet 3     cholecalciferol (VITAMIN D3) 1000 UNIT tablet Take 1,000 Units by mouth 2 times daily       HYDROmorphone (DILAUDID) 2 MG tablet Take 1 tablet (2 mg) by mouth every 3 hours as needed for moderate to severe pain  40 tablet 0     isosorbide mononitrate (IMDUR) 30 MG 24 hr tablet Take 1 tablet (30 mg) by mouth daily (Patient taking differently: Take 30 mg by mouth daily (with breakfast) ) 90 tablet 4     levothyroxine (SYNTHROID/LEVOTHROID) 75 MCG tablet Take 1 tablet (75 mcg) by mouth every morning (before breakfast) 90 tablet 1     liothyronine (CYTOMEL) 5 MCG tablet Take 1 tablet (5 mcg) by mouth daily (with breakfast) 90 tablet 1     methocarbamol (ROBAXIN) 750 MG tablet Take 1 tablet (750 mg) by mouth every 6 hours as needed for muscle spasms 60 tablet 1     metoprolol tartrate (LOPRESSOR) 25 MG tablet TAKE ONE-HALF TABLET (12.5MG) BY MOUTH TWO TIMES A DAY 90 tablet 1     omeprazole (PRILOSEC) 20 MG DR capsule TAKE ONE CAPSULE BY MOUTH ONCE DAILY 90 capsule 0     order for DME Equipment being ordered: wheeled walker 1 Device 0     order for DME Equipment being ordered: CPAP       polyethylene glycol (MIRALAX/GLYCOLAX) packet Take 1 packet by mouth 4 times daily as needed for constipation       sennosides (SENOKOT) 8.6 MG tablet Give 1 tablet by mouth in the morning for CONSTIPATION AND Give 1 tablet by mouth as needed for CONSTIPATION BID PRN       simvastatin (ZOCOR) 10 MG tablet Take 1 tablet (10 mg) by mouth At Bedtime 90 tablet 4     Capsaicin-Methyl Nicotinate (ARTH ARREST EX) Externally apply topically daily as needed (to Knees)       CRANBERRY PO Take 1 tablet by mouth 2 times daily       hydrOXYzine (ATARAX) 10 MG tablet Take 1 tablet (10 mg) by mouth every 6 hours as needed for itching (Patient not taking: Reported on 7/31/2019) 30 tablet 1     ketoconazole (NIZORAL) 2 % shampoo Apply to the affected area and wash off after 5 minutes. (Patient not taking: Reported on 7/31/2019) 120 mL 1     senna-docusate (SENOKOT-S/PERICOLACE) 8.6-50 MG tablet Take 1 tablet by mouth daily as needed for constipation (Patient not taking: Reported on 7/31/2019) 30 tablet 1       Medication Changes/Rationale:     No medication  changes made during TCU stay    Controlled medications sent with patient:   not applicable/none     ROS:   10 point ROS of systems including Constitutional, Eyes, Respiratory, Cardiovascular, Gastroenterology, Genitourinary, Integumentary, Musculoskeletal, Psychiatric were all negative except for pertinent positives noted in my HPI.    Physical Exam:   Vitals: BP (!) 141/72   Pulse 81   Temp 97.1  F (36.2  C)   Resp 16   Wt 77.7 kg (171 lb 4.8 oz)   SpO2 94%   BMI 31.84 kg/m    BMI= Body mass index is 31.84 kg/m .  Exam:  GENERAL APPEARANCE:  Alert, in no distress, morbidly obese  ENT:  Mouth and posterior oropharynx normal, moist mucous membranes, normal hearing acuity  EYES:  EOM, conjunctivae, lids, pupils and irises normal, PERRL  RESP:  respiratory effort and palpation of chest normal, lungs clear to auscultation , no respiratory distress  CV:  Palpation and auscultation of heart done , regular rate and rhythm, no murmur, rub, or gallop, peripheral edema trace+ in LE bilaterally  ABDOMEN:  normal bowel sounds, soft, nontender, no hepatosplenomegaly or other masses  M/S:   Examination of:   right upper extremity, left upper extremity, right lower extremity and left lower extremity  Inspection, ROM, stability and muscle strength normal  SKIN:  Inspection of skin and subcutaneous tissue baseline, did not visualize posterior spine incision  NEURO:   Cranial nerves 2-12 are normal tested and grossly at patient's baseline, speech WNL  PSYCH:  affect and mood normal     SNF labs: Recent labs in [facility] reviewed by me today.       ASSESSMENT/PLAN:  DDD (degenerative disc disease), lumbar  S/P lumbar spinal fusion  Obesity (BMI 35.0-39.9) with comorbidity (H)  Acute/ongoing: s/p lumbar fusion Dr. Jansen  DC home with home PT, OT and HHA through [facility], continue tylenol 1000mg q 6 hours prn, continue dilaudid 2mg q 3 hours prn (patient instructed to get a prescription from surgeon at f/u on 8/2/19), robaxin 750mg q 6  hours prn Disp 20 only), DC atarax  F/u with Dr. Jansen on 8/2/19      Anemia due to blood loss, acute  Acute/ongoing: Hgb stable f/u with PCP     Drug-induced constipation  Acute/ongoing: continue senna s 1 PO QD and 1 PO BID prn, miralax 17gm QD prn     Benign essential hypertension  Coronary artery disease involving native coronary artery of native heart without angina pectoris  Acute/ongoing:  continue metoprolol 12.5mg BID, zocor 10mg qhs, ASA 81mg QD       DISCHARGE PLAN:    Follow up labs: No labs orders/due    Medical Follow Up:      Follow up with primary care provider in 1-2 weeks    MTM referral needed and placed by this provider: No    Current [place] scheduled appointments:  Next 5 appointments (look out 90 days)    Aug 02, 2019 10:30 AM CDT  Return Visit with  Neuro Nurse  [facility] ([facility]) [address] [place] [address]  [phone]   Aug 08, 2019 10:20 AM CDT  SHORT with Bethany Seth NP  [facility] [address]  [facility] [address]  [phone]   Aug 26, 2019  3:00 PM CDT  Return Visit with Annabelle Leon PA-C  [facility] ([facility]) [address] [place] [address] [address]  [facility] [address]  [phone]   Oct 14, 2019  1:30 PM CDT  Return Visit with ISMAEL [facility] ([facility]) [address]  [facility] [address]  [phone] OPT 2   Oct 21, 2019 10:00 AM CDT  Return Visit with Annabelle Leon PA-C  [facility] ([facility]) [address] [place] [address] [address]  [facility] [address]  [phone]           Discharge Services: Home Care:  Occupational Therapy, Physical Therapy and Home Health Aide    Discharge Instructions Verbalized to Patient at Discharge:         TOTAL  DISCHARGE TIME:   Greater than 30 minutes  Electronically signed by:  Tonya Lynn Haase, APRN CNP

## 2019-08-01 NOTE — LETTER
8/1/2019        RE: Elizabeth Li  [address]  [place] [address]        [facility] [facility] DISCHARGE SUMMARY  PATIENT'S NAME: Elizabeth Li  YOB: 1935  MEDICAL RECORD NUMBER:  [id number]  Place of Service where encounter took place:  [facility] ([facility]) [[id number]]    PRIMARY CARE PROVIDER AND CLINIC RESPONSIBLE AFTER TRANSFER:   Rebekah Rausch MD, [address] / [facility] [address]    [facility]     Transferring providers: Tonya Lynn Haase, ISMAEL GARCIA, Dr. Jose L MD  Recent Hospitalization/ED:  [facility] stay 7/17/19 to 7/20/19.  Date of SNF Admission: July / 20 / 2019  Date of SNF (anticipated) Discharge: August / 02 / 2019  Discharged to: previous independent home  Cognitive Scores: BIMS=15/15, SBT=4/28  Physical Function: Ambulating > 150 feet using a RW ft with indep  DME: Walker    CODE STATUS/ADVANCE DIRECTIVES DISCUSSION:  Full Code     ALLERGIES: Flu virus vaccine; Gluten meal; and Milk protein extract    DISCHARGE DIAGNOSIS/NURSING FACILITY COURSE:   Patient progressed in therapy to walking > 150 feet using a RW indep, indep with ADL's will DC home with home PT, OT and HHA through [facility].     Past Medical History:  has a past medical history of Abnormal cardiovascular stress test, CAD (coronary artery disease), HTN (hypertension), Hyperlipidemia LDL goal <100, Hypothyroidism, IHD (ischemic heart disease), and KUSUM (obstructive sleep apnea).    Discharge Medications:  Current Outpatient Medications   Medication Sig Dispense Refill     acetaminophen (TYLENOL) 500 MG tablet Take 1,000 mg by mouth 3 times daily        ascorbic acid (VITAMIN C) 500 MG tablet Take 500 mg by mouth daily (with lunch) Pt takes in afternoon       aspirin (ASA) 81 MG tablet Take 1 tablet (81 mg) by mouth daily 90 tablet 3     cholecalciferol (VITAMIN D3) 1000 UNIT tablet Take 1,000 Units by mouth 2 times daily       HYDROmorphone  (DILAUDID) 2 MG tablet Take 1 tablet (2 mg) by mouth every 3 hours as needed for moderate to severe pain 40 tablet 0     isosorbide mononitrate (IMDUR) 30 MG 24 hr tablet Take 1 tablet (30 mg) by mouth daily (Patient taking differently: Take 30 mg by mouth daily (with breakfast) ) 90 tablet 4     levothyroxine (SYNTHROID/LEVOTHROID) 75 MCG tablet Take 1 tablet (75 mcg) by mouth every morning (before breakfast) 90 tablet 1     liothyronine (CYTOMEL) 5 MCG tablet Take 1 tablet (5 mcg) by mouth daily (with breakfast) 90 tablet 1     methocarbamol (ROBAXIN) 750 MG tablet Take 1 tablet (750 mg) by mouth every 6 hours as needed for muscle spasms 60 tablet 1     metoprolol tartrate (LOPRESSOR) 25 MG tablet TAKE ONE-HALF TABLET (12.5MG) BY MOUTH TWO TIMES A DAY 90 tablet 1     omeprazole (PRILOSEC) 20 MG DR capsule TAKE ONE CAPSULE BY MOUTH ONCE DAILY 90 capsule 0     order for DME Equipment being ordered: [employer] walker 1 Device 0     order for DME Equipment being ordered: CPAP       polyethylene glycol (MIRALAX/GLYCOLAX) packet Take 1 packet by mouth 4 times daily as needed for constipation       sennosides (SENOKOT) 8.6 MG tablet Give 1 tablet by mouth in the morning for CONSTIPATION AND Give 1 tablet by mouth as needed for CONSTIPATION BID PRN       simvastatin (ZOCOR) 10 MG tablet Take 1 tablet (10 mg) by mouth At Bedtime 90 tablet 4     Capsaicin-Methyl Nicotinate (ARTH ARREST EX) Externally apply topically daily as needed (to Knees)       CRANBERRY PO Take 1 tablet by mouth 2 times daily       hydrOXYzine (ATARAX) 10 MG tablet Take 1 tablet (10 mg) by mouth every 6 hours as needed for itching (Patient not taking: Reported on 7/31/2019) 30 tablet 1     ketoconazole (NIZORAL) 2 % shampoo Apply to the affected area and wash off after 5 minutes. (Patient not taking: Reported on 7/31/2019) 120 mL 1     senna-docusate (SENOKOT-S/PERICOLACE) 8.6-50 MG tablet Take 1 tablet by mouth daily as needed for constipation (Patient  not taking: Reported on 7/31/2019) 30 tablet 1       Medication Changes/Rationale:     No medication changes made during TCU stay    Controlled medications sent with patient:   not applicable/none     ROS:   10 point ROS of systems including Constitutional, Eyes, Respiratory, Cardiovascular, Gastroenterology, Genitourinary, Integumentary, Musculoskeletal, Psychiatric were all negative except for pertinent positives noted in my HPI.    Physical Exam:   Vitals: BP (!) 141/72   Pulse 81   Temp 97.1  F (36.2  C)   Resp 16   Wt 77.7 kg (171 lb 4.8 oz)   SpO2 94%   BMI 31.84 kg/m     BMI= Body mass index is 31.84 kg/m .  Exam:  GENERAL APPEARANCE:  Alert, in no distress, morbidly obese  ENT:  Mouth and posterior oropharynx normal, moist mucous membranes, normal hearing acuity  EYES:  EOM, conjunctivae, lids, pupils and irises normal, PERRL  RESP:  respiratory effort and palpation of chest normal, lungs clear to auscultation , no respiratory distress  CV:  Palpation and auscultation of heart done , regular rate and rhythm, no murmur, rub, or gallop, peripheral edema trac e+ in LE bilaterally  ABDOMEN:  normal bowel sounds, soft, nontender, no hepatosplenomegaly or other masses  M/S:   Examination of:   right upper extremity, left upper extremity, right lower extremity and left lower extremity  Inspection, ROM, stability and muscle strength normal  SKIN:  Inspection of skin and subcutaneous tissue baseline, did not visualize posterior spine incision  NEURO:   Cranial nerves 2-12 are normal tested and grossly at patient's baseline, speech WNL  PSYCH:  affect and mood normal     SNF labs: Recent labs in [facility] reviewed by me today.       ASSESSMENT/PLAN:  DDD (degenerative disc disease), lumbar  S/P lumbar spinal fusion  Obesity (BMI 35.0-39.9) with comorbidity (H)  Acute/ongoing: s/p lumbar fusion Dr. Jansen  DC home with home PT, OT and HHA through [facility], continue tylenol 1000mg q 6 hours prn, continue dilaudid 2mg q 3  hours prn (patient instructed to get a prescription from surgeon at f/u on 8/2/19), robaxin 750mg q 6 hours prn Disp 20 only), DC atarax  F/u with Dr. Jansen on 8/2/19      Anemia due to blood loss, acute  Acute/ongoing: Hgb stable f/u with PCP     Drug-induced constipation  Acute/ongoing: continue senna s 1 PO QD and 1 PO BID prn, miralax 17gm QD prn     Benign essential hypertension  Coronary artery disease involving native coronary artery of native heart without angina pectoris  Acute/ongoing:  continue metoprolol 12.5mg BID, zocor 10mg qhs, ASA 81mg QD       DISCHARGE PLAN:    Follow up labs: No labs orders/due    Medical Follow Up:      Follow up with primary care provider in 1-2 weeks    MTM referral needed and placed by this provider: No    Current [place] scheduled appointments:  Next 5 appointments (look out 90 days)    Aug 02, 2019 10:30 AM CDT  Return Visit with  Neuro Nurse  [facility] ([facility]) [address] [place] [address] [address]  [facility] [address]  [phone]   Aug 08, 2019 10:20 AM CDT  SHORT with Bethany Seth NP  [facility] ([facility]) [address]  [facility] [id number] [phone]   Aug 26, 2019  3:00 PM CDT  Return Visit with Annabelle Leon PA-C  [facility] ([facility]) [address]  [facility] [address] [phone]   Oct 14, 2019  1:30 PM CDT  Return Visit with ISMAEL Franco [facility] ([facility]) [address]  [facility] [address] [phone] OPT 2   Oct 21, 2019 10:00 AM CDT  Return Visit with Annabelle Leon PA-C  [facility] ([facility]) [address]  [facility] [address] [phone]           Discharge Services: Home Care:  Occupational Therapy, Physical Therapy  and Home Health Aide    Discharge Instructions Verbalized to Patient at Discharge:         TOTAL DISCHARGE TIME:   Greater than 30 minutes  Electronically signed by:  Tonya Lynn Haase, APRN CNP                         Sincerely,        Tonya Lynn Haase, APRN CNP

## 2019-08-02 NOTE — PATIENT INSTRUCTIONS
- Keep your incision clean and dry at all times. Steri strips applied and should remain in place for 7-10 days    -No bathing, swimming, or submerging in water until incision is well healed.  Call clinic or go to Emergency Room if you develop any new pain, drainage, swelling, or fever.    - No lifting greater than 10-15 pounds. No bending, twisting, or overhead reaching.    - Return on 8/26/19 for follow up appointment and xray    -Dilaudid refilled today     -Please call clinic with any further questions or concerns: [phone]    Maribel Sultana RN  [facility]  [address] [address]    Tel [phone]

## 2019-08-02 NOTE — LETTER
8/2/2019         RE: Elizabeth Li  [address]  [place] [address]        Dear Colleague,    Thank you for referring your patient, Elizabeth Li, to the [facility]. Please see a copy of my visit note below.    Suture/Staple removal visit note:    S:  Patient is s/p L3-4 TLIF on 7/17/19 with Dr. Jansen. Presents to clinic with . Patient reports minimal post op pain today. Currently taking Dilaudid and Tylenol PRN. She has no current concerns or questions regarding post-surgical plan of care.  O:  Lisa removed by Felicita GALDAMEZ, wound is healing well without erythema, fluctuation, induration, drainage, or fever.  A: Patient returned to clinic post-op for staple removal.  Patient tolerated procedure without incident.  P:    - Keep your incision clean and dry at all times. Steri strips applied and should remain in place for 7-10 days  -No bathing, swimming, or submerging in water until incision is well healed.  Call clinic or go to Emergency Room if you develop any new pain, drainage, swelling, or fever.  - No lifting greater than 10-15 pounds. No bending, twisting, or overhead reaching.  - Return on 8/26/19 for follow up appointment and xray    -Dilaudid refilled today    -Please call clinic with any further questions or concerns: [phone]    Maribel Sultana RN  [facility]  [address] [address]    Tel [phone]        Again, thank you for allowing me to participate in the care of your patient.        Sincerely,         Neurosurgery Nurse

## 2019-08-02 NOTE — NURSING NOTE
"Elizabeth Li is a 84 year old female who presents for:  Chief Complaint   Patient presents with     Surgical Followup     2 week follow up for staple removal s/p 7/19/19 L3-4 TLIF.         Initial Vitals:  /59 (BP Location: Right arm, Patient Position: Sitting, Cuff Size: Adult Large)   Pulse 73   Temp 98.2  F (36.8  C) (Oral)   Wt 173 lb (78.5 kg)   SpO2 94%   BMI 32.16 kg/m   Estimated body mass index is 32.16 kg/m  as calculated from the following:    Height as of 7/17/19: 5' 1.5\" (1.562 m).    Weight as of this encounter: 173 lb (78.5 kg).. Body surface area is 1.85 meters squared. BP completed using cuff size: large  Moderate Pain (5)        Nursing Comments: Patient states her pain level today is at a 5 out of 10.         Felicita French    "

## 2019-08-02 NOTE — PROGRESS NOTES
Suture/Staple removal visit note:    S:  Patient is s/p L3-4 TLIF on 7/17/19 with Dr. Jansen. Presents to clinic with . Patient reports minimal post op pain today. Currently taking Dilaudid and Tylenol PRN. She has no current concerns or questions regarding post-surgical plan of care.  O:  Lisa removed by Felicita GALDAMEZ, wound is healing well without erythema, fluctuation, induration, drainage, or fever.  A: Patient returned to clinic post-op for staple removal.  Patient tolerated procedure without incident.  P:    - Keep your incision clean and dry at all times. Steri strips applied and should remain in place for 7-10 days  -No bathing, swimming, or submerging in water until incision is well healed.  Call clinic or go to Emergency Room if you develop any new pain, drainage, swelling, or fever.  - No lifting greater than 10-15 pounds. No bending, twisting, or overhead reaching.  - Return on 8/26/19 for follow up appointment and xray    -Dilaudid refilled today    -Please call clinic with any further questions or concerns: [phone]    Maribel Sultana RN  [facility]  [address] [address]    Tel [phone]

## 2019-08-05 NOTE — TELEPHONE ENCOUNTER
Felisha from [facility] homecare called to obtain a Verbal order. Requesting ongoing home care with Myra for a Occupational Therapist to Evaluate and Treat twice a week for 3 weeks, then once a week for 1 week. Requesting a bath aide for one visit to assist with bathing and personal cares. Nursing spoke with Felisha and gave the verbal ok. Felisha faxing over a form to be filled out and returned.

## 2019-08-07 NOTE — TELEPHONE ENCOUNTER
Mary is working with pt. and is requesting on going home health aid/nurse who can help with showering and other tasks.

## 2019-08-07 NOTE — TELEPHONE ENCOUNTER
Returned call to JARON Hernandez with [facility]. Left detailed message. Provided verbal orders for on going home health aid assistance with showering and personal cares for patient. Advised to contact clinic if any further questions.

## 2019-08-13 NOTE — PROGRESS NOTES
Subjective     Elizabeth Li is a 84 year old female who presents to clinic today for the following health issues:    HPI       Hospital Follow-up Visit:    Hospital/Nursing Home/IP Rehab Facility: [facility]   Date of Admission: 7/17/19 [facility] & 7/20/19 [facility]  Date of Discharge: 7/20/19 [facility] & 8/2/19 [facility]  Reason(s) for Admission: s/p lumbar spine fusion             Problems taking medications regularly:  None       Medication changes since discharge: added ASA 81 mg 1 po every day, Dilaudid, hydroxyzine, robaxin & senna short term       Problems adhering to non-medication therapy:  None    Summary of hospitalization:  [facility] discharge summary reviewed  Diagnostic Tests/Treatments reviewed.  Follow up needed: surgeon as planned  Other Healthcare Providers Involved in Patient s Care:         None  Update since discharge: improved.     Post Discharge Medication Reconciliation: discharge medications reconciled, continue medications without change.  Plan of care communicated with patient     Coding guidelines for this visit:  Type of Medical   Decision Making Face-to-Face Visit       within 7 Days of discharge Face-to-Face Visit        within 14 days of discharge   Moderate Complexity [id number]   High Complexity [id number]          Patient underwent a lumbar spinal fusion on 7/17 and was in the hospital until 7/20. She was then in TCU from 7/20-8/2. Patient has another follow up appointment with the neurosurgery scheduled 8/26.    Patient followed up with neurosurgery on 8/2 and had the dilaudid refilled. She is taking 2 tabs nightly which is working very well. She is taking Miralax daily and prune juice in the mornings to help with the drug induced constipation. Overall she is doing well.     Depression    PHQ-9 SCORE 9/21/2018 3/11/2019 8/13/2019   PHQ-9 Total Score - - -   PHQ-9 Total Score MyChart - 8 (Mild depression) -   PHQ-9 Total Score 6 8 4  "    She was on an antidepressant when she first moved to [place], but has not been on any since then. She endorses feeling well and that her mood is doing great. She has a good support system and is able to cope appropriately if she feels down.       Recent Labs   Lab Test 07/24/19 07/09/19  1455 03/05/19  0958  01/17/19  0925 01/04/19  1045  03/07/18  0840  05/16/17  0735   LDL  --   --   --   --   --  88  --  58  --  63   HDL  --   --   --   --   --  57  --  51  --  54   TRIG  --   --   --   --   --  88  --  144  --  127   ALT  --   --   --   --   --  19  --  14  --  18   CR 0.67 0.73  --    < >  --  0.82  --  0.81  --   --    GFRESTIMATED >60 77  --    < >  --  66  --  68  --   --    GFRESTBLACK >60 89  --    < >  --  77  --  82  --   --    POTASSIUM 4.1 4.1  --    < >  --  4.2  --  4.6  --   --    TSH  --   --  0.44  --  0.23*  --    < > 0.25*   < >  --     < > = values in this interval not displayed.        Reviewed and updated as needed this visit by Provider  Meds  Problems         Review of Systems   ROS COMP: CONSTITUTIONAL: NEGATIVE for fever, chills, change in weight  RESP: NEGATIVE for significant cough or SOB  CV: NEGATIVE for chest pain, palpitations or peripheral edema  MUSCULOSKELETAL: NEGATIVE for significant arthralgias or myalgia  PSYCHIATRIC: POSITIVE for hx of anxiety and depression     This document serves as a record of the services and decisions personally performed and made by Rebekah Rausch MD. It was created on his behalf by Inez Valladares, a trained medical scribe. The creation of this document is based on the provider's statements to the medical scribe.  Inez Valladares August 13, 2019 3:02 PM        Objective    /70   Pulse 87   Temp 97.8  F (36.6  C) (Oral)   Resp 14   Ht 1.562 m (5' 1.5\")   Wt 76.2 kg (168 lb)   SpO2 97%   BMI 31.23 kg/m    Body mass index is 31.23 kg/m .     Physical Exam   GENERAL: healthy, alert and no distress  RESP: lungs clear to auscultation - " "no rales, rhonchi or wheezes  CV: regular rate and rhythm, normal S1 S2, no S3 or S4, no murmur, click or rub, no peripheral edema and peripheral pulses strong  MS: Surgical lacerations clean, dry, and intact. no gross musculoskeletal defects noted, no edema  SKIN: no suspicious lesions or rashes  NEURO: Normal strength and tone, mentation intact and speech normal  PSYCH: mentation appears normal, affect normal/bright    Diagnostic Test Results:  Labs reviewed in Epic        Assessment & Plan     (Z98.1) S/P lumbar spinal fusion  Comment: Patient doing well post op. Pain is managed with 2 tabs of dilaudid nightly  Plan: HYDROmorphone (DILAUDID) 2 MG tablet        Follow up with neurosurgery as planned 8/26    (F32.0) Mild major depression (H)  (primary encounter diagnosis)  Comment: stable  Plan: monitor      BMI:   Estimated body mass index is 31.23 kg/m  as calculated from the following:    Height as of this encounter: 1.562 m (5' 1.5\").    Weight as of this encounter: 76.2 kg (168 lb).   Weight management plan: Discussed healthy diet and exercise guidelines    FUTURE APPOINTMENTS:       - Follow-up visit in 6 months    There are no Patient Instructions on file for this visit.    The information in this document, created by the medical scribe for me, accurately reflects the services I personally performed and the decisions made by me. I have reviewed and approved this document for accuracy prior to leaving the patient care area.  August 13, 2019 3:11 PM    Rebekah Rausch MD  [facility]    "

## 2019-08-13 NOTE — NURSING NOTE
"/70   Pulse 87   Temp 97.8  F (36.6  C) (Oral)   Resp 14   Ht 1.562 m (5' 1.5\")   Wt 76.2 kg (168 lb)   SpO2 97%   BMI 31.23 kg/m      "

## 2019-08-19 NOTE — TELEPHONE ENCOUNTER
Received VM call from Lissette with  home care requesting OT orders 2x per month for 2 months for the patient for basic ADLs. Pt is s/p L3-4 TLIF with Dr. Jansen on 7/17. Called and spoke to Lissette, verbal orders given. Awaiting orders to be faxed.

## 2019-08-26 NOTE — PATIENT INSTRUCTIONS
- Physical therapy referral placed - they will contact you to schedule  - May increase lifting restriction to 20 pounds   - followup in 6 weeks with xray prior   - Call the clinic at [phone] for increased pain or any other questions and concerns.

## 2019-08-26 NOTE — LETTER
8/26/2019         RE: Elizabeth Li  [address]  [place] [address]        Dear Colleague,    Thank you for referring your patient, Elizabeth Li, to the [facility]. Please see a copy of my visit note below.    [facility]  Neurosurgery followup:    HPI: 6 weeks s/p L3-4 TLIF. Recovering well with minimal back and leg pain. She has been doing home therapy and is planning to begin outpatient therapy. She is eager to increase activity. No weakness.  Exam:  Constitutional:  Alert, well nourished, NAD.  HEENT: Normocephalic, atraumatic.   Pulm:  Without shortness of breath   CV:  No pitting edema of BLE.      Neurological:  Awake  Alert  Oriented x 3  Motor exam:        IP Q DF PF EHL  R   5  5   5   5    5  L   5  5   5   5    5     Reflexes are 2+ in the patellar and Achilles. There is no clonus. Downgoing Babinski.    Able to spontaneously move L/E bilaterally  Sensation intact throughout all L/E dermatomes     Incision: Healing nicely  Imaging: AP and lateral lumbar films reveal stable hardware  A/P: 6 weeks s/p L3-4 TLIF. Recovering well with minimal back and leg pain. XRs with stable hardware. Full strength on exam. Incision healing nicely. Given referral for outpatient PT. Routine follow up. Patient voiced understanding and agreement.  - May increase lifting restriction to 20 pounds   - followup in 6 weeks with xray prior   - Call the clinic at [phone] for increased pain or any other questions and concerns.      Annabelle Leon PA-C  [facility]  [address] [address]    Tel [phone]  Pager [phone]      Again, thank you for allowing me to participate in the care of your patient.        Sincerely,        Annabelle Leon PA-C

## 2019-08-26 NOTE — NURSING NOTE
"Elizabeth Li is a 84 year old female who presents for:  Chief Complaint   Patient presents with     Surgical Followup     6 week follow up s/p 7/19/19 L3-4 TLIF.         Initial Vitals:  /61 (BP Location: Right arm, Patient Position: Sitting, Cuff Size: Adult Large)   Pulse 76   Temp 98.3  F (36.8  C) (Oral)   Ht 5' 1.5\" (1.562 m)   Wt 166 lb (75.3 kg)   SpO2 97%   BMI 30.86 kg/m   Estimated body mass index is 30.86 kg/m  as calculated from the following:    Height as of this encounter: 5' 1.5\" (1.562 m).    Weight as of this encounter: 166 lb (75.3 kg).. Body surface area is 1.81 meters squared. BP completed using cuff size: large  Extreme Pain (8)        Nursing Comments: Patient states her pain level when walking is at a 8 out of 10.         Felicita French CMA    "

## 2019-08-26 NOTE — PROGRESS NOTES
[facility]  Neurosurgery followup:    HPI: 6 weeks s/p L3-4 TLIF. Recovering well with minimal back and leg pain. She has been doing home therapy and is planning to begin outpatient therapy. She is eager to increase activity. No weakness.  Exam:  Constitutional:  Alert, well nourished, NAD.  HEENT: Normocephalic, atraumatic.   Pulm:  Without shortness of breath   CV:  No pitting edema of BLE.      Neurological:  Awake  Alert  Oriented x 3  Motor exam:        IP Q DF PF EHL  R   5  5   5   5    5  L   5  5   5   5    5     Reflexes are 2+ in the patellar and Achilles. There is no clonus. Downgoing Babinski.    Able to spontaneously move L/E bilaterally  Sensation intact throughout all L/E dermatomes     Incision: Healing nicely  Imaging: AP and lateral lumbar films reveal stable hardware  A/P: 6 weeks s/p L3-4 TLIF. Recovering well with minimal back and leg pain. XRs with stable hardware. Full strength on exam. Incision healing nicely. Given referral for outpatient PT. Routine follow up. Patient voiced understanding and agreement.  - May increase lifting restriction to 20 pounds   - followup in 6 weeks with xray prior   - Call the clinic at [phone] for increased pain or any other questions and concerns.      Annabelle Leon PA-C  [facility]  [address] [address]    Tel [phone]  Pager [phone]

## 2019-09-06 NOTE — TELEPHONE ENCOUNTER
Patient is s/p TLIF on 07/17/19 by Dr. Jansen. Her LOV with Annabelle BAUTISTA with Annabelle was on 08/26/19. Patient states that she is having a pain 2/10 below her incision, above her tailbone. She stated she thinks it is arthritis pain. She feels best when ambulating and recently has starting to increase her walking distance. No longer taking narcotic pain medication. Inquiring what she can do for her arthritis symptoms. States she feels better with icing.  Nursing encouraged her to continue along her recovery path. Encouraged her to continue following restrictions and let her body heal. Advised her to call her PMD to discuss possible management or referral for her arthritis. She stated she has had this before and heat helps, so going to continue to try heat.  She is thankful for the call and will call back with any further questions or concerns.

## 2019-09-09 NOTE — PROGRESS NOTES
"[facility] Initial Evaluation -- Lumbar    Date: September 9, 2019  Elizabeth Li is a 84 year old female with a s/p lumbar fusion condition.   Referral: Dr. Justyna Schafer mechanical stresses:  retired  Employment status:  retired  Leisure mechanical stresses: traveling; going out to eat  Functional disability score (DEYSI/STarT Back):  See flowsheet  VAS score (0-10): 5/10  Patient goals/expectations:  \"Get my legs and my life back\"    HISTORY:    Present symptoms: Low back  Pain quality (sharp/shooting/stabbing/aching/burning/cramping):  Aching;burning   Paresthesia (yes/no):  no    Present since (onset date): July2019.     Symptoms (improving/unchanging/worsening):  unchanging    Symptoms commenced as a result of: n/a   Condition occurred in the following environment:   n/a     Symptoms at onset (back/thigh/leg): low back/sacrum  Constant symptoms (back/thigh/leg): back  Intermittent symptoms (back/thigh/leg): low back/sacrum    Symptoms are made worse with the following: Always Sitting, Always Lying, Time of day - Sometimes AM, Always When still   Symptoms are made better with the following: Always Standing, Always Walking and Always On the move    Disturbed sleep (yes/no):  yes Sleeping postures (prone/sup/side R/L): sides    Previous episodes (0/1-5/6-10/11+): 0 Year of first episode:     Previous history: chronic history of back; R hip  Previous treatments: PT w/poor success      Specific Questions:  Cough/Sneeze/Strain (pos/neg): neg  Bowel/Bladder (normal/abnormal): normal  Gait (normal/abnormal): abnormal; drags left leg  Medications (nil/NSAIDS/analg/steroids/anticoag/other):  Other - Thyroid  Medical allergies:  none  General health (excellent/good/fair/poor):  fair  Pertinent medical history:  Heart problems, Sleep disorder/apnea and Thyroid problems,   Imaging (None/Xray/MRI/Other):  Xrays  Recent or major surgery (yes/no):  July 17th lumbar fusion  Night pain (yes/no): " no  Accidents (yes/no): no  Unexplained weight loss (yes/no): no  Barriers at home: no  Other red flags: no    EXAMINATION    Posture:   Sitting (good/fair/poor): fair  Standing (good/fair/poor):poor, flex at R hip  Lordosis (red/acc/normal): red  Correction of posture (better/worse/no effect): better    Lateral Shift (right/left/nil): nil  Relevant (yes/no):  no  Other Observations: ambulates with rolling walker with decr R LE extension, weight shift    Neurological:    Motor deficit:  intact  Reflexes:    Sensory deficit:    Dural signs:  Not tested    Movement Loss:   Rodolfo Mod Min Nil Pain   Flexion     Deferred due to post op status   Extension  x x     Side Gliding R  x      Side Gliding L  x        Test Movements:   During: produces, abolishes, increases, decreases, no effect, centralizing, peripheralizing   After: better, worse, no better, no worse, no effect, centralized, peripheralized    Pretest symptoms standing:    Symptoms During Symptoms After ROM increased ROM decreased No Effect   FIS        Rep FIS        EIS        Rep EIS        Pretest symptoms lying:     Symptoms During Symptoms After ROM increased ROM decreased No Effect   FAN        Rep FAN        EIL        Rep EIL        If required, pretest symptoms:    Symptoms During Symptoms After ROM increased ROM decreased No Effect   SGIS - R        Rep SGIS - R        SGIS - L        Rep SGIS - L          Static Tests:  Sitting slouched:    Sitting erect:    Standing slouched :  Standing erect:    Lying prone in extension:  Attempted with increased pain for patient in L shoulder, R hip/unable to lie flat due to R hip flex contracture.  Long sitting:      Other Tests: Slouch/overcorrect: decr/NB/NE ROM    Provisional Classification:  Inconclusive/Other - Post-Surgery    Principle of Management:  Education:  Therapeutic dose of exercise, posture, use of lumbar roll.    Equipment provided:  Lumbar roll suggested  Mechanical therapy (Y/N):  Y   Extension  principle:  Posture correction  Lateral Principle:    Flexion principle:    Other:      ASSESSMENT/PLAN:    Patient is a 84 year old female with lumbar complaints.    Patient has the following significant findings with corresponding treatment plan.                Diagnosis 1:  S/p lumbar fusion  Pain -  self management, education and home program  Decreased ROM/flexibility - manual therapy and therapeutic exercise  Decreased joint mobility - manual therapy and therapeutic exercise  Decreased strength - therapeutic exercise and therapeutic activities  Impaired gait - gait training  Decreased function - therapeutic activities  Impaired posture - neuro re-education    Therapy Evaluation Codes:   1) History comprised of:   Personal factors that impact the plan of care:      Anxiety.    Comorbidity factors that impact the plan of care are:      Heart problems, Sleep disorder/apnea and Thyroid.     Medications impacting care: Thyroid.  2) Examination of Body Systems comprised of:   Body structures and functions that impact the plan of care:      Lumbar spine.   Activity limitations that impact the plan of care are:      Bending, Dressing, Lifting, Sitting, Standing, Walking and Sleeping.  3) Clinical presentation characteristics are:   Evolving/Changing.  4) Decision-Making    Moderate complexity using standardized patient assessment instrument and/or measureable assessment of functional outcome.  Cumulative Therapy Evaluation is: Moderate complexity.    Previous and current functional limitations:  (See Goal Flow Sheet for this information)    Short term and Long term goals: (See Goal Flow Sheet for this information)     Communication ability:  Patient appears to be able to clearly communicate and understand verbal and written communication and follow directions correctly.  Treatment Explanation - The following has been discussed with the patient:   RX ordered/plan of care  Anticipated outcomes  Possible risks and side  effects  This patient would benefit from PT intervention to resume normal activities.   Rehab potential is good.    Frequency:  1 X week, once daily  Duration:  for 6 weeks  Discharge Plan:  Achieve all LTG.  Independent in home treatment program.  Reach maximal therapeutic benefit.    Please refer to the daily flowsheet for treatment today, total treatment time and time spent performing 1:1 timed codes.

## 2019-09-09 NOTE — LETTER
"CARLITO DE LA ROSA Walls PT  [address]  [facility] [address]  [phone]    2019    Re: Elizabeth Li   :   1935  MRN:  [id number]   REFERRING PHYSICIAN:   Annabelle GUERRA PT  Date of Initial Evaluation: 2019  Visits:  Rxs Used: 1  Reason for Referral:  S/P lumbar spinal fusion    [facility] Initial Evaluation -- Lumbar  Date: 2019  Elizabeth Li is a 84 year old female with a s/p lumbar fusion condition.   Referral: Dr. Jansen  Work mechanical stresses:  retired  Employment status:  retired  Leisure mechanical stresses: traveling; going out to eat  Functional disability score (DEYSI/STarT Back):  See flowsheet  VAS score (0-10): 10  Patient goals/expectations:  \"Get my legs and my life back\"    HISTORY:  Present symptoms: Low back  Pain quality (sharp/shooting/stabbing/aching/burning/cramping):  Aching;burning   Paresthesia (yes/no):  no    Present since (onset date): 2019.     Symptoms (improving/unchanging/worsening):  unchanging    Symptoms commenced as a result of: n/a   Condition occurred in the following environment:   n/a     Symptoms at onset (back/thigh/leg): low back/sacrum  Constant symptoms (back/thigh/leg): back  Intermittent symptoms (back/thigh/leg): low back/sacrum    Symptoms are made worse with the following: Always Sitting, Always Lying, Time of day - Sometimes AM, Always When still   Symptoms are made better with the following: Always Standing, Always Walking and Always On the move    Disturbed sleep (yes/no):  yes Sleeping postures (prone/sup/side R/L): sides    Previous episodes (0/1-5/6-10/11+): 0 Year of first episode:     Previous history: chronic history of back; R hip  Previous treatments: PT w/poor success            Re: Elizabeth Li   :   1935    Specific Questions:  Cough/Sneeze/Strain (pos/neg): neg  Bowel/Bladder (normal/abnormal): normal  Gait (normal/abnormal): " abnormal; drags left leg  Medications (nil/NSAIDS/analg/steroids/anticoag/other):  Other - Thyroid  Medical allergies:  none  General health (excellent/good/fair/poor):  fair  Pertinent medical history:  Heart problems, Sleep disorder/apnea and Thyroid problems,   Imaging (None/Xray/MRI/Other):  Xrays  Recent or major surgery (yes/no):   lumbar fusion  Night pain (yes/no): no  Accidents (yes/no): no  Unexplained weight loss (yes/no): no  Barriers at home: no  Other red flags: no    EXAMINATION  Posture:   Sitting (good/fair/poor): fair  Standing (good/fair/poor):poor, flex at R hip  Lordosis (red/acc/normal): red  Correction of posture (better/worse/no effect): better    Lateral Shift (right/left/nil): nil  Relevant (yes/no):  no  Other Observations: ambulates with rolling walker with decr R LE extension, weight shift    Neurological:    Motor deficit:  intact  Reflexes:    Sensory deficit:    Dural signs:  Not tested    Movement Loss:   Rodolfo Mod Min Nil Pain   Flexion     Deferred due to post op status   Extension  x x     Side Gliding R  x      Side Gliding L  x        Test Movements:   During: produces, abolishes, increases, decreases, no effect, centralizing, peripheralizing   After: better, worse, no better, no worse, no effect, centralized, peripheralized    Pretest symptoms standing:    Symptoms During Symptoms After ROM increased ROM decreased No Effect   FIS        Rep FIS        EIS        Rep EIS                  Re: Elizabeth Nguyenjayesh   :   1935    Pretest symptoms lying:     Symptoms During Symptoms After ROM increased ROM decreased No Effect   FAN        Rep FAN        EIL        Rep EIL        If required, pretest symptoms:    Symptoms During Symptoms After ROM increased ROM decreased No Effect   SGIS - R        Rep SGIS - R        SGIS - L        Rep SGIS - L        Static Tests:  Sitting slouched:    Sitting erect:    Standing slouched :  Standing erect:    Lying prone in extension:   Attempted with increased pain for patient in L shoulder, R hip/unable to lie flat due to R hip flex contracture.  Long sitting:    Other Tests: Slouch/overcorrect: decr/NB/NE ROM  Provisional Classification:  Inconclusive/Other - Post-Surgery  Principle of Management:  Education:  Therapeutic dose of exercise, posture, use of lumbar roll.    Equipment provided:  Lumbar roll suggested  Mechanical therapy (Y/N):  Y   Extension principle:  Posture correction  Lateral Principle:    Flexion principle:    Other:    ASSESSMENT/PLAN:  Patient is a 84 year old female with lumbar complaints.    Patient has the following significant findings with corresponding treatment plan.                Diagnosis 1:  S/p lumbar fusion  Pain -  self management, education and home program  Decreased ROM/flexibility - manual therapy and therapeutic exercise  Decreased joint mobility - manual therapy and therapeutic exercise  Decreased strength - therapeutic exercise and therapeutic activities  Impaired gait - gait training  Decreased function - therapeutic activities  Impaired posture - neuro re-education  Therapy Evaluation Codes:   1) History comprised of:   Personal factors that impact the plan of care:      Anxiety.    Comorbidity factors that impact the plan of care are:      Heart problems, Sleep disorder/apnea and Thyroid.     Medications impacting care: Thyroid.  2) Examination of Body Systems comprised of:   Body structures and functions that impact the plan of care:      Lumbar spine.   Activity limitations that impact the plan of care are:      Bending, Dressing, Lifting, Sitting, Standing, Walking and Sleeping.  3) Clinical presentation characteristics are:   Evolving/Changing.  4) Decision-Making    Moderate complexity using standardized patient assessment instrument and/or measureable assessment of functional outcome.  Re: Elizabeth Li   :   1935      Cumulative Therapy Evaluation is: Moderate  complexity.    Previous and current functional limitations:  (See Goal Flow Sheet for this information)    Short term and Long term goals: (See Goal Flow Sheet for this information)     Communication ability:  Patient appears to be able to clearly communicate and understand verbal and written communication and follow directions correctly.  Treatment Explanation - The following has been discussed with the patient:   RX ordered/plan of care  Anticipated outcomes  Possible risks and side effects  This patient would benefit from PT intervention to resume normal activities.   Rehab potential is good.    Frequency:  1 X week, once daily  Duration:  for 6 weeks  Discharge Plan:  Achieve all LTG.  Independent in home treatment program.  Reach maximal therapeutic benefit.    Thank you for your referral.    INQUIRIES  Therapist: Debbie Yee, PT, Cert.MDT  [facility]  [address] [address] [place] [address]  Phone: [phone]  Fax: [phone]

## 2019-09-26 NOTE — TELEPHONE ENCOUNTER
"Requested Prescriptions   Pending Prescriptions Disp Refills     isosorbide mononitrate (IMDUR) 30 MG 24 hr tablet [Pharmacy Med Name: ISOSORBIDE MONONITRATE ER 30 TB24] 90 tablet 4     Sig: TAKE ONE TABLET BY MOUTH EVERY DAY   Last Written Prescription Date:  09/21/2018  Last Fill Quantity: 90,  # refills: 04   Last office visit: 8/13/2019 with prescribing provider:     Future Office Visit:   Next 5 appointments (look out 90 days)    Oct 14, 2019  1:30 PM CDT  Return Visit with ISMAEL Franco CNP  [facility] ([facility]) [address]  [place] [address]  [phone] OPT 2   Oct 21, 2019 10:00 AM CDT  Return Visit with Annabelle Leon PA-C  [facility] ([facility]) [address]  [address]  [place] [address]  [phone]           Nitrates Passed - 9/26/2019 10:25 AM        Passed - Blood pressure under 140/90 in past 12 months     BP Readings from Last 3 Encounters:   08/26/19 133/61   08/13/19 130/70   08/02/19 130/59                 Passed - Pt is not on erectile dysfunction medications        Passed - Recent (12 mo) or future (30 days) visit within the authorizing provider's specialty     Patient had office visit in the last 12 months or has a visit in the next 30 days with authorizing provider or within the authorizing provider's specialty.  See \"Patient Info\" tab in inbasket, or \"Choose Columns\" in Meds & Orders section of the refill encounter.              Passed - Medication is active on med list        Passed - Patient is age 18 or older        omeprazole (PRILOSEC) 20 MG DR capsule [Pharmacy Med Name: OMEPRAZOLE 20MG CPDR] 90 capsule 0     Sig: TAKE ONE CAPSULE BY MOUTH ONCE DAILY   Last Written Prescription Date:  06/19/2019  Last Fill Quantity: 90,  # refills: 0   Last office visit: 8/13/2019 with prescribing provider:     Future Office Visit:   Next 5 appointments (look " "out 90 days)    Oct 14, 2019  1:30 PM CDT  Return Visit with ISMAEL Franco CNP  [facility] ([facility]) [address]  [place] [address]  [phone] OPT 2   Oct 21, 2019 10:00 AM CDT  Return Visit with Annabelle Leon PA-C  [facility] ([facility]) [address]  [address]  [place] [address]  [phone]           PPI Protocol Passed - 9/26/2019 10:25 AM        Passed - Not on Clopidogrel (unless Pantoprazole ordered)        Passed - No diagnosis of osteoporosis on record        Passed - Recent (12 mo) or future (30 days) visit within the authorizing provider's specialty     Patient had office visit in the last 12 months or has a visit in the next 30 days with authorizing provider or within the authorizing provider's specialty.  See \"Patient Info\" tab in inbasket, or \"Choose Columns\" in Meds & Orders section of the refill encounter.              Passed - Medication is active on med list        Passed - Patient is age 18 or older        Passed - No active pregnacy on record        Passed - No positive pregnancy test in past 12 months        "

## 2019-09-27 NOTE — TELEPHONE ENCOUNTER
"Requested Prescriptions   Pending Prescriptions Disp Refills     levothyroxine (SYNTHROID/LEVOTHROID) 75 MCG tablet [Pharmacy Med Name:  Last Written Prescription Date:  1/24/19  Last Fill Quantity: 90,  # refills: 1   Last office visit: 1/24/2019 with prescribing provider:  Leighton   Future Office Visit:   Next 5 appointments (look out 90 days)    Oct 14, 2019  1:30 PM CDT  Return Visit with ISMAEL Franco CNP  [facility] ([facility]) [address]  [facility] [place] [address]  [phone] OPT 2   Oct 21, 2019 10:00 AM CDT  Return Visit with Annabelle Leon PA-C  [facility] ([facility]) [address]  [facility] [address]  [phone]          LEVOTHYROXINE SODIUM 75MCG TABS] 90 tablet 1     Sig: TAKE ONE TABLET BY MOUTH EVERY MORNING (BEFORE BREAKFAST)       Thyroid Protocol Passed - 9/26/2019 10:25 AM        Passed - Patient is 12 years or older        Passed - Recent (12 mo) or future (30 days) visit within the authorizing provider's specialty     Patient has had an office visit with the authorizing provider or a provider within the authorizing providers department within the previous 12 mos or has a future within next 30 days. See \"Patient Info\" tab in inbasket, or \"Choose Columns\" in Meds & Orders section of the refill encounter.              Passed - Medication is active on med list        Passed - Normal TSH on file in past 12 months     Recent Labs   Lab Test 03/05/19  0958   TSH 0.44              Passed - No active pregnancy on record     If patient is pregnant or has had a positive pregnancy test, please check TSH.          Passed - No positive pregnancy test in past 12 months     If patient is pregnant or has had a positive pregnancy test, please check TSH.          liothyronine (CYTOMEL) 5 MCG tablet [Pharmacy Med Name: LIOTHYRONINE  Last Written Prescription Date:  " "1/24/19  Last Fill Quantity: 90,  # refills: 1   Last office visit: 1/24/2019 with prescribing provider:  Leighton   Future Office Visit:   Next 5 appointments (look out 90 days)    Oct 14, 2019  1:30 PM CDT  Return Visit with ISMAEL Franco CNP  [facility] [place] [facility] ([facility]) [address]  [place] [address]  [phone] OPT 2   Oct 21, 2019 10:00 AM CDT  Return Visit with Annabelle Leon PA-C  [facility] ([facility]) [address]  [address]  [place] [address] [phone]          SODIUM 5MCG TABS] 90 tablet 1     Sig: TAKE ONE TABLET BY MOUTH DAILY (WITH BREAKFAST)       Thyroid Protocol Passed - 9/26/2019 10:25 AM        Passed - Patient is 12 years or older        Passed - Recent (12 mo) or future (30 days) visit within the authorizing provider's specialty     Patient has had an office visit with the authorizing provider or a provider within the authorizing providers department within the previous 12 mos or has a future within next 30 days. See \"Patient Info\" tab in inbasket, or \"Choose Columns\" in Meds & Orders section of the refill encounter.              Passed - Medication is active on med list        Passed - Normal TSH on file in past 12 months     Recent Labs   Lab Test 03/05/19  0958   TSH 0.44              Passed - No active pregnancy on record     If patient is pregnant or has had a positive pregnancy test, please check TSH.          Passed - No positive pregnancy test in past 12 months     If patient is pregnant or has had a positive pregnancy test, please check TSH.            "

## 2019-09-27 NOTE — TELEPHONE ENCOUNTER
"Routing refill request to provider for review/approval because:  A break in medication    Medications last ordered on 1/24/19 for a 90 day supply with one refill.     Requested Prescriptions   Pending Prescriptions Disp Refills     levothyroxine (SYNTHROID/LEVOTHROID) 75 MCG tablet [Pharmacy Med Name: LEVOTHYROXINE SODIUM 75MCG TABS] 90 tablet 1     Sig: TAKE ONE TABLET BY MOUTH EVERY MORNING (BEFORE BREAKFAST)       Thyroid Protocol Passed - 9/27/2019  2:48 PM        Passed - Patient is 12 years or older        Passed - Recent (12 mo) or future (30 days) visit within the authorizing provider's specialty     Patient has had an office visit with the authorizing provider or a provider within the authorizing providers department within the previous 12 mos or has a future within next 30 days. See \"Patient Info\" tab in inbasket, or \"Choose Columns\" in Meds & Orders section of the refill encounter.              Passed - Medication is active on med list        Passed - Normal TSH on file in past 12 months     Recent Labs   Lab Test 03/05/19  0958   TSH 0.44              Passed - No active pregnancy on record     If patient is pregnant or has had a positive pregnancy test, please check TSH.          Passed - No positive pregnancy test in past 12 months     If patient is pregnant or has had a positive pregnancy test, please check TSH.          liothyronine (CYTOMEL) 5 MCG tablet [Pharmacy Med Name: LIOTHYRONINE SODIUM 5MCG TABS] 90 tablet 1     Sig: TAKE ONE TABLET BY MOUTH DAILY (WITH BREAKFAST)       Thyroid Protocol Passed - 9/27/2019  2:48 PM        Passed - Patient is 12 years or older        Passed - Recent (12 mo) or future (30 days) visit within the authorizing provider's specialty     Patient has had an office visit with the authorizing provider or a provider within the authorizing providers department within the previous 12 mos or has a future within next 30 days. See \"Patient Info\" tab in inbasket, or \"Choose " "Columns\" in Meds & Orders section of the refill encounter.              Passed - Medication is active on med list        Passed - Normal TSH on file in past 12 months     Recent Labs   Lab Test 03/05/19  0958   TSH 0.44              Passed - No active pregnancy on record     If patient is pregnant or has had a positive pregnancy test, please check TSH.          Passed - No positive pregnancy test in past 12 months     If patient is pregnant or has had a positive pregnancy test, please check TSH.            "

## 2019-09-30 NOTE — TELEPHONE ENCOUNTER
Call to patient. States she has been taking it correctly and has not missed any doses. Patient states she is not going back to Dr. Manzanares. She wants all medications ordered by Dr. Rausch. Advised 90 day prescriptions of both thyroid medications were already sent to pharmacy. Advised regardless of who orders the medication thyroid labs would still need to be completed within 2-3 months as advised by Dr. Manzanares as only 90 day prescriptions were sent for both of these medications. Attempted to explain that patient would still need labs done and once completed and next refill is due we could ask Dr. Rausch if she would take over the prescribing of the thyroid medications. Patient again stated that she is not going back to Dr. Manzanares and she doesn't want anything to do with her. States she doesn't want to do any labs that were ordered by her. Dr. Rausch - Please advise if you are willing to take over prescribing of thyroid medications and if so when next thyroid labs are to be done. If willing to take over these medications, lab orders will need to be ordered under your name in order for patient to be willing to complete them.

## 2019-09-30 NOTE — TELEPHONE ENCOUNTER
Rx sent.    Please check if she is taking it correctly or any missing doses.  Due for labs and OV in 2-3 months.  Please help schedule.    Please inform patient.

## 2019-10-01 NOTE — TELEPHONE ENCOUNTER
Dr. Rausch - Are you willing to take over the prescribing of thyroid medications since patient is refusing to see Dr. Manzanares again? If so can you please reorder the labs in your name? Patient will not do them if they are in Dr. Manzanares's name as she says she doesn't want anything to do with that doctor.

## 2019-10-10 PROBLEM — Z98.1 S/P LUMBAR FUSION: Status: RESOLVED | Noted: 2019-01-01 | Resolved: 2019-01-01

## 2019-10-10 NOTE — PROGRESS NOTES
"Subjective:  HPI  Oswestry Score: 57.78 %                 Objective:  System    Physical Exam    General     ROS    Assessment/Plan:    DISCHARGE REPORT    Progress reporting period is from 9-9-19 to 10-8-19.       SUBJECTIVE  Subjective changes noted by patient:  .  Subjective: Patient reports overall unchanged since beginning PT.     Current pain level is 8/10 (with walking)  .     Previous pain level was  8/10  .   Changes in function:  Patient has a slight improvement in distance walking, more focused on how painful it is for her and \"will it ever get better?\"  Adverse reaction to treatment or activity: does not tolerate any hip ROM exercises.    OBJECTIVE  Changes noted in objective findings:  The objective findings below are from DOS 10-8-19.  Objective: DEYSI=57, STarT Back=LOW; ambulates with rolling walker with decreased weight shift R, decreased hip extension R, Trunk flexion.  Does not tolerate any attempts at ROM for R hip.  Patient demonstrates emotional liability and poor coping with current situation.       ASSESSMENT/PLAN  Updated problem list and treatment plan: Diagnosis 1:  S/p lumbar   Pain -  self management, education, directional preference exercise and home program  Decreased ROM/flexibility - therapeutic exercise  Decreased joint mobility - manual therapy and therapeutic exercise  Decreased strength - therapeutic exercise and therapeutic activities  Decreased function - therapeutic activities  Impaired posture - neuro re-education  STG/LTGs have been met or progress has been made towards goals:  None  Assessment of Progress: The patient's condition is unchanged.  Self Management Plans:  Patient is independent in a home treatment program.  Patient is independent in self management of symptoms.  I have re-evaluated this patient and find that the nature, scope, duration and intensity of the therapy is appropriate for the medical condition of the patient.  Elizabeth continues to require the following " "intervention to meet STG and LTG's:  PT intervention is no longer required to meet STG/LTG.    Recommendations:    Elizabeth has had difficulty coping with her condition and expectations for recovery.  Has indicated several times how vital is it for her to \"get out of the house\" due to the toxic environment she is in.  She demonstrates lability in the clinic, frustration over her continued pain.  She has demonstrated an improvement in her DEYSI (function).  I feel she would tolerated pool therapy well and would benefit from this service as she also has access to a pool where she lives.     Going forward I feel she would also benefit from pain PT with Sharon Lemos to improve her coping abilities.         Please refer to the daily flowsheet for treatment today, total treatment time and time spent performing 1:1 timed codes.          "

## 2019-10-14 NOTE — PATIENT INSTRUCTIONS
Today's Recommendations    1. Echocardiogram  2. Continue all other medications without changes.  3. Please follow up with Dr. Oakley in 1 year or sooner if needed.    Please send a [employer] message or call [phone] with questions or concerns.     Scheduling number [phone].

## 2019-10-14 NOTE — PROGRESS NOTES
Cardiology Clinic Progress Note  Elizabeth Li MRN# [id number]   YOB: 1935 Age: 84 year old         History of Presenting Illness:      Primary cardiologist: Dr. Oakley    Reason for visit: Three month follow up    Past medical history :    1. CAD: status post PCI with LUCILA to LAD and known occluded RCA and moderate stable LCx disease  2. Hypertension  3. Hyperlipidemia  4. Obstructive sleep apnea  5. Chronic back pain secondary sciatica   6. Anxiety/Depression  7. Hyopothyroidism    Elizabeth Li, a pleasant 84 year old patient who presents today for a routine 3 month follow up. Her cardiac history dates back to 2015 when she was evaluated for progressive dyspnea on exertion. He had an abnormal stress test and CTA that were abnormal. A coronary angiogram revealed 2 vessel CAD with a high-grade stenosis of the mid LAD, moderate left circumflex disease, and  of right coronary artery. She had a PCI to the mid LAD and the RCA was not intervened upon. Post her coronary artery intervention her symptoms improved significantly.     In July 2019 she underwent back surgery for ongoing chronic pain secondary to sciatica.  Prior to that she had mild dyspnea on exertion that was likely attributed to her chronic low back pain and hip pain.  She states that since her surgery she has had significant improvement in her back pain.  She continues to have hip and knee pain and she is going to be starting physical therapy for ongoing management.    Today in clinic she is accompanied by her .  She states that her shortness of breath on exertion is stable and unchanged.  She denies any chest discomfort, PND, orthopnea, lower extremity edema or palpitations.         Diagnostic studies:    Echocardiogram (6/2017): LVEF estimated at 55 to 60% with mild concentric LVH.  Small LV cavity size.  Normal RV structure and size with trace to mild aortic regurgitation.    (10/2015): LVEF 55 to 60% without regional wall  "motion abnormalities and normal LV size with trace to mild aortic regurgitation    Stress echocardiogram (1/2015): Dyspnea during exercise with normal blood pressure response.  Complaint of shortness of breath and tightness with activity.  1.5 mm ST depression inferior lateral leads with no resting wall motion or stress-induced wall motion abnormalities.    CT coronary angiogram (1/2015): subtotal occlusion of the proximal RCA with severe stenosis of mid LAD and moderate stenosis of the OM1.  Total calcium score of 1100    Coronary angiogram (3/2015): LUCILA to mid LAD,  of RCA and moderate LCx disease         Assessment and Plan:     ASSESSMENT:    1. Coronary artery disease    Status post PCI with LUCILA to LAD and known occluded RCA and moderate stable LCx disease    Stable dyspnea on exertion    Aspirin, statin and Imdur    2. Hypertension    Well controlled    Metoprolol XL 12.5 mg BID    3. Hyperlipidemia    LDL 88    Simvastatin 10 mg daily    Previously declined change in statin therapy    PLAN:  1. Echocardiogram  2. Follow up in 1 year with Dr. Oakley and fasting lipids            Review of Systems:     Review of Systems:  Skin:  Negative for itching;rash   Eyes:  Positive for glasses  ENT:  Negative for    Respiratory:  Positive for sleep apnea;CPAP;shortness of breath  Cardiovascular:  Negative Positive for;fatigue  Gastroenterology: Negative    Genitourinary:  Positive for nocturia  Musculoskeletal:  Positive for back pain;neck pain;joint pain  Neurologic:  Positive for numbness or tingling of hands  Psychiatric:  Positive for sleep disturbances  Heme/Lymph/Imm:  Negative allergies  Endocrine:  Positive for thyroid disorder            Physical Exam:     Vitals: /60   Pulse 70   Ht 1.562 m (5' 1.5\")   Wt 76.7 kg (169 lb)   SpO2 97%   BMI 31.42 kg/m    Constitutional:  cooperative;in no acute distress   Ambulates with walker    Skin:  warm and dry to the touch        Head:  normocephalic    "     Eyes:  conjunctivae and lids unremarkable;sclera white        ENT:  no pallor or cyanosis        Neck:  no stiffness        Chest:  clear to auscultation        Cardiac: regular rhythm;normal S1 and S2;apical impulse not displaced                  Abdomen:  abdomen soft        Vascular: pulses full and equal                                      Extremities and Back:  no edema        Neurological:  no gross motor deficits               Medications:     Current Outpatient Medications   Medication Sig Dispense Refill     ascorbic acid (VITAMIN C) 500 MG tablet Take 500 mg by mouth daily (with lunch) Pt takes in afternoon       aspirin (ASA) 81 MG tablet Take 1 tablet (81 mg) by mouth daily 90 tablet 3     cholecalciferol (VITAMIN D3) 1000 UNIT tablet Take 1,000 Units by mouth 2 times daily       isosorbide mononitrate (IMDUR) 30 MG 24 hr tablet TAKE ONE TABLET BY MOUTH EVERY DAY 90 tablet 1     levothyroxine (SYNTHROID/LEVOTHROID) 75 MCG tablet TAKE ONE TABLET BY MOUTH EVERY MORNING (BEFORE BREAKFAST) 90 tablet 0     liothyronine (CYTOMEL) 5 MCG tablet TAKE ONE TABLET BY MOUTH DAILY (WITH BREAKFAST) 90 tablet 0     metoprolol tartrate (LOPRESSOR) 25 MG tablet TAKE ONE-HALF TABLET (12.5MG) BY MOUTH TWO TIMES A DAY 90 tablet 1     omeprazole (PRILOSEC) 20 MG DR capsule TAKE ONE CAPSULE BY MOUTH ONCE DAILY 90 capsule 1     order for DME Equipment being ordered: wheeled walker 1 Device 0     order for DME Equipment being ordered: CPAP       polyethylene glycol (MIRALAX/GLYCOLAX) packet Take 1 packet by mouth 4 times daily as needed for constipation       simvastatin (ZOCOR) 10 MG tablet Take 1 tablet (10 mg) by mouth At Bedtime 90 tablet 4     acetaminophen (TYLENOL) 500 MG tablet Take 1,000 mg by mouth 3 times daily        HYDROmorphone (DILAUDID) 2 MG tablet Take 1 tablet (2 mg) by mouth 2 times daily as needed for moderate to severe pain (Patient not taking: Reported on 10/14/2019) 30 tablet 0       Family History    Problem Relation Age of Onset     Blood Disease Mother         pernious anemia     Heart Disease Father      Heart Disease Sister      Heart Disease Brother      Colon Cancer No family hx of        Social History     Socioeconomic History     Marital status:      Spouse name: Not on file     Number of children: 4     Years of education: Not on file     Highest education level: Not on file   Occupational History     Not on file   Social Needs     Financial resource strain: Not on file     Food insecurity:     Worry: Not on file     Inability: Not on file     Transportation needs:     Medical: Not on file     Non-medical: Not on file   Tobacco Use     Smoking status: Former Smoker     Last attempt to quit: 1984     Years since quittin.8     Smokeless tobacco: Never Used   Substance and Sexual Activity     Alcohol use: Yes     Alcohol/week: 0.0 standard drinks     Comment: social drinker     Drug use: No     Sexual activity: Not Currently     Partners: Male   Lifestyle     Physical activity:     Days per week: Not on file     Minutes per session: Not on file     Stress: Not on file   Relationships     Social connections:     Talks on phone: Not on file     Gets together: Not on file     Attends Baptism service: Not on file     Active member of club or organization: Not on file     Attends meetings of clubs or organizations: Not on file     Relationship status: Not on file     Intimate partner violence:     Fear of current or ex partner: Not on file     Emotionally abused: Not on file     Physically abused: Not on file     Forced sexual activity: Not on file   Other Topics Concern     Parent/sibling w/ CABG, MI or angioplasty before 65F 55M? Not Asked      Service Not Asked     Blood Transfusions Not Asked     Caffeine Concern Yes     Comment: coffee      Occupational Exposure No     Hobby Hazards No     Sleep Concern No     Stress Concern No     Weight Concern No     Special Diet Yes      Comment: gluten free and dairy free     Back Care No     Exercise No     Comment: on hold , 4/7 starting cardiac rehab     Bike Helmet Not Asked     Seat Belt Yes     Self-Exams Not Asked   Social History Narrative     Not on file            Past Medical History:     Past Medical History:   Diagnosis Date     Abnormal cardiovascular stress test     EKG changes with exercise on Exercise stress test 1/26/2015     CAD (coronary artery disease)     Severe 2 vessel CAD. PCI with drug-eluting stents x2 to mid LAD on 3/27/15     HTN (hypertension)      Hyperlipidemia LDL goal <100      Hypothyroidism      IHD (ischemic heart disease)      KUSUM (obstructive sleep apnea)     CPAP              Past Surgical History:     Past Surgical History:   Procedure Laterality Date     CARDIAC SURGERY      2 heart stents     COLONOSCOPY       COLONOSCOPY N/A 1/11/2017    Procedure: COLONOSCOPY;  Surgeon: Nghia Moss MD;  Location:  GI     HEAD & NECK SURGERY      wisdom teeth removed     HEART CATH, ANGIOPLASTY  3/27/15    2.5 X 18 mm & 3.0 X mm LUCILA to LAD     OPTICAL TRACKING SYSTEM FUSION POSTERIOR SPINE LUMBAR N/A 7/17/2019    Procedure: L3-L4 TRANSFORAMINAL LUMBAR  INTRABODY FUSION WITH ALLOGRAFT, OPTICAL TRACKING SYSTEM AND [employer] SOLARA INSTRUMENTATION;  Surgeon: Cole Jansen MD;  Location:  OR     TONSILLECTOMY      T&A as a child     TUBAL LIGATION                Allergies:   Flu virus vaccine; Gluten meal; and Milk protein extract       Data:   All laboratory data reviewed:    Recent Labs   Lab Test 03/05/19  0958 01/17/19  0925 01/04/19  1045 11/21/18  1252  03/07/18  0840  05/16/17  0735   LDL  --   --  88  --   --  58  --  63   HDL  --   --  57  --   --  51  --  54   NHDL  --   --  106  --   --  87  --  88   CHOL  --   --  163  --   --  138  --  142   TRIG  --   --  88  --   --  144  --  127   TSH 0.44 0.23*  --  0.08*   < > 0.25*   < >  --     < > = values in this interval not displayed.       Lab Results    Component Value Date    WBC 7.4 07/09/2019    RBC 4.21 07/09/2019    HGB 9.5 (L) 07/24/2019    HCT 39.3 07/09/2019    MCV 93 07/09/2019    MCH 31.1 07/09/2019    MCHC 33.3 07/09/2019    RDW 12.5 07/09/2019     07/09/2019       Lab Results   Component Value Date     07/24/2019    POTASSIUM 4.1 07/24/2019    CHLORIDE 107 07/24/2019    CO2 21 07/24/2019    ANIONGAP 13 07/24/2019    GLC 90 07/24/2019    BUN 8 07/24/2019    CR 0.67 07/24/2019    GFRESTIMATED >60 07/24/2019    GFRESTBLACK >60 07/24/2019    FRANKLIN 9.5 07/24/2019      Lab Results   Component Value Date    AST 13 01/04/2019    ALT 19 01/04/2019       No results found for: A1C    Lab Results   Component Value Date    INR 0.95 03/27/2015         ISMAEL SANDOVAL [facility]  Pager: [phone]  RN phone: [phone]

## 2019-10-14 NOTE — LETTER
10/14/2019    Rebekah Rausch MD  [address] [facility] [address]    RE: Elizabeth Li       Dear Colleague,    I had the pleasure of seeing Elizabeth Li in the [facility].    Cardiology Clinic Progress Note  Elizabeth Li MRN# [id number]   YOB: 1935 Age: 84 year old         History of Presenting Illness:      Primary cardiologist: Dr. Oakley    Reason for visit: Three month follow up    Past medical history :    1. CAD: status post PCI with LUCILA to LAD and known occluded RCA and moderate stable LCx disease  2. Hypertension  3. Hyperlipidemia  4. Obstructive sleep apnea  5. Chronic back pain secondary sciatica   6. Anxiety/Depression  7. Hyopothyroidism    Elizabeth Li, a pleasant 84 year old patient who presents today for a routine 3 month follow up. Her cardiac history dates back to 2015 when she was evaluated for progressive dyspnea on exertion. He had an abnormal stress test and CTA that were abnormal. A coronary angiogram revealed 2 vessel CAD with a high-grade stenosis of the mid LAD, moderate left circumflex disease, and  of right coronary artery. She had a PCI to the mid LAD and the RCA was not intervened upon. Post her coronary artery intervention her symptoms improved significantly.     In July 2019 she underwent back surgery for ongoing chronic pain secondary to sciatica.  Prior to that she had mild dyspnea on exertion that was likely attributed to her chronic low back pain and hip pain.  She states that since her surgery she has had significant improvement in her back pain.  She continues to have hip and knee pain and she is going to be starting physical therapy for ongoing management.    Today in clinic she is accompanied by her .  She states that her shortness of breath on exertion is stable and unchanged.  She denies any chest discomfort, PND, orthopnea, lower extremity edema or palpitations.         Diagnostic  studies:    Echocardiogram (6/2017): LVEF estimated at 55 to 60% with mild concentric LVH.  Small LV cavity size.  Normal RV structure and size with trace to mild aortic regurgitation.    (10/2015): LVEF 55 to 60% without regional wall motion abnormalities and normal LV size with trace to mild aortic regurgitation    Stress echocardiogram (1/2015): Dyspnea during exercise with normal blood pressure response.  Complaint of shortness of breath and tightness with activity.  1.5 mm ST depression inferior lateral leads with no resting wall motion or stress-induced wall motion abnormalities.    CT coronary angiogram (1/2015): subtotal occlusion of the proximal RCA with severe stenosis of mid LAD and moderate stenosis of the OM1.  Total calcium score of 1100    Coronary angiogram (3/2015): LUCILA to mid LAD,  of RCA and moderate LCx disease         Assessment and Plan:     ASSESSMENT:    1. Coronary artery disease    Status post PCI with LUCILA to LAD and known occluded RCA and moderate stable LCx disease    Stable dyspnea on exertion    Aspirin, statin and Imdur    2. Hypertension    Well controlled    Metoprolol XL 12.5 mg BID    3. Hyperlipidemia    LDL 88    Simvastatin 10 mg daily    Previously declined change in statin therapy    PLAN:  1. Echocardiogram  2. Follow up in 1 year with Dr. Oakley and fasting lipids            Review of Systems:     Review of Systems:  Skin:  Negative for itching;rash   Eyes:  Positive for glasses  ENT:  Negative for    Respiratory:  Positive for sleep apnea;CPAP;shortness of breath  Cardiovascular:  Negative Positive for;fatigue  Gastroenterology: Negative    Genitourinary:  Positive for nocturia  Musculoskeletal:  Positive for back pain;neck pain;joint pain  Neurologic:  Positive for numbness or tingling of hands  Psychiatric:  Positive for sleep disturbances  Heme/Lymph/Imm:  Negative allergies  Endocrine:  Positive for thyroid disorder            Physical Exam:     Vitals: /60    "Pulse 70   Ht 1.562 m (5' 1.5\")   Wt 76.7 kg (169 lb)   SpO2 97%   BMI 31.42 kg/m    Constitutional:  cooperative;in no acute distress   Ambulates with walker    Skin:  warm and dry to the touch        Head:  normocephalic        Eyes:  conjunctivae and lids unremarkable;sclera white        ENT:  no pallor or cyanosis        Neck:  no stiffness        Chest:  clear to auscultation        Cardiac: regular rhythm;normal S1 and S2;apical impulse not displaced                  Abdomen:  abdomen soft        Vascular: pulses full and equal                                      Extremities and Back:  no edema        Neurological:  no gross motor deficits               Medications:     Current Outpatient Medications   Medication Sig Dispense Refill     ascorbic acid (VITAMIN C) 500 MG tablet Take 500 mg by mouth daily (with lunch) Pt takes in afternoon       aspirin (ASA) 81 MG tablet Take 1 tablet (81 mg) by mouth daily 90 tablet 3     cholecalciferol (VITAMIN D3) 1000 UNIT tablet Take 1,000 Units by mouth 2 times daily       isosorbide mononitrate (IMDUR) 30 MG 24 hr tablet TAKE ONE TABLET BY MOUTH EVERY DAY 90 tablet 1     levothyroxine (SYNTHROID/LEVOTHROID) 75 MCG tablet TAKE ONE TABLET BY MOUTH EVERY MORNING (BEFORE BREAKFAST) 90 tablet 0     liothyronine (CYTOMEL) 5 MCG tablet TAKE ONE TABLET BY MOUTH DAILY (WITH BREAKFAST) 90 tablet 0     metoprolol tartrate (LOPRESSOR) 25 MG tablet TAKE ONE-HALF TABLET (12.5MG) BY MOUTH TWO TIMES A DAY 90 tablet 1     omeprazole (PRILOSEC) 20 MG DR capsule TAKE ONE CAPSULE BY MOUTH ONCE DAILY 90 capsule 1     order for DME Equipment being ordered: wheeled walker 1 Device 0     order for DME Equipment being ordered: CPAP       polyethylene glycol (MIRALAX/GLYCOLAX) packet Take 1 packet by mouth 4 times daily as needed for constipation       simvastatin (ZOCOR) 10 MG tablet Take 1 tablet (10 mg) by mouth At Bedtime 90 tablet 4     acetaminophen (TYLENOL) 500 MG tablet Take 1,000 " mg by mouth 3 times daily        HYDROmorphone (DILAUDID) 2 MG tablet Take 1 tablet (2 mg) by mouth 2 times daily as needed for moderate to severe pain (Patient not taking: Reported on 10/14/2019) 30 tablet 0       Family History   Problem Relation Age of Onset     Blood Disease Mother         pernious anemia     Heart Disease Father      Heart Disease Sister      Heart Disease Brother      Colon Cancer No family hx of        Social History     Socioeconomic History     Marital status:      Spouse name: Not on file     Number of children: 4     Years of education: Not on file     Highest education level: Not on file   Occupational History     Not on file   Social Needs     Financial resource strain: Not on file     Food insecurity:     Worry: Not on file     Inability: Not on file     Transportation needs:     Medical: Not on file     Non-medical: Not on file   Tobacco Use     Smoking status: Former Smoker     Last attempt to quit: 1984     Years since quittin.8     Smokeless tobacco: Never Used   Substance and Sexual Activity     Alcohol use: Yes     Alcohol/week: 0.0 standard drinks     Comment: social drinker     Drug use: No     Sexual activity: Not Currently     Partners: Male   Lifestyle     Physical activity:     Days per week: Not on file     Minutes per session: Not on file     Stress: Not on file   Relationships     Social connections:     Talks on phone: Not on file     Gets together: Not on file     Attends Restorationist service: Not on file     Active member of club or organization: Not on file     Attends meetings of clubs or organizations: Not on file     Relationship status: Not on file     Intimate partner violence:     Fear of current or ex partner: Not on file     Emotionally abused: Not on file     Physically abused: Not on file     Forced sexual activity: Not on file   Other Topics Concern     Parent/sibling w/ CABG, MI or angioplasty before 65F 55M? Not Asked      Service  Not Asked     Blood Transfusions Not Asked     Caffeine Concern Yes     Comment: coffee      Occupational Exposure No     Hobby Hazards No     Sleep Concern No     Stress Concern No     Weight Concern No     Special Diet Yes     Comment: gluten free and dairy free     Back Care No     Exercise No     Comment: on hold , 4/7 starting cardiac rehab     Bike Helmet Not Asked     Seat Belt Yes     Self-Exams Not Asked   Social History Narrative     Not on file            Past Medical History:     Past Medical History:   Diagnosis Date     Abnormal cardiovascular stress test     EKG changes with exercise on Exercise stress test 1/26/2015     CAD (coronary artery disease)     Severe 2 vessel CAD. PCI with drug-eluting stents x2 to mid LAD on 3/27/15     HTN (hypertension)      Hyperlipidemia LDL goal <100      Hypothyroidism      IHD (ischemic heart disease)      KUSUM (obstructive sleep apnea)     CPAP              Past Surgical History:     Past Surgical History:   Procedure Laterality Date     CARDIAC SURGERY      2 heart stents     COLONOSCOPY       COLONOSCOPY N/A 1/11/2017    Procedure: COLONOSCOPY;  Surgeon: Nghia Moss MD;  Location:  GI     HEAD & NECK SURGERY      wisdom teeth removed     HEART CATH, ANGIOPLASTY  3/27/15    2.5 X 18 mm & 3.0 X mm LUCILA to LAD     OPTICAL TRACKING SYSTEM FUSION POSTERIOR SPINE LUMBAR N/A 7/17/2019    Procedure: L3-L4 TRANSFORAMINAL LUMBAR  INTRABODY FUSION WITH ALLOGRAFT, OPTICAL TRACKING SYSTEM AND [employer] SOLARA INSTRUMENTATION;  Surgeon: Cole Jansen MD;  Location:  OR     TONSILLECTOMY      T&A as a child     TUBAL LIGATION                Allergies:   Flu virus vaccine; Gluten meal; and Milk protein extract       Data:   All laboratory data reviewed:    Recent Labs   Lab Test 03/05/19  0958 01/17/19  0925 01/04/19  1045 11/21/18  1252  03/07/18  0840  05/16/17  0735   LDL  --   --  88  --   --  58  --  63   HDL  --   --  57  --   --  51  --  54   NHDL  --   --   106  --   --  87  --  88   CHOL  --   --  163  --   --  138  --  142   TRIG  --   --  88  --   --  144  --  127   TSH 0.44 0.23*  --  0.08*   < > 0.25*   < >  --     < > = values in this interval not displayed.       Lab Results   Component Value Date    WBC 7.4 07/09/2019    RBC 4.21 07/09/2019    HGB 9.5 (L) 07/24/2019    HCT 39.3 07/09/2019    MCV 93 07/09/2019    MCH 31.1 07/09/2019    MCHC 33.3 07/09/2019    RDW 12.5 07/09/2019     07/09/2019       Lab Results   Component Value Date     07/24/2019    POTASSIUM 4.1 07/24/2019    CHLORIDE 107 07/24/2019    CO2 21 07/24/2019    ANIONGAP 13 07/24/2019    GLC 90 07/24/2019    BUN 8 07/24/2019    CR 0.67 07/24/2019    GFRESTIMATED >60 07/24/2019    GFRESTBLACK >60 07/24/2019    FRANKLIN 9.5 07/24/2019      Lab Results   Component Value Date    AST 13 01/04/2019    ALT 19 01/04/2019       No results found for: A1C    Lab Results   Component Value Date    INR 0.95 03/27/2015         ISMAEL SANDOVAL CNP  [facility]  Pager: [phone]  RN phone: [phone]    Thank you for allowing me to participate in the care of your patient.    Sincerely,     ISMAEL SANDOVAL CNP     [facility]     none known

## 2019-10-21 NOTE — PATIENT INSTRUCTIONS
- New physical therapy referral placed for [place] location - they will contact you to schedule  - May start to use lumbar corset (brace) again  - May increase lifting restriction to 30 pounds and continue to gradually increase  - followup in 3 months with xray prior   - Call the clinic at [phone] for increased pain or any other questions and concerns.

## 2019-10-21 NOTE — NURSING NOTE
"Elizabeth Li is a 84 year old female who presents for:  Chief Complaint   Patient presents with     Follow Up        Initial Vitals:  /72   Pulse 64   Temp 98  F (36.7  C) (Oral)   Resp 16   Ht 5' 1\" (1.549 m)   Wt 169 lb (76.7 kg)   SpO2 94%   BMI 31.93 kg/m   Estimated body mass index is 31.93 kg/m  as calculated from the following:    Height as of this encounter: 5' 1\" (1.549 m).    Weight as of this encounter: 169 lb (76.7 kg).. Body surface area is 1.82 meters squared. BP completed using cuff size: regular  Data Unavailable        Nursing Comments: Pt present today for follow up.        Heladio Lugo CMA    "

## 2019-10-21 NOTE — PROGRESS NOTES
[facility]  Neurosurgery followup:    HPI: 3 months s/p L3-4 TLIF. Doing well with improvement in back and leg pain. She has been doing outpatient PT and states she feels as though her pain has increased in her right hip and knee since starting outpatient therapy vs home therapy. States overall pain improved from prior to surgery. No weakness.  Exam:  Constitutional:  Alert, well nourished, NAD.  HEENT: Normocephalic, atraumatic.   Pulm:  Without shortness of breath   CV:  No pitting edema of BLE.      Neurological:  Awake  Alert  Oriented x 3  Motor exam:        IP Q DF PF EHL  R   5  5   5   5    5  L   5  5   5   5    5     Reflexes are 2+ in the patellar and Achilles. There is no clonus. Downgoing Babinski.    Able to spontaneously move L/E bilaterally  Sensation intact throughout all L/E dermatomes     Incision: Nicely healed  Imaging: AP and lateral lumbar films reveal stable hardware  A/P: 3 months s/p L3-4 TLIF. Doing well with improvement in back and leg pain. She has been doing outpatient PT and states she feels as though her pain has increased in her right hip and knee since starting outpatient therapy vs home therapy. XRs with stable hardware. No weakness on exam. Will refer for PT at different location per patient request. I did also discuss lumbar corset to help with her back pain and she does have one from prior to surgery, so I encouraged she wear this. Advised to contact clinic if pain does not seem to be improving. Patient voiced understanding and agreement.  - May increase lifting restriction to 30 pounds and continue to gradually increase  - followup in 3 months with xray prior   - Call the clinic at [phone] for increased pain or any other questions and concerns.      Annabelle Leon PA-C  [facility]  [address]  [address] [place] [id number]    Tel [phone]  Pager [phone]

## 2019-10-21 NOTE — LETTER
10/21/2019         RE: Elizabeth Li  [id number]  [facility] [address]        Dear Colleague,    Thank you for referring your patient, Elizabeth Li, to the [facility]. Please see a copy of my visit note below.    [facility]  Neurosurgery followup:    HPI: 3 months s/p L3-4 TLIF. Doing well with improvement in back and leg pain. She has been doing outpatient PT and states she feels as though her pain has increased in her right hip and knee since starting outpatient therapy vs home therapy. States overall pain improved from prior to surgery. No weakness.  Exam:  Constitutional:  Alert, well nourished, NAD.  HEENT: Normocephalic, atraumatic.   Pulm:  Without shortness of breath   CV:  No pitting edema of BLE.      Neurological:  Awake  Alert  Oriented x 3  Motor exam:        IP Q DF PF EHL  R   5  5   5   5    5  L   5  5   5   5    5     Reflexes are 2+ in the patellar and Achilles. There is no clonus. Downgoing Babinski.    Able to spontaneously move L/E bilaterally  Sensation intact throughout all L/E dermatomes     Incision: Nicely healed  Imaging: AP and lateral lumbar films reveal stable hardware  A/P: 3 months s/p L3-4 TLIF. Doing well with improvement in back and leg pain. She has been doing outpatient PT and states she feels as though her pain has increased in her right hip and knee since starting outpatient therapy vs home therapy. XRs with stable hardware. No weakness on exam. Will refer for PT at different location per patient request. I did also discuss lumbar corset to help with her back pain and she does have one from prior to surgery, so I encouraged she wear this. Advised to contact clinic if pain does not seem to be improving. Patient voiced understanding and agreement.  - May increase lifting restriction to 30 pounds and continue to gradually increase  - followup in 3 months with xray prior   - Call the clinic at [phone]  for increased pain or any other questions and concerns.      Annabelle Leon PA-C  [facility]  [address] [address]    Tel [phone]  Pager [phone]      Again, thank you for allowing me to participate in the care of your patient.        Sincerely,        Annabelle Leon PA-C

## 2019-10-23 PROBLEM — M54.42 ACUTE BILATERAL LOW BACK PAIN WITH BILATERAL SCIATICA: Status: ACTIVE | Noted: 2019-01-01

## 2019-10-23 PROBLEM — M51.369 DDD (DEGENERATIVE DISC DISEASE), LUMBAR: Status: RESOLVED | Noted: 2017-01-20 | Resolved: 2019-01-01

## 2019-10-23 PROBLEM — M54.41 ACUTE BILATERAL LOW BACK PAIN WITH BILATERAL SCIATICA: Status: ACTIVE | Noted: 2019-01-01

## 2019-10-23 NOTE — PROGRESS NOTES
Subjective:  HPI                    Objective:  System    Physical Exam    General     ROS    Assessment/Plan:    PROGRESS  REPORT    Progress reporting period is from 10/23/19, patient was previously in for 5 visits from 9/9/19 to 10/8/19.       SUBJECTIVE  Subjective changes noted by patient: Patient notes that she followed up with her surgeon/PA for re check and was asked to resume PT at [facility].   Current complaint are central LBP, R hip and R knee pain. Sometimes the thigh hurts as well.  Denies numbness/tingling/weakness. Symptoms are exacerbated by standing/walking and weight bearing fully on the R leg at times.  Sleep is disturbed.  Salon Pas on knees helps, ice for back and hip.  No longer taking pain medication, using only ibuprofen.         Current pain level is 0-8/10  .     Previous pain level was  5-10/10  .   Changes in function:  Slow initial progress in PT, will resume and continue to work in PT at advise of surgical PA.   Adverse reaction to treatment or activity: None    OBJECTIVE  Changes noted in objective findings:  Yes,   LUMBAR:    Posture: fair  Relevant Lateral Shift: no    Neurological:    Motor Deficit:  Myotomes L R   L1-2 (hip flexion) 5 5   L3 (knee extension) 5 5   L4 (ankle DF) 5 5   L5 (g. toe ext) 5 5   S1 (ankle PF or knee flex) 5 5     Sensory Deficit, Reflexes: Intact light touch screen B UE dermatomes.        AROM: (Major, Moderate, Minimal or Nil loss)  Movement Loss Rodolfo Mod Min Nil Pain   Flexion  X   Just past knees, no pain   Extension  X   Trace pain R LB   Side Gliding L  X   Trace pain R LB   Side Gliding R  X   No effect     Repeated movement testing: not tested due to post op status    Other Tests: hip  HIP PROM: R hip limited in motion <90 degrees flexion, 20 ER and 10 IR.  Pain at all end ranges, pain localizes to R hip joint and lateral thigh.  Old x-ray from 2016 reveals moderate hip degenerative changes.  This is likely a contributor to current symptoms  .    Provisional Classification: other, post op  Principle of Management: will progress PT for lumbar ROM gentle, strength and function.  Will work on R hip, but there is history of known hip OA/DJD that likely contributes .            ASSESSMENT/PLAN  Updated problem list and treatment plan: Diagnosis 1:  S/p L3-4 decompression/fusion surgery on 7/17/19; likely contribution of current symptoms from R hip joint with moderate DJD on 2016 and based on physical exam    Pain -  hot/cold therapy, manual therapy and home program  Decreased ROM/flexibility - manual therapy and therapeutic exercise  Decreased strength - therapeutic exercise and therapeutic activities  Decreased proprioception - neuro re-education and therapeutic activities  Impaired gait - gait training  Decreased function - therapeutic activities  STG/LTGs have been met or progress has been made towards goals:  Yes (See Goal flow sheet completed today.)  Assessment of Progress: The patient's condition is improving.  Self Management Plans:  Patient has been instructed in a home treatment program.  I have re-evaluated this patient and find that the nature, scope, duration and intensity of the therapy is appropriate for the medical condition of the patient.  Elizabeth continues to require the following intervention to meet STG and LTG's:  PT    Recommendations:  This patient would benefit from continued therapy.     Frequency:  1 X week, once daily  Duration:  for 8 weeks        Please refer to the daily flowsheet for treatment today, total treatment time and time spent performing 1:1 timed codes.

## 2019-10-29 NOTE — TELEPHONE ENCOUNTER
Per Annabelle Leon PA-C dermatomes at L3-4 correlate to lateral hip. Would recommend continue with PT for now as she is only 3 months out and still recovering from surgery. Can look into hip injection later into recovery. Could also try MDP now to see if that helps.     Spoke to patient. Reviewed above.  Patient verbalized understanding and would like to try MDP. Patient stated she has only been using heat to areas of pain. Advised to try applying ice instead. Patient stated she will. Rx sent to her pharmacy. Patient will contact clinic if pain persists or worsens.

## 2019-10-29 NOTE — TELEPHONE ENCOUNTER
Patient called, to discuss pain Symptoms, requesting Injection, in Rt Hip.    Please advise.    . [phone]

## 2019-10-29 NOTE — TELEPHONE ENCOUNTER
Spoke to patient. Patient recently saw Annabelle Leon PA-C in clinic on 10/21/19. Patient started PT, reports pain in right hip and groin causing difficulties doing PT.  Pain has increased over the last 2 weeks per patient. The pain has been affecting her sleep at night. She states the lumbar corset is not helping much. Patient was inquiring about an injection for her right hip. Will review with Annabelle Leon PA-C for recommendations.

## 2019-11-05 NOTE — TELEPHONE ENCOUNTER
Patient calling to update on symptoms s/p MDP prescribed 10/29. Her right hip pain has improved, and feeling more tolerable after the medrol dose pack and icing. Finished the Medrol dose pack. Using ice, ibuprofen, and lidocaine patches on hips/knees.  She reports having a good day yesterday, being awoken this AM at 5am with 10/10 pain in left lower back above her hip. This was a different pain than she took the medrol dose pack for. She has had this for 3 weeks or more-intermittently. At time of call, pain was managed and ambulating alleviates pain. She plans to go to her Physical Therapy appointment today and continue to monitor her pain and update us as needed.  Nursing encouraged her to continue to update us with concerns. Continue with icing. Reassured her it is encouraging the MDP helped her pain. Advised she update the team if the left lower back intermittent pain continues, worsens, or she has concerns.   Agreed with plan.

## 2019-11-22 NOTE — TELEPHONE ENCOUNTER
Patient is s/p L3-L4 TRANSFORAMINAL LUMBAR  INTRABODY FUSION on 07/17/2019. LOV with Annabelle Leon 10/21/2019. Calling to update on her hip pain. She continues with physical therapy. Can do a lot of PT exercises but nothing with right leg.  Pain is located in her right hip and groin.  When she tries to do exercises goes up 10/10 pain. Sitting down no pain. Taking Ibuprofen for pain. Icing. Uses pill to elevate leg when sleeping because lengthening the leg straight causes too much pain  Medrol dose pack prescribed on 10/29/2019 only helped for the period that she had it.   Inquiring about a hip injection. Will discuss with team and get back to her. Discussed will touch base with a plan either today or Monday and to call the team back if have not heard. She agrees with plan.

## 2019-11-22 NOTE — TELEPHONE ENCOUNTER
Per Annabelle BAUTISTA if its anterior hip/groin pain, would recommend she follow up with PCP for possible hip injection order. Relayed to patient. Advised to call back with questions or concerns.

## 2019-12-02 NOTE — TELEPHONE ENCOUNTER
Patient is calling to request a referral to get a cortisone injection in her hip. She has had them in the past, but her orthopedic doctor has since retired. Please call her back to advise at [phone] (home)

## 2019-12-03 NOTE — TELEPHONE ENCOUNTER
Left message on home/cell voicemail asking patient to return call to clinic.  Can contact [facility]  at [phone].  GEMMA Patel R.N.

## 2019-12-11 NOTE — PATIENT INSTRUCTIONS
1. Primary osteoarthritis of right hip    2. Trochanteric bursitis of right hip      -Patient has right hip pain due to arthritis and bursitis  -Patient tolerated cortisone injections of the right hip joint and bursa without complications.  Patient was given postprocedure instructions  -Patient will resume her therapy and home exercises for her lower back as tolerated  -Patient will follow up when pain returns  -Call direct clinic number [[phone]] at any time with questions or concerns.    Albert Yeo MD [facility]  [facility]

## 2019-12-11 NOTE — NURSING NOTE
Elizabeth to follow up with Primary Care provider regarding elevated blood pressure.  Lissette Plummer MS, ATC

## 2019-12-11 NOTE — LETTER
12/11/2019         RE: Elizabeth Li  [id number]  [facility] [address]        Dear Colleague,    Thank you for referring your patient, Elizabeth Li, to the [facility]. Please see a copy of my visit note below.    ASSESSMENT & PLAN  Patient Instructions     1. Primary osteoarthritis of right hip    2. Trochanteric bursitis of right hip      -Patient has right hip pain due to arthritis and bursitis  -Patient tolerated cortisone injections of the right hip joint and bursa without complications.  Patient was given postprocedure instructions  -Patient will resume her therapy and home exercises for her lower back as tolerated  -Patient will follow up when pain returns  -Call direct clinic number [[phone]] at any time with questions or concerns.    Albert Yeo MD [facility] Orthopedics and Sports Medicine  [facility]          -----    SUBJECTIVE  Elizabeth Li is a/an 84 year old female who is seen in consultation at the request of  Karen Fofana M.D. for evaluation of right hip pain. The patient is seen by themselves. Had back surgery in July, and now has pain in her right hip that is prohibiting her from doing her back rehab. Patient is using a walker, and will occasionally use a cane    Onset: 3 month(s) ago. Reports insidious onset without acute precipitating event.  Location of Pain: right buttock and lateral hip pain  Rating of Pain at worst: 10/10  Rating of Pain Currently:6/10  Worsened by: standing on hip, reaching, stairs  Better with: rest, ice, salpons patches  Treatments tried: rest/activity avoidance and ice  Quality: aching, squeezing, sharp  Associated symptoms: weakness of leg  Orthopedic history: YES - Date: previous cortisone shots on right hip 10/18  Relevant surgical history: YES - Date: low back surgery in July 2019  Social history: social history: retired    Past Medical History:   Diagnosis Date      Abnormal cardiovascular stress test     EKG changes with exercise on Exercise stress test 2015     CAD (coronary artery disease)     Severe 2 vessel CAD. PCI with drug-eluting stents x2 to mid LAD on 3/27/15     HTN (hypertension)      Hyperlipidemia LDL goal <100      Hypothyroidism      IHD (ischemic heart disease)      KUSUM (obstructive sleep apnea)     CPAP     Social History     Socioeconomic History     Marital status:      Spouse name: Not on file     Number of children: 4     Years of education: Not on file     Highest education level: Not on file   Occupational History     Not on file   Social Needs     Financial resource strain: Not on file     Food insecurity:     Worry: Not on file     Inability: Not on file     Transportation needs:     Medical: Not on file     Non-medical: Not on file   Tobacco Use     Smoking status: Former Smoker     Last attempt to quit: 1984     Years since quittin.9     Smokeless tobacco: Never Used   Substance and Sexual Activity     Alcohol use: Yes     Alcohol/week: 0.0 standard drinks     Comment: social drinker     Drug use: No     Sexual activity: Not Currently     Partners: Male   Lifestyle     Physical activity:     Days per week: Not on file     Minutes per session: Not on file     Stress: Not on file   Relationships     Social connections:     Talks on phone: Not on file     Gets together: Not on file     Attends Latter day service: Not on file     Active member of club or organization: Not on file     Attends meetings of clubs or organizations: Not on file     Relationship status: Not on file     Intimate partner violence:     Fear of current or ex partner: Not on file     Emotionally abused: Not on file     Physically abused: Not on file     Forced sexual activity: Not on file   Other Topics Concern     Parent/sibling w/ CABG, MI or angioplasty before 65F 55M? Not Asked      Service Not Asked     Blood Transfusions Not Asked     Caffeine  "Concern Yes     Comment: coffee      Occupational Exposure No     Hobby Hazards No     Sleep Concern No     Stress Concern No     Weight Concern No     Special Diet Yes     Comment: gluten free and dairy free     Back Care No     Exercise No     Comment: on hold , 4/7 starting cardiac rehab     Bike Helmet Not Asked     Seat Belt Yes     Self-Exams Not Asked   Social History Narrative     Not on file         Patient's past medical, surgical, social, and family histories were reviewed today and no changes are noted.    REVIEW OF SYSTEMS:  10 point ROS is negative other than symptoms noted above in HPI, Past Medical History or as stated below  Constitutional: NEGATIVE for fever, chills, change in weight  Skin: NEGATIVE for worrisome rashes, moles or lesions  GI/: NEGATIVE for bowel or bladder changes  Neuro: NEGATIVE for weakness, dizziness or paresthesias    OBJECTIVE:  BP (!) 148/60   Ht 1.549 m (5' 1\")   Wt 76.7 kg (169 lb)   BMI 31.93 kg/m      General: healthy, alert and in no distress  HEENT: no scleral icterus or conjunctival erythema  Skin: no suspicious lesions or rash. No jaundice.  CV: no pedal edema  Resp: normal respiratory effort without conversational dyspnea   Psych: normal mood and affect  Gait: normal steady gait with appropriate coordination and balance  Neuro: Normal light sensory exam of lower extremity  MSK:  RIGHT HIP  Inspection:    No obvious deformity or asymmetry, level pelvis  Palpation:    Tender about the anterior groin/joint line and greater trochanteric region. Otherwise all other landmarks are nontender.  Active Range of Motion:     Flexion limited substantially by pain, IR limited substantially by pain, ER  limited substantially by pain  Strength:    Flexion 4+/5, adduction 5-/5, abduction 4+/5  Special Tests:    Positive: Logroll, resisted gluteus medius provocation, WAYNE, anterior impingement (FADIR), posterior impingement (EX/AB/ER)        Independent visualization of the " below image:  No results found for this or any previous visit (from the past 24 hour(s)).    XR PELVIS 1/2 VW  12/29/2016 11:36 AM     HISTORY:  Pain in right hip     COMPARISON:  None.                                                                      IMPRESSION:  Moderate degenerative changes of the right hip with  medial joint space loss. Mild degenerative changes of the left hip.  Upper pelvis not entirely included on this image.      VANDANA BOLAÑOS MD    Large Joint Injection/Arthocentesis: R hip joint  Date/Time: 12/11/2019 11:30 AM  Performed by: Yeo, Albert, MD  Authorized by: Yeo, Albert, MD     Indications:  Pain and osteoarthritis  Needle Size:  22 G  Guidance: ultrasound    Approach:  Anterior  Location:  Hip      Site:  R hip joint  Medications:  40 mg methylPREDNISolone 40 MG/ML  Outcome:  Tolerated well, no immediate complications  Procedure discussed: discussed risks, benefits, and alternatives    Consent Given by:  Patient  Timeout: timeout called immediately prior to procedure    Prep: patient was prepped and draped in usual sterile fashion    Large Joint Injection/Arthocentesis: R greater trochanteric bursa  Date/Time: 12/11/2019 11:30 AM  Performed by: Yeo, Albert, MD  Authorized by: Yeo, Albert, MD     Indications:  Pain  Needle Size:  22 G  Guidance: ultrasound    Approach:  Lateral  Location:  Hip      Site:  R greater trochanteric bursa  Medications:  40 mg methylPREDNISolone 40 MG/ML  Outcome:  Tolerated well, no immediate complications  Procedure discussed: discussed risks, benefits, and alternatives    Consent Given by:  Patient  Timeout: timeout called immediately prior to procedure    Prep: patient was prepped and draped in usual sterile fashion              Albert Yeo MD [facility]      Again, thank you for allowing me to participate in the care of your patient.        Sincerely,        Albert Yeo, MD

## 2019-12-11 NOTE — PROGRESS NOTES
ASSESSMENT & PLAN  Patient Instructions     1. Primary osteoarthritis of right hip    2. Trochanteric bursitis of right hip      -Patient has right hip pain due to arthritis and bursitis  -Patient tolerated cortisone injections of the right hip joint and bursa without complications.  Patient was given postprocedure instructions  -Patient will resume her therapy and home exercises for her lower back as tolerated  -Patient will follow up when pain returns  -Call direct clinic number [[phone]] at any time with questions or concerns.    Albert Yeo MD [facility]  [facility]          -----    SUBJECTIVE  Elizabeth Li is a/an 84 year old female who is seen in consultation at the request of  Karen Fofana M.D. for evaluation of right hip pain. The patient is seen by themselves. Had back surgery in July, and now has pain in her right hip that is prohibiting her from doing her back rehab. Patient is using a walker, and will occasionally use a cane    Onset: 3 month(s) ago. Reports insidious onset without acute precipitating event.  Location of Pain: right buttock and lateral hip pain  Rating of Pain at worst: 10/10  Rating of Pain Currently:6/10  Worsened by: standing on hip, reaching, stairs  Better with: rest, ice, salpons patches  Treatments tried: rest/activity avoidance and ice  Quality: aching, squeezing, sharp  Associated symptoms: weakness of leg  Orthopedic history: YES - Date: previous cortisone shots on right hip 10/18  Relevant surgical history: YES - Date: low back surgery in July 2019  Social history: social history: retired    Past Medical History:   Diagnosis Date     Abnormal cardiovascular stress test     EKG changes with exercise on Exercise stress test 1/26/2015     CAD (coronary artery disease)     Severe 2 vessel CAD. PCI with drug-eluting stents x2 to mid LAD on 3/27/15     HTN (hypertension)      Hyperlipidemia LDL goal <100       Hypothyroidism      IHD (ischemic heart disease)      KUSUM (obstructive sleep apnea)     CPAP     Social History     Socioeconomic History     Marital status:      Spouse name: Not on file     Number of children: 4     Years of education: Not on file     Highest education level: Not on file   Occupational History     Not on file   Social Needs     Financial resource strain: Not on file     Food insecurity:     Worry: Not on file     Inability: Not on file     Transportation needs:     Medical: Not on file     Non-medical: Not on file   Tobacco Use     Smoking status: Former Smoker     Last attempt to quit: 1984     Years since quittin.9     Smokeless tobacco: Never Used   Substance and Sexual Activity     Alcohol use: Yes     Alcohol/week: 0.0 standard drinks     Comment: social drinker     Drug use: No     Sexual activity: Not Currently     Partners: Male   Lifestyle     Physical activity:     Days per week: Not on file     Minutes per session: Not on file     Stress: Not on file   Relationships     Social connections:     Talks on phone: Not on file     Gets together: Not on file     Attends Sabianist service: Not on file     Active member of club or organization: Not on file     Attends meetings of clubs or organizations: Not on file     Relationship status: Not on file     Intimate partner violence:     Fear of current or ex partner: Not on file     Emotionally abused: Not on file     Physically abused: Not on file     Forced sexual activity: Not on file   Other Topics Concern     Parent/sibling w/ CABG, MI or angioplasty before 65F 55M? Not Asked      Service Not Asked     Blood Transfusions Not Asked     Caffeine Concern Yes     Comment: coffee      Occupational Exposure No     Hobby Hazards No     Sleep Concern No     Stress Concern No     Weight Concern No     Special Diet Yes     Comment: gluten free and dairy free     Back Care No     Exercise No     Comment: on hold ,   "starting cardiac rehab     Bike Helmet Not Asked     Seat Belt Yes     Self-Exams Not Asked   Social History Narrative     Not on file         Patient's past medical, surgical, social, and family histories were reviewed today and no changes are noted.    REVIEW OF SYSTEMS:  10 point ROS is negative other than symptoms noted above in HPI, Past Medical History or as stated below  Constitutional: NEGATIVE for fever, chills, change in weight  Skin: NEGATIVE for worrisome rashes, moles or lesions  GI/: NEGATIVE for bowel or bladder changes  Neuro: NEGATIVE for weakness, dizziness or paresthesias    OBJECTIVE:  BP (!) 148/60   Ht 1.549 m (5' 1\")   Wt 76.7 kg (169 lb)   BMI 31.93 kg/m     General: healthy, alert and in no distress  HEENT: no scleral icterus or conjunctival erythema  Skin: no suspicious lesions or rash. No jaundice.  CV: no pedal edema  Resp: normal respiratory effort without conversational dyspnea   Psych: normal mood and affect  Gait: normal steady gait with appropriate coordination and balance  Neuro: Normal light sensory exam of lower extremity  MSK:  RIGHT HIP  Inspection:    No obvious deformity or asymmetry, level pelvis  Palpation:    Tender about the anterior groin/joint line and greater trochanteric region. Otherwise all other landmarks are nontender.  Active Range of Motion:     Flexion limited substantially by pain, IR limited substantially by pain, ER  limited substantially by pain  Strength:    Flexion 4+/5, adduction 5-/5, abduction 4+/5  Special Tests:    Positive: Logroll, resisted gluteus medius provocation, WAYNE, anterior impingement (FADIR), posterior impingement (EX/AB/ER)        Independent visualization of the below image:  No results found for this or any previous visit (from the past 24 hour(s)).    XR PELVIS 1/2 VW  12/29/2016 11:36 AM     HISTORY:  Pain in right hip     COMPARISON:  None.                                                                      IMPRESSION:  " Moderate degenerative changes of the right hip with  medial joint space loss. Mild degenerative changes of the left hip.  Upper pelvis not entirely included on this image.      VANDANA BOLAÑOS MD    Large Joint Injection/Arthocentesis: R hip joint  Date/Time: 12/11/2019 11:30 AM  Performed by: Yeo, Albert, MD  Authorized by: Yeo, Albert, MD     Indications:  Pain and osteoarthritis  Needle Size:  22 G  Guidance: ultrasound    Approach:  Anterior  Location:  Hip      Site:  R hip joint  Medications:  40 mg methylPREDNISolone 40 MG/ML  Outcome:  Tolerated well, no immediate complications  Procedure discussed: discussed risks, benefits, and alternatives    Consent Given by:  Patient  Timeout: timeout called immediately prior to procedure    Prep: patient was prepped and draped in usual sterile fashion    Large Joint Injection/Arthocentesis: R greater trochanteric bursa  Date/Time: 12/11/2019 11:30 AM  Performed by: Yeo, Albert, MD  Authorized by: Yeo, Albert, MD     Indications:  Pain  Needle Size:  22 G  Guidance: ultrasound    Approach:  Lateral  Location:  Hip      Site:  R greater trochanteric bursa  Medications:  40 mg methylPREDNISolone 40 MG/ML  Outcome:  Tolerated well, no immediate complications  Procedure discussed: discussed risks, benefits, and alternatives    Consent Given by:  Patient  Timeout: timeout called immediately prior to procedure    Prep: patient was prepped and draped in usual sterile fashion              Albert Yeo MD [facility]

## 2020-01-01 ENCOUNTER — OFFICE VISIT (OUTPATIENT)
Dept: ORTHOPEDICS | Facility: CLINIC | Age: 85
End: 2020-01-01
Payer: COMMERCIAL

## 2020-01-01 ENCOUNTER — RECORDS - HEALTHEAST (OUTPATIENT)
Dept: LAB | Facility: CLINIC | Age: 85
End: 2020-01-01

## 2020-01-01 ENCOUNTER — APPOINTMENT (OUTPATIENT)
Dept: PHYSICAL THERAPY | Facility: CLINIC | Age: 85
DRG: 542 | End: 2020-01-01
Attending: INTERNAL MEDICINE
Payer: COMMERCIAL

## 2020-01-01 ENCOUNTER — TRANSFERRED RECORDS (OUTPATIENT)
Dept: HEALTH INFORMATION MANAGEMENT | Facility: CLINIC | Age: 85
End: 2020-01-01

## 2020-01-01 ENCOUNTER — DOCUMENTATION ONLY (OUTPATIENT)
Dept: LAB | Facility: CLINIC | Age: 85
End: 2020-01-01

## 2020-01-01 ENCOUNTER — TELEPHONE (OUTPATIENT)
Dept: INTERNAL MEDICINE | Facility: CLINIC | Age: 85
End: 2020-01-01

## 2020-01-01 ENCOUNTER — NURSE TRIAGE (OUTPATIENT)
Dept: ORTHOPEDICS | Facility: CLINIC | Age: 85
End: 2020-01-01

## 2020-01-01 ENCOUNTER — APPOINTMENT (OUTPATIENT)
Dept: SPEECH THERAPY | Facility: CLINIC | Age: 85
DRG: 542 | End: 2020-01-01
Payer: COMMERCIAL

## 2020-01-01 ENCOUNTER — TELEPHONE (OUTPATIENT)
Dept: GERIATRICS | Facility: CLINIC | Age: 85
End: 2020-01-01

## 2020-01-01 ENCOUNTER — TELEPHONE (OUTPATIENT)
Dept: GERIATRICS | Facility: CLINIC | Age: 85
End: 2020-01-01
Payer: COMMERCIAL

## 2020-01-01 ENCOUNTER — VIRTUAL VISIT (OUTPATIENT)
Dept: GERIATRICS | Facility: CLINIC | Age: 85
End: 2020-01-01
Payer: COMMERCIAL

## 2020-01-01 ENCOUNTER — APPOINTMENT (OUTPATIENT)
Dept: ULTRASOUND IMAGING | Facility: CLINIC | Age: 85
DRG: 542 | End: 2020-01-01
Attending: INTERNAL MEDICINE
Payer: COMMERCIAL

## 2020-01-01 ENCOUNTER — APPOINTMENT (OUTPATIENT)
Dept: MAMMOGRAPHY | Facility: CLINIC | Age: 85
DRG: 542 | End: 2020-01-01
Attending: INTERNAL MEDICINE
Payer: COMMERCIAL

## 2020-01-01 ENCOUNTER — TELEPHONE (OUTPATIENT)
Dept: NEUROSURGERY | Facility: CLINIC | Age: 85
End: 2020-01-01

## 2020-01-01 ENCOUNTER — AMBULATORY - HEALTHEAST (OUTPATIENT)
Dept: OTHER | Facility: CLINIC | Age: 85
End: 2020-01-01

## 2020-01-01 ENCOUNTER — ANCILLARY PROCEDURE (OUTPATIENT)
Dept: GENERAL RADIOLOGY | Facility: CLINIC | Age: 85
End: 2020-01-01
Attending: PHYSICIAN ASSISTANT
Payer: COMMERCIAL

## 2020-01-01 ENCOUNTER — E-VISIT (OUTPATIENT)
Dept: INTERNAL MEDICINE | Facility: CLINIC | Age: 85
End: 2020-01-01
Payer: COMMERCIAL

## 2020-01-01 ENCOUNTER — APPOINTMENT (OUTPATIENT)
Dept: GENERAL RADIOLOGY | Facility: CLINIC | Age: 85
DRG: 208 | End: 2020-01-01
Attending: INTERNAL MEDICINE
Payer: COMMERCIAL

## 2020-01-01 ENCOUNTER — DOCUMENTATION ONLY (OUTPATIENT)
Dept: OTHER | Facility: CLINIC | Age: 85
End: 2020-01-01

## 2020-01-01 ENCOUNTER — PATIENT OUTREACH (OUTPATIENT)
Dept: CARE COORDINATION | Facility: CLINIC | Age: 85
End: 2020-01-01

## 2020-01-01 ENCOUNTER — APPOINTMENT (OUTPATIENT)
Dept: GENERAL RADIOLOGY | Facility: CLINIC | Age: 85
DRG: 542 | End: 2020-01-01
Attending: HOSPITALIST
Payer: COMMERCIAL

## 2020-01-01 ENCOUNTER — APPOINTMENT (OUTPATIENT)
Dept: CT IMAGING | Facility: CLINIC | Age: 85
DRG: 597 | End: 2020-01-01
Attending: EMERGENCY MEDICINE
Payer: COMMERCIAL

## 2020-01-01 ENCOUNTER — APPOINTMENT (OUTPATIENT)
Dept: GENERAL RADIOLOGY | Facility: CLINIC | Age: 85
DRG: 597 | End: 2020-01-01
Attending: EMERGENCY MEDICINE
Payer: COMMERCIAL

## 2020-01-01 ENCOUNTER — APPOINTMENT (OUTPATIENT)
Dept: CT IMAGING | Facility: CLINIC | Age: 85
DRG: 542 | End: 2020-01-01
Attending: HOSPITALIST
Payer: COMMERCIAL

## 2020-01-01 ENCOUNTER — OFFICE VISIT (OUTPATIENT)
Dept: NEUROSURGERY | Facility: CLINIC | Age: 85
End: 2020-01-01
Attending: NURSE PRACTITIONER
Payer: COMMERCIAL

## 2020-01-01 ENCOUNTER — APPOINTMENT (OUTPATIENT)
Dept: CT IMAGING | Facility: CLINIC | Age: 85
DRG: 542 | End: 2020-01-01
Attending: INTERNAL MEDICINE
Payer: COMMERCIAL

## 2020-01-01 ENCOUNTER — HOSPITAL ENCOUNTER (INPATIENT)
Facility: CLINIC | Age: 85
LOS: 10 days | Discharge: NURSING, REHAB OR LONG-TERM CARE | DRG: 542 | End: 2020-04-28
Attending: EMERGENCY MEDICINE | Admitting: HOSPITALIST
Payer: COMMERCIAL

## 2020-01-01 ENCOUNTER — VIRTUAL VISIT (OUTPATIENT)
Dept: INTERNAL MEDICINE | Facility: CLINIC | Age: 85
End: 2020-01-01
Payer: COMMERCIAL

## 2020-01-01 ENCOUNTER — ANCILLARY PROCEDURE (OUTPATIENT)
Dept: GENERAL RADIOLOGY | Facility: CLINIC | Age: 85
End: 2020-01-01
Attending: ORTHOPAEDIC SURGERY
Payer: COMMERCIAL

## 2020-01-01 ENCOUNTER — OFFICE VISIT (OUTPATIENT)
Dept: INTERNAL MEDICINE | Facility: CLINIC | Age: 85
End: 2020-01-01
Payer: COMMERCIAL

## 2020-01-01 ENCOUNTER — CARE COORDINATION (OUTPATIENT)
Dept: MAMMOGRAPHY | Facility: CLINIC | Age: 85
End: 2020-01-01

## 2020-01-01 ENCOUNTER — HOSPITAL ENCOUNTER (INPATIENT)
Facility: CLINIC | Age: 85
Setting detail: SURGERY ADMIT
End: 2020-01-01
Attending: ORTHOPAEDIC SURGERY | Admitting: ORTHOPAEDIC SURGERY
Payer: COMMERCIAL

## 2020-01-01 ENCOUNTER — THERAPY VISIT (OUTPATIENT)
Dept: PHYSICAL THERAPY | Facility: CLINIC | Age: 85
End: 2020-01-01
Payer: COMMERCIAL

## 2020-01-01 ENCOUNTER — APPOINTMENT (OUTPATIENT)
Dept: MRI IMAGING | Facility: CLINIC | Age: 85
DRG: 597 | End: 2020-01-01
Attending: INTERNAL MEDICINE
Payer: COMMERCIAL

## 2020-01-01 ENCOUNTER — APPOINTMENT (OUTPATIENT)
Dept: PHYSICAL THERAPY | Facility: CLINIC | Age: 85
DRG: 542 | End: 2020-01-01
Payer: COMMERCIAL

## 2020-01-01 ENCOUNTER — MEDICAL CORRESPONDENCE (OUTPATIENT)
Dept: HEALTH INFORMATION MANAGEMENT | Facility: CLINIC | Age: 85
End: 2020-01-01

## 2020-01-01 ENCOUNTER — TELEPHONE (OUTPATIENT)
Dept: ORTHOPEDICS | Facility: CLINIC | Age: 85
End: 2020-01-01

## 2020-01-01 ENCOUNTER — APPOINTMENT (OUTPATIENT)
Dept: GENERAL RADIOLOGY | Facility: CLINIC | Age: 85
DRG: 208 | End: 2020-01-01
Attending: EMERGENCY MEDICINE
Payer: COMMERCIAL

## 2020-01-01 ENCOUNTER — DOCUMENTATION ONLY (OUTPATIENT)
Dept: INTERNAL MEDICINE | Facility: CLINIC | Age: 85
End: 2020-01-01

## 2020-01-01 ENCOUNTER — HOSPITAL ENCOUNTER (INPATIENT)
Facility: CLINIC | Age: 85
LOS: 1 days | Discharge: ANOTHER HOSPITAL | DRG: 208 | End: 2020-06-23
Attending: EMERGENCY MEDICINE | Admitting: INTERNAL MEDICINE
Payer: COMMERCIAL

## 2020-01-01 ENCOUNTER — APPOINTMENT (OUTPATIENT)
Dept: SPEECH THERAPY | Facility: CLINIC | Age: 85
DRG: 542 | End: 2020-01-01
Attending: HOSPITALIST
Payer: COMMERCIAL

## 2020-01-01 ENCOUNTER — PRE VISIT (OUTPATIENT)
Dept: ORTHOPEDICS | Facility: CLINIC | Age: 85
End: 2020-01-01

## 2020-01-01 ENCOUNTER — HOSPITAL ENCOUNTER (INPATIENT)
Facility: CLINIC | Age: 85
LOS: 4 days | Discharge: NURSING, REHAB OR LONG-TERM CARE | DRG: 597 | End: 2020-06-07
Attending: EMERGENCY MEDICINE | Admitting: INTERNAL MEDICINE
Payer: COMMERCIAL

## 2020-01-01 ENCOUNTER — APPOINTMENT (OUTPATIENT)
Dept: CT IMAGING | Facility: CLINIC | Age: 85
DRG: 208 | End: 2020-01-01
Attending: EMERGENCY MEDICINE
Payer: COMMERCIAL

## 2020-01-01 ENCOUNTER — APPOINTMENT (OUTPATIENT)
Dept: OCCUPATIONAL THERAPY | Facility: CLINIC | Age: 85
DRG: 542 | End: 2020-01-01
Payer: COMMERCIAL

## 2020-01-01 VITALS
RESPIRATION RATE: 20 BRPM | SYSTOLIC BLOOD PRESSURE: 175 MMHG | HEIGHT: 62 IN | WEIGHT: 174.2 LBS | TEMPERATURE: 98.1 F | DIASTOLIC BLOOD PRESSURE: 71 MMHG | OXYGEN SATURATION: 93 % | HEART RATE: 78 BPM | BODY MASS INDEX: 32.06 KG/M2

## 2020-01-01 VITALS
BODY MASS INDEX: 23.65 KG/M2 | OXYGEN SATURATION: 93 % | SYSTOLIC BLOOD PRESSURE: 136 MMHG | HEART RATE: 71 BPM | HEIGHT: 62 IN | WEIGHT: 128.5 LBS | DIASTOLIC BLOOD PRESSURE: 70 MMHG | TEMPERATURE: 97.9 F | RESPIRATION RATE: 16 BRPM

## 2020-01-01 VITALS
SYSTOLIC BLOOD PRESSURE: 122 MMHG | WEIGHT: 128.5 LBS | DIASTOLIC BLOOD PRESSURE: 55 MMHG | HEART RATE: 78 BPM | HEIGHT: 62 IN | RESPIRATION RATE: 24 BRPM | TEMPERATURE: 98.7 F | OXYGEN SATURATION: 93 % | BODY MASS INDEX: 23.65 KG/M2

## 2020-01-01 VITALS
HEART RATE: 81 BPM | HEART RATE: 72 BPM | WEIGHT: 143.6 LBS | TEMPERATURE: 97.7 F | WEIGHT: 163.2 LBS | HEIGHT: 62 IN | OXYGEN SATURATION: 93 % | RESPIRATION RATE: 16 BRPM | HEIGHT: 62 IN | SYSTOLIC BLOOD PRESSURE: 181 MMHG | OXYGEN SATURATION: 93 % | SYSTOLIC BLOOD PRESSURE: 145 MMHG | DIASTOLIC BLOOD PRESSURE: 71 MMHG | RESPIRATION RATE: 14 BRPM | DIASTOLIC BLOOD PRESSURE: 78 MMHG | BODY MASS INDEX: 30.03 KG/M2 | BODY MASS INDEX: 26.43 KG/M2 | TEMPERATURE: 98.8 F

## 2020-01-01 VITALS
WEIGHT: 163.2 LBS | TEMPERATURE: 98.2 F | DIASTOLIC BLOOD PRESSURE: 84 MMHG | BODY MASS INDEX: 30.03 KG/M2 | HEART RATE: 85 BPM | HEIGHT: 62 IN | SYSTOLIC BLOOD PRESSURE: 178 MMHG | RESPIRATION RATE: 20 BRPM | OXYGEN SATURATION: 92 %

## 2020-01-01 VITALS
SYSTOLIC BLOOD PRESSURE: 148 MMHG | HEIGHT: 62 IN | HEART RATE: 70 BPM | WEIGHT: 148.6 LBS | RESPIRATION RATE: 24 BRPM | BODY MASS INDEX: 27.34 KG/M2 | TEMPERATURE: 98.2 F | DIASTOLIC BLOOD PRESSURE: 71 MMHG | OXYGEN SATURATION: 93 %

## 2020-01-01 VITALS
DIASTOLIC BLOOD PRESSURE: 65 MMHG | TEMPERATURE: 97.9 F | HEART RATE: 78 BPM | SYSTOLIC BLOOD PRESSURE: 162 MMHG | OXYGEN SATURATION: 96 % | RESPIRATION RATE: 16 BRPM | BODY MASS INDEX: 26.26 KG/M2 | WEIGHT: 143.6 LBS

## 2020-01-01 VITALS
SYSTOLIC BLOOD PRESSURE: 164 MMHG | HEART RATE: 76 BPM | HEIGHT: 62 IN | DIASTOLIC BLOOD PRESSURE: 76 MMHG | RESPIRATION RATE: 18 BRPM | WEIGHT: 163.2 LBS | TEMPERATURE: 97.8 F | OXYGEN SATURATION: 92 % | BODY MASS INDEX: 30.03 KG/M2

## 2020-01-01 VITALS
RESPIRATION RATE: 12 BRPM | WEIGHT: 170 LBS | HEART RATE: 85 BPM | HEIGHT: 61 IN | BODY MASS INDEX: 32.1 KG/M2 | OXYGEN SATURATION: 94 % | SYSTOLIC BLOOD PRESSURE: 120 MMHG | TEMPERATURE: 99.3 F | DIASTOLIC BLOOD PRESSURE: 70 MMHG

## 2020-01-01 VITALS
BODY MASS INDEX: 33.42 KG/M2 | TEMPERATURE: 97.3 F | DIASTOLIC BLOOD PRESSURE: 69 MMHG | SYSTOLIC BLOOD PRESSURE: 142 MMHG | WEIGHT: 177 LBS | HEIGHT: 61 IN | HEART RATE: 69 BPM | OXYGEN SATURATION: 95 % | RESPIRATION RATE: 18 BRPM

## 2020-01-01 VITALS
DIASTOLIC BLOOD PRESSURE: 60 MMHG | HEIGHT: 62 IN | SYSTOLIC BLOOD PRESSURE: 132 MMHG | WEIGHT: 143.6 LBS | RESPIRATION RATE: 22 BRPM | HEART RATE: 74 BPM | OXYGEN SATURATION: 92 % | TEMPERATURE: 97.8 F | BODY MASS INDEX: 26.43 KG/M2

## 2020-01-01 VITALS
WEIGHT: 187.4 LBS | HEART RATE: 70 BPM | HEIGHT: 62 IN | RESPIRATION RATE: 20 BRPM | TEMPERATURE: 99.3 F | OXYGEN SATURATION: 94 % | SYSTOLIC BLOOD PRESSURE: 159 MMHG | BODY MASS INDEX: 34.48 KG/M2 | DIASTOLIC BLOOD PRESSURE: 61 MMHG

## 2020-01-01 VITALS
HEIGHT: 62 IN | WEIGHT: 163.2 LBS | SYSTOLIC BLOOD PRESSURE: 174 MMHG | DIASTOLIC BLOOD PRESSURE: 80 MMHG | HEART RATE: 72 BPM | OXYGEN SATURATION: 90 % | BODY MASS INDEX: 30.03 KG/M2 | TEMPERATURE: 97.6 F | RESPIRATION RATE: 16 BRPM

## 2020-01-01 VITALS
TEMPERATURE: 98.8 F | DIASTOLIC BLOOD PRESSURE: 49 MMHG | HEART RATE: 76 BPM | BODY MASS INDEX: 23.83 KG/M2 | RESPIRATION RATE: 22 BRPM | WEIGHT: 130.29 LBS | SYSTOLIC BLOOD PRESSURE: 109 MMHG | OXYGEN SATURATION: 97 %

## 2020-01-01 VITALS — TEMPERATURE: 97.1 F | SYSTOLIC BLOOD PRESSURE: 163 MMHG | DIASTOLIC BLOOD PRESSURE: 79 MMHG | HEART RATE: 62 BPM

## 2020-01-01 VITALS
RESPIRATION RATE: 18 BRPM | OXYGEN SATURATION: 94 % | WEIGHT: 137.5 LBS | BODY MASS INDEX: 25.3 KG/M2 | SYSTOLIC BLOOD PRESSURE: 110 MMHG | HEIGHT: 62 IN | DIASTOLIC BLOOD PRESSURE: 49 MMHG | TEMPERATURE: 98.5 F | HEART RATE: 99 BPM

## 2020-01-01 VITALS
HEART RATE: 59 BPM | TEMPERATURE: 97.2 F | WEIGHT: 174.2 LBS | OXYGEN SATURATION: 95 % | DIASTOLIC BLOOD PRESSURE: 75 MMHG | SYSTOLIC BLOOD PRESSURE: 147 MMHG | RESPIRATION RATE: 18 BRPM | BODY MASS INDEX: 32.06 KG/M2 | HEIGHT: 62 IN

## 2020-01-01 VITALS
HEIGHT: 62 IN | BODY MASS INDEX: 30.03 KG/M2 | TEMPERATURE: 98.8 F | RESPIRATION RATE: 18 BRPM | SYSTOLIC BLOOD PRESSURE: 199 MMHG | HEART RATE: 83 BPM | OXYGEN SATURATION: 91 % | WEIGHT: 163.2 LBS | DIASTOLIC BLOOD PRESSURE: 82 MMHG

## 2020-01-01 VITALS
HEART RATE: 68 BPM | SYSTOLIC BLOOD PRESSURE: 136 MMHG | WEIGHT: 171.1 LBS | DIASTOLIC BLOOD PRESSURE: 67 MMHG | BODY MASS INDEX: 32.3 KG/M2 | OXYGEN SATURATION: 94 % | HEIGHT: 61 IN

## 2020-01-01 DIAGNOSIS — Z71.89 OTHER SPECIFIED COUNSELING: ICD-10-CM

## 2020-01-01 DIAGNOSIS — R60.0 BILATERAL LOWER EXTREMITY EDEMA: ICD-10-CM

## 2020-01-01 DIAGNOSIS — I10 ESSENTIAL HYPERTENSION: ICD-10-CM

## 2020-01-01 DIAGNOSIS — C79.9 METASTATIC CARCINOMA (H): ICD-10-CM

## 2020-01-01 DIAGNOSIS — Z98.1 S/P LUMBAR FUSION: ICD-10-CM

## 2020-01-01 DIAGNOSIS — R05.9 COUGH: ICD-10-CM

## 2020-01-01 DIAGNOSIS — M54.42 ACUTE BILATERAL LOW BACK PAIN WITH BILATERAL SCIATICA: ICD-10-CM

## 2020-01-01 DIAGNOSIS — K59.00 CONSTIPATION, UNSPECIFIED CONSTIPATION TYPE: ICD-10-CM

## 2020-01-01 DIAGNOSIS — E87.1 HYPONATREMIA: ICD-10-CM

## 2020-01-01 DIAGNOSIS — R44.3 HALLUCINATIONS: ICD-10-CM

## 2020-01-01 DIAGNOSIS — G89.3 CANCER RELATED PAIN: ICD-10-CM

## 2020-01-01 DIAGNOSIS — D64.9 ANEMIA, UNSPECIFIED TYPE: ICD-10-CM

## 2020-01-01 DIAGNOSIS — R09.02 HYPOXIA: ICD-10-CM

## 2020-01-01 DIAGNOSIS — N39.0 URINARY TRACT INFECTION WITHOUT HEMATURIA, SITE UNSPECIFIED: Primary | ICD-10-CM

## 2020-01-01 DIAGNOSIS — K21.9 GASTROESOPHAGEAL REFLUX DISEASE, ESOPHAGITIS PRESENCE NOT SPECIFIED: ICD-10-CM

## 2020-01-01 DIAGNOSIS — E03.9 HYPOTHYROIDISM, UNSPECIFIED TYPE: ICD-10-CM

## 2020-01-01 DIAGNOSIS — K59.01 SLOW TRANSIT CONSTIPATION: ICD-10-CM

## 2020-01-01 DIAGNOSIS — J98.8 CONGESTION OF RESPIRATORY TRACT: ICD-10-CM

## 2020-01-01 DIAGNOSIS — Z98.1 S/P LUMBAR SPINAL FUSION: Primary | ICD-10-CM

## 2020-01-01 DIAGNOSIS — I10 BENIGN ESSENTIAL HYPERTENSION: ICD-10-CM

## 2020-01-01 DIAGNOSIS — E78.5 DYSLIPIDEMIA: ICD-10-CM

## 2020-01-01 DIAGNOSIS — R13.10 DYSPHAGIA, UNSPECIFIED TYPE: ICD-10-CM

## 2020-01-01 DIAGNOSIS — E83.52 HYPERCALCEMIA OF MALIGNANCY: ICD-10-CM

## 2020-01-01 DIAGNOSIS — M54.42 ACUTE BILATERAL LOW BACK PAIN WITH BILATERAL SCIATICA: Primary | ICD-10-CM

## 2020-01-01 DIAGNOSIS — Z01.818 PREOP GENERAL PHYSICAL EXAM: Primary | ICD-10-CM

## 2020-01-01 DIAGNOSIS — R53.81 PHYSICAL DECONDITIONING: ICD-10-CM

## 2020-01-01 DIAGNOSIS — D69.6 THROMBOCYTOPENIA (H): ICD-10-CM

## 2020-01-01 DIAGNOSIS — E87.6 HYPOKALEMIA: Primary | ICD-10-CM

## 2020-01-01 DIAGNOSIS — N63.21 LUMP IN UPPER OUTER QUADRANT OF LEFT BREAST: ICD-10-CM

## 2020-01-01 DIAGNOSIS — R52 PAIN: Primary | ICD-10-CM

## 2020-01-01 DIAGNOSIS — D72.829 LEUKOCYTOSIS, UNSPECIFIED TYPE: ICD-10-CM

## 2020-01-01 DIAGNOSIS — M54.41 ACUTE BILATERAL LOW BACK PAIN WITH BILATERAL SCIATICA: Primary | ICD-10-CM

## 2020-01-01 DIAGNOSIS — C50.919 METASTATIC BREAST CANCER: ICD-10-CM

## 2020-01-01 DIAGNOSIS — A41.9 SEVERE SEPSIS (H): ICD-10-CM

## 2020-01-01 DIAGNOSIS — I25.10 CORONARY ARTERY DISEASE INVOLVING NATIVE CORONARY ARTERY OF NATIVE HEART WITHOUT ANGINA PECTORIS: ICD-10-CM

## 2020-01-01 DIAGNOSIS — R12 HEARTBURN: ICD-10-CM

## 2020-01-01 DIAGNOSIS — E44.1 MILD MALNUTRITION (H): ICD-10-CM

## 2020-01-01 DIAGNOSIS — D61.818 PANCYTOPENIA (H): ICD-10-CM

## 2020-01-01 DIAGNOSIS — E87.6 HYPOKALEMIA: ICD-10-CM

## 2020-01-01 DIAGNOSIS — I25.10 CORONARY ARTERY DISEASE INVOLVING NATIVE CORONARY ARTERY OF NATIVE HEART WITHOUT ANGINA PECTORIS: Primary | ICD-10-CM

## 2020-01-01 DIAGNOSIS — C79.9 METASTATIC CARCINOMA (H): Primary | ICD-10-CM

## 2020-01-01 DIAGNOSIS — Z53.9 ERRONEOUS ENCOUNTER--DISREGARD: Primary | ICD-10-CM

## 2020-01-01 DIAGNOSIS — E83.52 HYPERCALCEMIA OF MALIGNANCY: Primary | ICD-10-CM

## 2020-01-01 DIAGNOSIS — N39.0 URINARY TRACT INFECTION WITHOUT HEMATURIA, SITE UNSPECIFIED: ICD-10-CM

## 2020-01-01 DIAGNOSIS — Z98.1 S/P LUMBAR SPINAL FUSION: ICD-10-CM

## 2020-01-01 DIAGNOSIS — G93.41 ACUTE METABOLIC ENCEPHALOPATHY: ICD-10-CM

## 2020-01-01 DIAGNOSIS — E43 SEVERE PROTEIN-CALORIE MALNUTRITION (H): ICD-10-CM

## 2020-01-01 DIAGNOSIS — E03.9 ACQUIRED HYPOTHYROIDISM: ICD-10-CM

## 2020-01-01 DIAGNOSIS — G31.84 MILD COGNITIVE IMPAIRMENT: ICD-10-CM

## 2020-01-01 DIAGNOSIS — I25.10 CORONARY ARTERY DISEASE INVOLVING NATIVE HEART, ANGINA PRESENCE UNSPECIFIED, UNSPECIFIED VESSEL OR LESION TYPE: ICD-10-CM

## 2020-01-01 DIAGNOSIS — R65.20 SEVERE SEPSIS (H): ICD-10-CM

## 2020-01-01 DIAGNOSIS — F32.0 MILD MAJOR DEPRESSION (H): ICD-10-CM

## 2020-01-01 DIAGNOSIS — E83.52 HYPERCALCEMIA: ICD-10-CM

## 2020-01-01 DIAGNOSIS — M79.10 MYALGIA: ICD-10-CM

## 2020-01-01 DIAGNOSIS — R41.89 COGNITIVE IMPAIRMENT: ICD-10-CM

## 2020-01-01 DIAGNOSIS — M25.551 RIGHT HIP PAIN: Primary | ICD-10-CM

## 2020-01-01 DIAGNOSIS — F32.0 MILD MAJOR DEPRESSION (H): Primary | ICD-10-CM

## 2020-01-01 DIAGNOSIS — M16.11 PRIMARY OSTEOARTHRITIS OF RIGHT HIP: Primary | ICD-10-CM

## 2020-01-01 DIAGNOSIS — M62.81 GENERALIZED MUSCLE WEAKNESS: ICD-10-CM

## 2020-01-01 DIAGNOSIS — R33.9 URINARY RETENTION WITH INCOMPLETE BLADDER EMPTYING: Primary | ICD-10-CM

## 2020-01-01 DIAGNOSIS — M25.551 RIGHT HIP PAIN: ICD-10-CM

## 2020-01-01 DIAGNOSIS — R62.7 FAILURE TO THRIVE IN ADULT: ICD-10-CM

## 2020-01-01 DIAGNOSIS — N17.9 ACUTE KIDNEY INJURY (H): ICD-10-CM

## 2020-01-01 DIAGNOSIS — I10 BENIGN ESSENTIAL HYPERTENSION: Primary | ICD-10-CM

## 2020-01-01 DIAGNOSIS — I10 ESSENTIAL HYPERTENSION: Primary | ICD-10-CM

## 2020-01-01 DIAGNOSIS — R06.02 SHORTNESS OF BREATH: ICD-10-CM

## 2020-01-01 DIAGNOSIS — R50.9 FEVER, UNSPECIFIED FEVER CAUSE: Primary | ICD-10-CM

## 2020-01-01 DIAGNOSIS — R50.9 FEVER, UNSPECIFIED FEVER CAUSE: ICD-10-CM

## 2020-01-01 DIAGNOSIS — E03.9 HYPOTHYROIDISM, UNSPECIFIED TYPE: Primary | ICD-10-CM

## 2020-01-01 DIAGNOSIS — E46 PROTEIN-CALORIE MALNUTRITION, UNSPECIFIED SEVERITY (H): ICD-10-CM

## 2020-01-01 DIAGNOSIS — M54.50 LUMBAR SPINE PAIN: Primary | ICD-10-CM

## 2020-01-01 DIAGNOSIS — Z95.9 S/P ARTERIAL STENT: ICD-10-CM

## 2020-01-01 DIAGNOSIS — I25.9 IHD (ISCHEMIC HEART DISEASE): ICD-10-CM

## 2020-01-01 DIAGNOSIS — M54.41 ACUTE BILATERAL LOW BACK PAIN WITH BILATERAL SCIATICA: ICD-10-CM

## 2020-01-01 DIAGNOSIS — E78.5 HYPERLIPIDEMIA LDL GOAL <100: ICD-10-CM

## 2020-01-01 DIAGNOSIS — E87.5 HYPERKALEMIA: ICD-10-CM

## 2020-01-01 DIAGNOSIS — C79.51 CARCINOMA OF BREAST METASTATIC TO BONE, UNSPECIFIED LATERALITY (H): Primary | ICD-10-CM

## 2020-01-01 DIAGNOSIS — G92.8 TOXIC METABOLIC ENCEPHALOPATHY: ICD-10-CM

## 2020-01-01 DIAGNOSIS — Z98.61 STATUS POST CORONARY ANGIOPLASTY: ICD-10-CM

## 2020-01-01 DIAGNOSIS — R62.7 ADULT FAILURE TO THRIVE: ICD-10-CM

## 2020-01-01 DIAGNOSIS — R19.5 LOOSE STOOLS: ICD-10-CM

## 2020-01-01 DIAGNOSIS — G89.3 CANCER RELATED PAIN: Primary | ICD-10-CM

## 2020-01-01 DIAGNOSIS — J18.9 PNEUMONIA OF RIGHT LOWER LOBE DUE TO INFECTIOUS ORGANISM: ICD-10-CM

## 2020-01-01 DIAGNOSIS — E78.2 MIXED HYPERLIPIDEMIA: ICD-10-CM

## 2020-01-01 DIAGNOSIS — J18.9 PNEUMONIA DUE TO INFECTIOUS ORGANISM, UNSPECIFIED LATERALITY, UNSPECIFIED PART OF LUNG: Primary | ICD-10-CM

## 2020-01-01 DIAGNOSIS — M25.551 HIP PAIN, RIGHT: Primary | ICD-10-CM

## 2020-01-01 DIAGNOSIS — I25.9 IHD (ISCHEMIC HEART DISEASE): Primary | ICD-10-CM

## 2020-01-01 DIAGNOSIS — R50.9 RECENT UNEXPLAINED FEVER: ICD-10-CM

## 2020-01-01 DIAGNOSIS — C50.919 CARCINOMA OF BREAST METASTATIC TO BONE, UNSPECIFIED LATERALITY (H): Primary | ICD-10-CM

## 2020-01-01 DIAGNOSIS — R41.0 CONFUSION: Primary | ICD-10-CM

## 2020-01-01 DIAGNOSIS — M54.50 LUMBAR SPINE PAIN: ICD-10-CM

## 2020-01-01 DIAGNOSIS — R19.5 LOOSE STOOLS: Primary | ICD-10-CM

## 2020-01-01 DIAGNOSIS — J96.01 ACUTE RESPIRATORY FAILURE WITH HYPOXIA (H): ICD-10-CM

## 2020-01-01 LAB
1,25(OH)2D SERPL-MCNC: 9.6 PG/ML (ref 19.9–79.3)
ABO + RH BLD: NORMAL
ALBUMIN SERPL ELPH-MCNC: 2.8 G/DL (ref 3.7–5.1)
ALBUMIN SERPL-MCNC: 2.1 G/DL (ref 3.4–5)
ALBUMIN SERPL-MCNC: 2.4 G/DL (ref 3.4–5)
ALBUMIN SERPL-MCNC: 2.4 G/DL (ref 3.4–5)
ALBUMIN SERPL-MCNC: 2.6 G/DL (ref 3.4–5)
ALBUMIN SERPL-MCNC: 2.6 G/DL (ref 3.4–5)
ALBUMIN SERPL-MCNC: 2.7 G/DL (ref 3.4–5)
ALBUMIN SERPL-MCNC: 2.7 G/DL (ref 3.4–5)
ALBUMIN SERPL-MCNC: 2.8 G/DL (ref 3.4–5)
ALBUMIN SERPL-MCNC: 2.8 G/DL (ref 3.5–5)
ALBUMIN SERPL-MCNC: 3.5 G/DL (ref 3.4–5)
ALBUMIN UR-MCNC: 100 MG/DL
ALBUMIN UR-MCNC: 30 MG/DL
ALBUMIN UR-MCNC: 50 MG/DL
ALBUMIN UR-MCNC: ABNORMAL MG/DL
ALP SERPL-CCNC: 108 U/L (ref 40–150)
ALP SERPL-CCNC: 128 U/L (ref 45–120)
ALP SERPL-CCNC: 138 U/L (ref 40–150)
ALP SERPL-CCNC: 70 U/L (ref 40–150)
ALP SERPL-CCNC: 73 U/L (ref 40–150)
ALP SERPL-CCNC: 76 U/L (ref 40–150)
ALP SERPL-CCNC: 76 U/L (ref 40–150)
ALP SERPL-CCNC: 78 U/L (ref 40–150)
ALP SERPL-CCNC: 80 U/L (ref 40–150)
ALPHA1 GLOB SERPL ELPH-MCNC: 0.5 G/DL (ref 0.2–0.4)
ALPHA2 GLOB SERPL ELPH-MCNC: 0.8 G/DL (ref 0.5–0.9)
ALT SERPL W P-5'-P-CCNC: 10 U/L (ref 0–45)
ALT SERPL W P-5'-P-CCNC: 16 U/L (ref 0–50)
ALT SERPL W P-5'-P-CCNC: 20 U/L (ref 0–50)
ALT SERPL W P-5'-P-CCNC: 22 U/L (ref 0–50)
ALT SERPL W P-5'-P-CCNC: 23 U/L (ref 0–50)
ALT SERPL W P-5'-P-CCNC: 25 U/L (ref 0–50)
ALT SERPL W P-5'-P-CCNC: 27 U/L (ref 0–50)
ALT SERPL W P-5'-P-CCNC: 29 U/L (ref 0–50)
ALT SERPL W P-5'-P-CCNC: 31 U/L (ref 0–50)
AMMONIA PLAS-SCNC: <10 UMOL/L (ref 10–50)
ANION GAP SERPL CALCULATED.3IONS-SCNC: 10 MMOL/L (ref 3–14)
ANION GAP SERPL CALCULATED.3IONS-SCNC: 10 MMOL/L (ref 5–18)
ANION GAP SERPL CALCULATED.3IONS-SCNC: 11 MMOL/L (ref 3–14)
ANION GAP SERPL CALCULATED.3IONS-SCNC: 11 MMOL/L (ref 5–18)
ANION GAP SERPL CALCULATED.3IONS-SCNC: 11 MMOL/L (ref 5–18)
ANION GAP SERPL CALCULATED.3IONS-SCNC: 12 MMOL/L (ref 3–14)
ANION GAP SERPL CALCULATED.3IONS-SCNC: 12 MMOL/L (ref 3–14)
ANION GAP SERPL CALCULATED.3IONS-SCNC: 12 MMOL/L (ref 5–18)
ANION GAP SERPL CALCULATED.3IONS-SCNC: 13 MMOL/L (ref 5–18)
ANION GAP SERPL CALCULATED.3IONS-SCNC: 14 MMOL/L (ref 5–18)
ANION GAP SERPL CALCULATED.3IONS-SCNC: 14 MMOL/L (ref 5–18)
ANION GAP SERPL CALCULATED.3IONS-SCNC: 15 MMOL/L (ref 5–18)
ANION GAP SERPL CALCULATED.3IONS-SCNC: 15 MMOL/L (ref 8–18)
ANION GAP SERPL CALCULATED.3IONS-SCNC: 16 MMOL/L (ref 5–18)
ANION GAP SERPL CALCULATED.3IONS-SCNC: 16 MMOL/L (ref 5–18)
ANION GAP SERPL CALCULATED.3IONS-SCNC: 5 MMOL/L (ref 3–14)
ANION GAP SERPL CALCULATED.3IONS-SCNC: 6 MMOL/L (ref 3–14)
ANION GAP SERPL CALCULATED.3IONS-SCNC: 7 MMOL/L (ref 3–14)
ANION GAP SERPL CALCULATED.3IONS-SCNC: 8 MMOL/L (ref 3–14)
ANION GAP SERPL CALCULATED.3IONS-SCNC: 8 MMOL/L (ref 3–14)
ANION GAP SERPL CALCULATED.3IONS-SCNC: 9 MMOL/L (ref 3–14)
ANION GAP SERPL CALCULATED.3IONS-SCNC: 9 MMOL/L (ref 3–14)
ANION GAP SERPL CALCULATED.3IONS-SCNC: 9 MMOL/L (ref 5–18)
ANISOCYTOSIS BLD QL SMEAR: ABNORMAL
ANISOCYTOSIS BLD QL SMEAR: SLIGHT
APPEARANCE UR: ABNORMAL
APPEARANCE UR: CLEAR
AST SERPL W P-5'-P-CCNC: 107 U/L (ref 0–45)
AST SERPL W P-5'-P-CCNC: 139 U/L (ref 0–45)
AST SERPL W P-5'-P-CCNC: 78 U/L (ref 0–45)
AST SERPL W P-5'-P-CCNC: 84 U/L (ref 0–45)
AST SERPL W P-5'-P-CCNC: 87 U/L (ref 0–40)
AST SERPL W P-5'-P-CCNC: 88 U/L (ref 0–45)
AST SERPL W P-5'-P-CCNC: 93 U/L (ref 0–45)
AST SERPL W P-5'-P-CCNC: 94 U/L (ref 0–45)
AST SERPL W P-5'-P-CCNC: 98 U/L (ref 0–45)
B-GLOBULIN SERPL ELPH-MCNC: 0.6 G/DL (ref 0.6–1)
B2 MICROGLOB SERPL-MCNC: 3.3 MG/L
BACTERIA #/AREA URNS HPF: ABNORMAL /HPF
BACTERIA #/AREA URNS HPF: ABNORMAL HPF
BACTERIA SPEC CULT: ABNORMAL
BACTERIA SPEC CULT: NO GROWTH
BACTERIA SPEC CULT: NORMAL
BASE DEFICIT BLDV-SCNC: 10.4 MMOL/L
BASE DEFICIT BLDV-SCNC: 10.8 MMOL/L
BASE DEFICIT BLDV-SCNC: 11.8 MMOL/L
BASE DEFICIT BLDV-SCNC: 12 MMOL/L
BASOPHILS # BLD AUTO: 0 10E9/L (ref 0–0.2)
BASOPHILS # BLD AUTO: 0 THOU/UL (ref 0–0.2)
BASOPHILS # BLD AUTO: 0 THOU/UL (ref 0–0.2)
BASOPHILS # BLD AUTO: 0.1 10E9/L (ref 0–0.2)
BASOPHILS NFR BLD AUTO: 0 %
BASOPHILS NFR BLD AUTO: 0 % (ref 0–2)
BASOPHILS NFR BLD AUTO: 0 % (ref 0–2)
BASOPHILS NFR BLD AUTO: 0.2 %
BASOPHILS NFR BLD AUTO: 0.4 %
BILIRUB DIRECT SERPL-MCNC: 0.2 MG/DL (ref 0–0.2)
BILIRUB DIRECT SERPL-MCNC: 0.2 MG/DL (ref 0–0.2)
BILIRUB SERPL-MCNC: 0.4 MG/DL (ref 0.2–1.3)
BILIRUB SERPL-MCNC: 0.5 MG/DL (ref 0.2–1.3)
BILIRUB SERPL-MCNC: 0.5 MG/DL (ref 0–1)
BILIRUB SERPL-MCNC: 0.6 MG/DL (ref 0.2–1.3)
BILIRUB SERPL-MCNC: 0.6 MG/DL (ref 0.2–1.3)
BILIRUB UR QL STRIP: NEGATIVE
BLD GP AB SCN SERPL QL: NORMAL
BLD GP AB SCN SERPL QL: NORMAL
BLD PROD TYP BPU: NORMAL
BLD UNIT ID BPU: 0
BLOOD BANK CMNT PATIENT-IMP: NORMAL
BLOOD BANK CMNT PATIENT-IMP: NORMAL
BLOOD PRODUCT CODE: NORMAL
BPU ID: NORMAL
BUN SERPL-MCNC: 10 MG/DL (ref 7–30)
BUN SERPL-MCNC: 10 MG/DL (ref 8–28)
BUN SERPL-MCNC: 10 MG/DL (ref 8–28)
BUN SERPL-MCNC: 11 MG/DL (ref 7–30)
BUN SERPL-MCNC: 11 MG/DL (ref 8–28)
BUN SERPL-MCNC: 11 MG/DL (ref 8–28)
BUN SERPL-MCNC: 15 MG/DL (ref 8–28)
BUN SERPL-MCNC: 15 MG/DL (ref 8–28)
BUN SERPL-MCNC: 16 MG/DL (ref 7–30)
BUN SERPL-MCNC: 17 MG/DL (ref 7–30)
BUN SERPL-MCNC: 17 MG/DL (ref 8–28)
BUN SERPL-MCNC: 18 MG/DL (ref 8–28)
BUN SERPL-MCNC: 18 MG/DL (ref 8–28)
BUN SERPL-MCNC: 21 MG/DL (ref 7–30)
BUN SERPL-MCNC: 22 MG/DL (ref 7–30)
BUN SERPL-MCNC: 23 MG/DL (ref 7–30)
BUN SERPL-MCNC: 24 MG/DL (ref 7–30)
BUN SERPL-MCNC: 25 MG/DL (ref 7–30)
BUN SERPL-MCNC: 25 MG/DL (ref 8–28)
BUN SERPL-MCNC: 26 MG/DL (ref 7–30)
BUN SERPL-MCNC: 26 MG/DL (ref 7–30)
BUN SERPL-MCNC: 27 MG/DL (ref 7–30)
BUN SERPL-MCNC: 27 MG/DL (ref 7–30)
BUN SERPL-MCNC: 30 MG/DL (ref 8–28)
BUN SERPL-MCNC: 31 MG/DL (ref 8–28)
BUN SERPL-MCNC: 31 MG/DL (ref 8–28)
BUN SERPL-MCNC: 33 MG/DL (ref 8–28)
BUN SERPL-MCNC: 35 MG/DL (ref 7–30)
BUN SERPL-MCNC: 36 MG/DL (ref 7–30)
BUN SERPL-MCNC: 38 MG/DL (ref 7–30)
BUN SERPL-MCNC: 39 MG/DL (ref 7–30)
BUN SERPL-MCNC: 41 MG/DL (ref 8–28)
BUN SERPL-MCNC: 46 MG/DL (ref 8–28)
BUN SERPL-MCNC: 50 MG/DL (ref 7–30)
BUN SERPL-MCNC: 8 MG/DL (ref 8–28)
BUN SERPL-MCNC: 8 MG/DL (ref 8–28)
CA-I BLD-MCNC: 6.8 MG/DL (ref 4.4–5.2)
CA-I BLD-MCNC: 7.6 MG/DL (ref 4.4–5.2)
CA-I SERPL ISE-MCNC: 5.2 MG/DL (ref 4.4–5.2)
CA-I SERPL ISE-MCNC: 5.5 MG/DL (ref 4.4–5.2)
CA-I SERPL ISE-MCNC: 6.7 MG/DL (ref 4.4–5.2)
CA-I SERPL ISE-MCNC: 6.9 MG/DL (ref 4.4–5.2)
CALCIUM SERPL-MCNC: 10.2 MG/DL (ref 8.5–10.5)
CALCIUM SERPL-MCNC: 10.2 MG/DL (ref 8.5–10.5)
CALCIUM SERPL-MCNC: 10.3 MG/DL (ref 8.5–10.1)
CALCIUM SERPL-MCNC: 11.1 MG/DL (ref 8.5–10.5)
CALCIUM SERPL-MCNC: 11.6 MG/DL (ref 8.5–10.1)
CALCIUM SERPL-MCNC: 11.7 MG/DL (ref 8.5–10.1)
CALCIUM SERPL-MCNC: 12 MG/DL (ref 8.5–10.1)
CALCIUM SERPL-MCNC: 12.2 MG/DL (ref 8.5–10.1)
CALCIUM SERPL-MCNC: 13.3 MG/DL (ref 8.5–10.1)
CALCIUM SERPL-MCNC: 15 MG/DL (ref 8.5–10.1)
CALCIUM SERPL-MCNC: 8.4 MG/DL (ref 8.5–10.1)
CALCIUM SERPL-MCNC: 8.4 MG/DL (ref 8.5–10.5)
CALCIUM SERPL-MCNC: 8.4 MG/DL (ref 8.5–10.5)
CALCIUM SERPL-MCNC: 8.5 MG/DL (ref 8.5–10.1)
CALCIUM SERPL-MCNC: 8.6 MG/DL (ref 8.5–10.1)
CALCIUM SERPL-MCNC: 8.7 MG/DL (ref 8.5–10.1)
CALCIUM SERPL-MCNC: 8.8 MG/DL (ref 8.5–10.1)
CALCIUM SERPL-MCNC: 8.8 MG/DL (ref 8.5–10.1)
CALCIUM SERPL-MCNC: 9 MG/DL (ref 8.5–10.1)
CALCIUM SERPL-MCNC: 9 MG/DL (ref 8.5–10.1)
CALCIUM SERPL-MCNC: 9 MG/DL (ref 8.5–10.5)
CALCIUM SERPL-MCNC: 9 MG/DL (ref 8.5–10.5)
CALCIUM SERPL-MCNC: 9.3 MG/DL (ref 8.5–10.1)
CALCIUM SERPL-MCNC: 9.3 MG/DL (ref 8.5–10.5)
CALCIUM SERPL-MCNC: 9.5 MG/DL (ref 8.5–10.1)
CALCIUM SERPL-MCNC: 9.5 MG/DL (ref 8.5–10.5)
CALCIUM SERPL-MCNC: 9.6 MG/DL (ref 8.5–10.5)
CALCIUM SERPL-MCNC: 9.6 MG/DL (ref 8.5–10.5)
CALCIUM SERPL-MCNC: 9.7 MG/DL (ref 8.5–10.1)
CALCIUM SERPL-MCNC: 9.8 MG/DL (ref 8.5–10.5)
CALCIUM SERPL-MCNC: 9.8 MG/DL (ref 8.5–10.5)
CALCIUM SERPL-MCNC: 9.9 MG/DL (ref 8.5–10.5)
CHLORIDE BLD-SCNC: 102 MMOL/L (ref 98–107)
CHLORIDE BLD-SCNC: 103 MMOL/L (ref 98–107)
CHLORIDE BLD-SCNC: 104 MMOL/L (ref 98–107)
CHLORIDE BLD-SCNC: 105 MMOL/L (ref 98–107)
CHLORIDE BLD-SCNC: 106 MMOL/L (ref 98–107)
CHLORIDE BLD-SCNC: 106 MMOL/L (ref 98–107)
CHLORIDE BLD-SCNC: 108 MMOL/L (ref 98–107)
CHLORIDE BLD-SCNC: 111 MMOL/L (ref 98–107)
CHLORIDE SERPL-SCNC: 102 MMOL/L (ref 94–109)
CHLORIDE SERPL-SCNC: 104 MMOL/L (ref 94–109)
CHLORIDE SERPL-SCNC: 104 MMOL/L (ref 94–109)
CHLORIDE SERPL-SCNC: 105 MMOL/L (ref 94–109)
CHLORIDE SERPL-SCNC: 105 MMOL/L (ref 94–109)
CHLORIDE SERPL-SCNC: 107 MMOL/L (ref 94–109)
CHLORIDE SERPL-SCNC: 109 MMOL/L (ref 94–109)
CHLORIDE SERPL-SCNC: 110 MMOL/L (ref 94–109)
CHLORIDE SERPL-SCNC: 112 MMOL/L (ref 94–109)
CHLORIDE SERPL-SCNC: 113 MMOL/L (ref 94–109)
CHLORIDE SERPL-SCNC: 113 MMOL/L (ref 94–109)
CHLORIDE SERPL-SCNC: 114 MMOL/L (ref 94–109)
CHLORIDE SERPL-SCNC: 115 MMOL/L (ref 94–109)
CHLORIDE SERPL-SCNC: 116 MMOL/L (ref 94–109)
CHLORIDE SERPL-SCNC: 118 MMOL/L (ref 94–109)
CHLORIDE SERPL-SCNC: 118 MMOL/L (ref 94–109)
CHLORIDE SERPL-SCNC: 119 MMOL/L (ref 94–109)
CHLORIDE SERPL-SCNC: 119 MMOL/L (ref 94–109)
CHLORIDE SERPLBLD-SCNC: 102 MMOL/L (ref 98–107)
CHLORIDE SERPLBLD-SCNC: 103 MMOL/L (ref 98–107)
CHLORIDE SERPLBLD-SCNC: 105 MMOL/L (ref 98–107)
CHLORIDE SERPLBLD-SCNC: 106 MMOL/L (ref 98–107)
CHLORIDE SERPLBLD-SCNC: 108 MMOL/L (ref 98–107)
CK SERPL-CCNC: 71 U/L (ref 30–225)
CO2 SERPL-SCNC: 13 MMOL/L (ref 20–32)
CO2 SERPL-SCNC: 13 MMOL/L (ref 22–31)
CO2 SERPL-SCNC: 13 MMOL/L (ref 22–31)
CO2 SERPL-SCNC: 14 MMOL/L (ref 20–32)
CO2 SERPL-SCNC: 14 MMOL/L (ref 22–31)
CO2 SERPL-SCNC: 15 MMOL/L (ref 20–32)
CO2 SERPL-SCNC: 16 MMOL/L (ref 20–32)
CO2 SERPL-SCNC: 16 MMOL/L (ref 20–32)
CO2 SERPL-SCNC: 16 MMOL/L (ref 22–31)
CO2 SERPL-SCNC: 17 MMOL/L (ref 20–32)
CO2 SERPL-SCNC: 17 MMOL/L (ref 20–32)
CO2 SERPL-SCNC: 17 MMOL/L (ref 22–31)
CO2 SERPL-SCNC: 17 MMOL/L (ref 22–31)
CO2 SERPL-SCNC: 18 MMOL/L (ref 20–32)
CO2 SERPL-SCNC: 19 MMOL/L (ref 20–32)
CO2 SERPL-SCNC: 19 MMOL/L (ref 20–32)
CO2 SERPL-SCNC: 20 MMOL/L (ref 20–32)
CO2 SERPL-SCNC: 20 MMOL/L (ref 22–31)
CO2 SERPL-SCNC: 21 MMOL/L (ref 22–31)
CO2 SERPL-SCNC: 22 MMOL/L (ref 20–32)
CO2 SERPL-SCNC: 23 MMOL/L (ref 20–32)
CO2 SERPL-SCNC: 25 MMOL/L (ref 20–32)
COLOR UR AUTO: ABNORMAL
COLOR UR AUTO: YELLOW
COPATH REPORT: NORMAL
CORTIS SERPL-MCNC: 23.2 UG/DL (ref 4–22)
CREAT SERPL-MCNC: 0.43 MG/DL (ref 0.52–1.04)
CREAT SERPL-MCNC: 0.45 MG/DL (ref 0.52–1.04)
CREAT SERPL-MCNC: 0.58 MG/DL (ref 0.52–1.04)
CREAT SERPL-MCNC: 0.64 MG/DL (ref 0.52–1.04)
CREAT SERPL-MCNC: 0.68 MG/DL (ref 0.52–1.04)
CREAT SERPL-MCNC: 0.69 MG/DL (ref 0.6–1.1)
CREAT SERPL-MCNC: 0.7 MG/DL (ref 0.6–1.1)
CREAT SERPL-MCNC: 0.72 MG/DL (ref 0.6–1.1)
CREAT SERPL-MCNC: 0.72 MG/DL (ref 0.6–1.1)
CREAT SERPL-MCNC: 0.74 MG/DL (ref 0.6–1.1)
CREAT SERPL-MCNC: 0.74 MG/DL (ref 0.6–1.1)
CREAT SERPL-MCNC: 0.75 MG/DL (ref 0.52–1.04)
CREAT SERPL-MCNC: 0.75 MG/DL (ref 0.6–1.1)
CREAT SERPL-MCNC: 0.77 MG/DL (ref 0.6–1.1)
CREAT SERPL-MCNC: 0.78 MG/DL (ref 0.52–1.04)
CREAT SERPL-MCNC: 0.78 MG/DL (ref 0.6–1.1)
CREAT SERPL-MCNC: 0.8 MG/DL (ref 0.6–1.1)
CREAT SERPL-MCNC: 0.8 MG/DL (ref 0.6–1.1)
CREAT SERPL-MCNC: 0.82 MG/DL (ref 0.6–1.1)
CREAT SERPL-MCNC: 0.83 MG/DL (ref 0.6–1.1)
CREAT SERPL-MCNC: 0.89 MG/DL (ref 0.52–1.04)
CREAT SERPL-MCNC: 0.95 MG/DL (ref 0.52–1.04)
CREAT SERPL-MCNC: 1.07 MG/DL (ref 0.52–1.04)
CREAT SERPL-MCNC: 1.09 MG/DL (ref 0.52–1.04)
CREAT SERPL-MCNC: 1.09 MG/DL (ref 0.52–1.04)
CREAT SERPL-MCNC: 1.11 MG/DL (ref 0.52–1.04)
CREAT SERPL-MCNC: 1.19 MG/DL (ref 0.52–1.04)
CREAT SERPL-MCNC: 1.21 MG/DL (ref 0.52–1.04)
CREAT SERPL-MCNC: 1.4 MG/DL (ref 0.52–1.04)
CREAT SERPL-MCNC: 1.58 MG/DL (ref 0.52–1.04)
CREAT SERPL-MCNC: 1.59 MG/DL (ref 0.52–1.04)
DACRYOCYTES BLD QL SMEAR: SLIGHT
DEPRECATED CALCIDIOL+CALCIFEROL SERPL-MC: <45 UG/L (ref 20–75)
DIFFERENTIAL METHOD BLD: ABNORMAL
DIFFERENTIAL: ABNORMAL
EOSINOPHIL # BLD AUTO: 0 10E9/L (ref 0–0.7)
EOSINOPHIL # BLD AUTO: 0.1 10E9/L (ref 0–0.7)
EOSINOPHIL # BLD AUTO: 0.1 10E9/L (ref 0–0.7)
EOSINOPHIL COUNT (ABSOLUTE): 0 THOU/UL (ref 0–0.4)
EOSINOPHIL COUNT (ABSOLUTE): 0.1 THOU/UL (ref 0–0.4)
EOSINOPHIL NFR BLD AUTO: 0 %
EOSINOPHIL NFR BLD AUTO: 0 %
EOSINOPHIL NFR BLD AUTO: 0.3 %
EOSINOPHIL NFR BLD AUTO: 0.4 %
EOSINOPHIL NFR BLD AUTO: 1 %
EOSINOPHIL NFR BLD AUTO: 1 % (ref 0–6)
EOSINOPHIL NFR BLD AUTO: 1 % (ref 0–6)
EOSINOPHIL NFR BLD AUTO: 2 %
EOSINOPHIL NFR BLD AUTO: 2 %
ERYTHROCYTE [DISTWIDTH] IN BLOOD BY AUTOMATED COUNT: 12.5 % (ref 10–15)
ERYTHROCYTE [DISTWIDTH] IN BLOOD BY AUTOMATED COUNT: 12.6 % (ref 10–15)
ERYTHROCYTE [DISTWIDTH] IN BLOOD BY AUTOMATED COUNT: 12.8 % (ref 10–15)
ERYTHROCYTE [DISTWIDTH] IN BLOOD BY AUTOMATED COUNT: 13 % (ref 10–15)
ERYTHROCYTE [DISTWIDTH] IN BLOOD BY AUTOMATED COUNT: 13.5 % (ref 10–15)
ERYTHROCYTE [DISTWIDTH] IN BLOOD BY AUTOMATED COUNT: 13.7 % (ref 10–15)
ERYTHROCYTE [DISTWIDTH] IN BLOOD BY AUTOMATED COUNT: 15.4 % (ref 11–14.5)
ERYTHROCYTE [DISTWIDTH] IN BLOOD BY AUTOMATED COUNT: 15.4 % (ref 11–14.5)
ERYTHROCYTE [DISTWIDTH] IN BLOOD BY AUTOMATED COUNT: 15.9 % (ref 11–14.5)
ERYTHROCYTE [DISTWIDTH] IN BLOOD BY AUTOMATED COUNT: 15.9 % (ref 11–14.5)
ERYTHROCYTE [DISTWIDTH] IN BLOOD BY AUTOMATED COUNT: 16.8 % (ref 11–14.5)
ERYTHROCYTE [DISTWIDTH] IN BLOOD BY AUTOMATED COUNT: 16.8 % (ref 11–14.5)
ERYTHROCYTE [DISTWIDTH] IN BLOOD BY AUTOMATED COUNT: 16.9 % (ref 10–15)
ERYTHROCYTE [DISTWIDTH] IN BLOOD BY AUTOMATED COUNT: 16.9 % (ref 11–14.5)
ERYTHROCYTE [DISTWIDTH] IN BLOOD BY AUTOMATED COUNT: 16.9 % (ref 11–14.5)
ERYTHROCYTE [DISTWIDTH] IN BLOOD BY AUTOMATED COUNT: 17.3 % (ref 11–14.5)
ERYTHROCYTE [DISTWIDTH] IN BLOOD BY AUTOMATED COUNT: 17.5 % (ref 11–14.5)
ERYTHROCYTE [DISTWIDTH] IN BLOOD BY AUTOMATED COUNT: 17.6 % (ref 10–15)
ERYTHROCYTE [DISTWIDTH] IN BLOOD BY AUTOMATED COUNT: 18.1 % (ref 10–15)
ERYTHROCYTE [DISTWIDTH] IN BLOOD BY AUTOMATED COUNT: 18.2 % (ref 10–15)
ERYTHROCYTE [DISTWIDTH] IN BLOOD BY AUTOMATED COUNT: 18.2 % (ref 10–15)
ERYTHROCYTE [DISTWIDTH] IN BLOOD BY AUTOMATED COUNT: 18.5 % (ref 10–15)
ERYTHROCYTE [DISTWIDTH] IN BLOOD BY AUTOMATED COUNT: 18.8 % (ref 11–14.5)
FOLATE SERPL-MCNC: 1.3 NG/ML
GAMMA GLOB SERPL ELPH-MCNC: 0.6 G/DL (ref 0.7–1.6)
GFR SERPL CREATININE-BSD FRML MDRD: 30 ML/MIN/{1.73_M2}
GFR SERPL CREATININE-BSD FRML MDRD: 30 ML/MIN/{1.73_M2}
GFR SERPL CREATININE-BSD FRML MDRD: 35 ML/MIN/{1.73_M2}
GFR SERPL CREATININE-BSD FRML MDRD: 41 ML/MIN/{1.73_M2}
GFR SERPL CREATININE-BSD FRML MDRD: 42 ML/MIN/{1.73_M2}
GFR SERPL CREATININE-BSD FRML MDRD: 46 ML/MIN/{1.73_M2}
GFR SERPL CREATININE-BSD FRML MDRD: 47 ML/MIN/{1.73_M2}
GFR SERPL CREATININE-BSD FRML MDRD: 47 ML/MIN/{1.73_M2}
GFR SERPL CREATININE-BSD FRML MDRD: 48 ML/MIN/{1.73_M2}
GFR SERPL CREATININE-BSD FRML MDRD: 55 ML/MIN/{1.73_M2}
GFR SERPL CREATININE-BSD FRML MDRD: 60 ML/MIN/{1.73_M2}
GFR SERPL CREATININE-BSD FRML MDRD: 70 ML/MIN/{1.73_M2}
GFR SERPL CREATININE-BSD FRML MDRD: 74 ML/MIN/{1.73_M2}
GFR SERPL CREATININE-BSD FRML MDRD: 81 ML/MIN/{1.73_M2}
GFR SERPL CREATININE-BSD FRML MDRD: 83 ML/MIN/{1.73_M2}
GFR SERPL CREATININE-BSD FRML MDRD: 85 ML/MIN/{1.73_M2}
GFR SERPL CREATININE-BSD FRML MDRD: >60 ML/MIN/1.73M2
GFR SERPL CREATININE-BSD FRML MDRD: >90 ML/MIN/{1.73_M2}
GFR SERPL CREATININE-BSD FRML MDRD: >90 ML/MIN/{1.73_M2}
GLUCOSE BLD-MCNC: 129 MG/DL (ref 70–125)
GLUCOSE BLD-MCNC: 56 MG/DL (ref 70–125)
GLUCOSE BLD-MCNC: 63 MG/DL (ref 70–125)
GLUCOSE BLD-MCNC: 67 MG/DL (ref 70–125)
GLUCOSE BLD-MCNC: 69 MG/DL (ref 70–125)
GLUCOSE BLD-MCNC: 72 MG/DL (ref 70–125)
GLUCOSE BLD-MCNC: 77 MG/DL (ref 70–125)
GLUCOSE BLD-MCNC: 77 MG/DL (ref 70–125)
GLUCOSE BLD-MCNC: 81 MG/DL (ref 70–125)
GLUCOSE BLD-MCNC: 84 MG/DL (ref 70–125)
GLUCOSE BLD-MCNC: 92 MG/DL (ref 70–125)
GLUCOSE BLD-MCNC: 95 MG/DL (ref 70–125)
GLUCOSE BLD-MCNC: 96 MG/DL (ref 70–125)
GLUCOSE BLDC GLUCOMTR-MCNC: 100 MG/DL (ref 70–99)
GLUCOSE BLDC GLUCOMTR-MCNC: 103 MG/DL (ref 70–99)
GLUCOSE BLDC GLUCOMTR-MCNC: 104 MG/DL (ref 70–99)
GLUCOSE BLDC GLUCOMTR-MCNC: 108 MG/DL (ref 70–99)
GLUCOSE BLDC GLUCOMTR-MCNC: 111 MG/DL (ref 70–99)
GLUCOSE BLDC GLUCOMTR-MCNC: 116 MG/DL (ref 70–99)
GLUCOSE BLDC GLUCOMTR-MCNC: 130 MG/DL (ref 70–99)
GLUCOSE BLDC GLUCOMTR-MCNC: 144 MG/DL (ref 70–99)
GLUCOSE BLDC GLUCOMTR-MCNC: 155 MG/DL (ref 70–99)
GLUCOSE BLDC GLUCOMTR-MCNC: 163 MG/DL (ref 70–99)
GLUCOSE BLDC GLUCOMTR-MCNC: 166 MG/DL (ref 70–99)
GLUCOSE BLDC GLUCOMTR-MCNC: 178 MG/DL (ref 70–99)
GLUCOSE BLDC GLUCOMTR-MCNC: 80 MG/DL (ref 70–99)
GLUCOSE BLDC GLUCOMTR-MCNC: 87 MG/DL (ref 70–99)
GLUCOSE BLDC GLUCOMTR-MCNC: 89 MG/DL (ref 70–99)
GLUCOSE BLDC GLUCOMTR-MCNC: 97 MG/DL (ref 70–99)
GLUCOSE BLDC GLUCOMTR-MCNC: 97 MG/DL (ref 70–99)
GLUCOSE SERPL-MCNC: 102 MG/DL (ref 70–99)
GLUCOSE SERPL-MCNC: 112 MG/DL (ref 70–99)
GLUCOSE SERPL-MCNC: 112 MG/DL (ref 70–99)
GLUCOSE SERPL-MCNC: 115 MG/DL (ref 70–99)
GLUCOSE SERPL-MCNC: 125 MG/DL (ref 70–99)
GLUCOSE SERPL-MCNC: 126 MG/DL (ref 70–99)
GLUCOSE SERPL-MCNC: 128 MG/DL (ref 70–99)
GLUCOSE SERPL-MCNC: 138 MG/DL (ref 70–99)
GLUCOSE SERPL-MCNC: 141 MG/DL (ref 70–99)
GLUCOSE SERPL-MCNC: 143 MG/DL (ref 70–99)
GLUCOSE SERPL-MCNC: 150 MG/DL (ref 70–99)
GLUCOSE SERPL-MCNC: 155 MG/DL (ref 70–99)
GLUCOSE SERPL-MCNC: 63 MG/DL (ref 70–125)
GLUCOSE SERPL-MCNC: 77 MG/DL (ref 70–125)
GLUCOSE SERPL-MCNC: 77 MG/DL (ref 70–99)
GLUCOSE SERPL-MCNC: 80 MG/DL (ref 70–99)
GLUCOSE SERPL-MCNC: 84 MG/DL (ref 70–125)
GLUCOSE SERPL-MCNC: 84 MG/DL (ref 70–99)
GLUCOSE SERPL-MCNC: 91 MG/DL (ref 70–99)
GLUCOSE SERPL-MCNC: 91 MG/DL (ref 70–99)
GLUCOSE SERPL-MCNC: 92 MG/DL (ref 70–125)
GLUCOSE SERPL-MCNC: 94 MG/DL (ref 70–99)
GLUCOSE SERPL-MCNC: 96 MG/DL (ref 70–125)
GLUCOSE UR STRIP-MCNC: NEGATIVE MG/DL
HBV SURFACE AG SERPL QL IA: NONREACTIVE
HCO3 BLDV-SCNC: 13 MMOL/L (ref 21–28)
HCO3 BLDV-SCNC: 15 MMOL/L (ref 21–28)
HCO3 BLDV-SCNC: 15 MMOL/L (ref 21–28)
HCO3 BLDV-SCNC: 16 MMOL/L (ref 21–28)
HCT VFR BLD AUTO: 19.9 % (ref 35–47)
HCT VFR BLD AUTO: 21.4 % (ref 35–47)
HCT VFR BLD AUTO: 21.5 % (ref 35–47)
HCT VFR BLD AUTO: 22.2 % (ref 35–47)
HCT VFR BLD AUTO: 22.5 % (ref 35–47)
HCT VFR BLD AUTO: 23.1 % (ref 35–47)
HCT VFR BLD AUTO: 23.5 % (ref 35–47)
HCT VFR BLD AUTO: 23.5 % (ref 35–47)
HCT VFR BLD AUTO: 26 % (ref 35–47)
HCT VFR BLD AUTO: 26 % (ref 35–47)
HCT VFR BLD AUTO: 26.9 % (ref 35–47)
HCT VFR BLD AUTO: 27.1 % (ref 35–47)
HCT VFR BLD AUTO: 27.1 % (ref 35–47)
HCT VFR BLD AUTO: 27.5 % (ref 35–47)
HCT VFR BLD AUTO: 27.7 % (ref 35–47)
HCT VFR BLD AUTO: 29 % (ref 35–47)
HCT VFR BLD AUTO: 29 % (ref 35–47)
HCT VFR BLD AUTO: 29.2 % (ref 35–47)
HCT VFR BLD AUTO: 29.8 % (ref 35–47)
HCT VFR BLD AUTO: 30 % (ref 35–47)
HCT VFR BLD AUTO: 39 % (ref 35–47)
HCV AB SERPL QL IA: NONREACTIVE
HEMOCCULT STL QL: NEGATIVE
HEMOGLOBIN: 8.3 G/DL (ref 12–16)
HEMOGLOBIN: 8.8 G/DL (ref 12–16)
HEMOGLOBIN: 8.9 G/DL (ref 12–16)
HEMOGLOBIN: 9.8 G/DL (ref 12–16)
HGB BLD-MCNC: 12.7 G/DL (ref 11.7–15.7)
HGB BLD-MCNC: 13 G/DL (ref 11.7–15.7)
HGB BLD-MCNC: 6.1 G/DL (ref 11.7–15.7)
HGB BLD-MCNC: 6.5 G/DL (ref 11.7–15.7)
HGB BLD-MCNC: 6.9 G/DL (ref 11.7–15.7)
HGB BLD-MCNC: 7.1 G/DL (ref 12–16)
HGB BLD-MCNC: 7.2 G/DL (ref 11.7–15.7)
HGB BLD-MCNC: 7.4 G/DL (ref 11.7–15.7)
HGB BLD-MCNC: 7.4 G/DL (ref 12–16)
HGB BLD-MCNC: 7.4 G/DL (ref 12–16)
HGB BLD-MCNC: 7.5 G/DL (ref 11.7–15.7)
HGB BLD-MCNC: 8.3 G/DL (ref 12–16)
HGB BLD-MCNC: 8.4 G/DL (ref 11.7–15.7)
HGB BLD-MCNC: 8.8 G/DL (ref 11.7–15.7)
HGB BLD-MCNC: 8.8 G/DL (ref 12–16)
HGB BLD-MCNC: 8.9 G/DL (ref 12–16)
HGB BLD-MCNC: 9 G/DL (ref 11.7–15.7)
HGB BLD-MCNC: 9 G/DL (ref 11.7–15.7)
HGB BLD-MCNC: 9.5 G/DL (ref 11.7–15.7)
HGB BLD-MCNC: 9.7 G/DL (ref 11.7–15.7)
HGB BLD-MCNC: 9.8 G/DL (ref 12–16)
HGB BLD-MCNC: 9.9 G/DL (ref 11.7–15.7)
HGB BLD-MCNC: 9.9 G/DL (ref 11.7–15.7)
HGB UR QL STRIP: ABNORMAL
HGB UR QL STRIP: ABNORMAL
HGB UR QL STRIP: NEGATIVE
HGB UR QL STRIP: NEGATIVE
HIV 1+2 AB+HIV1 P24 AG SERPL QL IA: NONREACTIVE
IGA SERPL-MCNC: 137 MG/DL (ref 84–499)
IGG SERPL-MCNC: 575 MG/DL (ref 610–1616)
IGM SERPL-MCNC: 95 MG/DL (ref 35–242)
IMM GRANULOCYTES # BLD: 0.2 10E9/L (ref 0–0.4)
IMM GRANULOCYTES # BLD: 0.6 10E9/L (ref 0–0.4)
IMM GRANULOCYTES NFR BLD: 2 %
IMM GRANULOCYTES NFR BLD: 5 %
INR PPP: 1.26 (ref 0.86–1.14)
INTERPRETATION ECG - MUSE: NORMAL
KETONES UR STRIP-MCNC: ABNORMAL MG/DL
KETONES UR STRIP-MCNC: NEGATIVE MG/DL
LACTATE BLD-SCNC: 2.4 MMOL/L (ref 0.7–2)
LACTATE BLD-SCNC: 2.9 MMOL/L (ref 0.7–2)
LACTATE BLD-SCNC: 3.3 MMOL/L (ref 0.7–2)
LACTATE BLD-SCNC: 3.4 MMOL/L (ref 0.7–2)
LACTATE BLD-SCNC: 3.7 MMOL/L (ref 0.7–2)
LACTATE BLD-SCNC: 3.7 MMOL/L (ref 0.7–2)
LDH SERPL L TO P-CCNC: 1088 U/L (ref 81–234)
LEUKOCYTE ESTERASE UR QL STRIP: ABNORMAL
LIPASE SERPL-CCNC: 27 U/L (ref 73–393)
LYMPHOCYTES # BLD AUTO: 0.6 10E9/L (ref 0.8–5.3)
LYMPHOCYTES # BLD AUTO: 0.7 10E9/L (ref 0.8–5.3)
LYMPHOCYTES # BLD AUTO: 0.7 10E9/L (ref 0.8–5.3)
LYMPHOCYTES # BLD AUTO: 0.9 10E9/L (ref 0.8–5.3)
LYMPHOCYTES # BLD AUTO: 0.9 THOU/UL (ref 0.8–4.4)
LYMPHOCYTES # BLD AUTO: 1 THOU/UL (ref 0.8–4.4)
LYMPHOCYTES # BLD AUTO: 1.2 10E9/L (ref 0.8–5.3)
LYMPHOCYTES # BLD AUTO: 1.2 10E9/L (ref 0.8–5.3)
LYMPHOCYTES # BLD AUTO: 1.3 10E9/L (ref 0.8–5.3)
LYMPHOCYTES NFR BLD AUTO: 11 % (ref 20–40)
LYMPHOCYTES NFR BLD AUTO: 11.6 %
LYMPHOCYTES NFR BLD AUTO: 12 %
LYMPHOCYTES NFR BLD AUTO: 15 %
LYMPHOCYTES NFR BLD AUTO: 17 %
LYMPHOCYTES NFR BLD AUTO: 18 %
LYMPHOCYTES NFR BLD AUTO: 29 %
LYMPHOCYTES NFR BLD AUTO: 7.4 %
LYMPHOCYTES NFR BLD AUTO: 8 % (ref 20–40)
Lab: ABNORMAL
Lab: ABNORMAL
Lab: NORMAL
M PROTEIN SERPL ELPH-MCNC: 0 G/DL
MACROCYTES BLD QL SMEAR: PRESENT
MAGNESIUM SERPL-MCNC: 1.4 MG/DL (ref 1.6–2.3)
MAGNESIUM SERPL-MCNC: 2.1 MG/DL (ref 1.6–2.3)
MAGNESIUM SERPL-MCNC: 2.2 MG/DL (ref 1.6–2.3)
MAGNESIUM SERPL-MCNC: 2.5 MG/DL (ref 1.6–2.3)
MANUAL NRBC PER 100 CELLS: 1
MANUAL NRBC PER 100 CELLS: 6
MCH RBC QN AUTO: 29.9 PG (ref 26.5–33)
MCH RBC QN AUTO: 30 PG (ref 26.5–33)
MCH RBC QN AUTO: 30 PG (ref 27–34)
MCH RBC QN AUTO: 30.1 PG (ref 26.5–33)
MCH RBC QN AUTO: 30.1 PG (ref 26.5–33)
MCH RBC QN AUTO: 30.2 PG (ref 26.5–33)
MCH RBC QN AUTO: 30.2 PG (ref 27–34)
MCH RBC QN AUTO: 30.3 PG (ref 27–34)
MCH RBC QN AUTO: 30.3 PG (ref 27–34)
MCH RBC QN AUTO: 30.4 PG (ref 27–34)
MCH RBC QN AUTO: 30.4 PG (ref 27–34)
MCH RBC QN AUTO: 30.5 PG (ref 26.5–33)
MCH RBC QN AUTO: 30.6 PG (ref 26.5–33)
MCH RBC QN AUTO: 30.6 PG (ref 27–34)
MCH RBC QN AUTO: 30.7 PG (ref 26.5–33)
MCH RBC QN AUTO: 30.8 PG (ref 26.5–33)
MCH RBC QN AUTO: 31.6 PG (ref 26.5–33)
MCHC RBC AUTO-ENTMCNC: 30.7 G/DL (ref 31.5–36.5)
MCHC RBC AUTO-ENTMCNC: 30.7 G/DL (ref 32–36)
MCHC RBC AUTO-ENTMCNC: 30.7 G/DL (ref 32–36)
MCHC RBC AUTO-ENTMCNC: 31.5 G/DL (ref 32–36)
MCHC RBC AUTO-ENTMCNC: 31.9 G/DL (ref 31.5–36.5)
MCHC RBC AUTO-ENTMCNC: 31.9 G/DL (ref 32–36)
MCHC RBC AUTO-ENTMCNC: 31.9 G/DL (ref 32–36)
MCHC RBC AUTO-ENTMCNC: 32 G/DL (ref 31.5–36.5)
MCHC RBC AUTO-ENTMCNC: 32 G/DL (ref 32–36)
MCHC RBC AUTO-ENTMCNC: 32.2 G/DL (ref 31.5–36.5)
MCHC RBC AUTO-ENTMCNC: 32.5 G/DL (ref 31.5–36.5)
MCHC RBC AUTO-ENTMCNC: 32.5 G/DL (ref 32–36)
MCHC RBC AUTO-ENTMCNC: 32.5 G/DL (ref 32–36)
MCHC RBC AUTO-ENTMCNC: 32.6 G/DL (ref 31.5–36.5)
MCHC RBC AUTO-ENTMCNC: 32.7 G/DL (ref 31.5–36.5)
MCHC RBC AUTO-ENTMCNC: 32.7 G/DL (ref 31.5–36.5)
MCHC RBC AUTO-ENTMCNC: 32.9 G/DL (ref 32–36)
MCHC RBC AUTO-ENTMCNC: 33 G/DL (ref 31.5–36.5)
MCHC RBC AUTO-ENTMCNC: 33.2 G/DL (ref 31.5–36.5)
MCHC RBC AUTO-ENTMCNC: 33.2 G/DL (ref 31.5–36.5)
MCHC RBC AUTO-ENTMCNC: 33.5 G/DL (ref 31.5–36.5)
MCV RBC AUTO: 92 FL (ref 78–100)
MCV RBC AUTO: 92 FL (ref 80–100)
MCV RBC AUTO: 93 FL (ref 78–100)
MCV RBC AUTO: 93 FL (ref 78–100)
MCV RBC AUTO: 93 FL (ref 80–100)
MCV RBC AUTO: 93 FL (ref 80–100)
MCV RBC AUTO: 94 FL (ref 78–100)
MCV RBC AUTO: 94 FL (ref 80–100)
MCV RBC AUTO: 94 FL (ref 80–100)
MCV RBC AUTO: 95 FL (ref 78–100)
MCV RBC AUTO: 95 FL (ref 80–100)
MCV RBC AUTO: 96 FL (ref 78–100)
MCV RBC AUTO: 96 FL (ref 78–100)
MCV RBC AUTO: 96 FL (ref 80–100)
MCV RBC AUTO: 98 FL (ref 78–100)
MCV RBC AUTO: 99 FL (ref 80–100)
MCV RBC AUTO: 99 FL (ref 80–100)
METAMYELOCYTES # BLD: 0.1 10E9/L
METAMYELOCYTES # BLD: 0.2 10E9/L
METAMYELOCYTES # BLD: 0.3 10E9/L
METAMYELOCYTES # BLD: 0.5 10E9/L
METAMYELOCYTES # BLD: 0.6 10E9/L
METAMYELOCYTES (ABSOLUTE): 0.2 THOU/UL
METAMYELOCYTES (ABSOLUTE): 0.5 THOU/UL
METAMYELOCYTES NFR BLD MANUAL: 15 %
METAMYELOCYTES NFR BLD MANUAL: 2 %
METAMYELOCYTES NFR BLD MANUAL: 3 %
METAMYELOCYTES NFR BLD MANUAL: 4 %
METAMYELOCYTES NFR BLD MANUAL: 5 %
METAMYELOCYTES NFR BLD MANUAL: 5 %
METAMYELOCYTES NFR BLD MANUAL: 9 %
MICROCYTES BLD QL SMEAR: PRESENT
MONOCYTES # BLD AUTO: 0 10E9/L (ref 0–1.3)
MONOCYTES # BLD AUTO: 0.1 10E9/L (ref 0–1.3)
MONOCYTES # BLD AUTO: 0.3 10E9/L (ref 0–1.3)
MONOCYTES # BLD AUTO: 0.4 10E9/L (ref 0–1.3)
MONOCYTES # BLD AUTO: 0.7 THOU/UL (ref 0–0.9)
MONOCYTES # BLD AUTO: 0.8 10E9/L (ref 0–1.3)
MONOCYTES # BLD AUTO: 0.8 THOU/UL (ref 0–0.9)
MONOCYTES NFR BLD AUTO: 0 %
MONOCYTES NFR BLD AUTO: 1 %
MONOCYTES NFR BLD AUTO: 3.3 %
MONOCYTES NFR BLD AUTO: 6.7 %
MONOCYTES NFR BLD AUTO: 7 % (ref 2–10)
MONOCYTES NFR BLD AUTO: 8 %
MONOCYTES NFR BLD AUTO: 9 % (ref 2–10)
MUCOUS THREADS #/AREA URNS LPF: PRESENT /LPF
MYELOCYTES # BLD: 0 10E9/L
MYELOCYTES # BLD: 0.1 10E9/L
MYELOCYTES # BLD: 0.3 10E9/L
MYELOCYTES # BLD: 0.4 10E9/L
MYELOCYTES (ABSOLUTE): 0.1 THOU/UL
MYELOCYTES (ABSOLUTE): 0.2 THOU/UL
MYELOCYTES NFR BLD MANUAL: 1 %
MYELOCYTES NFR BLD MANUAL: 1 %
MYELOCYTES NFR BLD MANUAL: 2 %
MYELOCYTES NFR BLD MANUAL: 2 %
MYELOCYTES NFR BLD MANUAL: 4 %
MYELOCYTES NFR BLD MANUAL: 9 %
NEUTROPHILS # BLD AUTO: 2.2 10E9/L (ref 1.6–8.3)
NEUTROPHILS # BLD AUTO: 2.6 10E9/L (ref 1.6–8.3)
NEUTROPHILS # BLD AUTO: 2.8 10E9/L (ref 1.6–8.3)
NEUTROPHILS # BLD AUTO: 4.3 10E9/L (ref 1.6–8.3)
NEUTROPHILS # BLD AUTO: 4.8 10E9/L (ref 1.6–8.3)
NEUTROPHILS # BLD AUTO: 9 10E9/L (ref 1.6–8.3)
NEUTROPHILS # BLD AUTO: 9.7 10E9/L (ref 1.6–8.3)
NEUTROPHILS NFR BLD AUTO: 56 %
NEUTROPHILS NFR BLD AUTO: 65 %
NEUTROPHILS NFR BLD AUTO: 70 %
NEUTROPHILS NFR BLD AUTO: 73 %
NEUTROPHILS NFR BLD AUTO: 77 %
NEUTROPHILS NFR BLD AUTO: 78.9 %
NEUTROPHILS NFR BLD AUTO: 83.8 %
NITRATE UR QL: NEGATIVE
NITRATE UR QL: NEGATIVE
NITRATE UR QL: POSITIVE
NITRATE UR QL: POSITIVE
NRBC # BLD AUTO: 0 10*3/UL
NRBC # BLD AUTO: 0.1 10*3/UL
NRBC # BLD AUTO: 0.1 10*3/UL
NRBC # BLD AUTO: 0.4 10*3/UL
NRBC BLD AUTO-RTO: 0 /100
NRBC BLD AUTO-RTO: 0 /100
NRBC BLD AUTO-RTO: 1 /100
NRBC BLD AUTO-RTO: 1 /100
NRBC BLD AUTO-RTO: 2 /100
NRBC BLD AUTO-RTO: 7 /100
NT-PROBNP SERPL-MCNC: 3603 PG/ML (ref 0–1800)
NT-PROBNP SERPL-MCNC: 830 PG/ML (ref 0–1800)
NUM BPU REQUESTED: 1
NUM BPU REQUESTED: 2
O2/TOTAL GAS SETTING VFR VENT: ABNORMAL %
OVALOCYTES: ABNORMAL
OXYHGB MFR BLDV: 40 %
OXYHGB MFR BLDV: 65 %
OXYHGB MFR BLDV: 83 %
PCO2 BLDV: 27 MM HG (ref 40–50)
PCO2 BLDV: 32 MM HG (ref 40–50)
PCO2 BLDV: 38 MM HG (ref 40–50)
PCO2 BLDV: 40 MM HG (ref 40–50)
PH BLDV: 7.19 PH (ref 7.32–7.43)
PH BLDV: 7.22 PH (ref 7.32–7.43)
PH BLDV: 7.29 PH (ref 7.32–7.43)
PH BLDV: 7.31 PH (ref 7.32–7.43)
PH UR STRIP: 6.5 PH (ref 5–7)
PH UR STRIP: 6.5 [PH] (ref 4.5–8)
PH UR STRIP: 7 PH (ref 5–7)
PH UR STRIP: 7 PH (ref 5–7)
PHOSPHATE SERPL-MCNC: 2.4 MG/DL (ref 2.5–4.5)
PHOSPHATE SERPL-MCNC: 2.7 MG/DL (ref 2.5–4.5)
PLAT MORPH BLD: ABNORMAL
PLAT MORPH BLD: ABNORMAL
PLATELET # BLD AUTO: 101 10E9/L (ref 150–450)
PLATELET # BLD AUTO: 102 10E9/L (ref 150–450)
PLATELET # BLD AUTO: 11 10E9/L (ref 150–450)
PLATELET # BLD AUTO: 124 THOU/UL (ref 140–440)
PLATELET # BLD AUTO: 124 THOU/UL (ref 140–440)
PLATELET # BLD AUTO: 129 10E9/L (ref 150–450)
PLATELET # BLD AUTO: 134 10E9/L (ref 150–450)
PLATELET # BLD AUTO: 148 10E9/L (ref 150–450)
PLATELET # BLD AUTO: 168 10E9/L (ref 150–450)
PLATELET # BLD AUTO: 186 10E9/L (ref 150–450)
PLATELET # BLD AUTO: 19 10E9/L (ref 150–450)
PLATELET # BLD AUTO: 20 THOU/UL (ref 140–440)
PLATELET # BLD AUTO: 20 THOU/UL (ref 140–440)
PLATELET # BLD AUTO: 23 10E9/L (ref 150–450)
PLATELET # BLD AUTO: 24 10E9/L (ref 150–450)
PLATELET # BLD AUTO: 31 THOU/UL (ref 140–440)
PLATELET # BLD AUTO: 36 10E9/L (ref 150–450)
PLATELET # BLD AUTO: 70 THOU/UL (ref 140–440)
PLATELET # BLD AUTO: 70 THOU/UL (ref 140–440)
PLATELET # BLD AUTO: 86 THOU/UL (ref 140–440)
PLATELET # BLD AUTO: 86 THOU/UL (ref 140–440)
PLATELET # BLD AUTO: 89 THOU/UL (ref 140–440)
PLATELET # BLD AUTO: 89 THOU/UL (ref 140–440)
PLATELET # BLD AUTO: 9 10E9/L (ref 150–450)
PLATELET # BLD EST: ABNORMAL 10*3/UL
PMV BLD AUTO: 11.7 FL (ref 8.5–12.5)
PMV BLD AUTO: 12.1 FL (ref 8.5–12.5)
PMV BLD AUTO: 12.7 FL (ref 8.5–12.5)
PMV BLD AUTO: 13.2 FL (ref 8.5–12.5)
PMV BLD AUTO: ABNORMAL FL
PO2 BLDV: 27 MM HG (ref 25–47)
PO2 BLDV: 36 MM HG (ref 25–47)
PO2 BLDV: 54 MM HG (ref 25–47)
PO2 BLDV: 61 MM HG (ref 25–47)
POIKILOCYTOSIS BLD QL SMEAR: SLIGHT
POLYCHROMASIA BLD QL SMEAR: ABNORMAL
POTASSIUM BLD-SCNC: 3.1 MMOL/L (ref 3.5–5)
POTASSIUM BLD-SCNC: 3.1 MMOL/L (ref 3.5–5)
POTASSIUM BLD-SCNC: 3.2 MMOL/L (ref 3.5–5)
POTASSIUM BLD-SCNC: 3.6 MMOL/L (ref 3.5–5)
POTASSIUM BLD-SCNC: 3.8 MMOL/L (ref 3.5–5)
POTASSIUM BLD-SCNC: 4.4 MMOL/L (ref 3.5–5)
POTASSIUM BLD-SCNC: 4.5 MMOL/L (ref 3.5–5)
POTASSIUM BLD-SCNC: 4.9 MMOL/L (ref 3.5–5)
POTASSIUM BLD-SCNC: 5 MMOL/L (ref 3.5–5)
POTASSIUM BLD-SCNC: 5.3 MMOL/L (ref 3.5–5)
POTASSIUM BLD-SCNC: 5.3 MMOL/L (ref 3.5–5)
POTASSIUM BLD-SCNC: 5.5 MMOL/L (ref 3.5–5)
POTASSIUM SERPL-SCNC: 3 MMOL/L (ref 3.4–5.3)
POTASSIUM SERPL-SCNC: 3.1 MMOL/L (ref 3.5–5)
POTASSIUM SERPL-SCNC: 3.1 MMOL/L (ref 3.5–5)
POTASSIUM SERPL-SCNC: 3.2 MMOL/L (ref 3.4–5.3)
POTASSIUM SERPL-SCNC: 3.2 MMOL/L (ref 3.5–5)
POTASSIUM SERPL-SCNC: 3.3 MMOL/L (ref 3.4–5.3)
POTASSIUM SERPL-SCNC: 3.4 MMOL/L (ref 3.4–5.3)
POTASSIUM SERPL-SCNC: 3.5 MMOL/L (ref 3.4–5.3)
POTASSIUM SERPL-SCNC: 3.6 MMOL/L (ref 3.4–5.3)
POTASSIUM SERPL-SCNC: 3.6 MMOL/L (ref 3.4–5.3)
POTASSIUM SERPL-SCNC: 3.7 MMOL/L (ref 3.4–5.3)
POTASSIUM SERPL-SCNC: 3.8 MMOL/L (ref 3.4–5.3)
POTASSIUM SERPL-SCNC: 3.8 MMOL/L (ref 3.4–5.3)
POTASSIUM SERPL-SCNC: 3.9 MMOL/L (ref 3.4–5.3)
POTASSIUM SERPL-SCNC: 4 MMOL/L (ref 3.4–5.3)
POTASSIUM SERPL-SCNC: 4.1 MMOL/L (ref 3.4–5.3)
POTASSIUM SERPL-SCNC: 4.2 MMOL/L (ref 3.4–5.3)
POTASSIUM SERPL-SCNC: 4.4 MMOL/L (ref 3.5–5)
POTASSIUM SERPL-SCNC: 4.5 MMOL/L (ref 3.4–5.3)
POTASSIUM SERPL-SCNC: 4.5 MMOL/L (ref 3.5–5)
POTASSIUM SERPL-SCNC: 4.6 MMOL/L (ref 3.4–5.3)
POTASSIUM SERPL-SCNC: 4.6 MMOL/L (ref 3.4–5.3)
POTASSIUM SERPL-SCNC: 4.8 MMOL/L (ref 3.4–5.3)
POTASSIUM SERPL-SCNC: 5.1 MMOL/L (ref 3.4–5.3)
POTASSIUM SERPL-SCNC: 5.9 MMOL/L (ref 3.4–5.3)
PROCALCITONIN SERPL-MCNC: 0.49 NG/ML
PROCALCITONIN SERPL-MCNC: 0.67 NG/ML
PROMYELOCYTES # BLD MANUAL: 0.1 THOU/UL
PROMYELOCYTES NFR BLD MANUAL: 1 %
PROT PATTERN SERPL ELPH-IMP: ABNORMAL
PROT PATTERN SERPL IFE-IMP: ABNORMAL
PROT PATTERN UR ELPH-IMP: NORMAL
PROT SERPL-MCNC: 5.5 G/DL (ref 6.8–8.8)
PROT SERPL-MCNC: 5.5 G/DL (ref 6.8–8.8)
PROT SERPL-MCNC: 5.6 G/DL (ref 6.8–8.8)
PROT SERPL-MCNC: 5.8 G/DL (ref 6.8–8.8)
PROT SERPL-MCNC: 6 G/DL (ref 6.8–8.8)
PROT SERPL-MCNC: 6.1 G/DL (ref 6.8–8.8)
PROT SERPL-MCNC: 6.2 G/DL (ref 6–8)
PROT SERPL-MCNC: 6.7 G/DL (ref 6.8–8.8)
PROT SERPL-MCNC: 7.5 G/DL (ref 6.8–8.8)
PTH RELATED PROT SERPL-SCNC: 2.4 PMOL/L (ref 0–3.4)
PTH-INTACT SERPL-MCNC: <7 PG/ML (ref 18–80)
RBC # BLD AUTO: 2.04 10E12/L (ref 3.8–5.2)
RBC # BLD AUTO: 2.24 10E12/L (ref 3.8–5.2)
RBC # BLD AUTO: 2.28 10E12/L (ref 3.8–5.2)
RBC # BLD AUTO: 2.32 MILL/UL (ref 3.8–5.4)
RBC # BLD AUTO: 2.43 10E12/L (ref 3.8–5.2)
RBC # BLD AUTO: 2.45 10E12/L (ref 3.8–5.2)
RBC # BLD AUTO: 2.45 MILL/UL (ref 3.8–5.4)
RBC # BLD AUTO: 2.47 MILL/UL (ref 3.8–5.4)
RBC # BLD AUTO: 2.77 MILL/UL (ref 3.8–5.4)
RBC # BLD AUTO: 2.77 MILL/UL (ref 3.8–5.4)
RBC # BLD AUTO: 2.91 MILL/UL (ref 3.8–5.4)
RBC # BLD AUTO: 2.91 MILL/UL (ref 3.8–5.4)
RBC # BLD AUTO: 2.92 10E12/L (ref 3.8–5.2)
RBC # BLD AUTO: 2.93 MILL/UL (ref 3.8–5.4)
RBC # BLD AUTO: 2.93 MILL/UL (ref 3.8–5.4)
RBC # BLD AUTO: 2.98 10E12/L (ref 3.8–5.2)
RBC # BLD AUTO: 3 10E12/L (ref 3.8–5.2)
RBC # BLD AUTO: 3.17 10E12/L (ref 3.8–5.2)
RBC # BLD AUTO: 3.22 10E12/L (ref 3.8–5.2)
RBC # BLD AUTO: 3.23 MILL/UL (ref 3.8–5.4)
RBC # BLD AUTO: 3.23 MILL/UL (ref 3.8–5.4)
RBC # BLD AUTO: 3.24 10E12/L (ref 3.8–5.2)
RBC # BLD AUTO: 4.22 10E12/L (ref 3.8–5.2)
RBC #/AREA URNS AUTO: 1 /HPF (ref 0–2)
RBC #/AREA URNS AUTO: 20 /HPF (ref 0–2)
RBC #/AREA URNS AUTO: 8 /HPF (ref 0–2)
RBC #/AREA URNS AUTO: ABNORMAL HPF
RBC INCLUSIONS BLD: SLIGHT
SARS-COV-2 PCR COMMENT: ABNORMAL
SARS-COV-2 PCR COMMENT: NORMAL
SARS-COV-2 RNA SPEC QL NAA+PROBE: NEGATIVE
SARS-COV-2 RNA SPEC QL NAA+PROBE: NORMAL
SARS-COV-2 RNA SPEC QL NAA+PROBE: NORMAL
SARS-COV-2 RNA SPEC QL NAA+PROBE: POSITIVE
SCHISTOCYTES: ABNORMAL
SCHISTOCYTES: ABNORMAL
SODIUM SERPL-SCNC: 126 MMOL/L (ref 133–144)
SODIUM SERPL-SCNC: 127 MMOL/L (ref 136–145)
SODIUM SERPL-SCNC: 128 MMOL/L (ref 136–145)
SODIUM SERPL-SCNC: 128 MMOL/L (ref 136–145)
SODIUM SERPL-SCNC: 129 MMOL/L (ref 133–144)
SODIUM SERPL-SCNC: 130 MMOL/L (ref 133–144)
SODIUM SERPL-SCNC: 130 MMOL/L (ref 136–145)
SODIUM SERPL-SCNC: 131 MMOL/L (ref 133–144)
SODIUM SERPL-SCNC: 131 MMOL/L (ref 136–145)
SODIUM SERPL-SCNC: 132 MMOL/L (ref 136–145)
SODIUM SERPL-SCNC: 132 MMOL/L (ref 136–145)
SODIUM SERPL-SCNC: 134 MMOL/L (ref 133–144)
SODIUM SERPL-SCNC: 135 MMOL/L (ref 136–145)
SODIUM SERPL-SCNC: 135 MMOL/L (ref 136–145)
SODIUM SERPL-SCNC: 136 MMOL/L (ref 133–144)
SODIUM SERPL-SCNC: 137 MMOL/L (ref 133–144)
SODIUM SERPL-SCNC: 138 MMOL/L (ref 133–144)
SODIUM SERPL-SCNC: 140 MMOL/L (ref 133–144)
SODIUM SERPL-SCNC: 141 MMOL/L (ref 133–144)
SODIUM SERPL-SCNC: 141 MMOL/L (ref 133–144)
SODIUM SERPL-SCNC: 142 MMOL/L (ref 136–145)
SODIUM SERPL-SCNC: 143 MMOL/L (ref 133–144)
SODIUM SERPL-SCNC: 143 MMOL/L (ref 133–144)
SODIUM SERPL-SCNC: 143 MMOL/L (ref 136–145)
SODIUM SERPL-SCNC: 144 MMOL/L (ref 133–144)
SODIUM SERPL-SCNC: 145 MMOL/L (ref 133–144)
SODIUM SERPL-SCNC: 146 MMOL/L (ref 133–144)
SOURCE: ABNORMAL
SP GR UR STRIP: 1.01 (ref 1–1.03)
SPECIMEN EXP DATE BLD: NORMAL
SPECIMEN EXP DATE BLD: NORMAL
SPECIMEN SOURCE: ABNORMAL
SPECIMEN SOURCE: NORMAL
SQUAMOUS #/AREA URNS AUTO: 1 /HPF (ref 0–1)
SQUAMOUS #/AREA URNS AUTO: 3 /HPF (ref 0–1)
SQUAMOUS #/AREA URNS AUTO: ABNORMAL LPF
T3FREE SERPL-MCNC: 1.7 PG/ML (ref 2.3–4.2)
T3FREE SERPL-MCNC: 2.4 PG/ML (ref 2.3–4.2)
T4 FREE SERPL-MCNC: 0.86 NG/DL (ref 0.76–1.46)
T4 FREE SERPL-MCNC: 0.89 NG/DL (ref 0.76–1.46)
T4 FREE SERPL-MCNC: 0.9 NG/DL (ref 0.7–1.8)
TOTAL NEUTROPHILS-ABS(DIFF): 6.7 THOU/UL (ref 2–7.7)
TOTAL NEUTROPHILS-ABS(DIFF): 9 THOU/UL (ref 2–7.7)
TOTAL NEUTROPHILS-REL(DIFF): 73 % (ref 50–70)
TOTAL NEUTROPHILS-REL(DIFF): 82 % (ref 50–70)
TRANS CELLS #/AREA URNS HPF: ABNORMAL LPF
TRANSFUSION STATUS PATIENT QL: NORMAL
TROPONIN I SERPL-MCNC: 0.02 UG/L (ref 0–0.04)
TROPONIN I SERPL-MCNC: <0.015 UG/L (ref 0–0.04)
TSH SERPL DL<=0.005 MIU/L-ACNC: 0.27 MU/L (ref 0.4–4)
TSH SERPL DL<=0.005 MIU/L-ACNC: 0.72 MU/L (ref 0.4–4)
TSH SERPL DL<=0.005 MIU/L-ACNC: 3.07 UIU/ML (ref 0.3–5)
UROBILINOGEN UR STRIP-ACNC: ABNORMAL
UROBILINOGEN UR STRIP-MCNC: NORMAL MG/DL (ref 0–2)
VIT B12 SERPL-MCNC: 995 PG/ML (ref 193–986)
VITAMIN D2 SERPL-MCNC: <5 UG/L
VITAMIN D3 SERPL-MCNC: 40 UG/L
WBC # BLD AUTO: 11.1 THOU/UL (ref 4–11)
WBC # BLD AUTO: 11.4 10E9/L (ref 4–11)
WBC # BLD AUTO: 11.6 10E9/L (ref 4–11)
WBC # BLD AUTO: 14 10E9/L (ref 4–11)
WBC # BLD AUTO: 14.4 10E9/L (ref 4–11)
WBC # BLD AUTO: 3.8 10E9/L (ref 4–11)
WBC # BLD AUTO: 4 10E9/L (ref 4–11)
WBC # BLD AUTO: 4 10E9/L (ref 4–11)
WBC # BLD AUTO: 5.2 10E9/L (ref 4–11)
WBC # BLD AUTO: 5.2 THOU/UL (ref 4–11)
WBC # BLD AUTO: 5.6 10E9/L (ref 4–11)
WBC # BLD AUTO: 6.8 10E9/L (ref 4–11)
WBC # BLD AUTO: 7.3 THOU/UL (ref 4–11)
WBC # BLD AUTO: 7.8 THOU/UL (ref 4–11)
WBC # BLD AUTO: 8.5 10E9/L (ref 4–11)
WBC # BLD AUTO: 9.1 10E9/L (ref 4–11)
WBC #/AREA URNS AUTO: 137 /HPF (ref 0–5)
WBC #/AREA URNS AUTO: 89 /HPF (ref 0–5)
WBC #/AREA URNS AUTO: >182 /HPF (ref 0–5)
WBC #/AREA URNS AUTO: ABNORMAL HPF
WBC CLUMPS #/AREA URNS HPF: PRESENT /HPF
WBC: 11.1 THOU/UL (ref 4–11)
WBC: 5.2 THOU/UL (ref 4–11)
WBC: 5.7 THOU/UL (ref 4–11)
WBC: 7.3 THOU/UL (ref 4–11)
WBC: 7.8 THOU/UL (ref 4–11)
WBC: 8.8 THOU/UL (ref 4–11)
WBC: 9.2 THOU/UL (ref 4–11)
YEAST #/AREA URNS HPF: ABNORMAL /HPF

## 2020-01-01 PROCEDURE — 97166 OT EVAL MOD COMPLEX 45 MIN: CPT | Mod: GO | Performed by: OCCUPATIONAL THERAPIST

## 2020-01-01 PROCEDURE — 85018 HEMOGLOBIN: CPT | Performed by: HOSPITALIST

## 2020-01-01 PROCEDURE — 25000128 H RX IP 250 OP 636: Performed by: HOSPITALIST

## 2020-01-01 PROCEDURE — 96366 THER/PROPH/DIAG IV INF ADDON: CPT

## 2020-01-01 PROCEDURE — 83970 ASSAY OF PARATHORMONE: CPT | Performed by: EMERGENCY MEDICINE

## 2020-01-01 PROCEDURE — 25000132 ZZH RX MED GY IP 250 OP 250 PS 637: Performed by: INTERNAL MEDICINE

## 2020-01-01 PROCEDURE — 82330 ASSAY OF CALCIUM: CPT | Performed by: HOSPITALIST

## 2020-01-01 PROCEDURE — 81001 URINALYSIS AUTO W/SCOPE: CPT | Performed by: EMERGENCY MEDICINE

## 2020-01-01 PROCEDURE — 25000132 ZZH RX MED GY IP 250 OP 250 PS 637: Performed by: HOSPITALIST

## 2020-01-01 PROCEDURE — 36415 COLL VENOUS BLD VENIPUNCTURE: CPT | Performed by: INTERNAL MEDICINE

## 2020-01-01 PROCEDURE — 94660 CPAP INITIATION&MGMT: CPT

## 2020-01-01 PROCEDURE — 99233 SBSQ HOSP IP/OBS HIGH 50: CPT | Performed by: HOSPITALIST

## 2020-01-01 PROCEDURE — 97530 THERAPEUTIC ACTIVITIES: CPT | Mod: GP | Performed by: PHYSICAL THERAPIST

## 2020-01-01 PROCEDURE — 00000146 ZZHCL STATISTIC GLUCOSE BY METER IP

## 2020-01-01 PROCEDURE — 82805 BLOOD GASES W/O2 SATURATION: CPT | Performed by: EMERGENCY MEDICINE

## 2020-01-01 PROCEDURE — 12000000 ZZH R&B MED SURG/OB

## 2020-01-01 PROCEDURE — 83735 ASSAY OF MAGNESIUM: CPT | Performed by: HOSPITALIST

## 2020-01-01 PROCEDURE — 94002 VENT MGMT INPAT INIT DAY: CPT

## 2020-01-01 PROCEDURE — 92610 EVALUATE SWALLOWING FUNCTION: CPT | Mod: GN

## 2020-01-01 PROCEDURE — 25800030 ZZH RX IP 258 OP 636: Performed by: INTERNAL MEDICINE

## 2020-01-01 PROCEDURE — 99222 1ST HOSP IP/OBS MODERATE 55: CPT | Performed by: CLINICAL NURSE SPECIALIST

## 2020-01-01 PROCEDURE — 86850 RBC ANTIBODY SCREEN: CPT | Performed by: INTERNAL MEDICINE

## 2020-01-01 PROCEDURE — 82330 ASSAY OF CALCIUM: CPT | Performed by: EMERGENCY MEDICINE

## 2020-01-01 PROCEDURE — 85025 COMPLETE CBC W/AUTO DIFF WBC: CPT | Performed by: HOSPITALIST

## 2020-01-01 PROCEDURE — 80048 BASIC METABOLIC PNL TOTAL CA: CPT | Performed by: INTERNAL MEDICINE

## 2020-01-01 PROCEDURE — 25000125 ZZHC RX 250: Performed by: INTERNAL MEDICINE

## 2020-01-01 PROCEDURE — 82805 BLOOD GASES W/O2 SATURATION: CPT | Performed by: INTERNAL MEDICINE

## 2020-01-01 PROCEDURE — 85610 PROTHROMBIN TIME: CPT | Performed by: RADIOLOGY

## 2020-01-01 PROCEDURE — 87040 BLOOD CULTURE FOR BACTERIA: CPT | Performed by: INTERNAL MEDICINE

## 2020-01-01 PROCEDURE — 86923 COMPATIBILITY TEST ELECTRIC: CPT | Performed by: EMERGENCY MEDICINE

## 2020-01-01 PROCEDURE — 40000276 ZZH STATISTIC RCP TIME ED VENT EA 10 MIN

## 2020-01-01 PROCEDURE — 87086 URINE CULTURE/COLONY COUNT: CPT | Performed by: INTERNAL MEDICINE

## 2020-01-01 PROCEDURE — 40000275 ZZH STATISTIC RCP TIME EA 10 MIN

## 2020-01-01 PROCEDURE — 99309 SBSQ NF CARE MODERATE MDM 30: CPT | Mod: 95 | Performed by: NURSE PRACTITIONER

## 2020-01-01 PROCEDURE — 85025 COMPLETE CBC W/AUTO DIFF WBC: CPT | Performed by: INTERNAL MEDICINE

## 2020-01-01 PROCEDURE — 25800025 ZZH RX 258: Performed by: HOSPITALIST

## 2020-01-01 PROCEDURE — 25000128 H RX IP 250 OP 636: Performed by: INTERNAL MEDICINE

## 2020-01-01 PROCEDURE — 88342 IMHCHEM/IMCYTCHM 1ST ANTB: CPT | Performed by: HOSPITALIST

## 2020-01-01 PROCEDURE — 99232 SBSQ HOSP IP/OBS MODERATE 35: CPT | Performed by: INTERNAL MEDICINE

## 2020-01-01 PROCEDURE — 97110 THERAPEUTIC EXERCISES: CPT | Mod: GO | Performed by: OCCUPATIONAL THERAPIST

## 2020-01-01 PROCEDURE — 93005 ELECTROCARDIOGRAM TRACING: CPT

## 2020-01-01 PROCEDURE — 80053 COMPREHEN METABOLIC PANEL: CPT | Performed by: INTERNAL MEDICINE

## 2020-01-01 PROCEDURE — 82330 ASSAY OF CALCIUM: CPT | Performed by: INTERNAL MEDICINE

## 2020-01-01 PROCEDURE — 25800030 ZZH RX IP 258 OP 636: Performed by: EMERGENCY MEDICINE

## 2020-01-01 PROCEDURE — 25000131 ZZH RX MED GY IP 250 OP 636 PS 637: Performed by: HOSPITALIST

## 2020-01-01 PROCEDURE — 36415 COLL VENOUS BLD VENIPUNCTURE: CPT | Performed by: HOSPITALIST

## 2020-01-01 PROCEDURE — 86923 COMPATIBILITY TEST ELECTRIC: CPT | Performed by: INTERNAL MEDICINE

## 2020-01-01 PROCEDURE — 99207 ZZC CDG-MDM COMPONENT: MEETS LOW - DOWN CODED: CPT | Performed by: INTERNAL MEDICINE

## 2020-01-01 PROCEDURE — 99223 1ST HOSP IP/OBS HIGH 75: CPT | Performed by: CLINICAL NURSE SPECIALIST

## 2020-01-01 PROCEDURE — 70450 CT HEAD/BRAIN W/O DYE: CPT

## 2020-01-01 PROCEDURE — 25000132 ZZH RX MED GY IP 250 OP 250 PS 637: Performed by: EMERGENCY MEDICINE

## 2020-01-01 PROCEDURE — 36430 TRANSFUSION BLD/BLD COMPNT: CPT

## 2020-01-01 PROCEDURE — 99310 SBSQ NF CARE HIGH MDM 45: CPT | Mod: 95 | Performed by: NURSE PRACTITIONER

## 2020-01-01 PROCEDURE — 97161 PT EVAL LOW COMPLEX 20 MIN: CPT | Mod: GP | Performed by: PHYSICAL THERAPIST

## 2020-01-01 PROCEDURE — 84100 ASSAY OF PHOSPHORUS: CPT | Performed by: INTERNAL MEDICINE

## 2020-01-01 PROCEDURE — 92526 ORAL FUNCTION THERAPY: CPT | Mod: GN | Performed by: SPEECH-LANGUAGE PATHOLOGIST

## 2020-01-01 PROCEDURE — 83880 ASSAY OF NATRIURETIC PEPTIDE: CPT | Performed by: EMERGENCY MEDICINE

## 2020-01-01 PROCEDURE — 97140 MANUAL THERAPY 1/> REGIONS: CPT | Mod: GP | Performed by: PHYSICAL THERAPIST

## 2020-01-01 PROCEDURE — 84443 ASSAY THYROID STIM HORMONE: CPT | Performed by: INTERNAL MEDICINE

## 2020-01-01 PROCEDURE — C9803 HOPD COVID-19 SPEC COLLECT: HCPCS

## 2020-01-01 PROCEDURE — 40000986 XR CHEST PORT 1 VW

## 2020-01-01 PROCEDURE — 87086 URINE CULTURE/COLONY COUNT: CPT | Performed by: EMERGENCY MEDICINE

## 2020-01-01 PROCEDURE — 88305 TISSUE EXAM BY PATHOLOGIST: CPT | Performed by: HOSPITALIST

## 2020-01-01 PROCEDURE — 92526 ORAL FUNCTION THERAPY: CPT | Mod: GN

## 2020-01-01 PROCEDURE — 88342 IMHCHEM/IMCYTCHM 1ST ANTB: CPT | Mod: 26,XU | Performed by: INTERNAL MEDICINE

## 2020-01-01 PROCEDURE — 25000131 ZZH RX MED GY IP 250 OP 636 PS 637: Performed by: INTERNAL MEDICINE

## 2020-01-01 PROCEDURE — 3E043XZ INTRODUCTION OF VASOPRESSOR INTO CENTRAL VEIN, PERCUTANEOUS APPROACH: ICD-10-PCS | Performed by: INTERNAL MEDICINE

## 2020-01-01 PROCEDURE — 25500064 ZZH RX 255 OP 636: Performed by: INTERNAL MEDICINE

## 2020-01-01 PROCEDURE — 84481 FREE ASSAY (FT-3): CPT | Performed by: INTERNAL MEDICINE

## 2020-01-01 PROCEDURE — 83735 ASSAY OF MAGNESIUM: CPT | Performed by: EMERGENCY MEDICINE

## 2020-01-01 PROCEDURE — 84166 PROTEIN E-PHORESIS/URINE/CSF: CPT | Performed by: INTERNAL MEDICINE

## 2020-01-01 PROCEDURE — 25000132 ZZH RX MED GY IP 250 OP 250 PS 637: Performed by: CLINICAL NURSE SPECIALIST

## 2020-01-01 PROCEDURE — 88305 TISSUE EXAM BY PATHOLOGIST: CPT | Mod: 26,59 | Performed by: INTERNAL MEDICINE

## 2020-01-01 PROCEDURE — 25800030 ZZH RX IP 258 OP 636: Performed by: HOSPITALIST

## 2020-01-01 PROCEDURE — 99213 OFFICE O/P EST LOW 20 MIN: CPT | Performed by: PHYSICIAN ASSISTANT

## 2020-01-01 PROCEDURE — 88377 M/PHMTRC ALYS ISHQUANT/SEMIQ: CPT

## 2020-01-01 PROCEDURE — 97110 THERAPEUTIC EXERCISES: CPT | Mod: GP | Performed by: PHYSICAL THERAPIST

## 2020-01-01 PROCEDURE — A9585 GADOBUTROL INJECTION: HCPCS | Performed by: INTERNAL MEDICINE

## 2020-01-01 PROCEDURE — 80048 BASIC METABOLIC PNL TOTAL CA: CPT | Performed by: EMERGENCY MEDICINE

## 2020-01-01 PROCEDURE — 88342 IMHCHEM/IMCYTCHM 1ST ANTB: CPT | Performed by: INTERNAL MEDICINE

## 2020-01-01 PROCEDURE — 82533 TOTAL CORTISOL: CPT | Performed by: INTERNAL MEDICINE

## 2020-01-01 PROCEDURE — 25000132 ZZH RX MED GY IP 250 OP 250 PS 637: Performed by: NURSE PRACTITIONER

## 2020-01-01 PROCEDURE — 99223 1ST HOSP IP/OBS HIGH 75: CPT | Mod: AI | Performed by: INTERNAL MEDICINE

## 2020-01-01 PROCEDURE — P9016 RBC LEUKOCYTES REDUCED: HCPCS | Performed by: EMERGENCY MEDICINE

## 2020-01-01 PROCEDURE — 88342 IMHCHEM/IMCYTCHM 1ST ANTB: CPT | Mod: 26,XU | Performed by: HOSPITALIST

## 2020-01-01 PROCEDURE — 99214 OFFICE O/P EST MOD 30 MIN: CPT | Performed by: INTERNAL MEDICINE

## 2020-01-01 PROCEDURE — 96365 THER/PROPH/DIAG IV INF INIT: CPT

## 2020-01-01 PROCEDURE — 88305 TISSUE EXAM BY PATHOLOGIST: CPT | Performed by: INTERNAL MEDICINE

## 2020-01-01 PROCEDURE — 85025 COMPLETE CBC W/AUTO DIFF WBC: CPT | Performed by: EMERGENCY MEDICINE

## 2020-01-01 PROCEDURE — 82746 ASSAY OF FOLIC ACID SERUM: CPT | Performed by: INTERNAL MEDICINE

## 2020-01-01 PROCEDURE — 82542 COL CHROMOTOGRAPHY QUAL/QUAN: CPT | Performed by: HOSPITALIST

## 2020-01-01 PROCEDURE — 82784 ASSAY IGA/IGD/IGG/IGM EACH: CPT | Performed by: INTERNAL MEDICINE

## 2020-01-01 PROCEDURE — 84439 ASSAY OF FREE THYROXINE: CPT | Performed by: INTERNAL MEDICINE

## 2020-01-01 PROCEDURE — 86334 IMMUNOFIX E-PHORESIS SERUM: CPT | Performed by: INTERNAL MEDICINE

## 2020-01-01 PROCEDURE — 85049 AUTOMATED PLATELET COUNT: CPT | Performed by: HOSPITALIST

## 2020-01-01 PROCEDURE — 25000128 H RX IP 250 OP 636: Performed by: EMERGENCY MEDICINE

## 2020-01-01 PROCEDURE — 88360 TUMOR IMMUNOHISTOCHEM/MANUAL: CPT | Performed by: INTERNAL MEDICINE

## 2020-01-01 PROCEDURE — 00000158 ZZHCL STATISTIC H-FISH PROCESS B/S: Performed by: HOSPITALIST

## 2020-01-01 PROCEDURE — 88341 IMHCHEM/IMCYTCHM EA ADD ANTB: CPT | Mod: 26 | Performed by: HOSPITALIST

## 2020-01-01 PROCEDURE — 86803 HEPATITIS C AB TEST: CPT | Performed by: INTERNAL MEDICINE

## 2020-01-01 PROCEDURE — 82607 VITAMIN B-12: CPT | Performed by: INTERNAL MEDICINE

## 2020-01-01 PROCEDURE — 87340 HEPATITIS B SURFACE AG IA: CPT | Performed by: INTERNAL MEDICINE

## 2020-01-01 PROCEDURE — 87040 BLOOD CULTURE FOR BACTERIA: CPT | Performed by: HOSPITALIST

## 2020-01-01 PROCEDURE — 96361 HYDRATE IV INFUSION ADD-ON: CPT

## 2020-01-01 PROCEDURE — 99239 HOSP IP/OBS DSCHRG MGMT >30: CPT | Performed by: HOSPITALIST

## 2020-01-01 PROCEDURE — 84132 ASSAY OF SERUM POTASSIUM: CPT | Performed by: INTERNAL MEDICINE

## 2020-01-01 PROCEDURE — 72100 X-RAY EXAM L-S SPINE 2/3 VWS: CPT

## 2020-01-01 PROCEDURE — 83690 ASSAY OF LIPASE: CPT | Performed by: EMERGENCY MEDICINE

## 2020-01-01 PROCEDURE — 99214 OFFICE O/P EST MOD 30 MIN: CPT | Performed by: ORTHOPAEDIC SURGERY

## 2020-01-01 PROCEDURE — 80048 BASIC METABOLIC PNL TOTAL CA: CPT | Performed by: HOSPITALIST

## 2020-01-01 PROCEDURE — 99233 SBSQ HOSP IP/OBS HIGH 50: CPT | Performed by: NURSE PRACTITIONER

## 2020-01-01 PROCEDURE — 87186 SC STD MICRODIL/AGAR DIL: CPT | Performed by: EMERGENCY MEDICINE

## 2020-01-01 PROCEDURE — 31500 INSERT EMERGENCY AIRWAY: CPT

## 2020-01-01 PROCEDURE — 80053 COMPREHEN METABOLIC PANEL: CPT | Performed by: EMERGENCY MEDICINE

## 2020-01-01 PROCEDURE — 20000003 ZZH R&B ICU

## 2020-01-01 PROCEDURE — 84484 ASSAY OF TROPONIN QUANT: CPT | Performed by: EMERGENCY MEDICINE

## 2020-01-01 PROCEDURE — 40000901 ZZH STATISTIC WOC PT EDUCATION, 0-15 MIN

## 2020-01-01 PROCEDURE — 71275 CT ANGIOGRAPHY CHEST: CPT

## 2020-01-01 PROCEDURE — 82306 VITAMIN D 25 HYDROXY: CPT | Performed by: HOSPITALIST

## 2020-01-01 PROCEDURE — 83605 ASSAY OF LACTIC ACID: CPT | Performed by: INTERNAL MEDICINE

## 2020-01-01 PROCEDURE — 80076 HEPATIC FUNCTION PANEL: CPT | Performed by: EMERGENCY MEDICINE

## 2020-01-01 PROCEDURE — 88360 TUMOR IMMUNOHISTOCHEM/MANUAL: CPT | Mod: 26 | Performed by: HOSPITALIST

## 2020-01-01 PROCEDURE — U0003 INFECTIOUS AGENT DETECTION BY NUCLEIC ACID (DNA OR RNA); SEVERE ACUTE RESPIRATORY SYNDROME CORONAVIRUS 2 (SARS-COV-2) (CORONAVIRUS DISEASE [COVID-19]), AMPLIFIED PROBE TECHNIQUE, MAKING USE OF HIGH THROUGHPUT TECHNOLOGIES AS DESCRIBED BY CMS-2020-01-R: HCPCS | Performed by: EMERGENCY MEDICINE

## 2020-01-01 PROCEDURE — 25000125 ZZHC RX 250: Performed by: HOSPITALIST

## 2020-01-01 PROCEDURE — 85027 COMPLETE CBC AUTOMATED: CPT | Performed by: INTERNAL MEDICINE

## 2020-01-01 PROCEDURE — 99239 HOSP IP/OBS DSCHRG MGMT >30: CPT | Performed by: INTERNAL MEDICINE

## 2020-01-01 PROCEDURE — 84132 ASSAY OF SERUM POTASSIUM: CPT | Performed by: HOSPITALIST

## 2020-01-01 PROCEDURE — G0463 HOSPITAL OUTPT CLINIC VISIT: HCPCS

## 2020-01-01 PROCEDURE — P9016 RBC LEUKOCYTES REDUCED: HCPCS | Performed by: INTERNAL MEDICINE

## 2020-01-01 PROCEDURE — 36415 COLL VENOUS BLD VENIPUNCTURE: CPT | Performed by: RADIOLOGY

## 2020-01-01 PROCEDURE — 71046 X-RAY EXAM CHEST 2 VIEWS: CPT

## 2020-01-01 PROCEDURE — 87088 URINE BACTERIA CULTURE: CPT | Performed by: EMERGENCY MEDICINE

## 2020-01-01 PROCEDURE — 25800025 ZZH RX 258: Performed by: EMERGENCY MEDICINE

## 2020-01-01 PROCEDURE — 97535 SELF CARE MNGMENT TRAINING: CPT | Mod: GO | Performed by: OCCUPATIONAL THERAPIST

## 2020-01-01 PROCEDURE — 87389 HIV-1 AG W/HIV-1&-2 AB AG IA: CPT | Performed by: INTERNAL MEDICINE

## 2020-01-01 PROCEDURE — 51702 INSERT TEMP BLADDER CATH: CPT

## 2020-01-01 PROCEDURE — 83605 ASSAY OF LACTIC ACID: CPT | Performed by: EMERGENCY MEDICINE

## 2020-01-01 PROCEDURE — 0H9U3ZX DRAINAGE OF LEFT BREAST, PERCUTANEOUS APPROACH, DIAGNOSTIC: ICD-10-PCS | Performed by: RADIOLOGY

## 2020-01-01 PROCEDURE — 86900 BLOOD TYPING SEROLOGIC ABO: CPT | Performed by: INTERNAL MEDICINE

## 2020-01-01 PROCEDURE — 99291 CRITICAL CARE FIRST HOUR: CPT | Mod: 25 | Performed by: INTERNAL MEDICINE

## 2020-01-01 PROCEDURE — 0JB73ZX EXCISION OF BACK SUBCUTANEOUS TISSUE AND FASCIA, PERCUTANEOUS APPROACH, DIAGNOSTIC: ICD-10-PCS | Performed by: RADIOLOGY

## 2020-01-01 PROCEDURE — 88341 IMHCHEM/IMCYTCHM EA ADD ANTB: CPT | Performed by: HOSPITALIST

## 2020-01-01 PROCEDURE — 40000281 ZZH STATISTIC TRANSPORT TIME EA 15 MIN

## 2020-01-01 PROCEDURE — 73502 X-RAY EXAM HIP UNI 2-3 VIEWS: CPT | Mod: RT | Performed by: RADIOLOGY

## 2020-01-01 PROCEDURE — 99285 EMERGENCY DEPT VISIT HI MDM: CPT | Mod: 25

## 2020-01-01 PROCEDURE — 99306 1ST NF CARE HIGH MDM 50: CPT | Mod: 95 | Performed by: INTERNAL MEDICINE

## 2020-01-01 PROCEDURE — 82232 ASSAY OF BETA-2 PROTEIN: CPT | Performed by: INTERNAL MEDICINE

## 2020-01-01 PROCEDURE — 84145 PROCALCITONIN (PCT): CPT | Performed by: HOSPITALIST

## 2020-01-01 PROCEDURE — 96360 HYDRATION IV INFUSION INIT: CPT

## 2020-01-01 PROCEDURE — 85018 HEMOGLOBIN: CPT | Performed by: INTERNAL MEDICINE

## 2020-01-01 PROCEDURE — 96376 TX/PRO/DX INJ SAME DRUG ADON: CPT

## 2020-01-01 PROCEDURE — 80069 RENAL FUNCTION PANEL: CPT | Performed by: HOSPITALIST

## 2020-01-01 PROCEDURE — 86850 RBC ANTIBODY SCREEN: CPT | Performed by: EMERGENCY MEDICINE

## 2020-01-01 PROCEDURE — 82652 VIT D 1 25-DIHYDROXY: CPT | Performed by: HOSPITALIST

## 2020-01-01 PROCEDURE — 5A1935Z RESPIRATORY VENTILATION, LESS THAN 24 CONSECUTIVE HOURS: ICD-10-PCS | Performed by: EMERGENCY MEDICINE

## 2020-01-01 PROCEDURE — 88305 TISSUE EXAM BY PATHOLOGIST: CPT | Mod: 26 | Performed by: HOSPITALIST

## 2020-01-01 PROCEDURE — 82140 ASSAY OF AMMONIA: CPT | Performed by: EMERGENCY MEDICINE

## 2020-01-01 PROCEDURE — 87040 BLOOD CULTURE FOR BACTERIA: CPT | Performed by: EMERGENCY MEDICINE

## 2020-01-01 PROCEDURE — 76642 ULTRASOUND BREAST LIMITED: CPT | Mod: LT

## 2020-01-01 PROCEDURE — 82803 BLOOD GASES ANY COMBINATION: CPT | Performed by: EMERGENCY MEDICINE

## 2020-01-01 PROCEDURE — 00000159 ZZHCL STATISTIC H-SEND OUTS PREP: Performed by: INTERNAL MEDICINE

## 2020-01-01 PROCEDURE — 71045 X-RAY EXAM CHEST 1 VIEW: CPT

## 2020-01-01 PROCEDURE — 27211108 CT ABDOMEN RETROPERITONEAL BIOPSY

## 2020-01-01 PROCEDURE — 96365 THER/PROPH/DIAG IV INF INIT: CPT | Mod: 59

## 2020-01-01 PROCEDURE — 84165 PROTEIN E-PHORESIS SERUM: CPT | Performed by: INTERNAL MEDICINE

## 2020-01-01 PROCEDURE — 80053 COMPREHEN METABOLIC PANEL: CPT | Performed by: HOSPITALIST

## 2020-01-01 PROCEDURE — 19083 BX BREAST 1ST LESION US IMAG: CPT | Mod: LT

## 2020-01-01 PROCEDURE — 99292 CRITICAL CARE ADDL 30 MIN: CPT

## 2020-01-01 PROCEDURE — 88360 TUMOR IMMUNOHISTOCHEM/MANUAL: CPT | Performed by: HOSPITALIST

## 2020-01-01 PROCEDURE — 83615 LACTATE (LD) (LDH) ENZYME: CPT | Performed by: INTERNAL MEDICINE

## 2020-01-01 PROCEDURE — 85027 COMPLETE CBC AUTOMATED: CPT | Performed by: HOSPITALIST

## 2020-01-01 PROCEDURE — 76770 US EXAM ABDO BACK WALL COMP: CPT

## 2020-01-01 PROCEDURE — 88360 TUMOR IMMUNOHISTOCHEM/MANUAL: CPT | Mod: 26 | Performed by: INTERNAL MEDICINE

## 2020-01-01 PROCEDURE — 00000159 ZZHCL STATISTIC H-SEND OUTS PREP: Performed by: HOSPITALIST

## 2020-01-01 PROCEDURE — 99291 CRITICAL CARE FIRST HOUR: CPT | Mod: 25

## 2020-01-01 PROCEDURE — 00000402 ZZHCL STATISTIC TOTAL PROTEIN: Performed by: INTERNAL MEDICINE

## 2020-01-01 PROCEDURE — 86901 BLOOD TYPING SEROLOGIC RH(D): CPT | Performed by: INTERNAL MEDICINE

## 2020-01-01 PROCEDURE — 82272 OCCULT BLD FECES 1-3 TESTS: CPT | Performed by: EMERGENCY MEDICINE

## 2020-01-01 PROCEDURE — 86901 BLOOD TYPING SEROLOGIC RH(D): CPT | Performed by: EMERGENCY MEDICINE

## 2020-01-01 PROCEDURE — 99232 SBSQ HOSP IP/OBS MODERATE 35: CPT | Performed by: HOSPITALIST

## 2020-01-01 PROCEDURE — 82550 ASSAY OF CK (CPK): CPT | Performed by: EMERGENCY MEDICINE

## 2020-01-01 PROCEDURE — P9037 PLATE PHERES LEUKOREDU IRRAD: HCPCS | Performed by: INTERNAL MEDICINE

## 2020-01-01 PROCEDURE — 87635 SARS-COV-2 COVID-19 AMP PRB: CPT | Performed by: INTERNAL MEDICINE

## 2020-01-01 PROCEDURE — 99214 OFFICE O/P EST MOD 30 MIN: CPT | Mod: TEL | Performed by: INTERNAL MEDICINE

## 2020-01-01 PROCEDURE — 02HV33Z INSERTION OF INFUSION DEVICE INTO SUPERIOR VENA CAVA, PERCUTANEOUS APPROACH: ICD-10-PCS | Performed by: INTERNAL MEDICINE

## 2020-01-01 PROCEDURE — 99223 1ST HOSP IP/OBS HIGH 75: CPT | Mod: AI | Performed by: HOSPITALIST

## 2020-01-01 PROCEDURE — 84132 ASSAY OF SERUM POTASSIUM: CPT | Performed by: EMERGENCY MEDICINE

## 2020-01-01 PROCEDURE — 94003 VENT MGMT INPAT SUBQ DAY: CPT

## 2020-01-01 PROCEDURE — 25000125 ZZHC RX 250: Performed by: EMERGENCY MEDICINE

## 2020-01-01 PROCEDURE — 40000809 ZZH STATISTIC NO DOCUMENTATION TO SUPPORT CHARGE

## 2020-01-01 PROCEDURE — 97530 THERAPEUTIC ACTIVITIES: CPT | Mod: GO | Performed by: OCCUPATIONAL THERAPIST

## 2020-01-01 PROCEDURE — 70553 MRI BRAIN STEM W/O & W/DYE: CPT

## 2020-01-01 PROCEDURE — 77066 DX MAMMO INCL CAD BI: CPT

## 2020-01-01 PROCEDURE — 96375 TX/PRO/DX INJ NEW DRUG ADDON: CPT

## 2020-01-01 PROCEDURE — 86900 BLOOD TYPING SEROLOGIC ABO: CPT | Performed by: EMERGENCY MEDICINE

## 2020-01-01 PROCEDURE — 80076 HEPATIC FUNCTION PANEL: CPT | Performed by: INTERNAL MEDICINE

## 2020-01-01 PROCEDURE — 71260 CT THORAX DX C+: CPT

## 2020-01-01 PROCEDURE — 40000902 ZZH STATISTIC WOC PT EDUCATION, 16-30 MIN

## 2020-01-01 PROCEDURE — 00000158 ZZHCL STATISTIC H-FISH PROCESS B/S: Performed by: INTERNAL MEDICINE

## 2020-01-01 PROCEDURE — 36600 WITHDRAWAL OF ARTERIAL BLOOD: CPT

## 2020-01-01 PROCEDURE — 81001 URINALYSIS AUTO W/SCOPE: CPT | Performed by: HOSPITALIST

## 2020-01-01 PROCEDURE — 36556 INSERT NON-TUNNEL CV CATH: CPT | Performed by: INTERNAL MEDICINE

## 2020-01-01 PROCEDURE — 84145 PROCALCITONIN (PCT): CPT | Performed by: EMERGENCY MEDICINE

## 2020-01-01 PROCEDURE — 99232 SBSQ HOSP IP/OBS MODERATE 35: CPT | Performed by: CLINICAL NURSE SPECIALIST

## 2020-01-01 PROCEDURE — 99207 ZZC CONSULT E&M CHANGED TO INITIAL LEVEL: CPT | Performed by: CLINICAL NURSE SPECIALIST

## 2020-01-01 PROCEDURE — 25000125 ZZHC RX 250

## 2020-01-01 RX ORDER — HYDRALAZINE HYDROCHLORIDE 20 MG/ML
10 INJECTION INTRAMUSCULAR; INTRAVENOUS EVERY 6 HOURS PRN
Status: DISCONTINUED | OUTPATIENT
Start: 2020-01-01 | End: 2020-01-01 | Stop reason: HOSPADM

## 2020-01-01 RX ORDER — CEFAZOLIN SODIUM 1 G/50ML
1250 SOLUTION INTRAVENOUS ONCE
Status: COMPLETED | OUTPATIENT
Start: 2020-01-01 | End: 2020-01-01

## 2020-01-01 RX ORDER — ACETAMINOPHEN 500 MG
1000 TABLET ORAL 3 TIMES DAILY
DISCHARGE
Start: 2020-01-01

## 2020-01-01 RX ORDER — LANOLIN ALCOHOL/MO/W.PET/CERES
3 CREAM (GRAM) TOPICAL
Status: DISCONTINUED | OUTPATIENT
Start: 2020-01-01 | End: 2020-01-01 | Stop reason: HOSPADM

## 2020-01-01 RX ORDER — PROCHLORPERAZINE MALEATE 5 MG
5 TABLET ORAL EVERY 6 HOURS PRN
Status: DISCONTINUED | OUTPATIENT
Start: 2020-01-01 | End: 2020-01-01 | Stop reason: HOSPADM

## 2020-01-01 RX ORDER — METHYLPREDNISOLONE 4 MG
TABLET, DOSE PACK ORAL
Qty: 21 TABLET | Refills: 0 | Status: ON HOLD | OUTPATIENT
Start: 2020-01-01 | End: 2020-01-01

## 2020-01-01 RX ORDER — DEXAMETHASONE 2 MG/1
2 TABLET ORAL EVERY 12 HOURS SCHEDULED
Status: DISCONTINUED | OUTPATIENT
Start: 2020-01-01 | End: 2020-01-01

## 2020-01-01 RX ORDER — NICOTINE POLACRILEX 4 MG
15-30 LOZENGE BUCCAL
Status: DISCONTINUED | OUTPATIENT
Start: 2020-01-01 | End: 2020-01-01 | Stop reason: HOSPADM

## 2020-01-01 RX ORDER — HYDRALAZINE HYDROCHLORIDE 25 MG/1
25 TABLET, FILM COATED ORAL 3 TIMES DAILY
Status: CANCELLED | DISCHARGE
Start: 2020-01-01

## 2020-01-01 RX ORDER — BISACODYL 10 MG
10 SUPPOSITORY, RECTAL RECTAL DAILY PRN
DISCHARGE
Start: 2020-01-01

## 2020-01-01 RX ORDER — SALIVA STIMULANT COMB. NO.3
2 SPRAY, NON-AEROSOL (ML) MUCOUS MEMBRANE
DISCHARGE
Start: 2020-01-01

## 2020-01-01 RX ORDER — SODIUM CHLORIDE 9 MG/ML
INJECTION, SOLUTION INTRAVENOUS CONTINUOUS
Status: DISCONTINUED | OUTPATIENT
Start: 2020-01-01 | End: 2020-01-01

## 2020-01-01 RX ORDER — NALOXONE HYDROCHLORIDE 0.4 MG/ML
.1-.4 INJECTION, SOLUTION INTRAMUSCULAR; INTRAVENOUS; SUBCUTANEOUS
Status: DISCONTINUED | OUTPATIENT
Start: 2020-01-01 | End: 2020-01-01 | Stop reason: HOSPADM

## 2020-01-01 RX ORDER — AMOXICILLIN 250 MG
1 CAPSULE ORAL DAILY
Start: 2020-01-01 | End: 2020-01-01

## 2020-01-01 RX ORDER — HYDROMORPHONE HYDROCHLORIDE 2 MG/1
2 TABLET ORAL 2 TIMES DAILY
Qty: 30 TABLET | Refills: 0 | Status: SHIPPED | OUTPATIENT
Start: 2020-01-01

## 2020-01-01 RX ORDER — LIOTHYRONINE SODIUM 5 UG/1
5 TABLET ORAL DAILY
Status: DISCONTINUED | OUTPATIENT
Start: 2020-01-01 | End: 2020-01-01 | Stop reason: HOSPADM

## 2020-01-01 RX ORDER — METOPROLOL TARTRATE 100 MG
100 TABLET ORAL 2 TIMES DAILY
DISCHARGE
Start: 2020-01-01 | End: 2020-01-01

## 2020-01-01 RX ORDER — HYDROMORPHONE HYDROCHLORIDE 2 MG/1
2 TABLET ORAL EVERY 4 HOURS PRN
COMMUNITY
End: 2020-01-01

## 2020-01-01 RX ORDER — LIOTHYRONINE SODIUM 5 UG/1
5 TABLET ORAL DAILY
Qty: 90 TABLET | Refills: 0 | COMMUNITY
Start: 2020-01-01

## 2020-01-01 RX ORDER — ISOSORBIDE MONONITRATE 30 MG/1
TABLET, EXTENDED RELEASE ORAL
Qty: 90 TABLET | Refills: 1 | Status: SHIPPED | OUTPATIENT
Start: 2020-01-01

## 2020-01-01 RX ORDER — HYDROMORPHONE HYDROCHLORIDE 1 MG/ML
0.2 INJECTION, SOLUTION INTRAMUSCULAR; INTRAVENOUS; SUBCUTANEOUS
Status: DISCONTINUED | OUTPATIENT
Start: 2020-01-01 | End: 2020-01-01 | Stop reason: HOSPADM

## 2020-01-01 RX ORDER — CLONIDINE HYDROCHLORIDE 0.1 MG/1
0.1 TABLET ORAL EVERY 4 HOURS PRN
Status: DISCONTINUED | OUTPATIENT
Start: 2020-01-01 | End: 2020-01-01 | Stop reason: HOSPADM

## 2020-01-01 RX ORDER — MIRTAZAPINE 7.5 MG/1
7.5 TABLET, FILM COATED ORAL AT BEDTIME
Start: 2020-01-01

## 2020-01-01 RX ORDER — SIMETHICONE 80 MG
80 TABLET,CHEWABLE ORAL 4 TIMES DAILY PRN
Start: 2020-01-01 | End: 2020-01-01

## 2020-01-01 RX ORDER — POTASSIUM CHLORIDE 1500 MG/1
20 TABLET, EXTENDED RELEASE ORAL DAILY
Start: 2020-01-01 | End: 2020-01-01

## 2020-01-01 RX ORDER — FOLIC ACID 1 MG/1
1 TABLET ORAL DAILY
Status: DISCONTINUED | OUTPATIENT
Start: 2020-01-01 | End: 2020-01-01 | Stop reason: HOSPADM

## 2020-01-01 RX ORDER — CEFTRIAXONE 1 G/1
1 INJECTION, POWDER, FOR SOLUTION INTRAMUSCULAR; INTRAVENOUS EVERY 24 HOURS
Status: DISCONTINUED | OUTPATIENT
Start: 2020-01-01 | End: 2020-01-01

## 2020-01-01 RX ORDER — AMOXICILLIN 250 MG
1 CAPSULE ORAL 2 TIMES DAILY PRN
DISCHARGE
Start: 2020-01-01 | End: 2020-01-01

## 2020-01-01 RX ORDER — POTASSIUM CL/LIDO/0.9 % NACL 10MEQ/0.1L
10 INTRAVENOUS SOLUTION, PIGGYBACK (ML) INTRAVENOUS
Status: DISCONTINUED | OUTPATIENT
Start: 2020-01-01 | End: 2020-01-01 | Stop reason: HOSPADM

## 2020-01-01 RX ORDER — DEXAMETHASONE 4 MG/1
4 TABLET ORAL EVERY 12 HOURS SCHEDULED
Status: DISCONTINUED | OUTPATIENT
Start: 2020-01-01 | End: 2020-01-01

## 2020-01-01 RX ORDER — CLONIDINE HYDROCHLORIDE 0.1 MG/1
0.1 TABLET ORAL ONCE
Status: COMPLETED | OUTPATIENT
Start: 2020-01-01 | End: 2020-01-01

## 2020-01-01 RX ORDER — FAMOTIDINE 20 MG/1
20 TABLET, FILM COATED ORAL 2 TIMES DAILY
Status: DISCONTINUED | OUTPATIENT
Start: 2020-01-01 | End: 2020-01-01

## 2020-01-01 RX ORDER — LEVOTHYROXINE SODIUM 75 UG/1
75 TABLET ORAL DAILY
Status: DISCONTINUED | OUTPATIENT
Start: 2020-01-01 | End: 2020-01-01 | Stop reason: HOSPADM

## 2020-01-01 RX ORDER — HYDRALAZINE HYDROCHLORIDE 10 MG/1
10 TABLET, FILM COATED ORAL 3 TIMES DAILY
Status: DISCONTINUED | OUTPATIENT
Start: 2020-01-01 | End: 2020-01-01

## 2020-01-01 RX ORDER — HYDRALAZINE HYDROCHLORIDE 10 MG/1
10 TABLET, FILM COATED ORAL EVERY 8 HOURS PRN
Start: 2020-01-01

## 2020-01-01 RX ORDER — POLYETHYLENE GLYCOL 3350 17 G/17G
17 POWDER, FOR SOLUTION ORAL DAILY PRN
Status: DISCONTINUED | OUTPATIENT
Start: 2020-01-01 | End: 2020-01-01 | Stop reason: HOSPADM

## 2020-01-01 RX ORDER — GUAIFENESIN 600 MG/1
600 TABLET, EXTENDED RELEASE ORAL 2 TIMES DAILY
COMMUNITY

## 2020-01-01 RX ORDER — FUROSEMIDE 10 MG/ML
40 INJECTION INTRAMUSCULAR; INTRAVENOUS ONCE
Status: COMPLETED | OUTPATIENT
Start: 2020-01-01 | End: 2020-01-01

## 2020-01-01 RX ORDER — NALOXONE HYDROCHLORIDE 0.4 MG/ML
.1-.4 INJECTION, SOLUTION INTRAMUSCULAR; INTRAVENOUS; SUBCUTANEOUS
Status: DISCONTINUED | OUTPATIENT
Start: 2020-01-01 | End: 2020-01-01

## 2020-01-01 RX ORDER — HYDROMORPHONE HYDROCHLORIDE 2 MG/1
1-2 TABLET ORAL
Qty: 30 TABLET | Refills: 0 | Status: SHIPPED | OUTPATIENT
Start: 2020-01-01 | End: 2020-01-01

## 2020-01-01 RX ORDER — POTASSIUM CHLORIDE 7.45 MG/ML
10 INJECTION INTRAVENOUS
Status: DISCONTINUED | OUTPATIENT
Start: 2020-01-01 | End: 2020-01-01 | Stop reason: HOSPADM

## 2020-01-01 RX ORDER — SODIUM CHLORIDE 9 MG/ML
INJECTION, SOLUTION INTRAVENOUS CONTINUOUS
Status: DISCONTINUED | OUTPATIENT
Start: 2020-01-01 | End: 2020-01-01 | Stop reason: HOSPADM

## 2020-01-01 RX ORDER — FENTANYL CITRATE 50 UG/ML
25-50 INJECTION, SOLUTION INTRAMUSCULAR; INTRAVENOUS EVERY 5 MIN PRN
Status: DISCONTINUED | OUTPATIENT
Start: 2020-01-01 | End: 2020-01-01

## 2020-01-01 RX ORDER — FENTANYL CITRATE 50 UG/ML
50 INJECTION, SOLUTION INTRAMUSCULAR; INTRAVENOUS
Status: DISCONTINUED | OUTPATIENT
Start: 2020-01-01 | End: 2020-01-01 | Stop reason: HOSPADM

## 2020-01-01 RX ORDER — CEFAZOLIN SODIUM 1 G/50ML
1250 SOLUTION INTRAVENOUS EVERY 12 HOURS
Status: DISCONTINUED | OUTPATIENT
Start: 2020-01-01 | End: 2020-01-01 | Stop reason: HOSPADM

## 2020-01-01 RX ORDER — POLYETHYLENE GLYCOL 3350 17 G/17G
17 POWDER, FOR SOLUTION ORAL 2 TIMES DAILY PRN
Status: DISCONTINUED | OUTPATIENT
Start: 2020-01-01 | End: 2020-01-01 | Stop reason: HOSPADM

## 2020-01-01 RX ORDER — CALCIUM CARBONATE 500 MG/1
2 TABLET, CHEWABLE ORAL EVERY 4 HOURS PRN
DISCHARGE
Start: 2020-01-01 | End: 2020-01-01

## 2020-01-01 RX ORDER — HYDROMORPHONE HYDROCHLORIDE 2 MG/1
2 TABLET ORAL EVERY 4 HOURS PRN
Qty: 20 TABLET | Refills: 0 | Status: SHIPPED | OUTPATIENT
Start: 2020-01-01

## 2020-01-01 RX ORDER — POTASSIUM CHLORIDE 1500 MG/1
20 TABLET, EXTENDED RELEASE ORAL DAILY
COMMUNITY
Start: 2020-01-01 | End: 2020-01-01

## 2020-01-01 RX ORDER — METOPROLOL TARTRATE 25 MG/1
25 TABLET, FILM COATED ORAL 2 TIMES DAILY
Status: DISCONTINUED | OUTPATIENT
Start: 2020-01-01 | End: 2020-01-01

## 2020-01-01 RX ORDER — GADOBUTROL 604.72 MG/ML
7.5 INJECTION INTRAVENOUS ONCE
Status: COMPLETED | OUTPATIENT
Start: 2020-01-01 | End: 2020-01-01

## 2020-01-01 RX ORDER — HYDROCODONE BITARTRATE AND ACETAMINOPHEN 5; 325 MG/1; MG/1
1 TABLET ORAL 2 TIMES DAILY PRN
Status: DISCONTINUED | OUTPATIENT
Start: 2020-01-01 | End: 2020-01-01

## 2020-01-01 RX ORDER — ONDANSETRON 4 MG/1
4 TABLET, ORALLY DISINTEGRATING ORAL EVERY 6 HOURS PRN
Status: DISCONTINUED | OUTPATIENT
Start: 2020-01-01 | End: 2020-01-01 | Stop reason: HOSPADM

## 2020-01-01 RX ORDER — ACETAMINOPHEN 500 MG
1000 TABLET ORAL 2 TIMES DAILY PRN
Status: ON HOLD | COMMUNITY
End: 2020-01-01

## 2020-01-01 RX ORDER — ASCORBIC ACID 500 MG
500 TABLET ORAL
Status: DISCONTINUED | OUTPATIENT
Start: 2020-01-01 | End: 2020-01-01 | Stop reason: HOSPADM

## 2020-01-01 RX ORDER — AMOXICILLIN 250 MG
1 CAPSULE ORAL 2 TIMES DAILY PRN
Status: DISCONTINUED | OUTPATIENT
Start: 2020-01-01 | End: 2020-01-01 | Stop reason: HOSPADM

## 2020-01-01 RX ORDER — CALCITONIN SALMON 200 [USP'U]/ML
4 INJECTION, SOLUTION INTRAMUSCULAR; SUBCUTANEOUS ONCE
Status: COMPLETED | OUTPATIENT
Start: 2020-01-01 | End: 2020-01-01

## 2020-01-01 RX ORDER — ACETAMINOPHEN 325 MG/1
650 TABLET ORAL EVERY 4 HOURS PRN
Status: DISCONTINUED | OUTPATIENT
Start: 2020-01-01 | End: 2020-01-01 | Stop reason: HOSPADM

## 2020-01-01 RX ORDER — DEXTROSE MONOHYDRATE 25 G/50ML
25 INJECTION, SOLUTION INTRAVENOUS ONCE
Status: COMPLETED | OUTPATIENT
Start: 2020-01-01 | End: 2020-01-01

## 2020-01-01 RX ORDER — HYDROMORPHONE HYDROCHLORIDE 2 MG/1
2 TABLET ORAL
Status: DISCONTINUED | OUTPATIENT
Start: 2020-01-01 | End: 2020-01-01 | Stop reason: HOSPADM

## 2020-01-01 RX ORDER — DEXAMETHASONE 1 MG
1 TABLET ORAL DAILY
Status: DISCONTINUED | OUTPATIENT
Start: 2020-01-01 | End: 2020-01-01 | Stop reason: HOSPADM

## 2020-01-01 RX ORDER — HYDROMORPHONE HYDROCHLORIDE 2 MG/1
TABLET ORAL
Qty: 30 TABLET | Refills: 0 | Status: SHIPPED | OUTPATIENT
Start: 2020-01-01 | End: 2020-01-01

## 2020-01-01 RX ORDER — METOPROLOL TARTRATE 100 MG
100 TABLET ORAL 2 TIMES DAILY
COMMUNITY

## 2020-01-01 RX ORDER — MULTIPLE VITAMINS W/ MINERALS TAB 9MG-400MCG
1 TAB ORAL DAILY
Status: DISCONTINUED | OUTPATIENT
Start: 2020-01-01 | End: 2020-01-01 | Stop reason: HOSPADM

## 2020-01-01 RX ORDER — PROPOFOL 10 MG/ML
10-20 INJECTION, EMULSION INTRAVENOUS EVERY 30 MIN PRN
Status: DISCONTINUED | OUTPATIENT
Start: 2020-01-01 | End: 2020-01-01 | Stop reason: HOSPADM

## 2020-01-01 RX ORDER — ETOMIDATE 2 MG/ML
20 INJECTION INTRAVENOUS ONCE
Status: COMPLETED | OUTPATIENT
Start: 2020-01-01 | End: 2020-01-01

## 2020-01-01 RX ORDER — HYDROMORPHONE HYDROCHLORIDE 2 MG/1
1-2 TABLET ORAL
Qty: 15 TABLET | Refills: 0 | Status: SHIPPED | OUTPATIENT
Start: 2020-01-01 | End: 2020-01-01

## 2020-01-01 RX ORDER — OXYCODONE HYDROCHLORIDE 5 MG/1
5 TABLET ORAL EVERY 4 HOURS PRN
Status: DISCONTINUED | OUTPATIENT
Start: 2020-01-01 | End: 2020-01-01

## 2020-01-01 RX ORDER — DEXAMETHASONE 2 MG/1
2 TABLET ORAL DAILY
Status: DISCONTINUED | OUTPATIENT
Start: 2020-01-01 | End: 2020-01-01

## 2020-01-01 RX ORDER — METOPROLOL TARTRATE 25 MG/1
25 TABLET, FILM COATED ORAL ONCE
Status: COMPLETED | OUTPATIENT
Start: 2020-01-01 | End: 2020-01-01

## 2020-01-01 RX ORDER — ACETAMINOPHEN 500 MG
1000 TABLET ORAL 3 TIMES DAILY
Status: DISCONTINUED | OUTPATIENT
Start: 2020-01-01 | End: 2020-01-01 | Stop reason: HOSPADM

## 2020-01-01 RX ORDER — AMLODIPINE BESYLATE 5 MG/1
5 TABLET ORAL AT BEDTIME
COMMUNITY

## 2020-01-01 RX ORDER — SPIRONOLACTONE 25 MG/1
12.5 TABLET ORAL DAILY
Start: 2020-01-01 | End: 2020-01-01

## 2020-01-01 RX ORDER — LIDOCAINE 40 MG/G
CREAM TOPICAL
Status: DISCONTINUED | OUTPATIENT
Start: 2020-01-01 | End: 2020-01-01 | Stop reason: HOSPADM

## 2020-01-01 RX ORDER — HYDROXYZINE HYDROCHLORIDE 10 MG/1
10 TABLET, FILM COATED ORAL EVERY 6 HOURS PRN
Status: DISCONTINUED | OUTPATIENT
Start: 2020-01-01 | End: 2020-01-01 | Stop reason: HOSPADM

## 2020-01-01 RX ORDER — LIDOCAINE 4 G/G
1 PATCH TOPICAL
Status: DISCONTINUED | OUTPATIENT
Start: 2020-01-01 | End: 2020-01-01 | Stop reason: HOSPADM

## 2020-01-01 RX ORDER — LIOTHYRONINE SODIUM 5 UG/1
10 TABLET ORAL DAILY
Qty: 180 TABLET | Refills: 0 | Status: SHIPPED | OUTPATIENT
Start: 2020-01-01 | End: 2020-01-01

## 2020-01-01 RX ORDER — SIMVASTATIN 10 MG
TABLET ORAL
Qty: 90 TABLET | Refills: 4 | OUTPATIENT
Start: 2020-01-01

## 2020-01-01 RX ORDER — IOPAMIDOL 755 MG/ML
500 INJECTION, SOLUTION INTRAVASCULAR ONCE
Status: COMPLETED | OUTPATIENT
Start: 2020-01-01 | End: 2020-01-01

## 2020-01-01 RX ORDER — LISINOPRIL 5 MG/1
5 TABLET ORAL DAILY
DISCHARGE
Start: 2020-01-01 | End: 2020-01-01

## 2020-01-01 RX ORDER — DEXTROSE MONOHYDRATE, SODIUM CHLORIDE, AND POTASSIUM CHLORIDE 50; 1.49; 4.5 G/1000ML; G/1000ML; G/1000ML
INJECTION, SOLUTION INTRAVENOUS CONTINUOUS
Status: DISCONTINUED | OUTPATIENT
Start: 2020-01-01 | End: 2020-01-01

## 2020-01-01 RX ORDER — ZOLEDRONIC ACID 0.04 MG/ML
4 INJECTION, SOLUTION INTRAVENOUS ONCE
Status: DISCONTINUED | OUTPATIENT
Start: 2020-01-01 | End: 2020-01-01

## 2020-01-01 RX ORDER — AMOXICILLIN 250 MG
2 CAPSULE ORAL 2 TIMES DAILY PRN
Status: DISCONTINUED | OUTPATIENT
Start: 2020-01-01 | End: 2020-01-01 | Stop reason: HOSPADM

## 2020-01-01 RX ORDER — CALCIUM CARBONATE 500 MG/1
1000 TABLET, CHEWABLE ORAL EVERY 4 HOURS PRN
Status: DISCONTINUED | OUTPATIENT
Start: 2020-01-01 | End: 2020-01-01 | Stop reason: HOSPADM

## 2020-01-01 RX ORDER — AMLODIPINE BESYLATE 5 MG/1
5 TABLET ORAL AT BEDTIME
Start: 2020-01-01 | End: 2020-01-01

## 2020-01-01 RX ORDER — HYDRALAZINE HYDROCHLORIDE 25 MG/1
25 TABLET, FILM COATED ORAL 3 TIMES DAILY
Status: DISCONTINUED | OUTPATIENT
Start: 2020-01-01 | End: 2020-01-01 | Stop reason: HOSPADM

## 2020-01-01 RX ORDER — LIDOCAINE 40 MG/G
CREAM TOPICAL
Status: DISCONTINUED | OUTPATIENT
Start: 2020-01-01 | End: 2020-01-01

## 2020-01-01 RX ORDER — LEVOTHYROXINE SODIUM 75 UG/1
TABLET ORAL
Qty: 90 TABLET | Refills: 0 | Status: SHIPPED | OUTPATIENT
Start: 2020-01-01

## 2020-01-01 RX ORDER — SODIUM CHLORIDE, SODIUM LACTATE, POTASSIUM CHLORIDE, CALCIUM CHLORIDE 600; 310; 30; 20 MG/100ML; MG/100ML; MG/100ML; MG/100ML
INJECTION, SOLUTION INTRAVENOUS CONTINUOUS
Status: DISCONTINUED | OUTPATIENT
Start: 2020-01-01 | End: 2020-01-01

## 2020-01-01 RX ORDER — FLUORIDE TOOTHPASTE
15 TOOTHPASTE DENTAL 4 TIMES DAILY PRN
Status: DISCONTINUED | OUTPATIENT
Start: 2020-01-01 | End: 2020-01-01 | Stop reason: HOSPADM

## 2020-01-01 RX ORDER — POTASSIUM CHLORIDE 1500 MG/1
20-40 TABLET, EXTENDED RELEASE ORAL
Status: DISCONTINUED | OUTPATIENT
Start: 2020-01-01 | End: 2020-01-01 | Stop reason: HOSPADM

## 2020-01-01 RX ORDER — SALIVA STIMULANT COMB. NO.3
2 SPRAY, NON-AEROSOL (ML) MUCOUS MEMBRANE
Status: DISCONTINUED | OUTPATIENT
Start: 2020-01-01 | End: 2020-01-01 | Stop reason: HOSPADM

## 2020-01-01 RX ORDER — DEXAMETHASONE 4 MG/1
4 TABLET ORAL
COMMUNITY

## 2020-01-01 RX ORDER — DEXTROSE MONOHYDRATE 100 MG/ML
INJECTION, SOLUTION INTRAVENOUS CONTINUOUS
Status: DISCONTINUED | OUTPATIENT
Start: 2020-01-01 | End: 2020-01-01

## 2020-01-01 RX ORDER — SENNOSIDES 8.6 MG
1 TABLET ORAL 2 TIMES DAILY
COMMUNITY

## 2020-01-01 RX ORDER — POTASSIUM CHLORIDE 29.8 MG/ML
20 INJECTION INTRAVENOUS
Status: DISCONTINUED | OUTPATIENT
Start: 2020-01-01 | End: 2020-01-01 | Stop reason: HOSPADM

## 2020-01-01 RX ORDER — OXYCODONE HYDROCHLORIDE 5 MG/1
5 TABLET ORAL EVERY 6 HOURS PRN
Status: DISCONTINUED | OUTPATIENT
Start: 2020-01-01 | End: 2020-01-01

## 2020-01-01 RX ORDER — HYDROMORPHONE HYDROCHLORIDE 2 MG/1
2 TABLET ORAL 2 TIMES DAILY
COMMUNITY
End: 2020-01-01

## 2020-01-01 RX ORDER — METOPROLOL TARTRATE 50 MG
50 TABLET ORAL 2 TIMES DAILY
Status: DISCONTINUED | OUTPATIENT
Start: 2020-01-01 | End: 2020-01-01

## 2020-01-01 RX ORDER — DEXAMETHASONE SODIUM PHOSPHATE 4 MG/ML
6 INJECTION, SOLUTION INTRA-ARTICULAR; INTRALESIONAL; INTRAMUSCULAR; INTRAVENOUS; SOFT TISSUE DAILY
Status: DISCONTINUED | OUTPATIENT
Start: 2020-01-01 | End: 2020-01-01 | Stop reason: HOSPADM

## 2020-01-01 RX ORDER — ISOSORBIDE MONONITRATE 30 MG/1
30 TABLET, EXTENDED RELEASE ORAL DAILY
Status: DISCONTINUED | OUTPATIENT
Start: 2020-01-01 | End: 2020-01-01 | Stop reason: HOSPADM

## 2020-01-01 RX ORDER — SPIRONOLACTONE 25 MG
12.5 TABLET ORAL DAILY
COMMUNITY
End: 2020-01-01

## 2020-01-01 RX ORDER — LETROZOLE 2.5 MG/1
2.5 TABLET, FILM COATED ORAL DAILY
COMMUNITY

## 2020-01-01 RX ORDER — BISACODYL 10 MG
10 SUPPOSITORY, RECTAL RECTAL DAILY PRN
Status: DISCONTINUED | OUTPATIENT
Start: 2020-01-01 | End: 2020-01-01 | Stop reason: HOSPADM

## 2020-01-01 RX ORDER — LIDOCAINE HYDROCHLORIDE 10 MG/ML
INJECTION, SOLUTION INFILTRATION; PERINEURAL
Status: COMPLETED
Start: 2020-01-01 | End: 2020-01-01

## 2020-01-01 RX ORDER — LEVOTHYROXINE SODIUM 75 UG/1
1 TABLET ORAL EVERY 24 HOURS
COMMUNITY
Start: 2010-11-05 | End: 2020-01-01

## 2020-01-01 RX ORDER — ASPIRIN 81 MG/1
81 TABLET ORAL DAILY
Status: DISCONTINUED | OUTPATIENT
Start: 2020-01-01 | End: 2020-01-01 | Stop reason: HOSPADM

## 2020-01-01 RX ORDER — SIMVASTATIN 20 MG
TABLET ORAL
COMMUNITY
Start: 2016-04-11 | End: 2020-01-01

## 2020-01-01 RX ORDER — DEXTROSE MONOHYDRATE 25 G/50ML
25-50 INJECTION, SOLUTION INTRAVENOUS
Status: DISCONTINUED | OUTPATIENT
Start: 2020-01-01 | End: 2020-01-01 | Stop reason: HOSPADM

## 2020-01-01 RX ORDER — FLUMAZENIL 0.1 MG/ML
0.2 INJECTION, SOLUTION INTRAVENOUS
Status: DISCONTINUED | OUTPATIENT
Start: 2020-01-01 | End: 2020-01-01

## 2020-01-01 RX ORDER — LISINOPRIL 20 MG/1
40 TABLET ORAL DAILY
COMMUNITY

## 2020-01-01 RX ORDER — ONDANSETRON 2 MG/ML
4 INJECTION INTRAMUSCULAR; INTRAVENOUS EVERY 6 HOURS PRN
Status: DISCONTINUED | OUTPATIENT
Start: 2020-01-01 | End: 2020-01-01 | Stop reason: HOSPADM

## 2020-01-01 RX ORDER — FLUORIDE TOOTHPASTE
15 TOOTHPASTE DENTAL 4 TIMES DAILY PRN
DISCHARGE
Start: 2020-01-01 | End: 2020-01-01

## 2020-01-01 RX ORDER — TRAMADOL HYDROCHLORIDE 50 MG/1
50 TABLET ORAL DAILY PRN
Qty: 30 TABLET | Refills: 0 | Status: ON HOLD | OUTPATIENT
Start: 2020-01-01 | End: 2020-01-01

## 2020-01-01 RX ORDER — DEXTROSE MONOHYDRATE, SODIUM CHLORIDE, AND POTASSIUM CHLORIDE 50; 1.49; 9 G/1000ML; G/1000ML; G/1000ML
INJECTION, SOLUTION INTRAVENOUS CONTINUOUS
Status: DISCONTINUED | OUTPATIENT
Start: 2020-01-01 | End: 2020-01-01

## 2020-01-01 RX ORDER — CEPHALEXIN 500 MG/1
500 CAPSULE ORAL ONCE
Status: COMPLETED | OUTPATIENT
Start: 2020-01-01 | End: 2020-01-01

## 2020-01-01 RX ORDER — SIMVASTATIN 10 MG
10 TABLET ORAL AT BEDTIME
Qty: 90 TABLET | Refills: 4 | Status: ON HOLD | OUTPATIENT
Start: 2020-01-01 | End: 2020-01-01

## 2020-01-01 RX ORDER — LISINOPRIL 5 MG/1
10 TABLET ORAL DAILY
Start: 2020-01-01 | End: 2020-01-01

## 2020-01-01 RX ORDER — MAGNESIUM SULFATE HEPTAHYDRATE 40 MG/ML
4 INJECTION, SOLUTION INTRAVENOUS EVERY 4 HOURS PRN
Status: DISCONTINUED | OUTPATIENT
Start: 2020-01-01 | End: 2020-01-01 | Stop reason: HOSPADM

## 2020-01-01 RX ORDER — PROPOFOL 10 MG/ML
5-75 INJECTION, EMULSION INTRAVENOUS CONTINUOUS
Status: DISCONTINUED | OUTPATIENT
Start: 2020-01-01 | End: 2020-01-01

## 2020-01-01 RX ORDER — HYDROMORPHONE HYDROCHLORIDE 2 MG/1
2 TABLET ORAL EVERY 4 HOURS PRN
Qty: 15 TABLET | Refills: 0 | Status: SHIPPED | OUTPATIENT
Start: 2020-01-01 | End: 2020-01-01

## 2020-01-01 RX ORDER — NOREPINEPHRINE BITARTRATE 0.06 MG/ML
0.03-0.4 INJECTION, SOLUTION INTRAVENOUS CONTINUOUS
Status: DISCONTINUED | OUTPATIENT
Start: 2020-01-01 | End: 2020-01-01 | Stop reason: HOSPADM

## 2020-01-01 RX ORDER — METOPROLOL TARTRATE 25 MG/1
25 TABLET, FILM COATED ORAL
COMMUNITY
Start: 2015-06-23 | End: 2020-01-01

## 2020-01-01 RX ORDER — FENTANYL CITRATE 50 UG/ML
INJECTION, SOLUTION INTRAMUSCULAR; INTRAVENOUS
Status: DISCONTINUED
Start: 2020-01-01 | End: 2020-01-01 | Stop reason: WASHOUT

## 2020-01-01 RX ORDER — METOPROLOL TARTRATE 25 MG/1
TABLET, FILM COATED ORAL
Qty: 90 TABLET | Refills: 1 | Status: ON HOLD | OUTPATIENT
Start: 2020-01-01 | End: 2020-01-01

## 2020-01-01 RX ORDER — OXYCODONE HYDROCHLORIDE 5 MG/1
5 TABLET ORAL DAILY PRN
Qty: 30 TABLET | Refills: 0 | Status: ON HOLD | OUTPATIENT
Start: 2020-01-01 | End: 2020-01-01

## 2020-01-01 RX ORDER — PROCHLORPERAZINE 25 MG
12.5 SUPPOSITORY, RECTAL RECTAL EVERY 12 HOURS PRN
Status: DISCONTINUED | OUTPATIENT
Start: 2020-01-01 | End: 2020-01-01 | Stop reason: HOSPADM

## 2020-01-01 RX ORDER — CEPHALEXIN 500 MG/1
500 CAPSULE ORAL 3 TIMES DAILY
COMMUNITY
Start: 2020-01-01 | End: 2020-01-01

## 2020-01-01 RX ORDER — AMLODIPINE BESYLATE 5 MG/1
7.5 TABLET ORAL AT BEDTIME
Start: 2020-01-01 | End: 2020-01-01

## 2020-01-01 RX ORDER — PROPOFOL 10 MG/ML
5-75 INJECTION, EMULSION INTRAVENOUS CONTINUOUS
Status: DISCONTINUED | OUTPATIENT
Start: 2020-01-01 | End: 2020-01-01 | Stop reason: HOSPADM

## 2020-01-01 RX ORDER — METOPROLOL TARTRATE 100 MG
100 TABLET ORAL 2 TIMES DAILY
Status: DISCONTINUED | OUTPATIENT
Start: 2020-01-01 | End: 2020-01-01 | Stop reason: HOSPADM

## 2020-01-01 RX ORDER — AMOXICILLIN 250 MG
1 CAPSULE ORAL 2 TIMES DAILY PRN
Start: 2020-01-01

## 2020-01-01 RX ORDER — POTASSIUM CHLORIDE 1.5 G/1.58G
20-40 POWDER, FOR SOLUTION ORAL
Status: DISCONTINUED | OUTPATIENT
Start: 2020-01-01 | End: 2020-01-01 | Stop reason: HOSPADM

## 2020-01-01 RX ORDER — SIMETHICONE 125 MG
125 TABLET,CHEWABLE ORAL 4 TIMES DAILY PRN
COMMUNITY

## 2020-01-01 RX ORDER — FOLIC ACID 1 MG/1
1 TABLET ORAL DAILY
DISCHARGE
Start: 2020-01-01

## 2020-01-01 RX ORDER — GABAPENTIN 100 MG/1
100 CAPSULE ORAL 3 TIMES DAILY PRN
Qty: 90 CAPSULE | Refills: 0 | Status: SHIPPED | OUTPATIENT
Start: 2020-01-01 | End: 2020-01-01

## 2020-01-01 RX ORDER — FAMOTIDINE 20 MG/1
20 TABLET, FILM COATED ORAL 2 TIMES DAILY
Qty: 60 TABLET | Refills: 0 | Status: SHIPPED | OUTPATIENT
Start: 2020-01-01 | End: 2020-01-01

## 2020-01-01 RX ORDER — SALIVA STIMULANT COMB. NO.3
2 SPRAY, NON-AEROSOL (ML) MUCOUS MEMBRANE 4 TIMES DAILY
Status: DISCONTINUED | OUTPATIENT
Start: 2020-01-01 | End: 2020-01-01 | Stop reason: HOSPADM

## 2020-01-01 RX ORDER — SIMVASTATIN 10 MG
10 TABLET ORAL AT BEDTIME
Status: DISCONTINUED | OUTPATIENT
Start: 2020-01-01 | End: 2020-01-01

## 2020-01-01 RX ORDER — HYDROCODONE BITARTRATE AND ACETAMINOPHEN 5; 325 MG/1; MG/1
1 TABLET ORAL 2 TIMES DAILY PRN
Qty: 30 TABLET | Refills: 0 | Status: ON HOLD | OUTPATIENT
Start: 2020-01-01 | End: 2020-01-01

## 2020-01-01 RX ADMIN — OXYCODONE HYDROCHLORIDE AND ACETAMINOPHEN 500 MG: 500 TABLET ORAL at 11:19

## 2020-01-01 RX ADMIN — ACETAMINOPHEN 650 MG: 325 TABLET, FILM COATED ORAL at 09:08

## 2020-01-01 RX ADMIN — CEPHALEXIN 500 MG: 500 CAPSULE ORAL at 23:46

## 2020-01-01 RX ADMIN — HYDROMORPHONE HYDROCHLORIDE 2 MG: 2 TABLET ORAL at 15:21

## 2020-01-01 RX ADMIN — HYDROMORPHONE HYDROCHLORIDE 2 MG: 2 TABLET ORAL at 01:49

## 2020-01-01 RX ADMIN — POTASSIUM CHLORIDE 40 MEQ: 1500 TABLET, EXTENDED RELEASE ORAL at 09:54

## 2020-01-01 RX ADMIN — ISOSORBIDE MONONITRATE 30 MG: 30 TABLET, EXTENDED RELEASE ORAL at 08:38

## 2020-01-01 RX ADMIN — PROPOFOL 20 MG: 10 INJECTION, EMULSION INTRAVENOUS at 14:57

## 2020-01-01 RX ADMIN — HYDROXYZINE HYDROCHLORIDE 10 MG: 10 TABLET, FILM COATED ORAL at 09:01

## 2020-01-01 RX ADMIN — HYDRALAZINE HYDROCHLORIDE 10 MG: 10 TABLET, FILM COATED ORAL at 15:50

## 2020-01-01 RX ADMIN — HYDROCODONE BITARTRATE AND ACETAMINOPHEN 1 TABLET: 5; 325 TABLET ORAL at 21:11

## 2020-01-01 RX ADMIN — OXYCODONE HYDROCHLORIDE 5 MG: 5 TABLET ORAL at 14:04

## 2020-01-01 RX ADMIN — METOPROLOL TARTRATE 100 MG: 100 TABLET, FILM COATED ORAL at 08:44

## 2020-01-01 RX ADMIN — Medication 12.5 MG: at 13:00

## 2020-01-01 RX ADMIN — ACETAMINOPHEN 650 MG: 325 TABLET, FILM COATED ORAL at 08:41

## 2020-01-01 RX ADMIN — DEXTROSE MONOHYDRATE: 100 INJECTION, SOLUTION INTRAVENOUS at 20:48

## 2020-01-01 RX ADMIN — FENTANYL CITRATE 50 MCG: 50 INJECTION INTRAMUSCULAR; INTRAVENOUS at 16:10

## 2020-01-01 RX ADMIN — OXYCODONE HYDROCHLORIDE 5 MG: 5 TABLET ORAL at 09:08

## 2020-01-01 RX ADMIN — ACETAMINOPHEN 1000 MG: 500 TABLET, FILM COATED ORAL at 16:24

## 2020-01-01 RX ADMIN — ISOSORBIDE MONONITRATE 30 MG: 30 TABLET, EXTENDED RELEASE ORAL at 08:39

## 2020-01-01 RX ADMIN — ENOXAPARIN SODIUM 40 MG: 40 INJECTION SUBCUTANEOUS at 09:03

## 2020-01-01 RX ADMIN — MELATONIN TAB 3 MG 3 MG: 3 TAB at 22:01

## 2020-01-01 RX ADMIN — OXYCODONE HYDROCHLORIDE 5 MG: 5 TABLET ORAL at 21:22

## 2020-01-01 RX ADMIN — METOPROLOL TARTRATE 50 MG: 50 TABLET, FILM COATED ORAL at 08:38

## 2020-01-01 RX ADMIN — METOPROLOL TARTRATE 25 MG: 25 TABLET, FILM COATED ORAL at 21:52

## 2020-01-01 RX ADMIN — ACETAMINOPHEN 650 MG: 325 TABLET, FILM COATED ORAL at 00:58

## 2020-01-01 RX ADMIN — VANCOMYCIN HYDROCHLORIDE 1250 MG: 5 INJECTION, POWDER, LYOPHILIZED, FOR SOLUTION INTRAVENOUS at 22:32

## 2020-01-01 RX ADMIN — SODIUM CHLORIDE: 9 INJECTION, SOLUTION INTRAVENOUS at 05:22

## 2020-01-01 RX ADMIN — OXYCODONE HYDROCHLORIDE 5 MG: 5 TABLET ORAL at 00:00

## 2020-01-01 RX ADMIN — HYDRALAZINE HYDROCHLORIDE 10 MG: 10 TABLET, FILM COATED ORAL at 08:12

## 2020-01-01 RX ADMIN — OXYCODONE HYDROCHLORIDE 5 MG: 5 TABLET ORAL at 05:22

## 2020-01-01 RX ADMIN — ISOSORBIDE MONONITRATE 30 MG: 30 TABLET, EXTENDED RELEASE ORAL at 08:35

## 2020-01-01 RX ADMIN — ASPIRIN 81 MG: 81 TABLET, COATED ORAL at 08:35

## 2020-01-01 RX ADMIN — DEXAMETHASONE 1 MG: 1 TABLET ORAL at 08:38

## 2020-01-01 RX ADMIN — DEXTROSE MONOHYDRATE 25 G: 25 INJECTION, SOLUTION INTRAVENOUS at 20:41

## 2020-01-01 RX ADMIN — OXYCODONE HYDROCHLORIDE 5 MG: 5 TABLET ORAL at 19:46

## 2020-01-01 RX ADMIN — DEXAMETHASONE 2 MG: 2 TABLET ORAL at 09:01

## 2020-01-01 RX ADMIN — FAMOTIDINE 20 MG: 20 TABLET ORAL at 13:02

## 2020-01-01 RX ADMIN — LIOTHYRONINE SODIUM 5 MCG: 5 TABLET ORAL at 09:02

## 2020-01-01 RX ADMIN — LEVOTHYROXINE SODIUM 75 MCG: 0.07 TABLET ORAL at 08:38

## 2020-01-01 RX ADMIN — DEXAMETHASONE 2 MG: 2 TABLET ORAL at 08:38

## 2020-01-01 RX ADMIN — OXYCODONE HYDROCHLORIDE 5 MG: 5 TABLET ORAL at 04:51

## 2020-01-01 RX ADMIN — HYDROXYZINE HYDROCHLORIDE 10 MG: 10 TABLET, FILM COATED ORAL at 14:04

## 2020-01-01 RX ADMIN — ACETAMINOPHEN 650 MG: 325 TABLET, FILM COATED ORAL at 14:04

## 2020-01-01 RX ADMIN — HYDRALAZINE HYDROCHLORIDE 10 MG: 10 TABLET, FILM COATED ORAL at 17:46

## 2020-01-01 RX ADMIN — LIOTHYRONINE SODIUM 5 MCG: 5 TABLET ORAL at 09:54

## 2020-01-01 RX ADMIN — SODIUM CHLORIDE: 9 INJECTION, SOLUTION INTRAVENOUS at 08:01

## 2020-01-01 RX ADMIN — ONDANSETRON 4 MG: 2 INJECTION INTRAMUSCULAR; INTRAVENOUS at 20:41

## 2020-01-01 RX ADMIN — CEFTRIAXONE 1 G: 1 INJECTION, POWDER, FOR SOLUTION INTRAMUSCULAR; INTRAVENOUS at 02:43

## 2020-01-01 RX ADMIN — GADOBUTROL 6 ML: 604.72 INJECTION INTRAVENOUS at 16:42

## 2020-01-01 RX ADMIN — CLONIDINE HYDROCHLORIDE 0.1 MG: 0.1 TABLET ORAL at 09:13

## 2020-01-01 RX ADMIN — HYDRALAZINE HYDROCHLORIDE 10 MG: 10 TABLET, FILM COATED ORAL at 17:07

## 2020-01-01 RX ADMIN — SODIUM CHLORIDE: 9 INJECTION, SOLUTION INTRAVENOUS at 15:00

## 2020-01-01 RX ADMIN — SODIUM CHLORIDE 2000 ML: 9 INJECTION, SOLUTION INTRAVENOUS at 00:15

## 2020-01-01 RX ADMIN — Medication 12.5 MG: at 08:35

## 2020-01-01 RX ADMIN — METOPROLOL TARTRATE 50 MG: 50 TABLET, FILM COATED ORAL at 08:12

## 2020-01-01 RX ADMIN — HYDROMORPHONE HYDROCHLORIDE 2 MG: 2 TABLET ORAL at 21:47

## 2020-01-01 RX ADMIN — LIOTHYRONINE SODIUM 5 MCG: 5 TABLET ORAL at 09:32

## 2020-01-01 RX ADMIN — FAMOTIDINE 20 MG: 10 INJECTION, SOLUTION INTRAVENOUS at 01:12

## 2020-01-01 RX ADMIN — ASPIRIN 81 MG: 81 TABLET, COATED ORAL at 09:01

## 2020-01-01 RX ADMIN — OXYCODONE HYDROCHLORIDE AND ACETAMINOPHEN 500 MG: 500 TABLET ORAL at 14:04

## 2020-01-01 RX ADMIN — ACETAMINOPHEN 650 MG: 325 TABLET, FILM COATED ORAL at 01:39

## 2020-01-01 RX ADMIN — HYDRALAZINE HYDROCHLORIDE 10 MG: 10 TABLET, FILM COATED ORAL at 22:01

## 2020-01-01 RX ADMIN — DEXAMETHASONE 2 MG: 2 TABLET ORAL at 08:12

## 2020-01-01 RX ADMIN — ACETAMINOPHEN 650 MG: 325 TABLET, FILM COATED ORAL at 00:00

## 2020-01-01 RX ADMIN — OXYCODONE HYDROCHLORIDE AND ACETAMINOPHEN 500 MG: 500 TABLET ORAL at 11:45

## 2020-01-01 RX ADMIN — HYDRALAZINE HYDROCHLORIDE 10 MG: 20 INJECTION INTRAMUSCULAR; INTRAVENOUS at 17:30

## 2020-01-01 RX ADMIN — HYDROCODONE BITARTRATE AND ACETAMINOPHEN 1 TABLET: 5; 325 TABLET ORAL at 20:13

## 2020-01-01 RX ADMIN — HYDROXYZINE HYDROCHLORIDE 10 MG: 10 TABLET, FILM COATED ORAL at 00:58

## 2020-01-01 RX ADMIN — LIOTHYRONINE SODIUM 5 MCG: 5 TABLET ORAL at 08:12

## 2020-01-01 RX ADMIN — FAMOTIDINE 20 MG: 10 INJECTION INTRAVENOUS at 11:38

## 2020-01-01 RX ADMIN — SODIUM CHLORIDE: 9 INJECTION, SOLUTION INTRAVENOUS at 11:14

## 2020-01-01 RX ADMIN — HYDRALAZINE HYDROCHLORIDE 25 MG: 25 TABLET, FILM COATED ORAL at 08:42

## 2020-01-01 RX ADMIN — HYDROMORPHONE HYDROCHLORIDE 2 MG: 2 TABLET ORAL at 14:21

## 2020-01-01 RX ADMIN — OXYCODONE HYDROCHLORIDE AND ACETAMINOPHEN 500 MG: 500 TABLET ORAL at 13:44

## 2020-01-01 RX ADMIN — LIOTHYRONINE SODIUM 5 MCG: 5 TABLET ORAL at 08:34

## 2020-01-01 RX ADMIN — HYDRALAZINE HYDROCHLORIDE 10 MG: 10 TABLET, FILM COATED ORAL at 12:52

## 2020-01-01 RX ADMIN — SODIUM CHLORIDE: 9 INJECTION, SOLUTION INTRAVENOUS at 12:19

## 2020-01-01 RX ADMIN — POTASSIUM CHLORIDE 20 MEQ: 1500 TABLET, EXTENDED RELEASE ORAL at 12:55

## 2020-01-01 RX ADMIN — HYDROXYZINE HYDROCHLORIDE 10 MG: 10 TABLET, FILM COATED ORAL at 16:16

## 2020-01-01 RX ADMIN — ISOSORBIDE MONONITRATE 30 MG: 30 TABLET, EXTENDED RELEASE ORAL at 09:54

## 2020-01-01 RX ADMIN — METOPROLOL TARTRATE 25 MG: 25 TABLET, FILM COATED ORAL at 20:28

## 2020-01-01 RX ADMIN — SODIUM CHLORIDE: 9 INJECTION, SOLUTION INTRAVENOUS at 21:22

## 2020-01-01 RX ADMIN — TAZOBACTAM SODIUM AND PIPERACILLIN SODIUM 4.5 G: 500; 4 INJECTION, SOLUTION INTRAVENOUS at 20:38

## 2020-01-01 RX ADMIN — VANCOMYCIN HYDROCHLORIDE 1250 MG: 5 INJECTION, POWDER, LYOPHILIZED, FOR SOLUTION INTRAVENOUS at 11:37

## 2020-01-01 RX ADMIN — FENTANYL CITRATE 50 MCG: 50 INJECTION INTRAMUSCULAR; INTRAVENOUS at 15:03

## 2020-01-01 RX ADMIN — HYDRALAZINE HYDROCHLORIDE 25 MG: 25 TABLET, FILM COATED ORAL at 21:09

## 2020-01-01 RX ADMIN — ACETAMINOPHEN 650 MG: 325 TABLET, FILM COATED ORAL at 03:16

## 2020-01-01 RX ADMIN — CALCIUM CARBONATE (ANTACID) CHEW TAB 500 MG 1000 MG: 500 CHEW TAB at 16:51

## 2020-01-01 RX ADMIN — CEFTRIAXONE 1 G: 1 INJECTION, POWDER, FOR SOLUTION INTRAMUSCULAR; INTRAVENOUS at 01:04

## 2020-01-01 RX ADMIN — LIOTHYRONINE SODIUM 5 MCG: 5 TABLET ORAL at 14:20

## 2020-01-01 RX ADMIN — DEXAMETHASONE SODIUM PHOSPHATE 6 MG: 4 INJECTION, SOLUTION INTRAMUSCULAR; INTRAVENOUS at 08:03

## 2020-01-01 RX ADMIN — MELATONIN TAB 3 MG 3 MG: 3 TAB at 01:52

## 2020-01-01 RX ADMIN — SODIUM CHLORIDE: 9 INJECTION, SOLUTION INTRAVENOUS at 08:37

## 2020-01-01 RX ADMIN — ISOSORBIDE MONONITRATE 30 MG: 30 TABLET, EXTENDED RELEASE ORAL at 11:44

## 2020-01-01 RX ADMIN — HYDROXYZINE HYDROCHLORIDE 10 MG: 10 TABLET, FILM COATED ORAL at 08:12

## 2020-01-01 RX ADMIN — LIOTHYRONINE SODIUM 5 MCG: 5 TABLET ORAL at 08:43

## 2020-01-01 RX ADMIN — Medication 2 SPRAY: at 16:15

## 2020-01-01 RX ADMIN — Medication 2 MG: at 13:44

## 2020-01-01 RX ADMIN — METOPROLOL TARTRATE 100 MG: 100 TABLET, FILM COATED ORAL at 21:47

## 2020-01-01 RX ADMIN — HYDROXYZINE HYDROCHLORIDE 10 MG: 10 TABLET, FILM COATED ORAL at 11:55

## 2020-01-01 RX ADMIN — POLYETHYLENE GLYCOL 3350 17 G: 17 POWDER, FOR SOLUTION ORAL at 09:07

## 2020-01-01 RX ADMIN — ISOSORBIDE MONONITRATE 30 MG: 30 TABLET, EXTENDED RELEASE ORAL at 09:41

## 2020-01-01 RX ADMIN — LIOTHYRONINE SODIUM 5 MCG: 5 TABLET ORAL at 09:41

## 2020-01-01 RX ADMIN — LEVOTHYROXINE SODIUM 75 MCG: 0.07 TABLET ORAL at 11:46

## 2020-01-01 RX ADMIN — LIOTHYRONINE SODIUM 5 MCG: 5 TABLET ORAL at 08:38

## 2020-01-01 RX ADMIN — OXYCODONE HYDROCHLORIDE 5 MG: 5 TABLET ORAL at 08:41

## 2020-01-01 RX ADMIN — HYDRALAZINE HYDROCHLORIDE 25 MG: 25 TABLET, FILM COATED ORAL at 09:01

## 2020-01-01 RX ADMIN — ASPIRIN 81 MG: 81 TABLET, COATED ORAL at 11:45

## 2020-01-01 RX ADMIN — OXYCODONE HYDROCHLORIDE AND ACETAMINOPHEN 500 MG: 500 TABLET ORAL at 14:12

## 2020-01-01 RX ADMIN — SODIUM CHLORIDE: 9 INJECTION, SOLUTION INTRAVENOUS at 21:37

## 2020-01-01 RX ADMIN — OXYCODONE HYDROCHLORIDE AND ACETAMINOPHEN 500 MG: 500 TABLET ORAL at 12:49

## 2020-01-01 RX ADMIN — DEXAMETHASONE 4 MG: 2 TABLET ORAL at 19:29

## 2020-01-01 RX ADMIN — HYDRALAZINE HYDROCHLORIDE 10 MG: 20 INJECTION INTRAMUSCULAR; INTRAVENOUS at 06:31

## 2020-01-01 RX ADMIN — HYDROMORPHONE HYDROCHLORIDE 2 MG: 2 TABLET ORAL at 09:41

## 2020-01-01 RX ADMIN — ISOSORBIDE MONONITRATE 30 MG: 30 TABLET, EXTENDED RELEASE ORAL at 08:12

## 2020-01-01 RX ADMIN — ACETAMINOPHEN 650 MG: 325 TABLET, FILM COATED ORAL at 17:49

## 2020-01-01 RX ADMIN — CEFTRIAXONE 1 G: 1 INJECTION, POWDER, FOR SOLUTION INTRAMUSCULAR; INTRAVENOUS at 00:38

## 2020-01-01 RX ADMIN — ACETAMINOPHEN 650 MG: 325 TABLET, FILM COATED ORAL at 04:35

## 2020-01-01 RX ADMIN — HYDROXYZINE HYDROCHLORIDE 10 MG: 10 TABLET, FILM COATED ORAL at 01:52

## 2020-01-01 RX ADMIN — OXYCODONE HYDROCHLORIDE 5 MG: 5 TABLET ORAL at 18:40

## 2020-01-01 RX ADMIN — OXYCODONE HYDROCHLORIDE 5 MG: 5 TABLET ORAL at 20:25

## 2020-01-01 RX ADMIN — CEFTRIAXONE 1 G: 1 INJECTION, POWDER, FOR SOLUTION INTRAMUSCULAR; INTRAVENOUS at 00:15

## 2020-01-01 RX ADMIN — POTASSIUM CHLORIDE 40 MEQ: 1500 TABLET, EXTENDED RELEASE ORAL at 19:27

## 2020-01-01 RX ADMIN — METOPROLOL TARTRATE 50 MG: 50 TABLET, FILM COATED ORAL at 19:46

## 2020-01-01 RX ADMIN — METOPROLOL TARTRATE 100 MG: 100 TABLET, FILM COATED ORAL at 20:34

## 2020-01-01 RX ADMIN — POTASSIUM CHLORIDE, DEXTROSE MONOHYDRATE AND SODIUM CHLORIDE: 150; 5; 450 INJECTION, SOLUTION INTRAVENOUS at 02:32

## 2020-01-01 RX ADMIN — SODIUM CHLORIDE, POTASSIUM CHLORIDE, SODIUM LACTATE AND CALCIUM CHLORIDE 1749 ML: 600; 310; 30; 20 INJECTION, SOLUTION INTRAVENOUS at 20:37

## 2020-01-01 RX ADMIN — HYDRALAZINE HYDROCHLORIDE 10 MG: 20 INJECTION INTRAMUSCULAR; INTRAVENOUS at 23:06

## 2020-01-01 RX ADMIN — SODIUM CHLORIDE: 9 INJECTION, SOLUTION INTRAVENOUS at 01:13

## 2020-01-01 RX ADMIN — METOPROLOL TARTRATE 100 MG: 100 TABLET, FILM COATED ORAL at 08:39

## 2020-01-01 RX ADMIN — OXYCODONE HYDROCHLORIDE 5 MG: 5 TABLET ORAL at 04:35

## 2020-01-01 RX ADMIN — DEXAMETHASONE 2 MG: 2 TABLET ORAL at 20:24

## 2020-01-01 RX ADMIN — ISOSORBIDE MONONITRATE 30 MG: 30 TABLET, EXTENDED RELEASE ORAL at 09:01

## 2020-01-01 RX ADMIN — Medication 12.5 MG: at 21:11

## 2020-01-01 RX ADMIN — AZITHROMYCIN MONOHYDRATE 500 MG: 500 INJECTION, POWDER, LYOPHILIZED, FOR SOLUTION INTRAVENOUS at 01:22

## 2020-01-01 RX ADMIN — DEXTROSE MONOHYDRATE: 100 INJECTION, SOLUTION INTRAVENOUS at 01:24

## 2020-01-01 RX ADMIN — ROCURONIUM BROMIDE 100 MG: 10 INJECTION, SOLUTION INTRAVENOUS at 20:41

## 2020-01-01 RX ADMIN — HYDRALAZINE HYDROCHLORIDE 10 MG: 20 INJECTION INTRAMUSCULAR; INTRAVENOUS at 23:54

## 2020-01-01 RX ADMIN — POTASSIUM CHLORIDE 40 MEQ: 1500 TABLET, EXTENDED RELEASE ORAL at 10:45

## 2020-01-01 RX ADMIN — ACETAMINOPHEN 650 MG: 325 TABLET, FILM COATED ORAL at 14:12

## 2020-01-01 RX ADMIN — TAZOBACTAM SODIUM AND PIPERACILLIN SODIUM 3.38 G: 375; 3 INJECTION, SOLUTION INTRAVENOUS at 15:00

## 2020-01-01 RX ADMIN — SODIUM CHLORIDE: 9 INJECTION, SOLUTION INTRAVENOUS at 05:07

## 2020-01-01 RX ADMIN — POTASSIUM CHLORIDE, DEXTROSE MONOHYDRATE AND SODIUM CHLORIDE: 150; 5; 450 INJECTION, SOLUTION INTRAVENOUS at 13:52

## 2020-01-01 RX ADMIN — POTASSIUM CHLORIDE 20 MEQ: 1500 TABLET, EXTENDED RELEASE ORAL at 12:49

## 2020-01-01 RX ADMIN — ACETAMINOPHEN 650 MG: 325 TABLET, FILM COATED ORAL at 20:25

## 2020-01-01 RX ADMIN — HYDRALAZINE HYDROCHLORIDE 10 MG: 10 TABLET, FILM COATED ORAL at 08:38

## 2020-01-01 RX ADMIN — LIOTHYRONINE SODIUM 5 MCG: 5 TABLET ORAL at 11:45

## 2020-01-01 RX ADMIN — PROPOFOL 10 MG: 10 INJECTION, EMULSION INTRAVENOUS at 07:02

## 2020-01-01 RX ADMIN — SODIUM CHLORIDE 73 ML: 9 INJECTION, SOLUTION INTRAVENOUS at 23:27

## 2020-01-01 RX ADMIN — MELATONIN TAB 3 MG 3 MG: 3 TAB at 22:57

## 2020-01-01 RX ADMIN — DEXAMETHASONE 2 MG: 2 TABLET ORAL at 08:43

## 2020-01-01 RX ADMIN — LEVOTHYROXINE SODIUM 75 MCG: 0.07 TABLET ORAL at 09:32

## 2020-01-01 RX ADMIN — ASPIRIN 81 MG: 81 TABLET, COATED ORAL at 08:12

## 2020-01-01 RX ADMIN — HYDROMORPHONE HYDROCHLORIDE 2 MG: 2 TABLET ORAL at 21:15

## 2020-01-01 RX ADMIN — LEVOTHYROXINE SODIUM 75 MCG: 0.07 TABLET ORAL at 08:23

## 2020-01-01 RX ADMIN — FAMOTIDINE 20 MG: 20 TABLET ORAL at 20:13

## 2020-01-01 RX ADMIN — LEVOTHYROXINE SODIUM 75 MCG: 0.07 TABLET ORAL at 08:19

## 2020-01-01 RX ADMIN — ASPIRIN 81 MG: 81 TABLET, COATED ORAL at 09:54

## 2020-01-01 RX ADMIN — ACETAMINOPHEN 650 MG: 325 TABLET, FILM COATED ORAL at 19:46

## 2020-01-01 RX ADMIN — HYDROXYZINE HYDROCHLORIDE 10 MG: 10 TABLET, FILM COATED ORAL at 22:17

## 2020-01-01 RX ADMIN — OXYCODONE HYDROCHLORIDE 5 MG: 5 TABLET ORAL at 17:50

## 2020-01-01 RX ADMIN — HYDRALAZINE HYDROCHLORIDE 10 MG: 20 INJECTION INTRAMUSCULAR; INTRAVENOUS at 03:05

## 2020-01-01 RX ADMIN — FENTANYL CITRATE 50 MCG: 50 INJECTION INTRAMUSCULAR; INTRAVENOUS at 11:39

## 2020-01-01 RX ADMIN — ACETAMINOPHEN 650 MG: 325 TABLET, FILM COATED ORAL at 08:55

## 2020-01-01 RX ADMIN — METOPROLOL TARTRATE 50 MG: 50 TABLET, FILM COATED ORAL at 20:21

## 2020-01-01 RX ADMIN — POTASSIUM CHLORIDE, DEXTROSE MONOHYDRATE AND SODIUM CHLORIDE: 150; 5; 900 INJECTION, SOLUTION INTRAVENOUS at 04:16

## 2020-01-01 RX ADMIN — OXYCODONE HYDROCHLORIDE 5 MG: 5 TABLET ORAL at 11:29

## 2020-01-01 RX ADMIN — SODIUM CHLORIDE 63 ML: 9 INJECTION, SOLUTION INTRAVENOUS at 10:17

## 2020-01-01 RX ADMIN — ACETAMINOPHEN 650 MG: 325 TABLET, FILM COATED ORAL at 14:01

## 2020-01-01 RX ADMIN — HYDROXYZINE HYDROCHLORIDE 10 MG: 10 TABLET, FILM COATED ORAL at 17:50

## 2020-01-01 RX ADMIN — Medication 0.03 MCG/KG/MIN: at 09:56

## 2020-01-01 RX ADMIN — ACETAMINOPHEN 650 MG: 325 TABLET, FILM COATED ORAL at 09:54

## 2020-01-01 RX ADMIN — CALCITONIN SALMON 308 UNITS: 200 INJECTION, SOLUTION INTRAMUSCULAR; SUBCUTANEOUS at 03:21

## 2020-01-01 RX ADMIN — SODIUM CHLORIDE: 9 INJECTION, SOLUTION INTRAVENOUS at 01:53

## 2020-01-01 RX ADMIN — ASPIRIN 81 MG: 81 TABLET, COATED ORAL at 09:32

## 2020-01-01 RX ADMIN — LIDOCAINE 1 PATCH: 560 PATCH PERCUTANEOUS; TOPICAL; TRANSDERMAL at 20:41

## 2020-01-01 RX ADMIN — ENOXAPARIN SODIUM 40 MG: 40 INJECTION SUBCUTANEOUS at 11:19

## 2020-01-01 RX ADMIN — HYDRALAZINE HYDROCHLORIDE 25 MG: 25 TABLET, FILM COATED ORAL at 20:34

## 2020-01-01 RX ADMIN — FOLIC ACID 1 MG: 1 TABLET ORAL at 13:05

## 2020-01-01 RX ADMIN — OXYCODONE HYDROCHLORIDE AND ACETAMINOPHEN 500 MG: 500 TABLET ORAL at 12:22

## 2020-01-01 RX ADMIN — LIDOCAINE HYDROCHLORIDE 5 ML: 10 INJECTION, SOLUTION EPIDURAL; INFILTRATION; INTRACAUDAL; PERINEURAL at 11:59

## 2020-01-01 RX ADMIN — METOPROLOL TARTRATE 50 MG: 50 TABLET, FILM COATED ORAL at 09:32

## 2020-01-01 RX ADMIN — SODIUM CHLORIDE, POTASSIUM CHLORIDE, SODIUM LACTATE AND CALCIUM CHLORIDE: 600; 310; 30; 20 INJECTION, SOLUTION INTRAVENOUS at 09:55

## 2020-01-01 RX ADMIN — MAGNESIUM SULFATE IN WATER 4 G: 40 INJECTION, SOLUTION INTRAVENOUS at 11:57

## 2020-01-01 RX ADMIN — ACETAMINOPHEN 650 MG: 325 TABLET, FILM COATED ORAL at 11:55

## 2020-01-01 RX ADMIN — OXYCODONE HYDROCHLORIDE AND ACETAMINOPHEN 500 MG: 500 TABLET ORAL at 12:52

## 2020-01-01 RX ADMIN — ISOSORBIDE MONONITRATE 30 MG: 30 TABLET, EXTENDED RELEASE ORAL at 08:43

## 2020-01-01 RX ADMIN — LIDOCAINE 1 PATCH: 560 PATCH PERCUTANEOUS; TOPICAL; TRANSDERMAL at 20:30

## 2020-01-01 RX ADMIN — ACETAMINOPHEN 1000 MG: 500 TABLET, FILM COATED ORAL at 08:37

## 2020-01-01 RX ADMIN — POTASSIUM CHLORIDE 20 MEQ: 1500 TABLET, EXTENDED RELEASE ORAL at 21:52

## 2020-01-01 RX ADMIN — Medication 12.5 MG: at 20:13

## 2020-01-01 RX ADMIN — HYDRALAZINE HYDROCHLORIDE 25 MG: 25 TABLET, FILM COATED ORAL at 21:47

## 2020-01-01 RX ADMIN — METOPROLOL TARTRATE 100 MG: 100 TABLET, FILM COATED ORAL at 09:13

## 2020-01-01 RX ADMIN — HYDRALAZINE HYDROCHLORIDE 25 MG: 25 TABLET, FILM COATED ORAL at 16:22

## 2020-01-01 RX ADMIN — SODIUM CHLORIDE 1000 ML: 9 INJECTION, SOLUTION INTRAVENOUS at 20:31

## 2020-01-01 RX ADMIN — SODIUM CHLORIDE: 9 INJECTION, SOLUTION INTRAVENOUS at 22:01

## 2020-01-01 RX ADMIN — HYDRALAZINE HYDROCHLORIDE 10 MG: 20 INJECTION INTRAMUSCULAR; INTRAVENOUS at 02:36

## 2020-01-01 RX ADMIN — TAZOBACTAM SODIUM AND PIPERACILLIN SODIUM 3.38 G: 375; 3 INJECTION, SOLUTION INTRAVENOUS at 08:02

## 2020-01-01 RX ADMIN — LIDOCAINE HYDROCHLORIDE 10 ML: 10 INJECTION, SOLUTION INFILTRATION; PERINEURAL at 10:56

## 2020-01-01 RX ADMIN — Medication 37.5 MG: at 19:29

## 2020-01-01 RX ADMIN — HYDROXYZINE HYDROCHLORIDE 10 MG: 10 TABLET, FILM COATED ORAL at 12:22

## 2020-01-01 RX ADMIN — SODIUM CHLORIDE: 9 INJECTION, SOLUTION INTRAVENOUS at 04:43

## 2020-01-01 RX ADMIN — DEXAMETHASONE 4 MG: 2 TABLET ORAL at 11:45

## 2020-01-01 RX ADMIN — POTASSIUM CHLORIDE, DEXTROSE MONOHYDRATE AND SODIUM CHLORIDE: 150; 5; 900 INJECTION, SOLUTION INTRAVENOUS at 19:14

## 2020-01-01 RX ADMIN — OXYCODONE HYDROCHLORIDE 5 MG: 5 TABLET ORAL at 12:22

## 2020-01-01 RX ADMIN — ETOMIDATE 20 MG: 20 INJECTION, SOLUTION INTRAVENOUS at 20:41

## 2020-01-01 RX ADMIN — HYDRALAZINE HYDROCHLORIDE 10 MG: 10 TABLET, FILM COATED ORAL at 21:14

## 2020-01-01 RX ADMIN — DEXAMETHASONE 2 MG: 2 TABLET ORAL at 19:46

## 2020-01-01 RX ADMIN — HYDROCODONE BITARTRATE AND ACETAMINOPHEN 1 TABLET: 5; 325 TABLET ORAL at 09:54

## 2020-01-01 RX ADMIN — ACETAMINOPHEN 325 MG: 325 TABLET, FILM COATED ORAL at 17:37

## 2020-01-01 RX ADMIN — SIMVASTATIN 10 MG: 10 TABLET, FILM COATED ORAL at 22:01

## 2020-01-01 RX ADMIN — PROPOFOL 50 MCG/KG/MIN: 10 INJECTION, EMULSION INTRAVENOUS at 09:29

## 2020-01-01 RX ADMIN — CEFTRIAXONE 1 G: 1 INJECTION, POWDER, FOR SOLUTION INTRAMUSCULAR; INTRAVENOUS at 00:58

## 2020-01-01 RX ADMIN — HYDRALAZINE HYDROCHLORIDE 25 MG: 25 TABLET, FILM COATED ORAL at 08:39

## 2020-01-01 RX ADMIN — ASPIRIN 81 MG: 81 TABLET, COATED ORAL at 08:38

## 2020-01-01 RX ADMIN — OXYCODONE HYDROCHLORIDE 5 MG: 5 TABLET ORAL at 19:29

## 2020-01-01 RX ADMIN — HYDROXYZINE HYDROCHLORIDE 10 MG: 10 TABLET, FILM COATED ORAL at 09:54

## 2020-01-01 RX ADMIN — DEXAMETHASONE 4 MG: 2 TABLET ORAL at 09:33

## 2020-01-01 RX ADMIN — ASPIRIN 81 MG: 81 TABLET, COATED ORAL at 08:42

## 2020-01-01 RX ADMIN — POLYETHYLENE GLYCOL 3350 17 G: 17 POWDER, FOR SOLUTION ORAL at 15:21

## 2020-01-01 RX ADMIN — ISOSORBIDE MONONITRATE 30 MG: 30 TABLET, EXTENDED RELEASE ORAL at 09:32

## 2020-01-01 RX ADMIN — IOPAMIDOL 52 ML: 755 INJECTION, SOLUTION INTRAVENOUS at 23:27

## 2020-01-01 RX ADMIN — METOPROLOL TARTRATE 25 MG: 25 TABLET, FILM COATED ORAL at 09:41

## 2020-01-01 RX ADMIN — Medication 12.5 MG: at 09:54

## 2020-01-01 RX ADMIN — TAZOBACTAM SODIUM AND PIPERACILLIN SODIUM 3.38 G: 375; 3 INJECTION, SOLUTION INTRAVENOUS at 01:31

## 2020-01-01 RX ADMIN — ENOXAPARIN SODIUM 40 MG: 40 INJECTION SUBCUTANEOUS at 10:03

## 2020-01-01 RX ADMIN — METOPROLOL TARTRATE 50 MG: 50 TABLET, FILM COATED ORAL at 11:45

## 2020-01-01 RX ADMIN — OXYCODONE HYDROCHLORIDE AND ACETAMINOPHEN 500 MG: 500 TABLET ORAL at 13:00

## 2020-01-01 RX ADMIN — OXYCODONE HYDROCHLORIDE 5 MG: 5 TABLET ORAL at 14:13

## 2020-01-01 RX ADMIN — SODIUM CHLORIDE, POTASSIUM CHLORIDE, SODIUM LACTATE AND CALCIUM CHLORIDE: 600; 310; 30; 20 INJECTION, SOLUTION INTRAVENOUS at 23:10

## 2020-01-01 RX ADMIN — CEFTRIAXONE 1 G: 1 INJECTION, POWDER, FOR SOLUTION INTRAMUSCULAR; INTRAVENOUS at 00:23

## 2020-01-01 RX ADMIN — ACETAMINOPHEN 650 MG: 325 TABLET, FILM COATED ORAL at 01:20

## 2020-01-01 RX ADMIN — ACETAMINOPHEN 650 MG: 325 TABLET, FILM COATED ORAL at 03:05

## 2020-01-01 RX ADMIN — FAMOTIDINE 20 MG: 20 TABLET ORAL at 09:54

## 2020-01-01 RX ADMIN — SODIUM CHLORIDE: 9 INJECTION, SOLUTION INTRAVENOUS at 16:07

## 2020-01-01 RX ADMIN — ASPIRIN 81 MG: 81 TABLET, COATED ORAL at 13:02

## 2020-01-01 RX ADMIN — Medication 2 SPRAY: at 17:48

## 2020-01-01 RX ADMIN — BISACODYL 10 MG: 10 SUPPOSITORY RECTAL at 22:12

## 2020-01-01 RX ADMIN — ACETAMINOPHEN 1000 MG: 500 TABLET, FILM COATED ORAL at 21:47

## 2020-01-01 RX ADMIN — HYDROMORPHONE HYDROCHLORIDE 2 MG: 2 TABLET ORAL at 01:34

## 2020-01-01 RX ADMIN — ISOSORBIDE MONONITRATE 30 MG: 30 TABLET, EXTENDED RELEASE ORAL at 13:02

## 2020-01-01 RX ADMIN — OXYCODONE HYDROCHLORIDE 5 MG: 5 TABLET ORAL at 14:01

## 2020-01-01 RX ADMIN — LEVOTHYROXINE SODIUM 75 MCG: 0.07 TABLET ORAL at 09:01

## 2020-01-01 RX ADMIN — HYDRALAZINE HYDROCHLORIDE 10 MG: 10 TABLET, FILM COATED ORAL at 22:17

## 2020-01-01 RX ADMIN — HYDROMORPHONE HYDROCHLORIDE 0.2 MG: 1 INJECTION, SOLUTION INTRAMUSCULAR; INTRAVENOUS; SUBCUTANEOUS at 09:41

## 2020-01-01 RX ADMIN — SODIUM CHLORIDE 6 UNITS: 9 INJECTION, SOLUTION INTRAVENOUS at 20:45

## 2020-01-01 RX ADMIN — SODIUM CHLORIDE: 9 INJECTION, SOLUTION INTRAVENOUS at 15:02

## 2020-01-01 RX ADMIN — CEFTRIAXONE 1 G: 1 INJECTION, POWDER, FOR SOLUTION INTRAMUSCULAR; INTRAVENOUS at 00:40

## 2020-01-01 RX ADMIN — SODIUM CHLORIDE, POTASSIUM CHLORIDE, SODIUM LACTATE AND CALCIUM CHLORIDE: 600; 310; 30; 20 INJECTION, SOLUTION INTRAVENOUS at 16:25

## 2020-01-01 RX ADMIN — ASPIRIN 81 MG: 81 TABLET, COATED ORAL at 09:41

## 2020-01-01 RX ADMIN — PAMIDRONATE DISODIUM 60 MG: 3 INJECTION INTRAVENOUS at 14:36

## 2020-01-01 RX ADMIN — LEVOTHYROXINE SODIUM 75 MCG: 0.07 TABLET ORAL at 08:44

## 2020-01-01 RX ADMIN — ACETAMINOPHEN 650 MG: 325 TABLET, FILM COATED ORAL at 16:16

## 2020-01-01 RX ADMIN — HYDRALAZINE HYDROCHLORIDE 10 MG: 10 TABLET, FILM COATED ORAL at 11:45

## 2020-01-01 RX ADMIN — HYDROXYZINE HYDROCHLORIDE 10 MG: 10 TABLET, FILM COATED ORAL at 18:41

## 2020-01-01 RX ADMIN — METOPROLOL TARTRATE 50 MG: 50 TABLET, FILM COATED ORAL at 20:24

## 2020-01-01 RX ADMIN — LEVOTHYROXINE SODIUM 75 MCG: 0.07 TABLET ORAL at 08:12

## 2020-01-01 RX ADMIN — OXYCODONE HYDROCHLORIDE 5 MG: 5 TABLET ORAL at 21:33

## 2020-01-01 RX ADMIN — ACETAMINOPHEN 650 MG: 325 TABLET, FILM COATED ORAL at 18:41

## 2020-01-01 RX ADMIN — PROPOFOL 5 MCG/KG/MIN: 10 INJECTION, EMULSION INTRAVENOUS at 20:47

## 2020-01-01 RX ADMIN — PROPOFOL 30 MCG/KG/MIN: 10 INJECTION, EMULSION INTRAVENOUS at 14:08

## 2020-01-01 RX ADMIN — ACETAMINOPHEN 650 MG: 325 TABLET, FILM COATED ORAL at 02:36

## 2020-01-01 RX ADMIN — FUROSEMIDE 40 MG: 10 INJECTION, SOLUTION INTRAMUSCULAR; INTRAVENOUS at 20:35

## 2020-01-01 RX ADMIN — IOPAMIDOL 92 ML: 755 INJECTION, SOLUTION INTRAVENOUS at 10:13

## 2020-01-01 RX ADMIN — HYDRALAZINE HYDROCHLORIDE 25 MG: 25 TABLET, FILM COATED ORAL at 15:51

## 2020-01-01 RX ADMIN — SODIUM CHLORIDE: 9 INJECTION, SOLUTION INTRAVENOUS at 01:33

## 2020-01-01 RX ADMIN — ENOXAPARIN SODIUM 40 MG: 40 INJECTION SUBCUTANEOUS at 14:12

## 2020-01-01 RX ADMIN — DEXAMETHASONE 4 MG: 2 TABLET ORAL at 21:14

## 2020-01-01 RX ADMIN — OXYCODONE HYDROCHLORIDE 5 MG: 5 TABLET ORAL at 09:01

## 2020-01-01 RX ADMIN — DEXAMETHASONE 2 MG: 2 TABLET ORAL at 20:21

## 2020-01-01 RX ADMIN — LEVOTHYROXINE SODIUM 75 MCG: 0.07 TABLET ORAL at 06:09

## 2020-01-01 RX ADMIN — HYDRALAZINE HYDROCHLORIDE 25 MG: 25 TABLET, FILM COATED ORAL at 16:25

## 2020-01-01 RX ADMIN — METOPROLOL TARTRATE 50 MG: 50 TABLET, FILM COATED ORAL at 21:13

## 2020-01-01 RX ADMIN — OXYCODONE HYDROCHLORIDE 5 MG: 5 TABLET ORAL at 05:38

## 2020-01-01 RX ADMIN — LEVOTHYROXINE SODIUM 75 MCG: 0.07 TABLET ORAL at 13:02

## 2020-01-01 RX ADMIN — ACETAMINOPHEN 650 MG: 325 TABLET, FILM COATED ORAL at 00:17

## 2020-01-01 RX ADMIN — MULTIPLE VITAMINS W/ MINERALS TAB 1 TABLET: TAB at 09:07

## 2020-01-01 ASSESSMENT — ACTIVITIES OF DAILY LIVING (ADL)
ADLS_ACUITY_SCORE: 23
ADLS_ACUITY_SCORE: 30
ADLS_ACUITY_SCORE: 25
FALL_HISTORY_WITHIN_LAST_SIX_MONTHS: NO
ADLS_ACUITY_SCORE: 24
ADLS_ACUITY_SCORE: 26
ADLS_ACUITY_SCORE: 26
ADLS_ACUITY_SCORE: 24
ADLS_ACUITY_SCORE: 26
ADLS_ACUITY_SCORE: 25
ADLS_ACUITY_SCORE: 26
ADLS_ACUITY_SCORE: 28
ADLS_ACUITY_SCORE: 24
ADLS_ACUITY_SCORE: 30
ADLS_ACUITY_SCORE: 25
ADLS_ACUITY_SCORE: 26
ADLS_ACUITY_SCORE: 24
ADLS_ACUITY_SCORE: 26
ADLS_ACUITY_SCORE: 26
ADLS_ACUITY_SCORE: 29
ADLS_ACUITY_SCORE: 24
BATHING: 2-->ASSISTIVE PERSON
ADLS_ACUITY_SCORE: 23
ADLS_ACUITY_SCORE: 25
ADLS_ACUITY_SCORE: 24
ADLS_ACUITY_SCORE: 28
ADLS_ACUITY_SCORE: 26
ADLS_ACUITY_SCORE: 25
ADLS_ACUITY_SCORE: 23
ADLS_ACUITY_SCORE: 29
ADLS_ACUITY_SCORE: 24
ADLS_ACUITY_SCORE: 22
ADLS_ACUITY_SCORE: 28
ADLS_ACUITY_SCORE: 23
ADLS_ACUITY_SCORE: 28
RETIRED_COMMUNICATION: 0-->UNDERSTANDS/COMMUNICATES WITHOUT DIFFICULTY
ADLS_ACUITY_SCORE: 26
ADLS_ACUITY_SCORE: 28
ADLS_ACUITY_SCORE: 28
ADLS_ACUITY_SCORE: 22
ADLS_ACUITY_SCORE: 23
ADLS_ACUITY_SCORE: 26
ADLS_ACUITY_SCORE: 29
ADLS_ACUITY_SCORE: 26
ADLS_ACUITY_SCORE: 23
ADLS_ACUITY_SCORE: 22
ADLS_ACUITY_SCORE: 26
WHICH_OF_THE_ABOVE_FUNCTIONAL_RISKS_HAD_A_RECENT_ONSET_OR_CHANGE?: AMBULATION;TRANSFERRING;TOILETING;BATHING
ADLS_ACUITY_SCORE: 30
ADLS_ACUITY_SCORE: 22
ADLS_ACUITY_SCORE: 24
ADLS_ACUITY_SCORE: 30
ADLS_ACUITY_SCORE: 32
ADLS_ACUITY_SCORE: 26
ADLS_ACUITY_SCORE: 23
ADLS_ACUITY_SCORE: 28
ADLS_ACUITY_SCORE: 30
IADL_COMMENTS: SPOUSE CAN ASSIST AS NEEDED
ADLS_ACUITY_SCORE: 25
ADLS_ACUITY_SCORE: 26
ADLS_ACUITY_SCORE: 32
ADLS_ACUITY_SCORE: 24
DRESS: 2-->ASSISTIVE PERSON
ADLS_ACUITY_SCORE: 28
ADLS_ACUITY_SCORE: 24
ADLS_ACUITY_SCORE: 26
TRANSFERRING: 1-->ASSISTIVE EQUIPMENT
ADLS_ACUITY_SCORE: 24
ADLS_ACUITY_SCORE: 28
ADLS_ACUITY_SCORE: 26
ADLS_ACUITY_SCORE: 24
ADLS_ACUITY_SCORE: 29
ADLS_ACUITY_SCORE: 26
ADLS_ACUITY_SCORE: 26
ADLS_ACUITY_SCORE: 23
ADLS_ACUITY_SCORE: 26
ADLS_ACUITY_SCORE: 30
TOILETING: 2-->ASSISTIVE PERSON
ADLS_ACUITY_SCORE: 28
ADLS_ACUITY_SCORE: 22
ADLS_ACUITY_SCORE: 24
ADLS_ACUITY_SCORE: 26
ADLS_ACUITY_SCORE: 26
RETIRED_EATING: 0-->INDEPENDENT
COGNITION: 1 - ATTENTION OR MEMORY DEFICITS
ADLS_ACUITY_SCORE: 23
ADLS_ACUITY_SCORE: 29
ADLS_ACUITY_SCORE: 25
SWALLOWING: 0-->SWALLOWS FOODS/LIQUIDS WITHOUT DIFFICULTY
ADLS_ACUITY_SCORE: 26
ADLS_ACUITY_SCORE: 26
ADLS_ACUITY_SCORE: 28
ADLS_ACUITY_SCORE: 26
ADLS_ACUITY_SCORE: 23
ADLS_ACUITY_SCORE: 25
ADLS_ACUITY_SCORE: 28
AMBULATION: 1-->ASSISTIVE EQUIPMENT
ADLS_ACUITY_SCORE: 23
ADLS_ACUITY_SCORE: 23
ADLS_ACUITY_SCORE: 26
ADLS_ACUITY_SCORE: 25

## 2020-01-01 ASSESSMENT — MIFFLIN-ST. JEOR
SCORE: 996.12
SCORE: 1164.52
SCORE: 1153.52
SCORE: 1184.82
SCORE: 1203.42
SCORE: 1306.84
SCORE: 1064.62
SCORE: 1153.52
SCORE: 1024.02
SCORE: 1266.01
SCORE: 1025.83
SCORE: 1185.73
SCORE: 1263.29
SCORE: 996.12
SCORE: 1203.42
SCORE: 1280.07
SCORE: 1153.52
SCORE: 1087.3
SCORE: 1064.62
SCORE: 1247.87
SCORE: 1153.52
SCORE: 1266.01
SCORE: 1169.51
SCORE: 1270.09
SCORE: 1029.01
SCORE: 1153.52
SCORE: 1200.25
SCORE: 1031.27

## 2020-01-01 ASSESSMENT — ENCOUNTER SYMPTOMS
COUGH: 0
VOMITING: 0
SHORTNESS OF BREATH: 1
FEVER: 0
ABDOMINAL PAIN: 0
SHORTNESS OF BREATH: 0
DIARRHEA: 0
NAUSEA: 0
COUGH: 1
FATIGUE: 1
DIFFICULTY URINATING: 1
WEAKNESS: 1

## 2020-01-01 ASSESSMENT — PATIENT HEALTH QUESTIONNAIRE - PHQ9
SUM OF ALL RESPONSES TO PHQ QUESTIONS 1-9: 20
SUM OF ALL RESPONSES TO PHQ QUESTIONS 1-9: 8

## 2020-01-01 ASSESSMENT — PAIN SCALES - GENERAL: PAINLEVEL: WORST PAIN (10)

## 2020-01-09 NOTE — PROGRESS NOTES
DISCHARGE REPORT    Progress reporting period is from 9/9/19 to 1/8/20.       SUBJECTIVE  Subjective changes noted by patient: Patient notes that her back is doing well.  Rates pain there at 0-1/10.  She does have pain and limitation in her R hip that she rates at 10/10 pain.  If she has pain in the back, it is mild and temporary.  She has posterolateral hip pain that at time extends to the lateral knee.  She notes lack of ability to straighten/extend her R hip.  She denies any numbness/tingling in the legs (other than tingling B hands and feet--that she feels may be related to a medication she takes for something else).  She continues to walk with a walker, secondary to her hip.  Overall, feels back is progressing well, but hip is limiting progress at this point.  She did have 2 injetions in her hip from Dr Yeo.  These helped for about 2 weeks as noted by reports in PT--though today she notes that overall she didn't think they helped or are currently helping much anymore.     Current Pain level: 5/10.     Initial Pain level: 10/10.   Changes in function:  Yes (See Goal flowsheet attached for changes in current functional level)  Adverse reaction to treatment or activity: None    OBJECTIVE  Changes noted in objective findings:  Yes,   Objective: Lumbar AROM flexion to mid tibia, extension min-mod decrease, B SG min decrease.  No pain noted on lumbar motion screen (note she does unweight and flex the R hip when extending to avoid pain there--but there is no LBP).  Myotomes are 5/5 throughout.  Sensation is intact B LE dermatomes with light touch screen.  Hip PROM is limited to 90 flexion, 20 ER 5 IR.  Abduction is not formally tested, but observed as limited and painful with standing hip AROM exercise when she goes through too much ROM.  She is tender about the R hip joint line.      ASSESSMENT/PLAN  Updated problem list and treatment plan: Diagnosis 1:  s/p L3-4 fusion on 7/17/19    Pain -  home program  Decreased  strength - home program  Decreased proprioception - home program  Impaired gait - home program  Decreased function - home program  STG/LTGs have been met or progress has been made towards goals:  Yes (See Goal flow sheet completed today.)  Assessment of Progress: The patient's condition is improving.  Self Management Plans:  Patient has been instructed in a home treatment program.  I have re-evaluated this patient and find that the nature, scope, duration and intensity of the therapy is appropriate for the medical condition of the patient.  Elizabeth continues to require the following intervention to meet STG and LTG's:  PT intervention is no longer required to meet STG/LTG.    Recommendations:  Patient is doing well regarding her LB.  Her R hip continues to give her significant issues.  It appears that she is ready to re check and continue her HEP for her back.  She would likely benefit from further consultation on her R Hip once her back specialists feels her back is healed enough to proceed with this.     Please refer to the daily flowsheet for treatment today, total treatment time and time spent performing 1:1 timed codes.

## 2020-01-27 NOTE — NURSING NOTE
"Elizabeth Li is a 84 year old female who presents for:  No chief complaint on file.       Initial Vitals:  BP (!) 163/79   Pulse 62   Temp 97.1  F (36.2  C) (Oral)  Estimated body mass index is 31.93 kg/m  as calculated from the following:    Height as of 12/11/19: 5' 1\" (1.549 m).    Weight as of 12/11/19: 169 lb (76.7 kg).. There is no height or weight on file to calculate BSA. BP completed using cuff size: regular  Data Unavailable    Nursing Comments: follow up for right hip pain    Diamond Meza RN      "

## 2020-01-27 NOTE — LETTER
1/27/2020         RE: Elizabeth Li  [address]  [address]  [place] [address]        Dear Colleague,    Thank you for referring your patient, Elizabeth Li, to the [facility]. Please see a copy of my visit note below.    Neurosurgery follow-up    Ms. Li is an 84-year-old female 6 months status post L3-4 transforaminal lumbar interbody fusion.  The patient is doing well from surgery she states the back pain and bilateral leg symptoms she was having before really improved.  Since surgery she has been having some right hip pain issues which she is working out with her primary care provider.  Overall she is very pleased with her outcome and is starting to be more active.    Exam    B/P: 163/79, T: 97.1, P: 62, R: Data Unavailable     Bilateral lower extremities appropriate strength    Imaging    Lumbar x-ray demonstrates stable placement of L3-4 fusion hardware    Assessment    6 months status post L3-4 TLIF      Plan    Follow-up in 6 months with repeat lumbar x-rays AP and lateral.  Follow-up with primary care provider for hip pain.  Patient was given a temporary handicap parking form for 6 months.      Total time of 30 minutes was spent with the patient today in face-to-face consultation and coordination regarding the above assessment and plan.      Again, thank you for allowing me to participate in the care of your patient.        Sincerely,        Ruddy Bolanos PA-C

## 2020-01-27 NOTE — PROGRESS NOTES
Neurosurgery follow-up    Ms. Li is an 84-year-old female 6 months status post L3-4 transforaminal lumbar interbody fusion.  The patient is doing well from surgery she states the back pain and bilateral leg symptoms she was having before really improved.  Since surgery she has been having some right hip pain issues which she is working out with her primary care provider.  Overall she is very pleased with her outcome and is starting to be more active.    Exam    B/P: 163/79, T: 97.1, P: 62, R: Data Unavailable     Bilateral lower extremities appropriate strength    Imaging    Lumbar x-ray demonstrates stable placement of L3-4 fusion hardware    Assessment    6 months status post L3-4 TLIF      Plan    Follow-up in 6 months with repeat lumbar x-rays AP and lateral.  Follow-up with primary care provider for hip pain.  Patient was given a temporary handicap parking form for 6 months.      Total time of 30 minutes was spent with the patient today in face-to-face consultation and coordination regarding the above assessment and plan.

## 2020-02-07 NOTE — TELEPHONE ENCOUNTER
Call to spouse. States patient was already referred to orthopedics and saw someone for a couple of injections. Has also done physical therapy. Please advise how this referral is different that what patient has already done.

## 2020-02-07 NOTE — TELEPHONE ENCOUNTER
Patient calling with hip pain and it slows her down with exercise. Her right hip has bothered her for years but getting worsePatient would like a referral for orthopedic. Patient would like a [facility] Dr Edward Schmid. Ok to call and  [phone]

## 2020-02-14 NOTE — PROGRESS NOTES
Subjective     Elizabeth Li is a 84 year old female who presents to clinic today for the following health issues:  Patient is being seen for a follow up.  HPI         Hypertension Follow-up      Do you check your blood pressure regularly outside of the clinic? No     Are you following a low salt diet? Yes    Are your blood pressures ever more than 140 on the top number (systolic) OR more   than 90 on the bottom number (diastolic), for example 140/90? Yes      How many servings of fruits and vegetables do you eat daily?  4 or more    On average, how many sweetened beverages do you drink each day (Examples: soda, juice, sweet tea, etc.  Do NOT count diet or artificially sweetened beverages)?   1    How many days per week do you exercise enough to make your heart beat faster? 3 or less    How many minutes a day do you exercise enough to make your heart beat faster? 9 or less    How many days per week do you miss taking your medication? 0    Depression. She reports she would like to start a medication.   PHQ9 of 20. No thoughts of hurting herself.  Does not want to see a counselor at this time.    PHQ-9 SCORE 3/11/2019 8/13/2019 2/14/2020   PHQ-9 Total Score - - -   PHQ-9 Total Score MyChart 8 (Mild depression) - -   PHQ-9 Total Score 8 4 20     CAD.  S/p cardiac stents. She has not had chest pain.    KUSUM. Wears CPAP.    Hypothyroidism.  Gained 10 lbs in the past month.   She has seen Dr. Manzanares in the past for endocrinology.   Patient also has been experiencing constipation.    Of note, she plans on seeing ortho- hip doctor in March.     Reviewed and updated as needed this visit by Provider         Review of Systems   ROS COMP: CONSTITUTIONAL: NEGATIVE for fever, chills; POS 10 pound weight gain  RESP: NEGATIVE for significant cough or SOB  CV: NEGATIVE for chest pain, palpitations or peripheral edema      Objective    BP (!) 142/69 (BP Location: Left arm, Patient Position: Sitting, Cuff Size: Adult Large)    "Pulse 69   Temp 97.3  F (36.3  C) (Oral)   Resp 18   Ht 1.549 m (5' 1\")   Wt 80.3 kg (177 lb)   SpO2 95%   BMI 33.44 kg/m    Body mass index is 33.44 kg/m .  Physical Exam   GENERAL: healthy, alert and no distress  NECK: no adenopathy, no asymmetry, masses, or scars and thyroid normal to palpation  RESP: lungs clear to auscultation - no rales, rhonchi or wheezes  CV: regular rate and rhythm, normal S1 S2, no S3 or S4, no murmur  Psych: flattened affect    Diagnostic Test Results:  Labs reviewed in Epic        Assessment & Plan     (F32.0) Mild major depression (H)  (primary encounter diagnosis)  Comment: not at goal  Plan: sertraline (ZOLOFT) 50 MG tablet            (E03.9) Hypothyroidism, unspecified type  Comment: depression and weight gain  Plan: will discuss with endocrinology regarding cytomel    (I10) Benign essential hypertension  Comment: not at goal  Plan: metoprolol    (G47.33) KUSUM (obstructive sleep apnea)  Comment:   Plan: CPAP           BMI:   Estimated body mass index is 33.44 kg/m  as calculated from the following:    Height as of this encounter: 1.549 m (5' 1\").    Weight as of this encounter: 80.3 kg (177 lb).           See Patient Instructions    Return in about 4 weeks (around 3/13/2020).    Rebekah Rausch MD  [facility]        "

## 2020-02-14 NOTE — NURSING NOTE
"Patient is being seen for a follow up ( Hypertension and Lipids).  BP (!) 142/69 (BP Location: Left arm, Patient Position: Sitting, Cuff Size: Adult Large)   Pulse 69   Temp 97.3  F (36.3  C) (Oral)   Resp 18   Ht 1.549 m (5' 1\")   Wt 80.3 kg (177 lb)   SpO2 95%   BMI 33.44 kg/m      "

## 2020-02-14 NOTE — PATIENT INSTRUCTIONS
Take zoloft 25mg daily for one week  Then increase to 50mg daily    Follow up in 4-6 weeks for depression

## 2020-02-18 NOTE — TELEPHONE ENCOUNTER
Patient calls asking if PCP had forgotten about changing the dose on liothyronine (CYTOMEL) 5 MCG tablet.  Patient states PCP was going to another doctor and then call in the medication.  Please advise.

## 2020-02-19 NOTE — TELEPHONE ENCOUNTER
Last visit 1/2019.based on clinic note: plan was to continue current dose of levothyroxine to 75 mcg/day and Cytomel to 5 mcg/day (11/29/2018).  Endo staff- can you please take a look into this?  Please check dose with pt and the concerns?    Thank you.

## 2020-02-19 NOTE — TELEPHONE ENCOUNTER
See Dr. Manzanares's response.  Did you plan on something else?    Patience Arellano, [facility] Endocrinology  Briana/Niall

## 2020-02-20 NOTE — TELEPHONE ENCOUNTER
Patient calling.  Patient wants Dr Rausch to address the thyroid and make the decision to change her medication. Patient wants a call back today and prefers Dr Fischer not be involved. Ok to call and  [phone]

## 2020-02-20 NOTE — TELEPHONE ENCOUNTER
Call to patient. Advised Dr. Rausch is not in today. Will call once response received. Patient would like prescription called in tomorrow.    FYI - patient has refused to have anything to do with Dr. Manzanares in the past to the point that she refused to have labs done if they were ordered in Dr. Manzanares's name. See 12/30 refill encounter. Writer thought I had reordered labs in Dr. Rausch's name but it does not appear that I did

## 2020-02-21 NOTE — TELEPHONE ENCOUNTER
The free T3 is 1.7 on 1/6/2020.    TSH and free T4 are normal.    Patient is depressed and fatigued.     Should I increase cytomel from 5 mcg to 10mcg and then recheck labs in 6 weeks?

## 2020-02-24 NOTE — TELEPHONE ENCOUNTER
Called patient and advised. Patient verbalized understanding. Assisted in scheduling a lab appointment.

## 2020-02-24 NOTE — TELEPHONE ENCOUNTER
This patient called to see if new rx was sent in for her thyroid medications.  Just checking on status - let us know if there is a new dose we can fill?  Thanks so much    Lourdes Arellano [facility]   on behalf of [facility]

## 2020-02-24 NOTE — TELEPHONE ENCOUNTER
ENDO THYROID LABS-[facility] Latest Ref Rng & Units 1/6/2020 3/5/2019   TSH 0.40 - 4.00 mU/L 0.72 0.44   T4 FREE 0.76 - 1.46 ng/dL 0.89 1.10   FREE T3 2.3 - 4.2 pg/mL 1.7 (L) 2.1 (L)     Can consider to increase cytomel based on labs.

## 2020-02-26 NOTE — TELEPHONE ENCOUNTER
PREVISIT INFORMATION                                                    Elizabeth OSIRIS Li scheduled for future visit at [facility].    Patient is scheduled to see Dr. Schmid on 3/3/20  Reason for visit: Right hip pain  Referring provider Gladis Rausch  Has patient seen previous specialist? No  Medical Records:  Available in chart.  Patient was previously seen at a [place] or [facility] facility.    REVIEW                                                      New patient packet mailed to patient: Yes  Medication reconciliation complete: Yes      Current Outpatient Medications   Medication Sig Dispense Refill     ascorbic acid (VITAMIN C) 500 MG tablet Take 500 mg by mouth daily (with lunch) Pt takes in afternoon       aspirin (ASA) 81 MG tablet Take 1 tablet (81 mg) by mouth daily 90 tablet 3     cholecalciferol (VITAMIN D3) 1000 UNIT tablet Take 1,000 Units by mouth 2 times daily       isosorbide mononitrate (IMDUR) 30 MG 24 hr tablet TAKE ONE TABLET BY MOUTH EVERY DAY 90 tablet 1     levothyroxine (SYNTHROID/LEVOTHROID) 75 MCG tablet TAKE ONE TABLET BY MOUTH EVERY MORNING (BEFORE BREAKFAST) 90 tablet 0     liothyronine (CYTOMEL) 5 MCG tablet Take 2 tablets (10 mcg) by mouth daily 180 tablet 0     metoprolol tartrate (LOPRESSOR) 25 MG tablet TAKE ONE-HALF TABLET (12.5MG) BY MOUTH TWO TIMES A DAY 90 tablet 1     omeprazole (PRILOSEC) 20 MG DR capsule TAKE ONE CAPSULE BY MOUTH ONCE DAILY 90 capsule 1     order for DME Equipment being ordered: wheeled walker 1 Device 0     order for DME Equipment being ordered: CPAP       polyethylene glycol (MIRALAX/GLYCOLAX) packet Take 1 packet by mouth 4 times daily as needed for constipation       sertraline (ZOLOFT) 50 MG tablet Take 1 tablet (50 mg) by mouth daily 90 tablet 0     simvastatin (ZOCOR) 10 MG tablet Take 1 tablet (10 mg) by mouth At Bedtime 90 tablet 4       Allergies: Flu virus vaccine; Gluten meal; and Milk  protein extract    Last XR 2016, will need new ones    PLAN/FOLLOW-UP NEEDED                                                      Previsit review complete.  Patient will see provider at future scheduled appointment.     Patient Reminders Given:  Please, make sure you bring an updated list of your medications.   If you are having a procedure, please, present 15 minutes early.  If you need to cancel or reschedule,please call [phone].    Carrie Harvey RN

## 2020-03-03 NOTE — PROGRESS NOTES
Chief Complaint: Pain of the Right Hip    Physician:  No ref. provider found    HPI: Elizabeth Li is a 84 year old female who presents today for evaluation of her right hip     Symptom Profile  Location of symptoms:  Groin and buttock   Onset: insidious  Trend: getting  worse  Duration of symptoms:4-5 years   Quality of symptoms: aching, sharp/stabbing  Severity: severe  Alleviate:activity modification   Exacerbating: activities   Previous Treatments: Previous treatments include activity modification, oral pain medication (OTC), lumbar spine surgery about 7 months ago (single level fusion L3-4)    Current Status:  Results of the patient s Hip Disability and Osteoarthritis Outcome Score (HOOS)  are as follows (0-100 scales with 100 being the theoretical best):  Pain: 50   Symptoms:40  ADLs:50  Sports/Recreation:0  Quality of Life: 12.5   ([email or web address].[email or web address]/)  [facility] Activity Score: 2    MEDICAL HISTORY:   Past Medical History:   Diagnosis Date     Abnormal cardiovascular stress test     EKG changes with exercise on Exercise stress test 1/26/2015     CAD (coronary artery disease)     Severe 2 vessel CAD. PCI with drug-eluting stents x2 to mid LAD on 3/27/15     HTN (hypertension)      Hyperlipidemia LDL goal <100      Hypothyroidism      IHD (ischemic heart disease)      KUSUM (obstructive sleep apnea)     CPAP       Medications:     Current Outpatient Medications:      acetaminophen (TYLENOL) 500 MG tablet, Take 1,000 mg by mouth every 8 hours as needed for mild pain, Disp: , Rfl:      ascorbic acid (VITAMIN C) 500 MG tablet, Take 500 mg by mouth daily (with lunch) Pt takes in afternoon, Disp: , Rfl:      aspirin (ASA) 81 MG tablet, Take 1 tablet (81 mg) by mouth daily, Disp: 90 tablet, Rfl: 3     cholecalciferol (VITAMIN D3) 1000 UNIT tablet, Take 1,000 Units by mouth 2 times daily, Disp: , Rfl:      isosorbide mononitrate (IMDUR) 30 MG 24 hr tablet, TAKE ONE TABLET BY MOUTH EVERY DAY, Disp: 90 tablet, Rfl: 1      levothyroxine (SYNTHROID/LEVOTHROID) 75 MCG tablet, TAKE ONE TABLET BY MOUTH EVERY MORNING (BEFORE BREAKFAST), Disp: 90 tablet, Rfl: 0     metoprolol tartrate (LOPRESSOR) 25 MG tablet, TAKE ONE-HALF TABLET (12.5MG) BY MOUTH TWO TIMES A DAY, Disp: 90 tablet, Rfl: 1     order for DME, Equipment being ordered: wheeled walker, Disp: 1 Device, Rfl: 0     order for DME, Equipment being ordered: CPAP, Disp: , Rfl:      polyethylene glycol (MIRALAX/GLYCOLAX) packet, Take 1 packet by mouth 4 times daily as needed for constipation, Disp: , Rfl:      sertraline (ZOLOFT) 50 MG tablet, Take 1 tablet (50 mg) by mouth daily, Disp: 90 tablet, Rfl: 0     simvastatin (ZOCOR) 10 MG tablet, Take 1 tablet (10 mg) by mouth At Bedtime, Disp: 90 tablet, Rfl: 4     liothyronine (CYTOMEL) 5 MCG tablet, Take 2 tablets (10 mcg) by mouth daily (Patient taking differently: Take 5 mcg by mouth daily ), Disp: 180 tablet, Rfl: 0     omeprazole (PRILOSEC) 20 MG DR capsule, TAKE ONE CAPSULE BY MOUTH ONCE DAILY (Patient not taking: Reported on 3/3/2020), Disp: 90 capsule, Rfl: 1    Current Facility-Administered Medications:      methylPREDNISolone (DEPO-MEDROL) injection 40 mg, 40 mg, , , Yeo, Albert, MD, 40 mg at 12/11/19 1130     methylPREDNISolone (DEPO-MEDROL) injection 40 mg, 40 mg, , , Yeo, Albert, MD, 40 mg at 12/11/19 1130    Allergies: Flu virus vaccine; Gluten meal; and Milk protein extract    SURGICAL HISTORY:   Past Surgical History:   Procedure Laterality Date     CARDIAC SURGERY      2 heart stents     COLONOSCOPY       COLONOSCOPY N/A 1/11/2017    Procedure: COLONOSCOPY;  Surgeon: Nghia Moss MD;  Location:  GI     HEAD & NECK SURGERY      wisdom teeth removed     HEART CATH, ANGIOPLASTY  3/27/15    2.5 X 18 mm & 3.0 X mm LUCILA to LAD     OPTICAL TRACKING SYSTEM FUSION POSTERIOR SPINE LUMBAR N/A 7/17/2019    Procedure: L3-L4 TRANSFORAMINAL LUMBAR  INTRABODY FUSION WITH ALLOGRAFT, OPTICAL TRACKING SYSTEM AND [employer]  "INSTRUMENTATION;  Surgeon: Cole Jansen MD;  Location: [facility]     TONSILLECTOMY      T&A as a child     TUBAL LIGATION         FAMILY HISTORY:   Family History   Problem Relation Age of Onset     Blood Disease Mother         pernious anemia     Heart Disease Father      Heart Disease Sister      Heart Disease Brother      Colon Cancer No family hx of        SOCIAL HISTORY:   Social History     Tobacco Use     Smoking status: Former Smoker     Last attempt to quit: 1984     Years since quittin.1     Smokeless tobacco: Never Used   Substance Use Topics     Alcohol use: Yes     Alcohol/week: 0.0 standard drinks     Comment: social drinker       REVIEW OF SYSTEMS:  The comprehensive review of systems from the intake form was reviewed with the patient.  No fever, weight change or fatigue. No dry eyes. No oral ulcers, sore throat or voice change. No palpitations, syncope, angina or edema.  No chest pain, excessive sleepiness, shortness of breath or hemoptysis.   No abdominal pain, nausea, vomiting, diarrhea or heartburn.  No skin rash. No focal weakness or numbness. No bleeding or lymphadenopathy. No rhinitis or hives.     Exam:  On physical examination the patient appears the stated age, is in no acute distress, affectThe is appropriate, and breathing is non-labored.  Vitals are documented in the EMR and have been reviewed:    /67 (BP Location: Left arm, Patient Position: Sitting, Cuff Size: Adult Regular)   Pulse 68   Ht 1.543 m (5' 0.75\")   Wt 77.6 kg (171 lb 1.6 oz)   SpO2 94%   BMI 32.60 kg/m    5' .75\"  Body mass index is 32.6 kg/m .    Rises from chair: with effort   Gait: uses a walker   Trendelenburg test:  Gains the exam table: with effort     RIGHT hip subjective: painful  Abd: 10  Add:10  PFC:  Flexion: 70  IRF:-20  ERF:25  Impingement test: ++ groin     LEFT hip subjective: painless  Abd:  Add:  PFC:  Flexion: 95  IRF:0  ERF:25  Impingement test: -    Distally, the circulatory, motor, " and sensation exam is intact with 5/5 EHL, gastroc-soleus, and tibialis anterior.  Sensation to light touch is intact.  Dorsalis pedis and posterior tibialis pulses are palpable.  There are no sores on the feet, no bruising, and no lymphedema.    X-rays:   Final Report   Exam: Single view of both hips and a crosstable lateral view of the  right hip dated 3/3/2020.    COMPARISON: 12/29/2016.    CLINICAL HISTORY: Right hip pain.    FINDINGS: Single frontal view of both hips and a crosstable lateral  view of the right hip was obtained. Crosstable lateral view is  suboptimal. Marked osteoarthrosis in the right hip joint with  osteophytic spurring and joint space narrowing. There are  mild-to-moderate degenerative changes of the left hip joint. Vascular  calcifications are noted. No displaced fractures. Partially visualized  hardware in the lower lumbar spine.    IMPRESSION: Marked osteoarthrosis in the right hip joint. No displaced  fractures noted, however if history of trauma, additional views of the  right hip could be helpful versus MRI.    MICHAELA PARNELL MD  Principal   Name: MICHAELA PARNELL  Provider ID: [id number]  Home Phone Number: [phone]      Assessment: This is a 84 year old with end stage OA right hip associated with severe symptoms. We discussed the treatment options including living with it and total hip. We spent twenty minutes discussing total hip arthroplasty.  We discussed the implants, the procedure, the risks and benefits, and the post-operative course.  We discussed blood clots, blood clots to the lungs, injury to blood vessels and nerves, dislocation, infection, and leg length difference. We discussed thhat her spinal pathology likely increases her risk of dislocation.  All the patients questions were answered to the best of my ability.      Plan:  Right total hip when the patient chooses to go forward with it.

## 2020-03-03 NOTE — PATIENT INSTRUCTIONS
[facility]  [address] [address] [place] [address]  Phone [phone]  Fax [phone]    SURGICAL INFORMATION & INSTRUCTIONS  Dr. Edward Schmid  Name of Surgery: Right total hip arthroplasty    Date of Surgery:     If you need to reschedule/schedule your surgery, please contact Brook, our surgery scheduler at [facility], at [phone].    Arrival Time:     Time of Surgery:      Please arrive early so that we can prepare you for surgery. If you arrive later than your scheduled arrival time, your surgery may be cancelled.  Please note that scheduled times may change, but you will always be notified if there is a change.     Location of Surgery:     ? [facility], [facility]  [address] [place], [place] [address]e. [address], 3rd floor for check-in  Phone [phone]  Fax [phone]  Bring parking ticket with you for validation and reduced parking rate  [email or web address]    Prior to surgery    ? Medical Leave Forms  Please fax any medical leave forms from your employer/school to [phone] (if seen in [facility]). It can take up to 5 business days to complete the forms. We will fax them back to the number listed on the forms, if you would like a copy, please let us know and we will mail a copy to you. Do not bring with on day of surgery as the forms may get lost.    ? Call your insurance company  Ask if you need pre-approval for your surgery.  If pre-approval is needed, please call our surgery scheduler for assistance with the pre-approval process.   If you do not have insurance, please let us know.     ? [place] [facility] (if on [facility])  10 days before surgery, call [phone] to register with the surgery center. Have your insurance card ready.     Total Joint Replacement:  - Joint Replacement Class:  If you are scheduled to have a joint replacement, you (and any family/friends that will be  helping to care for you) are expected to attend an educational class at least two weeks prior to your surgery.  To register for the class please call the Patient Learning Center at [phone] or register online at [email or web address]. Classes are held at multiple locations as well as online.  You must know the date of your surgery before you can register for a class (approximate date OK). If registering online, please select hip or knee as surgery type as shoulder has not been made an option at this time.     o This is also a good opportunity to think about where you would like to have physical therapy after your surgery. You may also be able to set up appointments in advance so that everything is ready to go after surgery.    - Antibiotics Prophylaxis:  Certain procedures such as dental cleaning/work, colonoscopies and other surgeries can cause bacteria to enter the bloodstream.  These bacteria can attach to joint replacement devices.  To reduce this risk, you will need to take antibiotics before you have invasive procedures or dental work.  This is known as antibiotic prophylaxis.    - Dental Visit:  You may want to schedule an appointment with your dentist for a cleaning or needed work before your surgery.  We advise no dental work or cleaning until 6 months after your surgery with implants to hip(s), knee(s), or shoulder(s).  Once you are 6 months past your surgery, you will need to take prophylactic (preventative) antibiotics for the rest of your life before having any dental cleaning or work.  If dental work is needed before surgery, make sure everything is completely healed prior to surgery.  It may cause delays or cancellation of surgery if not healed completely. This is also for any other invasive procedures you may have such as a colonoscopy.  Please notify the clinic if you have any questions.    ? Schedule an appointment with a Primary Care Provider for a Pre-Op Physical.  This should be done  within 30 days of surgery  If you do not have a primary care provider, you may call [facility] at [phone], for an appointment.  Please have your office note and any labs or tests faxed to the facility where you are having surgery. Please be sure to request a copy of your pre-op physical and bring it with you on the day of surgery.      Tell your provider if you have any of the following:   - IF you have a pacemaker or an ICD (implanted cardiac defibrillator). If you do, please bring the ID card with you on the day of surgery  - IF you're a smoker. People who smoke have a higher risk of infection after surgery. Ask your provider how you can quit smoking.  - If you have diabetes, work with your provider to control your blood sugar. If its not well-controlled, we may need to delay surgery (or you may have problems healing afterward).  - If your surgeon asks you to see your dentist: You'll need to complete any dental work before surgery. Your dentist must send a letter to your surgery center saying it's ok to do the surgery.    ? Blood Bank Pre-Admission order (total joint replacement only):    You will need to have a Blood Type and Screen drawn at a [facility] or [facility] location before your surgery.  Please check your form included in your packet to determine if it needs to be done 1-30 days before surgery or 1-3 days before surgery.    ? Pre-Op Phone Call  You will receive a pre-op phone call 1-3 days before surgery to review your eating and drinking restrictions, review medications, and confirm surgery times.      ? 7-10 days BEFORE surgery  ? Stop taking aspirin, Plavix or aspirin products 10 days before surgery or as directed by your doctor.  ? Stop taking non-steroidal anti-inflammatory medications (naproxen/Aleve, ibuprofen/Advil/Motrin, celecoxib/Celebrex, meloxicam/Mobic) 3 days before surgery or as directed by your surgeon.  This will reduce the risk of bleeding during surgery.  ? Stop taking  fish oil (Omega-3-fatty acid) 1 week before surgery.  ? It is OK to take acetaminophen (Tylenol) up until 2 hours prior to surgery.  ? Take prescription medications as directed by your doctor.  Discuss which medications to take or hold prior to your surgery, with your primary care doctor.   ? If you have diabetes, ask your primary care doctor or endocrinologist how you should take your medication(s).    ? 24 hours BEFORE surgery  Stop drinking alcohol (beer, wine, liquor) at least 24-hours before and after your surgery.     ? Evening BEFORE surgery  - You may eat a normal meal the night before surgery, but eat nothing after midnight.     - Take a shower - to help wash away bacteria (germs) from your skin.  It s normal to have bacteria on your skin and skin protects us from these germs.  When you have surgery, we cut the skin.  Sometimes germs get into the cuts and cause infection (illness caused by germs).  By following the showering instructions and using the special soap, you will lower the number of germs on your skin.  This decreases your chance of an infection.    - Buy or get 8 ounces of antiseptic surgical soap called 4% CHG.  Common brands of this soap are Hibiclens and Exidine.    - You can find it this soap at your local pharmacy, clinic or retail store.  If you have trouble finding it, ask your pharmacist to help you find the right substitute.    - Do not shave within 12 inches of your incision (surgical cut) area for at least 3 days before surgery.  Shaving can make small cuts in the skin. This puts you at a higher risk of infection.    Items you will need for each shower:   - Newly washed towel  - 4 ounces of one of the recommended soaps    Follow these instructions, the evening before surgery  - Wash your hair and body with your regular shampoo and soap. Make sure you rinse the shampoo and soap from your hair and body.  - Using clean hands, apply about 2 ounces of soap gently on your skin from the neck  to your toes. Use on your groin area last. Do not use this soap on your face or head. If you get any soap in your eyes, ears or mouth, rinse right away.  - Once the soap has been on your skin for at least 1 minute, rinse off completely and repeat washing with the surgical soap one more time.  - Rinse well and dry off using a clean towel.  If you feel any tingling, itching or other irritation, rinse right away. It is normal to feel some coolness on the skin after using the antiseptic soap. Your skin may feel a bit dry after the shower, but do not use any lotions, creams or moisturizers. Do not use hair spray or other products in your hair.  - Dress in freshly washed clothes or pajamas. Use fresh pillowcases and sheets on your bed.    ? Day of Surgery  - You may drink clear liquids from midnight up to 2 hours before surgery or as directed on your pre-op phone call.  Clear liquids include: Water, Pedialyte, Gatorade, apple juice and liquids you can read through. Please avoid liquids that are red or orange in color.   - Do NOT drink: milk, liquids that have pulp, orange juice, and lemonade or tomato juice.   - Do NOT chew gum, chew tobacco or suck on hard candy.    - Take another shower          Follow these instructions:      - Wash your hair and body with your regular shampoo and soap. Make sure you rinse the shampoo and soap from your hair and body.  - Using clean hands, apply about 2 ounces of soap gently on your skin from the neck to your toes. Use on your groin area last. Do not use this soap on your face or head. If you get any soap in your eyes, ears or mouth, rinse right away.  - Once the soap has been on your skin for at least 1 minute, rinse off completely and repeat washing with the surgical soap one more time.  - Rinse well and dry off using a clean towel.  If you feel any tingling, itching or other irritation, rinse right away. It is normal to feel some coolness on the skin after using the antiseptic soap.  Your skin may feel a bit dry after the shower, but do not use any lotions, creams or moisturizers. Do not use hair spray or other products in your hair.  - Dress in freshly washed clothes.  - Do not wear deodorant, cologne, lotion, makeup, nail polish or jewelry to surgery.    ? If there is any change in your health PRIOR to your surgery, please contact your surgeon's office.  Such as a fever, body aches, fatigue, any flu-like symptoms, rash, or any new injury to related body part.    ? Arrange for someone to drive you home after discharge.    will need to be a responsible adult (18 years or older) that will provide transportation to and from surgery and stay in the waiting room during your surgery. You may not drive yourself or take public transportation to and from surgery.    ? Arrange for someone to stay with you for 24 hours after you go home.   This person must be a responsible adult (18 years or older).    ? Bring these items to the hospital/surgery center:   ? Insurance card(s)  ? Money for parking and co-pays, if needed  ? A list of all the medications you take, including vitamins, minerals, herbs and over the counter medications.    ? A copy of your Advance Health Care Directive, if you have one.  This tells us what treatment(s) you would or would not want, and who would make health care decisions, if you could no longer speak for yourself.    ? A case for glasses, contact lenses, hearing aids or dentures.   ? Your inhaler or CPAP machine, if you use these at home    ? Leave extra cash, jewelry and other valuables at home.         When you arrive for surgery  When you get to the surgery center/hospital, you will:  - Check in. If you are under age 18, you must be with a parent or legal guardian.  - Sign consent forms, if you haven t already. These forms state that you know the risks and benefits of surgery. When you sign the forms, you give us permission to do the surgery. Do not sign them unless you  understand what will happen during and after your surgery. If you have any questions about your surgery, ask to speak with your doctor before you sign the forms. If you don t understand the answers, ask again.  - Receive a copy of the Patient s Bill of Rights. If you do not receive a copy, please ask for one.  - Change into hospital clothes. Your belongings will be placed in a bag. We will return them to you after surgery.  - Meet with the anesthesia provider. He or she will tell you what kind of anesthesia (medicine) will be used to keep you comfortable during surgery.  - Remember: it s okay to remind doctors and nurses to wash their hands before touching you.  - In most cases, your surgeon will use a marker to write his or her initials on the surgery site. This ensures that the exact site is operated on.  - For safety reasons, we will ask you the same questions many times. For example, we may ask your name and birth date over and over again.  - Friends and family can stay with you until it s time for surgery. While you re in surgery, they will be in the waiting area. Please note that cell phones are not allowed in some patient care areas.  - If you have questions about what will happen in the operating room, talk to your care team.  - You will meet with an anesthesiologist, before your surgery.  He or she may reference types of anesthesia commonly used for surgeries:   o General:  This involves the use of an IV for injection of medication and anesthesia. You are put into a sleep and have a breathing tube to assist you with breathing.  o Sedation:  You are asleep, but not so deply that you need a breathing tube.   o Local or Regional: a nerve is injected to numb the surgical area.  o Spinal: you are numbed from the waist down with medicine injected into your back.  o Femoral Nerve Block:  Anesthesia injected into the groin of leg which you are having surgery on.      After surgery  We will move you to a recovery  "room, where we will watch you closely. If you have any pain or discomfort, tell your nurse. He or she will try to make you comfortable.    - We will move you to your hospital room after you are awake and stable. If you having any pain or discomfort, please let your nursing staff know.  - Please wash your hands every time you touch the wound or change bandages or dressings.  - Do not submerge the wound in water for 3 months after surgery.  You may not use a bathtub, hot tub, pool, or lake. Showering is ok. Any open area can be a source of incoming bacteria, which can lead to infection. Keep all dressings clean and dry.   - Remove your post op Aquacel/pressure dressing at 1 week post op. It is important for this to be removed to incision to \"breathe\"as well as minimize skin issues related to the dressing being in contact with your skin for so long (can cause skin tears).   - Elevate your leg(s) as much as possible to help reduce swelling. You will also receive compression stockings for the surgical leg. Please wear these during the day and fully remove them at night. Continue to wear these for approximately 4 weeks after surgery or until your swelling has resolved.  - You may put ice on the surgical area for comfort, keeping ice on area for up to 20 minutes then off for 20 minutes.  You may do this the first few days after surgery to help reduce pain and swelling.    - Drink at least 8-10 glasses of liquid each day to help your body heal.  - Keep your lungs clear by coughing and taking deep breaths every couple hours.  This is especially important the first 48 hours after surgery.  - Typically, you will be able to start driving once you are fully off narcotics and able to do a the quick stop test (slamming on the brake) with your right leg without experiencing much pain.  - You will start physical therapy while in the hospital. Once you leave the hospital, you will need to continue going to physical therapy to " maintain and improve your range of motion and strength. Please call the physical therapy clinic of your choice to set up an appointment within 1 week of being discharged from the hospital.    - Notify your doctor if you have any of the following:   o Fever of 101 F or higher  o Numbness and/or tingling  o Increased pain, redness or swelling  o Drainage from wound  o Prolonged or uncontrolled bleeding  o Difficulty with movement    Range of Motion Restrictions for total hip replacement  These are strict for 3 months post surgery, but should be followed for life. As your muscles gain strength, you will notice that you will have more range of motion, but your risk for dislocating remains. See patient education packet for details  - NO bending past 90 degrees at the waist (operative side)   - NO crossing your operative leg over or under your non-operative leg  - NO turning your operative leg inward     Follow-up Appointments, in Clinic  If you don't already have an appointment scheduled, please call to set up an appointment at [phone].      ? Nurse Only Appointment (2 weeks post op)  ? Post-Op appointments with provider (6 weeks post op)    Dealing with pain  A nurse will check your comfort level often during your stay. He or she will work with you to manage your pain.  It s expected that you will have pain after surgery.  Our goal is to reduce or minimize pain by:   - Medicine doesn t work the same for everyone. If your medicine isn t working, tell your nurse. There may be other medicines or treatments we can try.  - Medication Refills.  If you need refills on your pain medication, please call the clinic as soon as possible.  It may take 72-business hours to obtain a refill.  Refills must be picked up at check-in 2, [facility] or mailed to a pharmacy of your choice.    - It is expected that you will wean off the pain medications in a timely manner.   - Constipation is a common side effect of  pain medication, decreased activity and anesthesia from surgery.  Take a stool softener as prescribed by your doctor at the time of discharge.  You may also use over the counter medications as needed.  Be sure to increase your fiber (fruits and vegetables) and your water intake.      Please call the clinic at [phone], if you experience any problems or have questions.  If you are having an emergency, always call 911 or seek immediate evaluation at the Emergency Room.    Thank you for selecting [facility] for your care!  ---------------------------------------------        Thanks for coming today.  [facility]tho[facility] Medicine Clinic  [address] [address] [place] [address]    To schedule future appointments in Ortho Clinic, you may call [phone].    To schedule ordered imaging by your provider:   Call Central Imaging [phone]    To schedule an injection ordered by your provider:  Call Central Imaging Injection scheduling line: [phone]  [email or web address] available online at:  [email or web address]    Please call if any further questions or concerns ([phone]).  Clinic hours 8 am to 5 pm.    Return to clinic (call) if symptoms worsen or fail to improve.

## 2020-03-03 NOTE — NURSING NOTE
"Elizabeth Li's chief complaint for this visit includes:  Chief Complaint   Patient presents with     Right Hip - Pain     PCP: Rebekah Rausch    Referring Provider:  No referring provider defined for this encounter.    /67 (BP Location: Left arm, Patient Position: Sitting, Cuff Size: Adult Regular)   Pulse 68   Ht 1.543 m (5' 0.75\")   Wt 77.6 kg (171 lb 1.6 oz)   SpO2 94%   BMI 32.60 kg/m    Worst Pain (10)     Do you need any medication refills at today's visit? No    Selene Becerril CMA        "

## 2020-03-03 NOTE — TELEPHONE ENCOUNTER
Procedure: Right total hip arthroplasty  Facility: [facility]  Length: 120 minutes  Anesthesia: Choice  Post-op appointments needed: 2 weeks nurse only, 6 weeks with provider only.  Surgery packet/instructions given to patient?  Yes     Carrie Harvey RN

## 2020-03-03 NOTE — LETTER
3/3/2020         RE: Elizabeth Li  [id number]  [facility] [address]        Dear Colleague,    Thank you for referring your patient, Elizabeth Li, to the [facility]. Please see a copy of my visit note below.    Chief Complaint: Pain of the Right Hip    Physician:  No ref. provider found    HPI: Elizabeth Li is a 84 year old female who presents today for evaluation of her right hip     Symptom Profile  Location of symptoms:  Groin and buttock   Onset: insidious  Trend: getting  worse  Duration of symptoms:4-5 years   Quality of symptoms: aching, sharp/stabbing  Severity: severe  Alleviate:activity modification   Exacerbating: activities   Previous Treatments: Previous treatments include activity modification, oral pain medication (OTC), lumbar spine surgery about 7 months ago (single level fusion L3-4)    Current Status:  Results of the patient s Hip Disability and Osteoarthritis Outcome Score (HOOS)  are as follows (0-100 scales with 100 being the theoretical best):  Pain: 50   Symptoms:40  ADLs:50  Sports/Recreation:0  Quality of Life: 12.5   ([email or web address].nu/)  [facility] Activity Score: 2    MEDICAL HISTORY:   Past Medical History:   Diagnosis Date     Abnormal cardiovascular stress test     EKG changes with exercise on Exercise stress test 1/26/2015     CAD (coronary artery disease)     Severe 2 vessel CAD. PCI with drug-eluting stents x2 to mid LAD on 3/27/15     HTN (hypertension)      Hyperlipidemia LDL goal <100      Hypothyroidism      IHD (ischemic heart disease)      KUSUM (obstructive sleep apnea)     CPAP       Medications:     Current Outpatient Medications:      acetaminophen (TYLENOL) 500 MG tablet, Take 1,000 mg by mouth every 8 hours as needed for mild pain, Disp: , Rfl:      ascorbic acid (VITAMIN C) 500 MG tablet, Take 500 mg by mouth daily (with lunch) Pt takes in afternoon, Disp: , Rfl:      aspirin (ASA) 81 MG tablet, Take 1 tablet (81 mg) by  mouth daily, Disp: 90 tablet, Rfl: 3     cholecalciferol (VITAMIN D3) 1000 UNIT tablet, Take 1,000 Units by mouth 2 times daily, Disp: , Rfl:      isosorbide mononitrate (IMDUR) 30 MG 24 hr tablet, TAKE ONE TABLET BY MOUTH EVERY DAY, Disp: 90 tablet, Rfl: 1     levothyroxine (SYNTHROID/LEVOTHROID) 75 MCG tablet, TAKE ONE TABLET BY MOUTH EVERY MORNING (BEFORE BREAKFAST), Disp: 90 tablet, Rfl: 0     metoprolol tartrate (LOPRESSOR) 25 MG tablet, TAKE ONE-HALF TABLET (12.5MG) BY MOUTH TWO TIMES A DAY, Disp: 90 tablet, Rfl: 1     order for DME, Equipment being ordered: wheeled walker, Disp: 1 Device, Rfl: 0     order for DME, Equipment being ordered: CPAP, Disp: , Rfl:      polyethylene glycol (MIRALAX/GLYCOLAX) packet, Take 1 packet by mouth 4 times daily as needed for constipation, Disp: , Rfl:      sertraline (ZOLOFT) 50 MG tablet, Take 1 tablet (50 mg) by mouth daily, Disp: 90 tablet, Rfl: 0     simvastatin (ZOCOR) 10 MG tablet, Take 1 tablet (10 mg) by mouth At Bedtime, Disp: 90 tablet, Rfl: 4     liothyronine (CYTOMEL) 5 MCG tablet, Take 2 tablets (10 mcg) by mouth daily (Patient taking differently: Take 5 mcg by mouth daily ), Disp: 180 tablet, Rfl: 0     omeprazole (PRILOSEC) 20 MG DR capsule, TAKE ONE CAPSULE BY MOUTH ONCE DAILY (Patient not taking: Reported on 3/3/2020), Disp: 90 capsule, Rfl: 1    Current Facility-Administered Medications:      methylPREDNISolone (DEPO-MEDROL) injection 40 mg, 40 mg, , , Yeo, Albert, MD, 40 mg at 12/11/19 1130     methylPREDNISolone (DEPO-MEDROL) injection 40 mg, 40 mg, , , Yeo, Albert, MD, 40 mg at 12/11/19 1130    Allergies: Flu virus vaccine; Gluten meal; and Milk protein extract    SURGICAL HISTORY:   Past Surgical History:   Procedure Laterality Date     CARDIAC SURGERY      2 heart stents     COLONOSCOPY       COLONOSCOPY N/A 1/11/2017    Procedure: COLONOSCOPY;  Surgeon: Nghia Moss MD;  Location:  GI     HEAD & NECK SURGERY      wisdom teeth removed     HEART  "CATH, ANGIOPLASTY  3/27/15    2.5 X 18 mm & 3.0 X mm LUCILA to LAD     OPTICAL TRACKING SYSTEM FUSION POSTERIOR SPINE LUMBAR N/A 2019    Procedure: L3-L4 TRANSFORAMINAL LUMBAR  INTRABODY FUSION WITH ALLOGRAFT, OPTICAL TRACKING SYSTEM AND MEDTRONIC SOLARA INSTRUMENTATION;  Surgeon: Cole Jansen MD;  Location: [facility] OR     TONSILLECTOMY      T&A as a child     TUBAL LIGATION         FAMILY HISTORY:   Family History   Problem Relation Age of Onset     Blood Disease Mother         pernious anemia     Heart Disease Father      Heart Disease Sister      Heart Disease Brother      Colon Cancer No family hx of        SOCIAL HISTORY:   Social History     Tobacco Use     Smoking status: Former Smoker     Last attempt to quit: 1984     Years since quittin.1     Smokeless tobacco: Never Used   Substance Use Topics     Alcohol use: Yes     Alcohol/week: 0.0 standard drinks     Comment: social drinker       REVIEW OF SYSTEMS:  The comprehensive review of systems from the intake form was reviewed with the patient.  No fever, weight change or fatigue. No dry eyes. No oral ulcers, sore throat or voice change. No palpitations, syncope, angina or edema.  No chest pain, excessive sleepiness, shortness of breath or hemoptysis.   No abdominal pain, nausea, vomiting, diarrhea or heartburn.  No skin rash. No focal weakness or numbness. No bleeding or lymphadenopathy. No rhinitis or hives.     Exam:  On physical examination the patient appears the stated age, is in no acute distress, affectThe is appropriate, and breathing is non-labored.  Vitals are documented in the EMR and have been reviewed:    /67 (BP Location: Left arm, Patient Position: Sitting, Cuff Size: Adult Regular)   Pulse 68   Ht 1.543 m (5' 0.75\")   Wt 77.6 kg (171 lb 1.6 oz)   SpO2 94%   BMI 32.60 kg/m     5' .75\"  Body mass index is 32.6 kg/m .    Rises from chair: with effort   Gait: uses a walker   Trendelenburg test:  Gains the exam table: with " effort     RIGHT hip subjective: painful  Abd: 10  Add:10  PFC:  Flexion: 70  IRF:-20  ERF:25  Impingement test: ++ groin     LEFT hip subjective: painless  Abd:  Add:  PFC:  Flexion: 95  IRF:0  ERF:25  Impingement test: -    Distally, the circulatory, motor, and sensation exam is intact with 5/5 EHL, gastroc-soleus, and tibialis anterior.  Sensation to light touch is intact.  Dorsalis pedis and posterior tibialis pulses are palpable.  There are no sores on the feet, no bruising, and no lymphedema.    X-rays:   Final Report   Exam: Single view of both hips and a crosstable lateral view of the  right hip dated 3/3/2020.    COMPARISON: 12/29/2016.    CLINICAL HISTORY: Right hip pain.    FINDINGS: Single frontal view of both hips and a crosstable lateral  view of the right hip was obtained. Crosstable lateral view is  suboptimal. Marked osteoarthrosis in the right hip joint with  osteophytic spurring and joint space narrowing. There are  mild-to-moderate degenerative changes of the left hip joint. Vascular  calcifications are noted. No displaced fractures. Partially visualized  hardware in the lower lumbar spine.    IMPRESSION: Marked osteoarthrosis in the right hip joint. No displaced  fractures noted, however if history of trauma, additional views of the  right hip could be helpful versus MRI.    MICHAELA PARNELL MD  Principal   Name: MICHAELA PARNELL  Provider ID: [id number]  Home Phone Number: [phone]      Assessment: This is a 84 year old with end stage OA right hip associated with severe symptoms. We discussed the treatment options including living with it and total hip. We spent twenty minutes discussing total hip arthroplasty.  We discussed the implants, the procedure, the risks and benefits, and the post-operative course.  We discussed blood clots, blood clots to the lungs, injury to blood vessels and nerves, dislocation, infection, and leg length difference. We discussed thhat her spinal pathology  likely increases her risk of dislocation.  All the patients questions were answered to the best of my ability.      Plan:  Right total hip when the patient chooses to go forward with it.       Again, thank you for allowing me to participate in the care of your patient.        Sincerely,        Edward Schmid MD

## 2020-03-04 PROBLEM — M25.551 RIGHT HIP PAIN: Status: ACTIVE | Noted: 2020-01-01

## 2020-03-13 NOTE — NURSING NOTE
"/70   Pulse 85   Temp 99.3  F (37.4  C) (Oral)   Resp 12   Ht 1.543 m (5' 0.75\")   Wt 77.1 kg (170 lb)   SpO2 94%   Breastfeeding No   BMI 32.39 kg/m      "

## 2020-03-13 NOTE — PROGRESS NOTES
[place] [address]  [facility] [address]  [phone]  Dept: [phone]    PRE-OP EVALUATION:  Today's date: 3/13/2020    Elizabeth Li (: 1935) presents for pre-operative evaluation assessment as requested by Dr. Edward Schmid.  She requires evaluation and anesthesia risk assessment prior to undergoing surgery/procedure for treatment of right hip arthritis.    Proposed Surgery/ Procedure: right hip replacement  Date of Surgery/ Procedure:2020  Time of Surgery/ Procedure: 8am  Hospital/Surgical [facility]: [facility]  Primary Physician: Rebekah Rausch  Type of Anesthesia Anticipated: General  Patient has a Health Care Directive or Living Will:  YES     1. YES - DO YOU HAVE A HISTORY OF HEART ATTACK, STROKE, STENT, BYPASS OR SURGERY ON AN ARTERY IN THE HEAD, NECK, HEART OR LEG? 1984 - stress related; 2 cardiac stents   2. NO - Do you ever have any pain or discomfort in your chest?  3. NO - Do you have a history of  Heart Failure?  4. NO - Are you troubled by shortness of breath when: walking on the level, up a slight hill or at night?  5. NO - Do you currently have a cold, bronchitis or other respiratory infection?  6. NO - Do you have a cough, shortness of breath or wheezing?  7. YES - DO YOU SOMETIMES GET PAINS IN THE CALVES OF YOUR LEGS WHEN YOU WALK? Radiating from back  8. NO - Do you or anyone in your family have previous history of blood clots?  9. NO - Do you or does anyone in your family have a serious bleeding problem such as prolonged bleeding following surgeries or cuts?  10. NO - Have you ever had problems with anemia or been told to take iron pills?  11. NO - Have you had any abnormal blood loss such as black, tarry or bloody stools, or abnormal vaginal bleeding?  12. NO - Have you ever had a blood transfusion?  13. NO - Have you or any of your relatives ever had problems with anesthesia?  14. YES - DO YOU HAVE SLEEP APNEA, EXCESSIVE SNORING OR  DAYTIME DROWSINESS? Wears a cPAP  15. NO - Do you have any prosthetic heart valves?  16. NO - Do you have prosthetic joints?  17. NO - Is there any chance that you may be pregnant?      HPI:     HPI related to upcoming procedure:       CAD - Patient has a longstanding history of moderate-severe CAD. Patient denies recent chest pain or NTG use, denies exercise induced dyspnea or PND. No chest pain or shortness of breath. 2 cardiac stents in 2015 with dyspnea     DEPRESSION - Patient has a long history of Depression of moderate severity requiring medication for control with recent symptoms being gradually worsening..Current symptoms of depression include depressed mood.   No thoughts of harming self.    HYPERLIPIDEMIA - Patient has a long history of significant Hyperlipidemia requiring medication for treatment with recent good control. Patient reports no problems or side effects with the medication.     HYPOTHYROIDISM - Patient has a longstanding history of chronic Hypothyroidism. Patient has been doing well, noting no tremor, insomnia, hair loss or changes in skin texture. Continues to take medications as directed, without adverse reactions or side effects. Last TSH   Lab Results   Component Value Date    TSH 0.72 01/06/2020   .      SLEEP PROBLEM - Patient has a longstanding history of sleep apnea.. Patient has tried OTC medications with limited success. Wears a CPAP      MEDICAL HISTORY:     Patient Active Problem List    Diagnosis Date Noted     Right hip pain 03/04/2020     Priority: Medium     Added automatically from request for surgery [id number]       Acute bilateral low back pain with bilateral sciatica 10/23/2019     Priority: Medium     S/P lumbar fusion 07/17/2019     Priority: Medium     Pneumonia 01/18/2019     Priority: Medium     Obesity (BMI 35.0-39.9) with comorbidity (H) 11/29/2018     Priority: Medium     Benign essential hypertension 03/08/2017     Priority: Medium     CAD (coronary artery disease)       Priority: Medium     Severe 2 vessel CAD. PCI with drug-eluting stents x 2 to mid LAD on 3/27/15.  Chronic total occlusion of RCA, and moderate circumflex disease.  Stable dyspnea on exertion.       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         Hypothyroidism 11/28/2011     Priority: Medium     Hyperlipidemia LDL goal <100 11/28/2011     Priority: Medium     KUSUM (obstructive sleep apnea) 11/28/2011     Priority: Medium      Past Medical History:   Diagnosis Date     Abnormal cardiovascular stress test     EKG changes with exercise on Exercise stress test 1/26/2015     CAD (coronary artery disease)     Severe 2 vessel CAD. PCI with drug-eluting stents x2 to mid LAD on 3/27/15     HTN (hypertension)      Hyperlipidemia LDL goal <100      Hypothyroidism      IHD (ischemic heart disease)      KUSUM (obstructive sleep apnea)     CPAP     Past Surgical History:   Procedure Laterality Date     CARDIAC SURGERY      2 heart stents     COLONOSCOPY       COLONOSCOPY N/A 1/11/2017    Procedure: COLONOSCOPY;  Surgeon: Nghia Moss MD;  Location:  GI     HEAD & NECK SURGERY      wisdom teeth removed     HEART CATH, ANGIOPLASTY  3/27/15    2.5 X 18 mm & 3.0 X mm LUCILA to LAD     OPTICAL TRACKING SYSTEM FUSION POSTERIOR SPINE LUMBAR N/A 7/17/2019    Procedure: L3-L4 TRANSFORAMINAL LUMBAR  INTRABODY FUSION WITH ALLOGRAFT, OPTICAL TRACKING SYSTEM AND MEDTRONIC SOLARA INSTRUMENTATION;  Surgeon: Cole Jansen MD;  Location: [facility] OR     TONSILLECTOMY       T&A as a child     TUBAL LIGATION       Current Outpatient Medications   Medication Sig Dispense Refill     acetaminophen (TYLENOL) 500 MG tablet Take 1,000 mg by mouth every 8 hours as needed for mild pain       ascorbic acid (VITAMIN C) 500 MG tablet Take 500 mg by mouth daily (with lunch) Pt takes in afternoon       aspirin (ASA) 81 MG tablet Take 1 tablet (81 mg) by mouth daily 90 tablet 3     cholecalciferol (VITAMIN D3) 1000 UNIT tablet Take 1,000 Units by mouth 2 times daily       isosorbide mononitrate (IMDUR) 30 MG 24 hr tablet TAKE ONE TABLET BY MOUTH EVERY DAY 90 tablet 1     levothyroxine (SYNTHROID/LEVOTHROID) 75 MCG tablet TAKE ONE TABLET BY MOUTH EVERY MORNING (BEFORE BREAKFAST) 90 tablet 0     liothyronine (CYTOMEL) 5 MCG tablet Take 2 tablets (10 mcg) by mouth daily (Patient taking differently: Take 5 mcg by mouth daily ) 180 tablet 0     metoprolol tartrate (LOPRESSOR) 25 MG tablet TAKE ONE-HALF TABLET (12.5MG) BY MOUTH TWO TIMES A DAY 90 tablet 1     order for DME Equipment being ordered: Rack[employer]ivity walker 1 Device 0     order for DME Equipment being ordered: CPAP       polyethylene glycol (MIRALAX/GLYCOLAX) packet Take 1 packet by mouth 4 times daily as needed for constipation       sertraline (ZOLOFT) 50 MG tablet Take 1 tablet (50 mg) by mouth daily 90 tablet 0     simvastatin (ZOCOR) 10 MG tablet Take 1 tablet (10 mg) by mouth At Bedtime 90 tablet 4     OTC products: None, except as noted above    Allergies   Allergen Reactions     Flu Virus Vaccine Other (See Comments)     Viral SX     Gluten Meal      Milk Protein Extract       Latex Allergy: NO    Social History     Tobacco Use     Smoking status: Former Smoker     Last attempt to quit: 1984     Years since quittin.2     Smokeless tobacco: Never Used   Substance Use Topics     Alcohol use: Yes     Alcohol/week: 0.0 standard drinks     Comment: social drinker     History   Drug Use No       REVIEW OF SYSTEMS:   CONSTITUTIONAL: NEGATIVE  "for fever, chills, change in weight  INTEGUMENTARY/SKIN: NEGATIVE for worrisome rashes, moles or lesions  EYES: NEGATIVE for vision changes or irritation  ENT/MOUTH: NEGATIVE for ear, mouth and throat problems  RESP: NEGATIVE for significant cough or SOB  BREAST: NEGATIVE for masses, tenderness or discharge  CV: NEGATIVE for chest pain, palpitations or peripheral edema  GI: NEGATIVE for nausea, abdominal pain, heartburn, or change in bowel habits  : NEGATIVE for frequency, dysuria, or hematuria  MUSCULOSKELETAL: NEGATIVE for significant arthralgias or myalgia  NEURO: NEGATIVE for weakness, dizziness or paresthesias  ENDOCRINE: NEGATIVE for temperature intolerance, skin/hair changes  HEME: NEGATIVE for bleeding problems  PSYCHIATRIC: NEGATIVE for changes in mood or affect    EXAM:   /70   Pulse 85   Temp 99.3  F (37.4  C) (Oral)   Resp 12   Ht 1.543 m (5' 0.75\")   Wt 77.1 kg (170 lb)   SpO2 94%   Breastfeeding No   BMI 32.39 kg/m      GENERAL APPEARANCE: healthy, alert and no distress      HENT: ear canals and TM's normal and nose and mouth without ulcers or lesions     NECK: no adenopathy, no asymmetry, masses, or scars and thyroid normal to palpation     RESP: lungs clear to auscultation - no rales, rhonchi or wheezes     CV: regular rates and rhythm, normal S1 S2, no S3 or S4 and no murmur, click or rub     ABDOMEN:  soft, nontender, no HSM or masses and bowel sounds normal     MS: extremities normal- no gross deformities noted, no evidence of inflammation in joints, FROM in all extremities.     SKIN: no suspicious lesions or rashes     NEURO: Normal strength and tone, sensory exam grossly normal, mentation intact and speech normal     PSYCH: mentation appears normal. and affect normal/bright      DIAGNOSTICS:   EKG: appears normal, NSR, normal axis, normal intervals, no acute ST/T changes c/w ischemia, no LVH by voltage criteria      Recent Labs   Lab Test 07/24/19 07/19/19  0803  07/09/19  1455 " 01/21/19  0745  03/27/15  1156   HGB 9.5* 10.2*   < > 13.1  --    < > 13.5   PLT  --   --   --  214 238   < > 174   INR  --   --   --   --   --   --  0.95     --   --  138  --    < > 146*   POTASSIUM 4.1  --   --  4.1  --    < > 4.2   CR 0.67  --   --  0.73 0.82   < > 0.72    < > = values in this interval not displayed.        IMPRESSION:   Reason for surgery/procedure: right hip surgery  Diagnosis/reason for consult: risk assessment    The proposed surgical procedure is considered INTERMEDIATE risk.    REVISED CARDIAC RISK INDEX  The patient has the following serious cardiovascular risks for perioperative complications such as (MI, PE, VFib and 3  AV Block):  Coronary Artery Disease (MI, positive stress test, angina, Qs on EKG)  INTERPRETATION: 1 risks: Class II (low risk - 0.9% complication rate)    The patient has the following additional risks for perioperative complications:  No identified additional risks    No diagnosis found.    RECOMMENDATIONS:         --Patient is to take all scheduled medications on the day of surgery EXCEPT for modifications listed below.    APPROVAL GIVEN to proceed with proposed procedure, without further diagnostic evaluation       Signed Electronically by: Rebekah Rausch MD    Copy of this evaluation report is provided to requesting physician.    [facility]    Revised Cardiac Risk Index

## 2020-03-13 NOTE — TELEPHONE ENCOUNTER
Talked with [employer], her PA needs to be submitted through [employer] ([phone]) first for a procedure approval and then through [employer] (provider relations [phone]) for inpatient stay after procedure is approved.  The current request for [employer] will be withdrawn as it was going to  on Monday.  New PA will need to be submitted to [employer] first.  Carrie Harvey RN

## 2020-03-17 NOTE — TELEPHONE ENCOUNTER
"Requested Prescriptions   Pending Prescriptions Disp Refills     metoprolol tartrate (LOPRESSOR) 25 MG tablet [Pharmacy Med Name: METOPROLOL TARTRATE 25MG TABS] 90 tablet 1     Sig: TAKE ONE-HALF TABLET BY MOUTH TWICE A DAY   Last Written Prescription Date:  6/19/19  Last Fill Quantity: 90,  # refills: 1   Last office visit: 3/13/2020 with prescribing provider:     Future Office Visit:   Next 5 appointments (look out 90 days)    Apr 22, 2020 10:30 AM CDT  Nurse Only with MG NURSE ONLY ORTHO  [facility] ([facility]) [address] [place] [id number]  [phone]   May 26, 2020 10:30 AM CDT  Return Visit with Edward Schmid MD  [facility]) [address] [place] [id number]  [phone]             Beta-Blockers Protocol Passed - 3/17/2020  9:26 AM        Passed - Blood pressure under 140/90 in past 12 months     BP Readings from Last 3 Encounters:   03/13/20 120/70   03/03/20 136/67   02/14/20 (!) 142/69                 Passed - Patient is age 6 or older        Passed - Recent (12 mo) or future (30 days) visit within the authorizing provider's specialty     Patient has had an office visit with the authorizing provider or a provider within the authorizing providers department within the previous 12 mos or has a future within next 30 days. See \"Patient Info\" tab in inbasket, or \"Choose Columns\" in Meds & Orders section of the refill encounter.              Passed - Medication is active on med list           isosorbide mononitrate (IMDUR) 30 MG 24 hr tablet [Pharmacy Med Name: ISOSORBIDE MONONITRATE ER 30 TB24] 90 tablet 1     Sig: TAKE ONE TABLET BY MOUTH EVERY DAY   Last Written Prescription Date:  9/27/19  Last Fill Quantity: 90,  # refills: 1   Last office visit: 3/13/2020 with prescribing provider:     Future Office Visit:   Next 5 appointments (look out 90 days)    Apr 22, 2020 10:30 AM CDT  Nurse Only " "with MG NURSE ONLY ORTHO  [facility] ([facility]) [address] [address] [place] [id number] [phone]   May 26, 2020 10:30 AM CDT  Return Visit with Edward Schmid MD  [facility] ([facility]) [address] [place] [id number] [phone]             Nitrates Passed - 3/17/2020  9:26 AM        Passed - Blood pressure under 140/90 in past 12 months     BP Readings from Last 3 Encounters:   03/13/20 120/70   03/03/20 136/67   02/14/20 (!) 142/69                 Passed - Pt is not on erectile dysfunction medications        Passed - Recent (12 mo) or future (30 days) visit within the authorizing provider's specialty     Patient has had an office visit with the authorizing provider or a provider within the authorizing providers department within the previous 12 mos or has a future within next 30 days. See \"Patient Info\" tab in inbasket, or \"Choose Columns\" in Meds & Orders section of the refill encounter.              Passed - Medication is active on med list        Passed - Patient is age 18 or older           simvastatin (ZOCOR) 10 MG tablet [Pharmacy Med Name: SIMVASTATIN 10MG TABS] 90 tablet 4     Sig: TAKE ONE TABLET BY MOUTH AT BEDTIME   Last Written Prescription Date:  3/11/19  Last Fill Quantity: 90,  # refills: 4   Last office visit: 3/13/2020 with prescribing provider:     Future Office Visit:   Next 5 appointments (look out 90 days)    Apr 22, 2020 10:30 AM CDT  Nurse Only with MG NURSE ONLY ORTHO  [facility] ([facility]) [address] [address] [place] [phone]  [phone]   May 26, 2020 10:30 AM CDT  Return Visit with Edward Schmid MD  [facility] ([facility]) [address] [place] [phone]  [phone]             Statins Protocol Failed - 3/17/2020  9:26 AM        Failed - LDL on file in past 12 months     " "Recent Labs   Lab Test 01/04/19  1045   LDL 88             Passed - No abnormal creatine kinase in past 12 months     No lab results found.             Passed - Recent (12 mo) or future (30 days) visit within the authorizing provider's specialty     Patient has had an office visit with the authorizing provider or a provider within the authorizing providers department within the previous 12 mos or has a future within next 30 days. See \"Patient Info\" tab in inbasket, or \"Choose Columns\" in Meds & Orders section of the refill encounter.              Passed - Medication is active on med list        Passed - Patient is age 18 or older        Passed - No active pregnancy on record        Passed - No positive pregnancy test in past 12 months           Azar Mosher , [facility]  03/17/20 1:21 PM     "

## 2020-03-17 NOTE — TELEPHONE ENCOUNTER
PA submitted online to [employer] for surgery on 4-8-20. Pending authorization    Brook Schrader, Surgery Scheduling Coordinator

## 2020-03-19 NOTE — TELEPHONE ENCOUNTER
Routing refill request to provider for review/approval because:  A break in medication-metoprolol reordered on 6/19/19 for a 90 day supply with one refill.     Last office visit was 3/13/20 with primary care provider for a pre op.     Prescription approved per [facility] [place].  Isosorbide     Medication refused, simvastatin was refilled on 3/11/19 for a 90 day supply with 4 refills.

## 2020-03-19 NOTE — TELEPHONE ENCOUNTER
Routing refill request to provider for review/approval because:  Labs out of range:  TSH    Last refilled on 1/3/20  Last office visit was a pre op with primary care provider on 3/13/20

## 2020-03-20 NOTE — TELEPHONE ENCOUNTER
Pending Prescriptions:                       Disp   Refills    simvastatin (ZOCOR) 10 MG tablet          90 tab*4            Sig: Take 1 tablet (10 mg) by mouth At Bedtime    Last filled 20 but original written date was  on  so prescription is     Thank you,  Elisa Price [facility]  [phone]

## 2020-03-31 NOTE — TELEPHONE ENCOUNTER
Patient states she was told to call PCP from the nurse at her surgeons office to request Gabapentin because her surgeon has been cancelled/postponed due to COVID-19 and she is not sleeping and in a great deal of pain.  Please advise.

## 2020-03-31 NOTE — TELEPHONE ENCOUNTER
Prescribed gabapentin to patient.  This medication could make her drowsy, but does help with nerve pain.

## 2020-04-03 NOTE — TELEPHONE ENCOUNTER
Per Annabelle Leon PA-C : we can try MDP. Should also follow up with ortho as this pain may be coming from her hips.    Discussed above recommendation with patient. Patient stated she called her Ortho office and they discussed gabapentin Rx and they would not give her pain meds before surgery. She has a call out to her PCP for that. Patient is currently on gabapentin and reports not much relief with it.Patient would like to try medrol dose pack. Rx sent to her pharmacy. She will call if any further questions and continue close follow-up with her Ortho office for continued pain as well.

## 2020-04-03 NOTE — TELEPHONE ENCOUNTER
Patient calling with increased back pain.  Patient was due for surgery but it was cancelled  Please advise  Ok to call and bri [phone]

## 2020-04-03 NOTE — TELEPHONE ENCOUNTER
Spoke to patient. She is s/p L3-4 TLIF with Dr Jansen 7/17/2019. Patient calls today reporting low back pain and bilateral leg pain > in right leg, righ t hip pain.  Patient was scheduled 4/8/20 for Total right hip arthoplasty but was postponed surrounding COVID-19. Patient states her pain has increased over the last 3-4 weeks. She states ice helps. She is taking OTC tylenol without much relief and is wondering about getting something stronger for pain. Deferred patient to contact her PCP as we do not manage medications. She verbalized understanding. Patient denies any tingling or numbness to LE , does states her legs feels weaker, denies falls or foot drop. She is using a walker for ambulating. No bowel or bladder incontinence reported. Will discuss recommendations with care team. She is scheduled for her one year post op visit in July.

## 2020-04-03 NOTE — TELEPHONE ENCOUNTER
Call to patient. States she has hip, leg and back pain. States she thinks she is compensating for the hip pain and causing pain in other areas. Patient was given Gabapentin 3/31 but states this did not help so she stopped taking it. She was also given Medrol dose pack today but says she has tried that before and it doesn't do anything. Telephone visit scheduled.

## 2020-04-07 NOTE — PROGRESS NOTES
"Subjective      [phone] (H) to contact pt      Elizabeth Li is a 84 year old female who is being evaluated via a billable telephone visit.      The patient has been notified of following:     \"This telephone visit will be conducted via a call between you and your physician/provider. We have found that certain health care needs can be provided without the need for a physical exam.  This service lets us provide the care you need with a short phone conversation.  If a prescription is necessary we can send it directly to your pharmacy.  If lab work is needed we can place an order for that and you can then stop by our lab to have the test done at a later time.    If during the course of the call the physician/provider feels a telephone visit is not appropriate, you will not be charged for this service.\"     Patient has given verbal consent for Telephone visit?  Yes Radha Pal CMA      Elizabeth Li complains of   Chief Complaint   Patient presents with     Telephone     back pain       ALLERGIES  Flu virus vaccine; Gluten meal; and Milk protein extract    Back pain,it's affecting everything for her. At nightime it's worse and radiates down legs. She's not sleeping much at night and having to sleep in recliner. Pt is crying to due to pain currently. Unable to shower or go to bathroom without help. Been getting worse since last OV 3/13/20. Been rotating between heat & ice.  Last dose of prednisone is tomorrow, hasn't noticed much relief with this.  She has not had any weakness.  No bowel or bladder issues.   She has tried tylenol and ibuprofen without relief.  Gabapentin did not help her.   Steroid only gave some relief.  She had called spine doctor and she was prescribed steroid.     PHQ 8/13/2019 2/14/2020 4/7/2020   PHQ-9 Total Score 4 20 8   Q9: Thoughts of better off dead/self-harm past 2 weeks Not at all Not at all Not at all     Hypothyroidism.  The patient is taking 5mcg daily.  The patient " reports that since changing this dose, her mood has improved.     Patient Active Problem List   Diagnosis     Advanced directives, counseling/discussion     Hypothyroidism     Hyperlipidemia LDL goal <100     KUSUM (obstructive sleep apnea)     IHD (ischemic heart disease)     Mild major depression (H)     Abnormal cardiovascular stress test     Anxiety     Status post coronary angiogram     CAD (coronary artery disease)     Benign essential hypertension     Obesity (BMI 35.0-39.9) with comorbidity (H)     Pneumonia     S/P lumbar fusion     Acute bilateral low back pain with bilateral sciatica     Right hip pain     Past Surgical History:   Procedure Laterality Date     CARDIAC SURGERY      2 heart stents     COLONOSCOPY       COLONOSCOPY N/A 2017    Procedure: COLONOSCOPY;  Surgeon: Nghia Moss MD;  Location:  GI     HEAD & NECK SURGERY      wisdom teeth removed     HEART CATH, ANGIOPLASTY  3/27/15    2.5 X 18 mm & 3.0 X mm LUCILA to LAD     OPTICAL TRACKING SYSTEM FUSION POSTERIOR SPINE LUMBAR N/A 2019    Procedure: L3-L4 TRANSFORAMINAL LUMBAR  INTRABODY FUSION WITH ALLOGRAFT, OPTICAL TRACKING SYSTEM AND MEDTRONIC SOLARA INSTRUMENTATION;  Surgeon: Cole Jansen MD;  Location: [facility]     TONSILLECTOMY      T&A as a child     TUBAL LIGATION         Social History     Tobacco Use     Smoking status: Former Smoker     Last attempt to quit: 1984     Years since quittin.2     Smokeless tobacco: Never Used   Substance Use Topics     Alcohol use: Yes     Alcohol/week: 0.0 standard drinks     Comment: social drinker     Family History   Problem Relation Age of Onset     Blood Disease Mother         pernious anemia     Heart Disease Father      Heart Disease Sister      Heart Disease Brother      Colon Cancer No family hx of            Reviewed and updated as needed this visit by Provider         Review of Systems   ROS COMP: CONSTITUTIONAL: NEGATIVE for fever, chills, change in  weight  ENT/MOUTH: NEGATIVE for ear, mouth and throat problems  RESP: NEGATIVE for significant cough or SOB  CV: NEGATIVE for chest pain, palpitations or peripheral edema       Objective   Reported vitals:  There were no vitals taken for this visit.   healthy, alert and no distress  Psych: Alert and oriented times 3; coherent speech, normal   rate and volume, able to articulate logical thoughts, able   to abstract reason, no tangential thoughts, no hallucinations   or delusions  Her affect is affect     Diagnostic Test Results:  Labs reviewed in Epic        Assessment/Plan:    (M54.42,  M54.41) Acute bilateral low back pain with bilateral sciatica  (primary encounter diagnosis)  Comment:   Plan: tramadol daily as needed  tylenol    (F32.0) Mild major depression (H)  Comment: improved  Plan: continue on cytomel since this improved her mood when her thyroid labs were low and medication was increased    (E03.9) Hypothyroidism, unspecified type  Comment:   Plan: liothyronine (CYTOMEL) 5 MCG tablet            Phone call duration:  12 minutes      Rebekah Rausch MD

## 2020-04-09 NOTE — TELEPHONE ENCOUNTER
Patient calls to report traMADol is not helping her at all with the pain. Please call back to advise [phone] (home)

## 2020-04-10 NOTE — TELEPHONE ENCOUNTER
Ordered in 4/7/20 telephone encounter. Please advise if another telephone encounter should be done.

## 2020-04-10 NOTE — TELEPHONE ENCOUNTER
The patient would like to try a different pain medication since tramadol does not help.    The patient had had injections of spine and hip in the past without relief.       Will try oxycodone  Patient knows not to take this with the tramadol

## 2020-04-14 NOTE — TELEPHONE ENCOUNTER
Pt calls on a 3 way call with her BC Nurse , Sandy. ([phone]).     Pt states the Oxycodone is not working for her pain. She increased it on her own. She states she was taking one pill every 4 hours in the hospital so she started taking it at home. But it isn't helping.   The Tramadol didn't help either.     She states she took Norco last April and this is the only pain pill that worked.   She would like to get the Norco again.     She needs to wean off of the Oxycodone but doesn't have a lot of pills left. She just wants to go back to once a day, then stop.     Pt started crying over the phone bc she is in pain. She is upset that her surgery was postponed. But understands why.     OK to call pt back, Sandy doesn't need call back.

## 2020-04-16 PROBLEM — M54.41 ACUTE BILATERAL LOW BACK PAIN WITH BILATERAL SCIATICA: Status: RESOLVED | Noted: 2019-01-01 | Resolved: 2020-01-01

## 2020-04-16 PROBLEM — Z98.1 S/P LUMBAR FUSION: Status: RESOLVED | Noted: 2019-01-01 | Resolved: 2020-01-01

## 2020-04-16 PROBLEM — M54.42 ACUTE BILATERAL LOW BACK PAIN WITH BILATERAL SCIATICA: Status: RESOLVED | Noted: 2019-01-01 | Resolved: 2020-01-01

## 2020-04-16 NOTE — PROGRESS NOTES
DISCHARGE REPORT    Elizabeth did not return for further treatment after the last visit on 2/11/20.   Current status is unknown.  Please see information below for last known relevant information.  Patient seen for 17 visits.    SUBJECTIVE  Subjective changes noted by patient:  Notes that she did get an appointment with Dr Schmid, hip specialist, on 3/3/20.  Pain posterolateral hip and down the thigh.  Low Back with intermittent pain, more mild than hip.  Will plan to follow up in PT after and as instructed--following her visit with hip surgeon/specialist.   .  Current pain level is (hip/knee pain rated at 10/10.  LBP: 3/10).     Previous pain level was  10/10.   Changes in function: (See Goal flowsheet attached for changes in current functional level)  Adverse reaction to treatment or activity: None    OBJECTIVE  Changes noted in objective findings:   Hip continues to be in flexed position (contraction) with pain attempting to extend toward neutral.  Long axis distraction sometimes relieving.  Able on stairs up/down 10 stairs one step at a time with 2 rails.       ASSESSMENT/PLAN  Diagnosis: s/p L3-4 fusion   Updated problem list and treatment plan:  Patient will continue to utilize HEP for any remaining deficits.   STG/LTGs have been met or progress has been made towards goals:  Please see goal flowsheet for most current information  Assessment of Progress: current status is unknown.    Last current status: Pt is progressing slower than anticipated   Self Management Plans:  HEP  I have re-evaluated this patient and find that the nature, scope, duration and intensity of the therapy is appropriate for the medical condition of the patient.  Elizabeth continues to require the following intervention to meet STG and LTG's:  HEP.    Recommendations:  Discharge with current home program.  Patient to follow up with MD as needed.  Did have consultation scheduled with Dr Schmid.  Will defer regarding further PT, until she is  further instructed regarding plan of care by Dr Schmid.     Please refer to the daily flowsheet for treatment today, total treatment time and time spent performing 1:1 timed codes.

## 2020-04-17 NOTE — TELEPHONE ENCOUNTER
[facility] Call Center    Phone Message    May a detailed message be left on voicemail: yes     Reason for Call: Patient called stating that she is in severe pain from her hip. Patient said her hip surgery got cancelled and wants to know what she should do. Patient said she can't hold paper and said she is useless. She can barley walk to the bathroom, hasn't slept in her bed, and has a very dry mouth that she can't use her c-pap machine. Patient is wondering if she is able to get some pain killers to help until her surgery.  Please advise. Thank you.    Action Taken: Message routed to:  Adult Clinics: Orthopedics p [id number]    Travel Screening: Not Applicable

## 2020-04-17 NOTE — TELEPHONE ENCOUNTER
Per Dr. Schmid, have patient use a walker and there would be no other pain medications we could prescribe.   Called patient back and let her know the above. She expressed generalized weakness, seeing red spots, a slight cough and possibly a hard time with speech. She is tearful on the phone and worried that something else is going on. We will send this to have Dr. Ramos team reach out as she has not heard back from them from her conversation on Monday.  Carrie Harvey RN

## 2020-04-17 NOTE — TELEPHONE ENCOUNTER
Called and talked with the patient, she relays that her pain is making it hard for her to get around, she is unable to do any walking and is having a hard time sleeping. She hasb currently been on Tramadol and taking Oxycodone and his has not been helping. Has tried OTC meds. Wondering if Dr. Schmid will prescribe any other pain medications for her.  Will send to Dr. Schmid and call patient back.  Carrie Harvey RN

## 2020-04-17 NOTE — TELEPHONE ENCOUNTER
"Call to patient, Patient states that nurse had her write down all her symptoms as they think it may not all be related to hip pain, patient began to read off list of symptoms:   Patient is not able to sleep/shower/get to the bathroom due to pain and weakness  Weakness has been present for a long time, months but gradually getting worse everyday. Patient states today she dropped all her pills due to pain in her fingers and generalized body pain. Patient states she has had uncontrolled jerking movements that started about 1-2 weeks ago.   Patient states she is seeing red strings and circles that cross her vision, patient states this is not new as well but occurring intermittently, no pain is associated with this.   Patient is unable to use CPAP machine due to not being able to sleep in her bed because of pain, there for her mouth has been extremely dry. Patient states she is still drinking lots of fluids to stay hydrated.    Patient states that her urine has an odor but thinks this is due to the fact that she has not been able to make it to the bathroom and will saturate her pad. No pain or burning with urination.   Patient has a \"small cough\" not productive- has had for a while along with her sinus issues   Patient states that she has had a lose of taste that started a few days ago and isn't eating much.   Patient states her pain is generalized, all over. She is having a hard time thinking clearly which has been present for a while and more progressive.   Patient states that she is not taking any pain medications because they do not work for her.   She has had some nausea and constipation.   States that she has had shortness of breath her entire life worsening with weakness.     No fever 98.6    Huddle with provider, will schedule patient for phone visit next week.     Next 5 appointments (look out 90 days)    Apr 20, 2020  9:00 AM CDT  Telephone Visit with Rebekah Rausch MD  [facility] " "([facility]) [address]  [facility] [address] [phone]            Additional Information    Negative: Severe difficulty breathing (e.g., struggling for each breath, speaks in single words)    Negative: Shock suspected (e.g., cold/pale/clammy skin, too weak to stand, low BP, rapid pulse)    Negative: Difficult to awaken or acting confused (e.g., disoriented, slurred speech)    Negative: Fainted > 15 minutes ago and still feels too weak or dizzy to stand    Negative: SEVERE weakness (i.e., unable to walk or barely able to walk, requires support) and new onset or worsening    Negative: Sounds like a life-threatening emergency to the triager    Negative: Weakness of the face, arm or leg on one side of the body    Negative: Has diabetes and weakness from low blood sugar (i.e., < 60 mg/dl or 3.5 mmol/l)    Negative: Recent heat exposure, suspected cause of weakness    Negative: Vomiting is the main symptom    Negative: Diarrhea is the main symptom    Negative: Difficulty breathing    Negative: Heart beating < 50 beats per minute OR > 140 beats per minute    Negative: Extra heartbeats OR irregular heart beating (i.e., 'palpitations')    Negative: Follows bleeding (e.g., from vomiting, rectum, vagina)    Negative: Bloody, black, or tarry bowel movements    Negative: MODERATE weakness from poor fluid intake with no improvement after 2 hours of rest and fluids    Negative: Drinking very little and dehydration suspected (e.g., no urine > 12 hours, very dry mouth, very lightheaded)    Negative: Patient sounds very sick or weak to the triager    MODERATE weakness (i.e., interferes with work, school, normal activities) and cause unknown    Answer Assessment - Initial Assessment Questions  1. DESCRIPTION: \"Describe how you are feeling.\"      Weak/ not able to do anything, go to the bathroom, walk, sleep or shower  2. SEVERITY: \"How bad is it?\"  \"Can you stand and walk?\"    - MILD - Feels weak " "or tired, but does not interfere with work, school or normal activities    - MODERATE - Able to stand and walk; weakness interferes with work, school, or normal activities    - SEVERE - Unable to stand or walk      Moderate to severe  3. ONSET:  \"When did the weakness begin?\"      Patient states this has been present for a while but progressively worsened lately   4. CAUSE: \"What do you think is causing the weakness?\"      Pain   5. MEDICINES: \"Have you recently started a new medicine or had a change in the amount of a medicine?\"      Pain medication   6. OTHER SYMPTOMS: \"Do you have any other symptoms?\" (e.g., chest pain, fever, cough, SOB, vomiting, diarrhea, bleeding, other areas of pain)      Pain, shortness of breath, cough, constipation (chronic)  7. PREGNANCY: \"Is there any chance you are pregnant?\" \"When was your last menstrual period?\"      n/a    Protocols used: WEAKNESS (GENERALIZED) AND FATIGUE-A-OH      "

## 2020-04-17 NOTE — TELEPHONE ENCOUNTER
Talked to patient via triage, should would like to switch to taking 2 narco daily, this is the dose that she said she was on before. Please sign order if appropriate.

## 2020-04-18 PROBLEM — E83.52 HYPERCALCEMIA: Status: ACTIVE | Noted: 2020-01-01

## 2020-04-18 NOTE — ED TRIAGE NOTES
Patient comes in for evaluation of increased weakness, pain, and cough for the last month, also notes episodes of confusion and vision changes over the last couple of weeks. Has urinary changes, foul smelling urine. Notes changes in taste, chest pain with cough. ABCs intact.

## 2020-04-18 NOTE — ED NOTES
[facility]  ED Nurse Handoff Report    Elizabeth Li is a 84 year old female   ED Chief complaint: Generalized Weakness; Fatigue; and Generalized Body Aches  . ED Diagnosis:   Final diagnoses:   Generalized muscle weakness   Myalgias   Hypercalcemia   Urinary tract infection without hematuria, site unspecified   Acute kidney injury (H)   Hallucinations     Allergies:   Allergies   Allergen Reactions     Flu Virus Vaccine Other (See Comments)     Viral SX     Gluten Meal      Milk Protein Extract        Code Status: not on file  Activity level - Baseline/Home:  Independent. - walks with walker,  has been helping her get around at home Activity Level - Current:   Assist X 2. Lift room needed: No. Bariatric: No   Needed: No   Isolation: No. Infection: MD states not concerned for covid  Vital Signs:   Vitals:    04/17/20 2150 04/17/20 2315 04/17/20 2330   BP: (!) 184/139 (!) 179/77 (!) 172/76   Pulse: 83 76 76   Resp: 16     Temp: 98.8  F (37.1  C)     TempSrc: Temporal     SpO2: 96% 94% 95%       Cardiac Rhythm:  ,      Pain level: 0-10 Pain Scale: 5  Patient confused: No -mildly forgetful. Patient Falls Risk: Yes.   Elimination Status: Has voided   Patient Report - Initial Complaint: Many. Focused Assessment: States she has not been feeling herself for the past month.  Pain all over, fine motor skills not working as well, forgetful.  Also states she has had odorous urine.  Aware that she is seeing things that are not there such as bugs on the wall.  Pt very weak in ED.    Tests Performed:   Labs Ordered and Resulted from Time of ED Arrival Up to the Time of Departure from the ED   ROUTINE UA WITH MICROSCOPIC - Abnormal; Notable for the following components:       Result Value    Protein Albumin Urine 30 (*)     Nitrite Urine Positive (*)     Leukocyte Esterase Urine Large (*)     WBC Urine >182 (*)     RBC Urine 8 (*)     WBC Clumps Present (*)     Bacteria Urine Many (*)     Yeast  Urine Few (*)     Squamous Epithelial /HPF Urine 3 (*)     All other components within normal limits   CBC WITH PLATELETS DIFFERENTIAL - Abnormal; Notable for the following components:    WBC 11.4 (*)     Platelet Count 129 (*)     Absolute Neutrophil 9.0 (*)     All other components within normal limits   COMPREHENSIVE METABOLIC PANEL - Abnormal; Notable for the following components:    Glucose 115 (*)     Creatinine 1.21 (*)     GFR Estimate 41 (*)     GFR Estimate If Black 48 (*)     Calcium 15.0 (*)     AST 98 (*)     All other components within normal limits   CALCIUM IONIZED WHOLE BLOOD - Abnormal; Notable for the following components:    Calcium Ionized Whole Blood 7.6 (*)     All other components within normal limits   TROPONIN I   CK TOTAL   NT PROBNP INPATIENT   PARATHYROID HORMONE INTACT   LIPASE   PERIPHERAL IV CATHETER   URINE CULTURE AEROBIC BACTERIAL     No orders to display       Treatments provided: See MAR  Family Comments: updated her  Oswaldo on the phone  OBS brochure/video discussed/provided to patient:  N/A  ED Medications:   Medications   lidocaine 1 % 0.1-1 mL (has no administration in time range)   lidocaine (LMX4) cream (has no administration in time range)   sodium chloride (PF) 0.9% PF flush 3 mL (has no administration in time range)   sodium chloride (PF) 0.9% PF flush 3 mL (has no administration in time range)   0.9% sodium chloride BOLUS (2,000 mLs Intravenous New Bag 4/18/20 0015)   cephALEXin (KEFLEX) capsule 500 mg (500 mg Oral Given 4/17/20 4306)     Drips infusing:  No  For the majority of the shift, the patient's behavior Green. Interventions performed were rounding.    Sepsis treatment initiated: No       ED Nurse Name/Phone Number: Angelique Haji RN,   12:16 AM  RECEIVING UNIT ED HANDOFF REVIEW    Above ED Nurse Handoff Report was reviewed: Yes  Reviewed by: Litzy Monk RN on April 18, 2020 at 12:38 AM

## 2020-04-18 NOTE — PHARMACY-ADMISSION MEDICATION HISTORY
Admission medication history interview status for this patient is complete. See [facility] admission navigator for allergy information, prior to admission medications and immunization status.     Medication history interview source(s):Patient  Medication history resources (including written lists, pill bottles, clinic record): Epic list, chart review, fill history  [facility]    Changes made to PTA medication list:  Added: none  Deleted: Tramadol and oxycodone- pt stated are not working per clinic notes-new Rx for Norco on 4/17.   Changed: Acet to bid prn, Medrol dosepak completed, miralax from qid prn to hs prn.    Actions taken by pharmacist (provider contacted, etc):None     Additional medication history information:None    Medication reconciliation/reorder completed by provider prior to medication history? No      Prior to Admission medications    Medication Sig Last Dose Taking? Auth Provider   acetaminophen (TYLENOL) 500 MG tablet Take 1,000 mg by mouth 2 times daily as needed for mild pain  4/17/2020 at Unknown time Yes Reported, Patient   ascorbic acid (VITAMIN C) 500 MG tablet Take 500 mg by mouth daily (with lunch) Pt takes in afternoon 4/17/2020 at Unknown time Yes Reported, Patient   aspirin (ASA) 81 MG tablet Take 1 tablet (81 mg) by mouth daily 4/17/2020 at Unknown time Yes Alfredo Worthy PA-C   cholecalciferol (VITAMIN D3) 1000 UNIT tablet Take 1,000 Units by mouth 2 times daily 4/17/2020 at Unknown time Yes Reported, Patient   famotidine (PEPCID) 20 MG tablet Take 1 tablet (20 mg) by mouth 2 times daily unknown Yes Rebekah Rausch MD   HYDROcodone-acetaminophen (NORCO) 5-325 MG tablet Take 1 tablet by mouth 2 times daily as needed for severe pain new 4/17/20 Yes Rebekah Rausch MD   isosorbide mononitrate (IMDUR) 30 MG 24 hr tablet TAKE ONE TABLET BY MOUTH EVERY DAY 4/17/2020 at Unknown time Yes Rebekah Rausch MD   levothyroxine (SYNTHROID/LEVOTHROID) 75 MCG tablet TAKE  ONE TABLET BY MOUTH EVERY MORNING (BEFORE BREAKFAST) 4/17/2020 at Unknown time Yes Rebekah Rausch MD   liothyronine (CYTOMEL) 5 MCG tablet Take 1 tablet (5 mcg) by mouth daily 4/17/2020 at Unknown time Yes Rebekah Rausch MD   metoprolol tartrate (LOPRESSOR) 25 MG tablet TAKE ONE-HALF TABLET BY MOUTH TWICE A DAY 4/17/2020 at Unknown time Yes Maryam Fontenot MD   polyethylene glycol (MIRALAX/GLYCOLAX) packet Take 1 packet by mouth nightly as needed for constipation  Past Week at Unknown time Yes Reported, Patient   simvastatin (ZOCOR) 10 MG tablet Take 1 tablet (10 mg) by mouth At Bedtime 4/16/2020 at Unknown time Yes Rebekah Rausch MD

## 2020-04-18 NOTE — PLAN OF CARE
"BP (!) 187/80   Pulse 95   Temp 98.4  F (36.9  C) (Oral)   Resp 18   Ht 1.575 m (5' 2\")   Wt 77.2 kg (170 lb 4.8 oz)   SpO2 96%   BMI 31.15 kg/m      Pt admitted earlier around 1230 am due to weakness, related to hypercalcemia, UTI, who is anxious and with urinary frequency. Tylenol given for back pain. PIV infusing NS at 150 ml hr. Lungs are clear, TELE SR. PRN hydralazine given for elevated SBP. Bedside commode is available due to increased weakness, Assist x2 for transfers. Continue POC.   "

## 2020-04-18 NOTE — PLAN OF CARE
Pt is intermittently confused but is alert to self, time, place and situation. Up - Ax2 pivoting to the commode. Back pain treated with tylenol and ice. IVF  mL/hr. Tele - SR. Possible discharge 2-3 days.

## 2020-04-18 NOTE — PROGRESS NOTES
Patient seen and examined.  Chart reviewed.  Please see admission history and physical from earlier today by Dr. Antonio for details.    Plan to continue IV fluids.  Restart home medications of aspirin, famotidine, isosorbide mononitrate, levothyroxine, metoprolol, and simvastatin.

## 2020-04-18 NOTE — H&P
[facility]  Hospitalist H&P    Name: Elizabeth Li      MRN: [id number]  YOB: 1935    Age: 84 year old  Date of admission: 4/17/2020  Primary care provider: Rebekah Rausch            Assessment and Plan:   Elizabeth Li is a 84 year old female with a history of coronary artery disease status post stenting x2, hyperlipidemia, hypertension, hypothyroidism, KUSUM, anxiety, depression who presents with fatigue, weakness, and generalized myalgias.  For the past 1 month she has been suffering from increasing fatigue, weakness, and diffuse muscle pains.  She has been sleeping quite a bit in her recliner and as result has not been compliant with her CPAP.  Symptoms have progressed.  She denies any cough or shortness of breath.  No fever.  Some mild and diffuse abdominal pain.  No numbness or extremity weakness.  Due to persistent problems with these issues she comes to the emergency department for evaluation.    Here her temperature is 98.8, heart rate 83, blood pressure 184/139, respiratory 16, and oxygen saturation 96% on room air.  Labs are notable for a creatinine of 1.2, calcium 15.0, ionized calcium 7.6, AST 98, and white blood cells 11.4.  Urinalysis is grossly abnormal and indicative of UTI.  She was given IV Rocephin.  Urine culture was sent and is pending.  She was given some IV fluids as well as Keflex and will be admitted for further management and work-up of her hypercalcemia.    1. Idiopathic hypercalcemia: Does report taking vitamin D3 however no other calcium supplements.  Overall I have high suspicion for undiagnosed malignancy as the etiology of her hypercalcemia.  She does report a fairly lengthy history of smoking although quit about 30 to 40 years ago.  We will check a routine chest x-ray to evaluate for possible lung cancer.  We will also order a PTH, PTHrp, 1, 25 hydroxyvitamin D and 25-hydroxyvitamin D levels.  With regards to management we will continue normal  saline fairly vigorously at 150 cc/h.  Give 4 units/kg of calcitonin x1 now.  Recheck basic metabolic panel and ionized calcium level in the morning.  2. Mild acute kidney injury: Likely driven by volume depletion and hypercalcemia.  Continue IV fluids as noted above.  Avoid further nephrotoxic agents.  3. Urinary tract infection: Continue IV ceftriaxone and follow-up urine culture.  4. Coronary artery disease: Appears stable.  Troponin within normal range.  Resume prior to admission medications following medication reconciliation.  5. Hypertension: Blood pressure is somewhat elevated but trending down.  Resume her prior to admission medications.  6. Hypothyroidism: Resume Synthroid tomorrow and check TSH.    Code status: Full, discussed with patient.  Admit to inpatient status.  Prophylaxis: PCD's.  Disposition: 2 to 3 days.            Chief Complaint:   Weakness.         History of Present Illness:   Elizabeth Li is a 84 year old female who presents with weakness.  History was obtained from my discussion with the patient at the bedside.  I also discussed the case with the ED provider.  The electronic medical record was also reviewed.    84 year old female with a history of coronary artery disease status post stenting x2, hyperlipidemia, hypertension, hypothyroidism, KUSUM, anxiety, depression who presents with fatigue, weakness, and generalized myalgias.  For the past 1 month she has been suffering from increasing fatigue, weakness, and diffuse muscle pains.  She has been sleeping quite a bit in her recliner and as result has not been compliant with her CPAP.  Symptoms have progressed.  She denies any cough or shortness of breath.  No fever.  Some mild and diffuse abdominal pain.  No numbness or extremity weakness.  Due to persistent problems with these issues she comes to the emergency department for evaluation.    Here her temperature is 98.8, heart rate 83, blood pressure 184/139, respiratory 16, and oxygen  saturation 96% on room air.  Labs are notable for a creatinine of 1.2, calcium 15.0, ionized calcium 7.6, AST 98, and white blood cells 11.4.  Urinalysis is grossly abnormal and indicative of UTI.  She was given IV Rocephin.  Urine culture was sent and is pending.  She was given some IV fluids as well as Keflex and will be admitted for further management and work-up of her hypercalcemia.            Past Medical History:     Past Medical History:   Diagnosis Date     CAD (coronary artery disease)     Severe 2 vessel CAD. PCI with drug-eluting stents x2 to mid LAD on 3/27/15     Depression      HTN (hypertension)      Hyperlipidemia      Hypothyroidism      IHD (ischemic heart disease)      KUSUM (obstructive sleep apnea)     CPAP             Past Surgical History:     Past Surgical History:   Procedure Laterality Date     BACK SURGERY       COLONOSCOPY N/A 2017    Procedure: COLONOSCOPY;  Surgeon: Nghia Moss MD;  Location:  GI     Coronary angiogram with stents x2  2015    2.5 X 18 mm & 3.0 X mm LUCILA to LAD     OPTICAL TRACKING SYSTEM FUSION POSTERIOR SPINE LUMBAR N/A 2019    Procedure: L3-L4 TRANSFORAMINAL LUMBAR  INTRABODY FUSION WITH ALLOGRAFT, OPTICAL TRACKING SYSTEM AND MEDTRONIC SOLARA INSTRUMENTATION;  Surgeon: Cole Jansen MD;  Location: [facility] OR     TONSILLECTOMY, ADENOIDECTOMY, COMBINED       TUBAL LIGATION       Cayey teeth extraction               Social History:     Social History     Tobacco Use     Smoking status: Former Smoker     Last attempt to quit: 1984     Years since quittin.3     Smokeless tobacco: Never Used   Substance Use Topics     Alcohol use: Yes     Alcohol/week: 0.0 standard drinks     Comment: social drinker             Family History:   The family history was fully reviewed and non-contributory in this case.         Allergies:     Allergies   Allergen Reactions     Flu Virus Vaccine Other (See Comments)     Viral SX     Gluten Meal      Milk Protein  Extract              Medications:     Prior to Admission medications    Medication Sig Last Dose Taking? Auth Provider   acetaminophen (TYLENOL) 500 MG tablet Take 1,000 mg by mouth every 8 hours as needed for mild pain   Reported, Patient   ascorbic acid (VITAMIN C) 500 MG tablet Take 500 mg by mouth daily (with lunch) Pt takes in afternoon   Reported, Patient   aspirin (ASA) 81 MG tablet Take 1 tablet (81 mg) by mouth daily   Alfredo Worthy PA-C   cholecalciferol (VITAMIN D3) 1000 UNIT tablet Take 1,000 Units by mouth 2 times daily   Reported, Patient   famotidine (PEPCID) 20 MG tablet Take 1 tablet (20 mg) by mouth 2 times daily   Rebekah Rausch MD   HYDROcodone-acetaminophen (NORCO) 5-325 MG tablet Take 1 tablet by mouth 2 times daily as needed for severe pain   Rebekah Rausch MD   isosorbide mononitrate (IMDUR) 30 MG 24 hr tablet TAKE ONE TABLET BY MOUTH EVERY DAY   Rebekah Rausch MD   levothyroxine (SYNTHROID/LEVOTHROID) 75 MCG tablet TAKE ONE TABLET BY MOUTH EVERY MORNING (BEFORE BREAKFAST)   Rebekah Rausch MD   liothyronine (CYTOMEL) 5 MCG tablet Take 1 tablet (5 mcg) by mouth daily   Rebekah Rausch MD   methylPREDNISolone (MEDROL DOSEPAK) 4 MG tablet therapy pack Follow Package Directions   Annabelle Leon PA-C   metoprolol tartrate (LOPRESSOR) 25 MG tablet TAKE ONE-HALF TABLET BY MOUTH TWICE A DAY   Maryam Fontenot MD   oxyCODONE (ROXICODONE) 5 MG tablet Take 1 tablet (5 mg) by mouth daily as needed for pain   Rebekah Rausch MD   polyethylene glycol (MIRALAX/GLYCOLAX) packet Take 1 packet by mouth 4 times daily as needed for constipation   Reported, Patient   simvastatin (ZOCOR) 10 MG tablet Take 1 tablet (10 mg) by mouth At Bedtime   Rebekah Rausch MD   traMADol (ULTRAM) 50 MG tablet Take 1 tablet (50 mg) by mouth daily as needed for severe pain   Rebekah Rausch MD             Review of Systems:   A Comprehensive greater than 10 system review of systems  was carried out.  Pertinent positives and negatives are noted above.  Otherwise negative for contributory information.           Physical Exam:   Blood pressure (!) 187/80, pulse 80, temperature 98.8  F (37.1  C), temperature source Temporal, resp. rate 16, SpO2 94 %, not currently breastfeeding.  Wt Readings from Last 1 Encounters:   03/13/20 77.1 kg (170 lb)     Exam:  GENERAL: No apparent distress. Awake, alert, and fully oriented.  HEENT: Normocephalic, atraumatic. Extraocular movements intact.  CARDIOVASCULAR: Regular rate and rhythm without murmurs or rubs. No S3.  PULMONARY: Clear to auscultation bilaterally.  ABDOMINAL: Soft, non-tender, non-distended. Bowel sounds normoactive.   EXTREMITIES: No cyanosis or clubbing. No appreciable edema.  NEUROLOGICAL: CN 2-12 grossly intact, no focal neurological deficits.  DERMATOLOGICAL: No rash, ulcer, bruising, nor jaundice.          Data:   EKG:  Personally reviewed.   Sinus rhythm  Premature atrial complexes no longer present; Non specific T wave abnormality no evidence in inferior leads when compared to EKG dated 1/26/15  Rate 76 bpm. NV interval 164. QRS duration 80. QT/QTc 384/432. P-R-T axes 33 22 34.      Laboratory:  Recent Labs   Lab 04/17/20 2322   WBC 11.4*   HGB 12.7   HCT 39.0   MCV 92   *     Recent Labs   Lab 04/17/20 2322      POTASSIUM 3.8   CHLORIDE 105   CO2 25   ANIONGAP 8   *   BUN 16   CR 1.21*   GFRESTIMATED 41*   GFRESTBLACK 48*   FRANKLIN 15.0*     Recent Labs   Lab 04/17/20 2322   NTBNPI 830     Recent Labs   Lab 04/17/20 2322   CKT 71     Recent Labs   Lab 04/17/20 2322   LIPASE 27*     Recent Labs   Lab 04/17/20 2322   TROPI 0.019     Recent Labs   Lab 04/17/20 2310   COLOR Yellow   APPEARANCE Slightly Cloudy   URINEGLC Negative   URINEBILI Negative   URINEKETONE Negative   SG 1.015   UBLD Negative   URINEPH 6.5   PROTEIN 30*   NITRITE Positive*   LEUKEST Large*   RBCU 8*   WBCU >182*     No results for input(s): CULT  in the last 168 hours.    Imaging:  No results found for this or any previous visit (from the past 24 hour(s)).    Pranav Antonio DO MPH  [facility] Hospitalist  [address] [place] [address]  Pager: [phone]  04/18/2020

## 2020-04-18 NOTE — ED PROVIDER NOTES
History   Chief Complaint:  Generalized Weakness; Fatigue; and Generalized Body Aches     HPI   Elizabeth Li is a 84 year old female with history of ischemic heart disease, hyperlipidemia, hypertension, coronary artery disease, hypothyroidism, among others who presents for evaluation of generalized weakness, associated with fatigue and generalized myalgias, that began over a month ago. The patient states for about a month she has had increased fatigue, generalized weakness, and generalized myalgias and arthralgias. She notes she has had to sleep in a recliner at night as her CPAP no longer fits correctly. She also states she has had difficulty urinating and states her urine smells foul. Due to her symptoms not subsiding, she presented for evaluation.    Here, the patient states her hip surgery was cancelled due to the COVID-19 pandemic and she is frustrated over that. She denies any fever, cough, shortness of breath, nausea, emesis, diarrhea, or other symptoms prompting her presentation.     Of note, the patient endorses visual hallucinations, stating she sees spiders that are not there.     Allergies:  Flu Virus Vaccine    Medications:   Aspirin 81 mg   Pepcid  Norco  Imdur  Synthroid   Cytomel  Medrol  Lopressor  Roxicodone  Miralax  Zocor  Ultram    Past Medical History:    Coronary artery disease  Hypertension  Hypothyroidism  Ischemic heart disease  KUSUM  Hyperlipidemia  Depression  Anxiety   Obesity     Past Surgical History:    Colonoscopy x 2  [place] teeth extraction  Coronary angiogram with stents x 2  Fusion posterior spine lumbar  Tonsillectomy   Adenoidectomy   Tubal ligation     Family History:    Father - Heart disease  Mother - Pernious anemia  Sister - Heart disease  Brother - Heart disease     Social History:  The patient was unaccompanied to the ED.  Smoking Status: Former, 1984  Smokeless Tobacco: Never Used  Alcohol Use: Yes  Drug Use: No  PCP: Rebekah Rausch     Review of Systems    Constitutional: Positive for fatigue. Negative for fever.   Respiratory: Negative for cough and shortness of breath.    Gastrointestinal: Negative for diarrhea, nausea and vomiting.   Genitourinary: Positive for difficulty urinating.   Neurological: Positive for weakness.   All other systems reviewed and are negative.    Physical Exam     Patient Vitals for the past 24 hrs:   BP Temp Temp src Pulse Resp SpO2   04/18/20 0015  187/80 -- -- 80 -- 94 %   04/18/20 0000  175/78 -- -- 77 -- 92 %   04/17/20 2330  172/76 -- -- 76 -- 95 %   04/17/20 2315  179/77 -- -- 76 -- 94 %   04/17/20 2150  184/139 98.8  F (37.1  C) Temporal 83 16 96 %     Physical Exam  Nursing note and vitals reviewed.  Constitutional: Cooperative.   HENT:   Mouth/Throat: Mucous membranes are normal.   Eyes: Pupils are equal, round, and reactive to light. EOMI  Cardiovascular: Normal rate, regular rhythm and normal heart sounds.  No murmur. No edema in the legs.   Pulmonary/Chest: Effort normal and breath sounds normal. No respiratory distress. No wheezes. No rales.   Abdominal: Soft. Normal appearance and bowel sounds are normal. No distension. There is no tenderness. There is no rigidity and no guarding.   Musculoskeletal: Normal range of motion. No LE edema.  Joints are not warm or swollen.   Neurological: Alert. Cranial nerves 2-12 intact. Strength and sensation intact throughout.   Skin: Skin is warm and dry. No rash noted.   Psychiatric: Normal mood and affect.  She does endorse visual hallucinations, but has good insight.      Emergency Department Course     ECG:  ECG taken at 2344, ECG read at 2350 by Pranav Barahona MD  Sinus rhythm  Premature atrial complexes no longer present; Non specific T wave abnormality no evidence in inferior leads when compared to EKG dated 1/26/15  Rate 76 bpm. ME interval 164. QRS duration 80. QT/QTc 384/432. P-R-T axes 33 22 34.      Laboratory:  Laboratory findings were communicated with the patient and Admitting  MD who voiced understanding of the findings.    CBC: WBC 11.4 (H),  (H) o/w WNL (HGB 12.7)   CMP: Glucose 115 (H), Creatinine 1.21 (H), GFR 41 (L), Calcium 15.0 (HH), Ast 98 (H) o/w WNL  Lipase: 27 (L)      BNP: 830   Troponin (Collected ): 0.019     CK total: 71     Calcium ionized whole blood: 7.6 (HH)    Parathyroid hormone intact: Pending.    UA with Microscopic: Nitrite Positive (A), Leukocyte esterase urine Large (A), WBC/HPF >182 (H), RBC/HPF 8 (H), WBC Clumps Present (A), Bacteria Many (A), Yeast urine Few (A), Squamous epithelial/HPF urine 3 (H) o/w WNL     Urine Culture: Pending.    Interventions:   Keflex 500 mg PO  0015 0.9% NaCl bolus 2000 mL IV     Emergency Department Course:  Past medical records, nursing notes, and vitals reviewed.  EKG obtained in the ED, see results above.    IV was inserted and blood was drawn for laboratory testing, results above.   The patient provided a urine sample here in the emergency department. This was sent for laboratory testing, findings above.     (2313)   I performed an exam of the patient as documented above. History obtained from patient.     (0007)   I rechecked the patient and discussed results and plan of care.     (0010)   I spoke with Dr. Antonio of the Hospitalist service regarding patient's presentation, findings, and plan of care.     Findings and plan explained to the Patient who consents to admission. Discussed the patient with Dr. Antonio, who will admit the patient to a med/surg bed for further monitoring, evaluation, and treatment.  I personally reviewed the laboratory and imaging results with the Patient and answered all related questions prior to admission.     Impression & Plan   Medical Decision Makin yo F who presents with multiple vague but progressive symptoms as details about.  Work up shows new hypercalcemia and a nitrite positive UTI.  History consistent with stigmata of hypercalcemia.  We'll start with crystalloid hydration.   Further interventions once other labs return.  She is not in extremis nor unstable at this time.  Etiology of hypercalcemia is broad.  UTI being treated with PO antibiotics.  No signs of severe sepsis.  Urine culture sent.  Patient to be admitted in stable condition.       Diagnosis:    ICD-10-CM   1. Generalized muscle weakness  M62.81   2. Myalgias  M79.10   3. Hypercalcemia  E83.52   4. Urinary tract infection  N39.0   5. Acute kidney injury  N17.9   6. Hallucinations  R44.3     Disposition:  Admitted to a med/surg bed under the care of Dr. Antonio.     Scribe Disclosure:  I, Chava Frank, am serving as a scribe at 11:10 PM on 4/17/2020 to document services personally performed by Pranav Barahona MD based on my observations and the provider's statements to me.  April 17, 2020   [facility] EMERGENCY DEPARTMENT        Pranav Barahona MD  04/18/20 0051

## 2020-04-19 NOTE — PLAN OF CARE
Cardiac- WDL  Resp- WDL  GI- obese, BS audible and active  - WDL  Skin- redness blanchable on coccyx, barrier cream applied. Q2 turns.  Neuro- AxO x4, confused at times  Diet-combination regular  Activity- Ax2 with walker/gait belt  Labs- CR 1.09, WBC 11.4, Ca+ 6.8  VS- 140/62  Plan-     Continue with plan of care

## 2020-04-19 NOTE — PROGRESS NOTES
[facility]    Medicine Progress Note - Hospitalist Service       Date of Admission:  4/17/2020  Assessment & Plan   Elizabeth Li is a 84 year old female with a history of coronary artery disease status post stenting x2, hyperlipidemia, hypertension, hypothyroidism, KUSUM, anxiety, depression who presents with fatigue, weakness, and generalized myalgias.  For the past 1 month she has been suffering from increasing fatigue, weakness, and diffuse muscle pains.  She had been sleeping quite a bit in her recliner and as result has not been compliant with her CPAP.  She presented to the emergency room for further evaluation.  She was noted to have severe hypercalcemia and urinary tract infection.    1.  Severe hypercalcemia.  Calcium significantly improved with aggressive IV fluids and initial subcutaneous calcitonin.  Continue IV fluids at decreased rate today.  Recheck calcium tomorrow.  PTH level noted to be significantly low at time of presentation.  Concerning for underlying malignancy versus other non-primary hyperparathyroid cause.  Plan to get CT scan of the chest/abdomen/pelvis with contrast once creatinine improved.    2.  Acute kidney injury.  Continue IV fluids.  Avoid nephrotoxins as able.  Recheck metabolic panel tomorrow.    3.  Urinary tract infection.  Urine culture growing providencia.  Continue IV ceftriaxone.    4.  Hypothyroidism.  Continue levothyroxine.    5.  Hypertension.  Continue isosorbide mononitrate and metoprolol.    6.  Hyperlipidemia.  Hold simvastatin for now.    7.  Deconditioning.  Physical therapy consult.    Diet: Combination Diet Regular Diet Adult    DVT Prophylaxis: Pneumatic Compression Devices  Reynoso Catheter: not present  Code Status: Full Code      Disposition Plan   Expected discharge: 2 - 3 days, recommended to prior living arrangement     Romario Schmid DO  Hospitalist Service  [facility]  Hospital    ______________________________________________________________________    Interval History   Having some generalized body pains today.  Denies chest pain, shortness of breath, fevers, chills, nausea, vomiting, or diarrhea.    Data reviewed today: I reviewed all medications, new labs and imaging results over the last 24 hours.    Physical Exam   Vital Signs: Temp: 97.3  F (36.3  C) Temp src: Oral BP: (!) 163/60   Heart Rate: 81 Resp: 16 SpO2: 94 % O2 Device: None (Room air)    Weight: 170 lbs 1.6 oz  Gen:  NAD, A&Ox3.  Eyes:  PERRL, sclera anicteric.  OP:  MMM, no lesions.  Neck:  Supple.  CV:  Regular, no murmurs.  Lung:  CTA b/l, normal effort.  Ab:  +BS, soft.  Skin:  Warm, dry to touch.  No rash.  Ext:  No pitting edema LE b/l.      Data   Recent Labs   Lab 04/19/20  0625 04/18/20  0719 04/17/20  2322   WBC  --   --  11.4*   HGB  --   --  12.7   MCV  --   --  92   PLT  --   --  129*    140 138   POTASSIUM 3.5 3.4 3.8   CHLORIDE 113* 109 105   CO2 22 22 25   BUN 22 17 16   CR 1.59* 1.09* 1.21*   ANIONGAP 8 9 8   FRANKLIN 11.7* 13.3* 15.0*   GLC 94 150* 115*   ALBUMIN  --   --  3.5   PROTTOTAL  --   --  7.5   BILITOTAL  --   --  0.6   ALKPHOS  --   --  108   ALT  --   --  29   AST  --   --  98*   LIPASE  --   --  27*   TROPI  --   --  0.019

## 2020-04-19 NOTE — PLAN OF CARE
Confused at times and forgetful. Nutritional consult placed for very poor appetite and limited food options r/t gluten and diary intolerance and no protein. Up - Ax2 to pivot to the commode. Pt needs a lot of assistance to get up out of bed and to turn in bed mostly r/t generalized back and right hip pain. Pain has been treated with Norco and tylenol. IVF  mL/hr. Abx - rocephin for UTI. Tele - SR. Possible discharge 2-3 days.

## 2020-04-19 NOTE — PLAN OF CARE
RN [id number]-  Pt. A&Ox3, confused at times, up with assist of 2 to pivot to commode, very weak when standing up. Tele: SR. IVF infusing to right arm. Pt. On room air with sat's at 92%. Pt. C/o lower back pain ice pack provided per pt. Request and also Norco. BP elevated, scheduled metoprolol and pain medication given with improvement in BP. Coccyx reddened, barrier cream applied and pt. Repositioned in bed. Pt. Denies any needs at this time. Will continue with POC.

## 2020-04-20 NOTE — PROGRESS NOTES
[facility]    Medicine Progress Note - Hospitalist Service       Date of Admission:  4/17/2020  Assessment & Plan   Elizabeth Li is a 84 year old female with a history of coronary artery disease status post stenting x2, hyperlipidemia, hypertension, hypothyroidism, KUSUM, anxiety, depression who presents with fatigue, weakness, and generalized myalgias.  For the past 1 month she has been suffering from increasing fatigue, weakness, and diffuse muscle pains.  She had been sleeping quite a bit in her recliner and as result has not been compliant with her CPAP.  She presented to the emergency room for further evaluation.  She was noted to have severe hypercalcemia and urinary tract infection.     1.  Severe hypercalcemia.  Calcium improved with aggressive IV fluids and initial subcutaneous calcitonin.  PTH level noted to be significantly low at time of presentation.  Concerning for underlying malignancy versus other non-primary hyperparathyroid cause (Paget's?).  Plan to get CT scan of the chest/abdomen/pelvis with contrast once creatinine improved.  -Vitamin D levels and PTHrp still pending.  -Calcium is a bit higher today at 12.2.  -Continue to monitor calcium level daily.    -Increase IV fluids to 150 cc an hour.     2.  Acute kidney injury.  Creatinine stable at 1.6 today.  -Increase IV fluids.  -Check renal ultrasound.  -Avoid nephrotoxins as able.     3.  Urinary tract infection.  Urine culture growing providencia.    -Continue IV ceftriaxone.     4.  Hypothyroidism.  Continue levothyroxine.     5.  Hypertension.  Continue isosorbide mononitrate.  -Increase metoprolol to 25 mg twice a day.     6.  Hyperlipidemia.  Hold simvastatin for now.     7.  Deconditioning.  Physical therapy consult.    8.  Hypokalemia.  Potassium replacement protocol.    9.  Acute encephalopathy.  Suspect this is due to hypercalcemia.  She is having a few new neurologic symptoms on 4/20.  -Check CT scan of the  head.    Diet: Combination Diet Regular Diet Adult  Room Service    DVT Prophylaxis: Pneumatic Compression Devices  Reynoso Catheter: not present  Code Status: Full Code      Disposition Plan   Expected discharge: 2 - 3 days    Romario Schmid DO  Hospitalist Service  [facility]    ______________________________________________________________________    Interval History   Having bilateral leg pain.  Also feels more weak today.  Feels confused.  Denies chest pain, shortness of breath, fevers, chills, nausea, vomiting, or diarrhea.    Data reviewed today: I reviewed all medications, new labs and imaging results over the last 24 hours.    Physical Exam   Vital Signs: Temp: 98.8  F (37.1  C) Temp src: Oral BP: (!) 167/69   Heart Rate: 83 Resp: 16 SpO2: 91 % O2 Device: None (Room air)    Weight: 184 lbs 0 oz  Gen:  NAD, A&Ox3.  Eyes:  PERRL, sclera anicteric.  OP:  MMM, no lesions.  Neck:  Supple.  CV:  Regular, no murmurs.  Lung:  CTA b/l, normal effort.  Ab:  +BS, soft.  Skin:  Warm, dry to touch.  No rash.  Ext:  No pitting edema LE b/l.      Data   Recent Labs   Lab 04/20/20  0811 04/19/20  0625 04/18/20  0719 04/17/20  2322   WBC  --   --   --  11.4*   HGB  --   --   --  12.7   MCV  --   --   --  92   PLT  --   --   --  129*    143 140 138   POTASSIUM 3.0* 3.5 3.4 3.8   CHLORIDE 112* 113* 109 105   CO2 19* 22 22 25   BUN 26 22 17 16   CR 1.58* 1.59* 1.09* 1.21*   ANIONGAP 10 8 9 8   FRANKLIN 12.2* 11.7* 13.3* 15.0*   GLC 80 94 150* 115*   ALBUMIN  --   --   --  3.5   PROTTOTAL  --   --   --  7.5   BILITOTAL  --   --   --  0.6   ALKPHOS  --   --   --  108   ALT  --   --   --  29   AST  --   --   --  98*   LIPASE  --   --   --  27*   TROPI  --   --   --  0.019

## 2020-04-20 NOTE — CONSULTS
"CLINICAL NUTRITION SERVICES  -  ASSESSMENT NOTE      Recommendations Ordered by Registered Dietitian (RD):     Calorie counts    Room service with assist for meal ordering guidance    Current oral nutrition supplements options are not compatible with milk allergy   Future/Additional Recommendations:    Pending ability to improve oral intake over the next 1-3 days   Malnutrition: Unable to determine due to incomplete nutrition focused physical assessment per direction of department guidelines in the setting of COVID19      REASON FOR ASSESSMENT  Elizabeth Li is a 84 year old female seen by Registered Dietitian for RN consult: Poor appetite, limited food options r/t gluten and lactose intolerance.     History of coronary artery disease status post stenting x2, hyperlipidemia, hypertension, hypothyroidism, KUSMU, anxiety, depression who presents with fatigue, weakness, and generalized myalgias.  For the past 1 month she has been suffering from increasing fatigue, weakness, and diffuse muscle pains    NUTRITION HISTORY    Information obtained from chart review; unable to reach patient (lethargy also noted in chart this AM)    Food allergies/intolerances: milk protein, gluten    Unknown PO trends prior to admit    Lives with     CURRENT NUTRITION ORDERS    Diet: Regular    Supplement: none   Current Intake/Tolerance:     Factors affecting nutrition intake include:poor appetite, lethargy    Per RN note 4/19/20: \"Pt is tolerating a gluten free, lactose free, no protein diet- poor appetite. Drinking adequate amounts of fluids\"    Discussed with RN - 3 bites at best per day over the last 4 days    Patient upset when trays are sent that she did not order    NUTRITION FOCUSED PHYSICAL ASSESSMENT FOR DIAGNOSING MALNUTRITION + PHYSICAL FINDINGS  Completed and Observed:  No, deferred per direction of department guidelines in the setting of COVID19      Obtained from Chart/Interdisciplinary Team    Pedro hernandez " "score: 2; total score: 14    Last BM: 4/18    Intermittent confusion, anxious    Skin: red, blanchable and bruising    Generalized weakness    Edema: none    ANTHROPOMETRICS  Height: 5' 2\"  Weight: 83 kg - question accuracy with weight of 77 kg on 4/19  Body mass index is 33.65 kg/m .  Weight Status:  Obesity Grade I BMI 30-34.9 (question accuracy of current weight)  Weight History:  Weight has increased in last month, as noted below  Wt Readings from Last 10 Encounters:   04/20/20 83.5 kg (184 lb)   03/13/20 77.1 kg (170 lb)   03/03/20 77.6 kg (171 lb 1.6 oz)   02/14/20 80.3 kg (177 lb)   12/11/19 76.7 kg (169 lb)   10/21/19 76.7 kg (169 lb)   10/14/19 76.7 kg (169 lb)   08/26/19 75.3 kg (166 lb)   08/13/19 76.2 kg (168 lb)   08/02/19 78.5 kg (173 lb)       ASSESSED NUTRITION NEEDS (PER APPROVED PRACTICE GUIDELINES, Dosing weight: 77 kg):  Estimated Energy Needs: ~1925 kcals (~25 Kcal/Kg)  Justification: maintenance   Estimated Protein Needs: 77-92 grams protein (1-1.2 g pro/Kg)  Justification: preservation of lean body mass  Estimated Fluid Needs: >1 mL/Kcal  Justification: maintenance    LABS  Labs reviewed  Electrolytes  Potassium (mmol/L)   Date Value   04/19/2020 3.5   04/18/2020 3.4   04/17/2020 3.8    Blood Glucose  Glucose (mg/dL)   Date Value   04/19/2020 94   04/18/2020 150 (H)   04/17/2020 115 (H)   07/24/2019 90   07/09/2019 81    Inflammatory Markers  WBC (10e9/L)   Date Value   04/17/2020 11.4 (H)   07/09/2019 7.4     Albumin (g/dL)   Date Value   04/17/2020 3.5   01/04/2019 3.9      Sodium (mmol/L)   Date Value   04/19/2020 143   04/18/2020 140   04/17/2020 138    Renal  Urea Nitrogen (mg/dL)   Date Value   04/19/2020 22   04/18/2020 17   04/17/2020 16     Creatinine (mg/dL)   Date Value   04/19/2020 1.59 (H)   04/18/2020 1.09 (H)   04/17/2020 1.21 (H)     Additional  Triglycerides (mg/dL)   Date Value   01/04/2019 88   03/07/2018 144   05/16/2017 127     Ketones Urine (mg/dL)   Date Value "   04/17/2020 Negative          MEDICATIONS    Medications reviewed    aspirin  81 mg Oral Daily     cefTRIAXone  1 g Intravenous Q24H     isosorbide mononitrate  30 mg Oral Daily     levothyroxine  75 mcg Oral Daily     liothyronine  5 mcg Oral Daily     metoprolol tartrate  12.5 mg Oral BID     vitamin C  500 mg Oral Daily with lunch          sodium chloride 100 mL/hr at 04/20/20 0801         NUTRITION DIAGNOSIS:  Inadequate oral intake related to confusion/altered mental status as meals since admit    INTERVENTIONS  Recommendations / Nutrition Prescription  Continue diet as ordered (allergies entered)   Unable to add oral nutrition supplements without being able to confirm that patient is lactose intolerance vs milk allergy  Add Room service with assist for meal ordering guidance  Start calorie counts    Implementation  Nutrition education: deferred  Diet order: added with assist  Collaboration and Referral of care: Discussed patient during interdisciplinary care rounds this morning and with bedside RN    Goals  Patient to consume >/= 50% of meals TID    MONITORING AND EVALUATION:  Progress towards goals will be monitored and evaluated per protocol and Practice Guidelines      Shelia Dumont, MS, RDN, LD, CNSC  Pager - 3rd floor/ICU: [phone]  Pager - All other floors: [phone]  Pager - Weekend/holiday: [phone]  Office: [phone]

## 2020-04-20 NOTE — PLAN OF CARE
Pt. Alert, confused, forgetful, anxious this shift, up with assist of 2 pivot tx to commode/Turn&Reposition Q2, Lung sounds clear, diminished, room air sat's at 91-92%. Pt. Continues on Rocephin for UTI. Redness to coccyx, blanchable. Tele: SR, IVF infusing without difficulty. Will continue with POC.

## 2020-04-20 NOTE — PLAN OF CARE
A&Ox4 with some forgetfulness, Ax2, GB and walker to transfer, VSS. Pt c/o back pain/discomfort but refuses intervention's. Lungs clear/dim bilaterally, abdomen soft and non-tender, active bowels, + flatus. Pt is tolerating a gluten free, lactose free, no protein diet- poor appetite. Drinking adequate amounts of fluids. IV fluids infusing. Continent of B/B, good urine O.P.  Will continue to monitor.

## 2020-04-20 NOTE — PROGRESS NOTES
04/20/20 1352   Quick Adds   Type of Visit Initial PT Evaluation   Living Environment   Lives With spouse   Living Arrangements condominium   Home Accessibility no concerns   Transportation Anticipated family or friend will provide   Self-Care   Usual Activity Tolerance moderate   Current Activity Tolerance fair   Regular Exercise No   Equipment Currently Used at Home walker, rolling;shower chair   Functional Level Prior   Ambulation 1-->assistive equipment   Transferring 1-->assistive equipment   Bathing 1-->assistive equipment   Fall history within last six months no   Which of the above functional risks had a recent onset or change? ambulation;transferring;toileting;bathing;dressing   General Information   Onset of Illness/Injury or Date of Surgery - Date 04/18/20   Referring Physician Elizabeth Li is a 84 year old female with a history of coronary artery disease status post stenting x2, hyperlipidemia, hypertension, hypothyroidism, KUSUM, anxiety, depression who presents with fatigue, weakness, and generalized myalgias.  For the past 1 month she has been suffering from increasing fatigue, weakness, and diffuse muscle pains.  She had been sleeping quite a bit in her recliner and as result has not been compliant with her CPAP.  She presented to the emergency room for further evaluation.  She was noted to have severe hypercalcemia and urinary tract infection.   Patient/Family Goals Statement Return home   Pertinent History of Current Problem (include personal factors and/or comorbidities that impact the POC) Elizabeth Li is a 84 year old female with a history of coronary artery disease status post stenting x2, hyperlipidemia, hypertension, hypothyroidism, KUSUM, anxiety, depression who presents with fatigue, weakness, and generalized myalgias.  For the past 1 month she has been suffering from increasing fatigue, weakness, and diffuse muscle pains.  She had been sleeping quite a bit in her recliner and as  result has not been compliant with her CPAP.  She presented to the emergency room for further evaluation.  She was noted to have severe hypercalcemia and urinary tract infection   Precautions/Limitations fall precautions   Weight-Bearing Status - LLE full weight-bearing   Weight-Bearing Status - RLE full weight-bearing   General Observations Pt supine upon initiation, agreeable to session.    Cognitive Status Examination   Orientation person;place   Level of Consciousness alert   Follows Commands and Answers Questions 75% of the time   Personal Safety and Judgment at risk behaviors demonstrated   Memory impaired   Pain Assessment   Patient Currently in Pain No   Integumentary/Edema   Integumentary/Edema no deficits were identifed   Posture    Posture Forward head position;Protracted shoulders   Range of Motion (ROM)   ROM Comment WFL   Strength   Strength Comments Decreased functional strength as seen by pt requiring assist for mobility   Bed Mobility   Bed Mobility Comments sit<>supine with modA   Transfer Skills   Transfer Comments sit<>stand with modA   Gait   Gait Comments modA with FWW   Balance   Balance Comments Requires B UE support for safe dynamic mobility   Sensory Examination   Sensory Perception no deficits were identified   Coordination   Coordination no deficits were identified   Muscle Tone   Muscle Tone no deficits were identified   General Therapy Interventions   Planned Therapy Interventions bed mobility training;balance training;gait training;ROM;strengthening;stretching;transfer training;home program guidelines;progressive activity/exercise   Clinical Impression   Criteria for Skilled Therapeutic Intervention yes, treatment indicated   PT Diagnosis Impaired functional strength   Influenced by the following impairments Weakness, deconditioning, confusion   Functional limitations due to impairments Difficulty with bed mobility, transfers, ambulation   Clinical Presentation Stable/Uncomplicated  "  Clinical Presentation Rationale medically progressing   Clinical Decision Making (Complexity) Low complexity   Therapy Frequency 5x/week   Predicted Duration of Therapy Intervention (days/wks) 5 days   Anticipated Discharge Disposition Transitional Care Facility   Risk & Benefits of therapy have been explained Yes   Patient, Family & other staff in agreement with plan of care Yes   [facility] TM \"6 Clicks\"   2016, Trustees of [facility], under license to [employer].  All rights reserved.   6 Clicks Short Forms Basic Mobility Inpatient Short Form   [facility]  \"6 Clicks\" V.2 Basic Mobility Inpatient Short Form   1. Turning from your back to your side while in a flat bed without using bedrails? 2 - A Lot   2. Moving from lying on your back to sitting on the side of a flat bed without using bedrails? 2 - A Lot   3. Moving to and from a bed to a chair (including a wheelchair)? 2 - A Lot   4. Standing up from a chair using your arms (e.g., wheelchair, or bedside chair)? 2 - A Lot   5. To walk in hospital room? 2 - A Lot   6. Climbing 3-5 steps with a railing? 1 - Total   Basic Mobility Raw Score (Score out of 24.Lower scores equate to lower levels of function) 11   Total Evaluation Time   Total Evaluation Time (Minutes) 8     "

## 2020-04-20 NOTE — PLAN OF CARE
PT: Orders received. PT evaluation completed and treatment initiated. Pt reports living in a condo with her spouse. No stairs to enter/within. Pt ambulates with a 4WW at all times. Pt notes that she also uses a shower chair with bathing. Pt reports that her spouse was helping her with mobility in the days leading up to her hospital stay, but isn't able to speak to the sustainability of this. Pt denies any falls in the past 6 months.     Discharge Planner PT   Patient plan for discharge: Home  Current status: Pt is oriented to person and place, but appears disoriented to time and situation. Pt tangential throughout session with concerns regarding her meal tray and her personal belonging bag. Pt requesting to see her personal belonging bag multiple times throughout the session. Pt completes supine>sit with modA, increased time/effort, and reorientation to task. Pt completes sit<>stand with modA and cues for technique. Pt ambulates very short distance of ~2' at bedside with FWW and modA with cues for technique. Pt very unsteady, recommend nursing utilize Ax2 for OOB mobiltiy. Pt reports feeling that she would have done much better with a 4WW, but does note that she feels weak. If pt does use her own 4WW she should utilize brakes to avoid it slipping away as she is placing increased downward pressure on walker at this time with limited ability to recover if a LOB should occur. Pt returns to supine at end of session with mod-maxA and cues for technique. Pt requires cues for repositioning upon supine. Pt remains concerned throughout the session that her water had been replaced with milk. Retrieved new water for pt, but pt continues to be concerned that it is milk t with dairy allergy).   Barriers to return to prior living situation: Requires heavy A for basic mobility skills, cognition, below baseline, unsure how much spouse can assist.   Recommendations for discharge: TCU  Rationale for recommendations: Pt is not  currently at baseline for mobility, and is unsafe to discharge home. With continued PT, both IP and after discharge, pt is likely to obtain mobility goals.        Entered by: Niesha Guillaume 04/20/2020 2:06 PM

## 2020-04-20 NOTE — PLAN OF CARE
Confused at times. Very poor appetite. IVF  mL/hr. Up - Ax2 with gaitbelt and walker to pivot to commode. Oxycodone given for back pain. Potassium replaced per protocol. Recheck at 1700. Tele - SR. Possible discharge 2-3 days.

## 2020-04-21 NOTE — PLAN OF CARE
PT:  Patient attempted this AM by another PT, provider in the room and patient unavailable to work with PT. Per chart review later this AM, patient lethargic and not consistently following commands for OT evaluation. Patient not appropriate to participate with PT.

## 2020-04-21 NOTE — PLAN OF CARE
Pt is more confused than previous days. Very poor appetite with no food intake all shift. Pain in lower back and hips treated with dilaudid and oxycodone. Atarax given for anxiety. Turn q 2. Purewick in place. Up with a lift. Unable to pivot to commode. Blanchable redness on coccyx. Oncology following. Possible discharge to TCU in 2-3 days.

## 2020-04-21 NOTE — PLAN OF CARE
"OT eval attempted - pt lethargic, unable to accurately follow consistent commands. Reports \"seeing things that aren't there.\" Not appropriate for OT eval at this time. RN updated.   "

## 2020-04-21 NOTE — PROGRESS NOTES
Case discussed with oncology.  He is planning to send multiple further tests including SPEP and UPEP.    He did asked that I order dexamethasone 4 mg twice a day and pamidronate 60 mg IV once.  Ordered.    Also requested testing for SARS-CoV-2 as status of this illness would affect plans for treatment.  COVID 19 PCR ordered.

## 2020-04-21 NOTE — PROGRESS NOTES
[facility]    Medicine Progress Note - Hospitalist Service       Date of Admission:  4/17/2020  Assessment & Plan   Elizabeth Li is a 84 year old female with a history of coronary artery disease status post stenting x2, hyperlipidemia, hypertension, hypothyroidism, KUSUM, anxiety, depression who presents with fatigue, weakness, and generalized myalgias.  For the past 1 month she has been suffering from increasing fatigue, weakness, and diffuse muscle pains.  She had been sleeping quite a bit in her recliner and as result has not been compliant with her CPAP.  She presented to the emergency room for further evaluation.  She was noted to have severe hypercalcemia and urinary tract infection.     1.  Severe hypercalcemia.  Calcium improved with aggressive IV fluids and initial subcutaneous calcitonin.  PTH level noted to be significantly low at time of presentation.  Concerning for underlying malignancy versus other non-primary hyperparathyroid cause (Paget's?).    -Vitamin D levels and PTHrp still pending.  -Calcium is 12 today, 4/21.  -Continue to monitor calcium level daily.    -Continue IV fluids to 150 cc an hour.  -CT scan of the chest/abdomen/pelvis today shows multiple lesions concerning for multiple myeloma.  -Oncology consult.  -Likely would benefit from a dose of zoledronic acid if kidney function improves.     2.  Acute kidney injury.  Creatinine improved to 1.4 today.  -Continue IV fluids.  -Renal ultrasound on 4/20 was normal.  -Avoid nephrotoxins as able.     3.  Urinary tract infection.  Urine culture growing providencia.    -Continue IV ceftriaxone.  -Today, 4/21, was day 4 of ceftriaxone.     4.  Hypothyroidism.  Continue levothyroxine.     5.  Hypertension.  Continue isosorbide mononitrate.  -Increase metoprolol to 37.5 mg BID.  -IV hydralazine if needed.     6.  Hyperlipidemia.  Hold simvastatin for now.     7.  Deconditioning.  Physical therapy consult.     8.  Hypokalemia.   Potassium replacement protocol.     9.  Acute encephalopathy.  Suspect this is due to hypercalcemia.   -CT scan of the head on 4/20 showed no acute intracranial pathology.    I did update patient's , Oswaldo, again on 4/21.    Diet: Combination Diet Regular Diet Adult  Calorie Counts  Room Service    DVT Prophylaxis: Pneumatic Compression Devices  Reynoso Catheter: not present  Code Status: Full Code      Disposition Plan   Expected discharge: 2 - 3 days    Romario Schmid DO  Hospitalist Service  [facility]    ______________________________________________________________________    Interval History   Having back pain and bilateral lower extremity pain.  Denies chest pain, shortness of breath, fevers, chills, nausea, vomiting, or diarrhea.    Data reviewed today: I reviewed all medications, new labs and imaging results over the last 24 hours.    Physical Exam   Vital Signs: Temp: 98  F (36.7  C) Temp src: Oral BP: (!) 173/68 Pulse: 92 Heart Rate: 79 Resp: 16 SpO2: 90 % O2 Device: None (Room air)    Weight: 184 lbs 0 oz  Gen:  NAD, A&Ox2 to person and place.  Some trouble with time.  Eyes:  PERRL, sclera anicteric.  OP:  MMM, no lesions.  Neck:  Supple.  CV:  Regular, no murmurs.  Lung:  CTA b/l, normal effort.  Ab:  +BS, soft.  Skin:  Warm, dry to touch.  No rash.  Ext:  Mild non pitting edema LE b/l.      Data   Recent Labs   Lab 04/21/20  0643 04/21/20  0134 04/20/20  1741 04/20/20  0811 04/19/20  0625  04/17/20  2322   WBC 8.5  --   --   --   --   --  11.4*   HGB 9.7*  --   --   --   --   --  12.7   MCV 92  --   --   --   --   --  92   *  --   --   --   --   --  129*     --   --  141 143   < > 138   POTASSIUM 3.8 3.6 3.2* 3.0* 3.5   < > 3.8   CHLORIDE 118*  --   --  112* 113*   < > 105   CO2 19*  --   --  19* 22   < > 25   BUN 24  --   --  26 22   < > 16   CR 1.40*  --   --  1.58* 1.59*   < > 1.21*   ANIONGAP 6  --   --  10 8   < > 8   FRANKLIN 12.0*  --   --  12.2* 11.7*   < >  15.0*   GLC 91  --   --  80 94   < > 115*   ALBUMIN 2.4*  --   --   --   --   --  3.5   PROTTOTAL 5.5*  --   --   --   --   --  7.5   BILITOTAL 0.5  --   --   --   --   --  0.6   ALKPHOS 76  --   --   --   --   --  108   ALT 23  --   --   --   --   --  29   AST 94*  --   --   --   --   --  98*   LIPASE  --   --   --   --   --   --  27*   TROPI  --   --   --   --   --   --  0.019    < > = values in this interval not displayed.

## 2020-04-21 NOTE — PLAN OF CARE
Pt c/o generalized muscle pain.  It is painful for her to move, turn and reposition. PRN tylenol, oxycodone, atarax given. Heavy assist of 2, gait [facility]  Diminished lung sounds.  94% RA.  Tele is SR. Regular diet. Poor appetite. Incont bladder.  Pur wick placed at night. PIV infusing maintenance fluids per orders.

## 2020-04-21 NOTE — CONSULTS
Care Transition Initial Assessment - SW     Met with: Family - spoke with the pt's  on the phone.  Active Problems:    Hypercalcemia       DATA  Lives With: spouse - Oswaldo  Living Arrangements: condominium - 1st floor, not stairs.  Quality of Family Relationships: helpful, involved, supportive  Description of Support System: Supportive, Involved  Who is your support system?: , Children  Support Assessment: Adequate family and caregiver support. Oswaldo said that they have 2 dtrs (Barton and Castalia) who are involved and supportive, but at this time it is hard for them to be with the pt if needed due to the stay at home order.  Identified issues/concerns regarding health management: The pt was admitted for hypercalcemia.    Resources List: Skilled Nursing Facility     Quality of Family Relationships: helpful, involved, supportive  Transportation Anticipated: undetermined at this time.    ASSESSMENT  Cognitive Status:  confused  Concerns to be addressed: Tiffany did not meet with the pt today, but called her  Oswaldo [phone].  Chart notes and nursing staff report that the pt is confused and lethargic, so tiffany will attempt to meet with her when she is more alert and oriented.  Tiffany spoke with Oswaldo who said that he called the pt this morning and he was surprised at how confused she was.  He said that this is not her baseline.  Oswaldo said that up until about a few days ago, the pt was still able to manage her own medications and did not have the confusion.  He said that the pt uses a rolling walker at baseline.  The pt has spinal fusion surgery 10 months ago and Oswaldo said that is when he had to start helping her more.  Oswaldo said that prior to that surgery, the pt was completely independent.  Since the surgery, Oswaldo has had to help the pt get dressed because she cannot bend over.  He makes their meals, he helps her bathe, he does the driving, and shopping.  Oswaldo said that as of recent, the  pt's hip began to hurt her, so she has been sleeping in her recliner because she is in so much pain.  The pt shuffles to the bathroom and that is about all she can do.    Tiffany discussed with Oswaldo that yesterday, PT recommended TCU for the pt.  Tiffany told him that PT and OT have not been able to work with her due to her being unable to follow commands.  Oswaldo said that the pt went to [facility] after spinal fusion surgery, but he is not sure she would want to go back there.  He said that he wants the choice of where she goes to be hers.  Oswaldo said that she will want to be as close to home as possible.  Tiffany explained that [facility] is the closest, but they are not taking admissions at this time.  Tiffany explained that there is [facility] in [place], [facility] in [place], and then facilities in [place] and [place].  He said that [facility] would probably be a choice of hers, but he is not sure.  He reiterated to tiffany that he wants her to make that decision if her cognition clears.  Tiffany told Oswaldo that they plan to meet with her if/when her cognition clears and will discuss the discharge plan with her.    All of Oswaldo's questions were answered and concerns were addressed.     PLAN  Financial costs for the patient includes none at this time.  Patient given options and choices for discharge yes, home vs TCU.  Patient/family is agreeable to the plan?  Yes: TCU.  Transportation/person available to transport on day of discharge  is pending pt's status at discharge.  Patient/Oswaldo's Goals and Preferences: discharge to TCU.  Patient/Oswaldo anticipates discharging to: TCU.    Sw will continue with discharge planning and will be available as needed until discharge.    JUANPABLO Alvarenga, [facility]  Inpatient Care Coordination  [facility]  [phone]

## 2020-04-21 NOTE — PLAN OF CARE
Cardiac- WDL  Resp- Clear/diminished  GI-obese  - purewick  Skin- blanchable redness, rash/open skin under abdomen fold  Neuro- confused, disoriented to time/situation, anxious  Diet- regular  Activity- Ax2  Labs- Ca 12.2, Cr 1.58, K+ 3.2  VS-  Plan-     Continue with plan of care

## 2020-04-21 NOTE — CONSULTS
Hematology Consultation      Elizabeth Li MRN# [id number]   YOB: 1935 Age: 84 year old   Date of Admission: 4/17/2020     Reason for consult: I was asked by Dr Schmid to evaluate this patient for possible myeloma.           Assessment and Plan:   1.  Severe hypercalcemia.   2.  Extensive mixed lucent and sclerotic lesions throughout  the visualized osseous structures seen on CT scan on 4/21/2020.   3.  Differential diagnosis suggestive of multiple myeloma.     Plan.  1.  Discussed with Dr. Bender.  The clinical presentation of severe hypercalcemia and presence of extensive sclerotic lesions throughout the skeleton would be compatible with a plasma cell dyscrasia.  I would recommend initiating work-up for plasma cell dyscrasia, including SPEP, UPEP, immunofixation, beta-2 microglobulin, LDH, 24-hour urine collection for protein electrophoresis.  I would also recommend testing for HIV hepatitis B and hepatitis C serology.   2.  I would recommend using bisphosphonate to treat hypercalcemia, given patient's renal function pamidronate would be appropriate.  Dose based on kidney function.  Other consideration would be to use dexamethasone. (Given her age I would recommend 4 mg twice daily orally)   3. Please consider biopsy from destructive soft tissue component on L5.                Chief Complaint:   Patient is a pleasant 84-year-old lady who was admitted for increased fatigue weakness and diffuse muscle pains.  Patient is confused and history was obtained from her chart and discussion with her primary medical team.              History of Present Illness:   This patient is a 84 year old female who presents with 1 month history of fatigue weakness and generalized myalgia.  Initial work-up in the emergency room showed hypercalcemia and urinary tract infection.  She had acute kidney injury.  She was confused at the time of initial admission.   Patient was admitted to the hospital and treated with  IV fluids, subcutaneous calcitonin.   Today a CT scan of chest abdomen pelvis done to evaluate further showed Extensive mottled appearance of osseous structures. Destructive softtissue component seen in the posterior elements of L5 on the left.  There is been little improvement since her hospital admission.  Her calcium has come down some to 12, from an initial high of 15.  Her creatinine is stable at 1.4.               Past Medical History:   Past medical history of coronary artery disease status post stenting x2, hyperlipidemia, hypertension, hypothyroidism, KUSUM, anxiety, depression.           Past Surgical History:     Past Surgical History:   Procedure Laterality Date     BACK SURGERY       COLONOSCOPY N/A 1/11/2017    Procedure: COLONOSCOPY;  Surgeon: Nghia Moss MD;  Location:  GI     Coronary angiogram with stents x2  03/2015    2.5 X 18 mm & 3.0 X mm LUCILA to LAD     OPTICAL TRACKING SYSTEM FUSION POSTERIOR SPINE LUMBAR N/A 7/17/2019    Procedure: L3-L4 TRANSFORAMINAL LUMBAR  INTRABODY FUSION WITH ALLOGRAFT, OPTICAL TRACKING SYSTEM AND MEDTRONIC SOLARA INSTRUMENTATION;  Surgeon: Cole Jansen MD;  Location: [facility] OR     TONSILLECTOMY, ADENOIDECTOMY, COMBINED       TUBAL LIGATION       [place] teeth extraction                   Social History:   I have reviewed this patient's social history          Family History:   I have reviewed this patient's family history          Immunizations:   Immunization status is unknown          Allergies:   All allergies reviewed and addressed          Medications:   I have reviewed this patient's current medications          Review of Systems:   Review of systems not obtained due to patient factors - confusion            Physical Exam:   Vitals were reviewed  Constitutional: Patient is confused laying in bed, appears chronically ill, in moderate distress with back pain.        Data:   All laboratory data reviewed

## 2020-04-22 NOTE — PROGRESS NOTES
CALORIE COUNT    Current Diet + Supplement Order:   Regular diet (milk and gluten allergies listed, unable to add oral nutrition supplements)    Approximate Oral Intake for 4/22:   Calories: 0 kcals   Protein: 0 g     Number of Meals Recorded: 2  Number of Snacks Recorded: 0     ASSESSED NUTRITION NEEDS: (DW 77 kg)  Estimated Energy Needs: ~1925 kcals (~25 Kcal/Kg)  Justification: maintenance   Estimated Protein Needs: 77-92 grams protein (1-1.2 g pro/Kg)  Justification: preservation of lean body mass  Estimated Fluid Needs: >1 mL/Kcal  Justification: maintenance      Summary:   Meeting <25% of estimated energy and protein needs since admit x 5 days. Need to consider nutrition support in next 1-2 days if unable to improve oral intake. Confusion/altered mental status ongoing.   Calorie counts to continue - appreciate documentation of oral intake.      Shelia Dumont RDN, LD, CNSC  3rd floor/ICU: [phone]  All other floors: [phone]  Weekend/holiday: [phone]  Office: [phone]

## 2020-04-22 NOTE — PLAN OF CARE
Oriented to self, place and year. Low grade temp. BP improving. Remains on 1L O2. PRN Tylenol given for back pain. IVF's infusing. COVID negative. Repositioned Q 2 hrs. Tele SR. Incontinent, purewick in place. Unable to start 24 hr urine collection as purewick leaks and incontinent.Treating UTI w/ Rocephin. Oncology following for possible multiple myeloma. Continue plan of care.  Charis Becker RN on 4/22/2020 at 5:16 AM

## 2020-04-22 NOTE — PROGRESS NOTES
[facility]    Hospitalist Progress Note      Assessment & Plan   Elizabeth Li is a 84 year old female with a history of coronary artery disease status post stenting x2, hyperlipidemia, hypertension, hypothyroidism, KUSUM, anxiety, depression who presents with fatigue, weakness, and generalized myalgias.  For the past 1 month she has been suffering from increasing fatigue, weakness, and diffuse muscle pains found to have severe hypercalcemia.  CT scan is concerning for multiple myeloma.     #Severe hypercalcemia likely secondary to malignancy.   Ionized calcium was 7.6 on presentation.  Complicated by confusion.  Also does have bone pain.  Patient noted to have an JENNIFER with peak creatinine of 1.5.  PTH level is noted to be significantly low.  CT scan of the chest/abdomen/pelvis today shows multiple lesions concerning for multiple myeloma.  Patient has been treated with IV fluids, subcutaneous calcitonin.  Subsequently treated with dexamethasone and pamidronate therapy.  PTH level noted to be significantly low at time of presentation.    -Vitamin D levels and PTHrp still pending.  -Ionized calcium remains elevated today.  -Continue to monitor calcium level daily.    -We will need to continue IV fluids.  I did transition to LR given lower bicarb and elevated chloride.  -Oncology is consulted    #Possible Multiple Myeloma: CT scan of the chest abdomen pelvis showed multiple sclerotic lesions concerning for multiple myeloma.  This is in the setting of hypercalcemia as above.  Patient also noted to have an JENNIFER, anemia and sclerotic lesions as above.  -Multiple myeloma work-up is in process including SPEP and UPEP.  -Oncology is requesting biopsy.  I have placed an IR consult for biopsy of soft tissue area near L5.    #Acute kidney injury.  Improving.  Secondary to dehydration and hypercalcemia.  IV fluids as above  -Continue IV fluids.  -Renal ultrasound on 4/20 was normal.  -Avoid nephrotoxins as  able.     #Urinary tract infection.  Urine culture growing providencia.    -Continue IV ceftriaxone.  -Completing 5 days of ceftriaxone today.       #Hypothyroidism.  Continue levothyroxine.     #Hypertension.  Continue isosorbide mononitrate.  Increase metoprolol to 37.5 mg BID.  I have scheduled 10 mg p.o. hydralazine 3 times daily.  IV hydralazine if needed.     #Hyperlipidemia.  Hold simvastatin for now.     # Deconditioning.  Physical therapy consult.     #Acute encephalopathy.  Suspect this is due to hypercalcemia.   -CT scan of the head on 4/20 showed no acute intracranial pathology.    I did discuss the above plan of care with her , Ad.  He did voice understanding of above.    Diet:  I did order a swallow study today.  Noted to be having some trouble with thin liquids.  DVT Prophylaxis: Pneumatic Compression Devices  Reynoso Catheter: not present  Code Status: Full Code      Moses Wesley MD  Text Page    Interval History   Patient hypertensive overnight.  Patient still remains confused.  She does know she is in the hospital and is able to tell me her name and her 's name.  She does not quite understand the reason for hospital stay.  She does complain of back pain.  Denies chest pain.    -Data reviewed today: I reviewed all new labs and imaging results over the last 24 hours.    Physical Exam   Temp: 95.9  F (35.5  C) Temp src: Axillary BP: (!) 185/78 Pulse: 92 Heart Rate: 92 Resp: 22 SpO2: 95 % O2 Device: Nasal cannula Oxygen Delivery: 1 LPM  Vitals:    04/19/20 0655 04/20/20 0625 04/22/20 0633   Weight: 77.2 kg (170 lb 1.6 oz) 83.5 kg (184 lb) 85.3 kg (188 lb)     Vital Signs with Ranges  Temp:  [95.9  F (35.5  C)-100.3  F (37.9  C)] 95.9  F (35.5  C)  Pulse:  [92] 92  Heart Rate:  [84-96] 92  Resp:  [16-24] 22  BP: (156-215)/(63-89) 185/78  SpO2:  [89 %-99 %] 95 %  I/O last 3 completed shifts:  In: 2678 [P.O.:120; I.V.:2558]  Out: 1350 [Urine:1350]    Gen:  NAD, A&Ox2 to person and place.   Does not know year or month.  Re-directable.  HEENT: No elevation of JVD. No obvious trauma noted.   CV:  Regular, no murmurs.  Lung:   Some mild inspiratory crackles at the bases.  Otherwise good air entry.  No wheezes or rhonchi.  Ab:  +BS, soft.  Skin:  Warm, dry to touch.  No rash.  Ext:  Mild non pitting edema LE b/l.    Medications     lactated ringers 150 mL/hr at 04/22/20 0955       aspirin  81 mg Oral Daily     cefTRIAXone  1 g Intravenous Q24H     dexamethasone  4 mg Oral Q12H ELDA     hydrALAZINE  10 mg Oral TID     isosorbide mononitrate  30 mg Oral Daily     levothyroxine  75 mcg Oral Daily     liothyronine  5 mcg Oral Daily     metoprolol tartrate  50 mg Oral BID     vitamin C  500 mg Oral Daily with lunch       Data   Recent Labs   Lab 04/22/20  0735 04/21/20  0643 04/21/20  0134  04/20/20  0811  04/17/20  2322   WBC 9.1 8.5  --   --   --   --  11.4*   HGB 9.9* 9.7*  --   --   --   --  12.7   MCV 93 92  --   --   --   --  92   * 101*  --   --   --   --  129*   * 143  --   --  141   < > 138   POTASSIUM 3.3* 3.8 3.6   < > 3.0*   < > 3.8   CHLORIDE 118* 118*  --   --  112*   < > 105   CO2 17* 19*  --   --  19*   < > 25   BUN 27 24  --   --  26   < > 16   CR 1.19* 1.40*  --   --  1.58*   < > 1.21*   ANIONGAP 10 6  --   --  10   < > 8   FRANKLIN 11.6* 12.0*  --   --  12.2*   < > 15.0*   * 91  --   --  80   < > 115*   ALBUMIN  --  2.4*  --   --   --   --  3.5   PROTTOTAL  --  5.5*  --   --   --   --  7.5   BILITOTAL  --  0.5  --   --   --   --  0.6   ALKPHOS  --  76  --   --   --   --  108   ALT  --  23  --   --   --   --  29   AST  --  94*  --   --   --   --  98*   LIPASE  --   --   --   --   --   --  27*   TROPI  --   --   --   --   --   --  0.019    < > = values in this interval not displayed.       No results found for this or any previous visit (from the past 24 hour(s)).

## 2020-04-22 NOTE — PLAN OF CARE
OT-Attempted to see patient for evaluation. Patient unable to follow directions, requesting drink, increased time needed, multiple attempts and constant cues to be able to drink from cup/straw. Patient initially unaware straw was in mouth, becoming upset, insisting OT place straw in mouth. Patient becomes easily distressed, frequently talking, incoherent at times. Unable to complete evaluation, will continue to follow.

## 2020-04-22 NOTE — PROGRESS NOTES
Clinical Swallow Evaluation:      04/22/20 1529   General Information   Onset Date 04/17/20   Start of Care Date 04/22/20   Referring Physician Dr. Wesley   Patient Profile Review/OT: Additional Occupational Profile Info See Profile for full history and prior level of function   Patient/Family Goals Statement Did not state, confused   Swallowing Evaluation Bedside swallow evaluation   Behaviorial Observations Alert;Confused;Distractible;Impulsive   Mode of current nutrition Oral diet   Type of oral diet Regular;Thin liquid   Respiratory Status O2 Supply   Type of O2 supply Nasal cannula  (1 L)   Comments Pt presented to ED with report that over the past 1 month she has been suffering from increasing fatigue, weakness, and diffuse muscle pains. Currently being treated for hypercalcemia, JENNIFER, UTI, acute encephaloapthy, etc. Hem/Onc also consulted during admission as CT scan of the chest/abdomen/pelvis today shows multiple lesions concerning for multiple myeloma. Rn noted difficulty swallowing water so SLP consult placed. Per chart pt with limited PO intake, on calerie count. No baseline dysphagia per chart or RN report.   Clinical Swallow Evaluation   Oral Musculature unable to assess due to poor participation/comprehension   Additional Documentation Yes   Clinical Swallow Eval: Thin Liquid Texture Trial   Volume of Liquid or Food Presented 2 sips   Oral Phase of Swallow Poor AP movement;Premature pharyngeal entry   Oral Residue   (bilateral anterior spillage)   Pharyngeal Phase of Swallow impaired;reduction in laryngeal movement;coughing/choking   Diagnostic Statement Anterior bolus spillage and suspect premature bolus spillage with thin liquids resulting in overt coughing.   Clinical Swallow Eval: Nectar Thick Liquid Texture Trial   Mode of Presentation, Nectar spoon;cup   Volume of Nectar Presented 2 oz   Oral Phase, [place] WFL   Pharyngeal Phase, Nectar reduction in laryngeal movement   Diagnostic Statement no  signs/sx aspiration   Clinical Swallow Eval: Honey Thick Liquid Texture Trial   Mode of Presentation, Honey spoon;cup   Volume of Honey Presented 1 oz   Oral Phase, Honey WFL   Pharyngeal Phase, Honey reduction in laryngeal movement   Diagnostic Statement no signs/sx aspiration   Clinical Swallow Eval: Puree Solid Texture Trial   Diagnostic Statement Attempted both puree solids and regular solid textures, however pt with no oral manipulation or swallow and writer had to take out of oral cavity with toothettes   Clinical Swallow Eval: Solid Food Texture Trial   Diagnostic Statement Attempted both puree solids and regular solid textures, however pt with no oral manipulation or swallow and writer had to take out of oral cavity with toothettes   General Therapy Interventions   Planned Therapy Interventions Dysphagia Treatment   Swallow Eval: Clinical Impressions   Skilled Criteria for Therapy Intervention Skilled criteria met.  Treatment indicated.   Functional Assessment Scale (FAS) 2   Treatment Diagnosis mod-severe oropharyngeal dysphagia 2/2 AMS   Diet texture recommendations Dysphagia diet level 1;Nectar thick liquids   Recommended Feeding/Eating Techniques maintain upright posture during/after eating for 30 mins;no straws;small sips/bites;alternate between small bites and sips of food/liquid   Therapy Frequency 5x/week   Predicted Duration of Therapy Intervention (days/wks) 1 week   Anticipated Discharge Disposition   (TCU)   Risks and Benefits of Treatment have been explained. Yes   Patient, family and/or staff in agreement with Plan of Care Yes   Clinical Impression Comments Clinical swallow evaluation completed, limited in scope given significant AMS/confusion. Per RN difficulty with thin water and she has been thickening which has been going well. Pt has apparently been refusing all solid food today. Pt not following any directions during session. Assessed with thin liquids, nectar thick liquids, honey thick  liquids. Attempted both puree solids and regular solid textures, however pt with no oral manipulation or swallow and writer had to take out of oral cavity with toothettes. She currently presents with mod-severe dysphagia 2/2 AMS. Anterior bolus spillage and suspect premature bolus spillage with thin liquids resulting in overt coughing. All improved with nectar and honey which pt tolerated without signs/sx aspiration.   Total Evaluation Time   Total Evaluation Time (Minutes) 19

## 2020-04-22 NOTE — PLAN OF CARE
"VS elevated,added scheduled Hydralazine. SOB/ARELLANO, COVID negative.  Low grade fevers, Tylenol given. Positive for UTI on IV ABX. Blood cx  done today. Pt can answers questions time,date, place and person but is still confused. Pt repeats herself  and doesn't follow directions well. Calcium elevated, on IVF\"s Normal saline 150cc/hr, changed to LR 150cc/hr. Pt wt increased, increased edema and FC RLL, md aware, continue with IVF's. Heme/Onc following, CT showing possibly multiple myeloma.Pt requiring lift for transfers or turns. PT/OT following. Pt having difficulty swallowing water, pt did fine with meds in applesauce. Thickened water and pt did much better with swallowing. MD aware, added Speech.     Pt needs queuing with drinking and swallowing meds. Pt will not eat anything, she thinks her meds need to be only with water and needs to wait 1 hour after she takes meds. Unable to redirect or convince pt otherwise. No timmy count due to 0 intake.       Heme/Onc wanting 24 hour urine collection, but eves and nights unsuccessful due to accuracy with pt being incont with purwick in place. Not wanting to place knott due to UTI, discussed with md about issues, refer to heme/onc on this issues. Discussed with Heme/onc cancel 24 hour urine test, they will collect in out pt setting.   "

## 2020-04-22 NOTE — PROGRESS NOTES
X-cover    Called for hypertension, 215/89 so given IV hydralazine and now 194/71.  BPs elevated since admission. Currently on dexamethasone/IVF/pamidronate for treatment of hypercalcemia    Would not want to give diuretics in this situation  pta regimen imdu r30 mg every day, metop 12.5 mg bid  Currently on imdur 30 mg and metop 37.3 mg bid  BPs still elevated and HR in the 70-90 range.  Give single dose metop 25 mg once  Then increase metop to 50 mg bid.     X-cover    Ruled out for covid 19, precautions discontinued

## 2020-04-22 NOTE — PROVIDER NOTIFICATION
04/21/20 4161   Significant Event   Significant Event Critical result/value  (COVID-19 swab negative)   [facility] lab called to inform of the above results. Pt needed to be ruled out per Oncology due to new DX of myeloma. Bedside RN, Charis Becker, verbally notified of results by this RN. Result WBP to admitting MD w/request to discharge SPECIAL COVID ISO precautions by 3-11:30 shift RN.  Aida Keith, BSN, RN  Medical/Telemetry - 3  .

## 2020-04-22 NOTE — PROVIDER NOTIFICATION
Hydralazine PRN given at 1730 for , last recheck  194/71. MD notified.See Dr. Flores note for interventions.

## 2020-04-22 NOTE — PLAN OF CARE
"Patient confused, c/o pain when repositions, oxycodone given. Has muscles jerking. IV fluid 150cc/h. Purewick in place. , Hydralazine PRN given without improvement,MD called and updated, Metoprolol increased up to 50 mg. Continue to monitor.     Covid back negative, massage sent to MD \" Covid negative, need order to remove negative flow from room. \".  "

## 2020-04-22 NOTE — PROGRESS NOTES
Hematology oncology chart check.     84-year-old lady admitted with hypercalcemia confusion.  Work-up showed a urinary tract infection and, extensive mottled appearance of osseous structures, concern for multiple myeloma.     Hematology oncology consulted on 4/21: Work-up for plasma cell dyscrasia ordered.    4/22: Patient received pamidronate yesterday and started on dexamethasone.   There is slight improvement in calcium which was 11.6 today.  However once corrected for low albumin that remains high.  Hydration should continue.   COVID-19 test was conducted as patient is likely immunocompromised and will need possible chemotherapy.  It came back as negative.   Test results for myeloma work-up remains pending. Recommendations will be updated once results are available.     Please consider plans for biopsy for tissue diagnosis. The soft tissue area near L5 would be an obvious target for biopsy.     Rodney An MD  [place] [facility]

## 2020-04-22 NOTE — PLAN OF CARE
Discharge Planner SLP   Patient plan for discharge: did not state  Current status: Clinical swallow evaluation completed, limited in scope given significant AMS/confusion. Per RN difficulty with thin water and she has been thickening which has been going well. Pt has apparently been refusing all solid food today. Pt not following any directions during session. Assessed with thin liquids, nectar thick liquids, honey thick liquids. Attempted both puree solids and regular solid textures, however pt with no oral manipulation or swallow and writer had to take out of oral cavity with toothettes. She currently presents with mod-severe dysphagia 2/2 AMS. Anterior bolus spillage and suspect premature bolus spillage with thin liquids resulting in overt coughing. All improved with nectar and honey which pt tolerated without signs/sx aspiration.    Recommendations: Although writer did not see any swallowing of solid food, will downgrade to puree given high choking risk if regular items were to be provided. Downgrade to nectar thick liquids. Medications as tolerated.    Barriers to return to prior living situation: dysphagia, nutrition, level of assist, weakness, confusion/cognitive status.   Recommendations for discharge: TCU  Rationale for recommendations: pt significantly below baseline function and will require SLP intervention for upgrades to safest least restrictive diet.        Entered by: Danyell Long 04/22/2020 3:23 PM

## 2020-04-23 NOTE — PROGRESS NOTES
RT Note:    Patient placed on CPAP for the night with our machine. Auto CPAP settings with 1L bled in. Tolerating well currently, but is confused.    Edda Bull, RT  April 22, 2020.11:01 PM

## 2020-04-23 NOTE — PROGRESS NOTES
Hematology Consultation      Elizabeth Li MRN# [id number]   YOB: 1935 Age: 84 year old   Date of Admission: 4/17/2020     Reason for consult: I was asked by Dr Schmid to evaluate this patient for possible myeloma.           Assessment and Plan:   1.  Severe hypercalcemia.  Improving after hydration and pamidronate.   2.  Extensive mixed lucent and sclerotic lesions throughout  the visualized osseous structures seen on CT scan on 4/21/2020.  Biopsy completed and results awaited.   3.  Differential diagnosis suggestive of multiple myeloma.   Labs Awaited     Plan.  1.  Discussed with Dr. Wesley.  The clinical presentation of severe hypercalcemia and presence of extensive sclerotic lesions throughout the skeleton would be compatible with a plasma cell dyscrasia.  Work-up for plasma cell dyscrasia, including SPEP, UPEP, immunofixation, beta-2 microglobulin, LDH, 24-hour urine collection for protein electrophoresis, ordered and is awaited.     2. Hypercalcemia is better.  Continue gentle hydration.  Confusion has slightly improved.   Dexamethasone can be tapered, to 2 mg twice daily for 1 week, then taper down to 1 mg daily for 1 week and stop.     3. Biopsy from destructive soft tissue component on L5 completed results are awaited.   4. Patient might be discharged to rehab once clinically appropriate, will plan to follow-up on results as an outpatient to formulate a plan.   5.  Urinary tract infection: On antibiotics.                Chief Complaint:   Patient is a pleasant 84-year-old lady who was admitted for increased fatigue weakness and diffuse muscle pains.  Patient is confused and history was obtained from her chart and discussion with her primary medical team.              History of Present Illness:   This patient is a 84 year old female who presents with 1 month history of fatigue weakness and generalized myalgia.  Initial work-up in the emergency room showed hypercalcemia and urinary tract  infection.  She had acute kidney injury.  She was confused at the time of initial admission.   Patient was admitted to the hospital and treated with IV fluids, subcutaneous calcitonin.   Today a CT scan of chest abdomen pelvis done to evaluate further showed Extensive mottled appearance of osseous structures. Destructive softtissue component seen in the posterior elements of L5 on the left.  There is been little improvement since her hospital admission.  Her calcium has come down some to 12, from an initial high of 15.  Her creatinine is stable at 1.4.     Update 4/23/20:   Earlier today patient had a biopsy from her lumbar spine.  She still has some back pain.  Patient appeared more awake and alert today, still has some confusion.                 Past Medical History:   Past medical history of coronary artery disease status post stenting x2, hyperlipidemia, hypertension, hypothyroidism, KUSUM, anxiety, depression.           Past Surgical History:     Past Surgical History:   Procedure Laterality Date     BACK SURGERY       COLONOSCOPY N/A 1/11/2017    Procedure: COLONOSCOPY;  Surgeon: Nghia Moss MD;  Location: [facility]     Coronary angiogram with stents x2  03/2015    2.5 X 18 mm & 3.0 X mm LUCILA to LAD     OPTICAL TRACKING SYSTEM FUSION POSTERIOR SPINE LUMBAR N/A 7/17/2019    Procedure: L3-L4 TRANSFORAMINAL LUMBAR  INTRABODY FUSION WITH ALLOGRAFT, OPTICAL TRACKING SYSTEM AND MEDTRONIC SOLARA INSTRUMENTATION;  Surgeon: Cole Jansen MD;  Location: [facility]     TONSILLECTOMY, ADENOIDECTOMY, COMBINED       TUBAL LIGATION       [place] teeth extraction                   Social History:   I have reviewed this patient's social history          Family History:   I have reviewed this patient's family history          Immunizations:   Immunization status is unknown          Allergies:   All allergies reviewed and addressed          Medications:   I have reviewed this patient's current medications          Review of  Systems:   Review of systems not obtained due to patient factors - confusion            Physical Exam:   Vitals were reviewed  Constitutional: Patient is confused laying in bed, appears chronically ill, in moderate distress with back pain.        Data:   All laboratory data reviewed

## 2020-04-23 NOTE — PLAN OF CARE
Discharge Planner SLP   Patient plan for discharge: did not discuss  Current status: Swallow tx completed. Pt with modest improvements in ability to participate/follow directions compared to prior day. She clinically tolerated 4 oz nectar thick liquids via tsp, cup and straw and 2 oz puree solids before declining further PO. She continues to present with decreased oromotor movement/awareness and slow oral transit, therefore current diet continues to be safest least restrictive at this time.     Recommendations: Continue dysphagia diet level 1 with nectar thick liquids when fully alert, upright and accepting. Medications as tolerated.      Barriers to return to prior living situation: dysphagia, nutrition, level of assist, weakness, confusion/cognitive status.   Recommendations for discharge: TCU  Rationale for recommendations: pt significantly below baseline function and will require SLP intervention for upgrades to safest least restrictive diet.        Entered by: Danyell Long 04/23/2020 9:38 AM

## 2020-04-23 NOTE — PROGRESS NOTES
Paraspinal mass biopsy completed per Dr. Man without difficulty, patient tolerated well. Phone consent was obtained by Dr. Man with patient's spouse. Patient transferred to room via cart in stable condition, report called to bedside RN.

## 2020-04-23 NOTE — PLAN OF CARE
DX: Hypercalcemia Encephalopathy   Tele: SR  A&O disoriented x3, oriented to self  Activity: A-2 lift, Q2hr repo  Diet: DD1 pureed, nectar thick liquids  VSS bp 162/69 after prn hydralazine  O2: CPAP NOC,  1.5L nc  BG: na  PIV:  infusing LA, IV Rocephin  Pain: Oxy x1, Atarax x1  GI/: purewick in place,   Labs: K 3.7, Ca 11.65  Plan: IR biopsy soft tissue L5, ct possible multiple myloma  Discharge: TCU? TBD continue plan of care  PT/OT , pcd's on, pt takes pills whole in nectar thickened apple juice

## 2020-04-23 NOTE — PLAN OF CARE
Ambulatory Status:  Pt up 2 assist with lift.  Orientation: alert to self only   VS:  Currently on 1L O2, BP elevated   Pain:  generalized  Resp: LS dim. Cough infrequent non prod.   GI: +BS.    :  incontinent, purewick in place  Tx:  Rocephin   Labs:  Mg 2.5  Consults:  Speech, PT and OT  Disposition:  tbd

## 2020-04-23 NOTE — PROGRESS NOTES
[facility]    Hospitalist Progress Note      Assessment & Plan   Elizabeth Li is a 84 year old female with a history of coronary artery disease status post stenting x2, hyperlipidemia, hypertension, hypothyroidism, KUSUM, anxiety, depression who presents with fatigue, weakness, and generalized myalgias.  For the past 1 month she has been suffering from increasing fatigue, weakness, and diffuse muscle pains found to have severe hypercalcemia.  CT scan is concerning for multiple myeloma.     #Severe hypercalcemia likely secondary to malignancy.   Ionized calcium was 7.6 on presentation.  Complicated by confusion.  Also does have bone pain.  Patient noted to have an JENNIFER with peak creatinine of 1.5.  PTH level is noted to be significantly low.  CT scan of the chest/abdomen/pelvis today shows multiple lesions concerning for multiple myeloma.  Patient has been treated with IV fluids, subcutaneous calcitonin.  Subsequently treated with dexamethasone and pamidronate therapy.  PTH level noted to be significantly low at time of presentation.    -1,25 vit d low at 9.6, 25 vit D still in process.  PTHrp still in process  -Ionized calcium is improved today  -Continue to monitor calcium level daily.    -Did reduce her rate of IV fluids today as patient does appear to be better hydrated.  Also switch to D5 half-normal saline given hypernatremia.  We will also start to taper her dexamethasone.  Starting 2 mg twice daily tonight.  -Oncology is consulted     #Possible Multiple Myeloma: CT scan of the chest abdomen pelvis showed multiple sclerotic lesions concerning for multiple myeloma.  This is in the setting of hypercalcemia as above.  Patient also noted to have an JENNIFER, anemia and sclerotic lesions as above.  -Multiple myeloma work-up is in process including SPEP and UPEP.  -Patient did undergo IR guided biopsy today of soft tissue lesion at L5.     #Acute kidney injury.  Improving.  Secondary to dehydration and  hypercalcemia.  IV fluids as above  -Continue IV fluids.  -Renal ultrasound on 4/20 was normal.  -Avoid nephrotoxins as able.     #Urinary tract infection.  Urine culture growing providencia.    -Continue IV ceftriaxone.  -Completing 7 days of ceftriaxone      #Hypothyroidism.  Continue levothyroxine.     #Hypertension.  Continue isosorbide mononitrate.  Increase metoprolol to 50 mg BID.  I have scheduled 10 mg p.o. hydralazine 3 times daily.  IV hydralazine if needed.     #Hyperlipidemia.  Hold simvastatin for now.     # Deconditioning.  Physical therapy consult.     #Acute encephalopathy.  Suspect this is mainly due to hypercalcemia.  May have some contribution from UTI that was treated as above along with starting treatment with dexamethasone.  She does seem a bit improved today and is a bit more redirectable compared to yesterday with improvement of her calcium.  She is able to tell me her 's name and that she is from [place].  We will need to continue to monitor her mental status.  -CT scan of the head on 4/20 showed no acute intracranial pathology.  -Tapering dexamethasone to 2 mg twice daily tonight.  I did discuss this with oncology.  -Minimizing opiates as able, though understand patient does have acute pain from osteolytic lesions.      I did update her , Ad on the above.  He did state that Elizabeth has been more confused over the last month or so.  It became more significant in the week prior to presentation.  She was starting to not make much sense at times.       Diet:  Modified diet per speech  DVT Prophylaxis: Pneumatic Compression Devices  Code Status: Full Code     Moses Wesley MD  Text Page    Interval History   No acute events overnight.  Patient states that she feels better today compared to yesterday.  She does remember me from yesterday.  She is asking for her glasses which are at the bedside.  She is a bit more redirectable.  She denies any worsening pain.  She states that her  back pain is under decent control.    -Data reviewed today: I reviewed all new labs and imaging results over the last 24 hours.     Physical Exam   Temp: 97.1  F (36.2  C) Temp src: Axillary BP: (!) 147/55 Pulse: 101 Heart Rate: 71 Resp: 18 SpO2: 93 % O2 Device: Nasal cannula Oxygen Delivery: 1 LPM  Vitals:    04/19/20 0655 04/20/20 0625 04/22/20 0633   Weight: 77.2 kg (170 lb 1.6 oz) 83.5 kg (184 lb) 85.3 kg (188 lb)     Vital Signs with Ranges  Temp:  [97.1  F (36.2  C)-99.6  F (37.6  C)] 97.1  F (36.2  C)  Pulse:  [101] 101  Heart Rate:  [67-90] 71  Resp:  [16-26] 18  BP: (147-182)/(55-81) 147/55  FiO2 (%):  [1 %] 1 %  SpO2:  [92 %-96 %] 93 %  I/O last 3 completed shifts:  In: 1707 [P.O.:100; I.V.:1607]  Out: 1900 [Urine:1900]    Gen: Cute distress.  ANO x2.  Does not know the month or the year.  She does tell me her 's name.  She is asking for her glasses.  She seems a bit more alert today compared to yesterday  HEENT: No elevation of JVD. No obvious trauma noted.   CV:  Regular, no murmurs.  Lung:   Some mild inspiratory crackles at the bases.  Otherwise good air entry.  No wheezes or rhonchi.  Ab:  +BS, soft.  Skin:  Warm, dry to touch.  No rash.  Ext:  Mild non pitting edema LE b/l.    Medications     dextrose 5% and 0.45% NaCl + KCl 20 mEq/L         aspirin  81 mg Oral Daily     cefTRIAXone  1 g Intravenous Q24H     dexamethasone  4 mg Oral Q12H ELDA     fentaNYL (PF)         hydrALAZINE  10 mg Oral TID     isosorbide mononitrate  30 mg Oral Daily     levothyroxine  75 mcg Oral Daily     liothyronine  5 mcg Oral Daily     metoprolol tartrate  50 mg Oral BID     midazolam         vitamin C  500 mg Oral Daily with lunch       Data   Recent Labs   Lab 04/23/20  1006 04/23/20  0713 04/22/20  1719 04/22/20  0735 04/21/20  0643  04/17/20  2322   WBC  --  11.6*  --  9.1 8.5  --  11.4*   HGB  --  9.9*  --  9.9* 9.7*  --  12.7   MCV  --  92  --  93 92  --  92   PLT  --  134*  --  102* 101*  --  129*   INR 1.26*   --   --   --   --   --   --    NA  --  146*  --  145* 143   < > 138   POTASSIUM  --  3.5 3.7 3.3* 3.8   < > 3.8   CHLORIDE  --  119*  --  118* 118*   < > 105   CO2  --  17*  --  17* 19*   < > 25   BUN  --  35*  --  27 24   < > 16   CR  --  1.11*  --  1.19* 1.40*   < > 1.21*   ANIONGAP  --  10  --  10 6   < > 8   FRANKLIN  --  10.3*  --  11.6* 12.0*   < > 15.0*   GLC  --  126*  --  112* 91   < > 115*   ALBUMIN  --  2.6*  --   --  2.4*  --  3.5   PROTTOTAL  --  6.0*  --   --  5.5*  --  7.5   BILITOTAL  --  0.4  --   --  0.5  --  0.6   ALKPHOS  --  80  --   --  76  --  108   ALT  --  20  --   --  23  --  29   AST  --  84*  --   --  94*  --  98*   LIPASE  --   --   --   --   --   --  27*   TROPI  --   --   --   --   --   --  0.019    < > = values in this interval not displayed.       Recent Results (from the past 24 hour(s))   CT Abdomen Retroperitoneal Biopsy    Narrative    CT ABDOMEN RETROPERITONEAL BIOPSY  4/23/2020 11:14 AM     HISTORY:  Soft tissue lesion L5    TECHNIQUE:  After obtaining informed consent, the patient was placed in a supine  position on the CT table. The skin overlying the area of biopsy was  prepped and draped in the usual sterile manner. 1% lidocaine was  injected for local anesthesia. Under CT guidance, using a 17/18 gauge  coaxial biopsy needle system, cores were obtained from soft tissue  about the posterior elements of the L5 vertebral body and submitted to  pathology.    I determined this patient to be an appropriate candidate for the  planned sedation and procedure and reassessed the patient immediately  prior to sedation and procedure. Moderate intravenous conscious  sedation was supervised by me. The patient was independently monitored  by a registered nurse assigned to the Department of radiology using  automated blood pressure, EKG and pulse oximetry. The patient  tolerated the procedure well. There were no immediate postprocedure  complications. The patient's vital signs were monitored by  radiology  nursing staff under my supervision and remained stable throughout the  study. Radiation dose for this scan was reduced using automated  exposure control, adjustment of the mA and/or kV according to patient  size, or iterative reconstruction technique.    None    Sedation time: None      Impression    IMPRESSION: CT guided biopsy performed as described above.    Radiation dose for this scan was reduced using automated exposure  control, adjustment of the mA and/or kV according to patient size, or  iterative reconstruction technique.             KUN MENDOZA MD

## 2020-04-23 NOTE — PROGRESS NOTES
"Calorie Count    Current Diet: DD1 + NTL (per SLP)    Current Supplements: None (largely restricted by milk protein allergy)    Date: 4/22/2020  Meals Recorded: 0  Supplements Recorded: NA  Calories consumed - See below  Protein consumed - See below    ASSESSED NUTRITION NEEDS: (DW 77 kg)  Estimated Energy Needs: ~1925 kcals (~25 Kcal/Kg)  Justification: maintenance   Estimated Protein Needs: 77-92 grams protein (1-1.2 g pro/Kg)  Justification: preservation of lean body mass  Estimated Fluid Needs: >1 mL/Kcal  Justification: maintenance      Summary  - Per review of chart and meal ordering system, refused meals yesterday, therefore no PO intakes to record.  - Called patient's  this AM to verify gluten and milk protein allergies:  - Has been avoiding gluten for period of years, causes \"upset stomach\" if consumes.  Describes patient does not have Celiac though was told by a \"specialist\" that avoiding gluten may help with GI issues.  - Has also been avoiding what  interchanges as lactose and dairy products for a period of years as also results in \"stomach problems\".  Does consume lactose free milk at home as well as butter.  Avoids regular cows milk, ice cream, yogurt, and cheese.  - For breakfast she usually likes oatmeal with brown sugar and lactose free milk.  - For lunch she usually has apples and peanut butter or peanut butter toast.  - For dinner she usually has chicken or a gluten free pasta with a vegetable (may be starchy such as potatoes).  - For snacks she likes kind bars.  - SLP note from this AM indicates \"modest improvements in ability to participate/follow directions compared to prior day\".  - Will continue following for ability to add supplements and need for more aggressive nutrition interventions pending clinical progression and MD input.      Janie Valdes RDN, LD  Clinical Dietitian  3rd floor/ICU: [phone]  All other floors: [phone]  Weekend/holiday: [phone]  "

## 2020-04-23 NOTE — IR NOTE
CT ABDOMEN RETROPERITONEAL BIOPSY  4/23/2020 11:14 AM     HISTORY:  Soft tissue lesion L5    TECHNIQUE:  After obtaining informed consent, the patient was placed in a supine position on the CT table. The skin overlying the area of biopsy was prepped and draped in the usual sterile manner. 1% lidocaine was injected for local anesthesia. Under CT guidance, using a 17/18 gauge coaxial biopsy needle system, cores were obtained from soft tissue about the posterior elements of the L5 vertebral body and submitted to pathology.       I determined this patient to be an appropriate candidate for the planned sedation and procedure and reassessed the patient immediately prior to sedation and procedure. Moderate intravenous conscious sedation was supervised by me. The patient was independently monitored by a registered nurse assigned to the Department of radiology using automated blood pressure, EKG and pulse oximetry. The patient tolerated the procedure well. There were no immediate postprocedure complications. The patient's vital signs were monitored by radiology nursing staff under my supervision and remained stable throughout the study. Radiation dose for this scan was reduced using automated exposure control, adjustment of the mA and/or kV according to patient size, or iterative reconstruction technique.    None    Sedation time: None    IMPRESSION: CT guided biopsy performed as described above.    Radiation dose for this scan was reduced using automated exposure control, adjustment of the mA and/or kV according to patient size, or iterative reconstruction technique.

## 2020-04-23 NOTE — PLAN OF CARE
OT- Spoke with RN who reports patient is not appropriate for OT evaluation today. Plan to allow for further medical management and reassess appropriateness for tomorrow.

## 2020-04-23 NOTE — PLAN OF CARE
Pt admitted with increase weakness and muscle pain. Calcium elevated 11.6. Muscle movement are jerky. PRN Atarax  and tylenol given for pain with some results. PRN oxy given as well. Pt anxious. Mentation variable. Pt able to follow simple commands example being squeeze my fingers. WHen asked the date pt responds April of 2020. IVF . DD1 [place] thick. Purewick in place. Q2hr turn. Pt has labored breathing. BLE edema. Per day shift RN. MD was aware. IV fluid continued due to calcium level. O2 1L stat >90%. BP elevated 182/80. Scheduled hydralazine.  PO dexamethasone and IV rocephin for UTI.  Heme/onc following. Pt has suspecting multiple myeloma. Plan for IR guided biopsy of L5 tomorrow.  Will continue to monitor

## 2020-04-23 NOTE — PLAN OF CARE
VS elevated, on scheduled po hydralazine Calcium improving. Mag 1.4, on protocol. Pt still continues to be confused, A&Ox2, able to follow direction little better today, pt not as anxious and little more easier to redirect.  Heme/Onc following. Pt had IR bx of L5 soft tissue. IVF's changed. PT/OT following but holding off today due to pt unable to follow direction as well, reassess tomorrow. Speech following. PT tolerating nectar thick liq. Meds whole with Applesauce. Pt incont at times, purwick in place.

## 2020-04-24 NOTE — PLAN OF CARE
Pt in bed all shift. Confused. Incont, purewick in place. Rocephin complete. discontinue IVF. Diet advanced: nectar to thin liquids. Tolerating diet, appetite fair/poor, doesn't like food options, needs assistance with feeding. PT/OT following, unsure what her activity restrictions are. LE edema feet+2 legs+1. C/o pain, giving oxy, tyl & atarax. Abd fold with open areas, powder applied. Lumbar puncture drsg intact.

## 2020-04-24 NOTE — PROGRESS NOTES
CLINICAL NUTRITION SERVICES - REASSESSMENT NOTE      RECOMMENDATIONS FOR MD/PROVIDER TO ORDER:   Calorie count data reinforces minimal PO intakes during 7 day hospital admit.  Need for more aggressive nutrition-related interventions (?short-term enteral trial) per discretion of MD based on expected clinical progression, POC, and discharge disposition.  Per discussion with Dr. Wesley this AM who is aware of poor oral intakes, observed patient eating breakfast, feels mentation/ability to follow direction improved today compared to beginning of admit, continue monitoring PO intake trends as opposed to more aggressive interventions.     Future/Additional Recommendations:   Diet per SLP.  Continue milk protein and gluten free portions of diet (suspected intolerances based on discussion with ).      Hold off on offering supplements at this time in light of reported intolerances and given refusing meals in general.    Continue calorie counts for formal documentation of PO trends.     MALNUTRITION (4/24/2020)  % Weight Loss: None noted PTA  % Intake:  </= 50% for >/= 5 days (severe malnutrition)  Subcutaneous Fat Loss: Unable to complete physical exam  Muscle Loss: Unable to complete physical exam  Fluid Retention: Trace to mild, LEs --> potential to mask true wt trending    Malnutrition Diagnosis: Non-Severe malnutrition  In Context of:  Acute illness or injury  Chronic illness or disease         EVALUATION OF PROGRESS TOWARD GOALS   Diet:  DD1 + NTL, room service with assist --> no supplements at this time given whey/casein and gluten allergies (see calorie count note from this writer yesterday documenting allergy discussion with , suspect likely milk protein and lactose intolerance as opposed to true allergy    Intake/Tolerance:  Given mentation, mostly refusing PO intakes since admit, calorie counts initiated with data outlined below.  SLP consulted with new dysphagia concerns, bedside swallow evaluation  completed on 4/22/2020 and diet downgraded to DD1 + NTL.    4/21/2020: <25% needs  4/22/2020: refused all meals  4/23/2020: 1 meal ordered, though only 25% of 1 pudding consumed --> likely from floor stock as technically contains milk protein     Barriers to PO intakes including: Suspect decreased appetite with new possible malignancy, mentation and dysphagia larger barriers    - Oncology now following given concerns for new MM, biopsy completed yesterday.          ASSESSED NUTRITION NEEDS (PER APPROVED PRACTICE GUIDELINES, Dosing weight: 77 kg):  Estimated Energy Needs: >/=1925 kcals (>/=25 Kcal/Kg)  Justification: maintenance   Estimated Protein Needs: 77-92+ grams protein (1-1.2+ g pro/Kg)  Justification: preservation of lean body mass  Estimated Fluid Needs: per MD      NEW FINDINGS:   - Medications reviewed including:     aspirin  81 mg Oral Daily     cefTRIAXone  1 g Intravenous Q24H     dexamethasone  2 mg Oral Q12H ELDA     hydrALAZINE  10 mg Oral TID     isosorbide mononitrate  30 mg Oral Daily     levothyroxine  75 mcg Oral Daily     liothyronine  5 mcg Oral Daily     metoprolol tartrate  50 mg Oral BID     vitamin C  500 mg Oral Daily with lunch        dextrose 5% and 0.45% NaCl + KCl 20 mEq/L 75 mL/hr at 04/24/20 0232      - Labs reviewed including:  Electrolytes  Potassium (mmol/L)   Date Value   04/23/2020 3.5   04/22/2020 3.7   04/22/2020 3.3 (L)    Blood Glucose  Glucose (mg/dL)   Date Value   04/23/2020 126 (H)   04/22/2020 112 (H)   04/21/2020 91   04/20/2020 80   04/19/2020 94    Inflammatory Markers  WBC (10e9/L)   Date Value   04/23/2020 11.6 (H)   04/22/2020 9.1   04/21/2020 8.5     Albumin (g/dL)   Date Value   04/23/2020 2.6 (L)   04/21/2020 2.4 (L)   04/17/2020 3.5      Magnesium (mg/dL)   Date Value   04/23/2020 2.5 (H)   04/23/2020 1.4 (L)     Sodium (mmol/L)   Date Value   04/23/2020 146 (H)   04/22/2020 145 (H)   04/21/2020 143    Renal  Urea Nitrogen (mg/dL)   Date Value   04/23/2020 35  (H)   04/22/2020 27   04/21/2020 24     Creatinine (mg/dL)   Date Value   04/23/2020 1.11 (H)   04/22/2020 1.19 (H)   04/21/2020 1.40 (H)     Additional  Triglycerides (mg/dL)   Date Value   01/04/2019 88   03/07/2018 144   05/16/2017 127     Ketones Urine (mg/dL)   Date Value   04/17/2020 Negative        -  Weight trending reviewed, increasing since admit.  Does have documentation of trace to mild BLE edema:  Vitals:    04/18/20 0146 04/19/20 0655 04/20/20 0625 04/22/20 0633   Weight: 77.2 kg (170 lb 4.8 oz) 77.2 kg (170 lb 1.6 oz) 83.5 kg (184 lb) 85.3 kg (188 lb)    04/24/20 0451   Weight: 86.7 kg (191 lb 1.6 oz)     - Stooling patterns reviewed, last documented BM this AM.      Previous Goals:   Patient to consume >/= 50% of meals TID  Evaluation: Not met    Previous Nutrition Diagnosis:   Inadequate oral intake related to confusion/altered mental status as meals since admit  Evaluation: Declining      CURRENT NUTRITION DIAGNOSIS  Inadequate protein-energy intake related to suspect decreased appetite in combination with mentation and new dysphagia as evidenced by meeting <25% needs during 7 day hospital admit.    INTERVENTIONS  Recommendations / Nutrition Prescription  Calorie count data reinforces minimal PO intakes during 7 day hospital admit.  Need for more aggressive nutrition-related interventions (?short-term enteral trial) per discretion of MD based on expected clinical progression and POC.  Per discussion with Dr. Wesley this AM who observed patient eating breakfast, feels mentation/ability to follow direction improved today compared to beginning of admit, continue monitoring PO intake trends as opposed to more aggressive interventions.    Diet per SLP.  Continue milk protein and gluten free portions of diet (suspected intolerances based on discussion with ).      Hold off on offering supplements at this time in light of reported intolerances and given refusing meals in general.    Continue calorie  counts for formal documentation.    Implementation  Collaboration and Referral of Nutrition care: Discussed POC with team during rounds and with MD.    Goals  Patient to consistently consume at least 50% of meals or at least 50% of needs via calorie counts to show improvement in PO intakes.      MONITORING AND EVALUATION:  Progress towards goals will be monitored and evaluated per protocol and Practice Guidelines      Janie Valdes RDN, LD  Clinical Dietitian  3rd floor/ICU: [phone]  All other floors: [phone]  Weekend/holiday: [phone]

## 2020-04-24 NOTE — PROGRESS NOTES
Hematology oncology chart check.     84-year-old lady admitted with hypercalcemia confusion.  Work-up showed a urinary tract infection and, extensive mottled appearance of osseous structures, lumber spine, Initial concern for multiple myeloma/ other malignancy.    Hematology oncology consulted on 4/21: Work-up for plasma cell dyscrasia ordered.    4/23 Biopsy from [place] spine completed.     4/24.   The serum immunofixation did not show monoclonal protein which would be against a underlying plasma cell disorder, However concern for underlying malignancy remains high. Await biopsy results.   Hyper calcemia has improved after bisphosphonate.   Steroid taper to continue.      Rodney An MD  [place] [facility]

## 2020-04-24 NOTE — PROGRESS NOTES
For vital signs and complete assessments, please see documentation flowsheets.     Pertinent assessments: Alert to self and partial time, situation, confused at times. Yells out occasionally. Elevated BP, cpap with 1L, Oxycodone x 1 this shift, repositioning q 2hrs, pure wick in place. On tele monitoring, LE edema legs elevated.     Major Shift Events  Treatment Plan: IVF, Rocephin, Decadron, monitor labs

## 2020-04-24 NOTE — PLAN OF CARE
Discharge Planner SLP   Patient plan for discharge: did not state  Current status: Swallow tx completed. Ongoing improvements in mental status which is ultimately positively impacting oropharyngeal swallow function. Improved awareness, oral control and timeliness of swallow. Difficulty holding/drinking from cup - improvements with straw. Pt tolerated 3 oz nectar thick liquids and 6 oz thin liquids without any overt signs/sx aspiration. Pt accepted bites of puree but declined any further solid trials (including chewable options for possible upgrades) despite encouragement.     Recommendations: Continue dysphagia diet level 1, upgrade to THIN liquids (straws best) when fully alert, upright. Medications as tolerated. Downgrade back to nectar if any increased difficulty.      Barriers to return to prior living situation: dysphagia, nutrition, level of assist, weakness, confusion/cognitive status.   Recommendations for discharge: TCU  Rationale for recommendations: Pt making progress towards goals but continues to be significantly below baseline function and will require SLP intervention for upgrades to safest least restrictive diet.        Entered by: Danyell Long 04/24/2020 12:10 PM

## 2020-04-24 NOTE — PROGRESS NOTES
RT Note:    Patient placed on our CPAP for the night with auto CPAP settings and 1L bled in.    Edda Bull, RT  April 23, 2020.11:09 PM

## 2020-04-24 NOTE — PROGRESS NOTES
Discharge Planner   Discharge Plans in progress: Met with pt today to discuss discharge planning support.. Pt is still needing o2, this is new to her.. Pt still presents as a little confused, although she was able to have conversation about TCU.. she does agree that she is not able to return home at this time. Pt and spouse are waiting on results from her Bi-opsy for recommendations. SW did discuss TCU options. This conversation did overwhelm pt today   SW called Spouse and talked with Gene.. Again pt has been to [facility] in the past,. He would be agreeable to this location as well at [facility] due to location to their home.  Ad would like to have an understanding on what the ongoing plan is    Barriers to discharge plan: PT does require Prior auth for TCU placement. Still waiting on PT.OT eval and rec's  Oncology plan will need to be defined as well.. Should pt need TCU then treatment options will need to be placed on hold till pt is strong enough to return home.. SW spoke with family that Oncology will be needing to discuss options and whether or not pt is able to defer any treatment options if not able to return home   Follow up plan: SW continues to follow for possible TCU needs  Once PT/OT eval completed then can work on Prior auth needs and send referral sto [facility] and [facility]    Corinne White [facility]  Inpatient Care Coordination   [phone]  [facility]          Entered by: Corinne C. White 04/24/2020 3:08 PM

## 2020-04-24 NOTE — PROGRESS NOTES
[facility]    Hospitalist Progress Note      Assessment & Plan   Elizabeth Li is a 84 year old female with a history of coronary artery disease status post stenting x2, hyperlipidemia, hypertension, hypothyroidism, KUSUM, anxiety, depression who presents with fatigue, weakness, and generalized myalgias.  For the past 1 month she has been suffering from increasing fatigue, weakness, and diffuse muscle pains found to have severe hypercalcemia.  CT scan is concerning for multiple myeloma.     #Severe hypercalcemia likely secondary to malignancy.   Ionized calcium was 7.6 on presentation.  Complicated by confusion.  Also does have bone pain.  Patient noted to have an JENNIFER with peak creatinine of 1.5.  PTH level is noted to be significantly low.  CT scan of the chest/abdomen/pelvis today shows multiple lesions concerning for multiple myeloma.  Patient has been treated with IV fluids, subcutaneous calcitonin.  Subsequently treated with dexamethasone and pamidronate therapy.  PTH level noted to be significantly low at time of presentation.    -1,25 vit d low at 9.6, 25 vit D still in process.  PTHrp was present, not significantly elevated  -Ionized calcium is improved with treatment   -Stop IV fluids today.  Encourage oral hydration.  Also reducing steroids with taper.  -Oncology is consulted     #Possible Multiple Myeloma: CT scan of the chest abdomen pelvis showed multiple sclerotic lesions concerning for multiple myeloma.  This is in the setting of hypercalcemia as above.  Patient also noted to have an JENNIFER, anemia and sclerotic lesions as above.  -Multiple myeloma work-up is in process.  SPEP was consistent with mixed proteinuria but cannot totally rule out presence of monoclonal free light chains.  Beta-2 microglobulin was elevated at 3.3.  She will need a 24-hour urine collection as an outpatient.  Per oncology no urgent need for therapy.  -Patient did undergo IR guided biopsy today of soft tissue  lesion at L5.  Surgical pathology in process     #Acute kidney injury.  Improved.  Secondary to dehydration and hypercalcemia.  IV fluids as above  -Continue IV fluids.  -Renal ultrasound on 4/20 was normal.  -Avoid nephrotoxins as able.     #Urinary tract infection.  Urine culture growing providencia.    -Continue IV ceftriaxone.  -Completed 7 days of ceftriaxone      #Hypothyroidism.  Continue home meds     #Hypertension.  Continue isosorbide mononitrate.  Increase metoprolol to 50 mg BID.  I have scheduled 10 mg p.o. hydralazine 3 times daily.  IV hydralazine if needed.     #Hyperlipidemia.  Hold simvastatin for now.     # Deconditioning.  Physical therapy consult.     #Acute toxic metabolic encephalopathy.  Suspect this is mainly due to hypercalcemia.  May have some contribution from UTI that was treated as above along with starting treatment with dexamethasone.  He does continue to show slow improvement.  I suspect this will take time.  -CT scan of the head on 4/20 showed no acute intracranial pathology.  -Tapering dexamethasone to 2 mg twice daily tonight.  I did discuss this with oncology.   -Minimizing opiates as able, though understand patient does have acute pain from osteolytic lesions.  -Keep blinds open, maintain day night cycle.  Encourage reorientation.     #Nonsevere malnutrition: Poor p.o. intake through hospital stay.  I did see her eating a little bit of breakfast this a.m.  I do expect with improvement of mental status that she will be able to eat more.    I did update her , Ad, on above.  He did speak to her on the phone earlier today.  He notes that she did seem improved and was a bit clearer today compared to yesterday.  He does note that he does not think that she would be able to return home immediately aced on what he saw on the days-weeks leading up to her hospitalization.    Diet:  Modified diet per speech  DVT Prophylaxis: Pneumatic Compression Devices  Code Status: Full  Code     [facility] MD  Text Page    Interval History   No acute events overnight.  Patient states she again feels better compared to yesterday.  She still does not know that she is confused.  She is able to tell me today that her 's name is Heladio.  She also tells me that her son recently had a birthday.  She knows that it is April 2020.  She knows that she is in the hospital.  She is able to tell me that she has a thyroid condition.    -Data reviewed today: I reviewed all new labs and imaging results over the last 24 hours.     Physical Exam   Temp: 97.2  F (36.2  C) Temp src: Oral BP: (!) 166/85 Pulse: 101 Heart Rate: 71 Resp: 16 SpO2: 95 % O2 Device: Nasal cannula Oxygen Delivery: 1 LPM  Vitals:    04/20/20 0625 04/22/20 0633 04/24/20 0451   Weight: 83.5 kg (184 lb) 85.3 kg (188 lb) 86.7 kg (191 lb 1.6 oz)     Vital Signs with Ranges  Temp:  [97.1  F (36.2  C)-98.1  F (36.7  C)] 97.2  F (36.2  C)  Pulse:  [101] 101  Heart Rate:  [67-86] 71  Resp:  [16-22] 16  BP: (147-171)/(55-85) 166/85  FiO2 (%):  [1 %] 1 %  SpO2:  [90 %-96 %] 95 %  I/O last 3 completed shifts:  In: 1520 [P.O.:200; I.V.:1320]  Out: 850 [Urine:850]    Gen: No acute distress.    She is ANO x2-3.  She knows that it is April 2020.  She knows that her 's name is Ad.  She tells me that her son has a birthday in April.  She tells me she is not fond of chocolate pudding of what she is eating.  HEENT: No elevation of JVD. No obvious trauma noted.   CV:  Regular, no murmurs.  Lung:   Clear bilaterally.  Good air entry.  Ab:  +BS, soft.  Skin:  Warm, dry to touch.  No rash.  Ext:  Mild non pitting edema LE b/l.    Medications     dextrose 5% and 0.45% NaCl + KCl 20 mEq/L 75 mL/hr at 04/24/20 0232       aspirin  81 mg Oral Daily     dexamethasone  2 mg Oral Q12H ELDA     hydrALAZINE  10 mg Oral TID     isosorbide mononitrate  30 mg Oral Daily     levothyroxine  75 mcg Oral Daily     liothyronine  5 mcg Oral Daily     metoprolol tartrate  50  mg Oral BID     vitamin C  500 mg Oral Daily with lunch       Data   Recent Labs   Lab 04/24/20  0741 04/23/20  1006 04/23/20  0713 04/22/20  1719 04/22/20  0735 04/21/20  0643  04/17/20  2322   WBC  --   --  11.6*  --  9.1 8.5  --  11.4*   HGB  --   --  9.9*  --  9.9* 9.7*  --  12.7   MCV  --   --  92  --  93 92  --  92   PLT  --   --  134*  --  102* 101*  --  129*   INR  --  1.26*  --   --   --   --   --   --      --  146*  --  145* 143   < > 138   POTASSIUM 3.9  --  3.5 3.7 3.3* 3.8   < > 3.8   CHLORIDE 119*  --  119*  --  118* 118*   < > 105   CO2 18*  --  17*  --  17* 19*   < > 25   BUN 38*  --  35*  --  27 24   < > 16   CR 1.09*  --  1.11*  --  1.19* 1.40*   < > 1.21*   ANIONGAP 7  --  10  --  10 6   < > 8   FRANKLIN 9.5  --  10.3*  --  11.6* 12.0*   < > 15.0*   *  --  126*  --  112* 91   < > 115*   ALBUMIN  --   --  2.6*  --   --  2.4*  --  3.5   PROTTOTAL  --   --  6.0*  --   --  5.5*  --  7.5   BILITOTAL  --   --  0.4  --   --  0.5  --  0.6   ALKPHOS  --   --  80  --   --  76  --  108   ALT  --   --  20  --   --  23  --  29   AST  --   --  84*  --   --  94*  --  98*   LIPASE  --   --   --   --   --   --   --  27*   TROPI  --   --   --   --   --   --   --  0.019    < > = values in this interval not displayed.       Recent Results (from the past 24 hour(s))   CT Abdomen Retroperitoneal Biopsy    Narrative    CT ABDOMEN RETROPERITONEAL BIOPSY  4/23/2020 11:14 AM     HISTORY:  Soft tissue lesion L5    TECHNIQUE:  After obtaining informed consent, the patient was placed in a supine  position on the CT table. The skin overlying the area of biopsy was  prepped and draped in the usual sterile manner. 1% lidocaine was  injected for local anesthesia. Under CT guidance, using a 17/18 gauge  coaxial biopsy needle system, cores were obtained from soft tissue  about the posterior elements of the L5 vertebral body and submitted to  pathology.    I determined this patient to be an appropriate candidate for  the  planned sedation and procedure and reassessed the patient immediately  prior to sedation and procedure. Moderate intravenous conscious  sedation was supervised by me. The patient was independently monitored  by a registered nurse assigned to the Department of radiology using  automated blood pressure, EKG and pulse oximetry. The patient  tolerated the procedure well. There were no immediate postprocedure  complications. The patient's vital signs were monitored by radiology  nursing staff under my supervision and remained stable throughout the  study. Radiation dose for this scan was reduced using automated  exposure control, adjustment of the mA and/or kV according to patient  size, or iterative reconstruction technique.    None    Sedation time: None      Impression    IMPRESSION: CT guided biopsy performed as described above.    Radiation dose for this scan was reduced using automated exposure  control, adjustment of the mA and/or kV according to patient size, or  iterative reconstruction technique.             KUN MENDOZA MD

## 2020-04-25 NOTE — PLAN OF CARE
Pt up with zo [facility]. Confusion improved, more oriented. BLE & LUE edema. PCDs on. Lovenox restarted. Tele d/c'd. Purewick in place. C/o back pain, giving tyl, atarax, oxy. Lumbar puncture site WNL, ELINOR. HTN, gave sched meds.

## 2020-04-25 NOTE — PROGRESS NOTES
Oncology chart check for Dr. An:    Biopsy of paraspinal soft tissue consistent with metastatic carcinoma.  IHC pending which should possibly clarify the primary.  Further recommendations to follow

## 2020-04-25 NOTE — PROGRESS NOTES
[facility]    Hospitalist Progress Note      Assessment & Plan   Elizabeth Li is a 84 year old female with a history of coronary artery disease status post stenting x2, hyperlipidemia, hypertension, hypothyroidism, KUSUM, anxiety, depression who presents with fatigue, weakness, and generalized myalgias.  For the past 1 month she has been suffering from increasing fatigue, weakness, and diffuse muscle pains found to have severe hypercalcemia.  CT scan is concerning for metastatic cancer.      #Severe hypercalcemia likely secondary to malignancy.   Ionized calcium was 7.6 on presentation.  Complicated by confusion.  Also does have bone pain.  Patient noted to have an JENNIFER on presentation with peak creatinine of 1.5.  PTH level is noted to be significantly low.  CT scan of the chest/abdomen/pelvis shows multiple lesions concerning for metastatic malignancy.  Patient has been treated with IV fluids, subcutaneous calcitonin.  Subsequently treated with dexamethasone and pamidronate therapy.  PTH level noted to be significantly low at time of presentation.    -1,25 vit d low at 9.6, 25 vit D still in process.  PTHrp was present, not significantly elevated  -Ionized calcium is improved with treatment   -Encourage oral hydration.  Also reducing steroids with taper.  -Oncology is consulted     #Metastatic Carcinoma: CT scan of the chest abdomen pelvis showed multiple sclerotic lesions concerning for metastatic malignancy.  Initially was thought to be consistent with multiple myeloma.   -Multiple myeloma work-up was ordered by oncology.  Serum immunofixation did not show monoclonal protein which would be against a plasma cell disorder.   -Patient did undergo IR guided biopsy today of soft tissue lesion at L5.  Surgical pathology does show metastatic carcinoma.  Unknown primary.  Awaiting staining results     #Acute kidney injury.  Improved.  Secondary to dehydration and hypercalcemia.  -Continue IV  fluids.  -Renal ultrasound on 4/20 was normal.  -Avoid nephrotoxins as able.     #Urinary tract infection.  Urine culture growing providencia.    -Continue IV ceftriaxone.  -Completed 7 days of ceftriaxone      #Hypothyroidism.  Continue home meds     #Hypertension.  Continue isosorbide mononitrate.  Increase metoprolol to 50 mg BID.  I have scheduled 10 mg p.o. hydralazine 3 times daily initially.  Remains HTN, will increase to 25 mg PO TID.  IV hydralazine prn.       #Hyperlipidemia.  Hold simvastatin for now.     # Deconditioning.  Physical therapy and Occupational therapy consult.     #Acute toxic metabolic encephalopathy.  Suspect this is mainly due to hypercalcemia.  May have some contribution from UTI that was treated as above along with starting treatment with dexamethasone.    She has shown significant improvement over the last 2 to 3 days.  Today she is quite clear.  -CT scan of the head on 4/20 showed no acute intracranial pathology.  -Tapering dexamethasone to 2 mg twice daily tonight.  I did discuss this with oncology.   -Minimizing opiates as able, though understand patient does have acute pain from osteolytic lesions.  -Keep blinds open, maintain day night cycle.  Encourage reorientation.      #Nonsevere malnutrition: Poor p.o. intake through hospital stay.    This is started to  with improvement in her mental status.      Diet:  Modified diet per speech  DVT Prophylaxis: Pneumatic Compression Devices, restarted enoxaparin today. Counts stable post IR guided procedure.    Code Status: Full Code     I did discuss biopsy results with both patient and her  Gene.  They did voice understanding and of need for staining to further clarify primary site.      [facility] Wesley MD  Text Page    Interval History   No acute events overnight.  Patient states she feels better each day.  She feels clear.  She is endorsing continued pain that was present at home.  It is helped by the pain medications.  But it  is made worse with movement.  She denies any chest pain or chest discomfort.  No nausea or vomiting.  No shortness of breath.    -Data reviewed today: I reviewed all new labs and imaging results over the last 24 hours.     Physical Exam   Temp: 98.5  F (36.9  C) Temp src: Oral BP: (!) 196/87 Pulse: 74 Heart Rate: 71 Resp: 20 SpO2: 94 % O2 Device: Nasal cannula Oxygen Delivery: 1 LPM  Vitals:    04/22/20 0633 04/24/20 0451 04/25/20 0625   Weight: 85.3 kg (188 lb) 86.7 kg (191 lb 1.6 oz) 89.4 kg (197 lb)     Vital Signs with Ranges  Temp:  [96.5  F (35.8  C)-99.1  F (37.3  C)] 98.5  F (36.9  C)  Pulse:  [74] 74  Heart Rate:  [57-71] 71  Resp:  [18-20] 20  BP: (149-196)/(54-87) 196/87  SpO2:  [88 %-94 %] 94 %  I/O last 3 completed shifts:  In: 480 [P.O.:480]  Out: 1350 [Urine:1350]       Gen: No acute distress.  She is quite clear today.  A&Ox3.  She is able to relay events of the last several months.    HEENT: No elevation of JVD. No obvious trauma noted.   CV:  Regular, no murmurs.  Lung:   Clear bilaterally.  Good air entry.  Ab:  +BS, soft.  Skin:  Warm, dry to touch.  No rash.  Ext:  Mild non pitting edema LE b/l.    Medications       aspirin  81 mg Oral Daily     dexamethasone  2 mg Oral Q12H ELDA     enoxaparin ANTICOAGULANT  40 mg Subcutaneous Q24H     hydrALAZINE  10 mg Oral TID     isosorbide mononitrate  30 mg Oral Daily     levothyroxine  75 mcg Oral Daily     liothyronine  5 mcg Oral Daily     metoprolol tartrate  50 mg Oral BID     vitamin C  500 mg Oral Daily with lunch       Data   Recent Labs   Lab 04/25/20  0915 04/24/20  0741 04/23/20  1006 04/23/20  0713  04/22/20  0735 04/21/20  0643   WBC  --   --   --  11.6*  --  9.1 8.5   HGB 9.5*  --   --  9.9*  --  9.9* 9.7*   MCV  --   --   --  92  --  93 92     --   --  134*  --  102* 101*   INR  --   --  1.26*  --   --   --   --     144  --  146*  --  145* 143   POTASSIUM 4.6 3.9  --  3.5   < > 3.3* 3.8   CHLORIDE 116* 119*  --  119*  --  118*  118*   CO2 22 18*  --  17*  --  17* 19*   BUN 36* 38*  --  35*  --  27 24   CR 1.07* 1.09*  --  1.11*  --  1.19* 1.40*   ANIONGAP 6 7  --  10  --  10 6   FRANKLIN 9.0 9.5  --  10.3*  --  11.6* 12.0*   * 155*  --  126*  --  112* 91   ALBUMIN  --   --   --  2.6*  --   --  2.4*   PROTTOTAL  --   --   --  6.0*  --   --  5.5*   BILITOTAL  --   --   --  0.4  --   --  0.5   ALKPHOS  --   --   --  80  --   --  76   ALT  --   --   --  20  --   --  23   AST  --   --   --  84*  --   --  94*    < > = values in this interval not displayed.       No results found for this or any previous visit (from the past 24 hour(s)).

## 2020-04-25 NOTE — PROGRESS NOTES
"SPIRITUAL HEALTH SERVICES Progress Note  [facility] 5th floor    SH visit per length of stay.   Attempted tele-visit but pt did not answer her room phone. Later checked on pt and she was asleep, but woke to voice. Pt invited SH support, following orientation to  support.     Pt, Elizabeth, reflected on her family and medical narratives in the context of her robbie. She notes she has melded her [employer] robbie along with her 12 step Al-Anon learnings/principles. Pt engaged in episodic life review and shared the following:    Growing up in a family that mainly saw things \"black and white.\"     Her sense of isolation related to her increasing physical challenges and now amplified by COVID pandemic.     Her love for her  and the ways he cares for her.     Elizabeth shared that some of her favorite things are kittens.     With respect to her robbie she notes that \"I have robbie, and sometimes it's hard to hang onto it.\"     Provided supportive/reflectivec listening and affirmation. Shared in prayer together per her invitation. Then provided picture of kittens and a  Pocket cross per her request.     Will continue to follow for ongoing emotional/spiritual support 2-3x/wk. SH remains available.     SUNSHINE Ford.  Staff    Pager #[phone]   Pronouns: he/him/his    "

## 2020-04-25 NOTE — PLAN OF CARE
"    Discharge Planner PT   Patient plan for discharge: TCU  Current status: Discussed case with RN/MD. Dr. Wesley clearing mobility despite lesions at L5. Pt cued for spinal prec, pt recognizing precautions. RN assisting with mobility, cued for log roll with HOB mildly elevated, limited tolerance to hip flexion/knee flexion due to pain, but able to perform supine to sit with mod/max  A x 2.  Once seated at bedside pt was able to sit inde. Pt was cued for attempts to stand, encouraged to use grippy socks and FWW, declined FWW wanting to use her 4WW. Pt cued for sit to stand with 4WW, needing min A x 2, limited hip/lumbar extension. Pt was cued for attempts for steps. Pt reporting \"grippy sock\" preventing movement. Pt cued for return to seated position, placed her slippers on her at her request. Pt cued for return to standing with 4WW, pt again cued for wt shifting and taking step, but again reporting inability, poor posture despite cues, reporting hip pain preventing full standing. Pt was able to offload R LE but not able to progress to functional step. Pt was cued for return to seated. Pt cued for sit to stand with use of Regine steady, very difficult due to posture and height. Pt was able to wt shift forward in order to place paddles. Pt was cued for return to seated position in recliner. Assisted with positioning for improved comfort.   Barriers to return to prior living situation: Requires heavy A for basic mobility skills, cognition, below baseline, unsure how much spouse can assist.   Recommendations for discharge: TCU  Rationale for recommendations: Pt is not currently at baseline for mobility, and is unsafe to discharge home. With continued PT, both IP and after discharge, pt is likely to obtain mobility goals.        Entered by: Maryam Wylie 04/25/2020 11:39 AM       "

## 2020-04-25 NOTE — PROGRESS NOTES
CALORIE COUNT    Current Diet Order:   Dysphagia Diet Level 1: Pureed; Thin Liquids (STRAWS ok and best for patient)    Supplement Order:   Deferred sending given reported intolerances/allergies    Approximate Oral Intake for 4/24:   Unable to determine     Number of Meals Recorded: 1 (additional meals ordered)  Number of Snacks Recorded: 0    ASSESSED NUTRITION NEEDS: (DW 77 kg)  Estimated Energy Needs: >/=1925 kcals (>/=25 Kcal/Kg)  Justification: maintenance   Estimated Protein Needs: 77-92+ grams protein (1-1.2+ g pro/Kg)  Justification: preservation of lean body mass  Estimated Fluid Needs: per MD      Summary:   25% intake x 1 documented meal in flowsheet. Continues to meet <25-50% of estimated energy and protein needs since admit    Calorie counts to continue - appreciate documentation of oral intake.      Shelia Dumont RDN, LD, CNSC  3rd floor/ICU: [phone]  All other floors: [phone]  Weekend/holiday: [phone]  Office: [phone]

## 2020-04-25 NOTE — PLAN OF CARE
To Do:  End of Shift Summary  For vital signs and complete assessments, please see documentation flowsheets.     Pertinent assessments: Alert to self and partial time, situation, confused at times. Yells out occasionally. Elevated BP, cpap with 1L, repositioning q 2hrs, pure wick in place. On tele monitoring, LE edema. legs elevated. Open areas to abd fold. Powder applied.     Major Shift Events. Oxy/tylenol x3 and Atarax x1 for back pain.   Treatment Plan: Decadron, monitor labs     Discharge Readiness: Medically active  Expected Discharge Date: TBD  Discharge Disposition: TBD  Barriers/Criteria for discharge: Oncology plan

## 2020-04-25 NOTE — PLAN OF CARE
Discharge Planner SLP   Patient plan for discharge: not stated  Current status: Pt a little bit confused, preferred that nurse help her rearrange her tray and put up bed rails.  Pt reclined to low for safe oral intake of solid foods at this time.  She states that she is in 10/10 pain if she sit up any higher.  She will have PT evaluation and hopefully she can be transferred to chair or sit more upright in bed for safe upgrade of diet.  Pt needs 1:1 supervision with feeding, she is unable to lift cups for drinking through straws.  She tolerates straw drinking thin liquids if someone holds the cup for her.  Pt tolerated several bites of hot cereal with mildly increased oral prep time due to positioning.  Laryngeal elevation on all swallows and no oral residue after swallows during session.  No overt s/s aspiration with food/liquid presented.  Recommend continue with DD1/thin liquid with straw with 1:1 feeding, slow rate and small boluses alternate textures.  Pt only took a small amount of food and liquid,  not hungry.  Barriers to return to prior living situation: dysphagia, nutrition, level of assist, weakness, confusion/cognitive status.  Recommendations for discharge: TCU  Rationale for recommendations: Significantly below baseline although making progress will require SLP dysphagia management to safely upgrade to safer least restrictive diet texture.       Entered by: Rusty Colmenares 04/25/2020 9:04 AM

## 2020-04-26 NOTE — PLAN OF CARE
End of Shift Summary  For vital signs and complete assessments, please see documentation flowsheets.     Pertinent assessments: Confusion improved, A&Ox3, transfer with zo steady. C/o back pain, relived with oxycodone and repositioning. L&UE edema. Open areas to abd fold, powder applied. Tolerating diet, appetite fair, not many food options with food restrictions. Was able to self feed with cues.    Major Shift Events. Lower back pain, Oxycodone given x1and ice applied Elevate BP, on hydralazine and metoprolol.   Treatment Plan: Decadron, control BP, control back pain.    Discharge Readiness: Medically active  Expected Discharge Date: TBD  Discharge Disposition: TBD  Barriers/Criteria for discharge: Oncology plan

## 2020-04-26 NOTE — PROGRESS NOTES
Discharge Planner   Discharge Plans in progress: chart reviewed.  Referrals sent to [facility] and [facility]   Barriers to discharge plan: still waiting to see oncology   Follow up plan: SW will continue to follow and assist with discharge planning       Entered by: Qing Maddox 04/26/2020 9:07 AM         Qing ACE, [facility]  Inpatient Care Coordination   [facility]   [phone]    ADDENDUM:  [facility] back from [place].  They will need to know oncology's plan before taking pt, no female beds until 4/28.  They will continue to follow along.

## 2020-04-26 NOTE — PLAN OF CARE
Discharge Planner OT   Patient plan for discharge: home, open to [facility] if needs TCU  Current status: evaluation completed, treatment initiated.  Patient lives with her spouse in a condo. She reports ambulating in the condo with her 4WW, and that her spouse assists with LE dressing some days, other days she was completing with AE.  She was completing toilet transfer/task Jessy until a few weeks ago, spouse has assisted with clothing management recently. Pt reports spouse assists with shower transfer and task.  Today patient MaxA LE dressing, ModA UB dressing.  MaxA feeding. Patient declined to stand from chair due to back pain, and wanting to stay in chair until after breakfast.  Patient spoke with spouse on the phone during session. Patient participated in BUE AROM exercises with increased pain/decreased endurance. Patient reports she is well below baseline.   Patient needing to use toilet after session.  Therapist returned to assist with transfer, strong ModAx2 and multiple VC for safety/technique/hand placement.  Pt dependent for carissa-cares and clothing management.  Barriers to return to prior living situation: weakness, pain, current level of assist  Recommendations for discharge: TCU  Rationale for recommendations: patient below baseline, and would benefit from continued therapy to increase safety and independence with ADLs       Entered by: EARL CROUCH 04/26/2020 10:19 AM

## 2020-04-26 NOTE — PROGRESS NOTES
04/26/20 0930   Quick Adds   Type of Visit Initial Occupational Therapy Evaluation   Living Environment   Lives With spouse   Living Arrangements condominium   Home Accessibility no concerns   Transportation Anticipated family or friend will provide   Living Environment Comment Pt lives with spouse in a home with main level living   Self-Care   Usual Activity Tolerance moderate   Current Activity Tolerance fair   Equipment Currently Used at Home shower chair;grab bar, toilet;dressing device;walker, rolling   Activity/Exercise/Self-Care Comment Pt reports using 4WW for ambulation at home   Functional Level   Ambulation 1-->assistive equipment   Transferring 1-->assistive equipment   Toileting 3-->assistive equipment and person   Bathing 3-->assistive equipment and person   Dressing 3-->assistive equipment and person   Eating 0-->independent   Cognition 1 - attention or memory deficits   Which of the above functional risks had a recent onset or change? ambulation;transferring;toileting;bathing;dressing;cognition   Prior Functional Level Comment sposue has been putting on socks and assisting with toileting and shower transfer.        Present no   Language English   General Information   Onset of Illness/Injury or Date of Surgery - Date 04/21/20   Referring Physician Romario Schmid   Patient/Family Goals Statement home; pt overwhelmed with TCU rec   Additional Occupational Profile Info/Pertinent History of Current Problem 84 year old female with a history of coronary artery disease status post stenting x2, hyperlipidemia, hypertension, hypothyroidism, KUSUM, anxiety, depression who presents with fatigue, weakness, and generalized myalgias.  For the past 1 month she has been suffering from increasing fatigue, weakness, and diffuse muscle pains found to have severe hypercalcemia.  CT scan is concerning for metastatic cancer   Precautions/Limitations fall precautions;spinal precautions    Weight-Bearing Status - LUE full weight-bearing   Weight-Bearing Status - RUE full weight-bearing   Weight-Bearing Status - LLE full weight-bearing   Weight-Bearing Status - RLE full weight-bearing   Heart Disease Risk Factors High blood pressure;Lack of physical activity;Overweight;Medical history;Age   General Observations Pt seated in chair upon arrival; pt agreeable to OT session   General Info Comments Activity order: up with assist prn   Cognitive Status Examination   Orientation person;place  (knew it was Sunday, 2020; knew it was april or may)   Level of Consciousness alert   Follows Commands (Cognition) follows one step commands   Memory impaired   Cognitive Comment Pt generally giving PLOF as stated in chart; per chart pt with hallucinations last night, and cognitive issues this AM   Sensory Examination   Sensory Comments tingling B hands; R hand tingling is chronic   Pain Assessment   Patient Currently in Pain Yes, see Vital Sign flowsheet   Posture   Posture forward head position;protracted shoulders   Range of Motion (ROM)   ROM Comment B shoulder limited to 110, otherwise BUE WFL   Strength   Strength Comments generally deconditioned, MMT 4/5 B shoulders   Coordination   Coordination Comments Mild incoordination finger-to-thumb   Transfer Skill: Toilet Transfer   Toilet Transfer Skill Comments Pt has toilet safety frame at toilet; spouse has assisted with transfer/clothing management recently   Tub/Shower Transfer   Tub/Shower Transfer Comments Pt has walk-in shower with shower chair; spouse assists with transfer and washing LE   Balance   Balance Comments Good seated   Lower Body Dressing   Level of [place]: Dress Lower Body maximum assist (25% patients effort)   Physical Assist/Nonphysical Assist: Dress Lower Body 1 person assist;verbal cues;supervision;set-up required   Assistive Device reacher;sock-aid   Toileting   Level of [place]: Toilet maximum assist (25% patients effort)  "  Eating/Self Feeding   Level of [place]: Eating moderate assist (50% patients effort)   Physical Assist/Nonphysical Assist: Eating 1 person assist;set-up required   Instrumental Activities of Daily Living (IADL)   IADL Comments spouse can assist as needed   Activities of Daily Living Analysis   Impairments Contributing to Impaired Activities of Daily Living balance impaired;cognition impaired;coordination impaired;pain;ROM decreased;strength decreased   ADL Comments spouse can assist as needed   General Therapy Interventions   Planned Therapy Interventions ADL retraining;strengthening;transfer training   Clinical Impression   Criteria for Skilled Therapeutic Interventions Met yes, treatment indicated   OT Diagnosis impaired ADLs   Influenced by the following impairments weakness, decreased balance, impaired cognition   Assessment of Occupational Performance 3-5 Performance Deficits   Identified Performance Deficits dressing, bathing, toileting, home chores   Clinical Decision Making (Complexity) Moderate complexity   Therapy Frequency 5x/week   Predicted Duration of Therapy Intervention (days/wks) 3 days   Anticipated Discharge Disposition Transitional Care Facility   Risks and Benefits of Treatment have been explained. Yes   Patient, Family & other staff in agreement with plan of care Yes   [facility] AM-PAC  \"6 Clicks\" Daily Activity Inpatient Short Form   1. Putting on and taking off regular lower body clothing? 2 - A Lot   2. Bathing (including washing, rinsing, drying)? 2 - A Lot   3. Toileting, which includes using toilet, bedpan or urinal? 2 - A Lot   4. Putting on and taking off regular upper body clothing? 2 - A Lot   5. Taking care of personal grooming such as brushing teeth? 2 - A Lot   6. Eating meals? 2 - A Lot   Daily Activity Raw Score (Score out of 24.Lower scores equate to lower levels of function) 12   Total Evaluation Time   Total Evaluation Time (Minutes) 8     "

## 2020-04-26 NOTE — PROGRESS NOTES
[facility]    Hospitalist Progress Note      Assessment & Plan   Elizabeth Li is a 84 year old female with a history of coronary artery disease status post stenting x2, hyperlipidemia, hypertension, hypothyroidism, KUSUM, anxiety, depression who presents with fatigue, weakness, and generalized myalgias.  For the past 1 month she has been suffering from increasing fatigue, weakness, and diffuse muscle pains found to have severe hypercalcemia.  CT scan is concerning for metastatic cancer.      #Severe hypercalcemia likely secondary to malignancy.   Ionized calcium was 7.6 on presentation.  Complicated by confusion.  Also does have bone pain.  Patient noted to have an JENNIFER on presentation with peak creatinine of 1.5.  PTH level is noted to be significantly low.  CT scan of the chest/abdomen/pelvis shows multiple lesions concerning for metastatic malignancy.  Patient has been treated with IV fluids, subcutaneous calcitonin.  Subsequently treated with dexamethasone and pamidronate therapy.  PTH level noted to be significantly low at time of presentation.    -1,25 vit d low at 9.6, 25 vit D still in process.  PTHrp was present, not significantly elevated  -Ionized calcium is improved with treatment   -Encourage oral hydration.  Also reducing steroids with taper.  Decadron taper down to 2 mg a day  -Oncology is consulted and recommendation noted     #Metastatic Carcinoma: CT scan of the chest abdomen pelvis showed multiple sclerotic lesions concerning for metastatic malignancy.  Initially was thought to be consistent with multiple myeloma.   -Multiple myeloma work-up was ordered by oncology.  Serum immunofixation did not show monoclonal protein which would be against a plasma cell disorder.   -Patient did undergo IR guided biopsy of soft tissue lesion at L5.  Surgical pathology does show metastatic carcinoma.  Unknown primary.  Awaiting staining results  --She has left breast mass which is most likely the  primary source.  This was discussed with oncology team and plan is to be an ultrasound of the left breast and possibly biopsy for further evaluation.     #Acute kidney injury.  Improved.  Secondary to dehydration and hypercalcemia.  -May discontinue  IV fluids.  -Renal ultrasound on 4/20 was normal.  -Avoid nephrotoxins as able.     #Urinary tract infection.  Urine culture growing providencia.    -Continue IV ceftriaxone.  -Completed 7 days of ceftriaxone      #Hypothyroidism.  Continue home meds     #Hypertension.  Continue isosorbide mononitrate.  Increase metoprolol to 50 mg BID.  I have scheduled 10 mg p.o. hydralazine 3 times daily initially.  Remains HTN, will increase to 25 mg PO TID.  IV hydralazine prn.       #Hyperlipidemia.  Hold simvastatin for now.     # Deconditioning.  Physical therapy and Occupational therapy consult.     #Acute toxic metabolic encephalopathy.  Suspect this is mainly due to hypercalcemia.  May have some contribution from UTI that was treated as above along with starting treatment with dexamethasone.    She has shown significant improvement over the last 2 to 3 days.  Today she is quite clear.  -CT scan of the head on 4/20 showed no acute intracranial pathology.  -Tapering dexamethasone to 2 mg twice daily tonight.  I did discuss this with oncology.   -Minimizing opiates as able, though understand patient does have acute pain from osteolytic lesions.  -Keep blinds open, maintain day night cycle.  Encourage reorientation.      #Nonsevere malnutrition: Poor p.o. intake through hospital stay.    This is started to  with improvement in her mental status.      Diet:  Modified diet per speech  DVT Prophylaxis: Pneumatic Compression Devices, restarted enoxaparin today. Counts stable post IR guided procedure.    Code Status: Full Code     Disposition: May discharge home in the next few days    Rich Nick MD      Interval History      Patient care assumed this morning.  Patient  was seen and examined.  Medical record reviewed.  I discussed with oncology team.  She denies any new symptoms.  Abnormal findings on physical exam on the left breast noted by oncology was confirmed.  She has left breast mass  -Data reviewed today: I reviewed all new labs and imaging results over the last 24 hours.     Physical Exam   Temp: 98.6  F (37  C) Temp src: Oral BP: (!) 186/69 Pulse: 79 Heart Rate: 99 Resp: 22 SpO2: 92 % O2 Device: Nasal cannula Oxygen Delivery: 1 LPM  Vitals:    04/24/20 0451 04/25/20 0625 04/26/20 0605   Weight: 86.7 kg (191 lb 1.6 oz) 89.4 kg (197 lb) 85.3 kg (188 lb)     Vital Signs with Ranges  Temp:  [98.3  F (36.8  C)-98.8  F (37.1  C)] 98.6  F (37  C)  Pulse:  [79] 79  Heart Rate:  [67-99] 99  Resp:  [20-22] 22  BP: (171-206)/(62-83) 186/69  SpO2:  [92 %-94 %] 92 %  I/O last 3 completed shifts:  In: 300 [P.O.:300]  Out: 2650 [Urine:2650]       Gen: No acute distress.  She is quite clear today.  A&Ox3.  She is able to relay events of the last several months.    HEENT: No elevation of JVD. No obvious trauma noted.   CV:  Regular, no murmurs.  Lung:   Clear bilaterally.  Good air entry.  Ab:  +BS, soft.  Skin:  Warm, dry to touch.  No rash.  Ext:  Mild non pitting edema LE b/l.  Breast exam: She has left breast mass which is hard in the left quadrant.  Nontender.  No drainage or skin changes    Medications       aspirin  81 mg Oral Daily     [START ON 4/27/2020] dexamethasone  2 mg Oral Daily     enoxaparin ANTICOAGULANT  40 mg Subcutaneous Q24H     hydrALAZINE  25 mg Oral TID     isosorbide mononitrate  30 mg Oral Daily     levothyroxine  75 mcg Oral Daily     liothyronine  5 mcg Oral Daily     metoprolol tartrate  100 mg Oral BID     vitamin C  500 mg Oral Daily with lunch       Data   Recent Labs   Lab 04/25/20  0915 04/24/20  0741 04/23/20  1006 04/23/20  0713  04/22/20  0735 04/21/20  0643   WBC  --   --   --  11.6*  --  9.1 8.5   HGB 9.5*  --   --  9.9*  --  9.9* 9.7*   MCV  --    --   --  92  --  93 92     --   --  134*  --  102* 101*   INR  --   --  1.26*  --   --   --   --     144  --  146*  --  145* 143   POTASSIUM 4.6 3.9  --  3.5   < > 3.3* 3.8   CHLORIDE 116* 119*  --  119*  --  118* 118*   CO2 22 18*  --  17*  --  17* 19*   BUN 36* 38*  --  35*  --  27 24   CR 1.07* 1.09*  --  1.11*  --  1.19* 1.40*   ANIONGAP 6 7  --  10  --  10 6   FRANKLIN 9.0 9.5  --  10.3*  --  11.6* 12.0*   * 155*  --  126*  --  112* 91   ALBUMIN  --   --   --  2.6*  --   --  2.4*   PROTTOTAL  --   --   --  6.0*  --   --  5.5*   BILITOTAL  --   --   --  0.4  --   --  0.5   ALKPHOS  --   --   --  80  --   --  76   ALT  --   --   --  20  --   --  23   AST  --   --   --  84*  --   --  94*    < > = values in this interval not displayed.       No results found for this or any previous visit (from the past 24 hour(s)).

## 2020-04-26 NOTE — PLAN OF CARE
Discharge Planner SLP   Patient plan for discharge: not discussed  Current status: Pt seen for swallowing therapy.  She was up in the chair comfortably.  PT stated that she should be able to tolerate sitting in the chair for each meal now.  Importance for her diet upgrade that she is sitting in the chair for safest oral intake at optimal positioning.  Pt tolerated single pieces of cling peaches with functional oral transit times, no oral residue and laryngeal elevation.  Pt also ate single pieces of gluten free cereal alternated with peaches to keep mouth moist with no difficulty.  No oral residue on cereal.  No overt s/s aspiration during session.  Upgrade to DD2 with thin liquids via straw.  Discussed need for pt to be in chair for meals now with both patient and nursing.  She needs 1:1 assist with small bites, alternate solids and sips of liquid via straw.  Slow rate of intake.    Barriers to return to prior living situation: dysphagia, nutrition, level of A, weakness/confusion.  Recommendations for discharge: TCU  Rationale for recommendations: Needs 1:1 assist with feeding, below baseline for diet texture tolerance although now making some progress as she can sit in the chair.  Requires SLP to safely upgrade to least restrictive diet.       Entered by: Rusty Colmenares 04/26/2020 8:54 AM

## 2020-04-26 NOTE — PLAN OF CARE
Pt up Ax2 with walker. A&Ox4. Gen wkns. Incont, purewick in place. Diet advanced to DD2, tolerating, up in chair for meals. BP elevated, gave sched meds. C/o back pain, gave tyl, oxy & atarax. UTI-rocephin complete.

## 2020-04-26 NOTE — PROGRESS NOTES
CALORIE COUNT    Current Diet Order:   Dysphagia Diet Level 1: Pureed; Thin Liquids (STRAWS ok and best for patient)    Supplement Order:   Deferred sending given reported intolerances/allergies    Approximate Oral Intake for 4/25:   Unable to determine     Number of Meals Recorded: 0 (2 meals ordered)  Number of Snacks Recorded: 0    ASSESSED NUTRITION NEEDS: (DW 77 kg)  Estimated Energy Needs: >/=1925 kcals (>/=25 Kcal/Kg)  Justification: maintenance   Estimated Protein Needs: 77-92+ grams protein (1-1.2+ g pro/Kg)  Justification: preservation of lean body mass  Estimated Fluid Needs: per MD      Summary:   No documentation in flowsheet or timmy count folder (fair appetite noted at lunch). Removed milk allergy (per , previoius RD documentation patient can handle lactose free milk, butter, and pudding from floor stock) which will hopefully increase choices available from already limited DD1, gluten free diet. As patient clears, this will need to be readdressed. Continues to meet <50% of estimated energy and protein needs since admit    Calorie counts to continue - appreciate documentation of oral intake.      Shelia Dumont RDN, LD, CNSC  3rd floor/ICU: [phone]  All other floors: [phone]  Weekend/holiday: [phone]  Office: [phone]

## 2020-04-26 NOTE — PLAN OF CARE
Pertinent assessments:   Confused overnight, paranoid and uncooperative. Refused pain or sleep meds. HTN 190s/systolic- PRN IV hydralazine given with minimal improvement, increased again in AM >200/systolic, second dose given. Asymptomatic. Sticky note to make MD aware of sustained HTN. Excellent output >1000/purewick. Saline locked.     Major Shift Events. Confusion-Updated  on patient status after patient called  on his cellphone at 0200 asking him to call 911 for her.   HTN- prn meds x2.     Treatment Plan: Decadron, control BP, control back pain.    Discharge Readiness: Medically active  Expected Discharge Date: TBD  Discharge Disposition: TBD  Barriers/Criteria for discharge: Oncology plan

## 2020-04-26 NOTE — PROGRESS NOTES
RT Note:  Patient offered CPAP.  She declined stating that it was too uncomfortable for her without a humidifier.    Will continue to monitor.    Swetha Gambino, RT

## 2020-04-26 NOTE — PROGRESS NOTES
[facility] Progress Note          Assessment and Plan:   84 year old lady with multiple other medical issues presented on 4/17/2020 with fatigue, generalized weakness and back pain.  Has had AI and hypercalcemia on presentation both of which have now resolved.  She has had a CT C/A/P with contrast 4/21/2020 and this showed no obvious lung, kidney masses but did show extensive lucent and sclerotic lesions throughout the osseous structures.  There was also a soft tissue lesion associated with the left aspect of L5.  Initial concern was for possible multiple myeloma and as such further evaluation with serum protein electrophoresis and immunofixation studies were obtained on 4/22/2020 and were both found to be negative for a monoclonal paraproteinemia.       She subsequently underwent CT guided biopsy of L5 paraspinal mass with pathology reported as being consistent with a metastatic carcinoma.  IHC results pending.      Plan:  Typically primary tumors of lung, breast, kidney, prostate, melanoma are associated with bone metastasis.  She appears to have a left upper quadrant breast mass and the clinical suspicion is for a metastatic breast cancer.  Since it is a weekend, unable to obtain mammogram.  This can be arranged outpatient.  I will however obtain left breast ultrasound to see if this shows the left breast mass.  If mass is found recommend hospitalist team to schedule biopsy tomorrow for definitive histologic diagnosis.    I did update Elizabeth about the finding and told her we will arrange for left breast ultrasound today and possible biopsy thereafter.  She voiced understanding.      Suggest tapering steroids further to decadron 2mg daily for 3 days and then stop.    Discussed above plan with Dr. Nick.      Disposition: noted plan to discharge her to [facility].  Anticipate she can be discharged after the breast biopsy with plan to follow up with my colleague Dr. An in clinic to discuss the results  "and plan moving forwards.  Dr. An will see her tomorrow.      Je Hanks MD               Interval History:   No acute events overnight              Review of Systems:   As per subjective, otherwise 5 systems reviewed and negative.           Physical Exam:   Blood pressure (!) 186/69, pulse 79, temperature 98.6  F (37  C), temperature source Oral, resp. rate 22, height 1.575 m (5' 2\"), weight 85.3 kg (188 lb), SpO2 92 %, not currently breastfeeding.      Vital Sign Ranges  Temperature Temp  Av.6  F (37  C)  Min: 98.3  F (36.8  C)  Max: 98.8  F (37.1  C)   Blood pressure Systolic (24hrs), Av , Min:171 , Max:206        Diastolic (24hrs), Av, Min:62, Max:83      Pulse Pulse  Av  Min: 79  Max: 79   Respirations Resp  Av.7  Min: 20  Max: 22   Pulse oximetry SpO2  Av %  Min: 92 %  Max: 94 %         Intake/Output Summary (Last 24 hours) at 2020 1013  Last data filed at 2020 0604  Gross per 24 hour   Intake 300 ml   Output 2650 ml   Net -2350 ml       Constitutional:   No acute distress.   Skin:   No rashes, petechiae, or ecchymoses.   HEENT:   Normocephalic, atraumatic. Oropharynx clear with no mucosal lesions or thrush.   Neck:   Supple.   Lungs:   Clear to auscultation bilaterally.   Cardiovascular:   Regular rate and rhythm with no murmurs, rubs, or gallops.   Abdomen:   Soft, nontender, nondistended with no palpable hepatosplenomegaly.   Extremities:   No clubbing, cyanosis, or edema.   Neurological:   No focal motor or sensory deficits.   BREAST: left upper quadrant palpable mass.  No palpable right breast masses.  No axillary adenopathy bilaterally.          Medications:     No current outpatient medications on file.                Data:   No results found for this or any previous visit (from the past 24 hour(s)).        "

## 2020-04-26 NOTE — PLAN OF CARE
"    Discharge Planner PT   Patient plan for discharge: TCU  Current status: Discussed case with RN. Pt having increased parinoia last night seeing a family in her room. She reports trying to test her  last night to call \"[phone]\". Pt easily cued this morning, but continues to report events from last night, even when trying to reassure her that she likely saw her RNs last night. Attempting to empathize but limited ability to re-orient. Pt cued for spinal prec, pt recognizing precautions from yesterday. Pt cued for log roll with HOB mildly elevated, limited tolerance to hip flexion/knee flexion due to pain, but able to perform supine to sit with mod A x 2.  Once seated at bedside pt was able to sit inde. Pt was cued for attempts to stand able to perform with her 4WW. PT keeping brakes locked as would really rather use a FWW for her due to her weakness and use of UEs for transfer. Pt cued for sit to stand with 4WW, needing min A x 2, limited hip/lumbar extension. Pt was cued for attempts for steps. Pt able to  R LE, limited tolerance to full step on the L, but able to mobilize to very close recliner chair, min A x 2. Needing step by step cues for LE position, lumbar and hip extension, use of UEs on 4WW. Pt was cued for return to seated position in recliner. Assisted with positioning for improved comfort.   Barriers to return to prior living situation: Requires heavy A for basic mobility skills, cognition, below baseline, unsure how much spouse can assist.   Recommendations for discharge: TCU  Rationale for recommendations: Pt is not currently at baseline for mobility, and is unsafe to discharge home. With continued PT, both IP and after discharge, pt is likely to obtain mobility goals.        Entered by: Maryam Wylie 04/26/2020 8:47 AM       "

## 2020-04-27 NOTE — PROGRESS NOTES
CALORIE COUNT     Current Diet Order:   Dysphagia Diet Level 2; Thin Liquids (STRAWS ok and best for patient), Gluten Free Diet, Low Lactose Diet      Supplement Order:   Deferred sending given reported intolerances/allergies     Approximate Oral Intake for 4/26:   Unable to determine      Number of Meals Recorded: unable to determine, off-site (1 meal ordered: scrambled eggs, apple juice and peaches). Consumed 25% per the flowsheets.    Number of Snacks Recorded: unable to determine, off-site      ASSESSED NUTRITION NEEDS: (DW 77 kg)  Estimated Energy Needs: >/=1925 kcals (>/=25 Kcal/Kg)  Justification: maintenance   Estimated Protein Needs: 77-92+ grams protein (1-1.2+ g pro/Kg)  Justification: preservation of lean body mass  Estimated Fluid Needs: per MD     Summary:   Notes from flowsheets indicated that pt is not hungry. Continues to meet <50% of estimated energy and protein needs since admit. Likely need for more aggressive nutrition interventions (?EN) given continued poor PO intakes vs. goals of care. Deferred by MD on 4/24.      Calorie counts to continue - appreciate documentation of oral intake.    Gely Nazario, RD, LD  Clinical Dietitian

## 2020-04-27 NOTE — PLAN OF CARE
Discharge Planner SLP   Patient plan for discharge: Discharge tomorrow to [facility]  Current status: Swallow Tx was provided this pm.  Pt was alert and cooperative for Tx.  Pt stated general fatigue and requested not to transfer to the chair for all meals.  Pt demonstrated a mild swallow delay and dry mouth during the session.  Cough x 1 was noted after large consecutive sip of thin liquids by straw.  Soft solid gluten free option not available, but dry gluten free trial assessed during the session.  Pt demonstrated good mastication and cleared solid with mod-max assist to alternate to thin liquids.  Pt did feel regular solid was too dry.  Progress made toward goal.      Recommend diet advancement to dysphagia diet level 3 textures and thin liquids with reminders to use the following safe swallow strategies: nursing tray set up, sit at 90 degrees, small sips and bites, slow rate, thicken liquids to nectar thick if increased coughing noted with thin liquids.    Plan to continue Tx to assess diet tolerance and train strategies.  Barriers to return to prior living situation: Level of assist  Recommendations for discharge: TCU  Rationale for recommendations: ST at TCU to maximize safety for a least restrictive diet       Entered by: Lissette Burden 04/27/2020 3:52 PM

## 2020-04-27 NOTE — PROGRESS NOTES
Hematology Consultation      Elizabeth Li MRN# [id number]   YOB: 1935 Age: 84 year old   Date of Admission: 4/17/2020     Reason for consult: I was asked by Dr Schmid to evaluate this patient for hypercalcemia possible myeloma.           Assessment and Plan:   1.  Severe hypercalcemia.  Improved after hydration and pamidronate.   2.  Extensive mixed lucent and sclerotic lesions throughout  the visualized osseous structures seen on CT scan on 4/21/2020.  Biopsy show metastatic cancer.  Immunohistochemistry pending.   3.  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    Plan.  1.  Discussed with Dr. Antonio.  The clinical presentation of severe hypercalcemia and presence of extensive sclerotic lesions throughout the skeleton.  A biopsy from L5 soft tissue mass showed metastatic carcinoma.  Large left breast mass. Likely primary breast cancer with secondary bone metastasis.  Breast ultrasound and biopsy planned for today.   Please advise pathology about the presence of large breast mass, so ER HI and HER-2 receptor could be tested on the lumbar spine biopsy.     2. Hypercalcemia .  Resolved.  Confusion improving.  There is some diurnal fluctuation in confusion.  May need rehab    3. Patient might be discharged to rehab once clinically appropriate, will plan to follow-up on results as an outpatient to formulate a plan.                  Chief Complaint:   Patient is a pleasant 84-year-old lady who was admitted for increased fatigue weakness and diffuse muscle pains.  Patient is confused and history was obtained from her chart and discussion with her primary medical team.              History of Present Illness:   This patient is a 84 year old female who presents with 1 month history of fatigue weakness and generalized myalgia.  Initial work-up in the emergency room showed hypercalcemia and urinary tract infection.  She had acute kidney injury.  She was  confused at the time of initial admission.   Patient was admitted to the hospital and treated with IV fluids, subcutaneous calcitonin.   Today a CT scan of chest abdomen pelvis done to evaluate further showed Extensive mottled appearance of osseous structures. Destructive softtissue component seen in the posterior elements of L5 on the left.  There is been little improvement since her hospital admission.  Her calcium has come down some to 12, from an initial high of 15.  Her creatinine is stable at 1.4.     Update 4/27/20:   Over the weekend patient's calcium has improved, confusion have improved significantly, although there is still some diarrheal variation with more confusion in the night.  Patient's  reported that she called her last night asking him to call 911.                 Past Medical History:   Past medical history of coronary artery disease status post stenting x2, hyperlipidemia, hypertension, hypothyroidism, KUSUM, anxiety, depression.           Past Surgical History:     Past Surgical History:   Procedure Laterality Date     BACK SURGERY       COLONOSCOPY N/A 1/11/2017    Procedure: COLONOSCOPY;  Surgeon: Nghia Moss MD;  Location: [facility]     Coronary angiogram with stents x2  03/2015    2.5 X 18 mm & 3.0 X mm LUCILA to LAD     OPTICAL TRACKING SYSTEM FUSION POSTERIOR SPINE LUMBAR N/A 7/17/2019    Procedure: L3-L4 TRANSFORAMINAL LUMBAR  INTRABODY FUSION WITH ALLOGRAFT, OPTICAL TRACKING SYSTEM AND MEDTRONIC SOLARA INSTRUMENTATION;  Surgeon: Cole Jansen MD;  Location: [facility]     TONSILLECTOMY, ADENOIDECTOMY, COMBINED       TUBAL LIGATION       [place] teeth extraction                   Social History:   I have reviewed this patient's social history          Family History:   I have reviewed this patient's family history          Immunizations:   Immunization status is unknown          Allergies:   All allergies reviewed and addressed          Medications:   I have reviewed this patient's  current medications          Review of Systems:   Review of systems not obtained due to patient factors - confusion            Physical Exam:   Vitals were reviewed  Constitutional: Patient is sitting out in the chair, confusion is much improved.   Chest examination: Patient's nurse was present as chaperone.   She has a large golf sized lump in her left upper outer breast.  No palpable axillary lymphadenopathy.  No skin changes.  The mass is firm and easily movable.   No mass is in the right breast.   Abdomen soft non tender.          Data:   All laboratory data reviewed

## 2020-04-27 NOTE — PROGRESS NOTES
Your information has been submitted on April 27th, 2020 at 02:18:12 PM CDT. The confirmation number is [id number]    Lucila Montelongo  Care Management Coordinator  [facility]  [phone]

## 2020-04-27 NOTE — PROGRESS NOTES
[facility]    Hospitalist Progress Note      Assessment & Plan      Elizabeth Li is a 84 year old female with a history of coronary artery disease status post stenting x2, hyperlipidemia, hypertension, hypothyroidism, KUSUM, anxiety, depression who presents with fatigue, weakness, and generalized myalgias.  For the past 1 month she has been suffering from increasing fatigue, weakness, and diffuse muscle pains found to have severe hypercalcemia.  CT scan is concerning for metastatic cancer.     Patient's progress    Patient severe hypercalcemia responded well to subcutaneous calcitonin, dexamethasone and pamidronate in consultation with hematology oncology team.  Patient acute kidney injury improved as well as her mental status.  On close examination of her breast, she has left breast mass which was biopsied on April 27, 2020.  She is been advised doing better except for physical deconditioning for which PT and OT is consulted for discharge disposition needs.  Given patient's metastatic cancer and bone pain palliative care team and pain management team consulted to assist with further care plan discussion and pain control regimen.       #Severe hypercalcemia likely secondary to malignancy.   Ionized calcium was 7.6 on presentation.  Complicated by confusion.  Also does have bone pain.  Patient noted to have an JENNIFER on presentation with peak creatinine of 1.5.  PTH level is noted to be significantly low.  CT scan of the chest/abdomen/pelvis shows multiple lesions concerning for metastatic malignancy.  Patient has been treated with IV fluids, subcutaneous calcitonin.  Subsequently treated with dexamethasone and pamidronate therapy.  PTH level noted to be significantly low at time of presentation.    -1,25 vit d low at 9.6, 25 vit D normal . PTHrp was present, not significantly elevated  -Ionized calcium is improved with treatment   -Encourage oral hydration.  Also reducing steroids with taper.  Decadron  taper down to 1 mg a day for the next 2 days and stop  -Oncology is consulted and recommendation noted.  Their input is appreciated.  Plan is to follow pathology results for further recommendations likely to happen as an outpatient       #Metastatic Carcinoma: CT scan of the chest abdomen pelvis showed multiple sclerotic lesions concerning for metastatic malignancy.  Initially was thought to be consistent with multiple myeloma.   -Multiple myeloma work-up was ordered by oncology.  Serum immunofixation did not show monoclonal protein which would be against a plasma cell disorder.   -Patient did undergo IR guided biopsy of soft tissue lesion at L5.  Surgical pathology does show metastatic carcinoma.  Unknown primary.  Awaiting staining results  --She has left breast mass which is most likely the primary source.  This was discussed with oncology team and biopsy was done today and pathology is pending    #Acute kidney injury.  Improved.  Secondary to dehydration and hypercalcemia.  -IV fluids discontinued  -Renal ultrasound on 4/20 was normal.  -Avoid nephrotoxins as able.     #Urinary tract infection.  Urine culture growing providencia.    -Continue IV ceftriaxone.  -Completed 7 days of ceftriaxone      #Hypothyroidism.  Continue home meds     #Hypertension.  Patient blood pressure remained uncontrolled despite increasing metoprolol to 50 twice daily, continuation of Imdur 30 daily, and hydralazine 25 mg 3 times daily   --We will keep titrating beta-blocker to 100 twice daily, and monitor    #Hyperlipidemia.  Hold simvastatin for now.     # Deconditioning.  Physical therapy and Occupational therapy consult.     #Acute toxic metabolic encephalopathy.  Suspect this is mainly due to hypercalcemia.  May have some contribution from UTI that was treated as above along with starting treatment with dexamethasone.    She has shown significant improvement over the last 2 to 3 days.  Today she is quite clear.  -CT scan of the  head on 4/20 showed no acute intracranial pathology.  -Tapering dexamethasone to 1 mg daily  tonight.  I did discuss this with oncology.   -Minimizing opiates as able, though understand patient does have acute pain from osteolytic lesions.  -Keep blinds open, maintain day night cycle.  Encourage reorientation.      #Nonsevere malnutrition: Poor p.o. intake through hospital stay.    This is started to  with improvement in her mental status.      Diet:  Modified diet per speech  DVT Prophylaxis: Pneumatic Compression Devices, restarted enoxaparin today. Counts stable post IR guided procedure.    Code Status: Full Code     Disposition: Patient may discharge to TCU or home with home care next 2 days after obtaining palliative and pain management team as well as rehab    Rich Nick MD      Interval History      Patient was seen and examined by this morning.  She is back from breast biopsy and feels overall drowsy but able to communicate with me.  She denies any fever or chills.  She has back pain requiring pain medication.  Spoke with rounding oncologist  -Data reviewed today: I reviewed all new labs and imaging results over the last 24 hours.     Physical Exam   Temp: 98.3  F (36.8  C) Temp src: Oral BP: (!) 185/60 Pulse: 78 Heart Rate: 64 Resp: 18 SpO2: 91 % O2 Device: None (Room air) Oxygen Delivery: 1 LPM  Vitals:    04/25/20 0625 04/26/20 0605 04/27/20 0541   Weight: 89.4 kg (197 lb) 85.3 kg (188 lb) 85.7 kg (188 lb 14.4 oz)     Vital Signs with Ranges  Temp:  [97.6  F (36.4  C)-98.7  F (37.1  C)] 98.3  F (36.8  C)  Pulse:  [78] 78  Heart Rate:  [62-88] 64  Resp:  [18-22] 18  BP: (175-201)/(60-78) 185/60  SpO2:  [91 %-94 %] 91 %  I/O last 3 completed shifts:  In: 270 [P.O.:270]  Out: 1475 [Urine:1475]       Gen: No acute distress.  She is quite clear today.  A&Ox3.  She is able to relay events of the last several months.    HEENT: No elevation of JVD. No obvious trauma noted.   CV:  Regular, no  murmurs.  Lung:   Clear bilaterally.  Good air entry.  Ab:  +BS, soft.  Skin:  Warm, dry to touch.  No rash.  Ext:  Mild non pitting edema LE b/l.  Breast exam: She has left breast mass which is hard in the left quadrant.  Nontender.  No drainage or skin changes    Medications       aspirin  81 mg Oral Daily     dexamethasone  2 mg Oral Daily     enoxaparin ANTICOAGULANT  40 mg Subcutaneous Q24H     hydrALAZINE  25 mg Oral TID     isosorbide mononitrate  30 mg Oral Daily     levothyroxine  75 mcg Oral Daily     liothyronine  5 mcg Oral Daily     metoprolol tartrate  100 mg Oral BID     vitamin C  500 mg Oral Daily with lunch       Data   Recent Labs   Lab 04/27/20  0737 04/25/20  0915 04/24/20  0741 04/23/20  1006 04/23/20  0713  04/22/20  0735   WBC 14.0*  --   --   --  11.6*  --  9.1   HGB 9.0* 9.5*  --   --  9.9*  --  9.9*   MCV 92  --   --   --  92  --  93   * 186  --   --  134*  --  102*   INR  --   --   --  1.26*  --   --   --     144 144  --  146*  --  145*   POTASSIUM 3.6 4.6 3.9  --  3.5   < > 3.3*   CHLORIDE 115* 116* 119*  --  119*  --  118*   CO2 20 22 18*  --  17*  --  17*   BUN 25 36* 38*  --  35*  --  27   CR 0.95 1.07* 1.09*  --  1.11*  --  1.19*   ANIONGAP 9 6 7  --  10  --  10   FRANKLIN 8.6 9.0 9.5  --  10.3*  --  11.6*   GLC 77 112* 155*  --  126*  --  112*   ALBUMIN 2.7*  --   --   --  2.6*  --   --    PROTTOTAL 5.6*  --   --   --  6.0*  --   --    BILITOTAL 0.5  --   --   --  0.4  --   --    ALKPHOS 73  --   --   --  80  --   --    ALT 27  --   --   --  20  --   --    AST 78*  --   --   --  84*  --   --     < > = values in this interval not displayed.       Recent Results (from the past 24 hour(s))   MA Diagnostic Bilateral w/Scott    Narrative    MA DIAGNOSTIC BILATERAL W/ SCOTT,   US BREAST LEFT LIMITED 1-3 QUADRANTS -  4/27/2020 12:09 PM    HISTORY:  Palpable left breast lump.    COMPARISON:  Screening mammogram, 10/26/2012    BREAST DENSITY: Scattered fibroglandular  densities.    FINDINGS:  Standard bilateral diagnostic views were obtained,  including tomosynthesis. Marker was placed on the left breast at the  site of concern; deep to the marker there is a round mass with an  irregular margin measuring approximately 3.0 cm. No associated  calcifications. No suspicious findings elsewhere in either breast.    Further evaluation with targeted left breast ultrasound shows a  corresponding hypoechoic mass at the 2:00 position, 8 cm from the  nipple, measuring 2.8 x 2.4 x 3.1 cm. There is some internal  vascularity by color Doppler. Survey of the axilla shows no evidence  of adenopathy. A few small morphologically normal-appearing lymph  nodes are evident.      Impression    IMPRESSION: BI-RADS CATEGORY: 5 - Highly Suggestive of Malignancy.  Mass at the 2:00 position of the left breast measures 2.8 x 2.4 x 3.1  cm. Ultrasound-guided core biopsy was performed subsequently.    RECOMMENDED FOLLOW-UP: Biopsy.    VANESA COBB MD   US Breast Left Limited 1-3 Quadrants    Narrative    MA DIAGNOSTIC BILATERAL W/ SCOTT,   US BREAST LEFT LIMITED 1-3 QUADRANTS -  4/27/2020 12:09 PM    HISTORY:  Palpable left breast lump.    COMPARISON:  Screening mammogram, 10/26/2012    BREAST DENSITY: Scattered fibroglandular densities.    FINDINGS:  Standard bilateral diagnostic views were obtained,  including tomosynthesis. Marker was placed on the left breast at the  site of concern; deep to the marker there is a round mass with an  irregular margin measuring approximately 3.0 cm. No associated  calcifications. No suspicious findings elsewhere in either breast.    Further evaluation with targeted left breast ultrasound shows a  corresponding hypoechoic mass at the 2:00 position, 8 cm from the  nipple, measuring 2.8 x 2.4 x 3.1 cm. There is some internal  vascularity by color Doppler. Survey of the axilla shows no evidence  of adenopathy. A few small morphologically normal-appearing lymph  nodes are  evident.      Impression    IMPRESSION: BI-RADS CATEGORY: 5 - Highly Suggestive of Malignancy.  Mass at the 2:00 position of the left breast measures 2.8 x 2.4 x 3.1  cm. Ultrasound-guided core biopsy was performed subsequently.    RECOMMENDED FOLLOW-UP: Biopsy.    VANESA COBB MD   US Breast Biopsy Core Needle Left    Narrative    ULTRASOUND-GUIDED LEFT BREAST CORE BIOPSY;   CLIP PLACEMENT;   POSTPROCEDURE DIGITAL MAMMOGRAM; 4/27/2020 12:14 PM    INDICATION FOR PROCEDURE: Hypoechoic mass at the 2:00 position of the  left breast.    PROCEDURE: Written informed consent is obtained from the patient prior  to the procedure. The risks and benefits are discussed and the patient  wishes to continue.  Approximately 3 mL lidocaine without epinephrine  was infiltrated for local anesthetic and a skin nick was made through  which a 13-gauge trocar was introduced via lateral to medial approach.   The needle tip was placed adjacent to the lesion. A series of 3  samples were obtained with a 14-gauge core-cutting needle. A  coil-shaped clip was then deployed to andry the lesion.     Postbiopsy mammogram was deferred given the patient's limited  mobility. The patient tolerated the procedure without difficulty and  there was no immediate complication. Less than 5cc blood loss.  No  pain following biopsy.      Impression    IMPRESSION: Successful left breast ultrasound-guided core biopsy and  clip placement.  Final pathology is pending.      VANESA COBB MD

## 2020-04-27 NOTE — PROGRESS NOTES
Discharge Planner   Discharge Plans in progress: Pt has been clinically accepted for TCU for tomorrow at [facility]. Will need to update pt and family about location.   Barriers to discharge plan: None anticipated     Follow up plan: anticipate need for WC transport. Pervious conversation spouse preferred this options and was aware of the private pay cost     Corinne White [facility]  Inpatient Care Coordination   [phone]  [facility]     Addendum    Spoke with pt and she is aware of plan for d. c tomorrow to TCU.. PT has 10 days for auth for TCU.. Oncology following and aware of TCU needs    Left vm for spouse Gene.. H/E set up for  1445 At request of TCU facility          Entered by: Corinne C. White 04/27/2020 2:00 PM

## 2020-04-27 NOTE — PLAN OF CARE
Pertinent assessments:   Continues to show signs of sundowning past two evenings/ nights. More cooperative tonight. Remains HTN despite med changes. Did not meet criteria for PRN tonight. Incont urine- purewick. Turns with assist, ice to back.   Primary CA is suspected to be left breast, but ultrasound and bx planned. No confirmed dx just yet. Onc following    Major Shift Events. Confused at night, more clear in the mornings. Slept well. Using call light appropriately. Denies pain but used ice to back. Turns with assist.     Treatment Plan: Decadron, control BP, control back pain.    Discharge Readiness: Medically active  Expected Discharge Date: TBD  Discharge Disposition: TBD  Barriers/Criteria for discharge: Oncology vs Palliative plan TBD

## 2020-04-27 NOTE — PLAN OF CARE
Up in chair x2  ---medicated for pain with tylenol  x2 -- biopsy site clean and intact --appetite fair ---bp relieved by scheduled meds       Major Shift Events. Ice pack for back wih relief -- turns with assist of 2 --purewick in place    Treatment Plan: Decadron, control BP, control back pain.    Discharge Readiness: Medically active  Expected Discharge Date: TBD  Discharge Disposition: TBD  Barriers/Criteria for discharge: Oncology plan

## 2020-04-28 NOTE — PROGRESS NOTES
S: referral made to [facility].     B: Pt had a biopsy 4/27 and is also hospitalized for UTI and other cancer dx.     A:  Biopsy site covered by bandaid per MD and upon discussion with RN patient has some redness within panus skin folds but skin is intact. Kalyan cleanse and protect with dry soft wipes already being used.    R: Will complete WOC consult since pt needs have all been addressed.     If any other skin issues occur, please re-consult [facility]OC. Thanks!

## 2020-04-28 NOTE — PLAN OF CARE
End of Shift Summary  For vital signs and complete assessments, please see documentation flowsheets.     Pertinent assessments: Patient is Aox4. Tylenol and ice for lower back pain.  Purewick in place.  Denies SOB and nausea. Hypertension this shift.    Major Shift Events- hydralazine for HTN     Treatment Plan: Decadron, control BP, control back pain.    Discharge Readiness: Medically active  Expected Discharge Date:4/28  [facility] set up for 1445  Barriers/Criteria for discharge: Oncology plan

## 2020-04-28 NOTE — PROGRESS NOTES
" [facility]  Elizabeth Li is being evaluated via a billable video visit due to the restrictions of the Covid-19 pandemic.   The patient has been notified of following:  \"This video visit will be conducted via a call between you and your provider. We have found that certain health care needs can be provided without the need for an in-person physical exam.  This service lets us provide the care you need with a video conversation. If during the course of the call the provider feels a video visit is not appropriate, you will not be charged for this service.\"   The provider has received verbal consent for a Video Visit from the patient and or first contact? Yes  Patient/facility staff would like the video invitation sent by: N/A   Video Start Time: 8:26 AM  Which Facility the Patient is at during the time of visit: [facility]   PRIMARY CARE PROVIDER AND CLINIC:  Rebekah Rausch MD, [address] / [facility] [address]  Chief Complaint   Patient presents with     Video Visit     Hospital F/U     [facility] Record Number:  [id number]  Elizabeth Li  is a 84 year old  (1935), admitted to the above facility from  [facility]. Hospital stay 4/17/20 through 4/28/20..  Admitted to this facility for  rehab, medical management and nursing care.  HPI:    HPI information obtained from: facility chart records, facility staff, patient report and [facility] chart review.   Brief Summary of Hospital Course: Elizabeth Li is a 84 year old female with PMH significant for CAD s/p stenting x2, HTN, hyperlipidemia, hypothyroidism, anxiety, depression, and KUSUM. She was hospitalized at [facility] from 4/17/20-4/28/20. She originally presented to the hospital with fatigue, weakness and myalgias and was found to have severe hypercalcemia likely secondary to malignancy. She was treated with IV fluids, subcutaneous calcitonin, dexamethasone and " pamidronate therapy and her calcium level normalized prior to hospital discharge. She was also found to have metastatic carcinoma. Biopsy was completed at L5 which confirmed metastatic carcinoma. She has left breast mass which is likely primary source. She is meeting with oncology to review results later this week. During hospitalization was also found to have metabolic encephalopathy likely due to hypercalcemia and also possibly from UTI for which she was treated with 7 days of ceftriaxone. Also with JENNIFER during admission that peaked at 1.6 and trended down to baseline at discharge- suspected to be secondary to volume depletion and hypercalcemia. With elevated BP throughout her stay and several medications were adjusted/ added. Also with dysphagia- on altered diet. She was discharged to TCU for physical rehab and medical management.     Updates on Status Since Skilled nursing Admission: Elizabeth was seen today for admission visit at the TCU. She continues to have elevated BPs with SBP running 160-190s since admission. Lisinopril dose was increased yesterday to 5 mg daily, but no improvement in BP today after receiving increased dose. She denies any vision changes, headaches, SOB, CP, dizziness/lightheadedness. Reports edema to BLE that developed during her hospital stay. Denies history of edema. Reports loose stools- is not able to tell me how many a day, besides multiple or when they started. She does have associated stomach cramping. Believes she is going at least 3 times per day. Remains afebrile. Denies recent constipation. Denies any dysuria, frequency, trouble urinating. Reports pain at times to back- finds dilaudid helpful. Reports feeling weak. Prior to hospitalization was living with her spouse in a senior coop. She managed her own medications and was using a walker for ambulation at that time.     CODE STATUS/ADVANCE DIRECTIVES DISCUSSION:   CPR/Full code CPR/Full code   Patient's living condition: lives  with spouse  ALLERGIES: Flu virus vaccine and Gluten meal  PAST MEDICAL HISTORY:  has a past medical history of CAD (coronary artery disease), Depression, HTN (hypertension), Hyperlipidemia, Hypothyroidism, IHD (ischemic heart disease), and KUSUM (obstructive sleep apnea).  PAST SURGICAL HISTORY:   has a past surgical history that includes tubal ligation; Colonoscopy (N/A, 1/11/2017); Optical tracking system fusion spine posterior lumbar one level (N/A, 7/17/2019); Coronary angiogram with stents x2 (03/2015); [place] teeth extraction; Tonsillectomy, adenoidectomy, combined; and back surgery.  FAMILY HISTORY: family history includes Blood Disease in her mother; Heart Disease in her brother, father, and sister.  SOCIAL HISTORY:   reports that she quit smoking about 36 years ago. She has never used smokeless tobacco. She reports current alcohol use. She reports that she does not use drugs.  Current Outpatient Medications   Medication Sig Dispense Refill     acetaminophen (TYLENOL) 500 MG tablet Take 2 tablets (1,000 mg) by mouth 3 times daily       artificial saliva (BIOTENE DRY MOUTHWASH) LIQD liquid Swish and spit 15 mLs in mouth 4 times daily as needed for dry mouth       artificial saliva (BIOTENE MT) SOLN solution Swish and spit 2 mLs (2 sprays) in mouth every hour as needed for dry mouth       ascorbic acid (VITAMIN C) 500 MG tablet Take 500 mg by mouth daily (with lunch) Pt takes in afternoon       aspirin (ASA) 81 MG tablet Take 1 tablet (81 mg) by mouth daily 90 tablet 3     bisacodyl (DULCOLAX) 10 MG suppository Place 1 suppository (10 mg) rectally daily as needed for constipation       calcium carbonate (TUMS) 500 MG chewable tablet Take 2 tablets (1,000 mg) by mouth every 4 hours as needed for heartburn       hydrALAZINE (APRESOLINE) 10 MG tablet Take 1 tablet (10 mg) by mouth every 8 hours as needed (SBP>160)       HYDROmorphone (DILAUDID) 2 MG tablet Take 0.5-1 tablets (1-2 mg) by mouth every 3 hours as  "needed for moderate to severe pain 15 tablet 0     isosorbide mononitrate (IMDUR) 30 MG 24 hr tablet TAKE ONE TABLET BY MOUTH EVERY DAY 90 tablet 1     levothyroxine (SYNTHROID/LEVOTHROID) 75 MCG tablet TAKE ONE TABLET BY MOUTH EVERY MORNING (BEFORE BREAKFAST) 90 tablet 0     liothyronine (CYTOMEL) 5 MCG tablet Take 1 tablet (5 mcg) by mouth daily 90 tablet 0     lisinopril (ZESTRIL) 5 MG tablet Take 2 tablets (10 mg) by mouth daily       metoprolol tartrate (LOPRESSOR) 100 MG tablet Take 1 tablet (100 mg) by mouth 2 times daily       omeprazole (PRILOSEC) 20 MG DR capsule Take 1 capsule (20 mg) by mouth daily       polyethylene glycol (MIRALAX/GLYCOLAX) packet Take 1 packet by mouth nightly as needed for constipation        senna-docusate (SENOKOT-S/PERICOLACE) 8.6-50 MG tablet Take 1 tablet by mouth 2 times daily as needed for constipation       simethicone (MYLICON) 80 MG chewable tablet Take 1 tablet (80 mg) by mouth 4 times daily as needed for flatulence or cramping       ROS: 10 point ROS of systems including Constitutional, Eyes, Respiratory, Cardiovascular, Gastroenterology, Genitourinary, Integumentary, Musculoskeletal, Psychiatric were all negative except for pertinent positives noted in my HPI.  Vitals:BP (!) 147/75   Pulse 59   Temp 97.2  F (36.2  C)   Resp 18   Ht 1.575 m (5' 2\")   Wt 79 kg (174 lb 3.2 oz)   SpO2 95%   BMI 31.86 kg/m     Limited Visit Exam done given COVID-19 precautions:  GENERAL APPEARANCE:  Alert, pleasant and cooperative, elderly female sitting in wheelchair on exam  HEENT: normocephalic, conjunctivae, lids, pupils and irises normal, moist mucous membranes, nose without drainage or crusting  RESP:  respiratory effort normal, no respiratory distress, patient is on RA  CV: +1 edema to bilateral ankles/ feet  M/S:  reports tenderness to her back; able to move all extremities   NEURO: no facial asymmetry, no speech deficits and able to follow directions  PSYCH: insight and " judgement impaired, memory impaired, affect and mood normal    Lab/Diagnostic data:  Recent labs in [facility] reviewed by me today.     ASSESSMENT/PLAN:  (E83.52) Hypercalcemia of malignancy  (primary encounter diagnosis)  Comment: acute, treated inpatient and calcium improved to within normal limits at hospital discharge.  -Last calcium check 8.4 on 4/29 which is WNL  -corrected calcium 4/29 was 9.4 which is WNL  Plan: Continue to monitor calcium as ordered for 5/7. Monitor patient's mental status.     (C79.9) Metastatic carcinoma (H)  Comment: acute, per review of pathology results patient does have breast cancer. She is meeting with oncologist to discuss results tomorrow. Is reporting back pain today, which is likely due to metastasis found at L5.  Plan: Follow up with oncology tomorrow as scheduled. Continue tylenol scheduled and dilaudid PRN for pain management. Continue to monitor pain.     (I10) Benign essential hypertension  Comment: acute, not at goal  -received hydralazine during hospital stay  -metoprolol dose increased during hospital stay  -started on lisinopril prior to hospital discharge  Plan: Discussed medication management with geriatric pharmacist due to start of new medications on day of discharge and ongoing elevated BPs. Continue metoprolol (hold if HR <55 and notify provider), lisinopril, imdur. Recheck BMP as scheduled for next week. Start hydralazine 10 mg q8h PRN for SBP >160. If BP does not improve to <160 after receiving hydralazine contact provider and will consider increasing hydralazine dose to 25 mg. Discussed plan with nursing staff today. VS per TCU policy.      (R19.5) Loose stools  Comment: acute, reports multiple, loose stools, cramping- remains afebrile.   -was recently treated with antibiotics for UTI  Plan: Discussed with nursing staff today. C.diff sample to be collected STAT. Not currently on scheduled bowel medications. Nursing to update with worsening symptoms,  fever.    (R60.0) Bilateral lower extremity edema  Comment: acute, suspect secondary to IV hydration in the hospital- mild   Plan: Compression stockings- on in AM, off in PM. Expect improvement with ambulation and working with therapy. Monitor.     (G93.41) Acute metabolic encephalopathy  Comment: acute, improved prior to hospital discharge  Plan: OCCUPATIONAL THERAPY to complete cognitive testing at TCU.    (N17.9) Acute kidney injury (H)  Comment: acute, creatinine improved prior to discharge and stable at last check (4/29 creatinine was 0.82)  -creatinine peaked to 1.6 during hospitalization  Plan: BMP scheduled for next week.     (N39.0) Urinary tract infection without hematuria, site unspecified  Comment: acute, completed antibiotics inpatient- asymptomatic  Plan: Continue to monitor for symptoms.     (R13.10) Dysphagia, unspecified type  Comment: acute, on altered diet  Plan: Speech therapy following patient. Continues on mechanical soft diet an thin liquids. Monitor closely.    (E44.1) Mild malnutrition (H)  Comment: acute, with poor po intake during hospitalization, nursing staff documenting variable po intake ranging from 25-75% of meals  Plan: Not currently on supplementation. Dietician to follow. Follow weights.    (K21.9) GERD  Comment: acute, denies symptoms  -Estimated Creatinine Clearance: 47.5 mL/min (based on SCr of 0.89 mg/dL).  Plan: Due to creatinine clearance famotidine dose was reduced to 20 mg daily per  recommendations. However later received a call from the nurse stating famotidine not available from pharmacy. Ok to add tums PRN. Monitor symptoms.     (I25.10) Coronary artery disease involving native heart, angina presence unspecified, unspecified vessel or lesion type  Comment: chronic, asymptomatic  -history of 2 stents placed  -last ECHO from November 2019 showed: Left ventricular systolic function is normal.The visual ejection fraction is  estimated at 60-65%. The right  ventricular systolic function is normal. Mild aortic valve sclerosis, trace aortic regurgitation.  Plan: Continue ASA, imdur, metoprolol, lisinopril. Follow up with cardiology 1 year from last ECHO.    (E03.9) Hypothyroidism, unspecified type  Comment: chronic, last TSH lower than recommended goal for older adults- does not appear that dose was adjusted after this check  TSH   Date Value Ref Range Status   03/13/2020 0.27 (L) 0.40 - 4.00 mU/L Final     T4 Free   Date Value Ref Range Status   03/13/2020 0.86 0.76 - 1.46 ng/dL Final       Plan: Continue levothyroxine and liothyronine. Recommend recheck of TSH after acute medical event subsided to ensure closer to recommended goal.     (R53.81) Physical deconditioning  Comment: Acute, secondary to recent hospitalization, medical conditions as above  Plan: Encourage participation in physical therapy/occupational therapy for strengthening and deconditioning. Discharge planning per their recommendation. Social work to assist with d/c planning.      Tottal time spent with patient visit at the skilled nursing facility was 36 minutes including patient visit, review of past records, review of admission orders, medication reconciliation, review of pharmacy recommendations and discussion with nursing staff and geriatric pharmacist. Greater than 50% of total time spent with counseling and coordinating care due to HTN, edema, loose stools as well as other co morbidities as above.      Electronically signed by:  SIMAEL Peña CNP     Video-Visit Details  Type of service:  Video Visit  Video End Time (time video stopped): 8:37 AM  Distant Location (provider location):  [facility]

## 2020-04-28 NOTE — PROGRESS NOTES
Per chart patient is discharging today at 1445.     Physical Therapy Discharge Summary    Reason for therapy discharge:    Discharged to transitional care facility.    Progress towards therapy goal(s). See goals on Care Plan in [facility] electronic health record for goal details.  Goals not met.  Barriers to achieving goals:   discharge from facility.    Therapy recommendation(s):    TCU was recommended.

## 2020-04-28 NOTE — DISCHARGE SUMMARY
"[facility]  Hospitalist Discharge Summary       Date of Admission:  4/17/2020  Date of Discharge:  4/28/2020  Discharging Provider: Javon Venegas MD      Discharge Diagnoses   Severe hypercalcemia of malignancy  Acute metabolic encephalopathy related to the above, improved  Metastatic carcinoma of uncertain primary, probably breast  Acute kidney injury  UTI  Nonsevere malnutrition  Deconditioning    Follow-ups Needed After Discharge   Follow-up Appointments     Follow Up and recommended labs and tests      Follow up with oncology as directed.  Follow up with TCU physician. Check basic metabolic panel with calcium on   weekly basis.                Unresulted Labs Ordered in the Past 30 Days of this Admission     Date and Time Order Name Status Description    4/27/2020 1213 Surgical pathology exam In process       These results will be followed up by oncology.    Discharge Disposition   Discharged to TCU  Condition at discharge: Stable    History of Present Illness   Per admission H&P:  \"Elizabeth Li is a 84 year old female who presents with weakness.  History was obtained from my discussion with the patient at the bedside.  I also discussed the case with the ED provider.  The electronic medical record was also reviewed.     84 year old female with a history of coronary artery disease status post stenting x2, hyperlipidemia, hypertension, hypothyroidism, KUSUM, anxiety, depression who presents with fatigue, weakness, and generalized myalgias.  For the past 1 month she has been suffering from increasing fatigue, weakness, and diffuse muscle pains.  She has been sleeping quite a bit in her recliner and as result has not been compliant with her CPAP.  Symptoms have progressed.  She denies any cough or shortness of breath.  No fever.  Some mild and diffuse abdominal pain.  No numbness or extremity weakness.  Due to persistent problems with these issues she comes to the emergency department for " "evaluation.     Here her temperature is 98.8, heart rate 83, blood pressure 184/139, respiratory 16, and oxygen saturation 96% on room air.  Labs are notable for a creatinine of 1.2, calcium 15.0, ionized calcium 7.6, AST 98, and white blood cells 11.4.  Urinalysis is grossly abnormal and indicative of UTI.  She was given IV Rocephin.  Urine culture was sent and is pending.  She was given some IV fluids as well as Keflex and will be admitted for further management and work-up of her hypercalcemia.\"    Hospital Course     Severe hypercalcemia likely secondary to malignancy.    Ionized calcium was 7.6 on presentation. PTH level was noted to be significantly low.  1, 25 vitamin D was low, 25 vitamin D was normal, and parathyroid hormone related protein was present.  CT scan of the chest/abdomen/pelvis showed multiple lesions concerning for metastatic malignancy.  Patient was treated with IV fluids, subcutaneous calcitonin, dexamethasone and pamidronate therapy.  With these therapies, the patient's calcium trended to normal by the day of discharge.  She was able to be tapered off of the dexamethasone completely prior to discharge.    Metastatic Carcinoma:   CT scan of the chest abdomen pelvis showed multiple sclerotic lesions concerning for metastatic malignancy.  Initially was thought to be consistent with multiple myeloma. Multiple myeloma work-up was ordered by oncology.  Serum immunofixation did not show monoclonal protein which would be against a plasma cell disorder. Patient had IR guided biopsy of soft tissue lesion at L5.  Surgical pathology showed metastatic carcinoma.  Patient also has a left breast mass which is most likely the primary source.  This was discussed with oncology team and biopsy was done 4/27 and pathology is pending. Oncology will follow up pathology results.     Palliative care was involved. Switched pain medication to Dilaudid as she had used this before. Tylenol was scheduled.     Toxic " metabolic encephalopathy--improved  Suspect this is mainly due to hypercalcemia.  May have some contribution from UTI that was treated as above along with starting treatment with dexamethasone.    She has shown significant improvement.    Acute kidney injury  Caused by volume depletion and hypercalcemia.  Peaked at 1.6, trended down to 0.9 by discharge after IV fluids and normalization of calcium.    UTI  Culture grew providencia. She received 7 days of ceftriaxone.     HTN  Blood pressure was elevated. Continued on imdur, metoprolol increased, lisinopril added.    Nonsevere malnutrition: Poor p.o. intake through hospital stay.    This is started to  with improvement in her mental status.    Dysphagia  Appreciate SLP recommendation for Dysphagia Diet Level 3 textures and thin liquids    Consultations This Hospital Stay   PHYSICAL THERAPY ADULT IP CONSULT  NUTRITION SERVICES ADULT IP CONSULT  OCCUPATIONAL THERAPY ADULT IP CONSULT  SOCIAL WORK IP CONSULT  HEMATOLOGY & ONCOLOGY IP CONSULT  SWALLOW EVAL SPEECH PATH AT BEDSIDE IP CONSULT  OCCUPATIONAL THERAPY ADULT IP CONSULT  PALLIATIVE CARE ADULT IP CONSULT  WOUND OSTOMY CONTINENCE NURSE  IP CONSULT  PHYSICAL THERAPY ADULT IP CONSULT  OCCUPATIONAL THERAPY ADULT IP CONSULT  SPEECH LANGUAGE PATH ADULT IP CONSULT      Code Status   Full Code    Time Spent on this Encounter   I, Javon Venegas MD, personally saw the patient today and spent greater than 30 minutes discharging this patient.       Javon Venegas MD  [facility]  ______________________________________________________________________    Physical Exam   Vital Signs: Temp: 99.3  F (37.4  C) Temp src: Oral BP: (!) 159/61 Pulse: 70 Heart Rate: 57 Resp: 20 SpO2: 94 % O2 Device: None (Room air)    Weight: 187 lbs 6.4 oz      General: chronically ill, weak, no distress    HEENT: No scleral icterus. Oropharynx moist.     Neck: Supple. Normal range of motion.    Pulmonary: Normal work  of breathing. Clear to auscultation bilaterally.    Cardiovascular: Regular rate and rhythm without murmur or extra heart sounds.    Abdomen: Soft and non-tender.    Extremities: No peripheral edema. No clubbing.    Neurologic: Awake, alert, appropriate.    Skin: Warm and dry.    Psychiatric: Normal affect and mood.       Primary Care Physician   Rebekah Rausch    Discharge Orders      General info for SNF    Length of Stay Estimate: Short Term Care: Estimated # of Days <30  Condition at Discharge: Stable  Level of care:skilled   Rehabilitation Potential: Excellent  Admission H&P remains valid and up-to-date: Yes  Recent Chemotherapy: N/A  Use Nursing Home Standing Orders: Yes     Mantoux instructions    Give two-step Mantoux (PPD) Per Facility Policy Yes     Reason for your hospital stay    You were admitted for high calcium levels which we unfortunately found was due to metastatic cancer. We think this is most likely from breast cancer. We had the oncologist assist with evaluating this during your stay. Your calcium level improved. You will follow up with oncology.     Activity - Up with nursing assistance     Follow Up and recommended labs and tests    Follow up with oncology as directed.  Follow up with TCU physician. Check basic metabolic panel with calcium on weekly basis.     Physical Therapy Adult Consult    Evaluate and treat as clinically indicated.    Reason:  debility     Occupational Therapy Adult Consult    Evaluate and treat as clinically indicated.    Reason:  debility     Speech Language Path Adult Consult    Evaluate and treat as clinically indicated.    Reason:  dysphagia     Advance Diet as Tolerated    Follow this diet upon discharge:     Combination Diet Gluten Free Diet; Dysphagia Diet Level 3: Advanced; Thin Liquids (water, ice chips, juice, milk gelatin, ice cream, etc) (STRAWS ok if small sips); Low Lactose Diet       Significant Results and Procedures   Most Recent 3 CBC's:  Recent  Labs   Lab Test 04/28/20  0631 04/27/20  0737 04/25/20  0915 04/23/20  0713   WBC 14.4* 14.0*  --  11.6*   HGB 9.0* 9.0* 9.5* 9.9*   MCV 93 92  --  92    148* 186 134*     Most Recent 3 BMP's:  Recent Labs   Lab Test 04/28/20  0631 04/27/20  0737 04/25/20  0915    144 144   POTASSIUM 3.5 3.6 4.6   CHLORIDE 113* 115* 116*   CO2 23 20 22   BUN 23 25 36*   CR 0.89 0.95 1.07*   ANIONGAP 5 9 6   FRANKLIN 8.5 8.6 9.0   GLC 84 77 112*     Most Recent 2 LFT's:  Recent Labs   Lab Test 04/28/20  0631 04/27/20  0737   AST 88* 78*   ALT 31 27   ALKPHOS 78 73   BILITOTAL 0.6 0.5     Most Recent 3 Troponin's:  Recent Labs   Lab Test 04/17/20  2322   TROPI 0.019     Most Recent 6 Bacteria Isolates From Any Culture (See EPIC Reports for Culture Details):  Recent Labs   Lab Test 04/22/20  0913 04/22/20  0906 04/17/20  2310   CULT No growth No growth >100,000 colonies/mL  Providencia (Proteus) rettgeri  *   ,   Results for orders placed or performed during the hospital encounter of 04/17/20   XR Chest 2 Views    Narrative    EXAM: XR CHEST 2 VW  LOCATION: [facility]  DATE/TIME: 4/18/2020 12:59 AM    INDICATION: Cough.  COMPARISON: 03/18/2019.      Impression    IMPRESSION: Negative chest.   CT Head w/o Contrast    Narrative    CT SCAN OF THE HEAD WITHOUT CONTRAST April 20, 2020 12:02 PM     HISTORY: Altered level of consciousness (LOC), altered mental status,  unexplained.    TECHNIQUE: Axial images of the head and coronal reformations without  IV contrast material. Radiation dose for this scan was reduced using  automated exposure control, adjustment of the mA and/or kV according  to patient size, or iterative reconstruction technique.    COMPARISON: Head CT 3/1/2013.    FINDINGS: Moderate volume loss is present. White matter  hypoattenuation likely represents moderate chronic small vessel  ischemic change. Scattered vascular calcifications are present. The  cerebral hemispheres, brainstem, and cerebellum  otherwise demonstrate  normal morphology and attenuation. No evidence of acute ischemia,  hemorrhage, mass, mass effect or hydrocephalus. The visualized  tympanic cavities, mastoid cavities, and paranasal sinuses are  unremarkable. Prominent calvarial hyperostosis is present.      Impression    IMPRESSION: No acute intracranial abnormality.    ABDIRASHID PORTER MD   US Renal Complete    Narrative    US RENAL COMPLETE 4/20/2020 12:23 PM    CLINICAL HISTORY: acute kidney injury  TECHNIQUE: Routine Bilateral Renal and Bladder Ultrasound.    COMPARISON: None.    FINDINGS:    RIGHT KIDNEY: 11.2 x 4.3 x 4.5 cm. Normal without hydronephrosis or  masses.     LEFT KIDNEY: 12.1 x 4.3 x 5.2 cm. Normal without hydronephrosis or  masses.     BLADDER: Normal.      Impression    IMPRESSION:  1.  Normal kidney ultrasound.    HOLLY JENNINGS MD   CT Chest/Abdomen/Pelvis w Contrast    Narrative    CT CHEST, ABDOMEN, AND PELVIS WITH CONTRAST April 21, 2020 10:36 AM     HISTORY: Severe hypercalcemia, evaluate for underlying malignancy.    COMPARISON: None.    TECHNIQUE: Volumetric helical acquisition of CT images from the lung  apices through the symphysis pubis after the administration of 92mL  Isovue-370 intravenous contrast. Radiation dose for this scan was  reduced using automated exposure control, adjustment of the mA and/or  kV according to patient size, or iterative reconstruction technique.    FINDINGS:     Chest: Minimal bilateral effusions and associated atelectasis versus  less likely infiltrate. No definite adenopathy by size criteria in the  chest. No pericardial effusion. Numerous lucent lesions throughout the  ribs and visualized thoracic spine.    Abdomen and pelvis: The liver, spleen, adrenal glands, kidneys, and  pancreas demonstrate no worrisome focal lesion. There is  cholelithiasis without evidence for cholecystitis. There are extensive  atherosclerotic changes of the visualized aorta and its branches.  There is no  evidence of aortic dissection or aneurysm. There are no  dilated loops of small intestine or large bowel to suggest ileus or  obstruction. No free fluid. No free air. There are no lymph nodes that  are abnormal by size criteria.     Extensive mottled appearance of osseous structures. Destructive soft  tissue component seen in the posterior elements of L5 on the left.      Impression    IMPRESSION: Extensive mixed lucent and sclerotic lesions throughout  the visualized osseous structures, question multiple myeloma.  Ultimately findings are indeterminate. The soft tissue lesion  associated with the left aspect of L5 is amenable to percutaneous  biopsy if desired.    KISHOR CERVANTES MD   CT Abdomen Retroperitoneal Biopsy    Narrative    CT ABDOMEN RETROPERITONEAL BIOPSY  4/23/2020 11:14 AM     HISTORY:  Soft tissue lesion L5    TECHNIQUE:  After obtaining informed consent, the patient was placed in a supine  position on the CT table. The skin overlying the area of biopsy was  prepped and draped in the usual sterile manner. 1% lidocaine was  injected for local anesthesia. Under CT guidance, using a 17/18 gauge  coaxial biopsy needle system, cores were obtained from soft tissue  about the posterior elements of the L5 vertebral body and submitted to  pathology.    I determined this patient to be an appropriate candidate for the  planned sedation and procedure and reassessed the patient immediately  prior to sedation and procedure. Moderate intravenous conscious  sedation was supervised by me. The patient was independently monitored  by a registered nurse assigned to the Department of radiology using  automated blood pressure, EKG and pulse oximetry. The patient  tolerated the procedure well. There were no immediate postprocedure  complications. The patient's vital signs were monitored by radiology  nursing staff under my supervision and remained stable throughout the  study. Radiation dose for this scan was reduced using  automated  exposure control, adjustment of the mA and/or kV according to patient  size, or iterative reconstruction technique.    None    Sedation time: None      Impression    IMPRESSION: CT guided biopsy performed as described above.    Radiation dose for this scan was reduced using automated exposure  control, adjustment of the mA and/or kV according to patient size, or  iterative reconstruction technique.             KUN MENDOZA MD   US Breast Biopsy Core Needle Left    Narrative    ULTRASOUND-GUIDED LEFT BREAST CORE BIOPSY;   CLIP PLACEMENT;   POSTPROCEDURE DIGITAL MAMMOGRAM; 4/27/2020 12:14 PM    INDICATION FOR PROCEDURE: Hypoechoic mass at the 2:00 position of the  left breast.    PROCEDURE: Written informed consent is obtained from the patient prior  to the procedure. The risks and benefits are discussed and the patient  wishes to continue.  Approximately 3 mL lidocaine without epinephrine  was infiltrated for local anesthetic and a skin nick was made through  which a 13-gauge trocar was introduced via lateral to medial approach.   The needle tip was placed adjacent to the lesion. A series of 3  samples were obtained with a 14-gauge core-cutting needle. A  coil-shaped clip was then deployed to andry the lesion.     Postbiopsy mammogram was deferred given the patient's limited  mobility. The patient tolerated the procedure without difficulty and  there was no immediate complication. Less than 5cc blood loss.  No  pain following biopsy.      Impression    IMPRESSION: Successful left breast ultrasound-guided core biopsy and  clip placement.  Final pathology is pending.      VANESA COBB MD MA Diagnostic Bilateral w/Benja    Narrative    MA DIAGNOSTIC BILATERAL W/ BENJA,   US BREAST LEFT LIMITED 1-3 QUADRANTS -  4/27/2020 12:09 PM    HISTORY:  Palpable left breast lump.    COMPARISON:  Screening mammogram, 10/26/2012    BREAST DENSITY: Scattered fibroglandular densities.    FINDINGS:  Standard bilateral  diagnostic views were obtained,  including tomosynthesis. Marker was placed on the left breast at the  site of concern; deep to the marker there is a round mass with an  irregular margin measuring approximately 3.0 cm. No associated  calcifications. No suspicious findings elsewhere in either breast.    Further evaluation with targeted left breast ultrasound shows a  corresponding hypoechoic mass at the 2:00 position, 8 cm from the  nipple, measuring 2.8 x 2.4 x 3.1 cm. There is some internal  vascularity by color Doppler. Survey of the axilla shows no evidence  of adenopathy. A few small morphologically normal-appearing lymph  nodes are evident.      Impression    IMPRESSION: BI-RADS CATEGORY: 5 - Highly Suggestive of Malignancy.  Mass at the 2:00 position of the left breast measures 2.8 x 2.4 x 3.1  cm. Ultrasound-guided core biopsy was performed subsequently.    RECOMMENDED FOLLOW-UP: Biopsy.    VANESA COBB MD   US Breast Left Limited 1-3 Quadrants    Narrative    MA DIAGNOSTIC BILATERAL W/ SCOTT,   US BREAST LEFT LIMITED 1-3 QUADRANTS -  4/27/2020 12:09 PM    HISTORY:  Palpable left breast lump.    COMPARISON:  Screening mammogram, 10/26/2012    BREAST DENSITY: Scattered fibroglandular densities.    FINDINGS:  Standard bilateral diagnostic views were obtained,  including tomosynthesis. Marker was placed on the left breast at the  site of concern; deep to the marker there is a round mass with an  irregular margin measuring approximately 3.0 cm. No associated  calcifications. No suspicious findings elsewhere in either breast.    Further evaluation with targeted left breast ultrasound shows a  corresponding hypoechoic mass at the 2:00 position, 8 cm from the  nipple, measuring 2.8 x 2.4 x 3.1 cm. There is some internal  vascularity by color Doppler. Survey of the axilla shows no evidence  of adenopathy. A few small morphologically normal-appearing lymph  nodes are evident.      Impression    IMPRESSION: BI-RADS  CATEGORY: 5 - Highly Suggestive of Malignancy.  Mass at the 2:00 position of the left breast measures 2.8 x 2.4 x 3.1  cm. Ultrasound-guided core biopsy was performed subsequently.    RECOMMENDED FOLLOW-UP: Biopsy.    VANESA COBB MD       Discharge Medications   Current Discharge Medication List      START taking these medications    Details   artificial saliva (BIOTENE DRY MOUTHWASH) LIQD liquid Swish and spit 15 mLs in mouth 4 times daily as needed for dry mouth    Associated Diagnoses: Lump in upper outer quadrant of left breast      artificial saliva (BIOTENE MT) SOLN solution Swish and spit 2 mLs (2 sprays) in mouth every hour as needed for dry mouth    Associated Diagnoses: Lump in upper outer quadrant of left breast      bisacodyl (DULCOLAX) 10 MG suppository Place 1 suppository (10 mg) rectally daily as needed for constipation    Associated Diagnoses: Lump in upper outer quadrant of left breast      calcium carbonate (TUMS) 500 MG chewable tablet Take 2 tablets (1,000 mg) by mouth every 4 hours as needed for heartburn    Associated Diagnoses: Lump in upper outer quadrant of left breast      HYDROmorphone (DILAUDID) 2 MG tablet Take 0.5-1 tablets (1-2 mg) by mouth every 3 hours as needed for moderate to severe pain  Qty: 15 tablet, Refills: 0    Associated Diagnoses: Lump in upper outer quadrant of left breast      lisinopril (ZESTRIL) 5 MG tablet Take 1 tablet (5 mg) by mouth daily    Associated Diagnoses: IHD (ischemic heart disease)      senna-docusate (SENOKOT-S/PERICOLACE) 8.6-50 MG tablet Take 1 tablet by mouth 2 times daily as needed for constipation    Associated Diagnoses: Lump in upper outer quadrant of left breast         CONTINUE these medications which have CHANGED    Details   acetaminophen (TYLENOL) 500 MG tablet Take 2 tablets (1,000 mg) by mouth 3 times daily    Associated Diagnoses: Lump in upper outer quadrant of left breast      metoprolol tartrate (LOPRESSOR) 100 MG tablet Take 1 tablet  (100 mg) by mouth 2 times daily    Associated Diagnoses: IHD (ischemic heart disease)         CONTINUE these medications which have NOT CHANGED    Details   ascorbic acid (VITAMIN C) 500 MG tablet Take 500 mg by mouth daily (with lunch) Pt takes in afternoon      aspirin (ASA) 81 MG tablet Take 1 tablet (81 mg) by mouth daily  Qty: 90 tablet, Refills: 3    Associated Diagnoses: Abnormal cardiovascular stress test      famotidine (PEPCID) 20 MG tablet Take 1 tablet (20 mg) by mouth 2 times daily  Qty: 60 tablet, Refills: 0    Associated Diagnoses: Heartburn      isosorbide mononitrate (IMDUR) 30 MG 24 hr tablet TAKE ONE TABLET BY MOUTH EVERY DAY  Qty: 90 tablet, Refills: 1    Associated Diagnoses: Coronary artery disease involving native coronary artery of native heart without angina pectoris; Status post coronary angioplasty      levothyroxine (SYNTHROID/LEVOTHROID) 75 MCG tablet TAKE ONE TABLET BY MOUTH EVERY MORNING (BEFORE BREAKFAST)  Qty: 90 tablet, Refills: 0    Associated Diagnoses: Acquired hypothyroidism      liothyronine (CYTOMEL) 5 MCG tablet Take 1 tablet (5 mcg) by mouth daily  Qty: 90 tablet, Refills: 0    Associated Diagnoses: Hypothyroidism, unspecified type      polyethylene glycol (MIRALAX/GLYCOLAX) packet Take 1 packet by mouth nightly as needed for constipation          STOP taking these medications       cholecalciferol (VITAMIN D3) 1000 UNIT tablet Comments:   Reason for Stopping:         HYDROcodone-acetaminophen (NORCO) 5-325 MG tablet Comments:   Reason for Stopping:         simvastatin (ZOCOR) 10 MG tablet Comments:   Reason for Stopping:             Allergies   Allergies   Allergen Reactions     Flu Virus Vaccine Other (See Comments)     Viral SX     Gluten Meal

## 2020-04-28 NOTE — PROGRESS NOTES
Discharge Planner   Discharge Plans in progress: yes  Barriers to discharge plan: none  Follow up plan: Pt has been accepted at [facility]CU for today. Admissions aware. PAS complete.  MD aware.  [facility] Transport set up for 1445.  Will update Pt as well.    Cierra Motta RN BSN   Inpatient Care Coordination  [facility]  [phone]         Entered by: Jayna Motta 04/28/2020 9:33 AM

## 2020-04-28 NOTE — DISCHARGE INSTRUCTIONS
Page 1 of 1  For informational purposes only. Not to replace the advice of your health care provider. Copyright   2010 [facility]. All rights reserved. [employer] 429708 - REV 02/16.  After Your Breast Biopsy   Bleeding or bruising  Slight bruising is normal. If you bleed through the bandage, put direct pressure on the breast for 10 minutes.   If the breast begins to swell, or you have a lot of bleeding after 10 minutes of pressure, call the doctor who ordered your exam. Or, go to the emergency room.   Bandages  Keep your bandage in place until tomorrow morning. Do not get it wet.   If you have small pieces of tape on the skin, leave them in place. They will fall off on their own, or you can remove them after 5 days.   Activity  You may shower the morning after the exam. No heavy activity (lifting, vacuuming) on the day of your exam. You may go back to normal activity the next day, unless you had a lot of bleeding or pain.  Discomfort  You may take Tylenol (acetaminophen) today for pain. Tomorrow, you may take an anti-inflammatory medicine (aspirin, ibuprofen, Motrin, Aleve, Advil), unless your doctor tells you not to.  Wear your bra overnight to support the breast. You may also use an ice pack: Place it over the area for 15-20 minutes several times a day.  Infection  Infection is rare. Symptoms include fever, redness, increasing pain and fluid draining from the biopsy site. If you have any of these symptoms, please call the doctor who ordered your exam.  Results  Results may take up to 5 business days. A nurse or doctor from the Breast Center will call with your results. We will also send the results to the doctor who ordered your biopsy.  If you have not heard your results in 5 days, please call the Breast Center.   Other instructions  ______________________________________________________________________________________________________________________________  Call your doctor if:    You have  bleeding that lasts more than 10 minutes.    You have pain that cannot be controlled.   You have signs of infection (fever, redness, drainage or other signs).   You have not received your results within 5 days.    Please call the Breast Center nurse navigator at [phone] if you have questions or concerns about your biopsy.      General skin care for within skin folds: Twice daily and PRN with incontinence episodes:    Cleanse the area with Kalyan cleanse and protect, very gently with soft DRY cloth.    Use only one Covidien pad in between mattress and pt. No brief while in bed if possible to decrease moisture.    Thanks!

## 2020-04-28 NOTE — PLAN OF CARE
Occupational Therapy Discharge Summary    Reason for therapy discharge:    Discharged to transitional care facility.    Progress towards therapy goal(s). See goals on Care Plan in [facility] electronic health record for goal details.  Goals not met.  Barriers to achieving goals:   limited tolerance for therapy and discharge from facility.    Therapy recommendation(s):    Continued therapy is recommended.  Rationale/Recommendations:  OT eval and treat at TCU.    **Pt not seen by discharging therapist on this date, note written based on previous treating therapist's notes and recommendations

## 2020-04-28 NOTE — PLAN OF CARE
Speech Language Therapy Discharge Summary    Reason for therapy discharge:    Discharged to transitional care facility.    Progress towards therapy goal(s). See goals on Care Plan in [facility] electronic health record for goal details.  Goals not met.  Barriers to achieving goals:   discharge from facility.    Therapy recommendation(s):    Continued therapy is recommended.  Rationale/Recommendations:  See below.      Patient plan for discharge: TCU today  Current status: SLP: Despite having DD3 textures on tray, pt denied solids and requested only applesauce and apple juice by straw. Generalized weakness reported, however, pt able to self feed. Slow, but functional oral phase and initiation and no overt s/sx of aspiration throughout the meal. Pt denied any additional solids due to feeling full.     Continue dysphagia diet level 3 textures and thin liquids with reminders to use the following safe swallow strategies: nursing tray set up, sit at 90 degrees, small sips and bites, slow rate.     Barriers to return to prior living situation: Deconditioning  Recommendations for discharge: TCU  Rationale for recommendations: Short term SLP services at TCU to return to baseline regular diet/thin liquids as able.        Entered by: Niesha Weston 04/28/2020 9:36 AM

## 2020-04-28 NOTE — DISCHARGE SUMMARY
Discharge Planner      Discharge Plans in progress: Patient is discharging to [facility] today via medical transportation @ 1445.  had called me several times today regarding follow up cancer for patient's cancer. Palliative care and hospitalist did call  to explain the plan.  needed lots of support and reassurance.    Barriers to discharge plan: No barriers anticipated.    Follow up plan: Patient will follow up outpatient with oncology. Patient discharging to [facility] today.    MUNIRA Garcia   Inpatient Care Coordination   Supervisor  [facility]  [phone]         Entered by: Maryam Rivero 04/28/2020 2:07 PM

## 2020-04-28 NOTE — PROGRESS NOTES
"[facility]  Palliative Care Progress Note  Text Page    Briefly met with Elizabeth as she was resting in bed. She was needed to have a bowel movement, I requested her nurse Vika Kat, RN and explained that I would be back later.     Appreciate the phone call from  DB Garcia that Elizabeth's  Ad had called distressed about the plan to have Elizabeth move to [facility] today. I phoned Ad at [phone] and explained the dilemma. Elizabeth is not requiring hospitalization and she will need to get stronger to assure she is well enough for Oncology to offer options for treatment. Ad explained \"I don't want her to go to rehab and they drop the ball with her.\" I gave him the phone number for the Oncology clinic to contact if he does not hear back from them in regards to a follow up plan. I returned to Elizabeth's hospital room. She acknowledged \"I need to get stronger and that's not going to happen by staying in this hospital bed!\" Elizabeth was asking about getting dressed for dismissal. I relayed her question to her bedside nurse Vika Kat RN.         Assessment & Plan   1.  Decisional Capacity -  Intact. Patient does not have an advance directive. Per  informed consent policy next of kin should be involved in all consent and decision making. Her  Oswaldo Li is next-of-kin.     2.  Altered mental status -Resolved. Patient alert, oriented and able to give insight.        3.  Dysphagia - Appreciate input of Speech Therapist Niesha Weston, SLP recommendation for Dysphagia Diet Level 3 textures and thin liquids.      4.  Malnutrition - Appreciate input of Registered Dietitian Shelia Dumont, ROBERT, LD and Janie Valdes RD, LD      5.  Right hip and spine pain - Continue Tylenol 1,000 mg TID and  Dilaudid 1 to 2 mg every 3 hours prn for pain. The patient has not used opiates in the past day.      6.  Generalized weakness - Appreciate input of Physical Therapist Fernando Duke, PT " recommendation for TCU.      7.  Dry mouth - Continue Biotene for comfort.     8.  Spiritual Care - Appreciate input of devin Pal.     9.  Care Planning - Appreciate input of  Corinne White, LSW regarding TCU placement.      Goals of Care: FULL CODE - Restorative care    Lovely Youssef MS, RN, CNS, APRN, ACHPN, FAACVPR  Pain and Palliative Care  Pager [phone]  Office [phone]       Time Spent on this Encounter   Total unit/floor time 10:10 AM until 10:35 AM; and 11:35 AM until 12:15 PM, time consisted of the following, examination of the patient, reviewing the record and completing documentation. >50% of time spent in counseling and coordination of care.  Time spend counseling with patient and family consisted of the following topics, care planning for discharge and symptom management.  Time spent in coordination of care with Bedside Nurse Vika Kat RN and  DB Garcia.     Interval History   Chart reviewed - no new complaints and patient does not complain of hallucinations    Palliative Symptom Review (0=no symptom/no concern, 1=mild, 2=moderate, 3=severe):      Pain: 1-mild      Fatigue: 2-moderate      Nausea: 0-none      Constipation: 0-none      Diarrhea: 0-none      Depressive Symptoms: 0-none      Anxiety: 0-none      Drowsiness: 0-none      Poor Appetite: 0-none      Shortness of Breath: 0-none      Insomnia: 0-none        Physical Exam   Temp:  [98.3  F (36.8  C)-99.3  F (37.4  C)] 99.3  F (37.4  C)  Pulse:  [65-71] 70  Heart Rate:  [57-71] 57  Resp:  [18-24] 20  BP: (159-184)/(53-71) 159/61  SpO2:  [92 %-94 %] 94 %  187 lbs 6.4 oz  GEN:  Alert, oriented x 3, appears comfortable, NAD.  HEENT:  Normocephalic/atraumatic, no scleral icterus, no nasal discharge, mouth moist.  RESP:  Symmetric chest rise on inhalation noted.  Normal respiratory effort.  PAIN BEHAVIOR: Cooperative  Psych:  Normal affect.  Calm, cooperative, conversant  appropriately.    Medications       acetaminophen  1,000 mg Oral TID     aspirin  81 mg Oral Daily     dexamethasone  1 mg Oral Daily     enoxaparin ANTICOAGULANT  40 mg Subcutaneous Q24H     hydrALAZINE  25 mg Oral TID     isosorbide mononitrate  30 mg Oral Daily     levothyroxine  75 mcg Oral Daily     liothyronine  5 mcg Oral Daily     metoprolol tartrate  100 mg Oral BID     vitamin C  500 mg Oral Daily with lunch       Data   Results for orders placed or performed during the hospital encounter of 04/17/20 (from the past 24 hour(s))   MA Diagnostic Bilateral w/Scott    Narrative    MA DIAGNOSTIC BILATERAL W/ SCOTT,   US BREAST LEFT LIMITED 1-3 QUADRANTS -  4/27/2020 12:09 PM    HISTORY:  Palpable left breast lump.    COMPARISON:  Screening mammogram, 10/26/2012    BREAST DENSITY: Scattered fibroglandular densities.    FINDINGS:  Standard bilateral diagnostic views were obtained,  including tomosynthesis. Marker was placed on the left breast at the  site of concern; deep to the marker there is a round mass with an  irregular margin measuring approximately 3.0 cm. No associated  calcifications. No suspicious findings elsewhere in either breast.    Further evaluation with targeted left breast ultrasound shows a  corresponding hypoechoic mass at the 2:00 position, 8 cm from the  nipple, measuring 2.8 x 2.4 x 3.1 cm. There is some internal  vascularity by color Doppler. Survey of the axilla shows no evidence  of adenopathy. A few small morphologically normal-appearing lymph  nodes are evident.      Impression    IMPRESSION: BI-RADS CATEGORY: 5 - Highly Suggestive of Malignancy.  Mass at the 2:00 position of the left breast measures 2.8 x 2.4 x 3.1  cm. Ultrasound-guided core biopsy was performed subsequently.    RECOMMENDED FOLLOW-UP: Biopsy.    VANESA COBB MD   US Breast Left Limited 1-3 Quadrants    Narrative    MA DIAGNOSTIC BILATERAL W/ SCOTT,   US BREAST LEFT LIMITED 1-3 QUADRANTS -  4/27/2020 12:09  PM    HISTORY:  Palpable left breast lump.    COMPARISON:  Screening mammogram, 10/26/2012    BREAST DENSITY: Scattered fibroglandular densities.    FINDINGS:  Standard bilateral diagnostic views were obtained,  including tomosynthesis. Marker was placed on the left breast at the  site of concern; deep to the marker there is a round mass with an  irregular margin measuring approximately 3.0 cm. No associated  calcifications. No suspicious findings elsewhere in either breast.    Further evaluation with targeted left breast ultrasound shows a  corresponding hypoechoic mass at the 2:00 position, 8 cm from the  nipple, measuring 2.8 x 2.4 x 3.1 cm. There is some internal  vascularity by color Doppler. Survey of the axilla shows no evidence  of adenopathy. A few small morphologically normal-appearing lymph  nodes are evident.      Impression    IMPRESSION: BI-RADS CATEGORY: 5 - Highly Suggestive of Malignancy.  Mass at the 2:00 position of the left breast measures 2.8 x 2.4 x 3.1  cm. Ultrasound-guided core biopsy was performed subsequently.    RECOMMENDED FOLLOW-UP: Biopsy.    VANESA COBB MD   US Breast Biopsy Core Needle Left    Narrative    ULTRASOUND-GUIDED LEFT BREAST CORE BIOPSY;   CLIP PLACEMENT;   POSTPROCEDURE DIGITAL MAMMOGRAM; 4/27/2020 12:14 PM    INDICATION FOR PROCEDURE: Hypoechoic mass at the 2:00 position of the  left breast.    PROCEDURE: Written informed consent is obtained from the patient prior  to the procedure. The risks and benefits are discussed and the patient  wishes to continue.  Approximately 3 mL lidocaine without epinephrine  was infiltrated for local anesthetic and a skin nick was made through  which a 13-gauge trocar was introduced via lateral to medial approach.   The needle tip was placed adjacent to the lesion. A series of 3  samples were obtained with a 14-gauge core-cutting needle. A  coil-shaped clip was then deployed to andry the lesion.     Postbiopsy mammogram was deferred given  the patient's limited  mobility. The patient tolerated the procedure without difficulty and  there was no immediate complication. Less than 5cc blood loss.  No  pain following biopsy.      Impression    IMPRESSION: Successful left breast ultrasound-guided core biopsy and  clip placement.  Final pathology is pending.      VANESA COBB MD   CBC with platelets   Result Value Ref Range    WBC 14.4 (H) 4.0 - 11.0 10e9/L    RBC Count 2.98 (L) 3.8 - 5.2 10e12/L    Hemoglobin 9.0 (L) 11.7 - 15.7 g/dL    Hematocrit 27.7 (L) 35.0 - 47.0 %    MCV 93 78 - 100 fl    MCH 30.2 26.5 - 33.0 pg    MCHC 32.5 31.5 - 36.5 g/dL    RDW 13.7 10.0 - 15.0 %    Platelet Count 168 150 - 450 10e9/L   Comprehensive metabolic panel   Result Value Ref Range    Sodium 141 133 - 144 mmol/L    Potassium 3.5 3.4 - 5.3 mmol/L    Chloride 113 (H) 94 - 109 mmol/L    Carbon Dioxide 23 20 - 32 mmol/L    Anion Gap 5 3 - 14 mmol/L    Glucose 84 70 - 99 mg/dL    Urea Nitrogen 23 7 - 30 mg/dL    Creatinine 0.89 0.52 - 1.04 mg/dL    GFR Estimate 60 (L) >60 mL/min/[1.73_m2]    GFR Estimate If Black 69 >60 mL/min/[1.73_m2]    Calcium 8.5 8.5 - 10.1 mg/dL    Bilirubin Total 0.6 0.2 - 1.3 mg/dL    Albumin 2.7 (L) 3.4 - 5.0 g/dL    Protein Total 5.8 (L) 6.8 - 8.8 g/dL    Alkaline Phosphatase 78 40 - 150 U/L    ALT 31 0 - 50 U/L    AST 88 (H) 0 - 45 U/L

## 2020-04-28 NOTE — PROGRESS NOTES
"SPIRITUAL HEALTH SERVICES Progress Note  [facility] Oncology 5th floor    SH f/u per plan of care.  Met with pt, Elizabeth, and she informs me that she is \"going to rehab later today.\" She expresses a hope to \"get stronger and make some more doctor appointments about my cancer.\"     Elizabeth notes that she believes her pain is \"better\" controlled and is less fearful of it now. She expresses appreciation for the care she has and is receiving.     Elizabeth names her worries around what dx of cancer may mean but also reflects that \"COVID would be worse.\" She expresses her continued nuanced and thoughtful wonderings about her robbie - naming her confidence and her doubts and how both show up during the day. She expresses seeing \"God\" in a glimpse of green grass when being taken to a test yesterday.     Elizabeth asked for pocket cross to be placed in her red jacket zipper pocket (right side) and this was done. She also requested picture of kitten that I provided earlier be put in her hospital folder and this was done as well. She then asked for a prayer and we shared in prayer together.     No other needs expressed. Pt anticipates discharge at this time. SH remains available.     SUNSHINE Ford.  Staff    Pager #[phone]   Pronouns: he/him/his    "

## 2020-04-29 NOTE — PROGRESS NOTES
Notified Dr An's office at [facility] that US biopsy results are available for review.  Patient was discharged from hospital to nursing home 4-28-20.  RN states they will arrange for follow up consult with Dr An in the near future.

## 2020-04-30 NOTE — LETTER
"    4/30/2020        RE: Elizabeth Li  [address]  [place] [address]         [facility]  Elizabeth Li is being evaluated via a billable video visit due to the restrictions of the Covid-19 pandemic.   The patient has been notified of following:  \"This video visit will be conducted via a call between you and your provider. We have found that certain health care needs can be provided without the need for an in-person physical exam.  This service lets us provide the care you need with a video conversation. If during the course of the call the provider feels a video visit is not appropriate, you will not be charged for this service.\"   The provider has received verbal consent for a Video Visit from the patient and or first contact? Yes  Patient/facility staff would like the video invitation sent by: N/A   Video Start Time: 8:26 AM  Which Facility the Patient is at during the time of visit: [facility]   PRIMARY CARE PROVIDER AND CLINIC:  Rebekah Rausch MD, [address] / [facility] [address]  Chief Complaint   Patient presents with     Video Visit     Hospital F/U     [facility] Medical Record Number:  [id number]  Elizabeth Li  is a 84 year old  (1935), admitted to the above facility from  [facility]. Hospital stay 4/17/20 through 4/28/20..  Admitted to this facility for  rehab, medical management and nursing care.  HPI:    HPI information obtained from: facility chart records, facility staff, patient report and [facility] chart review.   Brief Summary of Hospital Course: Elizabeth Li is a 84 year old female with PMH significant for CAD s/p stenting x2, HTN, hyperlipidemia, hypothyroidism, anxiety, depression, and KUSUM. She was hospitalized at [facility] from 4/17/20-4/28/20. She originally presented to the hospital with fatigue, weakness and myalgias and was found to have severe hypercalcemia " likely secondary to malignancy. She was treated with IV fluids, subcutaneous calcitonin, dexamethasone and pamidronate therapy and her calcium level normalized prior to hospital discharge. She was also found to have metastatic carcinoma. Biopsy was completed at L5 which confirmed metastatic carcinoma. She has left breast mass which is likely primary source. She is meeting with oncology to review results later this week. During hospitalization was also found to have metabolic encephalopathy likely due to hypercalcemia and also possibly from UTI for which she was treated with 7 days of ceftriaxone. Also with JENNIFER during admission that peaked at 1.6 and trended down to baseline at discharge- suspected to be secondary to volume depletion and hypercalcemia. With elevated BP throughout her stay and several medications were adjusted/ added. Also with dysphagia- on altered diet. She was discharged to TCU for physical rehab and medical management.     Updates on Status Since Skilled nursing Admission: Elizabeth was seen today for admission visit at the TCU. She continues to have elevated BPs with SBP running 160-190s since admission. Lisinopril dose was increased yesterday to 5 mg daily, but no improvement in BP today after receiving increased dose. She denies any vision changes, headaches, SOB, CP, dizziness/lightheadedness. Reports edema to BLE that developed during her hospital stay. Denies history of edema. Reports loose stools- is not able to tell me how many a day, besides multiple or when they started. She does have associated stomach cramping. Believes she is going at least 3 times per day. Remains afebrile. Denies recent constipation. Denies any dysuria, frequency, trouble urinating. Reports pain at times to back- finds dilaudid helpful. Reports feeling weak. Prior to hospitalization was living with her spouse in a senior coop. She managed her own medications and was using a walker for ambulation at that time.     CODE  STATUS/ADVANCE DIRECTIVES DISCUSSION:   CPR/Full code CPR/Full code   Patient's living condition: lives with spouse  ALLERGIES: Flu virus vaccine and Gluten meal  PAST MEDICAL HISTORY:  has a past medical history of CAD (coronary artery disease), Depression, HTN (hypertension), Hyperlipidemia, Hypothyroidism, IHD (ischemic heart disease), and KUSUM (obstructive sleep apnea).  PAST SURGICAL HISTORY:   has a past surgical history that includes tubal ligation; Colonoscopy (N/A, 1/11/2017); Optical tracking system fusion spine posterior lumbar one level (N/A, 7/17/2019); Coronary angiogram with stents x2 (03/2015); Harvey teeth extraction; Tonsillectomy, adenoidectomy, combined; and back surgery.  FAMILY HISTORY: family history includes Blood Disease in her mother; Heart Disease in her brother, father, and sister.  SOCIAL HISTORY:   reports that she quit smoking about 36 years ago. She has never used smokeless tobacco. She reports current alcohol use. She reports that she does not use drugs.  Current Outpatient Medications   Medication Sig Dispense Refill     acetaminophen (TYLENOL) 500 MG tablet Take 2 tablets (1,000 mg) by mouth 3 times daily       artificial saliva (BIOTENE DRY MOUTHWASH) LIQD liquid Swish and spit 15 mLs in mouth 4 times daily as needed for dry mouth       artificial saliva (BIOTENE MT) SOLN solution Swish and spit 2 mLs (2 sprays) in mouth every hour as needed for dry mouth       ascorbic acid (VITAMIN C) 500 MG tablet Take 500 mg by mouth daily (with lunch) Pt takes in afternoon       aspirin (ASA) 81 MG tablet Take 1 tablet (81 mg) by mouth daily 90 tablet 3     bisacodyl (DULCOLAX) 10 MG suppository Place 1 suppository (10 mg) rectally daily as needed for constipation       calcium carbonate (TUMS) 500 MG chewable tablet Take 2 tablets (1,000 mg) by mouth every 4 hours as needed for heartburn       hydrALAZINE (APRESOLINE) 10 MG tablet Take 1 tablet (10 mg) by mouth every 8 hours as needed  "(SBP>160)       HYDROmorphone (DILAUDID) 2 MG tablet Take 0.5-1 tablets (1-2 mg) by mouth every 3 hours as needed for moderate to severe pain 15 tablet 0     isosorbide mononitrate (IMDUR) 30 MG 24 hr tablet TAKE ONE TABLET BY MOUTH EVERY DAY 90 tablet 1     levothyroxine (SYNTHROID/LEVOTHROID) 75 MCG tablet TAKE ONE TABLET BY MOUTH EVERY MORNING (BEFORE BREAKFAST) 90 tablet 0     liothyronine (CYTOMEL) 5 MCG tablet Take 1 tablet (5 mcg) by mouth daily 90 tablet 0     lisinopril (ZESTRIL) 5 MG tablet Take 2 tablets (10 mg) by mouth daily       metoprolol tartrate (LOPRESSOR) 100 MG tablet Take 1 tablet (100 mg) by mouth 2 times daily       omeprazole (PRILOSEC) 20 MG DR capsule Take 1 capsule (20 mg) by mouth daily       polyethylene glycol (MIRALAX/GLYCOLAX) packet Take 1 packet by mouth nightly as needed for constipation        senna-docusate (SENOKOT-S/PERICOLACE) 8.6-50 MG tablet Take 1 tablet by mouth 2 times daily as needed for constipation       simethicone (MYLICON) 80 MG chewable tablet Take 1 tablet (80 mg) by mouth 4 times daily as needed for flatulence or cramping       ROS: 10 point ROS of systems including Constitutional, Eyes, Respiratory, Cardiovascular, Gastroenterology, Genitourinary, Integumentary, Musculoskeletal, Psychiatric were all negative except for pertinent positives noted in my HPI.  Vitals:BP (!) 147/75   Pulse 59   Temp 97.2  F (36.2  C)   Resp 18   Ht 1.575 m (5' 2\")   Wt 79 kg (174 lb 3.2 oz)   SpO2 95%   BMI 31.86 kg/m     Limited Visit Exam done given COVID-19 precautions:  GENERAL APPEARANCE:  Alert, pleasant and cooperative, elderly female sitting in wheelchair on exam  HEENT: normocephalic, conjunctivae, lids, pupils and irises normal, moist mucous membranes, nose without drainage or crusting  RESP:  respiratory effort normal, no respiratory distress, patient is on RA  CV: +1 edema to bilateral ankles/ feet  M/S:  reports tenderness to her back; able to move all " extremities   NEURO: no facial asymmetry, no speech deficits and able to follow directions  PSYCH: insight and judgement impaired, memory impaired, affect and mood normal    Lab/Diagnostic data:  Recent labs in [facility] reviewed by me today.     ASSESSMENT/PLAN:  (E83.52) Hypercalcemia of malignancy  (primary encounter diagnosis)  Comment: acute, treated inpatient and calcium improved to within normal limits at hospital discharge.  -Last calcium check 8.4 on 4/29 which is WNL  -corrected calcium 4/29 was 9.4 which is WNL  Plan: Continue to monitor calcium as ordered for 5/7. Monitor patient's mental status.     (C79.9) Metastatic carcinoma (H)  Comment: acute, per review of pathology results patient does have breast cancer. She is meeting with oncologist to discuss results tomorrow. Is reporting back pain today, which is likely due to metastasis found at L5.  Plan: Follow up with oncology tomorrow as scheduled. Continue tylenol scheduled and dilaudid PRN for pain management. Continue to monitor pain.     (I10) Benign essential hypertension  Comment: acute, not at goal  -received hydralazine during hospital stay  -metoprolol dose increased during hospital stay  -started on lisinopril prior to hospital discharge  Plan: Discussed medication management with geriatric pharmacist due to start of new medications on day of discharge and ongoing elevated BPs. Continue metoprolol (hold if HR <55 and notify provider), lisinopril, imdur. Recheck BMP as scheduled for next week. Start hydralazine 10 mg q8h PRN for SBP >160. If BP does not improve to <160 after receiving hydralazine contact provider and will consider increasing hydralazine dose to 25 mg. Discussed plan with nursing staff today. VS per TCU policy.      (R19.5) Loose stools  Comment: acute, reports multiple, loose stools, cramping- remains afebrile.   -was recently treated with antibiotics for UTI  Plan: Discussed with nursing staff today. C.diff sample to be collected  STAT. Not currently on scheduled bowel medications. Nursing to update with worsening symptoms, fever.    (R60.0) Bilateral lower extremity edema  Comment: acute, suspect secondary to IV hydration in the hospital- mild   Plan: Compression stockings- on in AM, off in PM. Expect improvement with ambulation and working with therapy. Monitor.     (G93.41) Acute metabolic encephalopathy  Comment: acute, improved prior to hospital discharge  Plan: OCCUPATIONAL THERAPY to complete cognitive testing at TCU.    (N17.9) Acute kidney injury (H)  Comment: acute, creatinine improved prior to discharge and stable at last check (4/29 creatinine was 0.82)  -creatinine peaked to 1.6 during hospitalization  Plan: BMP scheduled for next week.     (N39.0) Urinary tract infection without hematuria, site unspecified  Comment: acute, completed antibiotics inpatient- asymptomatic  Plan: Continue to monitor for symptoms.     (R13.10) Dysphagia, unspecified type  Comment: acute, on altered diet  Plan: Speech therapy following patient. Continues on mechanical soft diet an thin liquids. Monitor closely.    (E44.1) Mild malnutrition (H)  Comment: acute, with poor po intake during hospitalization, nursing staff documenting variable po intake ranging from 25-75% of meals  Plan: Not currently on supplementation. Dietician to follow. Follow weights.    (K21.9) GERD  Comment: acute, denies symptoms  -Estimated Creatinine Clearance: 47.5 mL/min (based on SCr of 0.89 mg/dL).  Plan: Due to creatinine clearance famotidine dose was reduced to 20 mg daily per  recommendations. However later received a call from the nurse stating famotidine not available from pharmacy. Ok to add tums PRN. Monitor symptoms.     (I25.10) Coronary artery disease involving native heart, angina presence unspecified, unspecified vessel or lesion type  Comment: chronic, asymptomatic  -history of 2 stents placed  -last ECHO from November 2019 showed: Left ventricular  systolic function is normal.The visual ejection fraction is  estimated at 60-65%. The right ventricular systolic function is normal. Mild aortic valve sclerosis, trace aortic regurgitation.  Plan: Continue ASA, imdur, metoprolol, lisinopril. Follow up with cardiology 1 year from last ECHO.    (E03.9) Hypothyroidism, unspecified type  Comment: chronic, last TSH lower than recommended goal for older adults- does not appear that dose was adjusted after this check  TSH   Date Value Ref Range Status   03/13/2020 0.27 (L) 0.40 - 4.00 mU/L Final     T4 Free   Date Value Ref Range Status   03/13/2020 0.86 0.76 - 1.46 ng/dL Final       Plan: Continue levothyroxine and liothyronine. Recommend recheck of TSH after acute medical event subsided to ensure closer to recommended goal.     (R53.81) Physical deconditioning  Comment: Acute, secondary to recent hospitalization, medical conditions as above  Plan: Encourage participation in physical therapy/occupational therapy for strengthening and deconditioning. Discharge planning per their recommendation. Social work to assist with d/c planning.      Tottal time spent with patient visit at the skilled nursing facility was 36 minutes including patient visit, review of past records, review of admission orders, medication reconciliation, review of pharmacy recommendations and discussion with nursing staff and geriatric pharmacist. Greater than 50% of total time spent with counseling and coordinating care due to HTN, edema, loose stools as well as other co morbidities as above.      Electronically signed by:  ISMAEL Peña CNP     Video-Visit Details  Type of service:  Video Visit  Video End Time (time video stopped): 8:37 AM  Distant Location (provider location):  [facility]                 Sincerely,        ISMAEL Peña CNP

## 2020-04-30 NOTE — PROGRESS NOTES
Elizabeth Li   1935    Order faxed to nursing at [facility] TCU  Please start today. OK to pull from [employer]Kit.    Hydralazine 10 mg po q8h PRN for SBP >160. Contact provider if BP does not go below 160 after receiving a PRN dose. Diagnosis: HTN    Please add parameter to metoprolol to hold if HR <55 and notify provider.    ISMAEL Peña CNP on 2020 at 2:24 PM

## 2020-04-30 NOTE — PROGRESS NOTES
Patient was tested for COVID 19, results are negative.  Get well loop referral placed.    Kennedi Tamaoy RN-BSN  Care Coordination  Phone:  [phone]  Email: [email or web address]  [facility] [place] [facility]

## 2020-04-30 NOTE — PATIENT INSTRUCTIONS
Order faxed to nursing at [facility] TCU  Please start today. OK to pull from EKit.     Hydralazine 10 mg po q8h PRN for SBP >160. Contact provider if BP does not go below 160 after receiving a PRN dose. Diagnosis: HTN     Please add parameter to metoprolol to hold if HR <55 and notify provider.     Wanda Nguyen, ISMAEL CNP on 4/30/2020 at 2:24 PM

## 2020-05-02 NOTE — TELEPHONE ENCOUNTER
Nursing called due to T = 99.6  Had reports earlier this week of loose stools.    Today no diarrhea.  No stomach complaints.  Did get tested on 4/21/2020 for COVID and negative.    Minor cough which she relates fore to allergies.    Temp is 2-3x day.  Gave orders if that temp gets to 99.8 then send in a UA/UC and if cough worsens, then call back provider and maybe consider a repeat COVID test?.    Electronically signed by Anjali Finch RN, CNP

## 2020-05-04 NOTE — PROGRESS NOTES
"[facility]   Elizabeth Li is being evaluated via a billable video visit due to the restrictions of the Covid-19 pandemic.   The patient has been notified of following:  \"This video visit will be conducted via a call between you and your provider. We have found that certain health care needs can be provided without the need for an in-person physical exam.  This service lets us provide the care you need with a video conversation. If during the course of the call the provider feels a video visit is not appropriate, you will not be charged for this service.\"   The provider has received verbal consent for a Video Visit from the patient or first contact? Yes  Patient  or facility staff would like the video invitation sent by: N/A   Video Start Time: 8:48AM  [facility] Record Number:  [id number]  Place of Location at the time of visit: [facility]   Chief Complaint   Patient presents with     RECHECK     HPI:  Elizabeth Li  is a 84 year old (1935), who is being seen today for a visit.  HPI information obtained from: facility chart records, facility staff, patient report and [facility] chart review.     Brief Summary of Hospital Course: Elizabeth Li is a 84 year old female with PMH significant for CAD s/p stenting x2, HTN, hyperlipidemia, hypothyroidism, anxiety, depression, and KUSUM. She was hospitalized at [facility] from 4/17/20-4/28/20. She originally presented to the hospital with fatigue, weakness and myalgias and was found to have severe hypercalcemia likely secondary to malignancy. She was treated with IV fluids, subcutaneous calcitonin, dexamethasone and pamidronate therapy and her calcium level normalized prior to hospital discharge. She was also found to have metastatic carcinoma. Biopsy was completed at L5 which confirmed metastatic carcinoma. She has left breast mass which is likely primary source. She is meeting with oncology to " review results later this week. During hospitalization was also found to have metabolic encephalopathy likely due to hypercalcemia and also possibly from UTI for which she was treated with 7 days of ceftriaxone. Also with JENNIFER during admission that peaked at 1.6 and trended down to baseline at discharge- suspected to be secondary to volume depletion and hypercalcemia. With elevated BP throughout her stay and several medications were adjusted/ added. Also with dysphagia- on altered diet. She was discharged to TCU for physical rehab and medical management.     Today's concern is:  Elizabeth was seen today for routine follow up at TCU. Over the weekend she developed low grade temp, continued to have diarrhea, gas and upset stomach. COVID 19 swab was collected and patient was moved to different area of the facility where she is on droplet precautions. For unknown reasons stool sample was never collected to rule out c.diff infection that was ordered last week. Had labs pending today with WBC slightly elevated to 11.1. Potassium today 3.2. Elizabeth denies any loose stools again this morning. She continues to have some cramping, gas and burping. Denies any acid reflux symptoms. Is not able to tell me the last time she had loose stools, but per review of notes appears to be yesterday. Is afebrile. She reports her appetite is improving and she is eating ok- she met with the dietician yesterday to review some food options for her due to her gluten/lactose free diet. She reports she is drinking enough water. Denies any SOB, cough, other respiratory symptoms. Oxygen saturations >91% on RA since admission to the TCU. Feels edema to BLE is worsening- is not able to put her shoes on. Does not have compression stockings in place that were ordered last week, for unknown reasons. SBP continues to remain elevated 170-190. She has not received any PRN hydralazine since it was ordered last week for unknown reasons. Patient denies any  headaches, vision changes. HR 60-70s. Reports pain to her low back- finds PRN dilaudid controls pain well. Is using dilaudid about 3 times per day.    Past Medical and Surgical History reviewed in Epic today.  MEDICATIONS:  Current Outpatient Medications   Medication Sig Dispense Refill     acetaminophen (TYLENOL) 500 MG tablet Take 2 tablets (1,000 mg) by mouth 3 times daily       artificial saliva (BIOTENE DRY MOUTHWASH) LIQD liquid Swish and spit 15 mLs in mouth 4 times daily as needed for dry mouth       artificial saliva (BIOTENE MT) SOLN solution Swish and spit 2 mLs (2 sprays) in mouth every hour as needed for dry mouth       ascorbic acid (VITAMIN C) 500 MG tablet Take 500 mg by mouth daily (with lunch) Pt takes in afternoon       aspirin (ASA) 81 MG tablet Take 1 tablet (81 mg) by mouth daily 90 tablet 3     bisacodyl (DULCOLAX) 10 MG suppository Place 1 suppository (10 mg) rectally daily as needed for constipation       hydrALAZINE (APRESOLINE) 10 MG tablet Take 1 tablet (10 mg) by mouth every 8 hours as needed (SBP>160)       HYDROmorphone (DILAUDID) 2 MG tablet Take 0.5-1 tablets (1-2 mg) by mouth every 3 hours as needed for moderate to severe pain 15 tablet 0     isosorbide mononitrate (IMDUR) 30 MG 24 hr tablet TAKE ONE TABLET BY MOUTH EVERY DAY 90 tablet 1     levothyroxine (SYNTHROID/LEVOTHROID) 75 MCG tablet TAKE ONE TABLET BY MOUTH EVERY MORNING (BEFORE BREAKFAST) 90 tablet 0     liothyronine (CYTOMEL) 5 MCG tablet Take 1 tablet (5 mcg) by mouth daily 90 tablet 0     lisinopril (ZESTRIL) 5 MG tablet Take 2 tablets (10 mg) by mouth daily       metoprolol tartrate (LOPRESSOR) 100 MG tablet Take 1 tablet (100 mg) by mouth 2 times daily       omeprazole (PRILOSEC) 20 MG DR capsule Take 1 capsule (20 mg) by mouth daily       polyethylene glycol (MIRALAX/GLYCOLAX) packet Take 1 packet by mouth nightly as needed for constipation        senna-docusate (SENOKOT-S/PERICOLACE) 8.6-50 MG tablet Take 1 tablet  "by mouth 2 times daily as needed for constipation       simethicone (MYLICON) 80 MG chewable tablet Take 1 tablet (80 mg) by mouth 4 times daily as needed for flatulence or cramping       REVIEW OF SYSTEMS: 10 point ROS of systems including Constitutional, Eyes, Respiratory, Cardiovascular, Gastroenterology, Genitourinary, Integumentary, Musculoskeletal, Psychiatric were all negative except for pertinent positives noted in my HPI.  Objective: BP (!) 175/71   Pulse 78   Temp 98.1  F (36.7  C)   Resp 20   Ht 1.575 m (5' 2\")   Wt 79 kg (174 lb 3.2 oz)   SpO2 93%   BMI 31.86 kg/m    Limited visit exam done given COVID-19 precautions.   GENERAL APPEARANCE:  Alert, pleasant and cooperative, elderly female sitting in wheelchair on exam  HEENT: normocephalic, conjunctivae, lids, pupils and irises normal, moist mucous membranes, nose without drainage or crusting  RESP:  respiratory effort normal, no respiratory distress, patient is on RA  CV: +2 edema to bilateral ankles/ feet and lower legs  M/S:  reports tenderness to her back; able to move all extremities   NEURO: no facial asymmetry, no speech deficits and able to follow directions  PSYCH: insight and judgement impaired, memory impaired, affect and mood normal    Labs:   Labs done in SNF are in [facility] EPIC. Please refer to them using [employer]/Care Everywhere. and Recent labs in EPIC reviewed by me today.     ASSESSMENT/PLAN:  (R19.5) Loose stools  Comment: acute, with ongoing loose stools since last week- with cramping, gas, bloating, had low grade temp up to 99.6 48 hours ago, no further temperatures. No loose stools today- Not at goal  -was recently treated with antibiotics for UTI  -c.diff sample was ordered last week and discussed with nursing staff and the nurse reported that it was accidentally not collected  -COVID 19 results still pending  -reviewed imaging today from hospitalization and no evidence of any metastatic cancer GI involvement  -she is gluten and " dairy free due to sensitivities, but reports she has been following diet at [facility]  -famotidine also stopped as not available due to drug shortage-could also be contributing? She denies any acid reflux like symptoms today though.   Plan: As above discussed with nursing staff today for them to collect stool sample to rule out c.diff since she continues to have loose stools and has had recent antibiotic use. Continue to await COVID results. Continue droplet isolation precautions. Continue omperazole and simethicone that were started by on call over the weekend. Discontinue calcium carbonate. Not currently on scheduled bowel medications. Nursing to update with worsening symptoms, fever.    (I10) Benign essential hypertension  Comment: acute, not at goal  -received hydralazine during hospital stay, which was started at [facility] last week, but patient has not received a dose since it was ordered even though BP meet parameter.   -metoprolol dose increased during hospital stay  -started on lisinopril prior to hospital discharge  Plan: Discussed hydralazine order and usage with nursing staff and nurse confirmed order was viewable and they had medication to administer. He will administer a dose now and notify me if BP not improved. Also added a nursing order to use hydralazine as ordered- hydralazine 10 mg q8h PRN for SBP >160. If BP does not improve to <160 after receiving hydralazine contact provider and will consider increasing hydralazine dose to 25 mg. Continue metoprolol (hold if HR <55 and notify provider), lisinopril, imdur. Lasix added as below as well. Recheck BMP scheduled for later this week. VS per TCU policy.      R60.0) Bilateral lower extremity edema  Comment: acute, suspect secondary to IV hydration in the hospital- not improving and no longer able to wear shoes- not at goal  Plan: Start Lasix 20 mg po daily x3 days. Potassium replacement today as below, then Kcl 20 meq daily. BMP 5/7. Compression stockings- on in  AM, off in PM. Monitor for improvement.    (E87.6) Hypokalemia  Comment: acute, suspect secondary to loose stools.   Potassium 5/4 was 3.2  Plan: Potassium 40 meq po STAT now, then 40 meq 4 hours after that dose. Start 20 meq po daily tomorrow while taking lasix. BMP 5/7.    (D72.829) Leukocytosis  Comment: acute, mild elevation, is trending down from check in the hospital- could be related to malignancy, or loose stools if infection?   -is now afebrile  Plan: Discussed with Dr. Bravo today. Plan to repeat CBC 5/7. Collect cdiff sample as above. Monitor closely for fevers/other signs of infection    (E83.52) Hypercalcemia of malignancy    Comment: acute, treated inpatient and calcium improved to within normal limits at hospital discharge.  -Last calcium check 8.4 on 4/29 which is WNL  -corrected calcium 4/29 was 9.4 which is WNL  Plan: Continue to monitor calcium as ordered for 5/7. Monitor patient's mental status.      (C79.9) Metastatic carcinoma (H)  Comment: acute, per review of pathology results patient does have breast cancer. Continues to report back pain, which is likely due to metastasis found at L5- pain being managed well with dilaudid.  Plan: Continue tylenol scheduled and dilaudid PRN for pain management. Continue to monitor pain to ensure well controlled. Follow up with oncology in 2 weeks per their recommendations.     (G31.84) Mild cognitive impairment  Comment: acute, SLUMS 22/30 indicated mild cognitive impairment  -with acute metabolic encephalopathy during hospitalization that improved prior to hospital discharge.  -patient continues to be poor historian  Plan: Nursing to provide supportive cares at TCU. OCCUPATIONAL THERAPY to complete further cognitive testing at TCU,     (R13.10) Dysphagia, unspecified type  Comment: acute, on altered diet  Plan: Speech therapy continues to follow patient. Continues on mechanical soft diet an thin liquids. Monitor closely.    (E44.1) Mild malnutrition  (H)  Comment: acute, with poor po intake during hospitalization, patient reports appetite is improving. Nursing staff documenting 25-75% of po intake with meals   Plan: Not currently on supplementation. Dietician to follow. Follow weights. Discussed adding supplementation today with dietician.     (K21.9) GERD  Comment: acute, denies symptoms  -PTA was taking famotidine which is not available from the pharmacy due to drug shortage.  Plan: Discontinue calcium carbonate. Omeprazole started over the weekend by on call, so continue this. Monitor symptoms.     (R53.81) Physical deconditioning  Comment: Acute, secondary to recent hospitalization, medical conditions as above  Plan: Encourage participation in physical therapy/occupational therapy for strengthening and deconditioning. Discharge planning per their recommendation. Social work to assist with d/c planning.     (I25.10) Coronary artery disease involving native heart, angina presence unspecified, unspecified vessel or lesion type  Comment: chronic, asymptomatic  -history of 2 stents placed  -last ECHO from November 2019 showed: Left ventricular systolic function is normal.The visual ejection fraction is  estimated at 60-65%. The right ventricular systolic function is normal. Mild aortic valve sclerosis, trace aortic regurgitation.  Plan: Continue ASA, imdur, metoprolol, lisinopril. Follow up with cardiology 1 year from last ECHO.    (E03.9) Hypothyroidism, unspecified type  Comment: chronic, last TSH lower than recommended goal for older adults- does not appear that dose was adjusted after this check  TSH   Date Value Ref Range Status   03/13/2020 0.27 (L) 0.40 - 4.00 mU/L Final     T4 Free   Date Value Ref Range Status   03/13/2020 0.86 0.76 - 1.46 ng/dL Final       Plan: Continue levothyroxine and liothyronine. Recommend recheck of TSH after acute medical event subsided to ensure closer to recommended goal.     (N39.0) Urinary tract infection without hematuria,  site unspecified  Comment: acute, resolved- completed antibiotics inpatient- asymptomatic  Plan: Continue to monitor for symptoms.     Tottal time spent with patient visit at the [facility] nursing facility was 36 minutes including patient visit, review of past records, discussion with nursing staff, physician Dr. Bravo, and dietician. Greater than 50% of total time spent with counseling and coordinating care due to loose stools, HTN, edema, hypokalemia, leukocytosis, malnutrition as well as other co morbidities as above.    Electronically signed by:  ISMAEL Peña CNP     Video-Visit Details  Type of service:  Video Visit  Video End Time (time video stopped): 8:57AM  Distant Location (provider location):  [facility]

## 2020-05-04 NOTE — TELEPHONE ENCOUNTER
Resident with several bouts of diarrhea today, also reporting gas and upset stomach. Denies n/v. Is on a gluten/dairy free diet which staff reports she follows. Covid-19 neg on 4/21/20    Plan:  1.) SARS-Cov2 swab NP x1 stat  2.) Droplet precautions  3.) omeprazole 20 mg PO qday   4.) simethicone 125 mg PO QID PRN    ISMAEL Cowan CNP  [facility]

## 2020-05-04 NOTE — LETTER
"    5/4/2020        RE: Elizabeth Li  [address]  [place] [facility] [address]        [facility]   Elizabeth Li is being evaluated via a billable video visit due to the restrictions of the Covid-19 pandemic.   The patient has been notified of following:  \"This video visit will be conducted via a call between you and your provider. We have found that certain health care needs can be provided without the need for an in-person physical exam.  This service lets us provide the care you need with a video conversation. If during the course of the call the provider feels a video visit is not appropriate, you will not be charged for this service.\"   The provider has received verbal consent for a Video Visit from the patient or first contact? Yes  Patient  or facility staff would like the video invitation sent by: N/A   Video Start Time: 8:48AM  [facility] Medical Record Number:  [id number]  Place of Location at the time of visit: [facility]   Chief Complaint   Patient presents with     RECHECK     HPI:  Elizabeth Li  is a 84 year old (1935), who is being seen today for a visit.  HPI information obtained from: facility chart records, facility staff, patient report and [facility] chart review.     Brief Summary of Hospital Course: Elizabeth Li is a 84 year old female with PMH significant for CAD s/p stenting x2, HTN, hyperlipidemia, hypothyroidism, anxiety, depression, and KUSUM. She was hospitalized at [facility] from 4/17/20-4/28/20. She originally presented to the hospital with fatigue, weakness and myalgias and was found to have severe hypercalcemia likely secondary to malignancy. She was treated with IV fluids, subcutaneous calcitonin, dexamethasone and pamidronate therapy and her calcium level normalized prior to hospital discharge. She was also found to have metastatic carcinoma. Biopsy was completed at L5 which confirmed metastatic " carcinoma. She has left breast mass which is likely primary source. She is meeting with oncology to review results later this week. During hospitalization was also found to have metabolic encephalopathy likely due to hypercalcemia and also possibly from UTI for which she was treated with 7 days of ceftriaxone. Also with JENNIFER during admission that peaked at 1.6 and trended down to baseline at discharge- suspected to be secondary to volume depletion and hypercalcemia. With elevated BP throughout her stay and several medications were adjusted/ added. Also with dysphagia- on altered diet. She was discharged to TCU for physical rehab and medical management.     Today's concern is:  Elizabeth was seen today for routine follow up at TCU. Over the weekend she developed low grade temp, continued to have diarrhea, gas and upset stomach. COVID 19 swab was collected and patient was moved to different area of the facility where she is on droplet precautions. For unknown reasons stool sample was never collected to rule out c.diff infection that was ordered last week. Had labs pending today with WBC slightly elevated to 11.1. Potassium today 3.2. Elizabeth denies any loose stools again this morning. She continues to have some cramping, gas and burping. Denies any acid reflux symptoms. Is not able to tell me the last time she had loose stools, but per review of notes appears to be yesterday. Is afebrile. She reports her appetite is improving and she is eating ok- she met with the dietician yesterday to review some food options for her due to her gluten/lactose free diet. She reports she is drinking enough water. Denies any SOB, cough, other respiratory symptoms. Oxygen saturations >91% on RA since admission to the TCU. Feels edema to BLE is worsening- is not able to put her shoes on. Does not have compression stockings in place that were ordered last week, for unknown reasons. SBP continues to remain elevated 170-190. She has not received  any PRN hydralazine since it was ordered last week for unknown reasons. Patient denies any headaches, vision changes. HR 60-70s. Reports pain to her low back- finds PRN dilaudid controls pain well. Is using dilaudid about 3 times per day.    Past Medical and Surgical History reviewed in Epic today.  MEDICATIONS:  Current Outpatient Medications   Medication Sig Dispense Refill     acetaminophen (TYLENOL) 500 MG tablet Take 2 tablets (1,000 mg) by mouth 3 times daily       artificial saliva (BIOTENE DRY MOUTHWASH) LIQD liquid Swish and spit 15 mLs in mouth 4 times daily as needed for dry mouth       artificial saliva (BIOTENE MT) SOLN solution Swish and spit 2 mLs (2 sprays) in mouth every hour as needed for dry mouth       ascorbic acid (VITAMIN C) 500 MG tablet Take 500 mg by mouth daily (with lunch) Pt takes in afternoon       aspirin (ASA) 81 MG tablet Take 1 tablet (81 mg) by mouth daily 90 tablet 3     bisacodyl (DULCOLAX) 10 MG suppository Place 1 suppository (10 mg) rectally daily as needed for constipation       hydrALAZINE (APRESOLINE) 10 MG tablet Take 1 tablet (10 mg) by mouth every 8 hours as needed (SBP>160)       HYDROmorphone (DILAUDID) 2 MG tablet Take 0.5-1 tablets (1-2 mg) by mouth every 3 hours as needed for moderate to severe pain 15 tablet 0     isosorbide mononitrate (IMDUR) 30 MG 24 hr tablet TAKE ONE TABLET BY MOUTH EVERY DAY 90 tablet 1     levothyroxine (SYNTHROID/LEVOTHROID) 75 MCG tablet TAKE ONE TABLET BY MOUTH EVERY MORNING (BEFORE BREAKFAST) 90 tablet 0     liothyronine (CYTOMEL) 5 MCG tablet Take 1 tablet (5 mcg) by mouth daily 90 tablet 0     lisinopril (ZESTRIL) 5 MG tablet Take 2 tablets (10 mg) by mouth daily       metoprolol tartrate (LOPRESSOR) 100 MG tablet Take 1 tablet (100 mg) by mouth 2 times daily       omeprazole (PRILOSEC) 20 MG DR capsule Take 1 capsule (20 mg) by mouth daily       polyethylene glycol (MIRALAX/GLYCOLAX) packet Take 1 packet by mouth nightly as needed for  "constipation        senna-docusate (SENOKOT-S/PERICOLACE) 8.6-50 MG tablet Take 1 tablet by mouth 2 times daily as needed for constipation       simethicone (MYLICON) 80 MG chewable tablet Take 1 tablet (80 mg) by mouth 4 times daily as needed for flatulence or cramping       REVIEW OF SYSTEMS: 10 point ROS of systems including Constitutional, Eyes, Respiratory, Cardiovascular, Gastroenterology, Genitourinary, Integumentary, Musculoskeletal, Psychiatric were all negative except for pertinent positives noted in my HPI.  Objective: BP (!) 175/71   Pulse 78   Temp 98.1  F (36.7  C)   Resp 20   Ht 1.575 m (5' 2\")   Wt 79 kg (174 lb 3.2 oz)   SpO2 93%   BMI 31.86 kg/m    Limited visit exam done given COVID-19 precautions.   GENERAL APPEARANCE:  Alert, pleasant and cooperative, elderly female sitting in wheelchair on exam  HEENT: normocephalic, conjunctivae, lids, pupils and irises normal, moist mucous membranes, nose without drainage or crusting  RESP:  respiratory effort normal, no respiratory distress, patient is on RA  CV: +2 edema to bilateral ankles/ feet and lower legs  M/S:  reports tenderness to her back; able to move all extremities   NEURO: no facial asymmetry, no speech deficits and able to follow directions  PSYCH: insight and judgement impaired, memory impaired, affect and mood normal    Labs:   Labs done in SNF are in [facility] EPIC. Please refer to them using EPIC/Care Everywhere. and Recent labs in EPIC reviewed by me today.     ASSESSMENT/PLAN:  (R19.5) Loose stools  Comment: acute, with ongoing loose stools since last week- with cramping, gas, bloating, had low grade temp up to 99.6 48 hours ago, no further temperatures. No loose stools today- Not at goal  -was recently treated with antibiotics for UTI  -c.diff sample was ordered last week and discussed with nursing staff and the nurse reported that it was accidentally not collected  -COVID 19 results still pending  -reviewed imaging today from " hospitalization and no evidence of any metastatic cancer GI involvement  -she is gluten and dairy free due to sensitivities, but reports she has been following diet at [facility]  -famotidine also stopped as not available due to drug shortage-could also be contributing? She denies any acid reflux like symptoms today though.   Plan: As above discussed with nursing staff today for them to collect stool sample to rule out c.diff since she continues to have loose stools and has had recent antibiotic use. Continue to await COVID results. Continue droplet isolation precautions. Continue omperazole and simethicone that were started by on call over the weekend. Discontinue calcium carbonate. Not currently on scheduled bowel medications. Nursing to update with worsening symptoms, fever.    (I10) Benign essential hypertension  Comment: acute, not at goal  -received hydralazine during hospital stay, which was started at [facility] last week, but patient has not received a dose since it was ordered even though BP meet parameter.   -metoprolol dose increased during hospital stay  -started on lisinopril prior to hospital discharge  Plan: Discussed hydralazine order and usage with nursing staff and nurse confirmed order was viewable and they had medication to administer. He will administer a dose now and notify me if BP not improved. Also added a nursing order to use hydralazine as ordered- hydralazine 10 mg q8h PRN for SBP >160. If BP does not improve to <160 after receiving hydralazine contact provider and will consider increasing hydralazine dose to 25 mg. Continue metoprolol (hold if HR <55 and notify provider), lisinopril, imdur. Lasix added as below as well. Recheck BMP scheduled for later this week. VS per TCU policy.      R60.0) Bilateral lower extremity edema  Comment: acute, suspect secondary to IV hydration in the hospital- not improving and no longer able to wear shoes- not at goal  Plan: Start Lasix 20 mg po daily x3 days.  Potassium replacement today as below, then Kcl 20 meq daily. BMP 5/7. Compression stockings- on in AM, off in PM. Monitor for improvement.    (E87.6) Hypokalemia  Comment: acute, suspect secondary to loose stools.   Potassium 5/4 was 3.2  Plan: Potassium 40 meq po STAT now, then 40 meq 4 hours after that dose. Start 20 meq po daily tomorrow while taking lasix. BMP 5/7.    (D72.829) Leukocytosis  Comment: acute, mild elevation, is trending down from check in the hospital- could be related to malignancy, or loose stools if infection?   -is now afebrile  Plan: Discussed with Dr. Bravo today. Plan to repeat CBC 5/7. Collect cdiff sample as above. Monitor closely for fevers/other signs of infection    (E83.52) Hypercalcemia of malignancy    Comment: acute, treated inpatient and calcium improved to within normal limits at hospital discharge.  -Last calcium check 8.4 on 4/29 which is WNL  -corrected calcium 4/29 was 9.4 which is WNL  Plan: Continue to monitor calcium as ordered for 5/7. Monitor patient's mental status.      (C79.9) Metastatic carcinoma (H)  Comment: acute, per review of pathology results patient does have breast cancer. Continues to report back pain, which is likely due to metastasis found at L5- pain being managed well with dilaudid.  Plan: Continue tylenol scheduled and dilaudid PRN for pain management. Continue to monitor pain to ensure well controlled. Follow up with oncology in 2 weeks per their recommendations.     (G31.84) Mild cognitive impairment  Comment: acute, SLUMS 22/30 indicated mild cognitive impairment  -with acute metabolic encephalopathy during hospitalization that improved prior to hospital discharge.  -patient continues to be poor historian  Plan: Nursing to provide supportive cares at TCU. OCCUPATIONAL THERAPY to complete further cognitive testing at TCU,     (R13.10) Dysphagia, unspecified type  Comment: acute, on altered diet  Plan: Speech therapy continues to follow patient.  Continues on mechanical soft diet an thin liquids. Monitor closely.    (E44.1) Mild malnutrition (H)  Comment: acute, with poor po intake during hospitalization, patient reports appetite is improving. Nursing staff documenting 25-75% of po intake with meals   Plan: Not currently on supplementation. Dietician to follow. Follow weights. Discussed adding supplementation today with dietician.     (K21.9) GERD  Comment: acute, denies symptoms  -PTA was taking famotidine which is not available from the pharmacy due to drug shortage.  Plan: Discontinue calcium carbonate. Omeprazole started over the weekend by on call, so continue this. Monitor symptoms.     (R53.81) Physical deconditioning  Comment: Acute, secondary to recent hospitalization, medical conditions as above  Plan: Encourage participation in physical therapy/occupational therapy for strengthening and deconditioning. Discharge planning per their recommendation. Social work to assist with d/c planning.     (I25.10) Coronary artery disease involving native heart, angina presence unspecified, unspecified vessel or lesion type  Comment: chronic, asymptomatic  -history of 2 stents placed  -last ECHO from November 2019 showed: Left ventricular systolic function is normal.The visual ejection fraction is  estimated at 60-65%. The right ventricular systolic function is normal. Mild aortic valve sclerosis, trace aortic regurgitation.  Plan: Continue ASA, imdur, metoprolol, lisinopril. Follow up with cardiology 1 year from last ECHO.    (E03.9) Hypothyroidism, unspecified type  Comment: chronic, last TSH lower than recommended goal for older adults- does not appear that dose was adjusted after this check  TSH   Date Value Ref Range Status   03/13/2020 0.27 (L) 0.40 - 4.00 mU/L Final     T4 Free   Date Value Ref Range Status   03/13/2020 0.86 0.76 - 1.46 ng/dL Final       Plan: Continue levothyroxine and liothyronine. Recommend recheck of TSH after acute medical event  subsided to ensure closer to recommended goal.     (N39.0) Urinary tract infection without hematuria, site unspecified  Comment: acute, resolved- completed antibiotics inpatient- asymptomatic  Plan: Continue to monitor for symptoms.     Tottal time spent with patient visit at the [facility] was 36 minutes including patient visit, review of past records, discussion with nursing staff, physician Dr. Bravo, and dietician. Greater than 50% of total time spent with counseling and coordinating care due to loose stools, HTN, edema, hypokalemia, leukocytosis, malnutrition as well as other co morbidities as above.    Electronically signed by:  ISMAEL Peña CNP     Video-Visit Details  Type of service:  Video Visit  Video End Time (time video stopped): 8:57AM  Distant Location (provider location):  [facility]           Sincerely,        ISMAEL Peña CNP

## 2020-05-06 NOTE — TELEPHONE ENCOUNTER
"[facility] TELEPHONE ENCOUNTER    Elizabeth Li is a 84 year old  (1935),Nurse called today to report that patient is not getting pain relief when she uses 1 mg of dilaudid. Per nurse the \"1/2 tab (1 mg) does not even touch her pain.\" Nursing is requesting elimination of the 1 mg on the current order dilaudid 1- 2 mg q3h PRN. Patient is currently using about 4-6 mg of dilaudid per 24 hours.     ASSESSMENT/PLAN  (G89.3) Cancer related pain  (primary encounter diagnosis)  Comment: acute, pain not well controlled when receiving 1 mg of dilaudid  -Continues to report back pain, which is likely due to metastasis found at L5  Plan: Discussed with geriatric pharmacist. Discontinue current dilaudid orders. Start dilaudid 2 mg q4h PRN for pain. Continue tylenol scheduled and dilaudid PRN for pain management. Continue to monitor pain to ensure well controlled. Follow up with oncology in 2 weeks per their recommendations.       NURSING PLEASE USE THIS as TELEPHONE ORDER:   Discontinue current dilaudid orders. Start dilaudid 2 mg q4h PRN for pain. Updated script was sent to the pharmacy.     Electronically signed by:   ISMAEL Peña CNP    "

## 2020-05-08 NOTE — PROGRESS NOTES
" [place]  INITIAL PHYSICIAN VISIT    Elizabeth Li is being evaluated via a billable video visit due to the restrictions of the Covid-19 pandemic.   The patient has been notified of following:\"This video visit will be conducted via a call between you and your provider. We have found that certain health care needs can be provided without the need for an in-person physical exam.  This service lets us provide the care you need with a video conversation. If during the course of the call the provider feels a video visit is not appropriate, you will not be charged for this service.\"   The provider has received verbal consent for a Video Visit from the patient and or first contact? Yes    Video Start Time: 11:13 AM  Which Facility the Patient is at during the time of visit: [facility]     PRIMARY CARE PROVIDER AND CLINIC:  Rebekah Rausch MD, [address] / [place] [address]  Chief Complaint   Patient presents with     Hospital F/U     [place] Medical Record Number:  [id number]  Elizabeth Li  is a 84 year old  (1935), admitted to the above facility from  [facility]. Hospital stay 4/17/20 through 4/28/20. Admitted to this facility for  rehab, medical management and nursing care.  HPI:    HPI information obtained from: facility chart records, facility staff, patient report and [facility] chart review.   Brief Summary of Hospital Course: Elizabeth has h/o CAD, HTN, depression/anxiety, KUSUM presenting with worsening back pain, fatigue and myalgias finding severe hypercalcemia and ultimately unfortunately diagnosed with metastatic carcinoma. She has h/o back surgery in 2019 and also was scheduled for right hip arthroplasty early April but this was cancelled d/t COVID-19 pandemic. Biopsy of L5 mass was malignant and pointed to left breast mass as likely primary location. Therapies given to normalize her calcium prior to discharge. " "Additionally she had dysphagia (h/o gluten/lactose free diet), JENNIFER, elevated blood pressures with adjustments provided in medication regimen and encephalopathy (metabolic +/- UTI treated with Ceftriaxone).    Updates on Status Since Skilled Nursing Admission: She continues to struggle with pain at TCU and Dilaudid prn dose increased two days ago. Has had some on-going significant symptoms including low grade temps, loose stools and edema. Has had Lasix/K+ initiated and monitoring of labs. SLUMS 22/30 though noted by my colleague to be a poor historian. Dieticians following given her h/o lactose/gluten free diet and dysphagia being on mechanical soft/thins.  She is seen in her room today and is worried about on-going high BPs which she reports being new \"since I've been sick\". BP today during therapy into 200s systolic. Asymptomatic at that time but says had a mild headache last night resolving spontaneously. Didn't sleep well last night as now in retrospect found out her window was open over night and it was cool in there. Told her we'd adjust BP meds and she was pleased with that. Her pain control is now better too with increased dose of Dilaudid. No longer having diarrhea and gas pains also improved and feels she is eating better. Her edema improved with Lasix course and is wearing TGs today. Still some edema though and its tough to get her shoes on. Denies chest pain, cough or dyspnea. No more low-grade temps. Did have slightly lower platelet count yesterday and denies any nidus of bleeding. She says she had some f/u with onc earlier this week and has another next week after labs and  was a part of visit. I don't have those notes available to me as she is going through [facility].  Nursing staff have no specific concerns other than making sure BP better controlled.    CODE STATUS/ADVANCE DIRECTIVES DISCUSSION:   CPR/Full code   Patient's living condition: lives with spouse  ALLERGIES: Flu virus vaccine " and Gluten meal  PAST MEDICAL HISTORY:  has a past medical history of CAD (coronary artery disease), Depression, HTN (hypertension), Hyperlipidemia, Hypothyroidism, IHD (ischemic heart disease), and KUSUM (obstructive sleep apnea).  PAST SURGICAL HISTORY:   has a past surgical history that includes tubal ligation; Colonoscopy (N/A, 1/11/2017); Optical tracking system fusion spine posterior lumbar one level (N/A, 7/17/2019); Coronary angiogram with stents x2 (03/2015); [place] teeth extraction; Tonsillectomy, adenoidectomy, combined; and back surgery.  FAMILY HISTORY: family history includes Blood Disease in her mother; Heart Disease in her brother, father, and sister.  SOCIAL HISTORY:   reports that she quit smoking about 36 years ago. She has never used smokeless tobacco. She reports current alcohol use. She reports that she does not use drugs.     Reviewed in [facility]  Current Outpatient Medications   Medication Sig Dispense Refill     acetaminophen (TYLENOL) 500 MG tablet Take 2 tablets (1,000 mg) by mouth 3 times daily       artificial saliva (BIOTENE DRY MOUTHWASH) LIQD liquid Swish and spit 15 mLs in mouth 4 times daily as needed for dry mouth       artificial saliva (BIOTENE MT) SOLN solution Swish and spit 2 mLs (2 sprays) in mouth every hour as needed for dry mouth       ascorbic acid (VITAMIN C) 500 MG tablet Take 500 mg by mouth daily (with lunch) Pt takes in afternoon       aspirin (ASA) 81 MG tablet Take 1 tablet (81 mg) by mouth daily 90 tablet 3     bisacodyl (DULCOLAX) 10 MG suppository Place 1 suppository (10 mg) rectally daily as needed for constipation       hydrALAZINE (APRESOLINE) 10 MG tablet Take 1 tablet (10 mg) by mouth every 8 hours as needed (SBP>160)       HYDROmorphone (DILAUDID) 2 MG tablet Take 1 tablet (2 mg) by mouth every 4 hours as needed for moderate to severe pain 15 tablet 0     isosorbide mononitrate (IMDUR) 30 MG 24 hr tablet TAKE ONE TABLET BY MOUTH EVERY DAY 90 tablet 1      "levothyroxine (SYNTHROID/LEVOTHROID) 75 MCG tablet TAKE ONE TABLET BY MOUTH EVERY MORNING (BEFORE BREAKFAST) 90 tablet 0     liothyronine (CYTOMEL) 5 MCG tablet Take 1 tablet (5 mcg) by mouth daily 90 tablet 0     lisinopril (ZESTRIL) 5 MG tablet Take 2 tablets (10 mg) by mouth daily       metoprolol tartrate (LOPRESSOR) 100 MG tablet Take 1 tablet (100 mg) by mouth 2 times daily       omeprazole (PRILOSEC) 20 MG DR capsule Take 1 capsule (20 mg) by mouth daily       polyethylene glycol (MIRALAX/GLYCOLAX) packet Take 1 packet by mouth nightly as needed for constipation        senna-docusate (SENOKOT-S/PERICOLACE) 8.6-50 MG tablet Take 1 tablet by mouth 2 times daily as needed for constipation       simethicone (MYLICON) 80 MG chewable tablet Take 1 tablet (80 mg) by mouth 4 times daily as needed for flatulence or cramping          ROS: Limited secondary to cognitive impairment but today pt reports as above in HPI    Vitals:BP (!) 199/82   Pulse 83   Temp 98.8  F (37.1  C)   Resp 18   Ht 1.575 m (5' 2\")   Wt 74 kg (163 lb 3.2 oz)   SpO2 91%   BMI 29.85 kg/m     Limited Visit Exam done given COVID-19 precautions:  BPs continue to be high with systolics into 200s  Alert, pleasant, NAD, appears stated age, sitting up in chair  No scleral icterus  Breathing non-labored, no cough  Some occasional word finding difficulty and vague speech  Wearing bilat TGs to knees with some reported pitting edema according to RN helping with telehealth visit  Mood euthymic    Lab/Diagnostic data:  Labs yesterday: Na 142, K 3.1, Ca 9.9, Cr 0.72, WBC 7.8, Hgb 9.8, Platelets 89    Prior this week WBC was 11.1, Hgb 8.8 and platelets 124    ASSESSMENT/PLAN:  Hypercalcemia of malignancy  Metastatic carcinoma likely of breast origin  Physical deconditioning  Hypercalcemia resolved with in-patient therapies; getting weekly BMP here at [facility] on Wednesdays per onc  Paraspinal soft tissue and breast masses both biopsied in hospital with " pathology showing that the specimens appear morphologically similar  F/u with oncology planned next week  Pain now better controlled with scheduled APAP and PRN Dilaudid   Physical therapy eval and treat for deconditioning and also known DJD right hip pending future possible LYUBOV    Essential hypertension  Hypokalemia  Bilateral lower extremity edema  Legs edematous d/t therapies in hospital and has had a few day course of Lasix/K+ here at TCU with improvement  Creatinine normalized so hopefully she will continue self-diuresing now  K+ 40 mEq x 1 today for yesterday's level of 3.1  F/u BMP next Wednesday 5/13  Increasing Lisinopril from 10 mg to 20 mg daily today though suspect she will need further titration  Pain is under better control now so hopefully that helps with BP management as well    Dysphagia, unspecified type  To follow with speech therapy on modified diet  Also has special dietary requests with lactose and gluten free  She says though she is not a diagnosed Celiac disease patient    Thrombocytopenia (H)  Anemia, unspecified type  CBC for thrombocytopenia (no bleeding) added for 5/13/20 with BMP  Anemia improving and leukocytosis resolved  Seems chronically platelets run on the low side    Cognitive impairment  Occupational therapy eval and treat for safe dispo; lives with   Per history, sounds cognition is improving some as her hypercalcemia and UTI were treated  SLUMS 22/30 thus far per notes         Electronically signed by:  Gladis Bravo DO     Video-Visit Details  Type of service:  Video Visit  Video End Time (time video stopped): 11:30 AM  Distant Location (provider location):  [facility]       Total time spent with patient visit was >50 minutes including patient visit (17 minutes), review of past records (~20 minutes) and discussion with nursing staff re: new orders (3 minutes) and collaborating with NP colleague (>5 minutes) on overall plan of care going forward.  Greater than 50% of total time spent with counseling and coordinating care due to changes in BP medication, follow up labs and participating in initial physician visit at [facility].

## 2020-05-08 NOTE — LETTER
"    5/8/2020        RE: Elizabeth Li  [address]  [place] [id number]         [place] [facility]  INITIAL PHYSICIAN VISIT    Elizabeth Li is being evaluated via a billable video visit due to the restrictions of the Covid-19 pandemic.   The patient has been notified of following:\"This video visit will be conducted via a call between you and your provider. We have found that certain health care needs can be provided without the need for an in-person physical exam.  This service lets us provide the care you need with a video conversation. If during the course of the call the provider feels a video visit is not appropriate, you will not be charged for this service.\"   The provider has received verbal consent for a Video Visit from the patient and or first contact? Yes    Video Start Time: 11:13 AM  Which Facility the Patient is at during the time of visit: [facility]     PRIMARY CARE PROVIDER AND CLINIC:  Rebekah Rausch MD, [address] / [facility] [address]  Chief Complaint   Patient presents with     Hospital F/U     [place] Medical Record Number:  [id number]  Elizabeth Li  is a 84 year old  (1935), admitted to the above facility from  [facility]. Hospital stay 4/17/20 through 4/28/20. Admitted to this facility for  rehab, medical management and nursing care.  HPI:    HPI information obtained from: facility chart records, facility staff, patient report and [facility] chart review.   Brief Summary of Hospital Course: Elizabeth has h/o CAD, HTN, depression/anxiety, KUSUM presenting with worsening back pain, fatigue and myalgias finding severe hypercalcemia and ultimately unfortunately diagnosed with metastatic carcinoma. She has h/o back surgery in 2019 and also was scheduled for right hip arthroplasty early April but this was cancelled d/t COVID-19 pandemic. Biopsy of L5 mass was malignant and pointed to left " "breast mass as likely primary location. Therapies given to normalize her calcium prior to discharge. Additionally she had dysphagia (h/o gluten/lactose free diet), JENNIFER, elevated blood pressures with adjustments provided in medication regimen and encephalopathy (metabolic +/- UTI treated with Ceftriaxone).    Updates on Status Since Skilled Nursing Admission: She continues to struggle with pain at TCU and Dilaudid prn dose increased two days ago. Has had some on-going significant symptoms including low grade temps, loose stools and edema. Has had Lasix/K+ initiated and monitoring of labs. SLUMS 22/30 though noted by my colleague to be a poor historian. Dieticians following given her h/o lactose/gluten free diet and dysphagia being on mechanical soft/thins.  She is seen in her room today and is worried about on-going high BPs which she reports being new \"since I've been sick\". BP today during therapy into 200s systolic. Asymptomatic at that time but says had a mild headache last night resolving spontaneously. Didn't sleep well last night as now in retrospect found out her window was open over night and it was cool in there. Told her we'd adjust BP meds and she was pleased with that. Her pain control is now better too with increased dose of Dilaudid. No longer having diarrhea and gas pains also improved and feels she is eating better. Her edema improved with Lasix course and is wearing TGs today. Still some edema though and its tough to get her shoes on. Denies chest pain, cough or dyspnea. No more low-grade temps. Did have slightly lower platelet count yesterday and denies any nidus of bleeding. She says she had some f/u with onc earlier this week and has another next week after labs and  was a part of visit. I don't have those notes available to me as she is going through [facility].  Nursing staff have no specific concerns other than making sure BP better controlled.    CODE STATUS/ADVANCE DIRECTIVES " DISCUSSION:   CPR/Full code   Patient's living condition: lives with spouse  ALLERGIES: Flu virus vaccine and Gluten meal  PAST MEDICAL HISTORY:  has a past medical history of CAD (coronary artery disease), Depression, HTN (hypertension), Hyperlipidemia, Hypothyroidism, IHD (ischemic heart disease), and KUSUM (obstructive sleep apnea).  PAST SURGICAL HISTORY:   has a past surgical history that includes tubal ligation; Colonoscopy (N/A, 1/11/2017); Optical tracking system fusion spine posterior lumbar one level (N/A, 7/17/2019); Coronary angiogram with stents x2 (03/2015); [place] teeth extraction; Tonsillectomy, adenoidectomy, combined; and back surgery.  FAMILY HISTORY: family history includes Blood Disease in her mother; Heart Disease in her brother, father, and sister.  SOCIAL HISTORY:   reports that she quit smoking about 36 years ago. She has never used smokeless tobacco. She reports current alcohol use. She reports that she does not use drugs.     Reviewed in [facility]  Current Outpatient Medications   Medication Sig Dispense Refill     acetaminophen (TYLENOL) 500 MG tablet Take 2 tablets (1,000 mg) by mouth 3 times daily       artificial saliva (BIOTENE DRY MOUTHWASH) LIQD liquid Swish and spit 15 mLs in mouth 4 times daily as needed for dry mouth       artificial saliva (BIOTENE MT) SOLN solution Swish and spit 2 mLs (2 sprays) in mouth every hour as needed for dry mouth       ascorbic acid (VITAMIN C) 500 MG tablet Take 500 mg by mouth daily (with lunch) Pt takes in afternoon       aspirin (ASA) 81 MG tablet Take 1 tablet (81 mg) by mouth daily 90 tablet 3     bisacodyl (DULCOLAX) 10 MG suppository Place 1 suppository (10 mg) rectally daily as needed for constipation       hydrALAZINE (APRESOLINE) 10 MG tablet Take 1 tablet (10 mg) by mouth every 8 hours as needed (SBP>160)       HYDROmorphone (DILAUDID) 2 MG tablet Take 1 tablet (2 mg) by mouth every 4 hours as needed for moderate to severe pain 15 tablet 0  "    isosorbide mononitrate (IMDUR) 30 MG 24 hr tablet TAKE ONE TABLET BY MOUTH EVERY DAY 90 tablet 1     levothyroxine (SYNTHROID/LEVOTHROID) 75 MCG tablet TAKE ONE TABLET BY MOUTH EVERY MORNING (BEFORE BREAKFAST) 90 tablet 0     liothyronine (CYTOMEL) 5 MCG tablet Take 1 tablet (5 mcg) by mouth daily 90 tablet 0     lisinopril (ZESTRIL) 5 MG tablet Take 2 tablets (10 mg) by mouth daily       metoprolol tartrate (LOPRESSOR) 100 MG tablet Take 1 tablet (100 mg) by mouth 2 times daily       omeprazole (PRILOSEC) 20 MG DR capsule Take 1 capsule (20 mg) by mouth daily       polyethylene glycol (MIRALAX/GLYCOLAX) packet Take 1 packet by mouth nightly as needed for constipation        senna-docusate (SENOKOT-S/PERICOLACE) 8.6-50 MG tablet Take 1 tablet by mouth 2 times daily as needed for constipation       simethicone (MYLICON) 80 MG chewable tablet Take 1 tablet (80 mg) by mouth 4 times daily as needed for flatulence or cramping          ROS: Limited secondary to cognitive impairment but today pt reports as above in HPI    Vitals:BP (!) 199/82   Pulse 83   Temp 98.8  F (37.1  C)   Resp 18   Ht 1.575 m (5' 2\")   Wt 74 kg (163 lb 3.2 oz)   SpO2 91%   BMI 29.85 kg/m     Limited Visit Exam done given COVID-19 precautions:  BPs continue to be high with systolics into 200s  Alert, pleasant, NAD, appears stated age, sitting up in chair  No scleral icterus  Breathing non-labored, no cough  Some occasional word finding difficulty and vague speech  Wearing bilat TGs to knees with some reported pitting edema according to RN helping with telehealth visit  Mood euthymic    Lab/Diagnostic data:  Labs yesterday: Na 142, K 3.1, Ca 9.9, Cr 0.72, WBC 7.8, Hgb 9.8, Platelets 89    Prior this week WBC was 11.1, Hgb 8.8 and platelets 124    ASSESSMENT/PLAN:  Hypercalcemia of malignancy  Metastatic carcinoma likely of breast origin  Physical deconditioning  Hypercalcemia resolved with in-patient therapies; getting weekly BMP here at " TCU on Wednesdays per onc  Paraspinal soft tissue and breast masses both biopsied in hospital with pathology showing that the specimens appear morphologically similar  F/u with oncology planned next week  Pain now better controlled with scheduled APAP and PRN Dilaudid   Physical therapy eval and treat for deconditioning and also known DJD right hip pending future possible LYUBOV    Essential hypertension  Hypokalemia  Bilateral lower extremity edema  Legs edematous d/t therapies in hospital and has had a few day course of Lasix/K+ here at TCU with improvement  Creatinine normalized so hopefully she will continue self-diuresing now  K+ 40 mEq x 1 today for yesterday's level of 3.1  F/u BMP next Wednesday 5/13  Increasing Lisinopril from 10 mg to 20 mg daily today though suspect she will need further titration  Pain is under better control now so hopefully that helps with BP management as well    Dysphagia, unspecified type  To follow with speech therapy on modified diet  Also has special dietary requests with lactose and gluten free  She says though she is not a diagnosed Celiac disease patient    Thrombocytopenia (H)  Anemia, unspecified type  CBC for thrombocytopenia (no bleeding) added for 5/13/20 with BMP  Anemia improving and leukocytosis resolved  Seems chronically platelets run on the low side    Cognitive impairment  Occupational therapy eval and treat for safe dispo; lives with   Per history, sounds cognition is improving some as her hypercalcemia and UTI were treated  SLUMS 22/30 thus far per notes         Electronically signed by:  Gladis Bravo DO     Video-Visit Details  Type of service:  Video Visit  Video End Time (time video stopped): 11:30 AM  Distant Location (provider location):  [facility]       Total time spent with patient visit was >50 minutes including patient visit (17 minutes), review of past records (~20 minutes) and discussion with nursing staff re: new orders (3  minutes) and collaborating with NP colleague (>5 minutes) on overall plan of care going forward. Greater than 50% of total time spent with counseling and coordinating care due to changes in BP medication, follow up labs and participating in initial physician visit at TCU.              Sincerely,        Gladis Bravo DO

## 2020-05-11 NOTE — LETTER
"    5/11/2020        RE: Elizabeth Li  [id number]  [place] [address]        [facility]   Elizabeth Li is being evaluated via a billable video visit due to the restrictions of the Covid-19 pandemic.   The patient has been notified of following:  \"This video visit will be conducted via a call between you and your provider. We have found that certain health care needs can be provided without the need for an in-person physical exam.  This service lets us provide the care you need with a video conversation. If during the course of the call the provider feels a video visit is not appropriate, you will not be charged for this service.\"   The provider has received verbal consent for a Video Visit from the patient or first contact? Yes  Patient  or facility staff would like the video invitation sent by: N/A   Video Start Time: 8:28AM  [facility] Record Number:  [id number]  Place of Location at the time of visit: [facility]   Chief Complaint   Patient presents with     RECHECK     HPI:  Elizabeth Li  is a 84 year old (1935), who is being seen today for a visit.  HPI information obtained from: facility chart records, facility staff, patient report and [facility] chart review.     Brief Summary of Hospital Course: Elizabeth Li is a 84 year old female with PMH significant for CAD s/p stenting x2, HTN, hyperlipidemia, hypothyroidism, anxiety, depression, and KUSUM. She was hospitalized at [facility] from 4/17/20-4/28/20. She originally presented to the hospital with fatigue, weakness and myalgias and was found to have severe hypercalcemia likely secondary to malignancy. She was treated with IV fluids, subcutaneous calcitonin, dexamethasone and pamidronate therapy and her calcium level normalized prior to hospital discharge. She was also found to have metastatic carcinoma. Biopsy was completed at L5 which confirmed metastatic " carcinoma. She has left breast mass which is likely primary source. She is meeting with oncology to review results later this week. During hospitalization was also found to have metabolic encephalopathy likely due to hypercalcemia and also possibly from UTI for which she was treated with 7 days of ceftriaxone. Also with JENNIFER during admission that peaked at 1.6 and trended down to baseline at discharge- suspected to be secondary to volume depletion and hypercalcemia. With elevated BP throughout her stay and several medications were adjusted/ added. Also with dysphagia- on altered diet. She was discharged to TCU for physical rehab and medical management.    While at TCU she developed loose stools, cramping, gas, as well as low grade temps. COVID 19 was tested and was negative. C.diff was ordered, but never collected by facility and eventually stools resolved and are moving well. Had mild leukocytosis as well during this time that resolved on its own. Received 3 days of lasix 20 mg daily for bilateral lower extremity edema, which is improving. Dilaudid dose also increased to 2 mg q4h PRN for pain.     Today's concern is:  Elizabeth was seen today for routine follow up at TCU. She feels her pain is not well controlled. Currently pain is 6-7/10- located in back and hip. Reports feeling that diluadid is helpful, but would like to have dilaudid scheduled at 8AM and 8PM. She tells me she has oncology appointment scheduled for later this week. Continues to have elevated BPs, with little improvement even with recent increases in lisinopril. Is receiving PRN hydralazine frequently. -214. This AM , nursing reported that BP did not drop below 160 after receiving 10 mg of hydralazine, so I gave her another order for 10 mg STAT of hydralazine. Patient denies any headaches or vision changes. Denies SOB, CP, dizziness/lightheadedness. Continues to have some edema, but is improving. Per nursing some pitting edema to right  foot today, generalized edema to bilateral legs and left foot. Reports bowels moving well. Denies any dysuria or trouble voiding. Remains afebrile.      Past Medical and Surgical History reviewed in Epic today.  MEDICATIONS:    Current Outpatient Medications   Medication Sig Dispense Refill     HYDROmorphone (DILAUDID) 2 MG tablet Take 1 tablet (2 mg) by mouth 2 times daily. May also take 1 tablet (2 mg) every 4 hours as needed for moderate to severe pain. Do not give PRN dose within 4 hrs of scheduled dose 30 tablet 0     spironolactone (ALDACTONE) 25 MG tablet Take 0.5 tablets (12.5 mg) by mouth daily       acetaminophen (TYLENOL) 500 MG tablet Take 2 tablets (1,000 mg) by mouth 3 times daily       artificial saliva (BIOTENE DRY MOUTHWASH) LIQD liquid Swish and spit 15 mLs in mouth 4 times daily as needed for dry mouth       artificial saliva (BIOTENE MT) SOLN solution Swish and spit 2 mLs (2 sprays) in mouth every hour as needed for dry mouth       ascorbic acid (VITAMIN C) 500 MG tablet Take 500 mg by mouth daily (with lunch) Pt takes in afternoon       aspirin (ASA) 81 MG tablet Take 1 tablet (81 mg) by mouth daily 90 tablet 3     bisacodyl (DULCOLAX) 10 MG suppository Place 1 suppository (10 mg) rectally daily as needed for constipation       hydrALAZINE (APRESOLINE) 10 MG tablet Take 1 tablet (10 mg) by mouth every 8 hours as needed (SBP>160)       isosorbide mononitrate (IMDUR) 30 MG 24 hr tablet TAKE ONE TABLET BY MOUTH EVERY DAY 90 tablet 1     levothyroxine (SYNTHROID/LEVOTHROID) 75 MCG tablet TAKE ONE TABLET BY MOUTH EVERY MORNING (BEFORE BREAKFAST) 90 tablet 0     liothyronine (CYTOMEL) 5 MCG tablet Take 1 tablet (5 mcg) by mouth daily 90 tablet 0     lisinopril (ZESTRIL) 20 MG tablet Take 20 mg by mouth daily       metoprolol tartrate (LOPRESSOR) 100 MG tablet Take 1 tablet (100 mg) by mouth 2 times daily       omeprazole (PRILOSEC) 20 MG DR capsule Take 1 capsule (20 mg) by mouth daily        "polyethylene glycol (MIRALAX/GLYCOLAX) packet Take 1 packet by mouth nightly as needed for constipation        senna-docusate (SENOKOT-S/PERICOLACE) 8.6-50 MG tablet Take 1 tablet by mouth 2 times daily as needed for constipation       simethicone (MYLICON) 125 MG chewable tablet Take 125 mg by mouth 4 times daily as needed for intestinal gas       REVIEW OF SYSTEMS: 6 point ROS of systems including Constitutional, Respiratory, Cardiovascular, Gastroenterology, Genitourinary, Musculoskeletal were all negative except for pertinent positives noted in my HPI.  Objective: BP (!) 174/80   Pulse 72   Temp 97.6  F (36.4  C)   Resp 16   Ht 1.575 m (5' 2\")   Wt 74 kg (163 lb 3.2 oz)   SpO2 90%   BMI 29.85 kg/m    Limited visit exam done given COVID-19 precautions.   GENERAL APPEARANCE:  Alert, pleasant and cooperative, elderly female sitting in wheelchair on exam  HEENT: normocephalic, conjunctivae, lids, pupils and irises normal, moist mucous membranes, nose without drainage or crusting  RESP:  respiratory effort normal, no respiratory distress, patient is on RA  M/S:  reports tenderness to her back; able to move all extremities   NEURO: no facial asymmetry, no speech deficits and able to follow directions  PSYCH: insight and judgement impaired, memory impaired, affect and mood normal    Labs:   Recent labs in [facility] reviewed by me today.     ASSESSMENT/PLAN:  (I10) Benign essential hypertension  Comment: acute, not at goal  -received hydralazine during hospital stay, which was started at TCU last week, but patient has not received a dose since it was ordered even though BP meet parameter.   -metoprolol dose increased during hospital stay  -started on lisinopril prior to hospital discharge and has been slowly increasing  -gave extra dose of hydralazine 10 mg today for bp that did not come down below  with first dose of 10 mg  Estimated Creatinine Clearance: 55.2 mL/min (based on SCr of 0.74 mg/dL).  Plan: " Discussed with geriatric pharmacist today and she recommended starting spironolactone since BP so resistant and in these cases likely have some fluid issues and need diuretic as well. Start spironolactone 12.5 mg daily. Continue metoprolol (hold if HR <55 and notify provider), lisinopril, and imdur.  Continue hydralazine 10 mg q8h PRN and contact provider if SBP does not drop below 160 after receiving a dose. Recheck BMP scheduled for later this week. VS per TCU policy.      R60.0) Bilateral lower extremity edema  Comment: acute, suspect secondary to IV hydration in the hospital- improving  Plan: Spironolactone as above. Compression stockings- on in AM, off in PM. Monitor for improvement.    (E87.6) Hypokalemia  Comment: acute, suspect secondary to loose stools (now resolved), lasix course  Potassium 5/7 was 3.1  Plan: Received supplemental potassium replacement and lasix course completed. Started spironolactone as above, but is potassium sparing, so hopefully will increase also on lisinopril. Next BMP 5/13.    (E83.52) Hypercalcemia of malignancy    Comment: acute, treated inpatient and calcium improved to within normal limits at hospital discharge.  -Last calcium check 8.4 on 4/29 which is WNL  -corrected calcium 4/29 was 9.4 which is WNL  Plan: Continue to monitor weekly calcium, BMP on Wednesdays. Monitor patient's mental status.      (C79.9) Metastatic carcinoma (H)  Comment: acute, per review of pathology results patient does have breast cancer. Continues to report back pain, which is likely due to metastasis found at L5- pain being managed well with dilaudid.  Plan: Continue tylenol scheduled. Schedule dilaudid at 8AM and 8PM and continue q4h PRN for pain management. Continue to monitor pain to ensure well controlled. Follow up with oncology later this week per patient report.     (G31.84) Mild cognitive impairment  Comment: acute, SLUMS 22/30 indicated mild cognitive impairment  -with acute metabolic  encephalopathy during hospitalization that improved prior to hospital discharge.  -patient continues to be poor historian  Plan: Nursing to provide supportive cares at TCU. OCCUPATIONAL THERAPY to complete further cognitive testing at TCU,     (R13.10) Dysphagia, unspecified type  Comment: acute, on altered diet  Plan: Speech therapy continues to follow patient. Continues on mechanical soft diet an thin liquids. Monitor closely.    (E44.1) Mild malnutrition (H)  Comment: acute, with poor po intake during hospitalization, patient reports appetite is improving. Nursing staff documenting 25-75% of po intake with meals   -patient declined nutritional supplements per dietician  Plan: Dietician to follow. Follow weights.      (K21.9) GERD  Comment: acute, denies symptoms  -PTA was taking famotidine which is not available from the pharmacy due to drug shortage.  Plan: Continue omeprazole. Monitor symptoms.     (R53.81) Physical deconditioning  Comment: Acute, secondary to recent hospitalization, medical conditions as above  Plan: Encourage participation in physical therapy/occupational therapy for strengthening and deconditioning. Discharge planning per their recommendation. Social work to assist with d/c planning.     (I25.10) Coronary artery disease involving native heart, angina presence unspecified, unspecified vessel or lesion type  Comment: chronic, asymptomatic  -history of 2 stents placed  -last ECHO from November 2019 showed: Left ventricular systolic function is normal.The visual ejection fraction is  estimated at 60-65%. The right ventricular systolic function is normal. Mild aortic valve sclerosis, trace aortic regurgitation.  Plan: Continue ASA, imdur, metoprolol, lisinopril. Follow up with cardiology 1 year from last ECHO.    (E03.9) Hypothyroidism, unspecified type  Comment: chronic, last TSH lower than recommended goal for older adults- does not appear that dose was adjusted after this check  TSH   Date  Value Ref Range Status   03/13/2020 0.27 (L) 0.40 - 4.00 mU/L Final     T4 Free   Date Value Ref Range Status   03/13/2020 0.86 0.76 - 1.46 ng/dL Final       Plan: Continue levothyroxine and liothyronine. Recommend recheck of TSH after acute medical event subsided to ensure closer to recommended goal.     (R19.5) Loose stools-resolved  Comment: while at TCU developed loose stools, cramping, gas, bloating, had low grade temp max to 99.6- now resolved   -was recently treated with antibiotics for UTI  -c.diff sample ordered but facility never collected and eventually patient had formed stools so it was cancelled  -COVID 19 negative  -reviewed imaging today from hospitalization and no evidence of any metastatic cancer GI involvement  -she is gluten and dairy free due to sensitivities, but reports she has been following diet at [facility]  -famotidine also stopped as not available due to drug shortage-could have been contributing?  Now on omeperazole  Plan: Monitor for any reoccurrence of symptoms.    (N39.0) Urinary tract infection without hematuria, site unspecified-resolved  Comment: completed antibiotics inpatient- asymptomatic  Plan: Continue to monitor for symptoms.       Electronically signed by:  ISMAEL Peña CNP     Video-Visit Details  Type of service:  Video Visit  Video End Time (time video stopped): 8:38AM  Distant Location (provider location):  [facility]             Sincerely,        ISAMEL Peña CNP

## 2020-05-11 NOTE — PATIENT INSTRUCTIONS
Orders for Elizabeth Jen  : 1935    1) Start spironolactone 12.5 mg daily. Diagnosis HTN  2) Discontinue current dilaudid orders.  3) Start dilaudid 2 mg scheduled at 8AM and 8PM. Continue dilaudid 2 mg q4h PRN for pain. Do not give PRN dose of dilaudid within 4 hours of scheduled dose. New script faxed to [facility] by me.    ISMAEL Peña CNP on 2020 at 11:35 AM

## 2020-05-11 NOTE — PROGRESS NOTES
"[facility]   Elizabeth Li is being evaluated via a billable video visit due to the restrictions of the Covid-19 pandemic.   The patient has been notified of following:  \"This video visit will be conducted via a call between you and your provider. We have found that certain health care needs can be provided without the need for an in-person physical exam.  This service lets us provide the care you need with a video conversation. If during the course of the call the provider feels a video visit is not appropriate, you will not be charged for this service.\"   The provider has received verbal consent for a Video Visit from the patient or first contact? Yes  Patient  or facility staff would like the video invitation sent by: N/A   Video Start Time: 8:28AM  [facility] Medical Record Number:  [id number]  Place of Location at the time of visit: [facility]   Chief Complaint   Patient presents with     RECHECK     HPI:  Elizabeth Li  is a 84 year old (1935), who is being seen today for a visit.  HPI information obtained from: facility chart records, facility staff, patient report and [facility] chart review.     Brief Summary of Hospital Course: Elizabeth Li is a 84 year old female with PMH significant for CAD s/p stenting x2, HTN, hyperlipidemia, hypothyroidism, anxiety, depression, and KUSUM. She was hospitalized at [facility] from 4/17/20-4/28/20. She originally presented to the hospital with fatigue, weakness and myalgias and was found to have severe hypercalcemia likely secondary to malignancy. She was treated with IV fluids, subcutaneous calcitonin, dexamethasone and pamidronate therapy and her calcium level normalized prior to hospital discharge. She was also found to have metastatic carcinoma. Biopsy was completed at L5 which confirmed metastatic carcinoma. She has left breast mass which is likely primary source. She is meeting with oncology to " review results later this week. During hospitalization was also found to have metabolic encephalopathy likely due to hypercalcemia and also possibly from UTI for which she was treated with 7 days of ceftriaxone. Also with JENNIFER during admission that peaked at 1.6 and trended down to baseline at discharge- suspected to be secondary to volume depletion and hypercalcemia. With elevated BP throughout her stay and several medications were adjusted/ added. Also with dysphagia- on altered diet. She was discharged to TCU for physical rehab and medical management.    While at TCU she developed loose stools, cramping, gas, as well as low grade temps. COVID 19 was tested and was negative. C.diff was ordered, but never collected by facility and eventually stools resolved and are moving well. Had mild leukocytosis as well during this time that resolved on its own. Received 3 days of lasix 20 mg daily for bilateral lower extremity edema, which is improving. Dilaudid dose also increased to 2 mg q4h PRN for pain.     Today's concern is:  Elizabeth was seen today for routine follow up at TCU. She feels her pain is not well controlled. Currently pain is 6-7/10- located in back and hip. Reports feeling that diluadid is helpful, but would like to have dilaudid scheduled at 8AM and 8PM. She tells me she has oncology appointment scheduled for later this week. Continues to have elevated BPs, with little improvement even with recent increases in lisinopril. Is receiving PRN hydralazine frequently. -214. This AM , nursing reported that BP did not drop below 160 after receiving 10 mg of hydralazine, so I gave her another order for 10 mg STAT of hydralazine. Patient denies any headaches or vision changes. Denies SOB, CP, dizziness/lightheadedness. Continues to have some edema, but is improving. Per nursing some pitting edema to right foot today, generalized edema to bilateral legs and left foot. Reports bowels moving well. Denies any  dysuria or trouble voiding. Remains afebrile.      Past Medical and Surgical History reviewed in Epic today.  MEDICATIONS:    Current Outpatient Medications   Medication Sig Dispense Refill     HYDROmorphone (DILAUDID) 2 MG tablet Take 1 tablet (2 mg) by mouth 2 times daily. May also take 1 tablet (2 mg) every 4 hours as needed for moderate to severe pain. Do not give PRN dose within 4 hrs of scheduled dose 30 tablet 0     spironolactone (ALDACTONE) 25 MG tablet Take 0.5 tablets (12.5 mg) by mouth daily       acetaminophen (TYLENOL) 500 MG tablet Take 2 tablets (1,000 mg) by mouth 3 times daily       artificial saliva (BIOTENE DRY MOUTHWASH) LIQD liquid Swish and spit 15 mLs in mouth 4 times daily as needed for dry mouth       artificial saliva (BIOTENE MT) SOLN solution Swish and spit 2 mLs (2 sprays) in mouth every hour as needed for dry mouth       ascorbic acid (VITAMIN C) 500 MG tablet Take 500 mg by mouth daily (with lunch) Pt takes in afternoon       aspirin (ASA) 81 MG tablet Take 1 tablet (81 mg) by mouth daily 90 tablet 3     bisacodyl (DULCOLAX) 10 MG suppository Place 1 suppository (10 mg) rectally daily as needed for constipation       hydrALAZINE (APRESOLINE) 10 MG tablet Take 1 tablet (10 mg) by mouth every 8 hours as needed (SBP>160)       isosorbide mononitrate (IMDUR) 30 MG 24 hr tablet TAKE ONE TABLET BY MOUTH EVERY DAY 90 tablet 1     levothyroxine (SYNTHROID/LEVOTHROID) 75 MCG tablet TAKE ONE TABLET BY MOUTH EVERY MORNING (BEFORE BREAKFAST) 90 tablet 0     liothyronine (CYTOMEL) 5 MCG tablet Take 1 tablet (5 mcg) by mouth daily 90 tablet 0     lisinopril (ZESTRIL) 20 MG tablet Take 20 mg by mouth daily       metoprolol tartrate (LOPRESSOR) 100 MG tablet Take 1 tablet (100 mg) by mouth 2 times daily       omeprazole (PRILOSEC) 20 MG DR capsule Take 1 capsule (20 mg) by mouth daily       polyethylene glycol (MIRALAX/GLYCOLAX) packet Take 1 packet by mouth nightly as needed for constipation         "senna-docusate (SENOKOT-S/PERICOLACE) 8.6-50 MG tablet Take 1 tablet by mouth 2 times daily as needed for constipation       simethicone (MYLICON) 125 MG chewable tablet Take 125 mg by mouth 4 times daily as needed for intestinal gas       REVIEW OF SYSTEMS: 6 point ROS of systems including Constitutional, Respiratory, Cardiovascular, Gastroenterology, Genitourinary, Musculoskeletal were all negative except for pertinent positives noted in my HPI.  Objective: BP (!) 174/80   Pulse 72   Temp 97.6  F (36.4  C)   Resp 16   Ht 1.575 m (5' 2\")   Wt 74 kg (163 lb 3.2 oz)   SpO2 90%   BMI 29.85 kg/m    Limited visit exam done given COVID-19 precautions.   GENERAL APPEARANCE:  Alert, pleasant and cooperative, elderly female sitting in wheelchair on exam  HEENT: normocephalic, conjunctivae, lids, pupils and irises normal, moist mucous membranes, nose without drainage or crusting  RESP:  respiratory effort normal, no respiratory distress, patient is on RA  M/S:  reports tenderness to her back; able to move all extremities   NEURO: no facial asymmetry, no speech deficits and able to follow directions  PSYCH: insight and judgement impaired, memory impaired, affect and mood normal    Labs:   Recent labs in EPIC reviewed by me today.     ASSESSMENT/PLAN:  (I10) Benign essential hypertension  Comment: acute, not at goal  -received hydralazine during hospital stay, which was started at TCU last week, but patient has not received a dose since it was ordered even though BP meet parameter.   -metoprolol dose increased during hospital stay  -started on lisinopril prior to hospital discharge and has been slowly increasing  -gave extra dose of hydralazine 10 mg today for bp that did not come down below  with first dose of 10 mg  Estimated Creatinine Clearance: 55.2 mL/min (based on SCr of 0.74 mg/dL).  Plan: Discussed with geriatric pharmacist today and she recommended starting spironolactone since BP so resistant and in " these cases likely have some fluid issues and need diuretic as well. Start spironolactone 12.5 mg daily. Continue metoprolol (hold if HR <55 and notify provider), lisinopril, and imdur.  Continue hydralazine 10 mg q8h PRN and contact provider if SBP does not drop below 160 after receiving a dose. Recheck BMP scheduled for later this week. VS per TCU policy.      R60.0) Bilateral lower extremity edema  Comment: acute, suspect secondary to IV hydration in the hospital- improving  Plan: Spironolactone as above. Compression stockings- on in AM, off in PM. Monitor for improvement.    (E87.6) Hypokalemia  Comment: acute, suspect secondary to loose stools (now resolved), lasix course  Potassium 5/7 was 3.1  Plan: Received supplemental potassium replacement and lasix course completed. Started spironolactone as above, but is potassium sparing, so hopefully will increase also on lisinopril. Next BMP 5/13.    (E83.52) Hypercalcemia of malignancy    Comment: acute, treated inpatient and calcium improved to within normal limits at hospital discharge.  -Last calcium check 8.4 on 4/29 which is WNL  -corrected calcium 4/29 was 9.4 which is WNL  Plan: Continue to monitor weekly calcium, BMP on Wednesdays. Monitor patient's mental status.      (C79.9) Metastatic carcinoma (H)  Comment: acute, per review of pathology results patient does have breast cancer. Continues to report back pain, which is likely due to metastasis found at L5- pain being managed well with dilaudid.  Plan: Continue tylenol scheduled. Schedule dilaudid at 8AM and 8PM and continue q4h PRN for pain management. Continue to monitor pain to ensure well controlled. Follow up with oncology later this week per patient report.     (G31.84) Mild cognitive impairment  Comment: acute, SLUMS 22/30 indicated mild cognitive impairment  -with acute metabolic encephalopathy during hospitalization that improved prior to hospital discharge.  -patient continues to be poor  historian  Plan: Nursing to provide supportive cares at TCU. OCCUPATIONAL THERAPY to complete further cognitive testing at TCU,     (R13.10) Dysphagia, unspecified type  Comment: acute, on altered diet  Plan: Speech therapy continues to follow patient. Continues on mechanical soft diet an thin liquids. Monitor closely.    (E44.1) Mild malnutrition (H)  Comment: acute, with poor po intake during hospitalization, patient reports appetite is improving. Nursing staff documenting 25-75% of po intake with meals   -patient declined nutritional supplements per dietician  Plan: Dietician to follow. Follow weights.      (K21.9) GERD  Comment: acute, denies symptoms  -PTA was taking famotidine which is not available from the pharmacy due to drug shortage.  Plan: Continue omeprazole. Monitor symptoms.     (R53.81) Physical deconditioning  Comment: Acute, secondary to recent hospitalization, medical conditions as above  Plan: Encourage participation in physical therapy/occupational therapy for strengthening and deconditioning. Discharge planning per their recommendation. Social work to assist with d/c planning.     (I25.10) Coronary artery disease involving native heart, angina presence unspecified, unspecified vessel or lesion type  Comment: chronic, asymptomatic  -history of 2 stents placed  -last ECHO from November 2019 showed: Left ventricular systolic function is normal.The visual ejection fraction is  estimated at 60-65%. The right ventricular systolic function is normal. Mild aortic valve sclerosis, trace aortic regurgitation.  Plan: Continue ASA, imdur, metoprolol, lisinopril. Follow up with cardiology 1 year from last ECHO.    (E03.9) Hypothyroidism, unspecified type  Comment: chronic, last TSH lower than recommended goal for older adults- does not appear that dose was adjusted after this check  TSH   Date Value Ref Range Status   03/13/2020 0.27 (L) 0.40 - 4.00 mU/L Final     T4 Free   Date Value Ref Range Status    03/13/2020 0.86 0.76 - 1.46 ng/dL Final       Plan: Continue levothyroxine and liothyronine. Recommend recheck of TSH after acute medical event subsided to ensure closer to recommended goal.     (R19.5) Loose stools-resolved  Comment: while at TCU developed loose stools, cramping, gas, bloating, had low grade temp max to 99.6- now resolved   -was recently treated with antibiotics for UTI  -c.diff sample ordered but facility never collected and eventually patient had formed stools so it was cancelled  -COVID 19 negative  -reviewed imaging today from hospitalization and no evidence of any metastatic cancer GI involvement  -she is gluten and dairy free due to sensitivities, but reports she has been following diet at [facility]  -famotidine also stopped as not available due to drug shortage-could have been contributing?  Now on omeperazole  Plan: Monitor for any reoccurrence of symptoms.    (N39.0) Urinary tract infection without hematuria, site unspecified-resolved  Comment: completed antibiotics inpatient- asymptomatic  Plan: Continue to monitor for symptoms.       Electronically signed by:  ISMAEL Peña CNP     Video-Visit Details  Type of service:  Video Visit  Video End Time (time video stopped): 8:38AM  Distant Location (provider location):  [facility]

## 2020-05-14 NOTE — TELEPHONE ENCOUNTER
Called re: labs today as follows:  WBC 5.2, Hgb 8.9, Platelets 70  Na 142, K 3.1, BUN 11, Cr 0.7, Ca 11.1.    Several recent medication adjustments re: HTN given she is prone to hypokalemia (ex: increase Lisinopril and added Spironolactone). Hgb and Platelets slightly worse than last week. No signs/sx of bleeding. Supposedly has heme/onc f/u this week sometime and will have visit with ISMAEL Deleon CNP tomorrow.      PLAN: Start K+ 20 mEq daily starting now. Wanda and/or SAIMA to collaborate with her oncologist re: recurrent hypercalcemia of malignancy and on-going hematologic abnormalities.    Gladis Bravo, DO

## 2020-05-14 NOTE — PROGRESS NOTES
"[facility]   Elizabeth Li is being evaluated via a billable video visit due to the restrictions of the Covid-19 pandemic.   The patient has been notified of following:  \"This video visit will be conducted via a call between you and your provider. We have found that certain health care needs can be provided without the need for an in-person physical exam.  This service lets us provide the care you need with a video conversation. If during the course of the call the provider feels a video visit is not appropriate, you will not be charged for this service.\"   The provider has received verbal consent for a Video Visit from the patient or first contact? Yes  Patient  or facility staff would like the video invitation sent by: N/A   Video Start Time: 8:17AM  [facility] Record Number:  [id number]  Place of Location at the time of visit: [facility]   Chief Complaint   Patient presents with     Nursing Home Acute     HPI:  Elizabeth Li  is a 84 year old (1935), who is being seen today for a visit.  HPI information obtained from: facility chart records, facility staff, patient report and [facility] chart review.     Brief Summary of Hospital Course: Elizabeth Li is a 84 year old female with PMH significant for CAD s/p stenting x2, HTN, hyperlipidemia, hypothyroidism, anxiety, depression, and KUSUM. She was hospitalized at [facility] from 4/17/20-4/28/20. She originally presented to the hospital with fatigue, weakness and myalgias and was found to have severe hypercalcemia likely secondary to malignancy. She was treated with IV fluids, subcutaneous calcitonin, dexamethasone and pamidronate therapy and her calcium level normalized prior to hospital discharge. She was also found to have metastatic carcinoma. Biopsy was completed at L5 which confirmed metastatic carcinoma. She has left breast mass which is likely primary source. She is meeting with " oncology to review results later this week. During hospitalization was also found to have metabolic encephalopathy likely due to hypercalcemia and also possibly from UTI for which she was treated with 7 days of ceftriaxone. Also with JENNIFER during admission that peaked at 1.6 and trended down to baseline at discharge- suspected to be secondary to volume depletion and hypercalcemia. With elevated BP throughout her stay and several medications were adjusted/ added. Also with dysphagia- on altered diet. She was discharged to TCU for physical rehab and medical management.               While at TCU she developed loose stools, cramping, gas, as well as low grade temps. COVID 19 was tested and was negative. C.diff was ordered, but never collected by facility and eventually stools resolved and are moving well. Had mild leukocytosis as well during this time that resolved on its own. Received 3 days of lasix 20 mg daily for bilateral lower extremity edema, which is improving. Dilaudid dose also increased to 2 mg q4h PRN for pain.     Today's concern is:  Elizabeth was seen today for episodic follow up at TCU. Patient reports pain is better controlled with the scheduled dilaudid and breakthrough pain. BP continues to remain elevated- SBP 140s-210. Denies any vision changes or headaches. Is receiving PRN hydralazine for elevated BPs. Denies any SOB, CP, dizziness, lightheadedness. Continues to have some pitting edema to ankles, but is able to wear shoes. Patient reports no bowel movement in at least a few days. Is interested in receiving prune juice to breakfast tray. Denies any acid reflux symptoms. No abdominal pain. Continues to have gas, bloating at times, but infrequently. Labs yesterday revealed low potassium of 3.1. Also with hypercalcemia- calcium level 11.1. Patient reports no increased confusion. Nursing also denies any increased confusion or mental status changes noted    Past Medical and Surgical History reviewed in Epic  today.  MEDICATIONS:  Current Outpatient Medications   Medication Sig Dispense Refill     acetaminophen (TYLENOL) 500 MG tablet Take 2 tablets (1,000 mg) by mouth 3 times daily       artificial saliva (BIOTENE DRY MOUTHWASH) LIQD liquid Swish and spit 15 mLs in mouth 4 times daily as needed for dry mouth       artificial saliva (BIOTENE MT) SOLN solution Swish and spit 2 mLs (2 sprays) in mouth every hour as needed for dry mouth       ascorbic acid (VITAMIN C) 500 MG tablet Take 500 mg by mouth daily (with lunch) Pt takes in afternoon       aspirin (ASA) 81 MG tablet Take 1 tablet (81 mg) by mouth daily 90 tablet 3     bisacodyl (DULCOLAX) 10 MG suppository Place 1 suppository (10 mg) rectally daily as needed for constipation       hydrALAZINE (APRESOLINE) 10 MG tablet Take 1 tablet (10 mg) by mouth every 8 hours as needed (SBP>160)       HYDROmorphone (DILAUDID) 2 MG tablet Take 1 tablet (2 mg) by mouth 2 times daily. May also take 1 tablet (2 mg) every 4 hours as needed for moderate to severe pain. Do not give PRN dose within 4 hrs of scheduled dose 30 tablet 0     isosorbide mononitrate (IMDUR) 30 MG 24 hr tablet TAKE ONE TABLET BY MOUTH EVERY DAY 90 tablet 1     levothyroxine (SYNTHROID/LEVOTHROID) 75 MCG tablet TAKE ONE TABLET BY MOUTH EVERY MORNING (BEFORE BREAKFAST) 90 tablet 0     liothyronine (CYTOMEL) 5 MCG tablet Take 1 tablet (5 mcg) by mouth daily 90 tablet 0     lisinopril (ZESTRIL) 20 MG tablet Take 20 mg by mouth daily       metoprolol tartrate (LOPRESSOR) 100 MG tablet Take 1 tablet (100 mg) by mouth 2 times daily       omeprazole (PRILOSEC) 20 MG DR capsule Take 1 capsule (20 mg) by mouth daily       polyethylene glycol (MIRALAX/GLYCOLAX) packet Take 1 packet by mouth nightly as needed for constipation        potassium chloride ER (K-TAB) 20 MEQ CR tablet Take 1 tablet (20 mEq) by mouth daily       senna-docusate (SENOKOT-S/PERICOLACE) 8.6-50 MG tablet Take 1 tablet by mouth 2 times daily as needed  "for constipation       simethicone (MYLICON) 125 MG chewable tablet Take 125 mg by mouth 4 times daily as needed for intestinal gas       spironolactone (ALDACTONE) 25 MG tablet Take 0.5 tablets (12.5 mg) by mouth daily       REVIEW OF SYSTEMS: 10 point ROS of systems including Constitutional, Eyes, Respiratory, Cardiovascular, Gastroenterology, Genitourinary, Integumentary, Musculoskeletal, Psychiatric were all negative except for pertinent positives noted in my HPI.  Objective: BP (!) 178/84   Pulse 85   Temp 98.2  F (36.8  C)   Resp 20   Ht 1.575 m (5' 2\")   Wt 74 kg (163 lb 3.2 oz)   SpO2 92%   BMI 29.85 kg/m    Limited visit exam done given COVID-19 precautions.   GENERAL APPEARANCE:  Alert, pleasant and cooperative, elderly female sitting in wheelchair on exam  HEENT: normocephalic, conjunctivae, lids, pupils and irises normal, moist mucous membranes, nose without drainage or crusting  RESP:  respiratory effort normal, no respiratory distress, patient is on RA  M/S:  reports tenderness to her back; able to move all extremities   NEURO: no facial asymmetry, no speech deficits and able to follow directions  PSYCH: insight and judgement impaired, memory impaired, affect and mood normal    Labs:   Recent labs in [facility] reviewed by me today.     ASSESSMENT/PLAN:  (E83.52) Hypercalcemia of malignancy    Comment: acute, calcium level 11.1 on 5/13- no noted confusion or mental status changes  -treated inpatient and calcium improved to within normal limits at hospital discharge.  -Last calcium check 8.4 on 4/29 which is WNL  -corrected calcium 4/29 was 9.4 which is WNL  Plan: Contacted Dr. An, oncology office today and discussed hyperkalemia with with nurse. They scheduled patient for zometa tomorrow at their clinic and will contact facility to set up appointment. They are not requesting any more frequent lab draws than weekly. Continue to monitor weekly calcium, BMP on Wednesdays. Will fax to Dr. An's office " at [phone] (info provided to nursing staff at [facility].) Continue to monitor patient's mental status.     (I10) Benign essential hypertension  Comment: acute, not at goal  -received hydralazine during hospital stay, which was started at TCU last week, but patient has not received a dose since it was ordered even though BP meet parameter.   -metoprolol dose increased during hospital stay  -started on lisinopril prior to hospital discharge and has been slowly increasing  -gave extra dose of hydralazine 10 mg today for bp that did not come down below  with first dose of 10 mg  Estimated Creatinine Clearance: 55.2 mL/min (based on SCr of 0.74 mg/dL).  Plan: Discussed with geriatric pharmacist today and she recommended starting spironolactone since BP so resistant and in these cases likely have some fluid issues and need diuretic as well. Start spironolactone 12.5 mg daily. Continue metoprolol (hold if HR <55 and notify provider), lisinopril, and imdur.  Continue hydralazine 10 mg q8h PRN and contact provider if SBP does not drop below 160 after receiving a dose. Recheck BMP scheduled for later this week. VS per TCU policy.      (E87.6) Hypokalemia  Comment: acute, suspect secondary to diuretic despite lisinopril and spironolactone  Potassium 5/13 3.1  Plan: Started on potassium 20 meq daily. Potassium level 5/15. Also continues on potassium sparing spironolactone and lisinopril.     R60.0) Bilateral lower extremity edema  Comment: acute, suspect secondary to IV hydration in the hospital- continues to have edema to ankles- but can wear her shoes now  Plan: Spironolactone as above. Compression stockings- on in AM, off in PM. Monitor for improvement.    (C79.9) Metastatic carcinoma (H)  Comment: acute, per review of pathology results patient does have breast cancer. Continues to report back pain, which is likely due to metastasis found at L5- pain being managed well with dilaudid.  Plan: Continue tylenol  scheduled. Continue scheduled dilaudid at 8AM and 8PM and q4h PRN for pain management. Continue to monitor pain to ensure well controlled. Follow up with oncology.    (K59.00) Constipation  Comment: Acute, no bowel movement recently- no abdominal pain, cramping. Is passing gas  Plan: Nursing to administer dose of miralax today STAT. Continue PRN miralax and senna S as ordered. Prune juice to breakfast tray daily. Nursing to monitor bowels and use house orders PRN- notify provider if used to adjust daily regimen.    (K21.9) GERD  Comment: acute, denies symptoms  -PTA was taking famotidine which is not available from the pharmacy due to drug shortage.  Plan: Continue omeprazole. Monitor symptoms.        Electronically signed by:  ISMAEL Peña CNP     Video-Visit Details  Type of service:  Video Visit  Video End Time (time video stopped): 8:26AM  Distant Location (provider location):  [facility]

## 2020-05-14 NOTE — LETTER
"    5/14/2020        RE: Elizabeth Li  [address]  [place] [address]        [facility]   Elizabeth Li is being evaluated via a billable video visit due to the restrictions of the Covid-19 pandemic.   The patient has been notified of following:  \"This video visit will be conducted via a call between you and your provider. We have found that certain health care needs can be provided without the need for an in-person physical exam.  This service lets us provide the care you need with a video conversation. If during the course of the call the provider feels a video visit is not appropriate, you will not be charged for this service.\"   The provider has received verbal consent for a Video Visit from the patient or first contact? Yes  Patient  or facility staff would like the video invitation sent by: N/A   Video Start Time: 8:17AM  [facility] Medical Record Number:  [id number]  Place of Location at the time of visit: [facility]   Chief Complaint   Patient presents with     Nursing Home Acute     HPI:  Elizabeth Li  is a 84 year old (1935), who is being seen today for a visit.  HPI information obtained from: facility chart records, facility staff, patient report and [facility] chart review.     Brief Summary of Hospital Course: Elizabeth Li is a 84 year old female with PMH significant for CAD s/p stenting x2, HTN, hyperlipidemia, hypothyroidism, anxiety, depression, and KUSUM. She was hospitalized at [facility] from 4/17/20-4/28/20. She originally presented to the hospital with fatigue, weakness and myalgias and was found to have severe hypercalcemia likely secondary to malignancy. She was treated with IV fluids, subcutaneous calcitonin, dexamethasone and pamidronate therapy and her calcium level normalized prior to hospital discharge. She was also found to have metastatic carcinoma. Biopsy was completed at L5 which confirmed " metastatic carcinoma. She has left breast mass which is likely primary source. She is meeting with oncology to review results later this week. During hospitalization was also found to have metabolic encephalopathy likely due to hypercalcemia and also possibly from UTI for which she was treated with 7 days of ceftriaxone. Also with JENNIFER during admission that peaked at 1.6 and trended down to baseline at discharge- suspected to be secondary to volume depletion and hypercalcemia. With elevated BP throughout her stay and several medications were adjusted/ added. Also with dysphagia- on altered diet. She was discharged to TCU for physical rehab and medical management.               While at TCU she developed loose stools, cramping, gas, as well as low grade temps. COVID 19 was tested and was negative. C.diff was ordered, but never collected by facility and eventually stools resolved and are moving well. Had mild leukocytosis as well during this time that resolved on its own. Received 3 days of lasix 20 mg daily for bilateral lower extremity edema, which is improving. Dilaudid dose also increased to 2 mg q4h PRN for pain.     Today's concern is:  Elizabeth was seen today for episodic follow up at TCU. Patient reports pain is better controlled with the scheduled dilaudid and breakthrough pain. BP continues to remain elevated- SBP 140s-210. Denies any vision changes or headaches. Is receiving PRN hydralazine for elevated BPs. Denies any SOB, CP, dizziness, lightheadedness. Continues to have some pitting edema to ankles, but is able to wear shoes. Patient reports no bowel movement in at least a few days. Is interested in receiving prune juice to breakfast tray. Denies any acid reflux symptoms. No abdominal pain. Continues to have gas, bloating at times, but infrequently. Labs yesterday revealed low potassium of 3.1. Also with hypercalcemia- calcium level 11.1. Patient reports no increased confusion. Nursing also denies any  increased confusion or mental status changes noted    Past Medical and Surgical History reviewed in Epic today.  MEDICATIONS:  Current Outpatient Medications   Medication Sig Dispense Refill     acetaminophen (TYLENOL) 500 MG tablet Take 2 tablets (1,000 mg) by mouth 3 times daily       artificial saliva (BIOTENE DRY MOUTHWASH) LIQD liquid Swish and spit 15 mLs in mouth 4 times daily as needed for dry mouth       artificial saliva (BIOTENE MT) SOLN solution Swish and spit 2 mLs (2 sprays) in mouth every hour as needed for dry mouth       ascorbic acid (VITAMIN C) 500 MG tablet Take 500 mg by mouth daily (with lunch) Pt takes in afternoon       aspirin (ASA) 81 MG tablet Take 1 tablet (81 mg) by mouth daily 90 tablet 3     bisacodyl (DULCOLAX) 10 MG suppository Place 1 suppository (10 mg) rectally daily as needed for constipation       hydrALAZINE (APRESOLINE) 10 MG tablet Take 1 tablet (10 mg) by mouth every 8 hours as needed (SBP>160)       HYDROmorphone (DILAUDID) 2 MG tablet Take 1 tablet (2 mg) by mouth 2 times daily. May also take 1 tablet (2 mg) every 4 hours as needed for moderate to severe pain. Do not give PRN dose within 4 hrs of scheduled dose 30 tablet 0     isosorbide mononitrate (IMDUR) 30 MG 24 hr tablet TAKE ONE TABLET BY MOUTH EVERY DAY 90 tablet 1     levothyroxine (SYNTHROID/LEVOTHROID) 75 MCG tablet TAKE ONE TABLET BY MOUTH EVERY MORNING (BEFORE BREAKFAST) 90 tablet 0     liothyronine (CYTOMEL) 5 MCG tablet Take 1 tablet (5 mcg) by mouth daily 90 tablet 0     lisinopril (ZESTRIL) 20 MG tablet Take 20 mg by mouth daily       metoprolol tartrate (LOPRESSOR) 100 MG tablet Take 1 tablet (100 mg) by mouth 2 times daily       omeprazole (PRILOSEC) 20 MG DR capsule Take 1 capsule (20 mg) by mouth daily       polyethylene glycol (MIRALAX/GLYCOLAX) packet Take 1 packet by mouth nightly as needed for constipation        potassium chloride ER (K-TAB) 20 MEQ CR tablet Take 1 tablet (20 mEq) by mouth daily    "    senna-docusate (SENOKOT-S/PERICOLACE) 8.6-50 MG tablet Take 1 tablet by mouth 2 times daily as needed for constipation       simethicone (MYLICON) 125 MG chewable tablet Take 125 mg by mouth 4 times daily as needed for intestinal gas       spironolactone (ALDACTONE) 25 MG tablet Take 0.5 tablets (12.5 mg) by mouth daily       REVIEW OF SYSTEMS: 10 point ROS of systems including Constitutional, Eyes, Respiratory, Cardiovascular, Gastroenterology, Genitourinary, Integumentary, Musculoskeletal, Psychiatric were all negative except for pertinent positives noted in my HPI.  Objective: BP (!) 178/84   Pulse 85   Temp 98.2  F (36.8  C)   Resp 20   Ht 1.575 m (5' 2\")   Wt 74 kg (163 lb 3.2 oz)   SpO2 92%   BMI 29.85 kg/m    Limited visit exam done given COVID-19 precautions.   GENERAL APPEARANCE:  Alert, pleasant and cooperative, elderly female sitting in wheelchair on exam  HEENT: normocephalic, conjunctivae, lids, pupils and irises normal, moist mucous membranes, nose without drainage or crusting  RESP:  respiratory effort normal, no respiratory distress, patient is on RA  M/S:  reports tenderness to her back; able to move all extremities   NEURO: no facial asymmetry, no speech deficits and able to follow directions  PSYCH: insight and judgement impaired, memory impaired, affect and mood normal    Labs:   Recent labs in [facility] reviewed by me today.     ASSESSMENT/PLAN:  (E83.52) Hypercalcemia of malignancy    Comment: acute, calcium level 11.1 on 5/13- no noted confusion or mental status changes  -treated inpatient and calcium improved to within normal limits at hospital discharge.  -Last calcium check 8.4 on 4/29 which is WNL  -corrected calcium 4/29 was 9.4 which is WNL  Plan: Contacted Dr. An, oncology office today and discussed hyperkalemia with with nurse. They scheduled patient for zometa tomorrow at their clinic and will contact facility to set up appointment. They are not requesting any more frequent " lab draws than weekly. Continue to monitor weekly calcium, BMP on Wednesdays. Will fax to Dr. An's office at [phone] (info provided to nursing staff at [facility] Continue to monitor patient's mental status.     (I10) Benign essential hypertension  Comment: acute, not at goal  -received hydralazine during hospital stay, which was started at TCU last week, but patient has not received a dose since it was ordered even though BP meet parameter.   -metoprolol dose increased during hospital stay  -started on lisinopril prior to hospital discharge and has been slowly increasing  -gave extra dose of hydralazine 10 mg today for bp that did not come down below  with first dose of 10 mg  Estimated Creatinine Clearance: 55.2 mL/min (based on SCr of 0.74 mg/dL).  Plan: Discussed with geriatric pharmacist today and she recommended starting spironolactone since BP so resistant and in these cases likely have some fluid issues and need diuretic as well. Start spironolactone 12.5 mg daily. Continue metoprolol (hold if HR <55 and notify provider), lisinopril, and imdur.  Continue hydralazine 10 mg q8h PRN and contact provider if SBP does not drop below 160 after receiving a dose. Recheck BMP scheduled for later this week. VS per TCU policy.      (E87.6) Hypokalemia  Comment: acute, suspect secondary to diuretic despite lisinopril and spironolactone  Potassium 5/13 3.1  Plan: Started on potassium 20 meq daily. Potassium level 5/15. Also continues on potassium sparing spironolactone and lisinopril.     R60.0) Bilateral lower extremity edema  Comment: acute, suspect secondary to IV hydration in the hospital- continues to have edema to ankles- but can wear her shoes now  Plan: Spironolactone as above. Compression stockings- on in AM, off in PM. Monitor for improvement.    (C79.9) Metastatic carcinoma (H)  Comment: acute, per review of pathology results patient does have breast cancer. Continues to report back pain, which  is likely due to metastasis found at L5- pain being managed well with dilaudid.  Plan: Continue tylenol scheduled. Continue scheduled dilaudid at 8AM and 8PM and q4h PRN for pain management. Continue to monitor pain to ensure well controlled. Follow up with oncology.    (K59.00) Constipation  Comment: Acute, no bowel movement recently- no abdominal pain, cramping. Is passing gas  Plan: Nursing to administer dose of miralax today STAT. Continue PRN miralax and senna S as ordered. Prune juice to breakfast tray daily. Nursing to monitor bowels and use house orders PRN- notify provider if used to adjust daily regimen.    (K21.9) GERD  Comment: acute, denies symptoms  -PTA was taking famotidine which is not available from the pharmacy due to drug shortage.  Plan: Continue omeprazole. Monitor symptoms.        Electronically signed by:  ISMAEL Peña CNP     Video-Visit Details  Type of service:  Video Visit  Video End Time (time video stopped): 8:26AM  Distant Location (provider location):  [facility]             Sincerely,        ISMAEL Peña CNP

## 2020-05-15 NOTE — TELEPHONE ENCOUNTER
"[facility] TELEPHONE ENCOUNTER        Elizabeth Li is a 84 year old  (1935),Nurse called today to report: BP of 178/77 then 194/94 and patient seems \"slight confused or just having trouble explaining stuff\". Nurse administered prn hydralazine which is already ordered for patient. Nurse states this is first time working with patient.     Writer called back and BP down to 164/ .. Nurse Josephine reports patient without headache, dizziness, chest pain and other VSS. Reports did not complete a full neuro check but that she \"doesn't think its neuro, just..(hard to explain).\"     Of note primary NP aware of patient's elevated BPs and just saw patient yesterday.     ASSESSMENT/PLAN  Essential Hypertension  - has been uncontrolled of late. PCP aware and just saw patient yesterday. Meds have been adjusted recently and has prn hydralazine.     -  ? Word finding or mild confusion or ??    Orders:    Do neuro check now. Then Neuro check q 1 hour x 2 then q 4 hours x 2 then q shift x 48 hours. Update provider any noted changes from baseline.     Writer will route to primary NP to update.     Electronically signed by:   ISMAEL Loo CNP      "

## 2020-05-16 NOTE — PATIENT INSTRUCTIONS
Orders  Elizabeth Marquezzhanna  : 1935    1) Please give dose of miralax today STAT- as patient has not had BM for many days- has PRN order. Diagnosis constipation. Please monitor stools and use PRN medications for constipation as needed to ensure patient has bowel movement today or tomorrow.   2) Please provide prune juice daily to breakfast tray.     Wanda Nguyen, ISMAEL CNP on 2020 at 8:54 AM

## 2020-05-17 NOTE — TELEPHONE ENCOUNTER
Nursing called to update on status.  Last Friday neuro checks in place due to confusion.  Runs a low grade temp in general and has cancer.    nueros have been normal.  Staff question if the start of dementia.    Offered to do labs tomorrow and collect a UA/UC to rule out abnormalities if staff wanted but suggested to speak with the regular NP.    Staff thought she was fine enough to speak with regular NP on Monday and go from there.    No new orders today.  Will drop the regular NP a note of call.    Electronically signed by Anjali Finch RN, CNP

## 2020-05-18 NOTE — LETTER
"    5/18/2020        RE: Elizabeth Li  [id number]  [place] [facility] [address]        [facility]   Elizabeth Li is being evaluated via a billable video visit due to the restrictions of the Covid-19 pandemic.   The patient has been notified of following:  \"This video visit will be conducted via a call between you and your provider. We have found that certain health care needs can be provided without the need for an in-person physical exam.  This service lets us provide the care you need with a video conversation. If during the course of the call the provider feels a video visit is not appropriate, you will not be charged for this service.\"   The provider has received verbal consent for a Video Visit from the patient or first contact? Yes  Patient  or facility staff would like the video invitation sent by: N/A   Video Start Time: 8:26 AM  [facility] Record Number:  [id number]  Place of Location at the time of visit: [facility]   Chief Complaint   Patient presents with     RECHECK     HPI:  Elizabeth Li  is a 84 year old (1935), who is being seen today for a visit.  HPI information obtained from: facility chart records, facility staff, patient report and [facility] chart review.     Brief Summary of Hospital Course: Elizabeth Li is a 84 year old female with PMH significant for CAD s/p stenting x2, HTN, hyperlipidemia, hypothyroidism, anxiety, depression, and KUSUM. She was hospitalized at [facility] from 4/17/20-4/28/20. She originally presented to the hospital with fatigue, weakness and myalgias and was found to have severe hypercalcemia likely secondary to malignancy. She was treated with IV fluids, subcutaneous calcitonin, dexamethasone and pamidronate therapy and her calcium level normalized prior to hospital discharge. She was also found to have metastatic carcinoma. Biopsy was completed at L5 which confirmed " metastatic carcinoma. She has left breast mass which is likely primary source. She is meeting with oncology to review results later this week. During hospitalization was also found to have metabolic encephalopathy likely due to hypercalcemia and also possibly from UTI for which she was treated with 7 days of ceftriaxone. Also with JENNIFER during admission that peaked at 1.6 and trended down to baseline at discharge- suspected to be secondary to volume depletion and hypercalcemia. With elevated BP throughout her stay and several medications were adjusted/ added. Also with dysphagia- on altered diet. She was discharged to TCU for physical rehab and medical management.               While at TCU she developed loose stools, cramping, gas, as well as low grade temps. COVID 19 was tested and was negative. C.diff was ordered, but never collected by facility and eventually stools resolved and are moving well. Had mild leukocytosis as well during this time that resolved on its own. Received 3 days of lasix 20 mg daily for bilateral lower extremity edema, which is improving. Dilaudid was scheduled at 8AM and 8PM and continues q4h PRN. She continues to have elevated BPs- slowly titrating up blood pressure medications. Was also started on spironolactone.      Today's concern is:  Elizabeth was seen today for routine follow up at TCU. Patient received zometa infusion on 5/15 with oncology due to elevated calcium. Staff with concerns that patient is more confused the past few days. Neuro checks ordered last week and have been normal. Has had some intermittent very low grade temps over past few days running to tmax 99.4. Although she is on scheduled tylenol which is likely masking. During visit today Elizabeth is very confused. Unable to follow a conversation with me or nurse that was holding the ipad for our visit today. SBP 140s- 180s recently. HR 70s-80s.    Past Medical and Surgical History reviewed in Epic  today.  MEDICATIONS:    Current Outpatient Medications   Medication Sig Dispense Refill     HYDROmorphone (DILAUDID) 2 MG tablet Take 1 tablet (2 mg) by mouth 2 times daily. May also take 1 tablet (2 mg) every 4 hours as needed for moderate to severe pain. Do not give PRN dose within 4 hrs of scheduled dose 30 tablet 0     acetaminophen (TYLENOL) 500 MG tablet Take 2 tablets (1,000 mg) by mouth 3 times daily       artificial saliva (BIOTENE DRY MOUTHWASH) LIQD liquid Swish and spit 15 mLs in mouth 4 times daily as needed for dry mouth       artificial saliva (BIOTENE MT) SOLN solution Swish and spit 2 mLs (2 sprays) in mouth every hour as needed for dry mouth       ascorbic acid (VITAMIN C) 500 MG tablet Take 500 mg by mouth daily (with lunch) Pt takes in afternoon       aspirin (ASA) 81 MG tablet Take 1 tablet (81 mg) by mouth daily 90 tablet 3     bisacodyl (DULCOLAX) 10 MG suppository Place 1 suppository (10 mg) rectally daily as needed for constipation       hydrALAZINE (APRESOLINE) 10 MG tablet Take 1 tablet (10 mg) by mouth every 8 hours as needed (SBP>160)       isosorbide mononitrate (IMDUR) 30 MG 24 hr tablet TAKE ONE TABLET BY MOUTH EVERY DAY 90 tablet 1     levothyroxine (SYNTHROID/LEVOTHROID) 75 MCG tablet TAKE ONE TABLET BY MOUTH EVERY MORNING (BEFORE BREAKFAST) 90 tablet 0     liothyronine (CYTOMEL) 5 MCG tablet Take 1 tablet (5 mcg) by mouth daily 90 tablet 0     lisinopril (ZESTRIL) 20 MG tablet Take 30 mg by mouth daily       metoprolol tartrate (LOPRESSOR) 100 MG tablet Take 1 tablet (100 mg) by mouth 2 times daily       omeprazole (PRILOSEC) 20 MG DR capsule Take 1 capsule (20 mg) by mouth daily       polyethylene glycol (MIRALAX/GLYCOLAX) packet Take 1 packet by mouth nightly as needed for constipation        potassium chloride ER (K-TAB) 20 MEQ CR tablet Take 1 tablet (20 mEq) by mouth daily       senna-docusate (SENOKOT-S/PERICOLACE) 8.6-50 MG tablet Take 1 tablet by mouth 2 times daily as  "needed for constipation       simethicone (MYLICON) 125 MG chewable tablet Take 125 mg by mouth 4 times daily as needed for intestinal gas       spironolactone (ALDACTONE) 25 MG tablet Take 0.5 tablets (12.5 mg) by mouth daily       REVIEW OF SYSTEMS: Unobtainable secondary to cognitive impairment/ confusion today  Objective: BP (!) 164/76   Pulse 76   Temp 97.8  F (36.6  C)   Resp 18   Ht 1.575 m (5' 2\")   Wt 74 kg (163 lb 3.2 oz)   SpO2 92%   BMI 29.85 kg/m    Limited visit exam done given COVID-19 precautions.   GENERAL APPEARANCE:  Alert, pleasant, elderly female lying in bed on exam  HEENT: normocephalic, conjunctivae, lids, pupils and irises normal, moist mucous membranes, nose without drainage or crusting  RESP:  respiratory effort normal, no respiratory distress, patient is on RA  NEURO: no facial asymmetry, no speech deficits and able to follow directions  PSYCH: insight and judgement impaired, memory impaired, affect and mood normal    Labs:   Recent labs in [facility] reviewed by me today.     ASSESSMENT/PLAN:  (R41.0) Confusion  (primary encounter diagnosis)  (R50.9) Fever, unspecified fever cause  Comment: acute, suspect infection- low grade temps, but tylenol likely masking. With elevated BPs-Neuros have been normal . Does have metastatic cancer as well- which could be spreading? Also wonder if UTI?   Plan: CBC, BMP STAT today. UA/UC via straight cath to rule out UTI.     (E83.52) Hypercalcemia of malignancy    Comment: acute, calcium level 11.1 on 5/13- received Zometa on 5/15- follows with Dr. An  -treated inpatient and calcium improved to within normal limits at hospital discharge.  -Last calcium check 8.4 on 4/29 which is WNL  -corrected calcium 4/29 was 9.4 which is WNL  Plan: BMP today to ensure not contributing to confusion. Continue to monitor weekly calcium, BMP on Wednesdays and fax to Dr. An's office at [phone] (info provided to nursing staff at [facility] Continue to monitor " "patient's mental status.      (I10) Benign essential hypertension  Comment: acute, not at goal  -received hydralazine during hospital stay, which was started at TCU last week, but patient has not received a dose since it was ordered even though BP meet parameter.   -metoprolol dose increased during hospital stay  -started on lisinopril prior to hospital discharge and have been slowly increasing  Plan: Increase lisinopril to 30 mg daily. Continue spironolactone 12.5 mg daily. Continue metoprolol (hold if HR <55 and notify provider), imdur. Continue hydralazine 10 mg q8h PRN and contact provider if SBP does not drop below 160 after receiving a dose. Recheck BMP scheduled for later this week and next week. VS per TCU policy.       R60.0) Bilateral lower extremity edema  Comment: acute, suspect secondary to IV hydration in the hospital  Plan: Continue Spironolactone as above. Compression stockings- on in AM, off in PM. Monitor for improvement.     (C79.9) Metastatic carcinoma (H)  (G89.3) Cancer related pain  Comment: acute, per review of pathology results patient does have breast cancer. Continues to report back pain, which is likely due to metastasis found at L5- pain being managed with dilaudid/ tylenol  Plan: Continue tylenol scheduled. Continue scheduled dilaudid at 8AM and 8PM and q4h PRN for pain management. Continue to monitor pain to ensure well controlled. Follow up with oncology.       Electronically signed by:  ISMAEL Peña CNP     Video-Visit Details  Type of service:  Video Visit  Video End Time (time video stopped): 8:30 AM  Distant Location (provider location):  [facility]           [facility]   Elizabeth Li is being evaluated via a billable video visit due to the restrictions of the Covid-19 pandemic.   The patient has been notified of following:  \"This video visit will be conducted via a call between you and your provider. We have found that certain " "health care needs can be provided without the need for an in-person physical exam.  This service lets us provide the care you need with a video conversation. If during the course of the call the provider feels a video visit is not appropriate, you will not be charged for this service.\"   The provider has received verbal consent for a Video Visit from the patient or first contact? Yes  Patient  or facility staff would like the video invitation sent by: N/A   Video Start Time: 8:26 AM  [place] Medical Record Number:  [id number]  Place of Location at the time of visit: [facility]   Chief Complaint   Patient presents with     RECHECK     HPI:  Elizabeth Li  is a 84 year old (1935), who is being seen today for a visit.  HPI information obtained from: facility chart records, facility staff, patient report and [facility] chart review.     Brief Summary of Hospital Course: Elizabeth Li is a 84 year old female with PMH significant for CAD s/p stenting x2, HTN, hyperlipidemia, hypothyroidism, anxiety, depression, and KUSUM. She was hospitalized at [facility] from 4/17/20-4/28/20. She originally presented to the hospital with fatigue, weakness and myalgias and was found to have severe hypercalcemia likely secondary to malignancy. She was treated with IV fluids, subcutaneous calcitonin, dexamethasone and pamidronate therapy and her calcium level normalized prior to hospital discharge. She was also found to have metastatic carcinoma. Biopsy was completed at L5 which confirmed metastatic carcinoma. She has left breast mass which is likely primary source. She is meeting with oncology to review results later this week. During hospitalization was also found to have metabolic encephalopathy likely due to hypercalcemia and also possibly from UTI for which she was treated with 7 days of ceftriaxone. Also with JENNIFER during admission that peaked at 1.6 and trended down to baseline at " discharge- suspected to be secondary to volume depletion and hypercalcemia. With elevated BP throughout her stay and several medications were adjusted/ added. Also with dysphagia- on altered diet. She was discharged to TCU for physical rehab and medical management.               While at TCU she developed loose stools, cramping, gas, as well as low grade temps. COVID 19 was tested and was negative. C.diff was ordered, but never collected by facility and eventually stools resolved and are moving well. Had mild leukocytosis as well during this time that resolved on its own. Received 3 days of lasix 20 mg daily for bilateral lower extremity edema, which is improving. Dilaudid was scheduled at 8AM and 8PM and continues q4h PRN. She continues to have elevated BPs- slowly titrating up blood pressure medications. Was also started on spironolactone.      Today's concern is:  Elizabeth was seen today for routine follow up at TCU. Patient received zometa infusion on 5/15 with oncology due to elevated calcium. Staff with concerns that patient is more confused the past few days. Neuro checks ordered last week and have been normal. Has had some intermittent very low grade temps over past few days running to tmax 99.4. Although she is on scheduled tylenol which is likely masking. During visit today Elizabeth is very confused. Unable to follow a conversation with me or nurse that was holding the ipad for our visit today. SBP 140s- 180s recently. HR 70s-80s.  Nursing reports lung sounds are clear.     Past Medical and Surgical History reviewed in Epic today.  MEDICATIONS:    Current Outpatient Medications   Medication Sig Dispense Refill     HYDROmorphone (DILAUDID) 2 MG tablet Take 1 tablet (2 mg) by mouth 2 times daily. May also take 1 tablet (2 mg) every 4 hours as needed for moderate to severe pain. Do not give PRN dose within 4 hrs of scheduled dose 30 tablet 0     acetaminophen (TYLENOL) 500 MG tablet Take 2 tablets (1,000 mg) by  mouth 3 times daily       artificial saliva (BIOTENE DRY MOUTHWASH) LIQD liquid Swish and spit 15 mLs in mouth 4 times daily as needed for dry mouth       artificial saliva (BIOTENE MT) SOLN solution Swish and spit 2 mLs (2 sprays) in mouth every hour as needed for dry mouth       ascorbic acid (VITAMIN C) 500 MG tablet Take 500 mg by mouth daily (with lunch) Pt takes in afternoon       aspirin (ASA) 81 MG tablet Take 1 tablet (81 mg) by mouth daily 90 tablet 3     bisacodyl (DULCOLAX) 10 MG suppository Place 1 suppository (10 mg) rectally daily as needed for constipation       hydrALAZINE (APRESOLINE) 10 MG tablet Take 1 tablet (10 mg) by mouth every 8 hours as needed (SBP>160)       isosorbide mononitrate (IMDUR) 30 MG 24 hr tablet TAKE ONE TABLET BY MOUTH EVERY DAY 90 tablet 1     levothyroxine (SYNTHROID/LEVOTHROID) 75 MCG tablet TAKE ONE TABLET BY MOUTH EVERY MORNING (BEFORE BREAKFAST) 90 tablet 0     liothyronine (CYTOMEL) 5 MCG tablet Take 1 tablet (5 mcg) by mouth daily 90 tablet 0     lisinopril (ZESTRIL) 20 MG tablet Take 30 mg by mouth daily       metoprolol tartrate (LOPRESSOR) 100 MG tablet Take 1 tablet (100 mg) by mouth 2 times daily       omeprazole (PRILOSEC) 20 MG DR capsule Take 1 capsule (20 mg) by mouth daily       polyethylene glycol (MIRALAX/GLYCOLAX) packet Take 1 packet by mouth nightly as needed for constipation        potassium chloride ER (K-TAB) 20 MEQ CR tablet Take 1 tablet (20 mEq) by mouth daily       senna-docusate (SENOKOT-S/PERICOLACE) 8.6-50 MG tablet Take 1 tablet by mouth 2 times daily as needed for constipation       simethicone (MYLICON) 125 MG chewable tablet Take 125 mg by mouth 4 times daily as needed for intestinal gas       spironolactone (ALDACTONE) 25 MG tablet Take 0.5 tablets (12.5 mg) by mouth daily       REVIEW OF SYSTEMS: Unobtainable secondary to cognitive impairment/ confusion today  Objective: BP (!) 164/76   Pulse 76   Temp 97.8  F (36.6  C)   Resp 18   Ht  "1.575 m (5' 2\")   Wt 74 kg (163 lb 3.2 oz)   SpO2 92%   BMI 29.85 kg/m    Limited visit exam done given COVID-19 precautions.   GENERAL APPEARANCE:  Alert, pleasant, elderly female lying in bed on exam  HEENT: normocephalic, conjunctivae, lids, pupils and irises normal, moist mucous membranes, nose without drainage or crusting  RESP:  respiratory effort normal, no respiratory distress, patient is on RA  NEURO: no facial asymmetry, no speech deficits and able to follow directions  PSYCH: insight and judgement impaired, memory impaired, affect and mood normal    Labs:   Recent labs in [facility] reviewed by me today.     ASSESSMENT/PLAN:  (R41.0) Confusion  (primary encounter diagnosis)  (R50.9) Fever, unspecified fever cause  Comment: acute, suspect infection- low grade temps, but tylenol likely masking. With elevated BPs-Neuros have been normal . Does have metastatic cancer as well- which could be spreading? Also wonder if UTI? Lungs are clear per nursing- so unlikely to be pneumonia. Could consider COVID in diagnosis as well since she has been out to appointments- though no known cases in facility.  Plan: CBC, BMP STAT today. UA/UC via straight cath to rule out UTI. Consider COVID 19 swab if UA/UC negative.     (E83.52) Hypercalcemia of malignancy    Comment: acute, calcium level 11.1 on 5/13- received Zometa on 5/15- follows with Dr. An  -treated inpatient and calcium improved to within normal limits at hospital discharge.  -Last calcium check 8.4 on 4/29 which is WNL  -corrected calcium 4/29 was 9.4 which is WNL  Plan: BMP today to ensure not contributing to confusion. Continue to monitor weekly calcium, BMP on Wednesdays and fax to Dr. An's office at [phone] (info provided to nursing staff at [facility] Continue to monitor patient's mental status.      (I10) Benign essential hypertension  Comment: acute, not at goal  -received hydralazine during hospital stay, which was started at TCU last week, but " patient has not received a dose since it was ordered even though BP meet parameter.   -metoprolol dose increased during hospital stay  -started on lisinopril prior to hospital discharge and have been slowly increasing  Plan: Increase lisinopril to 30 mg daily. Continue spironolactone 12.5 mg daily. Continue metoprolol (hold if HR <55 and notify provider), imdur. Continue hydralazine 10 mg q8h PRN and contact provider if SBP does not drop below 160 after receiving a dose. Recheck BMP scheduled for later this week and next week. VS per TCU policy.       R60.0) Bilateral lower extremity edema  Comment: acute, suspect secondary to IV hydration in the hospital  Plan: Continue Spironolactone as above. Compression stockings- on in AM, off in PM. Monitor for improvement.     (C79.9) Metastatic carcinoma (H)  (G89.3) Cancer related pain  Comment: acute, per review of pathology results patient does have breast cancer. Continues to report back pain, which is likely due to metastasis found at L5- pain being managed with dilaudid/ tylenol  Plan: Continue tylenol scheduled. Continue scheduled dilaudid at 8AM and 8PM and q4h PRN for pain management. Continue to monitor pain to ensure well controlled. Follow up with oncology.       Electronically signed by:  ISMAEL Peña CNP     Video-Visit Details  Type of service:  Video Visit  Video End Time (time video stopped): 8:30 AM  Distant Location (provider location):  [facility]             Sincerely,        ISMAEL Peña CNP

## 2020-05-18 NOTE — PROGRESS NOTES
"[place] [facility]   Elizabeth Li is being evaluated via a billable video visit due to the restrictions of the Covid-19 pandemic.   The patient has been notified of following:  \"This video visit will be conducted via a call between you and your provider. We have found that certain health care needs can be provided without the need for an in-person physical exam.  This service lets us provide the care you need with a video conversation. If during the course of the call the provider feels a video visit is not appropriate, you will not be charged for this service.\"   The provider has received verbal consent for a Video Visit from the patient or first contact? Yes  Patient  or facility staff would like the video invitation sent by: N/A   Video Start Time: 8:26 AM  [place] Medical Record Number:  [id number]  Place of Location at the time of visit: [facility]   Chief Complaint   Patient presents with     RECHECK     HPI:  Elizabeth Li  is a 84 year old (1935), who is being seen today for a visit.  HPI information obtained from: facility chart records, facility staff, patient report and [facility] chart review.     Brief Summary of Hospital Course: Elizabeth Li is a 84 year old female with PMH significant for CAD s/p stenting x2, HTN, hyperlipidemia, hypothyroidism, anxiety, depression, and KUSUM. She was hospitalized at [facility] from 4/17/20-4/28/20. She originally presented to the hospital with fatigue, weakness and myalgias and was found to have severe hypercalcemia likely secondary to malignancy. She was treated with IV fluids, subcutaneous calcitonin, dexamethasone and pamidronate therapy and her calcium level normalized prior to hospital discharge. She was also found to have metastatic carcinoma. Biopsy was completed at L5 which confirmed metastatic carcinoma. She has left breast mass which is likely primary source. She is meeting with oncology to " review results later this week. During hospitalization was also found to have metabolic encephalopathy likely due to hypercalcemia and also possibly from UTI for which she was treated with 7 days of ceftriaxone. Also with JENNIFER during admission that peaked at 1.6 and trended down to baseline at discharge- suspected to be secondary to volume depletion and hypercalcemia. With elevated BP throughout her stay and several medications were adjusted/ added. Also with dysphagia- on altered diet. She was discharged to TCU for physical rehab and medical management.               While at TCU she developed loose stools, cramping, gas, as well as low grade temps. COVID 19 was tested and was negative. C.diff was ordered, but never collected by facility and eventually stools resolved and are moving well. Had mild leukocytosis as well during this time that resolved on its own. Received 3 days of lasix 20 mg daily for bilateral lower extremity edema, which is improving. Dilaudid was scheduled at 8AM and 8PM and continues q4h PRN. She continues to have elevated BPs- slowly titrating up blood pressure medications. Was also started on spironolactone.      Today's concern is:  Elizabeth was seen today for routine follow up at TCU. Patient received zometa infusion on 5/15 with oncology due to elevated calcium. Staff with concerns that patient is more confused the past few days. Neuro checks ordered last week and have been normal. Has had some intermittent very low grade temps over past few days running to tmax 99.4. Although she is on scheduled tylenol which is likely masking. During visit today Elizabeth is very confused. Unable to follow a conversation with me or nurse that was holding the ipad for our visit today. SBP 140s- 180s recently. HR 70s-80s.  Nursing reports lung sounds are clear.     Past Medical and Surgical History reviewed in Epic today.  MEDICATIONS:    Current Outpatient Medications   Medication Sig Dispense Refill      HYDROmorphone (DILAUDID) 2 MG tablet Take 1 tablet (2 mg) by mouth 2 times daily. May also take 1 tablet (2 mg) every 4 hours as needed for moderate to severe pain. Do not give PRN dose within 4 hrs of scheduled dose 30 tablet 0     acetaminophen (TYLENOL) 500 MG tablet Take 2 tablets (1,000 mg) by mouth 3 times daily       artificial saliva (BIOTENE DRY MOUTHWASH) LIQD liquid Swish and spit 15 mLs in mouth 4 times daily as needed for dry mouth       artificial saliva (BIOTENE MT) SOLN solution Swish and spit 2 mLs (2 sprays) in mouth every hour as needed for dry mouth       ascorbic acid (VITAMIN C) 500 MG tablet Take 500 mg by mouth daily (with lunch) Pt takes in afternoon       aspirin (ASA) 81 MG tablet Take 1 tablet (81 mg) by mouth daily 90 tablet 3     bisacodyl (DULCOLAX) 10 MG suppository Place 1 suppository (10 mg) rectally daily as needed for constipation       hydrALAZINE (APRESOLINE) 10 MG tablet Take 1 tablet (10 mg) by mouth every 8 hours as needed (SBP>160)       isosorbide mononitrate (IMDUR) 30 MG 24 hr tablet TAKE ONE TABLET BY MOUTH EVERY DAY 90 tablet 1     levothyroxine (SYNTHROID/LEVOTHROID) 75 MCG tablet TAKE ONE TABLET BY MOUTH EVERY MORNING (BEFORE BREAKFAST) 90 tablet 0     liothyronine (CYTOMEL) 5 MCG tablet Take 1 tablet (5 mcg) by mouth daily 90 tablet 0     lisinopril (ZESTRIL) 20 MG tablet Take 30 mg by mouth daily       metoprolol tartrate (LOPRESSOR) 100 MG tablet Take 1 tablet (100 mg) by mouth 2 times daily       omeprazole (PRILOSEC) 20 MG DR capsule Take 1 capsule (20 mg) by mouth daily       polyethylene glycol (MIRALAX/GLYCOLAX) packet Take 1 packet by mouth nightly as needed for constipation        potassium chloride ER (K-TAB) 20 MEQ CR tablet Take 1 tablet (20 mEq) by mouth daily       senna-docusate (SENOKOT-S/PERICOLACE) 8.6-50 MG tablet Take 1 tablet by mouth 2 times daily as needed for constipation       simethicone (MYLICON) 125 MG chewable tablet Take 125 mg by mouth  "4 times daily as needed for intestinal gas       spironolactone (ALDACTONE) 25 MG tablet Take 0.5 tablets (12.5 mg) by mouth daily       REVIEW OF SYSTEMS: Unobtainable secondary to cognitive impairment/ confusion today  Objective: BP (!) 164/76   Pulse 76   Temp 97.8  F (36.6  C)   Resp 18   Ht 1.575 m (5' 2\")   Wt 74 kg (163 lb 3.2 oz)   SpO2 92%   BMI 29.85 kg/m    Limited visit exam done given COVID-19 precautions.   GENERAL APPEARANCE:  Alert, pleasant, elderly female lying in bed on exam  HEENT: normocephalic, conjunctivae, lids, pupils and irises normal, moist mucous membranes, nose without drainage or crusting  RESP:  respiratory effort normal, no respiratory distress, patient is on RA  NEURO: no facial asymmetry, no speech deficits and able to follow directions  PSYCH: insight and judgement impaired, memory impaired, affect and mood normal    Labs:   Recent labs in [facility] reviewed by me today.     ASSESSMENT/PLAN:  (R41.0) Confusion  (primary encounter diagnosis)  (R50.9) Fever, unspecified fever cause  Comment: acute, suspect infection- low grade temps, but tylenol likely masking. With elevated BPs-Neuros have been normal . Does have metastatic cancer as well- which could be spreading? Also wonder if UTI? Lungs are clear per nursing- so unlikely to be pneumonia. Could consider COVID in diagnosis as well since she has been out to appointments- though no known cases in facility.  Plan: CBC, BMP STAT today. UA/UC via straight cath to rule out UTI. Consider COVID 19 swab if UA/UC negative.     (E83.52) Hypercalcemia of malignancy    Comment: acute, calcium level 11.1 on 5/13- received Zometa on 5/15- follows with Dr. An  -treated inpatient and calcium improved to within normal limits at hospital discharge.  -Last calcium check 8.4 on 4/29 which is WNL  -corrected calcium 4/29 was 9.4 which is WNL  Plan: BMP today to ensure not contributing to confusion. Continue to monitor weekly calcium, BMP on " Wednesdays and fax to Dr. An's office at [phone] (info provided to nursing staff at [facility] Continue to monitor patient's mental status.      (I10) Benign essential hypertension  Comment: acute, not at goal  -received hydralazine during hospital stay, which was started at TCU last week, but patient has not received a dose since it was ordered even though BP meet parameter.   -metoprolol dose increased during hospital stay  -started on lisinopril prior to hospital discharge and have been slowly increasing  Plan: Increase lisinopril to 30 mg daily. Continue spironolactone 12.5 mg daily. Continue metoprolol (hold if HR <55 and notify provider), imdur. Continue hydralazine 10 mg q8h PRN and contact provider if SBP does not drop below 160 after receiving a dose. Recheck BMP scheduled for later this week and next week. VS per TCU policy.       R60.0) Bilateral lower extremity edema  Comment: acute, suspect secondary to IV hydration in the hospital  Plan: Continue Spironolactone as above. Compression stockings- on in AM, off in PM. Monitor for improvement.     (C79.9) Metastatic carcinoma (H)  (G89.3) Cancer related pain  Comment: acute, per review of pathology results patient does have breast cancer. Continues to report back pain, which is likely due to metastasis found at L5- pain being managed with dilaudid/ tylenol  Plan: Continue tylenol scheduled. Continue scheduled dilaudid at 8AM and 8PM and q4h PRN for pain management. Continue to monitor pain to ensure well controlled. Follow up with oncology.       Electronically signed by:  ISMAEL Peña CNP     Video-Visit Details  Type of service:  Video Visit  Video End Time (time video stopped): 8:30 AM  Distant Location (provider location):  [facility]

## 2020-05-19 NOTE — TELEPHONE ENCOUNTER
"Called with UA obtained yesterday for increased confusion and fever. Results slightly concerning: WBC 5-10, RBC 0-2, Small LE, also though some squamous epi and protein/ketones. UCx pending. Afebrile today but \"definitely not at baseline and is confused\". Though only slight WBC, given lack of other findings (BMP and CBC stable with normalized calcium) will empirically initiate Keflex 500 mg TID x 7 days until UCx returns. Note, she had prior negative COVID testing and no known COVID cases at facility currently. However, low threshhold to order COVID testing if antibiotics not helpful and/or based on UCx.    Gladis Bravo, DO   "

## 2020-05-21 NOTE — PATIENT INSTRUCTIONS
Orders  Elizabeth Nguyenjayesh  : 1935    1) Discontinue keflex.  2) COVID 19 PCR swab to be obtained  upon return from radiation. Diagnosis fever.  3) Discontinue lisinopril.  4) Lisinopril 40 mg po daily. Diagnosis HTN  5) Start amlodipine 5 mg po at bedtime. Diagnosis HTN   6) Senna S 1 tab po at bedtime for constipation and continue PRN order as well  7) Encourage fluids 8 oz every 1 hour     ISMAEL Peña CNP on 2020 at 1:41 PM

## 2020-05-21 NOTE — PROGRESS NOTES
"[place] [facility]   Elizabeth Li is being evaluated via a billable video visit due to the restrictions of the Covid-19 pandemic.   The patient has been notified of following:  \"This video visit will be conducted via a call between you and your provider. We have found that certain health care needs can be provided without the need for an in-person physical exam.  This service lets us provide the care you need with a video conversation. If during the course of the call the provider feels a video visit is not appropriate, you will not be charged for this service.\"   The provider has received verbal consent for a Video Visit from the patient or first contact? Yes  Patient  or facility staff would like the video invitation sent by: N/A   Video Start Time: 8:23AM    [place] Medical Record Number:  [id number]  Place of Location at the time of visit: [facility]   Chief Complaint   Patient presents with     Nursing Home Acute     HPI:  Elizabeth Li  is a 84 year old (1935), who is being seen today for a visit.  HPI information obtained from: facility chart records, facility staff, patient report and [facility] chart review.     Brief Summary of Hospital Course: Elizabeth Li is a 84 year old female with PMH significant for CAD s/p stenting x2, HTN, hyperlipidemia, hypothyroidism, anxiety, depression, and KUSUM. She was hospitalized at [facility] from 4/17/20-4/28/20. She originally presented to the hospital with fatigue, weakness and myalgias and was found to have severe hypercalcemia likely secondary to malignancy. She was treated with IV fluids, subcutaneous calcitonin, dexamethasone and pamidronate therapy and her calcium level normalized prior to hospital discharge. She was also found to have metastatic carcinoma. Biopsy was completed at L5 which confirmed metastatic carcinoma. She has left breast mass which is likely primary source. She is meeting with " oncology to review results later this week. During hospitalization was also found to have metabolic encephalopathy likely due to hypercalcemia and also possibly from UTI for which she was treated with 7 days of ceftriaxone. Also with JENNIFER during admission that peaked at 1.6 and trended down to baseline at discharge- suspected to be secondary to volume depletion and hypercalcemia. With elevated BP throughout her stay and several medications were adjusted/ added. Also with dysphagia- on altered diet. She was discharged to TCU for physical rehab and medical management.               While at TCU she developed loose stools, cramping, gas, as well as low grade temps. COVID 19 was tested on 5/3/20 and was negative. C.diff was ordered, but never collected by facility and eventually stools resolved and are moving well. Had mild leukocytosis as well during this time that resolved on its own. Received 3 days of lasix 20 mg daily for bilateral lower extremity edema, which is improving. Dilaudid was scheduled at 8AM and 8PM and continues q4h PRN. She continues to have elevated BPs- slowly titrating up blood pressure medications. Was also started on spironolactone and amlodipine. Received zometa with oncology for hypercalcemia 5/15. Also has had low grade temperatures intermittently at TCU- UA/UC negative from 5/18.      Today's concern is:  Elizabeth was seen today for routine follow up at TCU. Staff has noted intermittent confusion at times recently, but nursing today feels her confusion is improved. Nursing is concerned that she is eating very little and not drinking well. She has met with dietician but refusing supplements. She is eating anywhere from 0-50% of meals. Nursing is pushing fluids. Nurse also reports patient has been constipated with infrequent bowel movements.  She is oriented to person, place and time today at the visit. She reports pain pill is working well and denies any pain. Edema to bilateral feet and ankles  is improved. She denies any SOB, cough. UC returned and showed no growth. Denies dysuria or trouble voiding. Continue to have low grade intermittent temperatures in the 99s and is on scheduled tylenol. SBP remains elevated 160s-200s. HR 70-90. Patient tells me she is starting radiation this week and is looking forward to this. Patient completed therapy on 5/20 and will continue to stay at TCU at this time, as her spouse is not able to care for her at home. She is not currently ambulating per therapy notes. Is requiring deangelo lift for transfers with nursing staff. Requires assist of 1 for all ADLs and needs 24 hour care    Past Medical and Surgical History reviewed in Epic today.  MEDICATIONS:  Current Outpatient Medications   Medication Sig Dispense Refill     amLODIPine (NORVASC) 5 MG tablet Take 1 tablet (5 mg) by mouth At Bedtime       senna-docusate (SENOKOT-S/PERICOLACE) 8.6-50 MG tablet Take 1 tablet by mouth daily And 1 tab po BID PRN       acetaminophen (TYLENOL) 500 MG tablet Take 2 tablets (1,000 mg) by mouth 3 times daily       artificial saliva (BIOTENE DRY MOUTHWASH) LIQD liquid Swish and spit 15 mLs in mouth 4 times daily as needed for dry mouth       artificial saliva (BIOTENE MT) SOLN solution Swish and spit 2 mLs (2 sprays) in mouth every hour as needed for dry mouth       ascorbic acid (VITAMIN C) 500 MG tablet Take 500 mg by mouth daily (with lunch) Pt takes in afternoon       aspirin (ASA) 81 MG tablet Take 1 tablet (81 mg) by mouth daily 90 tablet 3     bisacodyl (DULCOLAX) 10 MG suppository Place 1 suppository (10 mg) rectally daily as needed for constipation       hydrALAZINE (APRESOLINE) 10 MG tablet Take 1 tablet (10 mg) by mouth every 8 hours as needed (SBP>160)       HYDROmorphone (DILAUDID) 2 MG tablet Take 1 tablet (2 mg) by mouth 2 times daily. May also take 1 tablet (2 mg) every 4 hours as needed for moderate to severe pain. Do not give PRN dose within 4 hrs of scheduled dose 30 tablet  "0     isosorbide mononitrate (IMDUR) 30 MG 24 hr tablet TAKE ONE TABLET BY MOUTH EVERY DAY 90 tablet 1     levothyroxine (SYNTHROID/LEVOTHROID) 75 MCG tablet TAKE ONE TABLET BY MOUTH EVERY MORNING (BEFORE BREAKFAST) 90 tablet 0     liothyronine (CYTOMEL) 5 MCG tablet Take 1 tablet (5 mcg) by mouth daily 90 tablet 0     lisinopril (ZESTRIL) 20 MG tablet Take 40 mg by mouth daily       metoprolol tartrate (LOPRESSOR) 100 MG tablet Take 1 tablet (100 mg) by mouth 2 times daily       omeprazole (PRILOSEC) 20 MG DR capsule Take 1 capsule (20 mg) by mouth daily       polyethylene glycol (MIRALAX/GLYCOLAX) packet Take 1 packet by mouth nightly as needed for constipation        potassium chloride ER (K-TAB) 20 MEQ CR tablet Take 1 tablet (20 mEq) by mouth daily       simethicone (MYLICON) 125 MG chewable tablet Take 125 mg by mouth 4 times daily as needed for intestinal gas       spironolactone (ALDACTONE) 25 MG tablet Take 0.5 tablets (12.5 mg) by mouth daily       REVIEW OF SYSTEMS: 6 point ROS of systems including Constitutional, Respiratory, Cardiovascular, Gastroenterology, Genitourinary, Musculoskeletal were all negative except for pertinent positives noted in my HPI.  Objective: BP (!) 181/78   Pulse 81   Temp 97.7  F (36.5  C)   Resp 16   Ht 1.575 m (5' 2\")   Wt 74 kg (163 lb 3.2 oz)   SpO2 93%   BMI 29.85 kg/m    Limited visit exam done given COVID-19 precautions.   GENERAL APPEARANCE:  Alert, pleasant, elderly female lying in bed on exam  HEENT: normocephalic, conjunctivae, lids, pupils and irises normal, moist mucous membranes, nose without drainage or crusting  RESP:  respiratory effort normal, no respiratory distress, patient is on RA  NEURO: no facial asymmetry, no speech deficits and able to follow directions  PSYCH: insight and judgement impaired, memory impaired, affect and mood normal    Labs:   Recent [facility] in [facility] reviewed by me today.     ASSESSMENT/PLAN:  (R50.9) Fever, unspecified fever " cause  Comment: acute, UC shows no growth from earlier this week, WBC WNL from 5/18 no cough or SOB that would indicate respiratory infection. Consider COVID since she has been out to appointments- though no known cases in facility. Could also be malignancy?  Plan: Discussed with Dr. Bravo today. Discontinue keflex. Also discussed with nurse manager to obtain COVID 19 swab due to intermittent temperatures. If negative then will assume the low grade temps are related to malignancy, unless other symptoms develop.      (I10) Benign essential hypertension  Comment: acute, not at goal  -received hydralazine during hospital stay, which was started at TCU last week, but patient has not received a dose since it was ordered even though BP meet parameter.   -metoprolol dose increased during hospital stay  -started on lisinopril prior to hospital discharge and have been slowly increasing  Plan: Increase lisinopril to 40 mg daily. Start amlodipine 5 mg po at bedtime. Continue spironolactone 12.5 mg daily. Continue metoprolol (hold if HR <55 and notify provider), imdur. Continue hydralazine 10 mg q8h PRN and contact provider if SBP does not drop below 160 after receiving a dose. VS per TCU policy.       (C79.9) Metastatic carcinoma (H)  (G89.3) Cancer related pain  Comment: acute, per review of pathology results patient does have breast cancer. Continues to report back pain, which is likely due to metastasis found at L5- pain being managed with dilaudid/ tylenol-using dilaudid about 3-4 times per day total (including scheduled doses)  Plan: Continue tylenol scheduled. Continue scheduled dilaudid at 8AM and 8PM and q4h PRN for pain management. Continue to monitor pain to ensure well controlled. Patient starts radiation today. Follow up with oncology per their recommendations.     (E46) Protein calorie malnutrition  Comment: poor po intake, weight trending down- currently refusing supplements, diet is restricted due to sensitivity  to gluten and dairy though dietician has been meeting with patient  (and discussed with spouse) to update food preferences regularly  Plan: Left message with dietician to rediscuss supplements with patient again due to weight loss and ongoing poor po intake. Nursing is pushing fluids 8oz of water q1h when awake to ensure she is staying hydrated. Continue to monitor po intake and weights.     (E83.52) Hypercalcemia of malignancy    Comment: acute, calcium level 11.1 on 5/13- received Zometa on 5/15- follows with Dr. An  -treated inpatient and calcium improved to within normal limits at hospital discharge.  Plan: Continue to monitor weekly calcium, BMP on Wednesdays and fax to Dr. An's office at [phone] (info provided to nursing staff at [facility].) Continue to monitor patient's mental status.     (R60.0) Bilateral lower extremity edema  Comment: acute, suspect secondary to IV hydration in the hospital- now improved  Plan: Continue Spironolactone as above. Compression stockings- on in AM, off in PM. Monitor for improvement.    (K59.00) Constipation  Comment: Acute, infrequent bowel movements- also on scheduled dilaudid  Plan: Prune juice to breakfast tray daily. Start Senna S 1 tab po at bedtime for constipation. Continue senna S PRN, bisacodyl PRN. Nursing to monitor bowels and use house orders PRN- notify provider if used to adjust daily regimen.    (R53.81) Physical deconditioning  Comment: completed therapy on 5/20- continues to require deangelo lift for transfers, assist with all ADLs, is not ambulating and requires 24 hour care. Spouse unable to care for patient at home, so she will stay at TCU  Plan: Nursing to provide supportive cares at TCU. Social work to assist with d/c planning.       Electronically signed by:  ISMAEL Peña CNP     Video-Visit Details  Type of service:  Video Visit  Video End Time (time video stopped): 8:29AM  Distant Location (provider location):  [facility]  SERVICES

## 2020-05-21 NOTE — LETTER
"    5/21/2020        RE: Elizabeth Li  [address] [address]  [place] [address]        [place] [facility]   Elizabeth Li is being evaluated via a billable video visit due to the restrictions of the Covid-19 pandemic.   The patient has been notified of following:  \"This video visit will be conducted via a call between you and your provider. We have found that certain health care needs can be provided without the need for an in-person physical exam.  This service lets us provide the care you need with a video conversation. If during the course of the call the provider feels a video visit is not appropriate, you will not be charged for this service.\"   The provider has received verbal consent for a Video Visit from the patient or first contact? Yes  Patient  or facility staff would like the video invitation sent by: N/A   Video Start Time: 8:23AM    [place] Medical Record Number:  [id number]  Place of Location at the time of visit: [facility]   Chief Complaint   Patient presents with     Nursing Home Acute     HPI:  Elizabeth Li  is a 84 year old (1935), who is being seen today for a visit.  HPI information obtained from: facility chart records, facility staff, patient report and [facility] chart review.     Brief Summary of Hospital Course: Elizabeth Li is a 84 year old female with PMH significant for CAD s/p stenting x2, HTN, hyperlipidemia, hypothyroidism, anxiety, depression, and KUSUM. She was hospitalized at [facility] from 4/17/20-4/28/20. She originally presented to the hospital with fatigue, weakness and myalgias and was found to have severe hypercalcemia likely secondary to malignancy. She was treated with IV fluids, subcutaneous calcitonin, dexamethasone and pamidronate therapy and her calcium level normalized prior to hospital discharge. She was also found to have metastatic carcinoma. Biopsy was completed at L5 which " confirmed metastatic carcinoma. She has left breast mass which is likely primary source. She is meeting with oncology to review results later this week. During hospitalization was also found to have metabolic encephalopathy likely due to hypercalcemia and also possibly from UTI for which she was treated with 7 days of ceftriaxone. Also with JENNIFER during admission that peaked at 1.6 and trended down to baseline at discharge- suspected to be secondary to volume depletion and hypercalcemia. With elevated BP throughout her stay and several medications were adjusted/ added. Also with dysphagia- on altered diet. She was discharged to TCU for physical rehab and medical management.               While at TCU she developed loose stools, cramping, gas, as well as low grade temps. COVID 19 was tested on 5/3/20 and was negative. C.diff was ordered, but never collected by facility and eventually stools resolved and are moving well. Had mild leukocytosis as well during this time that resolved on its own. Received 3 days of lasix 20 mg daily for bilateral lower extremity edema, which is improving. Dilaudid was scheduled at 8AM and 8PM and continues q4h PRN. She continues to have elevated BPs- slowly titrating up blood pressure medications. Was also started on spironolactone and amlodipine. Received zometa with oncology for hypercalcemia 5/15. Also has had low grade temperatures intermittently at TCU- UA/UC negative from 5/18.      Today's concern is:  Elizabeth was seen today for routine follow up at TCU. Staff has noted intermittent confusion at times recently, but nursing today feels her confusion is improved. Nursing is concerned that she is eating very little and not drinking well. She has met with dietician but refusing supplements. She is eating anywhere from 0-50% of meals. Nursing is pushing fluids. Nurse also reports patient has been constipated with infrequent bowel movements.  She is oriented to person, place and time today  at the visit. She reports pain pill is working well and denies any pain. Edema to bilateral feet and ankles is improved. She denies any SOB, cough. UC returned and showed no growth. Denies dysuria or trouble voiding. Continue to have low grade intermittent temperatures in the 99s and is on scheduled tylenol. SBP remains elevated 160s-200s. HR 70-90. Patient tells me she is starting radiation this week and is looking forward to this. Patient completed therapy on 5/20 and will continue to stay at TCU at this time, as her spouse is not able to care for her at home. She is not currently ambulating per therapy notes. Is requiring deangelo lift for transfers with nursing staff. Requires assist of 1 for all ADLs and needs 24 hour care    Past Medical and Surgical History reviewed in Epic today.  MEDICATIONS:  Current Outpatient Medications   Medication Sig Dispense Refill     amLODIPine (NORVASC) 5 MG tablet Take 1 tablet (5 mg) by mouth At Bedtime       senna-docusate (SENOKOT-S/PERICOLACE) 8.6-50 MG tablet Take 1 tablet by mouth daily And 1 tab po BID PRN       acetaminophen (TYLENOL) 500 MG tablet Take 2 tablets (1,000 mg) by mouth 3 times daily       artificial saliva (BIOTENE DRY MOUTHWASH) LIQD liquid Swish and spit 15 mLs in mouth 4 times daily as needed for dry mouth       artificial saliva (BIOTENE MT) SOLN solution Swish and spit 2 mLs (2 sprays) in mouth every hour as needed for dry mouth       ascorbic acid (VITAMIN C) 500 MG tablet Take 500 mg by mouth daily (with lunch) Pt takes in afternoon       aspirin (ASA) 81 MG tablet Take 1 tablet (81 mg) by mouth daily 90 tablet 3     bisacodyl (DULCOLAX) 10 MG suppository Place 1 suppository (10 mg) rectally daily as needed for constipation       hydrALAZINE (APRESOLINE) 10 MG tablet Take 1 tablet (10 mg) by mouth every 8 hours as needed (SBP>160)       HYDROmorphone (DILAUDID) 2 MG tablet Take 1 tablet (2 mg) by mouth 2 times daily. May also take 1 tablet (2 mg) every  "4 hours as needed for moderate to severe pain. Do not give PRN dose within 4 hrs of scheduled dose 30 tablet 0     isosorbide mononitrate (IMDUR) 30 MG 24 hr tablet TAKE ONE TABLET BY MOUTH EVERY DAY 90 tablet 1     levothyroxine (SYNTHROID/LEVOTHROID) 75 MCG tablet TAKE ONE TABLET BY MOUTH EVERY MORNING (BEFORE BREAKFAST) 90 tablet 0     liothyronine (CYTOMEL) 5 MCG tablet Take 1 tablet (5 mcg) by mouth daily 90 tablet 0     lisinopril (ZESTRIL) 20 MG tablet Take 40 mg by mouth daily       metoprolol tartrate (LOPRESSOR) 100 MG tablet Take 1 tablet (100 mg) by mouth 2 times daily       omeprazole (PRILOSEC) 20 MG DR capsule Take 1 capsule (20 mg) by mouth daily       polyethylene glycol (MIRALAX/GLYCOLAX) packet Take 1 packet by mouth nightly as needed for constipation        potassium chloride ER (K-TAB) 20 MEQ CR tablet Take 1 tablet (20 mEq) by mouth daily       simethicone (MYLICON) 125 MG chewable tablet Take 125 mg by mouth 4 times daily as needed for intestinal gas       spironolactone (ALDACTONE) 25 MG tablet Take 0.5 tablets (12.5 mg) by mouth daily       REVIEW OF SYSTEMS: 6 point ROS of systems including Constitutional, Respiratory, Cardiovascular, Gastroenterology, Genitourinary, Musculoskeletal were all negative except for pertinent positives noted in my HPI.  Objective: BP (!) 181/78   Pulse 81   Temp 97.7  F (36.5  C)   Resp 16   Ht 1.575 m (5' 2\")   Wt 74 kg (163 lb 3.2 oz)   SpO2 93%   BMI 29.85 kg/m    Limited visit exam done given COVID-19 precautions.   GENERAL APPEARANCE:  Alert, pleasant, elderly female lying in bed on exam  HEENT: normocephalic, conjunctivae, lids, pupils and irises normal, moist mucous membranes, nose without drainage or crusting  RESP:  respiratory effort normal, no respiratory distress, patient is on RA  NEURO: no facial asymmetry, no speech deficits and able to follow directions  PSYCH: insight and judgement impaired, memory impaired, affect and mood " normal    Labs:   Recent labs in [facility] reviewed by me today.     ASSESSMENT/PLAN:  (R50.9) Fever, unspecified fever cause  Comment: acute, UC shows no growth from earlier this week, WBC WNL from 5/18 no cough or SOB that would indicate respiratory infection. Consider COVID since she has been out to appointments- though no known cases in facility. Could also be malignancy?  Plan: Discussed with Dr. Bravo today. Discontinue keflex. Also discussed with nurse manager to obtain COVID 19 swab due to intermittent temperatures. If negative then will assume the low grade temps are related to malignancy, unless other symptoms develop.      (I10) Benign essential hypertension  Comment: acute, not at goal  -received hydralazine during hospital stay, which was started at TCU last week, but patient has not received a dose since it was ordered even though BP meet parameter.   -metoprolol dose increased during hospital stay  -started on lisinopril prior to hospital discharge and have been slowly increasing  Plan: Increase lisinopril to 40 mg daily. Start amlodipine 5 mg po at bedtime. Continue spironolactone 12.5 mg daily. Continue metoprolol (hold if HR <55 and notify provider), imdur. Continue hydralazine 10 mg q8h PRN and contact provider if SBP does not drop below 160 after receiving a dose. VS per TCU policy.       (C79.9) Metastatic carcinoma (H)  (G89.3) Cancer related pain  Comment: acute, per review of pathology results patient does have breast cancer. Continues to report back pain, which is likely due to metastasis found at L5- pain being managed with dilaudid/ tylenol-using dilaudid about 3-4 times per day total (including scheduled doses)  Plan: Continue tylenol scheduled. Continue scheduled dilaudid at 8AM and 8PM and q4h PRN for pain management. Continue to monitor pain to ensure well controlled. Patient starts radiation today. Follow up with oncology per their recommendations.     (E46) Protein calorie  malnutrition  Comment: poor po intake, weight trending down- currently refusing supplements, diet is restricted due to sensitivity to gluten and dairy though dietician has been meeting with patient  (and discussed with spouse) to update food preferences regularly  Plan: Left message with dietician to rediscuss supplements with patient again due to weight loss and ongoing poor po intake. Nursing is pushing fluids 8oz of water q1h when awake to ensure she is staying hydrated. Continue to monitor po intake and weights.     (E83.52) Hypercalcemia of malignancy    Comment: acute, calcium level 11.1 on 5/13- received Zometa on 5/15- follows with Dr. An  -treated inpatient and calcium improved to within normal limits at hospital discharge.  Plan: Continue to monitor weekly calcium, BMP on Wednesdays and fax to Dr. An's office at [phone] (info provided to nursing staff at [facility] Continue to monitor patient's mental status.     (R60.0) Bilateral lower extremity edema  Comment: acute, suspect secondary to IV hydration in the hospital- now improved  Plan: Continue Spironolactone as above. Compression stockings- on in AM, off in PM. Monitor for improvement.    (K59.00) Constipation  Comment: Acute, infrequent bowel movements- also on scheduled dilaudid  Plan: Prune juice to breakfast tray daily. Start Senna S 1 tab po at bedtime for constipation. Continue senna S PRN, bisacodyl PRN. Nursing to monitor bowels and use house orders PRN- notify provider if used to adjust daily regimen.    (R53.81) Physical deconditioning  Comment: completed therapy on 5/20- continues to require deangelo lift for transfers, assist with all ADLs, is not ambulating and requires 24 hour care. Spouse unable to care for patient at home, so she will stay at TCU  Plan: Nursing to provide supportive cares at TCU. Social work to assist with d/c planning.       Electronically signed by:  ISMAEL Peña CNP     Video-Visit Details  Type of  service:  Video Visit  Video End Time (time video stopped): 8:29AM  Distant Location (provider location):  [place] [facility]             Sincerely,        ISMAEL Peña CNP

## 2020-05-26 NOTE — LETTER
"    5/26/2020        RE: Elizabeth Li  [address] [address]  [place] [address]        [place] [facility]   Elizabeth Li is being evaluated via a billable video visit due to the restrictions of the Covid-19 pandemic.   The patient has been notified of following: \"This video visit will be conducted via a call between you and your provider. We have found that certain health care needs can be provided without the need for an in-person physical exam.  This service lets us provide the care you need with a video conversation. If during the course of the call the provider feels a video visit is not appropriate, you will not be charged for this service.\"   The provider has received verbal consent for a Video Visit from the patient or first contact? Yes  Patient  or facility staff would like the video invitation sent by: N/A   Video Start Time: 0959    [place] Medical Record Number:  [id number]  Place of Location at the time of visit: [facility]   Chief Complaint   Patient presents with     Nursing Home Acute     HPI:  Elizabeth Li  is a 84 year old (1935), who is being seen today for a visit.  HPI information obtained from: facility chart records, facility staff, patient report and [facility] chart review. Today's concern is:    Hypertension. Upon review of blood pressure over the past 5 days, systolic range from 122-189. Diastolic 62-85.    Constipation. Today, patient complains of being incontinent of stool at night. Feels this is due to the Miralax and wants it stopped. Is also drinking Prune Juice. Upon review of bowel movements, has had 3 bowel movements in the past 5 days.     Recent Fever. Noted to have low grade, intermittent fevers on 5/21/20. Urine culture with no growth. WBC WNL on 5/18/20. No respiratory symptoms. COVID-19 swab negative on 5/22/20. Upon review of documentation over the past 5 days, on 5/21/20, had a temperature of 99.3 " F and 98.8 F on 5/26/20. Has otherwise been afebrile. Today, patient reports no concerns.     Past Medical and Surgical History reviewed in Epic today.  MEDICATIONS:  Current Outpatient Medications   Medication Sig Dispense Refill     acetaminophen (TYLENOL) 500 MG tablet Take 2 tablets (1,000 mg) by mouth 3 times daily       amLODIPine (NORVASC) 5 MG tablet Take 1 tablet (5 mg) by mouth At Bedtime       artificial saliva (BIOTENE DRY MOUTHWASH) LIQD liquid Swish and spit 15 mLs in mouth 4 times daily as needed for dry mouth       artificial saliva (BIOTENE MT) SOLN solution Swish and spit 2 mLs (2 sprays) in mouth every hour as needed for dry mouth       ascorbic acid (VITAMIN C) 500 MG tablet Take 500 mg by mouth daily (with lunch) Pt takes in afternoon       aspirin (ASA) 81 MG tablet Take 1 tablet (81 mg) by mouth daily 90 tablet 3     bisacodyl (DULCOLAX) 10 MG suppository Place 1 suppository (10 mg) rectally daily as needed for constipation       hydrALAZINE (APRESOLINE) 10 MG tablet Take 1 tablet (10 mg) by mouth every 8 hours as needed (SBP>160)       HYDROmorphone (DILAUDID) 2 MG tablet Take 1 tablet (2 mg) by mouth 2 times daily. May also take 1 tablet (2 mg) every 4 hours as needed for moderate to severe pain. Do not give PRN dose within 4 hrs of scheduled dose 30 tablet 0     isosorbide mononitrate (IMDUR) 30 MG 24 hr tablet TAKE ONE TABLET BY MOUTH EVERY DAY 90 tablet 1     levothyroxine (SYNTHROID/LEVOTHROID) 75 MCG tablet TAKE ONE TABLET BY MOUTH EVERY MORNING (BEFORE BREAKFAST) 90 tablet 0     liothyronine (CYTOMEL) 5 MCG tablet Take 1 tablet (5 mcg) by mouth daily 90 tablet 0     lisinopril (ZESTRIL) 20 MG tablet Take 40 mg by mouth daily       metoprolol tartrate (LOPRESSOR) 100 MG tablet Take 1 tablet (100 mg) by mouth 2 times daily       omeprazole (PRILOSEC) 20 MG DR capsule Take 1 capsule (20 mg) by mouth daily       polyethylene glycol (MIRALAX/GLYCOLAX) packet Take 1 packet by mouth nightly  "as needed for constipation        potassium chloride ER (K-TAB) 20 MEQ CR tablet Take 1 tablet (20 mEq) by mouth daily       senna-docusate (SENOKOT-S/PERICOLACE) 8.6-50 MG tablet Take 1 tablet by mouth daily And 1 tab po BID PRN       simethicone (MYLICON) 125 MG chewable tablet Take 125 mg by mouth 4 times daily as needed for intestinal gas       spironolactone (ALDACTONE) 25 MG tablet Take 0.5 tablets (12.5 mg) by mouth daily       REVIEW OF SYSTEMS: 4 point ROS including Respiratory, CV, GI and , other than that noted in the HPI,  is negative  Objective: BP (!) 148/71   Pulse 70   Temp 98.2  F (36.8  C)   Resp 24   Ht 1.575 m (5' 2\")   Wt 67.4 kg (148 lb 9.6 oz)   SpO2 93%   BMI 27.18 kg/m    Limited visit exam done given COVID-19 precautions.   GENERAL APPEARANCE:  Alert, in no distress  ENT:  Mouth and posterior oropharynx normal, moist mucous membranes  EYES:  EOM, conjunctivae, lids, pupils and irises normal  RESP:  no respiratory distress  PSYCH:  affect and mood normal     Labs:   Labs done in SNF are in [place] EPIC. Please refer to them using [employer]TelemetryWeb Everywhere.    ASSESSMENT/PLAN:  Hypertension. Blood pressures remain elevated overall, but slightly improved from previous 5 days. Amlodipine initiated on 5/21/20. Also has Hydralazine PRN, Isosorbide Mononitrate, Lisinopril, Metoprolol and Spironolactone. Will continue to monitor blood pressure daily given recent addition of Amlodipine. Adjust treatment accordingly.     Constipation. Complained during 5/21/20 visit of constipation and started on Senna-S. Now stating she is incontinent at night due to the Miralax. Miralax scheduled as needed. Continue Senna and Senna-S as ordered. Is also drinking Prune Juice.     Recent Fever. Resolved. Previous work-up with no acute findings. May be related to underlying malignancy. Continue to monitor temperature BID during COVID pandemic per facility protocol.     Electronically signed by:  Dyana Nguyễn " ISMAEL Sandoval CNP     Video-Visit Details  Type of service:  Video Visit  Video End Time (time video stopped): 1003  Distant Location (provider location):  [place] [facility]             Sincerely,        ISMAEL Salazar CNP

## 2020-05-26 NOTE — PROGRESS NOTES
"[facility]   Elizabeth Li is being evaluated via a billable video visit due to the restrictions of the Covid-19 pandemic.   The patient has been notified of following: \"This video visit will be conducted via a call between you and your provider. We have found that certain health care needs can be provided without the need for an in-person physical exam.  This service lets us provide the care you need with a video conversation. If during the course of the call the provider feels a video visit is not appropriate, you will not be charged for this service.\"   The provider has received verbal consent for a Video Visit from the patient or first contact? Yes  Patient  or facility staff would like the video invitation sent by: N/A   Video Start Time: 0959    [facility] Medical Record Number:  [id number]  Place of Location at the time of visit: [facility]   Chief Complaint   Patient presents with     Nursing Home Acute     HPI:  Elizabeth Li  is a 84 year old (1935), who is being seen today for a visit.  HPI information obtained from: facility chart records, facility staff, patient report and [facility] chart review. Today's concern is:    Hypertension. Upon review of blood pressure over the past 5 days, systolic range from 122-189. Diastolic 62-85.    Constipation. Today, patient complains of being incontinent of stool at night. Feels this is due to the Miralax and wants it stopped. Is also drinking Prune Juice. Upon review of bowel movements, has had 3 bowel movements in the past 5 days.     Recent Fever. Noted to have low grade, intermittent fevers on 5/21/20. Urine culture with no growth. WBC WNL on 5/18/20. No respiratory symptoms. COVID-19 swab negative on 5/22/20. Upon review of documentation over the past 5 days, on 5/21/20, had a temperature of 99.3 F and 98.8 F on 5/26/20. Has otherwise been afebrile. Today, patient reports no concerns.     Past " Medical and Surgical History reviewed in Epic today.  MEDICATIONS:  Current Outpatient Medications   Medication Sig Dispense Refill     acetaminophen (TYLENOL) 500 MG tablet Take 2 tablets (1,000 mg) by mouth 3 times daily       amLODIPine (NORVASC) 5 MG tablet Take 1 tablet (5 mg) by mouth At Bedtime       artificial saliva (BIOTENE DRY MOUTHWASH) LIQD liquid Swish and spit 15 mLs in mouth 4 times daily as needed for dry mouth       artificial saliva (BIOTENE MT) SOLN solution Swish and spit 2 mLs (2 sprays) in mouth every hour as needed for dry mouth       ascorbic acid (VITAMIN C) 500 MG tablet Take 500 mg by mouth daily (with lunch) Pt takes in afternoon       aspirin (ASA) 81 MG tablet Take 1 tablet (81 mg) by mouth daily 90 tablet 3     bisacodyl (DULCOLAX) 10 MG suppository Place 1 suppository (10 mg) rectally daily as needed for constipation       hydrALAZINE (APRESOLINE) 10 MG tablet Take 1 tablet (10 mg) by mouth every 8 hours as needed (SBP>160)       HYDROmorphone (DILAUDID) 2 MG tablet Take 1 tablet (2 mg) by mouth 2 times daily. May also take 1 tablet (2 mg) every 4 hours as needed for moderate to severe pain. Do not give PRN dose within 4 hrs of scheduled dose 30 tablet 0     isosorbide mononitrate (IMDUR) 30 MG 24 hr tablet TAKE ONE TABLET BY MOUTH EVERY DAY 90 tablet 1     levothyroxine (SYNTHROID/LEVOTHROID) 75 MCG tablet TAKE ONE TABLET BY MOUTH EVERY MORNING (BEFORE BREAKFAST) 90 tablet 0     liothyronine (CYTOMEL) 5 MCG tablet Take 1 tablet (5 mcg) by mouth daily 90 tablet 0     lisinopril (ZESTRIL) 20 MG tablet Take 40 mg by mouth daily       metoprolol tartrate (LOPRESSOR) 100 MG tablet Take 1 tablet (100 mg) by mouth 2 times daily       omeprazole (PRILOSEC) 20 MG DR capsule Take 1 capsule (20 mg) by mouth daily       polyethylene glycol (MIRALAX/GLYCOLAX) packet Take 1 packet by mouth nightly as needed for constipation        potassium chloride ER (K-TAB) 20 MEQ CR tablet Take 1 tablet (20  "mEq) by mouth daily       senna-docusate (SENOKOT-S/PERICOLACE) 8.6-50 MG tablet Take 1 tablet by mouth daily And 1 tab po BID PRN       simethicone (MYLICON) 125 MG chewable tablet Take 125 mg by mouth 4 times daily as needed for intestinal gas       spironolactone (ALDACTONE) 25 MG tablet Take 0.5 tablets (12.5 mg) by mouth daily       REVIEW OF SYSTEMS: 4 point ROS including Respiratory, CV, GI and , other than that noted in the HPI,  is negative  Objective: BP (!) 148/71   Pulse 70   Temp 98.2  F (36.8  C)   Resp 24   Ht 1.575 m (5' 2\")   Wt 67.4 kg (148 lb 9.6 oz)   SpO2 93%   BMI 27.18 kg/m    Limited visit exam done given COVID-19 precautions.   GENERAL APPEARANCE:  Alert, in no distress  ENT:  Mouth and posterior oropharynx normal, moist mucous membranes  EYES:  EOM, conjunctivae, lids, pupils and irises normal  RESP:  no respiratory distress  PSYCH:  affect and mood normal     Labs:   Labs done in SNF are in [place] EPIC. Please refer to them using [facility] Everywhere.    ASSESSMENT/PLAN:  Hypertension. Blood pressures remain elevated overall, but slightly improved from previous 5 days. Amlodipine initiated on 5/21/20. Also has Hydralazine PRN, Isosorbide Mononitrate, Lisinopril, Metoprolol and Spironolactone. Will continue to monitor blood pressure daily given recent addition of Amlodipine. Adjust treatment accordingly.     Constipation. Complained during 5/21/20 visit of constipation and started on Senna-S. Now stating she is incontinent at night due to the Miralax. Miralax scheduled as needed. Continue Senna and Senna-S as ordered. Is also drinking Prune Juice.     Recent Fever. Resolved. Previous work-up with no acute findings. May be related to underlying malignancy. Continue to monitor temperature BID during COVID pandemic per facility protocol.     Electronically signed by:  ISMAEL Salazar CNP     Video-Visit Details  Type of service:  Video Visit  Video End Time (time video " stopped): 1003  Distant Location (provider location):  [facility]

## 2020-06-01 NOTE — PATIENT INSTRUCTIONS
Orders  Elizabeth Li  1935    1) CBC 6/3. Diagnosis anemia, thrombocytopenia  2) Change BMP on 6/3 to CMP. Diagnosis protein calorie malnutrition  3) nursing to monitor for hallucinations and update provider.   4) Discontinue spironolactone.  5) Discontinue amlodipine  6) Start amlodipine 7.5 mg po at bedtime. Diagnosis htn      ISMAEL Peña CNP on 6/1/2020 at 2:42 PM

## 2020-06-01 NOTE — LETTER
"    6/1/2020        RE: Elizabeth Li  [address]  [place] [address]        [place] [facility]   Elizabeth Li is being evaluated via a billable video visit due to the restrictions of the Covid-19 pandemic.   The patient has been notified of following:  \"This video visit will be conducted via a call between you and your provider. We have found that certain health care needs can be provided without the need for an in-person physical exam.  This service lets us provide the care you need with a video conversation. If during the course of the call the provider feels a video visit is not appropriate, you will not be charged for this service.\"   The provider has received verbal consent for a Video Visit from the patient or first contact? Yes  Patient  or facility staff would like the video invitation sent by: N/A   Video Start Time: 8:04AM    [facility] Medical Record Number:  [id number]  Place of Location at the time of visit: [facility]   Chief Complaint   Patient presents with     RECHECK     HPI:  Elizabeth Li  is a 84 year old (1935), who is being seen today for a visit.  HPI information obtained from: facility chart records, facility staff, patient report and [facility] chart review.     Brief Summary of Hospital Course: Elizabeth Li is a 84 year old female with PMH significant for CAD s/p stenting x2, HTN, hyperlipidemia, hypothyroidism, anxiety, depression, and KUSUM. She was hospitalized at [facility] from 4/17/20-4/28/20. She originally presented to the hospital with fatigue, weakness and myalgias and was found to have severe hypercalcemia likely secondary to malignancy. She was treated with IV fluids, subcutaneous calcitonin, dexamethasone and pamidronate therapy and her calcium level normalized prior to hospital discharge. She was also found to have metastatic carcinoma. Biopsy was completed at L5 which confirmed " metastatic carcinoma. She has left breast mass which is likely primary source. She is meeting with oncology to review results later this week. During hospitalization was also found to have metabolic encephalopathy likely due to hypercalcemia and also possibly from UTI for which she was treated with 7 days of ceftriaxone. Also with JENNIFER during admission that peaked at 1.6 and trended down to baseline at discharge- suspected to be secondary to volume depletion and hypercalcemia. With elevated BP throughout her stay and several medications were adjusted/ added. Also with dysphagia- on altered diet. She was discharged to TCU for physical rehab and medical management.               While at TCU she developed loose stools, cramping, gas, as well as low grade temps. COVID 19 was tested on 5/3/20 and was negative. C.diff was ordered, but never collected by facility and eventually stools resolved and are moving well. Had mild leukocytosis as well during this time that resolved on its own. Received 3 days of lasix 20 mg daily for bilateral lower extremity edema, which was improving. Dilaudid was scheduled at 8AM and 8PM and continues q4h PRN. She continues to have elevated BPs- slowly titrating up blood pressure medications. Was started on spironolactone and amlodipine. Received zometa with oncology for hypercalcemia 5/15. Also has had low grade temperatures intermittently at TCU- UA/UC negative from 5/18. COVID-19 5/22 negative. She has started radiation treatments while at TCU as well.     Today's concern is:  Elizabeth was seen today for routine follow up at TCU. Is no longer working with therapy at this time, but spouse is not able to care for her at home, so she remains in the TCU. She reports this morning have pain 4/10 to her hip- she had recently received a pain pill for this. She feels dilaudid is helpful for her pain.     Upon review of nursing notes patient has been having hallucinations intermittently. She tells me  they started about 2 weeks ago and are occurring at night when she is trying to fall asleep. She is aware they are not real.     Continues to have extremely poor intake, ranging from 0-75%, most often eating less than a quarter of her meals. She has lost 20 lb since admission to TCU. Has been agreeable and drinking nutritional juice supplement that was started last week. Discussed today and she will not try any other supplements. She does confirm that she is eating very little or not at all with meals. Also discussed possibility of tube feeds which was brought up by dietician and she is clear that she does not want to receive tube feeds.     -185 over past week, but most in 140s or under. HR 70-90s. Afebrile    Past Medical and Surgical History reviewed in Epic today.  MEDICATIONS:  Current Outpatient Medications   Medication Sig Dispense Refill     amLODIPine (NORVASC) 5 MG tablet Take 1.5 tablets (7.5 mg) by mouth At Bedtime       HYDROmorphone (DILAUDID) 2 MG tablet Take 1 tablet (2 mg) by mouth 2 times daily. May also take 1 tablet (2 mg) every 4 hours as needed for moderate to severe pain. Do not give PRN dose within 4 hrs of scheduled dose 30 tablet 0     acetaminophen (TYLENOL) 500 MG tablet Take 2 tablets (1,000 mg) by mouth 3 times daily       artificial saliva (BIOTENE DRY MOUTHWASH) LIQD liquid Swish and spit 15 mLs in mouth 4 times daily as needed for dry mouth       artificial saliva (BIOTENE MT) SOLN solution Swish and spit 2 mLs (2 sprays) in mouth every hour as needed for dry mouth       ascorbic acid (VITAMIN C) 500 MG tablet Take 500 mg by mouth daily (with lunch) Pt takes in afternoon       aspirin (ASA) 81 MG tablet Take 1 tablet (81 mg) by mouth daily 90 tablet 3     bisacodyl (DULCOLAX) 10 MG suppository Place 1 suppository (10 mg) rectally daily as needed for constipation       hydrALAZINE (APRESOLINE) 10 MG tablet Take 1 tablet (10 mg) by mouth every 8 hours as needed (SBP>160)        isosorbide mononitrate (IMDUR) 30 MG 24 hr tablet TAKE ONE TABLET BY MOUTH EVERY DAY 90 tablet 1     levothyroxine (SYNTHROID/LEVOTHROID) 75 MCG tablet TAKE ONE TABLET BY MOUTH EVERY MORNING (BEFORE BREAKFAST) 90 tablet 0     liothyronine (CYTOMEL) 5 MCG tablet Take 1 tablet (5 mcg) by mouth daily 90 tablet 0     lisinopril (ZESTRIL) 20 MG tablet Take 40 mg by mouth daily       metoprolol tartrate (LOPRESSOR) 100 MG tablet Take 1 tablet (100 mg) by mouth 2 times daily       omeprazole (PRILOSEC) 20 MG DR capsule Take 1 capsule (20 mg) by mouth daily       polyethylene glycol (MIRALAX/GLYCOLAX) packet Take 1 packet by mouth nightly as needed for constipation        potassium chloride ER (K-TAB) 20 MEQ CR tablet Take 1 tablet (20 mEq) by mouth daily       senna-docusate (SENOKOT-S/PERICOLACE) 8.6-50 MG tablet Take 1 tablet by mouth 2 times daily as needed for constipation       sennosides (SENOKOT) 8.6 MG tablet Take 1 tablet by mouth At Bedtime       simethicone (MYLICON) 125 MG chewable tablet Take 125 mg by mouth 4 times daily as needed for intestinal gas       REVIEW OF SYSTEMS: 7 point ROS of systems including Constitutional, Respiratory, Cardiovascular, Gastroenterology, Genitourinary, Musculoskeletal, Psychiatric were all negative except for pertinent positives noted in my HPI.  Objective: BP (!) 162/65   Pulse 78   Temp 97.9  F (36.6  C)   Resp 16   Wt 65.1 kg (143 lb 9.6 oz)   SpO2 96%   BMI 26.26 kg/m    Limited visit exam done given COVID-19 precautions.   GENERAL APPEARANCE:  Alert, pleasant, elderly female lying in bed on exam  HEENT: normocephalic, conjunctivae, lids, pupils and irises normal, moist mucous membranes, nose without drainage or crusting  RESP:  respiratory effort normal, no respiratory distress, patient is on RA  NEURO: no facial asymmetry, no speech deficits and able to follow directions  PSYCH: insight and judgement impaired, memory impaired, affect and mood normal    Labs:   Recent  labs in EPIC reviewed by me today.     ASSESSMENT/PLAN:  (I10) Benign essential hypertension  Comment: acute, is improving, but still some BP higher than goal  -metoprolol dose increased during hospital stay  -started on lisinopril prior to hospital discharge and titrated up at TCU  -PRN hydralazine started at TCU  -Started on amlodipine at TCU  Plan: Discontinue spironolactone. Increase amlodipine to 7.5 mg po at bedtime. Continue lisinopril 40 mg daily, metoprolol (hold if HR <55 and notify provider), imdur. Continue hydralazine 10 mg q8h PRN and contact provider if SBP does not drop below 160 after receiving a dose. VS per TCU policy.       (E46) Protein calorie malnutrition  Comment: poor po intake, weight trending down- has lost 20 lb since admission to TCU admit weight 163-->143 lb  Plan: Discussed with patient today and she does not want to receive tube feeds. Is ok with continuing juice nutritional supplement, but not agreeable to any other supplements. Nursing is pushing fluids 8oz of water q1h when awake to ensure she is staying hydrated. Dietician to continue to follow. Continue to monitor po intake and weights. CMP with next labs. Discontinue spironolactone as above, as could be contributing to dehydration. Continue potassium supplement with labs this week.     (R44.3) Hallucinations  Comment: acute, per patient developed in past 2 weeks- they are not bothersome to her.  -first documentation of episodes of hallucinations from nursing is from 5/26.  -could be related to poor nutritional status, narcotics, metastatic cancer  Plan: Discussed with Dr. Bravo today. Will continue to monitor since they are not bothersome to patient and appear to be infrequent. Discontinue spironolactone today as above as well- which if due to malnutrition/ dehydration will help. Left message with oncology to update them regarding this as well. Nursing to update provider with any further episodes of hallucinations. If they  persist or are occurring frequently will need to review medication list and consider other offending agents, including possible alternate to dilaudid.    (C79.9) Metastatic carcinoma (H)  (G89.3) Cancer related pain  Comment: acute, patient with breast cancer. Continues to report back pain, which is likely due to metastasis found at L5- pain being managed with dilaudid/ tylenol-using dilaudid about 3-4 times per day total (including scheduled doses)  -completed course of 5 radiation treatments last week  -continues to decline- poor po intake, very weak, requiring max assist for cares.  Plan: Continue tylenol scheduled. Continue scheduled dilaudid at 8AM and 8PM and q4h PRN for pain management. Continue to monitor pain to ensure well controlled. Left message with Dr. An's nurse to update on patient's current condition. Follow up with oncology as scheduled for later this week. Also has neurology follow up scheduled to ensure nothing else contributing to her ability to walk.     (E83.52) Hypercalcemia of malignancy    Comment: acute, corrected calcium level 10.5 on 5/27, although most recent albumin from a month ago  -treated inpatient and calcium improved to within normal limits at hospital discharge.  - received Zometa on 5/15- follows with Dr. An  Plan: Continue to monitor weekly calcium, BMP on Wednesdays and fax to Dr. An's office at [phone] (info provided to nursing staff at [facility] Changed BMP due this Wednesday to CMP, to check albumin level as well. Continue to monitor patient's mental status.      (D64.9) Anemia  (D69.6) Thrombocytopenia  Comment: acute, no active bleeding. Suspect may be related to metastatic cancer  Hemoglobin   Date Value Ref Range Status   05/18/2020 8.3 (A) 12.0 - 16.0 g/dL Final   05/13/2020 8.9 (A) 12.0 - 16.0 g/dL Final     Platelet Count   Date Value Ref Range Status   05/18/2020 86 (A) 140 - 440 thou/uL Final     Plan: CBC 6/3. Monitor for s/sx of bleeding.      (R53.81) Physical deconditioning  Comment: completed therapy on 5/20- continues to require deangelo lift for transfers, assist with all ADLs, is not ambulating and requires 24 hour care. Spouse unable to care for patient at home, so she will stay at TCU  Plan: Nursing to provide supportive cares at TCU. Social work to assist with d/c planning.    (R41.89) Cognitive impairment  Comment: acute, SLUMS 22/30 at TCU that indicates mild cognitive impairment.  -patient is poor medical historian though  -with acute metabolic encephalopathy during hospitalization that improved prior to hospital discharge.  -patient continues to be poor historian  Plan: Nursing to provide supportive cares at TCU.      (I25.10) Coronary artery disease involving native heart, angina presence unspecified, unspecified vessel or lesion type  Comment: chronic, asymptomatic  -history of 2 stents placed  -last ECHO from November 2019 showed: Left ventricular systolic function is normal.The visual ejection fraction is  estimated at 60-65%. The right ventricular systolic function is normal. Mild aortic valve sclerosis, trace aortic regurgitation.  Plan: Continue ASA, imdur, metoprolol, lisinopril. Follow up with cardiology 1 year from last ECHO.    Spoke to spouse, Gene today extensively (for >20 minutes) regarding Elizabeth's current health status, poor po intake, hallucinations and other medical problems as above.      Total time spent with patient visit at the skilled nursing facility was 45 minutes including patient visit, discussion of POC with spouse, and multiple phone calls to oncology clinic. Greater than 50% of total time spent with counseling and coordinating care due poor po intake, overall decline in health, hallucinations and other problems as above.     Electronically signed by:  ISMAEL Peña CNP       Video-Visit Details  Type of service:  Video Visit  Video End Time (time video stopped): 8:14AM  Distant Location (provider  location):  [facility]             Sincerely,        ISMAEL Peña CNP

## 2020-06-01 NOTE — PROGRESS NOTES
"[facility]   Elizabeth Li is being evaluated via a billable video visit due to the restrictions of the Covid-19 pandemic.   The patient has been notified of following:  \"This video visit will be conducted via a call between you and your provider. We have found that certain health care needs can be provided without the need for an in-person physical exam.  This service lets us provide the care you need with a video conversation. If during the course of the call the provider feels a video visit is not appropriate, you will not be charged for this service.\"   The provider has received verbal consent for a Video Visit from the patient or first contact? Yes  Patient  or facility staff would like the video invitation sent by: N/A   Video Start Time: 8:04AM    [facility] Medical Record Number:  [id number]  Place of Location at the time of visit: [facility]   Chief Complaint   Patient presents with     RECHECK     HPI:  Elizabeth Li  is a 84 year old (1935), who is being seen today for a visit.  HPI information obtained from: facility chart records, facility staff, patient report and [facility] chart review.     Brief Summary of Hospital Course: Elizabeth Li is a 84 year old female with PMH significant for CAD s/p stenting x2, HTN, hyperlipidemia, hypothyroidism, anxiety, depression, and KUSUM. She was hospitalized at [facility] from 4/17/20-4/28/20. She originally presented to the hospital with fatigue, weakness and myalgias and was found to have severe hypercalcemia likely secondary to malignancy. She was treated with IV fluids, subcutaneous calcitonin, dexamethasone and pamidronate therapy and her calcium level normalized prior to hospital discharge. She was also found to have metastatic carcinoma. Biopsy was completed at L5 which confirmed metastatic carcinoma. She has left breast mass which is likely primary source. She is meeting with oncology to " review results later this week. During hospitalization was also found to have metabolic encephalopathy likely due to hypercalcemia and also possibly from UTI for which she was treated with 7 days of ceftriaxone. Also with JENNIFER during admission that peaked at 1.6 and trended down to baseline at discharge- suspected to be secondary to volume depletion and hypercalcemia. With elevated BP throughout her stay and several medications were adjusted/ added. Also with dysphagia- on altered diet. She was discharged to TCU for physical rehab and medical management.               While at TCU she developed loose stools, cramping, gas, as well as low grade temps. COVID 19 was tested on 5/3/20 and was negative. C.diff was ordered, but never collected by facility and eventually stools resolved and are moving well. Had mild leukocytosis as well during this time that resolved on its own. Received 3 days of lasix 20 mg daily for bilateral lower extremity edema, which was improving. Dilaudid was scheduled at 8AM and 8PM and continues q4h PRN. She continues to have elevated BPs- slowly titrating up blood pressure medications. Was started on spironolactone and amlodipine. Received zometa with oncology for hypercalcemia 5/15. Also has had low grade temperatures intermittently at TCU- UA/UC negative from 5/18. COVID-19 5/22 negative. She has started radiation treatments while at TCU as well.     Today's concern is:  Elizabeth was seen today for routine follow up at TCU. Is no longer working with therapy at this time, but spouse is not able to care for her at home, so she remains in the TCU. She reports this morning have pain 4/10 to her hip- she had recently received a pain pill for this. She feels dilaudid is helpful for her pain.     Upon review of nursing notes patient has been having hallucinations intermittently. She tells me they started about 2 weeks ago and are occurring at night when she is trying to fall asleep. She is aware they  are not real.     Continues to have extremely poor intake, ranging from 0-75%, most often eating less than a quarter of her meals. She has lost 20 lb since admission to TCU. Has been agreeable and drinking nutritional juice supplement that was started last week. Discussed today and she will not try any other supplements. She does confirm that she is eating very little or not at all with meals. Also discussed possibility of tube feeds which was brought up by dietician and she is clear that she does not want to receive tube feeds.     -185 over past week, but most in 140s or under. HR 70-90s. Afebrile    Past Medical and Surgical History reviewed in Epic today.  MEDICATIONS:  Current Outpatient Medications   Medication Sig Dispense Refill     amLODIPine (NORVASC) 5 MG tablet Take 1.5 tablets (7.5 mg) by mouth At Bedtime       HYDROmorphone (DILAUDID) 2 MG tablet Take 1 tablet (2 mg) by mouth 2 times daily. May also take 1 tablet (2 mg) every 4 hours as needed for moderate to severe pain. Do not give PRN dose within 4 hrs of scheduled dose 30 tablet 0     acetaminophen (TYLENOL) 500 MG tablet Take 2 tablets (1,000 mg) by mouth 3 times daily       artificial saliva (BIOTENE DRY MOUTHWASH) LIQD liquid Swish and spit 15 mLs in mouth 4 times daily as needed for dry mouth       artificial saliva (BIOTENE MT) SOLN solution Swish and spit 2 mLs (2 sprays) in mouth every hour as needed for dry mouth       ascorbic acid (VITAMIN C) 500 MG tablet Take 500 mg by mouth daily (with lunch) Pt takes in afternoon       aspirin (ASA) 81 MG tablet Take 1 tablet (81 mg) by mouth daily 90 tablet 3     bisacodyl (DULCOLAX) 10 MG suppository Place 1 suppository (10 mg) rectally daily as needed for constipation       hydrALAZINE (APRESOLINE) 10 MG tablet Take 1 tablet (10 mg) by mouth every 8 hours as needed (SBP>160)       isosorbide mononitrate (IMDUR) 30 MG 24 hr tablet TAKE ONE TABLET BY MOUTH EVERY DAY 90 tablet 1      levothyroxine (SYNTHROID/LEVOTHROID) 75 MCG tablet TAKE ONE TABLET BY MOUTH EVERY MORNING (BEFORE BREAKFAST) 90 tablet 0     liothyronine (CYTOMEL) 5 MCG tablet Take 1 tablet (5 mcg) by mouth daily 90 tablet 0     lisinopril (ZESTRIL) 20 MG tablet Take 40 mg by mouth daily       metoprolol tartrate (LOPRESSOR) 100 MG tablet Take 1 tablet (100 mg) by mouth 2 times daily       omeprazole (PRILOSEC) 20 MG DR capsule Take 1 capsule (20 mg) by mouth daily       polyethylene glycol (MIRALAX/GLYCOLAX) packet Take 1 packet by mouth nightly as needed for constipation        potassium chloride ER (K-TAB) 20 MEQ CR tablet Take 1 tablet (20 mEq) by mouth daily       senna-docusate (SENOKOT-S/PERICOLACE) 8.6-50 MG tablet Take 1 tablet by mouth 2 times daily as needed for constipation       sennosides (SENOKOT) 8.6 MG tablet Take 1 tablet by mouth At Bedtime       simethicone (MYLICON) 125 MG chewable tablet Take 125 mg by mouth 4 times daily as needed for intestinal gas       REVIEW OF SYSTEMS: 7 point ROS of systems including Constitutional, Respiratory, Cardiovascular, Gastroenterology, Genitourinary, Musculoskeletal, Psychiatric were all negative except for pertinent positives noted in my HPI.  Objective: BP (!) 162/65   Pulse 78   Temp 97.9  F (36.6  C)   Resp 16   Wt 65.1 kg (143 lb 9.6 oz)   SpO2 96%   BMI 26.26 kg/m    Limited visit exam done given COVID-19 precautions.   GENERAL APPEARANCE:  Alert, pleasant, elderly female lying in bed on exam  HEENT: normocephalic, conjunctivae, lids, pupils and irises normal, moist mucous membranes, nose without drainage or crusting  RESP:  respiratory effort normal, no respiratory distress, patient is on RA  NEURO: no facial asymmetry, no speech deficits and able to follow directions  PSYCH: insight and judgement impaired, memory impaired, affect and mood normal    Labs:   Recent labs in [facility] reviewed by me today.     ASSESSMENT/PLAN:  (I10) Benign essential  hypertension  Comment: acute, is improving, but still some BP higher than goal  -metoprolol dose increased during hospital stay  -started on lisinopril prior to hospital discharge and titrated up at TCU  -PRN hydralazine started at TCU  -Started on amlodipine at TCU  Plan: Discontinue spironolactone. Increase amlodipine to 7.5 mg po at bedtime. Continue lisinopril 40 mg daily, metoprolol (hold if HR <55 and notify provider), imdur. Continue hydralazine 10 mg q8h PRN and contact provider if SBP does not drop below 160 after receiving a dose. VS per TCU policy.       (E46) Protein calorie malnutrition  Comment: poor po intake, weight trending down- has lost 20 lb since admission to TCU admit weight 163-->143 lb  Plan: Discussed with patient today and she does not want to receive tube feeds. Is ok with continuing juice nutritional supplement, but not agreeable to any other supplements. Nursing is pushing fluids 8oz of water q1h when awake to ensure she is staying hydrated. Dietician to continue to follow. Continue to monitor po intake and weights. CMP with next labs. Discontinue spironolactone as above, as could be contributing to dehydration. Continue potassium supplement with labs this week.     (R44.3) Hallucinations  Comment: acute, per patient developed in past 2 weeks- they are not bothersome to her.  -first documentation of episodes of hallucinations from nursing is from 5/26.  -could be related to poor nutritional status, narcotics, metastatic cancer  Plan: Discussed with Dr. Bravo today. Will continue to monitor since they are not bothersome to patient and appear to be infrequent. Discontinue spironolactone today as above as well- which if due to malnutrition/ dehydration will help. Left message with oncology to update them regarding this as well. Nursing to update provider with any further episodes of hallucinations. If they persist or are occurring frequently will need to review medication list and consider  other offending agents, including possible alternate to dilaudid.    (C79.9) Metastatic carcinoma (H)  (G89.3) Cancer related pain  Comment: acute, patient with breast cancer. Continues to report back pain, which is likely due to metastasis found at L5- pain being managed with dilaudid/ tylenol-using dilaudid about 3-4 times per day total (including scheduled doses)  -completed course of 5 radiation treatments last week  -continues to decline- poor po intake, very weak, requiring max assist for cares.  Plan: Continue tylenol scheduled. Continue scheduled dilaudid at 8AM and 8PM and q4h PRN for pain management. Continue to monitor pain to ensure well controlled. Left message with Dr. An's nurse to update on patient's current condition. Follow up with oncology as scheduled for later this week. Also has neurology follow up scheduled to ensure nothing else contributing to her ability to walk.     (E83.52) Hypercalcemia of malignancy    Comment: acute, corrected calcium level 10.5 on 5/27, although most recent albumin from a month ago  -treated inpatient and calcium improved to within normal limits at hospital discharge.  - received Zometa on 5/15- follows with Dr. An  Plan: Continue to monitor weekly calcium, BMP on Wednesdays and fax to Dr. An's office at [phone] (info provided to nursing staff at [facility] Changed BMP due this Wednesday to CMP, to check albumin level as well. Continue to monitor patient's mental status.      (D64.9) Anemia  (D69.6) Thrombocytopenia  Comment: acute, no active bleeding. Suspect may be related to metastatic cancer  Hemoglobin   Date Value Ref Range Status   05/18/2020 8.3 (A) 12.0 - 16.0 g/dL Final   05/13/2020 8.9 (A) 12.0 - 16.0 g/dL Final     Platelet Count   Date Value Ref Range Status   05/18/2020 86 (A) 140 - 440 thou/uL Final     Plan: CBC 6/3. Monitor for s/sx of bleeding.     (R53.81) Physical deconditioning  Comment: completed therapy on 5/20- continues to require  deangelo lift for transfers, assist with all ADLs, is not ambulating and requires 24 hour care. Spouse unable to care for patient at home, so she will stay at TCU  Plan: Nursing to provide supportive cares at TCU. Social work to assist with d/c planning.    (R41.89) Cognitive impairment  Comment: acute, SLUMS 22/30 at TCU that indicates mild cognitive impairment.  -patient is poor medical historian though  -with acute metabolic encephalopathy during hospitalization that improved prior to hospital discharge.  -patient continues to be poor historian  Plan: Nursing to provide supportive cares at TCU.      (I25.10) Coronary artery disease involving native heart, angina presence unspecified, unspecified vessel or lesion type  Comment: chronic, asymptomatic  -history of 2 stents placed  -last ECHO from November 2019 showed: Left ventricular systolic function is normal.The visual ejection fraction is  estimated at 60-65%. The right ventricular systolic function is normal. Mild aortic valve sclerosis, trace aortic regurgitation.  Plan: Continue ASA, imdur, metoprolol, lisinopril. Follow up with cardiology 1 year from last ECHO.    Spoke to spouse, Gene today extensively (for >20 minutes) regarding Elizabeth's current health status, poor po intake, hallucinations and other medical problems as above.      Total time spent with patient visit at the skilled nursing facility was 45 minutes including patient visit, discussion of POC with spouse, and multiple phone calls to oncology clinic. Greater than 50% of total time spent with counseling and coordinating care due poor po intake, overall decline in health, hallucinations and other problems as above.     Electronically signed by:  ISMAEL Peña CNP       Video-Visit Details  Type of service:  Video Visit  Video End Time (time video stopped): 8:14AM  Distant Location (provider location):  [facility]

## 2020-06-02 NOTE — TELEPHONE ENCOUNTER
[facility] TELEPHONE ENCOUNTER    RFC: pale, progressive decline.     Elizabeth Li is a 84 year old  (1935),Nurse called today to report: that patient continued to decline, looks pale to RN (has not seen her in 2 weeks), less responsive.  was offered hospice today but feels it premature.     ASSESSMENT/PLAN      Pt with breast cancer and mets to bone, progressive decline, scheduled for MRI on June 4th to r/o brain mets, ongoing  hallucination. VSS, noted  this evening.   - will start decadron 4 mg po daily x 4 days with food, in case has increased ICP 2/2 possible mets. Monitor for psychosis  - noted pancytopenia, will repeat CBC in am  - will check CMP, TSH and FT4.  - full code, if condition deteriorate call back.     Electronically signed by:   Mary Yan MD

## 2020-06-03 NOTE — ED TRIAGE NOTES
Presents to ED via EMS for abnormal labs. Pt has history of malignant neoplasm and breast cancer who lives at [facility]. Pt has had increased confusion over the past few days and had labs drawn this morning. , K 5.3, Hg 7.1, hematocrit 22.2% and platelet count 31. Pt also c/o lower back pain. Pt scheduled for MRI tomorrow.

## 2020-06-03 NOTE — LETTER
Transition Communication Hand-off for Care Transitions to Next Level of Care Provider    Name: Elizabeth Li  : 1935  MRN #: [id number]     Key Recommendations:  CTS identifies patient as high risk due to elevated SHAQUILLE score. Currently admitted for metastatic breast cancer, this is her 2nd admission in 6 months. Per chart review, pt resides at home in a [facility] with her , Oswaldo. Baseline mobility is total care. She remains a total care. She has needed to use the Chaka lift at home for the past 3 weeks.      Her PMH that can effect her SHAQUILLE score includes CAD, depression, HTN, hyperlipidemia, hypothyroidism, IHD & KUSUM. Her PTA medications that can effect her SHAQUILLE score includes ASA, Decadron & Hydromorphone.     She is currently pleasantly confused and her decisional capacity is unreliable. She has severe malnutrition r/t a weight loss of 36 pounds over the last year. She has constipation issues r/t to her opiate use for cancer-related pain.     Kaley Live RN, BSN, CPHN, CM    AVS/Discharge Summary is the source of truth; this is a helpful guide for improved communication of patient story

## 2020-06-03 NOTE — ED NOTES
Bed: ED14  Expected date:   Expected time:   Means of arrival:   Comments:  A594- [facility] labs

## 2020-06-03 NOTE — LETTER
Transition Communication Hand-off for Care Transitions to Next Level of Care Provider    Name: Elizabeth Li  : 1935  MRN #: [id number]  Primary Care Provider: Rebekah Rausch     Primary Clinic: [address]  [facility] [address]     Reason for Hospitalization:  Hyperkalemia [E87.5]  Hyponatremia [E87.1]  Thrombocytopenia (H) [D69.6]  Generalized muscle weakness [M62.81]  Metastatic carcinoma (H) [C79.9]  Anemia, unspecified type [D64.9]  Admit Date/Time: 6/3/2020  5:56 PM  Discharge Date: 2020  Payor Source: Payor: BCBS / Plan: BCBS MEDICARE ADVANTAGE / Product Type: Medicare /     Readmission Assessment Measure (SHAQUILLE) Risk Score/category: Elevated        Reason for Communication Hand-off Referral: Other Metastatic breast cancer    Discharge Plan: Return to TCU     Concern for non-adherence with plan of care: No  Discharge Needs Assessment:  Needs      Most Recent Value   Skilled Nursing Facility  [facility] ([place]) [phone], Fax: [phone]   PAS Number  -- [Not required as pt returning to skilled facility]        Follow-up specialty is recommended: Yes. Follow up with [facility] as scheduled. Also, follow up with Physical & Occupation therapy while at TCU.     Follow-up plan:    Future Appointments   Date Time Provider Department Center   2020 10:05 AM CSXR1 [facility]    2020 10:30 AM Annabelle Leon PA-C [facility]     Any outstanding tests or procedures:  No. Recommend a repeat hemoglobin at TCU.       Referrals     Future Labs/Procedures    Occupational Therapy Adult Consult     Comments:    Evaluate and treat as clinically indicated.    Reason: severe decondition    Physical Therapy Adult Consult     Comments:    Evaluate and treat as clinically indicated.    Reason:severe decondition          Supplies     Future Labs/Procedures    AntiEmbolism Stockings     Comments:    Bilateral below knee length.On in the  morning, off at night    STRAIGHT TIP URINE CATHETER         Key Recommendations:  CTS identifies patient as high risk due to elevated SHAQUILLE score. Currently admitted for metastatic breast cancer, this is her 2nd admission in 6 months. Per chart review, pt resides at home in a [facility] with her , Oswaldo. Baseline mobility is total care. She remains a total care. She has needed to use the Chaka lift at home for the past 3 weeks.      Her PMH that can effect her SHAQUILLE score includes CAD, depression, HTN, hyperlipidemia, hypothyroidism, IHD & KUSUM. Her PTA medications that can effect her SHAQUILLE score includes ASA, Decadron & Hydromorphone.     She is currently pleasantly confused and her decisional capacity is unreliable. She has severe malnutrition r/t a weight loss of 36 pounds over the last year. She has constipation issues r/t to her opiate use for cancer-related pain.     Kaley Live RN, BSN, CPHN, CM    AVS/Discharge Summary is the source of truth; this is a helpful guide for improved communication of patient story

## 2020-06-03 NOTE — TELEPHONE ENCOUNTER
Patient with cancer diagnosis, pancytopenia. Today Plt down to 31.   Lab Results   Component Value Date    PLT 86 05/18/2020    PLT 70 05/13/2020      VSS, no s/s bleeding or bruising    PLAN  Monitor VS, status, bleeding/bruising and update if changes noted  Staff to send today's labs to Dr An, oncology  Repeat CBC with diff on 6/5    Electronically signed by ISMAEL Emerson GNP     Addendum: received more labs today, several significant abnormal results with Na today 127 and K 5.3 in addition to Plt as above. Patient weak, not feeling well and code is Full Code.     Asked staff to notify family of abnormal labs: if so desired, OK for ED eval vs monitor at LTC.     Electronically signed by ISMAEL Emerson GNP

## 2020-06-03 NOTE — ED PROVIDER NOTES
"  History     Chief Complaint:  Abnormal Labs and Generalized Weakness    The history is provided by the EMS personnel. The history is limited by the condition of the patient.      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 Staff at the living facility report that she has had generalized declining functional and health status due to metastatic carcinoma felt to be from primary breast source.  Presently the patient reports that she \"feels terrible\" but is not really able to specify further.  She denies any focal chest or abdominal pain.  Per review of the notes from the nursing home there is no noted fever and they recently reported vital signs have been normal.  Patient does endorse diffuse body aches and back pain associated with her diffuse bony metastasis.  She is not able to provide much further useful history with the exception that she feels generally weak and tired.      Allergies:  Flu Virus Vaccine  Gluten    Medications:    Amlodipine  Aspirin  Dulcolax  Hydralazine  Dilaudid  Levothyroxine  Imdur  Lisinopril  Cytomel  Prilosec  Miralax  Senna-docusate  Simethicone    Past Medical History:    Coronary artery disease  Depression  Hypertension  Hyperlipidemia  Hypothyroidism  Ischemic heart disease  Obstructive sleep apnea    Past Surgical History:    Back surgery  Colonoscopy  Coronary angiogram w stents x2  Optical tracking system fusion posterior spine (L3-L4)  Tonsillectomy and adenoidectomy   Tubal ligation  [place] teeth extraction    Family History:    Pernious anemia  Heart disease    Social History:  The patient presents to the ED alone.  Smoking Status: Former smoker  Smokeless Tobacco: Never Used  Alcohol Use: Yes  Drug Use: No  PCP: Rebekah Rausch     Review of Systems   Unable to perform ROS: Mental status change       Physical " Exam     Patient Vitals for the past 24 hrs:   BP Temp Temp src Pulse Heart Rate Resp SpO2   06/03/20 2215 123/56 -- -- 77 78 -- 99 %   06/03/20 2200 137/52 97.6  F (36.4  C) Oral 75 76 -- 100 %   06/03/20 2145 119/73 -- -- 75 76 -- 100 %   06/03/20 2130 119/59 -- -- 77 80 -- 95 %   06/03/20 2100 -- -- -- 87 88 -- --   06/03/20 2045 117/50 -- -- 70 70 -- 96 %   06/03/20 2035 122/85 97.6  F (36.4  C) Oral -- 69 -- 93 %   06/03/20 2030 122/85 -- -- 75 74 -- 92 %   06/03/20 2021 129/56 98.3  F (36.8  C) -- 74 -- 16 --   06/03/20 2015 129/56 -- -- 75 75 -- 91 %   06/03/20 1930 135/50 -- -- 74 -- -- --   06/03/20 1915 117/63 -- -- 75 -- -- --   06/03/20 1900 120/70 -- -- 72 -- -- --   06/03/20 1845 123/58 -- -- 75 -- -- --   06/03/20 1830 116/79 -- -- 73 -- -- 94 %   06/03/20 1815 115/44 -- -- 68 -- -- 94 %   06/03/20 1800 125/48 97.4  F (36.3  C) Oral 79 79 18 93 %       Physical Exam  General: Appears chronically ill, unwell. Nontoxic  Head:  Scalp, face, and head appear normal  Eyes:  Pupils are equal, round, and reactive to light    Conjunctivae non-injected and sclerae white  ENT:    The external nose is normal    Pinnae are normal    The oropharynx is normal, mucous membranes dry    Uvula is in the midline  Neck:  Normal range of motion    There is no rigidity noted    Trachea is in the midline  CV:  Regular rate and rhythm     Normal S1/S2, no S3/S4    No murmur or rub. Radial pulses 2+ bilaterally   Resp:  Lungs are clear and equal bilaterally    There is no tachypnea    No increased work of breathing    No rales, wheezing, or rhonchi  GI:  Abdomen is soft, no rigidity or guarding    No distension, or mass    No tenderness or rebound tenderness  Rectal Exam: No external anal lesions. Rectal tone normal. No bright red blood or melena. No internal masses or lesions palpated.   MS:  Normal muscular tone    Symmetric motor strength    No lower extremity edema  Skin:  Generalized pallor. Stage II sacral decubitus  ulcer. No rash or acute skin lesions noted  Neuro: Awake and alert, oriented to person and place. No facial droop. Strength diffusely decreased but symmetric in all extremities. She responds to tactile stimuli in all extremities.     Speech is fluent    Moves all extremities spontaneously  Psych:  Normal affect.  Appropriate interactions.      Emergency Department Course     Imaging:  Radiology findings were communicated with the admitting MD who voiced understanding of the findings.      CT Head without contrast:   1.  No CT evidence for acute intracranial process.  2.  Brain atrophy and presumed chronic microvascular ischemic changes as above.  3.  Several lucent lesions involving the calvarium are similar to 04/20/2020 examination and new from remote 2013 examination. Findings may reflect metastatic lesions associated with the patient's reported history of breast cancer.  As per radiology.     XR Chest Port 1 View:  Lungs appear clear. Normal heart size. No pleural effusion or pneumothorax. There is a healing left fifth rib fracture laterally new compared to prior exam.  As per radiology.     Laboratory:  Laboratory findings were communicated with the admitting MD who voiced understanding of the findings.    CBC: WBC: 6.8, HGB: 6.9 (Critically high), PLT: 36 (Critically low)    BMP: Sodium 126 (low), Potassium 5.9 (high), CO2 13 (low) o/w WNL (Creatinine: 0.78)  BMP: Glucose 128 (high), Sodium 129 (low), CO2 15 (low) o/w WNL (Creatinine: 0.75)    ABO/Rh Type and Screen: O Pos     Magnesium: 2.1    Hepatic panel: Albumin 2.8 (low), Protein Total 6.7 (low),  (high)    2017 Glucose by meter: 80 (WNL)  2133 Glucose by meter: 166 (high)    Stool Occult Blood: Negative    Interventions:  2021 Blood transfusion IV  2031 NS 1L IV  2041 Dextrose 50% 25g IV  2045 Insulin (regular) 6 units IV  2048 Dextrose 10% 75mL/hr IV    Emergency Department Course:  Past medical records, nursing notes, and vitals  reviewed.    1610 I performed an exam of the patient as documented above.     IV was inserted and blood was drawn for laboratory testing, results above.    The patient was sent for a CT while in the emergency department, results above.     Portable chest x-ray was used at the patient's bedside, see results above.      2200 I spoke with radiology concerning the patient's CT results.     2205 I consulted with Dr. Britton, hospitalist, regarding the patient's history and presentation here in the emergency department who accepted the patient for admission.    Findings and plan explained to the patient  who consents to admission. Discussed the patient with Dr. Britton, who will admit the patient to a medical bed for further monitoring, evaluation, and treatment.    I personally reviewed the laboratory and imaging results with the patient and answered all related questions prior to admission.     Impression & Plan     Medical Decision Making:  Elizabeth Li is a 84 year old female with progressive metastatic carcinoma currently undergoing radiation therapy for bony metastasis presents from nursing home with severe anemia, thrombocytopenia and hyperkalemia found on lab studies today.  She has had a generalizing decline in her functional health status.  No known recent fever, fall or trauma.  On my evaluation she appears chronically ill and unwell but hemodynamically stable and afebrile.  Outpatient labs were reviewed and repeat studies were performed in the ED.  Work-up in the emergency department revealed hyponatremia, hyperkalemia, metabolic acidosis, normocytic anemia with hemoglobin of 6.9, thrombocytopenia with platelets of 36.  No leukopenia.  Digital rectal exam did not reveal any melena or bright red blood to suggest GI blood loss.  Anemia and thrombocytopenia are likely secondary due to underlying malignancy and chronic disease.  At this point patient wishes to still pursue full cares and remains full code.   She consented to blood transfusion 1 unit of PRBCs was transfused in the ED.  Given concern for decline in mental status and the possibility for brain metastasis or intracranial hemorrhage given thrombocytopenia CT scan of the head was obtained and reveals evidence of bony metastasis to the skull but no evidence of intracranial hemorrhage or obvious brain metastasis or edema.  Chest x-ray does not reveal any acute pulmonary process or pneumonia.  Subacute appearing left rib fracture is noted.    Given severe anemia, thrombocytopenia, multiple metabolic disturbances patient will require admission to the hospital for ongoing monitoring evaluation and treatment.  The case was discussed with the hospitalist who accepted the patient for admission.  The patient was admitted in stable condition.    Diagnosis:    ICD-10-CM    1. Metastatic carcinoma (H)  C79.9 Basic metabolic panel   2. Anemia, unspecified type  D64.9    3. Thrombocytopenia (H)  D69.6    4. Hyperkalemia  E87.5    5. Generalized muscle weakness  M62.81    6. Hyponatremia  E87.1        Disposition:  Admitted to Dr. Britton.    Scribe Disclosure:  I, Tye Mccullough, am serving as a scribe at 6:20 PM on 6/3/2020 to document services personally performed by Barry Bland MD based on my observations and the provider's statements to me.      Barry Bland MD  06/04/20 1048

## 2020-06-04 NOTE — H&P
[facility]  Hospitalist Admission Note  Name: Elizabeth Li    MRN: [id number]  YOB: 1935    Age: 84 year old  Date of admission: 6/3/2020  Primary care provider: Rebekah Rausch            Assessment and Plan:   Elizabeth Li is a 84 year old female with recently diagnosed metastatic breast cancer, previously hospitalized last April of this year due to severe hypercalcemia secondary to malignancy, hypertension, recently discharged to TCU setting for her physical deconditioning, seen 2 days ago by her clinic provider at the TCU setting due to concerns that she is no longer working with therapy but still staying at the TCU as her spouse was not able to take care of her at home, staff was concerned that she has poor oral intake, approximately lost 20 pounds during her admission to TCU, increasing intermittent back pain, intermittent mental status changes, and has been having periodic hallucinations.  Routine labs were drawn and resulted showed increasing acidosis, electrolyte derangements and was subsequently referred to the hospital for further evaluation.     1.  Metabolic encephalopathy  2.  Hyperkalemia  3.  Metabolic acidosis  4.  Dehydration  5.  Hypovolemic hyponatremia  6.  Severe physical deconditioning  7.  At risk for severe protein calorie malnutrition  8.  Metastatic breast carcinoma with deteriorating functional status  9.  Hypertension  10.  Dyslipidemia  11.  CAD  12.  Hypothyroidism  13.  Worsening anemia, requiring packed RBC transfusion, worsening thrombocytopenia with no bleeding tendencies    Admit as inpatient.  Elizabeth is high risk for clinical deterioration  Per current deteriorating functional status likely brought about by her worsening metastatic breast cancer.  Complicated by decreased oral intake, dehydration now with numerous electrolyte derangements  Correct electrolytes, IV fluid support.  Repeat serum potassium.  Cardiac telemetry monitoring for  now.  Optimize from physical therapy, OT and nutrition/dietitian services  Will benefit from ancillary services input this well such as spiritual health, social service and palliative care.  I think Elizabeth, her family as well needs to revisit her goals of care  Formal oncology evaluation requested this well for assistance regarding her worsening clinical condition with her established metastatic breast cancer  Packed RBC transfusion as ordered in the emergency room.  Hold for further transfusion after that.  Recheck levels.  No bleeding tendencies seen, stool occult negative  Try to be as conservative as possible for transfusion given her poor prognosis.  Hold her oral antihypertensives for now.    She may have regular diet  No chemo DVT prophylaxis given worsening thrombocytopenia.  Mechanical prophylaxis    Code status: Not the best historian for now, will default to her most recent CODE STATUS of full code   Disposition: To be determined          Chief Complaint:   Mental status changes, abnormal labs and generalized weakness       Source of Information:   Patient with poor reliability  Discussion with ED physician  Review of E chart records         History of Present Illness:   Elizabeth Li is a 84 year old female with recently diagnosed metastatic breast cancer, previously hospitalized last April of this year due to severe hypercalcemia secondary to malignancy, hypertension, recently discharged to TCU setting for her physical deconditioning, seen 2 days ago by her clinic provider at the TCU setting due to concerns that she is no longer working with therapy but still staying at the TCU as her spouse was not able to take care of her at home, staff was concerned that she has poor oral intake, approximately lost 20 pounds during her admission to TCU, increasing intermittent back pain, intermittent mental status changes, and has been having periodic hallucinations.  Routine labs were drawn and resulted showed  increasing acidosis, electrolyte derangements and was subsequently referred to the hospital for further evaluation.  There was no report of any fevers, vomiting, diarrhea nor bleeding tendencies.  Upon initial presentation she was found to be dehydrated, dry, numerous electrolyte derangements such as hyponatremia, metabolic acidosis, hyperkalemia, elevated AST, worsening anemia, thrombocytopenia.  She is being prepared to receive 1 unit of packed RBC, received treatment for her hyperkalemia and her case was subsequently referred to us for further management and care and states hospitalization.            Past Medical History:     Past Medical History:   Diagnosis Date     CAD (coronary artery disease)     Severe 2 vessel CAD. PCI with drug-eluting stents x2 to mid LAD on 3/27/15     Depression      HTN (hypertension)      Hyperlipidemia      Hypothyroidism      IHD (ischemic heart disease)      KUSUM (obstructive sleep apnea)     CPAP             Past Surgical History:     Past Surgical History:   Procedure Laterality Date     BACK SURGERY       COLONOSCOPY N/A 2017    Procedure: COLONOSCOPY;  Surgeon: Nghia Moss MD;  Location: [facility]     Coronary angiogram with stents x2  2015    2.5 X 18 mm & 3.0 X mm ULCILA to LAD     OPTICAL TRACKING SYSTEM FUSION POSTERIOR SPINE LUMBAR N/A 2019    Procedure: L3-L4 TRANSFORAMINAL LUMBAR  INTRABODY FUSION WITH ALLOGRAFT, OPTICAL TRACKING SYSTEM AND MEDTRONIC SOLARA INSTRUMENTATION;  Surgeon: Cole Jansen MD;  Location: [facility] OR     TONSILLECTOMY, ADENOIDECTOMY, COMBINED       TUBAL LIGATION       [place] teeth extraction               Social History:     Social History     Tobacco Use     Smoking status: Former Smoker     Last attempt to quit: 1984     Years since quittin.4     Smokeless tobacco: Never Used   Substance Use Topics     Alcohol use: Yes     Alcohol/week: 0.0 standard drinks     Comment: social drinker             Family History:   Family  history was fully reviewed and non-contributory in this case.         Allergies:     Allergies   Allergen Reactions     Flu Virus Vaccine Other (See Comments)     Viral SX     Gluten Meal              Medications:     Prior to Admission medications    Medication Sig Last Dose Taking? Auth Provider   acetaminophen (TYLENOL) 500 MG tablet Take 2 tablets (1,000 mg) by mouth 3 times daily 6/3/2020 at x2 Yes Javon Venegas MD   amLODIPine (NORVASC) 5 MG tablet Take 1.5 tablets (7.5 mg) by mouth At Bedtime   Wanda Nguyen APRN CNP   artificial saliva (BIOTENE DRY MOUTHWASH) LIQD liquid Swish and spit 15 mLs in mouth 4 times daily as needed for dry mouth   Javon Venegas MD   artificial saliva (BIOTENE MT) SOLN solution Swish and spit 2 mLs (2 sprays) in mouth every hour as needed for dry mouth   Javon Venegas MD   ascorbic acid (VITAMIN C) 500 MG tablet Take 500 mg by mouth daily (with lunch) Pt takes in afternoon   Reported, Patient   aspirin (ASA) 81 MG tablet Take 1 tablet (81 mg) by mouth daily   Alfredo Worthy, LILY   bisacodyl (DULCOLAX) 10 MG suppository Place 1 suppository (10 mg) rectally daily as needed for constipation   Javon Venegas MD   hydrALAZINE (APRESOLINE) 10 MG tablet Take 1 tablet (10 mg) by mouth every 8 hours as needed (SBP>160)   Wanda Nguyen APRN CNP   HYDROmorphone (DILAUDID) 2 MG tablet Take 1 tablet (2 mg) by mouth 2 times daily. May also take 1 tablet (2 mg) every 4 hours as needed for moderate to severe pain. Do not give PRN dose within 4 hrs of scheduled dose   Wanda Nguyen APRN CNP   isosorbide mononitrate (IMDUR) 30 MG 24 hr tablet TAKE ONE TABLET BY MOUTH EVERY DAY   Rebekah Rausch MD   levothyroxine (SYNTHROID/LEVOTHROID) 75 MCG tablet TAKE ONE TABLET BY MOUTH EVERY MORNING (BEFORE BREAKFAST)   Rebekah Rausch MD   liothyronine (CYTOMEL) 5 MCG tablet Take 1 tablet (5 mcg) by mouth daily   Shalom  Rebekah Mcintyre MD   lisinopril (ZESTRIL) 20 MG tablet Take 40 mg by mouth daily   Reported, Patient   metoprolol tartrate (LOPRESSOR) 100 MG tablet Take 1 tablet (100 mg) by mouth 2 times daily   Javon Venegas MD   omeprazole (PRILOSEC) 20 MG DR capsule Take 1 capsule (20 mg) by mouth daily   Wanda Nguyen Maryam, ISMAEL CNP   polyethylene glycol (MIRALAX/GLYCOLAX) packet Take 1 packet by mouth nightly as needed for constipation    Reported, Patient   potassium chloride ER (K-TAB) 20 MEQ CR tablet Take 1 tablet (20 mEq) by mouth daily   Gladis Bravo,    senna-docusate (SENOKOT-S/PERICOLACE) 8.6-50 MG tablet Take 1 tablet by mouth 2 times daily as needed for constipation   Dyana Sandoval APRN CNP   sennosides (SENOKOT) 8.6 MG tablet Take 1 tablet by mouth At Bedtime   Reported, Patient   simethicone (MYLICON) 125 MG chewable tablet Take 125 mg by mouth 4 times daily as needed for intestinal gas   Reported, Patient             Review of Systems:   A Comprehensive greater than 10 system review of systems was carried out.  Pertinent positives and negatives are noted above.  Otherwise negative for contributory information.           Physical Exam:   Blood pressure 123/56, pulse 77, temperature 97.6  F (36.4  C), temperature source Oral, resp. rate 16, SpO2 99 %, not currently breastfeeding.  Wt Readings from Last 1 Encounters:   06/01/20 65.1 kg (143 lb 9.6 oz)     Exam:  GENERAL: No apparent distress. Awake, alert, and oriented to her name.  Not in respiratory distress  Weak appearing.  HEENT: Cachectic , normocephalic, atraumatic. Extraocular movements intact.  CARDIOVASCULAR: Regular rate and rhythm without murmurs or rubs. No JVD  PULMONARY: Fair air entry  ABDOMINAL: Soft, non-tender, non-distended. Bowel sounds normoactive. No hepatosplenomegaly.  EXTREMITIES: No cyanosis or clubbing. No edema.  NEUROLOGICAL: CN 2-12 grossly intact, awake and alert x1  spontaneous and coherent speech. no  focal neurological deficits.  DERMATOLOGICAL: No rash, ulcer, ecchymoses, jaundice.  Psych: Depressed mood, not agitated           Data:   EKG: Not seen in epic, reportedly no ischemic findings, no peak T waves    Imaging:  Results for orders placed or performed during the hospital encounter of 06/03/20   XR Chest Port 1 View    Narrative    EXAM: XR CHEST PORT 1 VW  LOCATION: [facility]  DATE/TIME: 6/3/2020 8:53 PM    INDICATION: Generalized weakness history of metastatic cancer  COMPARISON: 04/18/2020      Impression    IMPRESSION: Lungs appear clear. Normal heart size. No pleural effusion or pneumothorax. There is a healing left fifth rib fracture laterally new compared to prior exam.   CT Head w/o Contrast    Narrative    EXAM: CT HEAD W/O CONTRAST  LOCATION: [facility]  DATE/TIME: 6/3/2020 9:16 PM    INDICATION: Altered level of consciousness (LOC), confusion  COMPARISON: CT head without contrast 04/20/2020.  TECHNIQUE: Routine without IV contrast. Multiplanar reformats. Dose reduction techniques were used.    FINDINGS:  INTRACRANIAL CONTENTS: No intracranial hemorrhage, extraaxial collection, or mass effect.  No CT evidence of acute infarct. Mild presumed chronic small vessel ischemic changes. Mild generalized volume loss. No hydrocephalus.     VISUALIZED ORBITS/SINUSES/MASTOIDS: No intraorbital abnormality. No paranasal sinus mucosal disease. No middle ear or mastoid effusion.    BONES/SOFT TISSUES: There are several scattered lucent lesions within the calvarium, similar to recent CT head examination and new in comparison to more remote 03/01/2013 CT examination.      Impression    IMPRESSION:  1.  No CT evidence for acute intracranial process.  2.  Brain atrophy and presumed chronic microvascular ischemic changes as above.  3.  Several lucent lesions involving the calvarium are similar to 04/20/2020 examination and new from remote 2013 examination. Findings may reflect metastatic  lesions associated with the patient's reported history of breast cancer.    Results discussed with Dr. Bland on 6/3/2020 10:02 PM.       Labs:  No results for input(s): CULT in the last 168 hours.  Recent Labs   Lab 06/03/20  1804   WBC 6.8   HGB 6.9*   HCT 21.4*   MCV 96   PLT 36*     Recent Labs   Lab 06/03/20 2157 06/03/20  1804   * 126*   POTASSIUM 4.6 5.9*   CHLORIDE 104 102   CO2 15* 13*   ANIONGAP 10 11   * 91   BUN 26 27   CR 0.75 0.78   GFRESTIMATED 74 70   GFRESTBLACK 86 81   FRANKLIN 8.8 9.3   MAG  --  2.1   PROTTOTAL  --  6.7*   ALBUMIN  --  2.8*   BILITOTAL  --  0.5   ALKPHOS  --  138   AST  --  139*   ALT  --  16     No results for input(s): SED, CRP in the last 168 hours.  Recent Labs   Lab 06/03/20 2157 06/03/20 2133 06/03/20 2103 06/03/20 2017 06/03/20  1804   *  --   --   --  91   BGM  --  166* 163* 80  --      No results for input(s): INR in the last 168 hours.  No results for input(s): LIPASE in the last 168 hours.  No results for input(s): TROPONIN, TROPI, TROPR in the last 168 hours.    Invalid input(s): TROP, TROPONINIES  No results for input(s): COLOR, APPEARANCE, URINEGLC, URINEBILI, URINEKETONE, SG, UBLD, URINEPH, PROTEIN, UROBILINOGEN, NITRITE, LEUKEST, RBCU, WBCU in the last 168 hours.

## 2020-06-04 NOTE — PROGRESS NOTES
Spoke with  Ad ([phone]) and updated him that patient will have MRI here today sometime around 1600 and then be transferred to 5th floor after that.  Gene will call back around 1700 to find out which room she is going in to.  This RN and Ad completed MRI checklist and it was faxed to MRI.

## 2020-06-04 NOTE — CONSULTS
"CLINICAL NUTRITION SERVICES  -  ASSESSMENT NOTE      Recommendations Ordered by Registered Dietitian (RD):   Already with gluten allergy interface, self-selection of low lactose preferences.  Protein focus as able.     Add Boost Breeze supplement offerings (variety of flavors) with meals TID.      Add room service with assist for ordering guidance when mentation allows.     Add daily MVI/M.    Add-on phosphorus given currently requiring D10 at 75 ml/hr with refeeding risk.     Future/Additional Recommendations:   Goals of care, palliative consulted.      MALNUTRITION:  % Weight Loss:  > 7.5% in 3 months (severe malnutrition) --> diuresis component though also true wt loss, poor oral intake and malignancy contribution?  % Intake:  </= 50% for >/= 5 days (severe malnutrition)  Subcutaneous Fat Loss:  Unable to complete physical exam  Muscle Loss: Unable to complete physical exam  Fluid Retention: None documented    Malnutrition Diagnosis: Severe malnutrition  In Context of:  Acute illness or injury  Chronic illness or disease          REASON FOR ASSESSMENT  Elizabeth Li is a 84 year old female seen by Registered Dietitian for Admission Nutrition Risk Screen for unintentional loss of 10# or more in the past two months and reduced oral intake over the last month and Provider Order - \"metastatic breast cancer, decreased oral intake, malnutrition\".      PMH of: Recently diagnosed metastatic breast CA, HTN.     Admit 2/2: From TCU with concerns for inability to participate in therapies, intermittent mental status changes and back pain, poor oral intake --> metabolic encephalopathy, hyperkalemia, metabolic acidosis, dehydration, hyponatremia    NUTRITION HISTORY  - Information obtained from chart.  Multiple attempts to reach patient via phone today without success (per direction of department guidelines in the setting of COVID19).    - Known to nutrition services and this writer from 4/2020 admit.  Does follow a gluten " "free diet at home and also avoids dairy products to some degree (lactose intolerance).  PO intakes during last admit were limited in the setting of dysphagia and mentation changes.  - [facility] Patient's baseline diet is quite restrictive (described as \"picky\") and avoided almost all dairy products while admitted.  Did receive butter and almond milk.  Always requested gluten free options.  Oral intakes were \"ok\" during beginning of admission but soon declined and remained inadequate for some period of time.  She was averaging 25% of meals for a period of weeks.  She initially denied supplements though was agreeable to a juice-based option, only after what sounds like a care conference, which she did not consistently consume.   involved and very supportive, would often bring food for patient to encourage eating.  Nurse Practitioner at facility had begun discussions relating to nutrition support interventions, given ongoing poor PO intakes in combination with wt loss.    - Allergies: Gluten and lactose intolerances.       CURRENT NUTRITION ORDERS  Diet Order:     Regular    Current Intake/Tolerance:  No PO intakes recorded since admit, limited timeframe.        NUTRITION FOCUSED PHYSICAL ASSESSMENT FOR DIAGNOSING MALNUTRITION)  No:  per direction of department guidelines in the setting of COVID19            Observed:    deferred d/t above    Obtained from Chart/Interdisciplinary Team:  - No documentation of skin issues   - Stooling patterns reviewed, no recorded BM since admit    ANTHROPOMETRICS  Height: 5' 1.5\"  Weight: 136 lbs 6.4 oz  Body mass index is 25.36 kg/m .  Weight Status:  Overweight BMI 25-29.9  Weight History:  Wt Readings from Last 10 Encounters:   06/04/20 61.9 kg (136 lb 6.4 oz)   06/01/20 65.1 kg (143 lb 9.6 oz)   05/26/20 67.4 kg (148 lb 9.6 oz)   05/21/20 74 kg (163 lb 3.2 oz)   05/18/20 74 kg (163 lb 3.2 oz)   05/14/20 74 kg (163 lb 3.2 oz)   05/11/20 74 kg " (163 lb 3.2 oz)   05/08/20 74 kg (163 lb 3.2 oz)   05/04/20 79 kg (174 lb 3.2 oz)   04/30/20 79 kg (174 lb 3.2 oz)   - Concern for true wt loss, possible degree of diuresis component.  - ?18% wt loss in the past month.  Likely admit wt with dehydration component, therefore used 143#.    - With wts provided by TCU (174# on 4/29 and 155# on 5/27), would still equate to an 11% wt loss in <2 months.      LABS  Labs reviewed:  Electrolytes  Potassium (mmol/L)   Date Value   06/04/2020 4.2   06/04/2020 4.5   06/03/2020 4.6    Blood Glucose  Glucose (mg/dL)   Date Value   06/04/2020 143 (H)   06/03/2020 128 (H)   06/03/2020 91   05/27/2020 92   05/18/2020 96    Inflammatory Markers  WBC   Date Value   06/04/2020 5.2 10e9/L   06/03/2020 6.8 10e9/L   05/18/2020 7.3 thou/uL     Albumin (g/dL)   Date Value   06/03/2020 2.8 (L)   04/28/2020 2.7 (L)   04/27/2020 2.7 (L)      Magnesium (mg/dL)   Date Value   06/03/2020 2.1   04/24/2020 2.2   04/23/2020 2.5 (H)     Sodium (mmol/L)   Date Value   06/04/2020 130 (L)   06/03/2020 129 (L)   06/03/2020 126 (L)    Renal  Urea Nitrogen (mg/dL)   Date Value   06/04/2020 21   06/03/2020 26   06/03/2020 27     Creatinine (mg/dL)   Date Value   06/04/2020 0.64   06/03/2020 0.75   06/03/2020 0.78     Additional  Triglycerides (mg/dL)   Date Value   01/04/2019 88   03/07/2018 144   05/16/2017 127     Ketones Urine (mg/dL)   Date Value   04/17/2020 Negative        B/P: 130/52, T: 98.9, P: 72, R: 16      MEDICATIONS  Medications reviewed:    sodium chloride (PF)  3 mL Intracatheter Q8H        dextrose 10% 75 mL/hr at 06/04/20 0124     sodium chloride 100 mL/hr at 06/04/20 0113          ASSESSED NUTRITION NEEDS PER APPROVED PRACTICE GUIDELINES:    Dosing Weight 65 kg   Estimated Energy Needs: 9399-6283+ kcals (25-30 Kcal/Kg)  Justification: minimum maintenance, repletion and malignancy component  Estimated Protein Needs: 78-98+ grams protein (1.2-1.5+ g pro/Kg)  Justification: minimum for  preservation of lean body mass, suspect repletion needs  Estimated Fluid Needs: per MD      NUTRITION DIAGNOSIS:  Malnutrition related to decreased oral intake likely in the setting of metastatic malignancy leading to ongoing poor oral intakes and wt loss as evidenced by 11% wt loss in <2 months, meeting <50% needs orally for period of weeks.     NUTRITION INTERVENTIONS  Recommendations / Nutrition Prescription  Change to low lactose diet, already with gluten allergy interface.  Protein focus as able.     Add Boost Breeze supplement offerings (variety of flavors) with meals TID.      Add room service with assist for ordering guidance when mentation allows.     Add daily MVI/M.    Add-on phosphorus given currently requiring D10 at 75 ml/hr with refeeding risk.    Goals of care discussions needed, agree with palliative consult.       Implementation  Nutrition education: Not appropriate at this time as could not connect with patient.    Medical Food Supplement: As above.     Multivitamin/Mineral: As above.     Nutrition Goals  Goals of care decisions w/in 24-48 hrs.       MONITORING AND EVALUATION:  Progress towards goals will be monitored and evaluated per protocol and Practice Guidelines          Janie Valdes RDN, LD  Clinical Dietitian  3rd floor/ICU: [phone]  All other floors: [phone]  Weekend/holiday: [phone]

## 2020-06-04 NOTE — PLAN OF CARE
PT: Attempted evaluation, patient transferring to receive MRI then transferring to 5th. Will reschedule evaluation and continue to follow.

## 2020-06-04 NOTE — PHARMACY-ADMISSION MEDICATION HISTORY
Admission medication history interview status for this patient is complete. See EPIC admission navigator for allergy information, prior to admission medications and immunization status.     Medication history interview done via telephone during Covid-19 pandemic, indicate source(s): [facility]  Medication history resources (including written lists, pill bottles, clinic record):Phone call with Gavin    Changes made to PTA medication list:  Added: spironolactone, dexamethasone   Deleted: artificial saliva QID prn  Changed: amlodipine from 7.5 mg to 5 mg    Actions taken by pharmacist (provider contacted, etc):None     Additional medication history information: Pt was prescribed lorazepam for a one time dose prior to MRI that was scheduled for tomorrow, 6/4/2020    Medication reconciliation/reorder completed by provider prior to medication history?  Y   (Y/N)     For patients on insulin therapy: N  (Y/N)      Prior to Admission medications    Medication Sig Last Dose Taking? Auth Provider   acetaminophen (TYLENOL) 500 MG tablet Take 2 tablets (1,000 mg) by mouth 3 times daily 6/3/2020 at x2 Yes Javon Venegas MD   amLODIPine (NORVASC) 5 MG tablet Take 5 mg by mouth At Bedtime 6/2/2020 at pm Yes Unknown, Entered By History   artificial saliva (BIOTENE MT) SOLN solution Swish and spit 2 mLs (2 sprays) in mouth every hour as needed for dry mouth  at prn Yes Javon Venegas MD   ascorbic acid (VITAMIN C) 500 MG tablet Take 500 mg by mouth daily (with lunch) Pt takes in afternoon 6/3/2020 at noon Yes Reported, Patient   aspirin (ASA) 81 MG tablet Take 1 tablet (81 mg) by mouth daily 6/3/2020 at am Yes Alfredo Worthy PA-C   bisacodyl (DULCOLAX) 10 MG suppository Place 1 suppository (10 mg) rectally daily as needed for constipation  at prn Yes Javon Venegas MD   dexamethasone (DECADRON) 4 MG tablet Take 4 mg by mouth daily (with breakfast) 6/3/2020 at am  Yes Unknown, Entered By History   hydrALAZINE (APRESOLINE) 10 MG tablet Take 1 tablet (10 mg) by mouth every 8 hours as needed (SBP>160)  at prn Yes Wanda Nguyen APRN CNP   HYDROmorphone (DILAUDID) 2 MG tablet Take 2 mg by mouth 2 times daily 6/3/2020 at am Yes Unknown, Entered By History   HYDROmorphone (DILAUDID) 2 MG tablet Take 2 mg by mouth every 4 hours as needed for moderate to severe pain Do not give PRN doses within 4 hours of the scheduled dose   at prn Yes Unknown, Entered By History   isosorbide mononitrate (IMDUR) 30 MG 24 hr tablet TAKE ONE TABLET BY MOUTH EVERY DAY 6/3/2020 at am Yes Rebekah Rausch MD   levothyroxine (SYNTHROID/LEVOTHROID) 75 MCG tablet TAKE ONE TABLET BY MOUTH EVERY MORNING (BEFORE BREAKFAST) 6/3/2020 at am Yes Rebekah Rausch MD   liothyronine (CYTOMEL) 5 MCG tablet Take 1 tablet (5 mcg) by mouth daily 6/3/2020 at am Yes Rebekah Rausch MD   lisinopril (ZESTRIL) 20 MG tablet Take 40 mg by mouth daily 6/3/2020 at am Yes Reported, Patient   metoprolol tartrate (LOPRESSOR) 100 MG tablet Take 100 mg by mouth 2 times daily Hold if heart rate is < 55 6/3/2020 at am Yes Unknown, Entered By History   omeprazole (PRILOSEC) 20 MG DR capsule Take 1 capsule (20 mg) by mouth daily 6/3/2020 at am Yes Wanda Nguyen APRN CNP   polyethylene glycol (MIRALAX/GLYCOLAX) packet Take 1 packet by mouth nightly as needed for constipation   at prn Yes Reported, Patient   potassium chloride ER (K-TAB) 20 MEQ CR tablet Take 1 tablet (20 mEq) by mouth daily 6/3/2020 at am Yes Gladis Bravo DO   senna-docusate (SENOKOT-S/PERICOLACE) 8.6-50 MG tablet Take 1 tablet by mouth 2 times daily as needed for constipation  at prn Yes Dyana Sandoval APRN CNP   sennosides (SENOKOT) 8.6 MG tablet Take 1 tablet by mouth At Bedtime 6/2/2020 at pm Yes Reported, Patient   simethicone (MYLICON) 125 MG chewable tablet Take 125 mg by mouth 4 times daily as needed for intestinal gas  at  prn Yes Reported, Patient   spironolactone (ALDACTONE) 12.5 mg TABS half-tab Take 12.5 mg by mouth daily 6/3/2020 at am Yes Unknown, Entered By History

## 2020-06-04 NOTE — CONSULTS
[facility]    Palliative Care Consultation   Text Page    Date of Admission:  6/3/2020    Assessment & Plan   Elizabeth Li is a 84 year old female who was admitted on 6/3/2020. I was asked by Hospitalist Estevan Britton MD to see the patient for metastatic breast cancer, goals of care.    Recommendations:  1.  Decisional Capacity -  Unreliable as patient is pleasantly confused. Patient does not have an advance directive. Per  informed consent policy next of kin should be involved in all consent and decision making. Her  Oswaldo Li is next-of-kin.     2.  Altered mental status -  indicates this is not a new finding as she had not been able to competently use her cell phone for weeks.      3.  Severe Malnutrition - Appreciate input of Registered Dietitian Janie Valdes, RD, LD. The patient has lost 36 pounds the past year.      4.  Cancer related pain - Dilaudid 1 to 2 mg every 3 hours prn for pain. Avoid the use of Oxycodone as the patient indicated in previous hospitalization her mental state was better with Dilaudid.      5.  Constipation - due to opiate use. Reasonable to offer Senna-S daily.     6.  Generalized weakness -  indicates she was deangelo lift move for the past 3 weeks. Doubt therapy would improve this situation.      7.  Spiritual Care - Appreciate input of  PRANEETH Roa.     8.  Care Planning - Appreciate input of  DB Hebert regarding bed hold at [facility] (which  indicates they are paying out of pocket)     Goals of Care: DNR/DNI - Restorative care.  indicates he would like to learn about results of MRI before considering a hospice plan. He would like to see if there is an opportunity at [facility] for hospice care as his wife enjoyed her Jainism robbie. POLST complete for dismissal indicating comfort focused plan.      Disease Process/es & Symptoms:  Elizabeth Li is a 84 year  old patient admitted with symptoms of severe anemia, thrombocytopenia, hyponatremia and hyperkalemia noted on lab studies. Head CT demonstrate bony metastasis with no evidence of intracranial hemorrhage or metastasis. Patient is known to me from her hospitalization April 17 through 28 when she was admitted for severe hypercalcemia of malignancy. She underwent a biopsy soft tissue lesion at L5 on 4/23/2020 which was positive for malignancy. She was dismissed to [facility] where she has resided since.     The patient did have a uncomplicated hospitalization June 17 through 20, 2019 for L3-L4 Transforaminal Lumbar Intrabody Fusion by Cole Jansen MD. She was hospitalized  April 17 thorough 28 with hypercalcemia of malignancy. She has a 37 pound weight loss in the past year as she weighs 136 pounds today and last year she was documented at 173 pounds on Evangelina 10, 2019.    Findings & plan of care discussed with: Bedside nurse Jennifer oDuglas RN and Hospitalist Toro Solorzano MD.  Follow-up plan from palliative team: Will follow closely during this hospitalization  Thank you for involving us in the patient's care.     Lovely Youssef MS, RN, CNS, APRN, ACHPN, FAACVPR  Pain and Palliative Care  Pager [phone]  Office [phone]       Time Spent on this Encounter   Total unit/floor time 3:15 PM until 4:20 PM, time consisted of the following, examination of the patient, reviewing the record and completing documentation. >50% of time spent in counseling and coordination of care.  Time spend counseling with patient and family consisted of the following topics, goals of care, education about prognosis, care planning for discharge and symptom management.  Time spent in coordination of care with Bedside nurse Jennifer Douglas RN and Hospitalist Toro Solorzano MD.     Primary Care Physician   Rebekah Rausch    Chief Complaint   Abnormal Labs and Generalized Weakness      History is obtained from the electronic health  "record and patient's spouse      Decision-Making & Goals of Care:  Discussed on June 4, 2020 with Lovely GUEVARA, CNS:     Met with the patient as she was resting in bed. She was unable to give insight. I asked when she had last seen her . Elizabeth explained \"He was just here. I wonder where he went. (Impossible as we have no visitors due to the COVID-19 visitor restrictions). She denied complaint but agreed that I should call her  to give and update.     I phoned the patient's , Oswaldo at [phone]. He did recall talking with me during Elizabeth's hospital stay in April. As Elizabeth had gone to five radiation treatment, he was able to see her briefly at those appointments. He explained that when Elizabeth was hospitalized in April he had hoped she would get stronger and return home. \"I see now that is not going to happen.\" He knew that she had lost more than 20 pounds while at [facility] and he estimated that it had been three weeks since she had been out of bed without the use of a hover lift. He explained that since she was not doing rehab \"I just got the bill from [facility] the other day and it was 14 thousand to have her live there.\" I asked Oswaldo if they had discussions about what Elizabeth would want. \"She told me no tube feed, but I don't know if she understood what that would mean.\" He explained that they had talked about \"If things got bad, no CPR, but I don't know if it's that bad.\" Oswaldo acknowledged that Elizabeth couldn't remember or didn't know how to use her cell phone. \"I don't know if she's given up or what.\" We discussed the normal dying process in cancer. He acknowledged that she was sleeping more and hadn't been eating the past couple of weeks. \"I worried that she can't be with me, but I can't take care of her. I don't want her in pain. I suppose it's time for hospice to make her without pain and comfortable.\" We discussed the hospice benefit and as he offered " "concern about finances I explained that [facility] may be an option that helps him financially. \"I don't want to make that decision now, until I find out about the MRI and how they can improve her at the hospital.\" I explained that our , Caitie can look into bed availability at [facility], but he can always have Elizabeth return to [facility] for hospice care. He offered that he should talk to their two daughters who live in [place] and [place] and their son in [place], [place] (another son has been estranged since 1982). He asked for information about hospice and OLOP and asked that I send it to his e-mail at [email or web address] which I have done.      Past Medical History   I have reviewed this patient's medical history and updated it with pertinent information if needed.   Past Medical History:   Diagnosis Date     CAD (coronary artery disease)     Severe 2 vessel CAD. PCI with drug-eluting stents x2 to mid LAD on 3/27/15     Depression      HTN (hypertension)      Hyperlipidemia      Hypothyroidism      IHD (ischemic heart disease)      KUSUM (obstructive sleep apnea)     CPAP       Past Surgical History   I have reviewed this patient's surgical history and updated it with pertinent information if needed.  Past Surgical History:   Procedure Laterality Date     BACK SURGERY       COLONOSCOPY N/A 1/11/2017    Procedure: COLONOSCOPY;  Surgeon: Nghia Moss MD;  Location:  GI     Coronary angiogram with stents x2  03/2015    2.5 X 18 mm & 3.0 X mm LUCILA to LAD     OPTICAL TRACKING SYSTEM FUSION POSTERIOR SPINE LUMBAR N/A 7/17/2019    Procedure: L3-L4 TRANSFORAMINAL LUMBAR  INTRABODY FUSION WITH ALLOGRAFT, OPTICAL TRACKING SYSTEM AND MEDTRONIC SOLARA INSTRUMENTATION;  Surgeon: Cole Jansen MD;  Location:  OR     TONSILLECTOMY, ADENOIDECTOMY, COMBINED       TUBAL LIGATION       [place] teeth extraction         Prior to Admission Medications   Prior to Admission Medications   Prescriptions Last Dose " Informant Patient Reported? Taking?   HYDROmorphone (DILAUDID) 2 MG tablet 6/3/2020 at am  Yes Yes   Sig: Take 2 mg by mouth 2 times daily   HYDROmorphone (DILAUDID) 2 MG tablet  at prn  Yes Yes   Sig: Take 2 mg by mouth every 4 hours as needed for moderate to severe pain Do not give PRN doses within 4 hours of the scheduled dose    acetaminophen (TYLENOL) 500 MG tablet 6/3/2020 at x2  No Yes   Sig: Take 2 tablets (1,000 mg) by mouth 3 times daily   amLODIPine (NORVASC) 5 MG tablet 6/2/2020 at pm  Yes Yes   Sig: Take 5 mg by mouth At Bedtime   artificial saliva (BIOTENE MT) SOLN solution  at prn  No Yes   Sig: Swish and spit 2 mLs (2 sprays) in mouth every hour as needed for dry mouth   ascorbic acid (VITAMIN C) 500 MG tablet 6/3/2020 at noon Self Yes Yes   Sig: Take 500 mg by mouth daily (with lunch) Pt takes in afternoon   aspirin (ASA) 81 MG tablet 6/3/2020 at am  No Yes   Sig: Take 1 tablet (81 mg) by mouth daily   bisacodyl (DULCOLAX) 10 MG suppository  at prn  No Yes   Sig: Place 1 suppository (10 mg) rectally daily as needed for constipation   dexamethasone (DECADRON) 4 MG tablet 6/3/2020 at am  Yes Yes   Sig: Take 4 mg by mouth daily (with breakfast)   hydrALAZINE (APRESOLINE) 10 MG tablet  at prn  No Yes   Sig: Take 1 tablet (10 mg) by mouth every 8 hours as needed (SBP>160)   isosorbide mononitrate (IMDUR) 30 MG 24 hr tablet 6/3/2020 at am  No Yes   Sig: TAKE ONE TABLET BY MOUTH EVERY DAY   levothyroxine (SYNTHROID/LEVOTHROID) 75 MCG tablet 6/3/2020 at am  No Yes   Sig: TAKE ONE TABLET BY MOUTH EVERY MORNING (BEFORE BREAKFAST)   liothyronine (CYTOMEL) 5 MCG tablet 6/3/2020 at am  Yes Yes   Sig: Take 1 tablet (5 mcg) by mouth daily   lisinopril (ZESTRIL) 20 MG tablet 6/3/2020 at am  Yes Yes   Sig: Take 40 mg by mouth daily   metoprolol tartrate (LOPRESSOR) 100 MG tablet 6/3/2020 at am  Yes Yes   Sig: Take 100 mg by mouth 2 times daily Hold if heart rate is < 55   omeprazole (PRILOSEC) 20 MG DR capsule  6/3/2020 at am  No Yes   Sig: Take 1 capsule (20 mg) by mouth daily   polyethylene glycol (MIRALAX/GLYCOLAX) packet  at prn  Yes Yes   Sig: Take 1 packet by mouth nightly as needed for constipation    potassium chloride ER (K-TAB) 20 MEQ CR tablet 6/3/2020 at am  Yes Yes   Sig: Take 1 tablet (20 mEq) by mouth daily   senna-docusate (SENOKOT-S/PERICOLACE) 8.6-50 MG tablet  at prn  No Yes   Sig: Take 1 tablet by mouth 2 times daily as needed for constipation   sennosides (SENOKOT) 8.6 MG tablet 6/2/2020 at pm  Yes Yes   Sig: Take 1 tablet by mouth At Bedtime   simethicone (MYLICON) 125 MG chewable tablet  at prn  Yes Yes   Sig: Take 125 mg by mouth 4 times daily as needed for intestinal gas   spironolactone (ALDACTONE) 12.5 mg TABS half-tab 6/3/2020 at am  Yes Yes   Sig: Take 12.5 mg by mouth daily      Facility-Administered Medications: None     Allergies   Allergies   Allergen Reactions     Flu Virus Vaccine Other (See Comments)     Viral SX     Gluten Meal        Social History   Living situation: [facility]  Family system:  Oswaldo and 2 adult daughters are supportive  Functional status: Needs help with ADLs   Employment/education: Retired condominium manger  History of substance use/abuse:  reports that she quit smoking about 36 years ago. She has never used smokeless tobacco.  reports current alcohol use.  Orthodox affiliation: Hindu  Involvement in robbie community: [facility]    Family History   I have reviewed this patient's family history and updated it with pertinent information if needed.   Family History   Problem Relation Age of Onset     Blood Disease Mother         pernious anemia     Heart Disease Father      Heart Disease Sister      Heart Disease Brother      Colon Cancer No family hx of        Review of Systems   Review of systems not obtained due to patient factors - confusion and mental status    Palliative Symptom Review (0=no symptom/no concern,  1=mild, 2=moderate, 3=severe):      Pain: 0-none      Fatigue: 3-severe      Nausea: 0-none      Constipation: 0-none      Diarrhea: 0-none      Depressive Symptoms: 0-none      Anxiety: 0-none      Drowsiness: 2-moderate      Poor Appetite: 3-severe      Shortness of Breath: 0-none      Insomnia: 0-none        Physical Exam   Temp:  [97.3  F (36.3  C)-98.9  F (37.2  C)] 98.9  F (37.2  C)  Pulse:  [68-87] 72  Heart Rate:  [69-88] 80  Resp:  [16-18] 16  BP: (115-156)/(44-85) 130/52  SpO2:  [91 %-100 %] 97 %  136 lbs 6.4 oz  GEN:  Cachetic and frail elderly  female. Alert, oriented to self only, appears comfortable, NAD.  HEENT:  Normocephalic/atraumatic, no scleral icterus, no nasal discharge, mouth moist.  CV:  RRR, S1, S2; no murmurs or other irregularities noted.  +3 DP/PT pulses bilaterally; trace pedal edema bilaterally.  RESP:  Clear to auscultation bilaterally without rales/rhonchi/wheezing/retractions.  Symmetric chest rise on inhalation noted.  Normal respiratory effort.  ABD:  Rounded, soft, non-tender/non-distended.  +BS  EXT: CMS intact x 4.     M/S:   Denies pain with palpation of extremities and spine.    SKIN:  Warm and dry to touch, no exanthems noted in the visualized areas.    NEURO: Symmetric strength +5/5.  Sensation to touch intact all extremities.   There is no area of allodynia or hyperesthesia.  Psych:  Flat affect.  Calm, cooperative, conversant but has much difficulty in staying on task. (She thought her  visited moments ago, yet we have no visitors in the hospital due to COVID-19 policy)       Data   Results for orders placed or performed during the hospital encounter of 06/03/20   XR Chest Port 1 View     Status: None    Narrative    EXAM: XR CHEST PORT 1 VW  LOCATION: [facility]  DATE/TIME: 6/3/2020 8:53 PM    INDICATION: Generalized weakness history of metastatic cancer  COMPARISON: 04/18/2020      Impression    IMPRESSION: Lungs appear clear. Normal heart  size. No pleural effusion or pneumothorax. There is a healing left fifth rib fracture laterally new compared to prior exam.   CT Head w/o Contrast     Status: None    Narrative    EXAM: CT HEAD W/O CONTRAST  LOCATION: [facility]  DATE/TIME: 6/3/2020 9:16 PM    INDICATION: Altered level of consciousness (LOC), confusion  COMPARISON: CT head without contrast 04/20/2020.  TECHNIQUE: Routine without IV contrast. Multiplanar reformats. Dose reduction techniques were used.    FINDINGS:  INTRACRANIAL CONTENTS: No intracranial hemorrhage, extraaxial collection, or mass effect.  No CT evidence of acute infarct. Mild presumed chronic small vessel ischemic changes. Mild generalized volume loss. No hydrocephalus.     VISUALIZED ORBITS/SINUSES/MASTOIDS: No intraorbital abnormality. No paranasal sinus mucosal disease. No middle ear or mastoid effusion.    BONES/SOFT TISSUES: There are several scattered lucent lesions within the calvarium, similar to recent CT head examination and new in comparison to more remote 03/01/2013 CT examination.      Impression    IMPRESSION:  1.  No CT evidence for acute intracranial process.  2.  Brain atrophy and presumed chronic microvascular ischemic changes as above.  3.  Several lucent lesions involving the calvarium are similar to 04/20/2020 examination and new from remote 2013 examination. Findings may reflect metastatic lesions associated with the patient's reported history of breast cancer.    Results discussed with Dr. Bland on 6/3/2020 10:02 PM.   CBC with platelets differential     Status: Abnormal   Result Value Ref Range    WBC 6.8 4.0 - 11.0 10e9/L    RBC Count 2.24 (L) 3.8 - 5.2 10e12/L    Hemoglobin 6.9 (LL) 11.7 - 15.7 g/dL    Hematocrit 21.4 (L) 35.0 - 47.0 %    MCV 96 78 - 100 fl    MCH 30.8 26.5 - 33.0 pg    MCHC 32.2 31.5 - 36.5 g/dL    RDW 18.2 (H) 10.0 - 15.0 %    Platelet Count 36 (LL) 150 - 450 10e9/L    Diff Method Manual Differential     % Neutrophils 70.0 %     % Lymphocytes 18.0 %    % Monocytes 1.0 %    % Eosinophils 2.0 %    % Basophils 0.0 %    % Metamyelocytes 5.0 %    % Myelocytes 4.0 %    Nucleated RBCs 1 (H) 0 /100    Absolute Neutrophil 4.8 1.6 - 8.3 10e9/L    Absolute Lymphocytes 1.2 0.8 - 5.3 10e9/L    Absolute Monocytes 0.1 0.0 - 1.3 10e9/L    Absolute Eosinophils 0.1 0.0 - 0.7 10e9/L    Absolute Basophils 0.0 0.0 - 0.2 10e9/L    Absolute Metamyelocytes 0.3 (H) 0 10e9/L    Absolute Myelocytes 0.3 (H) 0 10e9/L    Absolute Nucleated RBC 0.1     Anisocytosis Moderate     RBC Fragments Slight     Teardrop Cells Slight     Microcytes Present     Macrocytes Present     Platelet Estimate       Automated count confirmed.  Platelet morphology is normal.   Basic metabolic panel     Status: Abnormal   Result Value Ref Range    Sodium 126 (L) 133 - 144 mmol/L    Potassium 5.9 (H) 3.4 - 5.3 mmol/L    Chloride 102 94 - 109 mmol/L    Carbon Dioxide 13 (L) 20 - 32 mmol/L    Anion Gap 11 3 - 14 mmol/L    Glucose 91 70 - 99 mg/dL    Urea Nitrogen 27 7 - 30 mg/dL    Creatinine 0.78 0.52 - 1.04 mg/dL    GFR Estimate 70 >60 mL/min/[1.73_m2]    GFR Estimate If Black 81 >60 mL/min/[1.73_m2]    Calcium 9.3 8.5 - 10.1 mg/dL   Hepatic panel     Status: Abnormal   Result Value Ref Range    Bilirubin Direct 0.2 0.0 - 0.2 mg/dL    Bilirubin Total 0.5 0.2 - 1.3 mg/dL    Albumin 2.8 (L) 3.4 - 5.0 g/dL    Protein Total 6.7 (L) 6.8 - 8.8 g/dL    Alkaline Phosphatase 138 40 - 150 U/L    ALT 16 0 - 50 U/L     (H) 0 - 45 U/L   Magnesium     Status: None   Result Value Ref Range    Magnesium 2.1 1.6 - 2.3 mg/dL   Stool: occult blood     Status: None   Result Value Ref Range    Occult Blood Negative NEG^Negative   Glucose by meter     Status: None   Result Value Ref Range    Glucose 80 70 - 99 mg/dL   Glucose by meter     Status: Abnormal   Result Value Ref Range    Glucose 163 (H) 70 - 99 mg/dL   Glucose by meter     Status: Abnormal   Result Value Ref Range    Glucose 166 (H) 70 - 99 mg/dL    Basic metabolic panel     Status: Abnormal   Result Value Ref Range    Sodium 129 (L) 133 - 144 mmol/L    Potassium 4.6 3.4 - 5.3 mmol/L    Chloride 104 94 - 109 mmol/L    Carbon Dioxide 15 (L) 20 - 32 mmol/L    Anion Gap 10 3 - 14 mmol/L    Glucose 128 (H) 70 - 99 mg/dL    Urea Nitrogen 26 7 - 30 mg/dL    Creatinine 0.75 0.52 - 1.04 mg/dL    GFR Estimate 74 >60 mL/min/[1.73_m2]    GFR Estimate If Black 86 >60 mL/min/[1.73_m2]    Calcium 8.8 8.5 - 10.1 mg/dL   Glucose by meter     Status: Abnormal   Result Value Ref Range    Glucose 130 (H) 70 - 99 mg/dL   Basic metabolic panel     Status: Abnormal   Result Value Ref Range    Sodium 130 (L) 133 - 144 mmol/L    Potassium 4.2 3.4 - 5.3 mmol/L    Chloride 105 94 - 109 mmol/L    Carbon Dioxide 14 (L) 20 - 32 mmol/L    Anion Gap 11 3 - 14 mmol/L    Glucose 143 (H) 70 - 99 mg/dL    Urea Nitrogen 21 7 - 30 mg/dL    Creatinine 0.64 0.52 - 1.04 mg/dL    GFR Estimate 83 >60 mL/min/[1.73_m2]    GFR Estimate If Black >90 >60 mL/min/[1.73_m2]    Calcium 8.7 8.5 - 10.1 mg/dL   CBC with platelets     Status: Abnormal   Result Value Ref Range    WBC 5.2 4.0 - 11.0 10e9/L    RBC Count 2.92 (L) 3.8 - 5.2 10e12/L    Hemoglobin 8.8 (L) 11.7 - 15.7 g/dL    Hematocrit 26.9 (L) 35.0 - 47.0 %    MCV 92 78 - 100 fl    MCH 30.1 26.5 - 33.0 pg    MCHC 32.7 31.5 - 36.5 g/dL    RDW 16.9 (H) 10.0 - 15.0 %    Platelet Count 24 (LL) 150 - 450 10e9/L   Potassium     Status: None   Result Value Ref Range    Potassium 4.5 3.4 - 5.3 mmol/L   Glucose by meter     Status: None   Result Value Ref Range    Glucose 97 70 - 99 mg/dL   Glucose by meter     Status: Abnormal   Result Value Ref Range    Glucose 144 (H) 70 - 99 mg/dL   Glucose by meter     Status: Abnormal   Result Value Ref Range    Glucose 104 (H) 70 - 99 mg/dL   EKG 12 lead     Status: None   Result Value Ref Range    Interpretation ECG Click View Image link to view waveform and result    Nutrition Services Adult IP Consult     Status: None  "()    Narrative    Janie Valdes RD, LD     6/4/2020 11:52 AM  CLINICAL NUTRITION SERVICES  -  ASSESSMENT NOTE      Recommendations Ordered by Registered Dietitian (RD):   Already with gluten allergy interface, self-selection of low   lactose preferences.  Protein focus as able.     Add Boost Breeze supplement offerings (variety of flavors) with   meals TID.      Add room service with assist for ordering guidance when mentation   allows.     Add daily MVI/M.    Add-on phosphorus given currently requiring D10 at 75 ml/hr with   refeeding risk.     Future/Additional Recommendations:   Goals of care, palliative consulted.      MALNUTRITION:  % Weight Loss:  > 7.5% in 3 months (severe malnutrition) -->   diuresis component though also true wt loss, poor oral intake and   malignancy contribution?  % Intake:  </= 50% for >/= 5 days (severe malnutrition)  Subcutaneous Fat Loss:  Unable to complete physical exam  Muscle Loss: Unable to complete physical exam  Fluid Retention: None documented    Malnutrition Diagnosis: Severe malnutrition  In Context of:  Acute illness or injury  Chronic illness or disease          REASON FOR ASSESSMENT  Elizabeth Li is a 84 year old female seen by Registered   Dietitian for Admission Nutrition Risk Screen for unintentional   loss of 10# or more in the past two months and reduced oral   intake over the last month and Provider Order - \"metastatic   breast cancer, decreased oral intake, malnutrition\".      PMH of: Recently diagnosed metastatic breast CA, HTN.     Admit 2/2: From TCU with concerns for inability to participate in   therapies, intermittent mental status changes and back pain, poor   oral intake --> metabolic encephalopathy, hyperkalemia, metabolic   acidosis, dehydration, hyponatremia    NUTRITION HISTORY  - Information obtained from chart.  Multiple attempts to reach   patient via phone today without success (per direction of   department guidelines in the setting of " "COVID19).    - Known to nutrition services and this writer from 4/2020 admit.    Does follow a gluten free diet at home and also avoids dairy   products to some degree (lactose intolerance).  PO intakes during   last admit were limited in the setting of dysphagia and mentation   changes.  - [facility] Patient's baseline   diet is quite restrictive (described as \"picky\") and avoided   almost all dairy products while admitted.  Did receive butter and   almond milk.  Always requested gluten free options.  Oral intakes   were \"ok\" during beginning of admission but soon declined and   remained inadequate for some period of time.  She was averaging   25% of meals for a period of weeks.  She initially denied   supplements though was agreeable to a juice-based option, only   after what sounds like a care conference, which she did not   consistently consume.   involved and very supportive,   would often bring food for patient to encourage eating.  Nurse   Practitioner at facility had begun discussions relating to   nutrition support interventions, given ongoing poor PO intakes in   combination with wt loss.    - Allergies: Gluten and lactose intolerances.       CURRENT NUTRITION ORDERS  Diet Order:     Regular    Current Intake/Tolerance:  No PO intakes recorded since admit, limited timeframe.        NUTRITION FOCUSED PHYSICAL ASSESSMENT FOR DIAGNOSING   MALNUTRITION)  No:  per direction of department guidelines in the setting of COVID19              Observed:    deferred d/t above    Obtained from Chart/Interdisciplinary Team:  - No documentation of skin issues   - Stooling patterns reviewed, no recorded BM since admit    ANTHROPOMETRICS  Height: 5' 1.5\"  Weight: 136 lbs 6.4 oz  Body mass index is 25.36 kg/m .  Weight Status:  Overweight BMI 25-29.9  Weight History:  Wt Readings from Last 10 Encounters:   06/04/20 61.9 kg (136 lb 6.4 oz)   06/01/20 65.1 kg (143 lb 9.6 oz)   05/26/20 67.4 " kg (148 lb 9.6 oz)   05/21/20 74 kg (163 lb 3.2 oz)   05/18/20 74 kg (163 lb 3.2 oz)   05/14/20 74 kg (163 lb 3.2 oz)   05/11/20 74 kg (163 lb 3.2 oz)   05/08/20 74 kg (163 lb 3.2 oz)   05/04/20 79 kg (174 lb 3.2 oz)   04/30/20 79 kg (174 lb 3.2 oz)   - Concern for true wt loss, possible degree of diuresis   component.  - ?18% wt loss in the past month.  Likely admit wt with   dehydration component, therefore used 143#.    - With wts provided by [facility] (174# on 4/29 and 155# on 5/27), would   still equate to an 11% wt loss in <2 months.      LABS  Labs reviewed:  Electrolytes  Potassium (mmol/L)   Date Value   06/04/2020 4.2   06/04/2020 4.5   06/03/2020 4.6    Blood Glucose  Glucose (mg/dL)   Date Value   06/04/2020 143 (H)   06/03/2020 128 (H)   06/03/2020 91   05/27/2020 92   05/18/2020 96    Inflammatory Markers  WBC   Date Value   06/04/2020 5.2 10e9/L   06/03/2020 6.8 10e9/L   05/18/2020 7.3 thou/uL     Albumin (g/dL)   Date Value   06/03/2020 2.8 (L)   04/28/2020 2.7 (L)   04/27/2020 2.7 (L)      Magnesium (mg/dL)   Date Value   06/03/2020 2.1   04/24/2020 2.2   04/23/2020 2.5 (H)     Sodium (mmol/L)   Date Value   06/04/2020 130 (L)   06/03/2020 129 (L)   06/03/2020 126 (L)    Renal  Urea Nitrogen (mg/dL)   Date Value   06/04/2020 21   06/03/2020 26   06/03/2020 27     Creatinine (mg/dL)   Date Value   06/04/2020 0.64   06/03/2020 0.75   06/03/2020 0.78     Additional  Triglycerides (mg/dL)   Date Value   01/04/2019 88   03/07/2018 144   05/16/2017 127     Ketones Urine (mg/dL)   Date Value   04/17/2020 Negative        B/P: 130/52, T: 98.9, P: 72, R: 16      MEDICATIONS  Medications reviewed:    sodium chloride (PF)  3 mL Intracatheter Q8H        dextrose 10% 75 mL/hr at 06/04/20 0124     sodium chloride 100 mL/hr at 06/04/20 0113          ASSESSED NUTRITION NEEDS PER APPROVED PRACTICE GUIDELINES:    Dosing Weight 65 kg   Estimated Energy Needs: 9368-6688+ kcals (25-30 Kcal/Kg)  Justification: minimum  maintenance, repletion and malignancy   component  Estimated Protein Needs: 78-98+ grams protein (1.2-1.5+ g pro/Kg)  Justification: minimum for preservation of lean body mass,   suspect repletion needs  Estimated Fluid Needs: per MD      NUTRITION DIAGNOSIS:  Malnutrition related to decreased oral intake likely in the   setting of metastatic malignancy leading to ongoing poor oral   intakes and wt loss as evidenced by 11% wt loss in <2 months,   meeting <50% needs orally for period of weeks.     NUTRITION INTERVENTIONS  Recommendations / Nutrition Prescription  Change to low lactose diet, already with gluten allergy   interface.  Protein focus as able.     Add Boost Breeze supplement offerings (variety of flavors) with   meals TID.      Add room service with assist for ordering guidance when mentation   allows.     Add daily MVI/M.    Add-on phosphorus given currently requiring D10 at 75 ml/hr with   refeeding risk.    Goals of care discussions needed, agree with palliative consult.       Implementation  Nutrition education: Not appropriate at this time as could not   connect with patient.    Medical Food Supplement: As above.     Multivitamin/Mineral: As above.     Nutrition Goals  Goals of care decisions w/in 24-48 hrs.       MONITORING AND EVALUATION:  Progress towards goals will be monitored and evaluated per   protocol and Practice Guidelines          Janie Valdes RDN, LD  Clinical Dietitian  3rd floor/ICU: [phone]  All other floors: [phone]  Weekend/holiday: [phone]   Hematology & Oncology IP Consult: Metastatic breast cancer, decreasing functional status; Consultant may enter orders: Yes; Patient to be seen: Routine - within 24 hours; Requesting provider? Attending physician     Status: None ()    Rodney Andersen MD     6/4/2020 12:18 PM  [facility]    Hematology / Oncology Consultation     Date of Admission:  6/3/2020    Assessment & Plan   Elizabeth Li is  a 84 year old female who was admitted on   6/3/2020. I was asked to see the patient for metastatic breast   cancer.     Assessment:  1. Recent diagnosis of metastatic breast cancer. ER negative, MI   low positive (1-5%) HER-2/miguel angel negative.  Extensive bony   metastasis.  Presented with hypercalcemia confusion.   2. Currently getting palliative radiation treatment to the lumbar   spine  3. Readmitted with general decline, metabolic encephalopathy.  4. Pancytopenia,   Plan:  1. MRI brain to rule out intracranial disease.  Patient was   scheduled to get MRI of brain as outpatient but could not   complete due to hospital admission.   2. Urine culture and blood culture to rule out infections.   3. Pancytopenia likely secondary to extensive bone metastasis and   recent radiation to lumbar spine.  Status post 1 unit of packed   red blood cell transfusion.  There is possibility that patient   may have bone marrow involvement with her disease.   4. May need a bone marrow biopsy depending on goals of care.     Rodney An MD   [facility]  Office:  [phone]    Code Status    Full Code    Reason for Consult Metastatic breast cancer    Primary Care Physician   Rebekah Rausch    Chief Complaint     Acute confusion.     History of Present Illness   Elizabeth Li is a 84 year old female who presents as they   were concerned from transitional care unit staff that patient is   not eating has lost about 20 pounds during admission intermittent   back pain and intermittent mental status changes and has been   having periodic hallucinations.  Labs in TCU showed increasing   acidosis electrolyte derangement and pancytopenia.       Oncologic history from [place] [facility] charts.       1.  4/17/2020: Patient presented to [facility] ER, 1 month   history of progressive Fatigue  weakness generalized myalgia back   pain and confusion.  Patient was found to have severe   hypercalcemia. CT scan of chest abdomen and pelvis  showed   extensive osseous metastatic disease, destructive disease near   L5.  He was treated with IV fluids calcitonin and pamidronate.    She also had treatment for concurrent UTI.  During the   hospitalization she was found to have a large left breast mass.   2.  4/23/2020: Biopsy from L5 showed metastatic carcinoma, breast   biopsy showed invasive ductal carcinoma.  2 pathologies were   compared and found to be morphologically similar. ER negative, ND   low positive (1-5%) HER-2/miguel angel negative.  3. Age of menarche 15, last mammogram was 8 years ago.  Menopause   49.  Four children attempted breast-feed with 1st child.   4.  5/18/2020: Patient started on palliative radiation to lumbar   spine.     Past Medical History   I have reviewed this patient's medical history and updated it   with pertinent information if needed.   Past Medical History:   Diagnosis Date     CAD (coronary artery disease)     Severe 2 vessel CAD. PCI with drug-eluting stents x2 to mid LAD   on 3/27/15     Depression      HTN (hypertension)      Hyperlipidemia      Hypothyroidism      IHD (ischemic heart disease)      KUSUM (obstructive sleep apnea)     CPAP       Past Surgical History   I have reviewed this patient's surgical history and updated it   with pertinent information if needed.  Past Surgical History:   Procedure Laterality Date     BACK SURGERY       COLONOSCOPY N/A 1/11/2017    Procedure: COLONOSCOPY;  Surgeon: Nghia Moss MD;    Location:  GI     Coronary angiogram with stents x2  03/2015    2.5 X 18 mm & 3.0 X mm LUCILA to LAD     OPTICAL TRACKING SYSTEM FUSION POSTERIOR SPINE LUMBAR N/A   7/17/2019    Procedure: L3-L4 TRANSFORAMINAL LUMBAR  INTRABODY FUSION WITH   ALLOGRAFT, OPTICAL TRACKING SYSTEM AND MEDTRONIC SOLARA   INSTRUMENTATION;  Surgeon: Cole Jansen MD;  Location:    OR     TONSILLECTOMY, ADENOIDECTOMY, COMBINED       TUBAL LIGATION       Britt teeth extraction         Prior to Admission Medications    Prior to Admission Medications   Prescriptions Last Dose Informant Patient Reported? Taking?   HYDROmorphone (DILAUDID) 2 MG tablet 6/3/2020 at am  Yes Yes   Sig: Take 2 mg by mouth 2 times daily   HYDROmorphone (DILAUDID) 2 MG tablet  at prn  Yes Yes   Sig: Take 2 mg by mouth every 4 hours as needed for moderate to   severe pain Do not give PRN doses within 4 hours of the scheduled   dose    acetaminophen (TYLENOL) 500 MG tablet 6/3/2020 at x2  No Yes   Sig: Take 2 tablets (1,000 mg) by mouth 3 times daily   amLODIPine (NORVASC) 5 MG tablet 6/2/2020 at pm  Yes Yes   Sig: Take 5 mg by mouth At Bedtime   artificial saliva (BIOTENE MT) SOLN solution  at prn  No Yes   Sig: Swish and spit 2 mLs (2 sprays) in mouth every hour as   needed for dry mouth   ascorbic acid (VITAMIN C) 500 MG tablet 6/3/2020 at noon Self Yes   Yes   Sig: Take 500 mg by mouth daily (with lunch) Pt takes in   afternoon   aspirin (ASA) 81 MG tablet 6/3/2020 at am  No Yes   Sig: Take 1 tablet (81 mg) by mouth daily   bisacodyl (DULCOLAX) 10 MG suppository  at prn  No Yes   Sig: Place 1 suppository (10 mg) rectally daily as needed for   constipation   dexamethasone (DECADRON) 4 MG tablet 6/3/2020 at am  Yes Yes   Sig: Take 4 mg by mouth daily (with breakfast)   hydrALAZINE (APRESOLINE) 10 MG tablet  at prn  No Yes   Sig: Take 1 tablet (10 mg) by mouth every 8 hours as needed   (SBP>160)   isosorbide mononitrate (IMDUR) 30 MG 24 hr tablet 6/3/2020 at am    No Yes   Sig: TAKE ONE TABLET BY MOUTH EVERY DAY   levothyroxine (SYNTHROID/LEVOTHROID) 75 MCG tablet 6/3/2020 at am    No Yes   Sig: TAKE ONE TABLET BY MOUTH EVERY MORNING (BEFORE BREAKFAST)   liothyronine (CYTOMEL) 5 MCG tablet 6/3/2020 at am  Yes Yes   Sig: Take 1 tablet (5 mcg) by mouth daily   lisinopril (ZESTRIL) 20 MG tablet 6/3/2020 at am  Yes Yes   Sig: Take 40 mg by mouth daily   metoprolol tartrate (LOPRESSOR) 100 MG tablet 6/3/2020 at am  Yes   Yes   Sig: Take 100 mg by mouth 2 times  daily Hold if heart rate is <   55   omeprazole (PRILOSEC) 20 MG DR capsule 6/3/2020 at am  No Yes   Sig: Take 1 capsule (20 mg) by mouth daily   polyethylene glycol (MIRALAX/GLYCOLAX) packet  at prn  Yes Yes   Sig: Take 1 packet by mouth nightly as needed for constipation    potassium chloride ER (K-TAB) 20 MEQ CR tablet 6/3/2020 at am    Yes Yes   Sig: Take 1 tablet (20 mEq) by mouth daily   senna-docusate (SENOKOT-S/PERICOLACE) 8.6-50 MG tablet  at prn    No Yes   Sig: Take 1 tablet by mouth 2 times daily as needed for   constipation   sennosides (SENOKOT) 8.6 MG tablet 6/2/2020 at pm  Yes Yes   Sig: Take 1 tablet by mouth At Bedtime   simethicone (MYLICON) 125 MG chewable tablet  at prn  Yes Yes   Sig: Take 125 mg by mouth 4 times daily as needed for intestinal   gas   spironolactone (ALDACTONE) 12.5 mg TABS half-tab 6/3/2020 at am    Yes Yes   Sig: Take 12.5 mg by mouth daily      Facility-Administered Medications: None     Allergies   Allergies   Allergen Reactions     Flu Virus Vaccine Other (See Comments)     Viral SX     Gluten Meal        Social History   I have reviewed this patient's social history and updated it with   pertinent information if needed. Elizabeth Li  reports that   she quit smoking about 36 years ago. She has never used smokeless   tobacco. She reports current alcohol use. She reports that she   does not use drugs.    Family History   I have reviewed this patient's family history and updated it with   pertinent information if needed.   Family History   Problem Relation Age of Onset     Blood Disease Mother         pernious anemia     Heart Disease Father      Heart Disease Sister      Heart Disease Brother      Colon Cancer No family hx of        Review of Systems   Review of systems unavailable because of mental status changes    Physical Exam   Temp: 98.9  F (37.2  C) Temp src: Temporal BP: 130/52 Pulse: 72   Heart Rate: 80 Resp: 16 SpO2: 97 % O2 Device: Nasal cannula   Oxygen  Delivery: 2 LPM  Vital Signs with Ranges  Temp:  [97.3  F (36.3  C)-98.9  F (37.2  C)] 98.9  F (37.2  C)  Pulse:  [68-87] 72  Heart Rate:  [69-88] 80  Resp:  [16-18] 16  BP: (115-156)/(44-85) 130/52  SpO2:  [91 %-100 %] 97 %  136 lbs 6.4 oz    Constitutional: Patient very drowsy confused.  In hospital bed is   able to wake up, answer limited to 1-2 words.   Eyes: Conjunctiva and pupils examined there is severe pallor .    Respiratory: No respiratory distress.   GI: Soft, non-distended, non-tender, normal bowel sounds.  Lymph/Hematologic: No anterior cervical or supraclavicular   adenopathy.  Skin: No rashes, no cyanosis, no edema.  Musculoskeletal: No joint swelling, erythema or tenderness.  Neurologic: Patient very drowsy and confused but no focal   deficits moving all 4 extremities.       Data     Results for AMARI AVILEZ (MRN [id number]) as of 6/4/2020   11:45   Ref. Range 6/3/2020 21:33 6/3/2020 21:57 6/3/2020 22:53 6/4/2020   01:23 6/4/2020 01:52 6/4/2020 04:53 6/4/2020 05:54 6/4/2020 10:45     Sodium Latest Ref Range: 133 - 144 mmol/L  129 (L)     130 (L)    Potassium Latest Ref Range: 3.4 - 5.3 mmol/L  4.6   4.5  4.2    Chloride Latest Ref Range: 94 - 109 mmol/L  104     105    Carbon Dioxide Latest Ref Range: 20 - 32 mmol/L  15 (L)     14   (L)    Urea Nitrogen Latest Ref Range: 7 - 30 mg/dL  26     21    Creatinine Latest Ref Range: 0.52 - 1.04 mg/dL  0.75     0.64    GFR Estimate Latest Ref Range: >60 mL/min/1.73_m2  74     83    GFR Estimate If Black Latest Ref Range: >60 mL/min/1.73_m2  86       >90    Calcium Latest Ref Range: 8.5 - 10.1 mg/dL  8.8     8.7    Anion Gap Latest Ref Range: 3 - 14 mmol/L  10     11    Glucose Latest Ref Range: 70 - 99 mg/dL 166 (H) 128 (H) 130 (H)   97  144 (H) 143 (H) 104 (H)   WBC Latest Ref Range: 4.0 - 11.0 10e9/L       5.2    Hemoglobin Latest Ref Range: 11.7 - 15.7 g/dL       8.8 (L)    Hematocrit Latest Ref Range: 35.0 - 47.0 %       26.9 (L)    Platelet Count  Latest Ref Range: 150 - 450 10e9/L       24 (LL)    RBC Count Latest Ref Range: 3.8 - 5.2 10e12/L       2.92 (L)    MCV Latest Ref Range: 78 - 100 fl       92    MCH Latest Ref Range: 26.5 - 33.0 pg       30.1    MCHC Latest Ref Range: 31.5 - 36.5 g/dL       32.7    RDW Latest Ref Range: 10.0 - 15.0 %       16.9 (H)          ABO/Rh type and screen     Status: None   Result Value Ref Range    Units Ordered 1     ABO O     RH(D) Pos     Antibody Screen Neg     Test Valid Only At [facility]        Specimen Expires 06/06/2020     Crossmatch Red Blood Cells    Blood component     Status: None   Result Value Ref Range    Unit Number [id number]     Blood Component Type Red Blood Cells Leukocyte Reduced     Division Number 00     Status of Unit Released to care unit     Blood Product Code [id number]     Unit Status ISS

## 2020-06-04 NOTE — PROGRESS NOTES
[facility]    Hospitalist Progress Note    Date of Service (when I saw the patient): 06/04/2020  Provider:  Toro Solorzano MD   Text Page  7am - 6PM       Assessment & Plan   Elizabeth Li is a 84 year old female who  has a past medical history of CAD (coronary artery disease), Depression, HTN (hypertension), Hyperlipidemia, Hypothyroidism, IHD (ischemic heart disease), and KUSUM (obstructive sleep apnea) and recent diagnosis of metastatic breast cancer.  She is coming from the TCU where she has been doing physical therapy after her last admission.  TCU staff reported that patient has lost around 20 pounds during the stay, she does not participate in physical therapy and she is confused and hallucinating.  She had abnormal lab work-up and was sent to the emergency department for an assessment.  She was admitted on 6/3/2020 for further studies, treatment and follow-up.    Active Problems:  1.  Metastatic breast cancer.  2.  Acute toxic metabolic encephalopathy with hallucinations and confusion.  3.  Failure to thrive, generalized decline.  4.  Bicytopenia with moderate anemia (after transfusion) and severe thrombocytopenia.  5.  Severe malnutrition In Context of acute illness or injury  Chronic illness or disease  6. Chronic comorbidities.  - Essential hypertension.  - Dyslipidemia.  - Coronary artery disease.  - Hypothyroidism.  - KUSUM    Plan.  Inpatient.  Diet as tolerated.  Hydration with D5/normal saline/KCl 20 mEq at 100 mils per hour.  Pain control with Dilaudid IV as needed.  Multivitamin 1 tablet daily.    Famotidine 20 mg  IV every 12 hours.  Hydralazine with parameters.  Oncology consultation highly appreciated.  Pending MRI of the brain performance.    DVT Prophylaxis: Pneumatic Compression Devices  Code Status: Full Code    Disposition: Expected discharge to ? Hospice once work up completed.    Interval History   Patient is disoriented to time place and person, mumbling incoherently and  nonsense, not reliable in the interview.  Apparently she is not in pain.  She does not have any evidence of shortness of breath or difficulty breathing.  Oncology has seen her, I will be waiting for his recommendations.    -Data reviewed today: I reviewed all new labs and imaging results over the last 24 hours.     Physical Exam   Temp: 98.9  F (37.2  C) Temp src: Temporal BP: 130/52 Pulse: 72 Heart Rate: 80 Resp: 16 SpO2: 97 % O2 Device: Nasal cannula Oxygen Delivery: 2 LPM  Vitals:    06/04/20 0010   Weight: 61.9 kg (136 lb 6.4 oz)     Vital Signs with Ranges  Temp:  [97.3  F (36.3  C)-98.9  F (37.2  C)] 98.9  F (37.2  C)  Pulse:  [68-87] 72  Heart Rate:  [69-88] 80  Resp:  [16-18] 16  BP: (115-156)/(44-85) 130/52  SpO2:  [91 %-100 %] 97 %  I/O last 3 completed shifts:  In: 600   Out: 1600 [Urine:1600]    GEN:  Alert, not oriented x 3, appears comfortable, NAD.  HEENT:  Normocephalic/atraumatic, no scleral icterus, no nasal discharge, mouth moist.  CV:  Regular rate and rhythm, no murmur or JVD.  S1 + S2 noted, no S3 or S4.  LUNGS:  Clear to auscultation bilaterally without rales/rhonchi/wheezing/retractions.  Symmetric chest rise on inhalation noted.  ABD:  Active bowel sounds, soft, non-tender/non-distended.  No rebound/guarding/rigidity.  EXT:  No edema or cyanosis.  No joint synovitis noted.  SKIN:  Dry to touch, no exanthems noted in the visualized areas.       Medications     dextrose 10% 75 mL/hr at 06/04/20 0124     sodium chloride 100 mL/hr at 06/04/20 1114       [START ON 6/5/2020] multivitamin w/minerals  1 tablet Oral Daily     sodium chloride (PF)  3 mL Intracatheter Q8H       Data   Recent Labs   Lab 06/04/20  0554 06/04/20  0152 06/03/20  2157 06/03/20  1804   WBC 5.2  --   --  6.8   HGB 8.8*  --   --  6.9*   MCV 92  --   --  96   PLT 24*  --   --  36*   *  --  129* 126*   POTASSIUM 4.2 4.5 4.6 5.9*   CHLORIDE 105  --  104 102   CO2 14*  --  15* 13*   BUN 21  --  26 27   CR 0.64  --  0.75 0.78    ANIONGAP 11  --  10 11   FRANKLIN 8.7  --  8.8 9.3   *  --  128* 91   ALBUMIN  --   --   --  2.8*   PROTTOTAL  --   --   --  6.7*   BILITOTAL  --   --   --  0.5   ALKPHOS  --   --   --  138   ALT  --   --   --  16   AST  --   --   --  139*       Recent Results (from the past 24 hour(s))   XR Chest Port 1 View    Narrative    EXAM: XR CHEST PORT 1 VW  LOCATION: [facility]  DATE/TIME: 6/3/2020 8:53 PM    INDICATION: Generalized weakness history of metastatic cancer  COMPARISON: 04/18/2020      Impression    IMPRESSION: Lungs appear clear. Normal heart size. No pleural effusion or pneumothorax. There is a healing left fifth rib fracture laterally new compared to prior exam.   CT Head w/o Contrast    Narrative    EXAM: CT HEAD W/O CONTRAST  LOCATION: [facility]  DATE/TIME: 6/3/2020 9:16 PM    INDICATION: Altered level of consciousness (LOC), confusion  COMPARISON: CT head without contrast 04/20/2020.  TECHNIQUE: Routine without IV contrast. Multiplanar reformats. Dose reduction techniques were used.    FINDINGS:  INTRACRANIAL CONTENTS: No intracranial hemorrhage, extraaxial collection, or mass effect.  No CT evidence of acute infarct. Mild presumed chronic small vessel ischemic changes. Mild generalized volume loss. No hydrocephalus.     VISUALIZED ORBITS/SINUSES/MASTOIDS: No intraorbital abnormality. No paranasal sinus mucosal disease. No middle ear or mastoid effusion.    BONES/SOFT TISSUES: There are several scattered lucent lesions within the calvarium, similar to recent CT head examination and new in comparison to more remote 03/01/2013 CT examination.      Impression    IMPRESSION:  1.  No CT evidence for acute intracranial process.  2.  Brain atrophy and presumed chronic microvascular ischemic changes as above.  3.  Several lucent lesions involving the calvarium are similar to 04/20/2020 examination and new from remote 2013 examination. Findings may reflect metastatic lesions  associated with the patient's reported history of breast cancer.    Results discussed with Dr. Bland on 6/3/2020 10:02 PM.             Disclaimer: This note consists of symbols derived from keyboarding, dictation and/or voice recognition software. As a result, there may be errors in the script that have gone undetected. Please consider this when interpreting information found in this chart.

## 2020-06-04 NOTE — PROGRESS NOTES
Report was given to JAIME Yeung on 5th floor. Patient belongings gathered and taken to room 526.  Pt to go to 5th floor after MRI is complete.

## 2020-06-04 NOTE — PROGRESS NOTES
SWS ( brief note)     D: Received request to see regarding discharge planning, noted consults pending for palliative care, oncology, nutrition, PT/OT.             SW this morning received call from MELISA Espinal at [facility] ( [phone]) who informs that pt has been a resident of their facility, bed hold at this time. She also has recommended that there be discussion with  regarding hospice consideration for pt, oncology MD has initiated this discussion with him, as well as has staff from the facility initiating discussion regarding goals of care and consideration for comfort care measures for pt.     A/P: SW following for support and discharge planning per MD determination of plan.         .    DB Moran   Care Transitions Services   [facility]     [phone]

## 2020-06-04 NOTE — CONSULTS
[facility]    Hematology / Oncology Consultation     Date of Admission:  6/3/2020    Assessment & Plan   Elizabeth Li is a 84 year old female who was admitted on 6/3/2020. I was asked to see the patient for metastatic breast cancer.     Assessment:  1. Recent diagnosis of metastatic breast cancer. ER negative, MT low positive (1-5%) HER-2/miguel angel negative.  Extensive bony metastasis.  Presented with hypercalcemia confusion.   2. Currently getting palliative radiation treatment to the lumbar spine  3. Readmitted with general decline, metabolic encephalopathy.  4. Pancytopenia,   Plan:  1. MRI brain to rule out intracranial disease.  Patient was scheduled to get MRI of brain as outpatient but could not complete due to hospital admission.   2. Urine culture and blood culture to rule out infections.   3. Pancytopenia likely secondary to extensive bone metastasis and recent radiation to lumbar spine.  Status post 1 unit of packed red blood cell transfusion.  There is possibility that patient may have bone marrow involvement with her disease.   4. May need a bone marrow biopsy depending on goals of care.     Rodney An MD   [facility]  Office:  [phone]    Code Status    Full Code    Reason for Consult Metastatic breast cancer    Primary Care Physician   Rebekah Rausch    Chief Complaint     Acute confusion.     History of Present Illness   Elizabeth Li is a 84 year old female who presents as they were concerned from transitional care unit staff that patient is not eating has lost about 20 pounds during admission intermittent back pain and intermittent mental status changes and has been having periodic hallucinations.  Labs in TCU showed increasing acidosis electrolyte derangement and pancytopenia.       Oncologic history from [place] [facility] charts.       1.  4/17/2020: Patient presented to [facility] [place], 1 month history of progressive Fatigue  weakness generalized myalgia back  pain and confusion.  Patient was found to have severe hypercalcemia. CT scan of chest abdomen and pelvis showed extensive osseous metastatic disease, destructive disease near L5.  He was treated with IV fluids calcitonin and pamidronate.  She also had treatment for concurrent UTI.  During the hospitalization she was found to have a large left breast mass.   2.  4/23/2020: Biopsy from L5 showed metastatic carcinoma, breast biopsy showed invasive ductal carcinoma.  2 pathologies were compared and found to be morphologically similar. ER negative, MS low positive (1-5%) HER-2/miguel angel negative.  3. Age of menarche 15, last mammogram was 8 years ago.  Menopause 49.  Four children attempted breast-feed with 1st child.   4.  5/18/2020: Patient started on palliative radiation to lumbar spine.     Past Medical History   I have reviewed this patient's medical history and updated it with pertinent information if needed.   Past Medical History:   Diagnosis Date     CAD (coronary artery disease)     Severe 2 vessel CAD. PCI with drug-eluting stents x2 to mid LAD on 3/27/15     Depression      HTN (hypertension)      Hyperlipidemia      Hypothyroidism      IHD (ischemic heart disease)      KUSUM (obstructive sleep apnea)     CPAP       Past Surgical History   I have reviewed this patient's surgical history and updated it with pertinent information if needed.  Past Surgical History:   Procedure Laterality Date     BACK SURGERY       COLONOSCOPY N/A 1/11/2017    Procedure: COLONOSCOPY;  Surgeon: Nghia Moss MD;  Location:  GI     Coronary angiogram with stents x2  03/2015    2.5 X 18 mm & 3.0 X mm LUCILA to LAD     OPTICAL TRACKING SYSTEM FUSION POSTERIOR SPINE LUMBAR N/A 7/17/2019    Procedure: L3-L4 TRANSFORAMINAL LUMBAR  INTRABODY FUSION WITH ALLOGRAFT, OPTICAL TRACKING SYSTEM AND MEDTRONIC SOLARA INSTRUMENTATION;  Surgeon: Cole Jansen MD;  Location:  OR     TONSILLECTOMY, ADENOIDECTOMY, COMBINED       TUBAL LIGATION        [place] teeth extraction         Prior to Admission Medications   Prior to Admission Medications   Prescriptions Last Dose Informant Patient Reported? Taking?   HYDROmorphone (DILAUDID) 2 MG tablet 6/3/2020 at am  Yes Yes   Sig: Take 2 mg by mouth 2 times daily   HYDROmorphone (DILAUDID) 2 MG tablet  at prn  Yes Yes   Sig: Take 2 mg by mouth every 4 hours as needed for moderate to severe pain Do not give PRN doses within 4 hours of the scheduled dose    acetaminophen (TYLENOL) 500 MG tablet 6/3/2020 at x2  No Yes   Sig: Take 2 tablets (1,000 mg) by mouth 3 times daily   amLODIPine (NORVASC) 5 MG tablet 6/2/2020 at pm  Yes Yes   Sig: Take 5 mg by mouth At Bedtime   artificial saliva (BIOTENE MT) SOLN solution  at prn  No Yes   Sig: Swish and spit 2 mLs (2 sprays) in mouth every hour as needed for dry mouth   ascorbic acid (VITAMIN C) 500 MG tablet 6/3/2020 at noon Self Yes Yes   Sig: Take 500 mg by mouth daily (with lunch) Pt takes in afternoon   aspirin (ASA) 81 MG tablet 6/3/2020 at am  No Yes   Sig: Take 1 tablet (81 mg) by mouth daily   bisacodyl (DULCOLAX) 10 MG suppository  at prn  No Yes   Sig: Place 1 suppository (10 mg) rectally daily as needed for constipation   dexamethasone (DECADRON) 4 MG tablet 6/3/2020 at am  Yes Yes   Sig: Take 4 mg by mouth daily (with breakfast)   hydrALAZINE (APRESOLINE) 10 MG tablet  at prn  No Yes   Sig: Take 1 tablet (10 mg) by mouth every 8 hours as needed (SBP>160)   isosorbide mononitrate (IMDUR) 30 MG 24 hr tablet 6/3/2020 at am  No Yes   Sig: TAKE ONE TABLET BY MOUTH EVERY DAY   levothyroxine (SYNTHROID/LEVOTHROID) 75 MCG tablet 6/3/2020 at am  No Yes   Sig: TAKE ONE TABLET BY MOUTH EVERY MORNING (BEFORE BREAKFAST)   liothyronine (CYTOMEL) 5 MCG tablet 6/3/2020 at am  Yes Yes   Sig: Take 1 tablet (5 mcg) by mouth daily   lisinopril (ZESTRIL) 20 MG tablet 6/3/2020 at am  Yes Yes   Sig: Take 40 mg by mouth daily   metoprolol tartrate (LOPRESSOR) 100 MG tablet 6/3/2020 at  am  Yes Yes   Sig: Take 100 mg by mouth 2 times daily Hold if heart rate is < 55   omeprazole (PRILOSEC) 20 MG DR capsule 6/3/2020 at am  No Yes   Sig: Take 1 capsule (20 mg) by mouth daily   polyethylene glycol (MIRALAX/GLYCOLAX) packet  at prn  Yes Yes   Sig: Take 1 packet by mouth nightly as needed for constipation    potassium chloride ER (K-TAB) 20 MEQ CR tablet 6/3/2020 at am  Yes Yes   Sig: Take 1 tablet (20 mEq) by mouth daily   senna-docusate (SENOKOT-S/PERICOLACE) 8.6-50 MG tablet  at prn  No Yes   Sig: Take 1 tablet by mouth 2 times daily as needed for constipation   sennosides (SENOKOT) 8.6 MG tablet 6/2/2020 at pm  Yes Yes   Sig: Take 1 tablet by mouth At Bedtime   simethicone (MYLICON) 125 MG chewable tablet  at prn  Yes Yes   Sig: Take 125 mg by mouth 4 times daily as needed for intestinal gas   spironolactone (ALDACTONE) 12.5 mg TABS half-tab 6/3/2020 at am  Yes Yes   Sig: Take 12.5 mg by mouth daily      Facility-Administered Medications: None     Allergies   Allergies   Allergen Reactions     Flu Virus Vaccine Other (See Comments)     Viral SX     Gluten Meal        Social History   I have reviewed this patient's social history and updated it with pertinent information if needed. Elizabeth Li  reports that she quit smoking about 36 years ago. She has never used smokeless tobacco. She reports current alcohol use. She reports that she does not use drugs.    Family History   I have reviewed this patient's family history and updated it with pertinent information if needed.   Family History   Problem Relation Age of Onset     Blood Disease Mother         pernious anemia     Heart Disease Father      Heart Disease Sister      Heart Disease Brother      Colon Cancer No family hx of        Review of Systems   Review of systems unavailable because of mental status changes    Physical Exam   Temp: 98.9  F (37.2  C) Temp src: Temporal BP: 130/52 Pulse: 72 Heart Rate: 80 Resp: 16 SpO2: 97 % O2 Device:  Nasal cannula Oxygen Delivery: 2 LPM  Vital Signs with Ranges  Temp:  [97.3  F (36.3  C)-98.9  F (37.2  C)] 98.9  F (37.2  C)  Pulse:  [68-87] 72  Heart Rate:  [69-88] 80  Resp:  [16-18] 16  BP: (115-156)/(44-85) 130/52  SpO2:  [91 %-100 %] 97 %  136 lbs 6.4 oz    Constitutional: Patient very drowsy confused.  In hospital bed is able to wake up, answer limited to 1-2 words.   Eyes: Conjunctiva and pupils examined there is severe pallor .    Respiratory: No respiratory distress.   GI: Soft, non-distended, non-tender, normal bowel sounds.  Lymph/Hematologic: No anterior cervical or supraclavicular adenopathy.  Skin: No rashes, no cyanosis, no edema.  Musculoskeletal: No joint swelling, erythema or tenderness.  Neurologic: Patient very drowsy and confused but no focal deficits moving all 4 extremities.       Data     Results for AMARI AVILEZ (MRN [id number]) as of 6/4/2020 11:45   Ref. Range 6/3/2020 21:33 6/3/2020 21:57 6/3/2020 22:53 6/4/2020 01:23 6/4/2020 01:52 6/4/2020 04:53 6/4/2020 05:54 6/4/2020 10:45   Sodium Latest Ref Range: 133 - 144 mmol/L  129 (L)     130 (L)    Potassium Latest Ref Range: 3.4 - 5.3 mmol/L  4.6   4.5  4.2    Chloride Latest Ref Range: 94 - 109 mmol/L  104     105    Carbon Dioxide Latest Ref Range: 20 - 32 mmol/L  15 (L)     14 (L)    Urea Nitrogen Latest Ref Range: 7 - 30 mg/dL  26     21    Creatinine Latest Ref Range: 0.52 - 1.04 mg/dL  0.75     0.64    GFR Estimate Latest Ref Range: >60 mL/min/1.73_m2  74     83    GFR Estimate If Black Latest Ref Range: >60 mL/min/1.73_m2  86     >90    Calcium Latest Ref Range: 8.5 - 10.1 mg/dL  8.8     8.7    Anion Gap Latest Ref Range: 3 - 14 mmol/L  10     11    Glucose Latest Ref Range: 70 - 99 mg/dL 166 (H) 128 (H) 130 (H) 97  144 (H) 143 (H) 104 (H)   WBC Latest Ref Range: 4.0 - 11.0 10e9/L       5.2    Hemoglobin Latest Ref Range: 11.7 - 15.7 g/dL       8.8 (L)    Hematocrit Latest Ref Range: 35.0 - 47.0 %       26.9 (L)    Platelet  Count Latest Ref Range: 150 - 450 10e9/L       24 (LL)    RBC Count Latest Ref Range: 3.8 - 5.2 10e12/L       2.92 (L)    MCV Latest Ref Range: 78 - 100 fl       92    MCH Latest Ref Range: 26.5 - 33.0 pg       30.1    MCHC Latest Ref Range: 31.5 - 36.5 g/dL       32.7    RDW Latest Ref Range: 10.0 - 15.0 %       16.9 (H)

## 2020-06-04 NOTE — PROGRESS NOTES
Pt arrived on the floor at around midnight welcome to room call light/welcome packet, pt is intermittently confused/disoriented, lung sounds diminished, pale, on regular diet,+ve bowel sounds.Tylenol given for pain. IVF infusing D10/NS.IV on both left and right forearms.1U of RBC replaced in ER.Repositioning q2hrs.pure wick in place draining adequate amounts.incontinent at times.Potassium 4.2,hgb,8.8 this am.  Blood glucose 97, and 144. Will continue to monitor.

## 2020-06-04 NOTE — ED NOTES
[facility]  ED Nurse Handoff Report    Elizabeth Li is a 84 year old female   ED Chief complaint: Abnormal Labs and Generalized Weakness  . ED Diagnosis:   Final diagnoses:   Metastatic carcinoma (H)   Anemia, unspecified type   Thrombocytopenia (H)   Hyperkalemia   Generalized muscle weakness   Hyponatremia     Allergies:   Allergies   Allergen Reactions     Flu Virus Vaccine Other (See Comments)     Viral SX     Gluten Meal        Code Status: Full Code  Activity level - Baseline/Home:  Total Care. Activity Level - Current:   Total Care. Lift room needed: No. Bariatric: No   Needed: No   Isolation: Yes. Infection: Chemotherapy/radiation.     Vital Signs:   Vitals:    06/03/20 2130 06/03/20 2145 06/03/20 2200 06/03/20 2215   BP: 119/59 119/73 137/52 123/56   Pulse: 77 75 75 77   Resp:       Temp:   97.6  F (36.4  C)    TempSrc:   Oral    SpO2: 95% 100% 100% 99%       Cardiac Rhythm:  ,   Cardiac  Cardiac Rhythm: Normal sinus rhythm  Pain level:    Patient confused: Yes. Patient Falls Risk: Yes.   Elimination Status: Has voided   Patient Report - Initial Complaint: Abnormal Labs, Generalized Weaknss. Focused Assessment: Pt has hx of metastatic carcinoma on chemo receiving radiation. Per [facility] staff, pt has had increased weakness and confusion for the last few days. Had labs drawn today and Na, K, and Hgb were out of range (see results). Pt also has sacral bed sore present on arrival and c/o right hip pain. Denies recent falls or injuries. ABCs intact, disoriented to time.  Tests Performed:   Labs Ordered and Resulted from Time of ED Arrival Up to the Time of Departure from the ED   CBC WITH PLATELETS DIFFERENTIAL - Abnormal; Notable for the following components:       Result Value    RBC Count 2.24 (*)     Hemoglobin 6.9 (*)     Hematocrit 21.4 (*)     RDW 18.2 (*)     Platelet Count 36 (*)     Nucleated RBCs 1 (*)     Absolute Metamyelocytes 0.3 (*)     Absolute  Myelocytes 0.3 (*)     All other components within normal limits   BASIC METABOLIC PANEL - Abnormal; Notable for the following components:    Sodium 126 (*)     Potassium 5.9 (*)     Carbon Dioxide 13 (*)     All other components within normal limits   HEPATIC PANEL - Abnormal; Notable for the following components:    Albumin 2.8 (*)     Protein Total 6.7 (*)      (*)     All other components within normal limits   GLUCOSE BY METER - Abnormal; Notable for the following components:    Glucose 163 (*)     All other components within normal limits   GLUCOSE BY METER - Abnormal; Notable for the following components:    Glucose 166 (*)     All other components within normal limits   BASIC METABOLIC PANEL - Abnormal; Notable for the following components:    Sodium 129 (*)     Carbon Dioxide 15 (*)     Glucose 128 (*)     All other components within normal limits   MAGNESIUM   OCCULT BLOOD STOOL   GLUCOSE BY METER   PERIPHERAL IV CATHETER   CARDIAC CONTINUOUS MONITORING   ASSESS FOR HYPOGLYCEMIA SYMPTOMS   NOTIFY PHYSICIAN   ABO/RH TYPE AND SCREEN   RED BLOOD CELL PREPARE ORDER UNIT   BLOOD COMPONENT     CT Head w/o Contrast   Final Result   IMPRESSION:   1.  No CT evidence for acute intracranial process.   2.  Brain atrophy and presumed chronic microvascular ischemic changes as above.   3.  Several lucent lesions involving the calvarium are similar to 04/20/2020 examination and new from remote 2013 examination. Findings may reflect metastatic lesions associated with the patient's reported history of breast cancer.      Results discussed with Dr. Bland on 6/3/2020 10:02 PM.      XR Chest Port 1 View   Final Result   IMPRESSION: Lungs appear clear. Normal heart size. No pleural effusion or pneumothorax. There is a healing left fifth rib fracture laterally new compared to prior exam.          Treatments provided: Insulin, Dextrose, Lasix, RBC, IVF (see MAR)  Family Comments:  Oswaldo [phone] would like to be  kept updated.  OBS brochure/video discussed/provided to patient:  Yes  ED Medications:   Medications   glucose gel 15-30 g (has no administration in time range)     Or   dextrose 50 % injection 25-50 mL (has no administration in time range)     Or   glucagon injection 1 mg (has no administration in time range)   dextrose 10% infusion ( Intravenous New Bag 6/3/20 2048)   furosemide (LASIX) injection 40 mg (40 mg Intravenous Given 6/3/20 2035)   insulin (regular) (HumuLIN R/NovoLIN R) 1 unit/mL injection 6 Units (6 Units Intravenous Given 6/3/20 2045)   dextrose 50 % injection 25 g (25 g Intravenous Given 6/3/20 2041)   0.9% sodium chloride BOLUS (0 mLs Intravenous Stopped 6/3/20 2130)     Drips infusing:  No  For the majority of the shift, the patient's behavior Green. Interventions performed were none.    Sepsis treatment initiated: No       ED Nurse Name/Phone Number: Swetha Morales RN,   10:29 PM  RECEIVING UNIT ED HANDOFF REVIEW    Above ED Nurse Handoff Report was reviewed: Yes  Reviewed by: Selwyn Biggs, JAIME on Evangelina 3, 2020 at 11:46 PM

## 2020-06-04 NOTE — ED NOTES
Pt's spouse Oswaldo called. Spouse updated on pt's status and plan to admit pt. All questions answered at this time.

## 2020-06-04 NOTE — PROGRESS NOTES
"SPIRITUAL HEALTH SERVICES Progress Note  [facility] Ortho/Spine [facility]    Saw pt Elizabeth per staff referral requesting emotional support for pt.  When I introduced myself, Elizabeth deferred a spiritual care conversation and reported that someone from [facility] will call her.  She shared that has not been able to walk lately and is waiting to hear more about her care plan.  Elizabeth acknowledged that she sometimes asks God \"why me?\"  Offered reflective listening and validation of feelings.  Elizabeth welcomed prayer.  Reviewed how she can request further  support.    This author and other chaplains remain available per pt's request.     Elmer Pierre M.Div., [facility]  Staff   Pager [phone]    "

## 2020-06-05 NOTE — TELEPHONE ENCOUNTER
Called patient's .    He discussed that he wasn't sure if Elizabeth should undergo chemotherapy or not.    I recommended that patient discuss with oncology, as the are the experts.    The patient's wife sounds like he want to try chemotherapy to see if there is a response and if she tolerates it.

## 2020-06-05 NOTE — PROGRESS NOTES
[facility]  Palliative Care Progress Note  Text Page     Assessment & Plan   Elizabeth Li is a 84 year old female who was admitted on 6/3/2020. I was asked by Hospitalist Estevan Britton MD to see the patient for metastatic breast cancer, goals of care.    Recommendations:  1.  Decisional Capacity -  Unreliable as patient is pleasantly confused. Patient does not have an advance directive. Per  informed consent policy next of kin should be involved in all consent and decision making. Her  Oswaldo Li is next-of-kin.     2.  Altered mental status -  indicates this is not a new finding as she had not been able to competently use her cell phone for weeks  -Patient is able to say season, president and year. She is able to say she is a [facility] but may not be able to see the bigger picture of there current medical conditions     3.  Severe Malnutrition - Appreciate input of Registered Dietitian Janie Valdes, RD, LD. The patient has lost 36 pounds the past year.  -Added biotene oral spray for dry mouth as she reports this is preventing her from wanting to eat      4.  Cancer related pain - Dilaudid 1 to 2 mg every 3 hours prn for pain. Avoid the use of Oxycodone as the patient indicated in previous hospitalization her mental state was better with Dilaudid.  -Added lidocaine patch to back in the evening      5.  Constipation - due to opiate use. Reasonable to offer Senna-S daily.     6.  Generalized weakness -  indicates she was deangelo lift move for the past 3 weeks. Doubt therapy would improve this situation.      7.  Spiritual Care - Appreciate input of PRANEETH Joe.     8.  Care Planning - Appreciate input of  DB Hebert regarding bed hold at [facility] (which  indicates they are paying out of pocket)     Disease Process/es & Symptoms:  Elizabeth Li is a 84 year old patient admitted with  symptoms of severe anemia, thrombocytopenia, hyponatremia and hyperkalemia noted on lab studies. Head CT demonstrate bony metastasis with no evidence of intracranial hemorrhage or metastasis. Patient is known to me from her hospitalization April 17 through 28 when she was admitted for severe hypercalcemia of malignancy. She underwent a biopsy soft tissue lesion at L5 on 4/23/2020 which was positive for malignancy. She was dismissed to [facility] where she has resided since.     The patient did have a uncomplicated hospitalization June 17 through 20, 2019 for L3-L4 Transforaminal Lumbar Intrabody Fusion by Cole Jansen MD. She was hospitalized  April 17 thorough 28 with hypercalcemia of malignancy. She has a 37 pound weight loss in the past year as she weighs 136 pounds today and last year she was documented at 173 pounds on Evangelina 10, 2019.    Goals of care: DNR/DNI restorative  Findings & plan of care discussed with: Bedside nurse Bri Marin RN  Follow-up plan from palliative team: Will follow closely during this hospitalization  Thank you for involving us in the patient's care      Bita GUEVARA CNP  Pain Management and Palliative Care  [facility]  Pgr: [phone]    Time Spent on this Encounter   Total unit/floor time 42 minutes, time consisted of the following, examination of the patient, reviewing the record and completing documentation. >50% of time spent in counseling and coordination of care.  Time spend counseling with patient, family and medical consisted of the following topics, goals of care, education about prognosis and symptom management.  Time spent in coordination of care with those listed above.     Interval History   Patient is in good spirits and laying in bed. She does complain of pain in back    Course of Hospitalization Discussions Data    Discussed on June 5, 2020 with ISMAEL Ha CNP: Met with patient per the request of the her  to talk to her about  "what her wishes and goals of care of. Patient reports that she \"wants to live\" we talked about her nutrition, physical condition. We discussed her cancer diagnosis and what that would look like for the further. She stated \" If it do not do treatment I will die\" We talked about how that might be a possibility but further treatment may also decrease her life expectancy, she verbalized understanding and when asked if she could tell me what I was telling her and she said \"I could die either way\" she then said \"well I would like to try chemotherapy again\" we talked about how she may need to get stronger prior to being able to do further treatments and she verbalized understanding. Reviewed her medications, and her symptoms. She reports needing something more for her back pain, when asked if she would like to try a patch she stated ''yes\". Phone call placed to  to review plan of care and prognosis.  reports they would like TCU and rehab and treating her cancer today. He repots her children are in agreement with this today.    Discussed on June 4, 2020 with oLvely GUEVARA, CNS:   Met with the patient as she was resting in bed. She was unable to give insight. I asked when she had last seen her . Elizabeth explained \"He was just here. I wonder where he went. (Impossible as we have no visitors due to the COVID-19 visitor restrictions). She denied complaint but agreed that I should call her  to give and update.      I phoned the patient's , Oswaldo at [phone]. He did recall talking with me during Elizabeth's hospital stay in April. As Elizaebth had gone to five radiation treatment, he was able to see her briefly at those appointments. He explained that when Elizabeth was hospitalized in April he had hoped she would get stronger and return home. \"I see now that is not going to happen.\" He knew that she had lost more than 20 pounds while at [facility] and he estimated that it had " "been three weeks since she had been out of bed without the use of a hover lift. He explained that since she was not doing rehab \"I just got the bill from [facility] the other day and it was 14 thousand to have her live there.\" I asked Oswaldo if they had discussions about what Elizabeth would want. \"She told me no tube feed, but I don't know if she understood what that would mean.\" He explained that they had talked about \"If things got bad, no CPR, but I don't know if it's that bad.\" Oswaldo acknowledged that Elizabeth couldn't remember or didn't know how to use her cell phone. \"I don't know if she's given up or what.\" We discussed the normal dying process in cancer. He acknowledged that she was sleeping more and hadn't been eating the past couple of weeks. \"I worried that she can't be with me, but I can't take care of her. I don't want her in pain. I suppose it's time for hospice to make her without pain and comfortable.\" We discussed the hospice benefit and as he offered concern about finances I explained that [facility] may be an option that helps him financially. \"I don't want to make that decision now, until I find out about the MRI and how they can improve her at the hospital.\" I explained that our , Caitie can look into bed availability at [facility], but he can always have Elizabeth return to [facility] for hospice care. He offered that he should talk to their two daughters who live in [place] and [place] and their son in [place], [place] (another son has been estranged since 1982). He asked for information about hospice and [facility] and asked that I send it to his e-mail at [email or web address] which I have done.     Review of Systems    CONSTITUTIONAL: NEGATIVE for fever, chills, change in weight  ENT/MOUTH: complains of dry mouth  RESP: NEGATIVE for significant cough or SOB  CV: NEGATIVE for chest pain, palpitations or peripheral edema    Palliative Symptom Review (0=no symptom/no concern, 1=mild, 2=moderate, " 3=severe):      Pain: 1-mild      Fatigue: 2-moderate      Nausea: 0-none      Constipation: 0-none      Diarrhea: 0-none      Depressive Symptoms: 0-none      Anxiety: 0-none      Drowsiness: 2-moderate      Poor Appetite: 2-moderate      Shortness of Breath: 0-none      Insomnia: 0-none      Overall (0 good/no concerns, 3 very poor):  3    Physical Exam   Temp:  [96.7  F (35.9  C)-99.8  F (37.7  C)] 96.7  F (35.9  C)  Heart Rate:  [] 91  Resp:  [16] 16  BP: (136-151)/(49-62) 140/54  SpO2:  [97 %-99 %] 97 %  136 lbs 12.8 oz  Exam:  GENERAL APPEARANCE:  Alert, cooperative  ENT:  Mouth and posterior oropharynx normal, moist mucous membranes, Elim IRA  CV:  Palpation and auscultation of heart done , regular rate and rhythm, no murmur, rub, or gallop  RESP:  respiratory effort and palpation of chest normal, lungs clear to auscultation , no respiratory distress  ABD:  Rounded, soft, non-tender/non-distended.  +BS  EXT:  Edema & pulses as noted above.  CMS intact x 4.     SKIN:  Dry to touch, no exanthems noted in the visualized areas.    NEURO: Symmetric strength +5/5.  Sensation to touch intact all extremities  PAIN BEHAVIOR: Cooperative  Psych:  Normal affect.  Calm, cooperative, conversant appropriately.    Medications       artificial saliva  2 spray Swish & Spit 4x Daily     lidocaine  1 patch Transdermal Q24h    And     lidocaine   Transdermal Q8H     multivitamin w/minerals  1 tablet Oral Daily     sodium chloride (PF)  3 mL Intracatheter Q8H       Data   Results for orders placed or performed during the hospital encounter of 06/03/20 (from the past 24 hour(s))   MR Brain w/o & w Contrast    Narrative    MRI BRAIN WITHOUT AND WITH CONTRAST  6/4/2020 5:22 PM    HISTORY: Altered mental status unexplained. Metastatic triple negative  breast cancer, please rule out intracranial metastasis.     TECHNIQUE: Multiplanar, multisequence MRI of the brain without and  with 6 mL Gadavist    COMPARISON: None.    FINDINGS:  Diffuse abnormal marrow signal intensity is present in the  upper cervical spine as well as in several areas within the calvarium.  These findings are consistent with bony metastases. Postcontrast  images do not show any abnormal areas of enhancement in the brain  parenchyma. Diffusion-weighted images are normal. There is no evidence  for intracranial hemorrhage or acute infarct. Mild-moderate cerebral  atrophy is present. Minimal nonspecific white matter change without  mass effect are present. Brain parenchyma is otherwise normal.  Incidental note is made of some frontal hyperostosis interna.      Impression    IMPRESSION:  1. Diffuse osseous metastases.  2. No evidence for any intra-axial brain metastases.  3. Cerebral atrophy.  4. Minimal nonspecific white matter changes also noted.  5. No evidence for acute infarct or acute hemorrhage.    MIRTHA COVINGTON MD   Procalcitonin   Result Value Ref Range    Procalcitonin 0.49 ng/ml   Basic metabolic panel   Result Value Ref Range    Sodium 136 133 - 144 mmol/L    Potassium 4.0 3.4 - 5.3 mmol/L    Chloride 114 (H) 94 - 109 mmol/L    Carbon Dioxide 16 (L) 20 - 32 mmol/L    Anion Gap 6 3 - 14 mmol/L    Glucose 125 (H) 70 - 99 mg/dL    Urea Nitrogen 10 7 - 30 mg/dL    Creatinine 0.43 (L) 0.52 - 1.04 mg/dL    GFR Estimate >90 >60 mL/min/[1.73_m2]    GFR Estimate If Black >90 >60 mL/min/[1.73_m2]    Calcium 9.0 8.5 - 10.1 mg/dL   CBC with platelets differential   Result Value Ref Range    WBC 3.8 (L) 4.0 - 11.0 10e9/L    RBC Count 2.43 (L) 3.8 - 5.2 10e12/L    Hemoglobin 7.4 (L) 11.7 - 15.7 g/dL    Hematocrit 23.1 (L) 35.0 - 47.0 %    MCV 95 78 - 100 fl    MCH 30.5 26.5 - 33.0 pg    MCHC 32.0 31.5 - 36.5 g/dL    RDW 17.6 (H) 10.0 - 15.0 %    Platelet Count 23 (LL) 150 - 450 10e9/L    Diff Method Manual Differential     % Neutrophils 73.0 %    % Lymphocytes 15.0 %    % Monocytes 8.0 %    % Eosinophils 1.0 %    % Basophils 0.0 %    % Metamyelocytes 3.0 %    Nucleated RBCs 2 (H)  0 /100    Absolute Neutrophil 2.8 1.6 - 8.3 10e9/L    Absolute Lymphocytes 0.6 (L) 0.8 - 5.3 10e9/L    Absolute Monocytes 0.3 0.0 - 1.3 10e9/L    Absolute Eosinophils 0.0 0.0 - 0.7 10e9/L    Absolute Basophils 0.0 0.0 - 0.2 10e9/L    Absolute Metamyelocytes 0.1 (H) 0 10e9/L    Absolute Nucleated RBC 0.1     Anisocytosis Slight     RBC Fragments Slight     Microcytes Present     Macrocytes Present     Platelet Estimate       Automated count confirmed.  Platelet morphology is normal.   Blood culture    Specimen: Blood    Left Arm   Result Value Ref Range    Specimen Description Blood Left Arm     Culture Micro PENDING

## 2020-06-05 NOTE — PROGRESS NOTES
Regine from palliative care asked me to call [facility] regarding a bed per 's request. [facility] will not have any beds until late next week, this information sheared with Regine.    Maryam Rivero, [facility] MELISA   Inpatient Care Coordination   Supervisor  [facility]  [phone]

## 2020-06-05 NOTE — PROGRESS NOTES
End of Shift Summary  For vital signs and complete assessments, please see documentation flowsheets.     Pertinent assessments: Alert to self, elevated BP's, tachy at times, on tele monitoring, currently on 2L NC. Pure wick in place, adequate urine output. C/O lower back pain 7/10 oral Dilaudid x 1 given. Repositioning     Major Shift Events   Treatment Plan: IVF, tele monitoring, monitor labs.

## 2020-06-05 NOTE — PROGRESS NOTES
[facility]    Hospitalist Progress Note    Date of Service (when I saw the patient): 06/05/2020  Provider:  Toro Solorzano MD   Text Page  7am - 6PM       Assessment & Plan   Elizabeth Li is a 84 year old female who  has a past medical history of CAD (coronary artery disease), Depression, HTN (hypertension), Hyperlipidemia, Hypothyroidism, IHD (ischemic heart disease), and KUSUM (obstructive sleep apnea) and recent diagnosis of metastatic breast cancer.  She is coming from the TCU where she has been doing physical therapy after her last admission.  TCU staff reported that patient has lost around 20 pounds during the stay, she does not participate in physical therapy and she is confused and hallucinating.  She had abnormal lab work-up and was sent to the emergency department for an assessment.  She was admitted on 6/3/2020 for further studies, treatment and follow-up.    Active Problems:  1.  Metastatic breast cancer.  2.  Acute toxic metabolic encephalopathy with hallucinations and confusion.  3.  Failure to thrive, generalized decline.  4.  Pancytopenia with moderate anemia (after transfusion) and severe thrombocytopenia.  5.  Severe malnutrition In Context of acute illness or injury  Chronic illness or disease  6. Chronic comorbidities.  - Essential hypertension.  - Dyslipidemia.  - Coronary artery disease.  - Hypothyroidism.  - KUSUM    Plan.  Inpatient.  Diet as tolerated.  Hydration with D5/normal saline/KCl 20 mEq at 100 mils per hour.  Pain control with Dilaudid IV as needed.  Multivitamin 1 tablet daily.    Famotidine 20 mg  IV every 12 hours.  Hydralazine with parameters.  Oncology consultation highly appreciated.  MRI of the brain performed.    DVT Prophylaxis: Pneumatic Compression Devices  Code Status: DNR/DNI    Disposition: Expected discharge to ? Hospice once work up completed.    Interval History   Patient is better disoriented to place and person, she knows she is at [facility] in  [place]. She is not in pain at the moment.  She does not have any evidence of shortness of breath or difficulty breathing. Oncology has seen her, she may be going to home tomorrow after discussion with her family of goals of care.    -Data reviewed today: I reviewed all new labs and imaging results over the last 24 hours.     Physical Exam   Temp: 96.7  F (35.9  C) Temp src: Oral BP: (Abnormal) 140/54   Heart Rate: 91 Resp: 16 SpO2: 97 % O2 Device: Nasal cannula Oxygen Delivery: 2 LPM  Vitals:    06/04/20 0010 06/05/20 0607   Weight: 61.9 kg (136 lb 6.4 oz) 62.1 kg (136 lb 12.8 oz)     Vital Signs with Ranges  Temp:  [96.7  F (35.9  C)-99.8  F (37.7  C)] 96.7  F (35.9  C)  Heart Rate:  [] 91  Resp:  [16] 16  BP: (136-151)/(49-62) 140/54  SpO2:  [97 %-99 %] 97 %  I/O last 3 completed shifts:  In: 1064 [I.V.:1064]  Out: 1200 [Urine:1200]    GEN:  Alert,  oriented x 2, appears comfortable, NAD.  HEENT:  Normocephalic/atraumatic, no scleral icterus, no nasal discharge, mouth moist.  CV:  Regular rate and rhythm, no murmur or JVD.  S1 + S2 noted, no S3 or S4.  LUNGS:  Clear to auscultation bilaterally without rales/rhonchi/wheezing/retractions.  Symmetric chest rise on inhalation noted.  ABD:  Active bowel sounds, soft, non-tender/non-distended.  No rebound/guarding/rigidity.  EXT:  No edema or cyanosis.  No joint synovitis noted.  SKIN:  Dry to touch, no exanthems noted in the visualized areas.       Medications     dextrose 5% and 0.9% NaCl with potassium chloride 20 mEq 100 mL/hr at 06/05/20 0416       multivitamin w/minerals  1 tablet Oral Daily     sodium chloride (PF)  3 mL Intracatheter Q8H       Data   Recent Labs   Lab 06/05/20  0656 06/04/20  0554 06/04/20  0152 06/03/20  2157 06/03/20  1804   WBC 3.8* 5.2  --   --  6.8   HGB 7.4* 8.8*  --   --  6.9*   MCV 95 92  --   --  96   PLT 23* 24*  --   --  36*    130*  --  129* 126*   POTASSIUM 4.0 4.2 4.5 4.6 5.9*   CHLORIDE 114* 105  --  104 102   CO2  16* 14*  --  15* 13*   BUN 10 21  --  26 27   CR 0.43* 0.64  --  0.75 0.78   ANIONGAP 6 11  --  10 11   FRANKLIN 9.0 8.7  --  8.8 9.3   * 143*  --  128* 91   ALBUMIN  --   --   --   --  2.8*   PROTTOTAL  --   --   --   --  6.7*   BILITOTAL  --   --   --   --  0.5   ALKPHOS  --   --   --   --  138   ALT  --   --   --   --  16   AST  --   --   --   --  139*       Recent Results (from the past 24 hour(s))   MR Brain w/o & w Contrast    Narrative    MRI BRAIN WITHOUT AND WITH CONTRAST  6/4/2020 5:22 PM    HISTORY: Altered mental status unexplained. Metastatic triple negative  breast cancer, please rule out intracranial metastasis.     TECHNIQUE: Multiplanar, multisequence MRI of the brain without and  with 6 mL Gadavist    COMPARISON: None.    FINDINGS: Diffuse abnormal marrow signal intensity is present in the  upper cervical spine as well as in several areas within the calvarium.  These findings are consistent with bony metastases. Postcontrast  images do not show any abnormal areas of enhancement in the brain  parenchyma. Diffusion-weighted images are normal. There is no evidence  for intracranial hemorrhage or acute infarct. Mild-moderate cerebral  atrophy is present. Minimal nonspecific white matter change without  mass effect are present. Brain parenchyma is otherwise normal.  Incidental note is made of some frontal hyperostosis interna.      Impression    IMPRESSION:  1. Diffuse osseous metastases.  2. No evidence for any intra-axial brain metastases.  3. Cerebral atrophy.  4. Minimal nonspecific white matter changes also noted.  5. No evidence for acute infarct or acute hemorrhage.    MIRTHA COVINGTON MD       Disclaimer: This note consists of symbols derived from keyboarding, dictation and/or voice recognition software. As a result, there may be errors in the script that have gone undetected. Please consider this when interpreting information found in this chart.

## 2020-06-05 NOTE — PLAN OF CARE
OT/PT: Pt admitted from TCU due to pt failure to progress with therapy, 20lb weight loss and abnormal lab work-up, hx of metastatic breast cancer. Note palliative note that family maybe considering hospice placement for pt at discharge. OT/PT will hold and continue to follow for dispo planning.

## 2020-06-05 NOTE — TELEPHONE ENCOUNTER
Patient's  is calling as Elizabeth is in the hospital right now and they would like to to get Dr Rausch's opinion about Elizabeth getting chemo therapy. Oswaldo said the oncologist is kind of hesitant to do it for her but will if they wish.

## 2020-06-05 NOTE — PROGRESS NOTES
[facility]    Hematology / Oncology progress note.     Date of Admission:  6/3/2020    Assessment & Plan   Elizabeth Li is a 84 year old female who was admitted on 6/3/2020. I was asked to see the patient for metastatic breast cancer.     Assessment:  1. Recent ( 4/23/2020) diagnosis of metastatic breast cancer. ER negative, AK low positive (1-5%) HER-2/miguel angel negative.  Extensive bony metastasis.  Presented with hypercalcemia/ confusion in 4/2020. Received Bisphosphonates. Improved.  2. Completed palliative radiation treatment to the lumbar spine. 5#.   3. Readmitted with general decline, metabolic encephalopathy. Anemia, thrombocytopenia, hyponatremia.   4. Brain MRI negative for intracranial metastasis.   5. Hyponatremia improved after hydration.   6. Confusion better today.     Plan:  1. Confusion improving today.   2. Pancytopenia likely secondary to extensive bone metastasis and recent radiation to lumbar spine.  Status post 1 unit of packed red blood cell transfusion.  There is possibility that patient may have bone marrow involvement with her disease.   3. May need a bone marrow biopsy depending on goals of care.   4. Had another discussion with patient and her  regarding goal of care, Pts  and pt want to pursue active treatment at this point.  5. Please check:    6. Urine culture and blood culture to rule out infections.   7. B12 folic acid and TSH.     Rodney An MD   [facility]  Office:  [phone]    Code Status    DNR/DNI    Reason for Consult Metastatic breast cancer    Primary Care Physician   Rebekah Rausch    Chief Complaint     Acute confusion.     History of Present Illness   Elizabeth Li is a 84 year old female who presents as they were concerned from transitional care unit staff that patient is not eating has lost about 20 pounds during admission intermittent back pain and intermittent mental status changes and has been having periodic hallucinations.   Labs in TCU showed increasing acidosis electrolyte derangement and pancytopenia.     Interval history  Patient is in bed, appears less confused today able to give coherent answers.  Denies any abdominal pain today denies any nausea.  Denies any headaches.  Back pain is uncontrolled at this point.       Oncologic history from [place] [facility] charts.       1.  4/17/2020: Patient presented to [facility] ER, 1 month history of progressive Fatigue  weakness generalized myalgia back pain and confusion.  Patient was found to have severe hypercalcemia. CT scan of chest abdomen and pelvis showed extensive osseous metastatic disease, destructive disease near L5.  He was treated with IV fluids calcitonin and pamidronate.  She also had treatment for concurrent UTI.  During the hospitalization she was found to have a large left breast mass.   2.  4/23/2020: Biopsy from L5 showed metastatic carcinoma, breast biopsy showed invasive ductal carcinoma.  2 pathologies were compared and found to be morphologically similar. ER negative, IL low positive (1-5%) HER-2/miguel angel negative.  3. Age of menarche 15, last mammogram was 8 years ago.  Menopause 49.  Four children attempted breast-feed with 1st child.   4.  5/18/2020: Patient started on palliative radiation to lumbar spine.     Past Medical History   I have reviewed this patient's medical history and updated it with pertinent information if needed.   Past Medical History:   Diagnosis Date     CAD (coronary artery disease)     Severe 2 vessel CAD. PCI with drug-eluting stents x2 to mid LAD on 3/27/15     Depression      HTN (hypertension)      Hyperlipidemia      Hypothyroidism      IHD (ischemic heart disease)      KUSUM (obstructive sleep apnea)     CPAP       Past Surgical History   I have reviewed this patient's surgical history and updated it with pertinent information if needed.  Past Surgical History:   Procedure Laterality Date     BACK SURGERY       COLONOSCOPY N/A  1/11/2017    Procedure: COLONOSCOPY;  Surgeon: Nghia Moss MD;  Location: [facility]     Coronary angiogram with stents x2  03/2015    2.5 X 18 mm & 3.0 X mm LUCILA to LAD     OPTICAL TRACKING SYSTEM FUSION POSTERIOR SPINE LUMBAR N/A 7/17/2019    Procedure: L3-L4 TRANSFORAMINAL LUMBAR  INTRABODY FUSION WITH ALLOGRAFT, OPTICAL TRACKING SYSTEM AND MEDTRONIC SOLARA INSTRUMENTATION;  Surgeon: Cole Jansen MD;  Location:  OR     TONSILLECTOMY, ADENOIDECTOMY, COMBINED       TUBAL LIGATION       [place] teeth extraction         Prior to Admission Medications   Prior to Admission Medications   Prescriptions Last Dose Informant Patient Reported? Taking?   HYDROmorphone (DILAUDID) 2 MG tablet 6/3/2020 at am  Yes Yes   Sig: Take 2 mg by mouth 2 times daily   HYDROmorphone (DILAUDID) 2 MG tablet  at prn  Yes Yes   Sig: Take 2 mg by mouth every 4 hours as needed for moderate to severe pain Do not give PRN doses within 4 hours of the scheduled dose    acetaminophen (TYLENOL) 500 MG tablet 6/3/2020 at x2  No Yes   Sig: Take 2 tablets (1,000 mg) by mouth 3 times daily   amLODIPine (NORVASC) 5 MG tablet 6/2/2020 at pm  Yes Yes   Sig: Take 5 mg by mouth At Bedtime   artificial saliva (BIOTENE MT) SOLN solution  at prn  No Yes   Sig: Swish and spit 2 mLs (2 sprays) in mouth every hour as needed for dry mouth   ascorbic acid (VITAMIN C) 500 MG tablet 6/3/2020 at noon Self Yes Yes   Sig: Take 500 mg by mouth daily (with lunch) Pt takes in afternoon   aspirin (ASA) 81 MG tablet 6/3/2020 at am  No Yes   Sig: Take 1 tablet (81 mg) by mouth daily   bisacodyl (DULCOLAX) 10 MG suppository  at prn  No Yes   Sig: Place 1 suppository (10 mg) rectally daily as needed for constipation   dexamethasone (DECADRON) 4 MG tablet 6/3/2020 at am  Yes Yes   Sig: Take 4 mg by mouth daily (with breakfast)   hydrALAZINE (APRESOLINE) 10 MG tablet  at prn  No Yes   Sig: Take 1 tablet (10 mg) by mouth every 8 hours as needed (SBP>160)   isosorbide  mononitrate (IMDUR) 30 MG 24 hr tablet 6/3/2020 at am  No Yes   Sig: TAKE ONE TABLET BY MOUTH EVERY DAY   levothyroxine (SYNTHROID/LEVOTHROID) 75 MCG tablet 6/3/2020 at am  No Yes   Sig: TAKE ONE TABLET BY MOUTH EVERY MORNING (BEFORE BREAKFAST)   liothyronine (CYTOMEL) 5 MCG tablet 6/3/2020 at am  Yes Yes   Sig: Take 1 tablet (5 mcg) by mouth daily   lisinopril (ZESTRIL) 20 MG tablet 6/3/2020 at am  Yes Yes   Sig: Take 40 mg by mouth daily   metoprolol tartrate (LOPRESSOR) 100 MG tablet 6/3/2020 at am  Yes Yes   Sig: Take 100 mg by mouth 2 times daily Hold if heart rate is < 55   omeprazole (PRILOSEC) 20 MG DR capsule 6/3/2020 at am  No Yes   Sig: Take 1 capsule (20 mg) by mouth daily   polyethylene glycol (MIRALAX/GLYCOLAX) packet  at prn  Yes Yes   Sig: Take 1 packet by mouth nightly as needed for constipation    potassium chloride ER (K-TAB) 20 MEQ CR tablet 6/3/2020 at am  Yes Yes   Sig: Take 1 tablet (20 mEq) by mouth daily   senna-docusate (SENOKOT-S/PERICOLACE) 8.6-50 MG tablet  at prn  No Yes   Sig: Take 1 tablet by mouth 2 times daily as needed for constipation   sennosides (SENOKOT) 8.6 MG tablet 6/2/2020 at pm  Yes Yes   Sig: Take 1 tablet by mouth At Bedtime   simethicone (MYLICON) 125 MG chewable tablet  at prn  Yes Yes   Sig: Take 125 mg by mouth 4 times daily as needed for intestinal gas   spironolactone (ALDACTONE) 12.5 mg TABS half-tab 6/3/2020 at am  Yes Yes   Sig: Take 12.5 mg by mouth daily      Facility-Administered Medications: None     Allergies   Allergies   Allergen Reactions     Flu Virus Vaccine Other (See Comments)     Viral SX     Gluten Meal        Social History   I have reviewed this patient's social history and updated it with pertinent information if needed. Elizabeth NEWELL Jen  reports that she quit smoking about 36 years ago. She has never used smokeless tobacco. She reports current alcohol use. She reports that she does not use drugs.    Family History   I have reviewed this  patient's family history and updated it with pertinent information if needed.   Family History   Problem Relation Age of Onset     Blood Disease Mother         pernious anemia     Heart Disease Father      Heart Disease Sister      Heart Disease Brother      Colon Cancer No family hx of        Review of Systems   Review of systems unavailable because of mental status changes    Physical Exam   Temp: 96.7  F (35.9  C) Temp src: Oral BP: (!) 140/54   Heart Rate: 91 Resp: 16 SpO2: 97 % O2 Device: Nasal cannula Oxygen Delivery: 2 LPM  Vital Signs with Ranges  Temp:  [96.7  F (35.9  C)-99.8  F (37.7  C)] 96.7  F (35.9  C)  Heart Rate:  [] 91  Resp:  [16] 16  BP: (136-151)/(49-62) 140/54  SpO2:  [97 %-99 %] 97 %  136 lbs 12.8 oz    Constitutional: Patient patient is in hospital bed appears less confused able to give coherent answers.   Eyes: Conjunctiva and pupils examined there is severe pallor .    Respiratory: No respiratory distress.   GI: Soft, non-distended, non-tender, normal bowel sounds.  Lymph/Hematologic: No anterior cervical or supraclavicular adenopathy.  Skin: No rashes, no cyanosis, no edema.  Musculoskeletal: No joint swelling, erythema or tenderness.      Data     Results for AMARI AVILEZ (MRN [id number]) as of 6/4/2020 11:45   Ref. Range 6/3/2020 21:33 6/3/2020 21:57 6/3/2020 22:53 6/4/2020 01:23 6/4/2020 01:52 6/4/2020 04:53 6/4/2020 05:54 6/4/2020 10:45   Sodium Latest Ref Range: 133 - 144 mmol/L  129 (L)     130 (L)    Potassium Latest Ref Range: 3.4 - 5.3 mmol/L  4.6   4.5  4.2    Chloride Latest Ref Range: 94 - 109 mmol/L  104     105    Carbon Dioxide Latest Ref Range: 20 - 32 mmol/L  15 (L)     14 (L)    Urea Nitrogen Latest Ref Range: 7 - 30 mg/dL  26     21    Creatinine Latest Ref Range: 0.52 - 1.04 mg/dL  0.75     0.64    GFR Estimate Latest Ref Range: >60 mL/min/1.73_m2  74     83    GFR Estimate If Black Latest Ref Range: >60 mL/min/1.73_m2  86     >90    Calcium Latest Ref  Range: 8.5 - 10.1 mg/dL  8.8     8.7    Anion Gap Latest Ref Range: 3 - 14 mmol/L  10     11    Glucose Latest Ref Range: 70 - 99 mg/dL 166 (H) 128 (H) 130 (H) 97  144 (H) 143 (H) 104 (H)   WBC Latest Ref Range: 4.0 - 11.0 10e9/L       5.2    Hemoglobin Latest Ref Range: 11.7 - 15.7 g/dL       8.8 (L)    Hematocrit Latest Ref Range: 35.0 - 47.0 %       26.9 (L)    Platelet Count Latest Ref Range: 150 - 450 10e9/L       24 (LL)    RBC Count Latest Ref Range: 3.8 - 5.2 10e12/L       2.92 (L)    MCV Latest Ref Range: 78 - 100 fl       92    MCH Latest Ref Range: 26.5 - 33.0 pg       30.1    MCHC Latest Ref Range: 31.5 - 36.5 g/dL       32.7    RDW Latest Ref Range: 10.0 - 15.0 %       16.9 (H)

## 2020-06-05 NOTE — PLAN OF CARE
Pertinent assessments: Alert to self, tele monitoring SR, RA. Purewick in place, adequate urine output. C/O lower back pain oral Dilaudid x 1 given. Repositioning Q2 offered. Mepilex in place coccyx.     Major Shift Events:     Treatment Plan: Discharging planning, tele monitoring, monitor labs

## 2020-06-06 NOTE — PLAN OF CARE
End of Shift Summary  For vital signs and complete assessments, please see documentation flowsheets.     Pertinent assessments: Reports pain, dilaudid & tylenol given.  Major Shift Events: Anxious, confused, redirected.   Treatment Plan: Pain management    Discharge Readiness: Medically active  Expected Discharge Date: TBD  Discharge Disposition: Transitional Care Unit  Barriers for discharge: Decision on tx plan.     Addendum: No UOP overnight, scan revealed 530 mls, transferred on to commode some incontinence, urine odorous. Re scanned for 497 mls will, no orders in place. Will pass information to AM nurse.

## 2020-06-06 NOTE — PLAN OF CARE
Pertinent assessments: Alert to self, tele monitoring SR, VSS on room air. Afebrile. Purewick in place. C/O lower back pain oral Dilaudid x 1 given. Lidocaine patch to midback. Repositioning Q2. Mepilex in place to coccyx.   Major Shift Events: None  Treatment Plan: Discharging planning, tele monitoring, monitor labs     Discharge Readiness: Medically active  Expected Discharge Date: TBD  Discharge Disposition: TBD  Barriers/Criteria for discharge: pain control?

## 2020-06-06 NOTE — PROGRESS NOTES
[facility]    Hospitalist Progress Note    Date of Service (when I saw the patient): 06/06/2020  Provider:  Toro Solorzano MD   Text Page  7am - 6PM       Assessment & Plan   Elizabeth Li is a 84 year old female who  has a past medical history of CAD (coronary artery disease), Depression, HTN (hypertension), Hyperlipidemia, Hypothyroidism, IHD (ischemic heart disease), and KUSUM (obstructive sleep apnea) and recent diagnosis of metastatic breast cancer.  She is coming from the TCU where she has been doing physical therapy after her last admission.  TCU staff reported that patient has lost around 20 pounds during the stay, she does not participate in physical therapy and she is confused and hallucinating.  She had abnormal lab work-up and was sent to the emergency department for an assessment.  She was admitted on 6/3/2020 for further studies, treatment and follow-up.    Active Problems:  1.  Metastatic breast cancer.  2.  Acute toxic metabolic encephalopathy with hallucinations and confusion.  3.  Failure to thrive, generalized decline.  4.  Pancytopenia with moderate anemia (after transfusion) and severe thrombocytopenia.  5.  Severe malnutrition In Context of acute illness or injury  Chronic illness or disease  6. Chronic comorbidities.  - Essential hypertension.  - Dyslipidemia.  - Coronary artery disease.  - Hypothyroidism.  - KUSUM    Plan.  Inpatient.  Diet as tolerated.  Hydration with D5/normal saline/KCl 20 mEq at 100 mils per hour.  Pain control with Dilaudid IV as needed.  Multivitamin 1 tablet daily.    Famotidine 20 mg  IV every 12 hours.  Hydralazine with parameters.  Oncology consultation highly appreciated.  MRI of the brain performed.    DVT Prophylaxis: Pneumatic Compression Devices  Code Status: DNR/DNI    Disposition: Expected discharge to home tomorrow after discussion with  Oswaldo. He has requested to resume chemo, Oncology will see her in clinic on Tuesday.    Interval  History   Patient this am has is disoriented to place and person, and keeps a coherent dialogue with me. Not in pain at the moment.  She does not have any evidence of shortness of breath or difficulty breathing.      -Data reviewed today: I reviewed all new labs and imaging results over the last 24 hours.     Physical Exam   Temp: 97.9  F (36.6  C) Temp src: Oral BP: (Abnormal) 144/52 Pulse: 107 Heart Rate: 100 Resp: 20 SpO2: 96 % O2 Device: None (Room air)    Vitals:    06/04/20 0010 06/05/20 0607 06/06/20 0607   Weight: 61.9 kg (136 lb 6.4 oz) 62.1 kg (136 lb 12.8 oz) 62.6 kg (138 lb)     Vital Signs with Ranges  Temp:  [96.7  F (35.9  C)-98.5  F (36.9  C)] 97.9  F (36.6  C)  Pulse:  [] 107  Heart Rate:  [100] 100  Resp:  [16-20] 20  BP: (143-147)/(52-56) 144/52  SpO2:  [95 %-96 %] 96 %  I/O last 3 completed shifts:  In: 630 [P.O.:630]  Out: 775 [Urine:675; Emesis/NG output:100]    GEN:  Alert,  oriented x 2, appears comfortable, NAD.  HEENT:  Normocephalic/atraumatic, no scleral icterus, no nasal discharge, mouth moist.  CV:  Regular rate and rhythm, no murmur or JVD.  S1 + S2 noted, no S3 or S4.  LUNGS:  Clear to auscultation bilaterally without rales/rhonchi/wheezing/retractions.  Symmetric chest rise on inhalation noted.  ABD:  Active bowel sounds, soft, non-tender/non-distended.  No rebound/guarding/rigidity.  EXT:  No edema or cyanosis.  No joint synovitis noted.  SKIN:  Dry to touch, no exanthems noted in the visualized areas.       Medications       artificial saliva  2 spray Swish & Spit 4x Daily     lidocaine  1 patch Transdermal Q24h    And     lidocaine   Transdermal Q8H     multivitamin w/minerals  1 tablet Oral Daily     sodium chloride (PF)  3 mL Intracatheter Q8H       Data   Recent Labs   Lab 06/06/20  0710 06/05/20  0656 06/04/20  0554 06/04/20  0152 06/03/20  2157 06/03/20  1804   WBC 4.0 3.8* 5.2  --   --  6.8   HGB 7.5* 7.4* 8.8*  --   --  6.9*   MCV 96 95 92  --   --  96   PLT 19* 23*  24*  --   --  36*   NA  --  136 130*  --  129* 126*   POTASSIUM  --  4.0 4.2 4.5 4.6 5.9*   CHLORIDE  --  114* 105  --  104 102   CO2  --  16* 14*  --  15* 13*   BUN  --  10 21  --  26 27   CR  --  0.43* 0.64  --  0.75 0.78   ANIONGAP  --  6 11  --  10 11   FRANKLIN  --  9.0 8.7  --  8.8 9.3   GLC  --  125* 143*  --  128* 91   ALBUMIN  --   --   --   --   --  2.8*   PROTTOTAL  --   --   --   --   --  6.7*   BILITOTAL  --   --   --   --   --  0.5   ALKPHOS  --   --   --   --   --  138   ALT  --   --   --   --   --  16   AST  --   --   --   --   --  139*       No results found for this or any previous visit (from the past 24 hour(s)).    Disclaimer: This note consists of symbols derived from keyboarding, dictation and/or voice recognition software. As a result, there may be errors in the script that have gone undetected. Please consider this when interpreting information found in this chart.

## 2020-06-06 NOTE — PLAN OF CARE
"OT/PT- Orders received, chart reviewed and PT spoke with care coordinator. Per chart, patient \"is a 84 year old patient admitted with symptoms of severe anemia, thrombocytopenia, hyponatremia and hyperkalemia noted on lab studies. Head CT demonstrate bony metastasis with no evidence of intracranial hemorrhage or metastasis. Patient is known to me from her hospitalization April 17 through 28 when she was admitted for severe hypercalcemia of malignancy. She underwent a biopsy soft tissue lesion at L5 on 4/23/2020 which was positive for malignancy. She was dismissed to [facility] where she has resided since\". Prior to this, pt was living at home in a [facility] with her , Oswaldo. Baseline mobility is total care. She remains a total care. She has needed to use the Chaka lift at home for the past 3 weeks. Per palliative care note,  indicates AMS is not a new finding as she had not been able to competently use her cell phone for weeks.    Patient does not appear to be appropriate for IP OT or PT services, at this time will discharge from rehab services.   "

## 2020-06-06 NOTE — PROVIDER NOTIFICATION
Dr Solorzano paged: Pt unable to void, bladder scanned for 557, can we have straight cath orders?    Also, did you see UA from yesterday? Thanks.     Order for straight cath placed.

## 2020-06-06 NOTE — PLAN OF CARE
Discharge Planner PT   Patient plan for discharge: Unknown  Current status: Orders received and chart reviewed. Per chart review, patient admitted on 6/3/2020 for severe anemia. Per previously filed notes, patient's baseline is total care and she remains a total care, use of deangelo lift at home for the past 3 weeks. No skilled IP PT needs identified, will complete IP PT orders at this time.   Barriers to return to prior living situation: Defer to medical team  Recommendations for discharge: Defer to medical team  Rationale for recommendations: Defer to medical team       Entered by: Jodie Del Toro 06/06/2020 2:57 PM

## 2020-06-06 NOTE — PLAN OF CARE
Pertinent assessments: Disoriented to time. Poor appetite, encouraged to eat and drink. Few bouts of nauseous with repositioning, emesis x1, 100ml. HR increased at that time, MD told verbally. Tachy in the 100-110s otherwise. Unsure of last BM, none since admission, Miralax given. Straight cathed x2. Urine Very malodorous, thick, cloudy, sediment. Mepilex in place, repo q2. Diluadid given for right hip pain.   Major Shift Events:   Treatment Plan: Pain management, discharge planning

## 2020-06-07 NOTE — PROGRESS NOTES
MELISA following for discharge planning needs. MELISA placed call to [facility] to check on status of pts bed at the facility. Delma in admissions reports that pt has been private pay at the facility since May 21 (coverage for TCU ended at that time). She reports that pt could return and do cancer treatment if she continued to be private pay. MELISA will follow for recommendations from MD regarding plan.  MUNIRA Hinkle on 6/7/2020 at 10:44 AM    Addendum: MELISA discussed with MD who reports plan for discharge today back to [facility] and reports that pts  was updated. RN reports pt needing to be straight cathed. RN and MD report need for pt to have stretcher transport due to confusion and inability to sit up for the length of transport time due to pain. MELISA spoke with [facility] who report they can do this as long as it is ordered. They also report that they have had one COVID case, on a different floor than pt is on and who has since been hospitalized, since pt came to hospital and family needs to be informed. MELISA updated MD regarding orders. MELISA set up [facility] transport via stretcher at 2:30 PM. MELISA spoke with pts  Oswaldo to inform him of transport time. He stated understanding that insurance would be billed for transport but coverage was not guaranteed. SW informed him of COVID case at the facility. Discussed that pts stay at [facility] would continue as it was previous to hospitalization, private pay. He stated understanding and agreement with all and plan to stay in communication with [facility] moving forward. MELISA updated facility, charge and [facility] of transport time. PCS completed and given to [facility].  11:26 AM    MELISA sent orders to [facility].  11:43 AM

## 2020-06-07 NOTE — PLAN OF CARE
Discharged to [facility] TCU. Was previously at the facility prior to admission. AVS reviewed with pt, unable to sign, copy placed to belongings. All belongings sent with pt. IV removed CDI x2. Straight cath at 1400 for 300cc, irrigated. Packet sent with [facility], who provided stretched transportation.  updated on discharge by MD MELISA and pt.

## 2020-06-07 NOTE — DISCHARGE SUMMARY
[facility]    Discharge Summary  Hospitalist    Date of Admission:  6/3/2020  Date of Discharge:  6/7/2020  Provider:  Toro Solorzano MD  Date of Service (when I last saw the patient): 06/07/20    Discharge Diagnoses   1.  Metastatic breast cancer.  2.  Acute toxic metabolic encephalopathy with hallucinations and confusion.  3.  Failure to thrive, generalized decline.  4.  Pancytopenia with moderate anemia (after transfusion) and severe thrombocytopenia.  5.  Severe malnutrition In Context of acute illness or injury  Chronic illness or disease  6. Chronic comorbidities.  - Essential hypertension.  - Dyslipidemia.  - Coronary artery disease.  - Hypothyroidism.  - KUSUM  7. Severe generalized muscle deconditioning.     Other medical issues:  Past Medical History:   Diagnosis Date     CAD (coronary artery disease)     Severe 2 vessel CAD. PCI with drug-eluting stents x2 to mid LAD on 3/27/15     Depression      HTN (hypertension)      Hyperlipidemia      Hypothyroidism      IHD (ischemic heart disease)      KUSUM (obstructive sleep apnea)     CPAP       History of Present Illness   Elizabeth Li is an 84 year old female who presented with acute mental status change.  Please see the admission history and physical for full details.    Hospital Course   Elizabeth Li is a 84 year old female who  has a past medical history of CAD (coronary artery disease), Depression, HTN (hypertension), Hyperlipidemia, Hypothyroidism, IHD (ischemic heart disease), and KSUUM (obstructive sleep apnea) and recent diagnosis of metastatic breast cancer.  She is coming from the TCU where she has been doing physical therapy after her last admission.  TCU staff reported that patient has lost around 20 pounds during the stay, she does not participate in physical therapy and she is confused and hallucinating.  She had abnormal lab work-up and was sent to the emergency department for an assessment.  She was admitted on 6/3/2020 for  further studies, treatment and follow-up.    Hospital course.  Elizabeth has undergone a laboratory work-up and imaging during this hospital stay with the help and advice of oncology.  Her mental condition was -at the beginning- suspected from new metastasis to the brain, however MRI of the brain is negative for.  No infection has been conclusively proven, she has normal procalcitonin and negative blood culture.  Her urine grew up more than 100,000 colonies of mixed urogenital nicolette which has no value so far.  She has not been spiking any fever. She admitted with severe hyponatremia and hyperkalemia.  Along the days, this derangements were corrected, at the same time her mental status improved simultaneously with improvement of electrolytes.  It is the only proven correctable cause perhaps playing the major role of the acute mental status change.  In general, her cancer disease is imposing a heavy toll to her general condition, she seems to be going down the end stage of her malignancy.  She has a low level of folate, supplementation has been started here.  In the last 2 days, she has been having urinary retention for which she has been straight catheter.     Active Problems:  1.  Metastatic breast cancer.  2.  Acute toxic metabolic encephalopathy with hallucinations and confusion.  3.  Failure to thrive, generalized decline.  4.  Pancytopenia with moderate anemia (after transfusion) and severe thrombocytopenia.  5.  Severe malnutrition In Context of acute illness or injury  Chronic illness or disease  6. Chronic comorbidities.  - Essential hypertension.  - Dyslipidemia.  - Coronary artery disease.  - Hypothyroidism.  - KUSUM  7. Severe generalized muscle deconditioning.    # Discharge Pain Plan:    - Patient currently has NO PAIN and is not being prescribed pain medications on discharge.      Significant Results and Procedures   See below     Pending Results      Unresulted Labs Ordered in the Past 30 Days of this  Admission     Date and Time Order Name Status Description    6/5/2020 0946 Blood culture Preliminary           Code Status   DNR / DNI       Primary Care Physician   Rebekah Rausch    GEN:  Alert, oriented x 3, appears comfortable, NAD.  HEENT:  Normocephalic/atraumatic, no scleral icterus, no nasal discharge, mouth moist.  CV:  Regular rate and rhythm, no murmur or JVD.  S1 + S2 noted, no S3 or S4.  LUNGS:  Clear to auscultation bilaterally without rales/rhonchi/wheezing/retractions.  Symmetric chest rise on inhalation noted.  ABD:  Active bowel sounds, soft, non-tender/non-distended.  No rebound/guarding/rigidity.  EXT:  No edema or cyanosis.  No joint synovitis noted.  SKIN:  Dry to touch, no exanthems noted in the visualized areas.     Discharge Disposition   Discharged to TCU    Consultations This Hospital Stay   SOCIAL WORK IP CONSULT  PHYSICAL THERAPY ADULT IP CONSULT  OCCUPATIONAL THERAPY ADULT IP CONSULT  NUTRITION SERVICES ADULT IP CONSULT  SPIRITUAL HEALTH SERVICES IP CONSULT  PALLIATIVE CARE ADULT IP CONSULT  HEMATOLOGY & ONCOLOGY IP CONSULT  SOCIAL WORK IP CONSULT  CARE COORDINATOR IP CONSULT    Time Spent on this Encounter   I, Toro Solorzano MD, personally saw the patient today and spent greater than 30 minutes discharging this patient.     Discharge Orders   No discharge procedures on file.  Discharge Medications   Current Discharge Medication List      START taking these medications    Details   folic acid (FOLVITE) 1 MG tablet Take 1 tablet (1 mg) by mouth daily  Qty:      Associated Diagnoses: Metastatic carcinoma (H)         CONTINUE these medications which have NOT CHANGED    Details   acetaminophen (TYLENOL) 500 MG tablet Take 2 tablets (1,000 mg) by mouth 3 times daily    Associated Diagnoses: Lump in upper outer quadrant of left breast      amLODIPine (NORVASC) 5 MG tablet Take 5 mg by mouth At Bedtime      artificial saliva (BIOTENE MT) SOLN solution Swish and spit 2 mLs (2 sprays) in  mouth every hour as needed for dry mouth    Associated Diagnoses: Lump in upper outer quadrant of left breast      ascorbic acid (VITAMIN C) 500 MG tablet Take 500 mg by mouth daily (with lunch) Pt takes in afternoon      aspirin (ASA) 81 MG tablet Take 1 tablet (81 mg) by mouth daily  Qty: 90 tablet, Refills: 3    Associated Diagnoses: Abnormal cardiovascular stress test      bisacodyl (DULCOLAX) 10 MG suppository Place 1 suppository (10 mg) rectally daily as needed for constipation    Associated Diagnoses: Lump in upper outer quadrant of left breast      dexamethasone (DECADRON) 4 MG tablet Take 4 mg by mouth daily (with breakfast)      hydrALAZINE (APRESOLINE) 10 MG tablet Take 1 tablet (10 mg) by mouth every 8 hours as needed (SBP>160)    Associated Diagnoses: Essential hypertension      !! HYDROmorphone (DILAUDID) 2 MG tablet Take 2 mg by mouth 2 times daily      !! HYDROmorphone (DILAUDID) 2 MG tablet Take 2 mg by mouth every 4 hours as needed for moderate to severe pain Do not give PRN doses within 4 hours of the scheduled dose       isosorbide mononitrate (IMDUR) 30 MG 24 hr tablet TAKE ONE TABLET BY MOUTH EVERY DAY  Qty: 90 tablet, Refills: 1    Associated Diagnoses: Coronary artery disease involving native coronary artery of native heart without angina pectoris; Status post coronary angioplasty      levothyroxine (SYNTHROID/LEVOTHROID) 75 MCG tablet TAKE ONE TABLET BY MOUTH EVERY MORNING (BEFORE BREAKFAST)  Qty: 90 tablet, Refills: 0    Associated Diagnoses: Acquired hypothyroidism      liothyronine (CYTOMEL) 5 MCG tablet Take 1 tablet (5 mcg) by mouth daily  Qty: 90 tablet, Refills: 0    Associated Diagnoses: Hypothyroidism, unspecified type      lisinopril (ZESTRIL) 20 MG tablet Take 40 mg by mouth daily      metoprolol tartrate (LOPRESSOR) 100 MG tablet Take 100 mg by mouth 2 times daily Hold if heart rate is < 55      omeprazole (PRILOSEC) 20 MG DR capsule Take 1 capsule (20 mg) by mouth daily  Qty:         polyethylene glycol (MIRALAX/GLYCOLAX) packet Take 1 packet by mouth nightly as needed for constipation       potassium chloride ER (K-TAB) 20 MEQ CR tablet Take 1 tablet (20 mEq) by mouth daily    Associated Diagnoses: Hypokalemia      senna-docusate (SENOKOT-S/PERICOLACE) 8.6-50 MG tablet Take 1 tablet by mouth 2 times daily as needed for constipation    Associated Diagnoses: Lump in upper outer quadrant of left breast      sennosides (SENOKOT) 8.6 MG tablet Take 1 tablet by mouth At Bedtime      simethicone (MYLICON) 125 MG chewable tablet Take 125 mg by mouth 4 times daily as needed for intestinal gas      spironolactone (ALDACTONE) 12.5 mg TABS half-tab Take 12.5 mg by mouth daily       !! - Potential duplicate medications found. Please discuss with provider.        Allergies   Allergies   Allergen Reactions     Flu Virus Vaccine Other (See Comments)     Viral SX     Gluten Meal      Data   Most Recent 3 CBC's:  Recent Labs   Lab Test 06/06/20  1540 06/06/20  0710 06/05/20  0656 06/04/20  0554   WBC  --  4.0 3.8* 5.2   HGB 8.4* 7.5* 7.4* 8.8*   MCV  --  96 95 92   PLT  --  19* 23* 24*      Most Recent 3 BMP's:  Recent Labs   Lab Test 06/06/20  1540 06/05/20  0656 06/04/20  0554    136 130*   POTASSIUM 4.1 4.0 4.2   CHLORIDE 110* 114* 105   CO2 15* 16* 14*   BUN 11 10 21   CR 0.45* 0.43* 0.64   ANIONGAP 12 6 11   FRANKLIN 9.7 9.0 8.7   * 125* 143*     Most Recent 2 LFT's:  Recent Labs   Lab Test 06/03/20  1804 04/28/20  0631   * 88*   ALT 16 31   ALKPHOS 138 78   BILITOTAL 0.5 0.6     Most Recent INR's and Anticoagulation Dosing History:  Anticoagulation Dose History     Recent Dosing and Labs Latest Ref Rng & Units 3/27/2015 4/23/2020    INR 0.86 - 1.14 0.95 1.26(H)        Most Recent 3 Troponin's:  Recent Labs   Lab Test 04/17/20  2322   TROPI 0.019     Most Recent Cholesterol Panel:  Recent Labs   Lab Test 01/04/19  1045   CHOL 163   LDL 88   HDL 57   TRIG 88     Most Recent 6 Bacteria  Isolates From Any Culture (See EPIC Reports for Culture Details):  Recent Labs   Lab Test 06/05/20  1430 06/05/20  1010 04/22/20  0913 04/22/20  0906 04/17/20  2310   CULT >100,000 colonies/mL  mixed urogenital nicolette  Susceptibility testing not routinely done   No growth after 2 days No growth No growth >100,000 colonies/mL  Providencia (Proteus) rettgeri  *     Most Recent TSH, T4 and A1c Labs:  Recent Labs   Lab Test 03/13/20  1125   TSH 0.27*   T4 0.86     Results for orders placed or performed during the hospital encounter of 06/03/20   XR Chest Port 1 View    Narrative    EXAM: XR CHEST PORT 1 VW  LOCATION: [facility]  DATE/TIME: 6/3/2020 8:53 PM    INDICATION: Generalized weakness history of metastatic cancer  COMPARISON: 04/18/2020      Impression    IMPRESSION: Lungs appear clear. Normal heart size. No pleural effusion or pneumothorax. There is a healing left fifth rib fracture laterally new compared to prior exam.   CT Head w/o Contrast    Narrative    EXAM: CT HEAD W/O CONTRAST  LOCATION: [facility]  DATE/TIME: 6/3/2020 9:16 PM    INDICATION: Altered level of consciousness (LOC), confusion  COMPARISON: CT head without contrast 04/20/2020.  TECHNIQUE: Routine without IV contrast. Multiplanar reformats. Dose reduction techniques were used.    FINDINGS:  INTRACRANIAL CONTENTS: No intracranial hemorrhage, extraaxial collection, or mass effect.  No CT evidence of acute infarct. Mild presumed chronic small vessel ischemic changes. Mild generalized volume loss. No hydrocephalus.     VISUALIZED ORBITS/SINUSES/MASTOIDS: No intraorbital abnormality. No paranasal sinus mucosal disease. No middle ear or mastoid effusion.    BONES/SOFT TISSUES: There are several scattered lucent lesions within the calvarium, similar to recent CT head examination and new in comparison to more remote 03/01/2013 CT examination.      Impression    IMPRESSION:  1.  No CT evidence for acute intracranial  process.  2.  Brain atrophy and presumed chronic microvascular ischemic changes as above.  3.  Several lucent lesions involving the calvarium are similar to 04/20/2020 examination and new from remote 2013 examination. Findings may reflect metastatic lesions associated with the patient's reported history of breast cancer.    Results discussed with Dr. Bland on 6/3/2020 10:02 PM.   MR Brain w/o & w Contrast    Narrative    MRI BRAIN WITHOUT AND WITH CONTRAST  6/4/2020 5:22 PM    HISTORY: Altered mental status unexplained. Metastatic triple negative  breast cancer, please rule out intracranial metastasis.     TECHNIQUE: Multiplanar, multisequence MRI of the brain without and  with 6 mL Gadavist    COMPARISON: None.    FINDINGS: Diffuse abnormal marrow signal intensity is present in the  upper cervical spine as well as in several areas within the calvarium.  These findings are consistent with bony metastases. Postcontrast  images do not show any abnormal areas of enhancement in the brain  parenchyma. Diffusion-weighted images are normal. There is no evidence  for intracranial hemorrhage or acute infarct. Mild-moderate cerebral  atrophy is present. Minimal nonspecific white matter change without  mass effect are present. Brain parenchyma is otherwise normal.  Incidental note is made of some frontal hyperostosis interna.      Impression    IMPRESSION:  1. Diffuse osseous metastases.  2. No evidence for any intra-axial brain metastases.  3. Cerebral atrophy.  4. Minimal nonspecific white matter changes also noted.  5. No evidence for acute infarct or acute hemorrhage.    MIRTHA COVINGTON MD     Disclaimer: This note consists of symbols derived from keyboarding, dictation and/or voice recognition software. As a result, there may be errors in the script that have gone undetected. Please consider this when interpreting information found in this chart.

## 2020-06-07 NOTE — PLAN OF CARE
End of Shift Summary  For vital signs and complete assessments, please see documentation flowsheets.     Pertinent assessments: Disoriented to time. Speech garbled, coughs w/intake @ times. Suppository given w/no results.     Major Shift Events: Emesis x1, zofran given. No UOP overnight, scanned for 318 mls, cath'd for 275 mls, urine malodorous, cloudy w/sediment.   Treatment Plan: Pain management, cardiac & lab monitoring.     Discharge Readiness: Medically active  Expected Discharge Date: TBD  Discharge Disposition: Transitional Care Unit  Barriers/Criteria for discharge: Decision on tx plan.

## 2020-06-08 NOTE — LETTER
[facility]   To:   Gavin BAJWA          Please give to facility    From:   Idris Live  [facility]  Care Coordinator   [facility]     Patient Name:  Elizabeth Li YOB: 1935   Admit date: 6.7.2020      *Information Needed:  Please contact me when the patient will discharge (or if they will move to long term care)- include the discharge date, disposition, and main diagnosis   - If the patient is discharged with home care services, please provide the name of the agency    Also- Please inform me if a care conference is being held.   Phone, Fax or Email with information       Idris Live, [facility]  Clinic Care Coordinator  Ph. [phone]  [email or web address]

## 2020-06-08 NOTE — PROGRESS NOTES
Clinic Care Coordination Contact  Care Coordination Transition Communication    Referral Source: IP Handoff    Clinical Data: Patient was hospitalized at [facility] from 6.3.2020 to 6.7.2020 with diagnoses of:  1.  Metastatic breast cancer.  2.  Acute toxic metabolic encephalopathy with hallucinations and confusion.  3.  Failure to thrive, generalized decline.  4.  Pancytopenia with moderate anemia (after transfusion) and severe thrombocytopenia.  5.  Severe malnutrition In Context of acute illness or injury  Chronic illness or disease  6. Chronic comorbidities.  - Essential hypertension.  - Dyslipidemia.  - Coronary artery disease.  - Hypothyroidism.  - KUSUM  7. Severe generalized muscle deconditioning.     Transition to Facility:              Facility Name: [facility]              Contact name and phone number/fax:    Ph. [phone], Fax. [phone]    Plan:  Care Coordinator will await notification from facility staff informing  Care Coordinator of patient's discharge plans/needs. SW Care Coordinator will review chart and outreach to facility staff every 4 weeks and as needed.       Idris Live [facility]  Clinic Care Coordinator  Ph. [phone]  [email or web address]

## 2020-06-08 NOTE — PATIENT INSTRUCTIONS
Rere Li  1935    1) Discontinue spironolactone.  2) CBC, BMP 6/11. Diagnosis: thrombocytopenia, HTN. Fax results to oncology clinic at [phone]    ISMAEL Peña CNP on 6/8/2020 at 10:07 AM

## 2020-06-08 NOTE — LETTER
"    6/8/2020        RE: Elizabeth Li  [address] [address]  [facility] [id number]         [place] [facility]  Elizabeth Li is being evaluated via a billable video visit due to the restrictions of the Covid-19 pandemic.   The patient has been notified of following:  \"This video visit will be conducted via a call between you and your provider. We have found that certain health care needs can be provided without the need for an in-person physical exam.  This service lets us provide the care you need with a video conversation. If during the course of the call the provider feels a video visit is not appropriate, you will not be charged for this service.\"   The provider has received verbal consent for a Video Visit from the patient and or first contact? Yes  Patient/facility staff would like the video invitation sent by: N/A   Video Start Time: 8:10AM  Which Facility the Patient is at during the time of visit: [facility]     PRIMARY CARE PROVIDER AND CLINIC:  Rebekah Rausch MD, [address] / [facility] [address]  Chief Complaint   Patient presents with     Hospital F/U     [place] Medical Record Number:  [id number]  Elizabeth Li  is a 84 year old  (1935), returned to the above facility from  [facility]. Hospital stay 6/3/20 - 6/7/20. .  Admitted to this facility for  rehab, medical management and nursing care.  HPI:    HPI information obtained from: facility chart records, facility staff, patient report and [facility] chart review.   Brief Summary of Hospital Course: Brief Summary of Hospital Course: Elizabeth Li is a 84 year old female with PMH significant for CAD s/p stenting x2, HTN, hyperlipidemia, hypothyroidism, anxiety, depression, and KUSUM. She was hospitalized at [facility] from 4/17/20-4/28/20. She originally presented to the hospital with fatigue, weakness and myalgias and was found to have severe " hypercalcemia likely secondary to malignancy. She was treated with IV fluids, subcutaneous calcitonin, dexamethasone and pamidronate therapy and her calcium level normalized prior to hospital discharge. She was also found to have metastatic carcinoma. Biopsy was completed at L5 which confirmed metastatic carcinoma. She has left breast mass which is likely primary source. She is meeting with oncology to review results later this week. During hospitalization was also found to have metabolic encephalopathy likely due to hypercalcemia and also possibly from UTI for which she was treated with 7 days of ceftriaxone. Also with JENNIFER during admission that peaked at 1.6 and trended down to baseline at discharge- suspected to be secondary to volume depletion and hypercalcemia. With elevated BP throughout her stay and several medications were adjusted/ added. Also with dysphagia- on altered diet. She was discharged to TCU for physical rehab and medical management.               While at TCU she developed loose stools, cramping, gas, as well as low grade temps. COVID 19 was tested on 5/3/20 and was negative. C.diff was ordered, but never collected by facility and eventually stools resolved and are moving well. Had mild leukocytosis as well during this time that resolved on its own. Received 3 days of lasix 20 mg daily for bilateral lower extremity edema, which was improving. Dilaudid was scheduled at 8AM and 8PM and continues q4h PRN. She continues to have elevated BPs- slowly titrating up blood pressure medications. Was started on spironolactone and amlodipine. Received zometa with oncology for hypercalcemia 5/15. Also has had low grade temperatures intermittently at TCU- UA/UC negative from 5/18. COVID-19 5/22 negative. She completed 5 radiation treatments while at TCU as well. With generalized decline, loss of about 20 lb during TCU stay by this point, and no longer able to work with therapy. She also developed some confusion  and hallucinations. Discussed with Dr. An and he ordered brain MRI to rule out metastasis.   Elizabeth was re hospitalized 6/3-6/7 for abnormal labs including hyponatremia and hyperkalemia. MRI of brain was negative for metastasis, infectious work up was negative. Mental status improved as electrolytes normalized. Was started on folic acid supplementation. Also had some urinary retention that required straight catheterization. Palliative care also followed patient during her admission and her code status was changed to DNR/DNI. She was discharged back to [facility].     Updates on Status Since Skilled nursing Admission: Elizabeth today tells me she is doing ok. Reports that she is feeling better than yesterday. She feels her cognition has improved and denies any recent hallucinations. She is oriented to person only today. Reports ongoing poor appetite. Again reaffirms that she does not want to receive tube feeds. Denies any pain at time of visit.     CODE STATUS/ADVANCE DIRECTIVES DISCUSSION:   DNR / DNI  Patient's living condition: lives with spouse  ALLERGIES: Flu virus vaccine and Gluten meal  PAST MEDICAL HISTORY:  has a past medical history of CAD (coronary artery disease), Depression, HTN (hypertension), Hyperlipidemia, Hypothyroidism, IHD (ischemic heart disease), and KUSUM (obstructive sleep apnea).  PAST SURGICAL HISTORY:   has a past surgical history that includes tubal ligation; Colonoscopy (N/A, 1/11/2017); Optical tracking system fusion spine posterior lumbar one level (N/A, 7/17/2019); Coronary angiogram with stents x2 (03/2015); [facility] teeth extraction; Tonsillectomy, adenoidectomy, combined; and back surgery.  FAMILY HISTORY: family history includes Blood Disease in her mother; Heart Disease in her brother, father, and sister.  SOCIAL HISTORY:   reports that she quit smoking about 36 years ago. She has never used smokeless tobacco. She reports current alcohol use. She reports that she does not use  drugs.  Current Outpatient Medications   Medication Sig Dispense Refill     acetaminophen (TYLENOL) 500 MG tablet Take 2 tablets (1,000 mg) by mouth 3 times daily       amLODIPine (NORVASC) 5 MG tablet Take 5 mg by mouth At Bedtime       artificial saliva (BIOTENE MT) SOLN solution Swish and spit 2 mLs (2 sprays) in mouth every hour as needed for dry mouth       ascorbic acid (VITAMIN C) 500 MG tablet Take 500 mg by mouth daily (with lunch) Pt takes in afternoon       aspirin (ASA) 81 MG tablet Take 1 tablet (81 mg) by mouth daily 90 tablet 3     bisacodyl (DULCOLAX) 10 MG suppository Place 1 suppository (10 mg) rectally daily as needed for constipation       dexamethasone (DECADRON) 4 MG tablet Take 4 mg by mouth daily (with breakfast)       folic acid (FOLVITE) 1 MG tablet Take 1 tablet (1 mg) by mouth daily       hydrALAZINE (APRESOLINE) 10 MG tablet Take 1 tablet (10 mg) by mouth every 8 hours as needed (SBP>160)       HYDROmorphone (DILAUDID) 2 MG tablet Take 2 mg by mouth 2 times daily       HYDROmorphone (DILAUDID) 2 MG tablet Take 2 mg by mouth every 4 hours as needed for moderate to severe pain Do not give PRN doses within 4 hours of the scheduled dose        isosorbide mononitrate (IMDUR) 30 MG 24 hr tablet TAKE ONE TABLET BY MOUTH EVERY DAY 90 tablet 1     levothyroxine (SYNTHROID/LEVOTHROID) 75 MCG tablet TAKE ONE TABLET BY MOUTH EVERY MORNING (BEFORE BREAKFAST) 90 tablet 0     liothyronine (CYTOMEL) 5 MCG tablet Take 1 tablet (5 mcg) by mouth daily 90 tablet 0     lisinopril (ZESTRIL) 20 MG tablet Take 40 mg by mouth daily       metoprolol tartrate (LOPRESSOR) 100 MG tablet Take 100 mg by mouth 2 times daily Hold if heart rate is < 55       omeprazole (PRILOSEC) 20 MG DR capsule Take 1 capsule (20 mg) by mouth daily       polyethylene glycol (MIRALAX/GLYCOLAX) packet Take 1 packet by mouth nightly as needed for constipation        potassium chloride ER (K-TAB) 20 MEQ CR tablet Take 1 tablet (20 mEq) by  "mouth daily       senna-docusate (SENOKOT-S/PERICOLACE) 8.6-50 MG tablet Take 1 tablet by mouth 2 times daily as needed for constipation       sennosides (SENOKOT) 8.6 MG tablet Take 1 tablet by mouth At Bedtime       simethicone (MYLICON) 125 MG chewable tablet Take 125 mg by mouth 4 times daily as needed for intestinal gas       spironolactone (ALDACTONE) 12.5 mg TABS half-tab Take 12.5 mg by mouth daily       ROS: 7 point ROS of systems including Constitutional, Respiratory, Cardiovascular, Gastroenterology, Genitourinary, Musculoskeletal, Psychiatric were all negative except for pertinent positives as above in HPI    Vitals:/60   Pulse 74   Temp 97.8  F (36.6  C)   Resp 22   Ht 1.575 m (5' 2\")   Wt 65.1 kg (143 lb 9.6 oz)   SpO2 92%   BMI 26.26 kg/m     Limited Visit Exam done given COVID-19 precautions:  GENERAL APPEARANCE:  Alert, pleasant, elderly female lying in bed on exam  HEENT: normocephalic, conjunctivae, lids, pupils and irises normal, moist mucous membranes, nose without drainage or crusting  RESP:  respiratory effort normal, no respiratory distress, patient is on RA  NEURO: no facial asymmetry, no speech deficits and able to follow directions  PSYCH: insight and judgement impaired, memory impaired, affect and mood normal    Lab/Diagnostic data:  Recent labs in [facility] reviewed by me today.     ASSESSMENT/PLAN:  (C79.9) Metastatic carcinoma (H)  (G89.3) Cancer related pain  Comment: acute, patient with breast cancer.    -metastasis found at L5  -completed course of 5 radiation treatments while residing at [facility]  -continues to decline- poor po intake, very weak, requiring max assist for cares.  -denies any pain today at time of visit  Plan: Continue tylenol scheduled. Continue scheduled dilaudid at 8AM and 8PM and q4h PRN for pain management. Continue lidocaine patch. Has neurology follow up scheduled 6/10 to ensure nothing else contributing to her ability to walk.    (G92) Toxic metabolic " encephalopathy  (R44.3) Hallucinations  Comment: acute, due to electrolyte abnormalities- hyponatremia and hyperkalemia-improved after these were normalized- mentation normal today, denies any further hallucinations  -infectious workup negative  -brain MRI negative for metastasis  Plan: Continue to monitor for further symptoms. BMP 6/11- results to be faxed to oncology.     (R62.7) Failure to thrive    (E46) Severe Protein calorie malnutrition  Comment: poor po intake, weight trending down- has lost 20 lb since admission to TCU admit weight 163-->143 lb  -patient does not want to receive tube feeds  Plan: Continue juice supplement. Nursing to encourage fluids. Dietician to continue to follow. Continue to monitor po intake and weights.     (D61.818) Pancytopenia  (D69.6)Thrombocytopenia  (D64.9) anemia  Comment: acute, related to cancer  WBC   Date Value Ref Range Status   06/06/2020 4.0 4.0 - 11.0 10e9/L Final     Platelet Count   Date Value Ref Range Status   06/06/2020 19 (LL) 150 - 450 10e9/L Final     Comment:     .   Critical result, provider not notified due to previous critical result   notification.       Hemoglobin   Date Value Ref Range Status   06/06/2020 8.4 (L) 11.7 - 15.7 g/dL Final   ]  Plan: CBC 6/9 at oncology clinic and 6/11- results to be faxed to oncology. Follow up with oncology tomorrow as scheduled. Spoke with Dr. An's nurse today and she will find out how frequently she should have labs checked at TCU. I also updated her on her current platelet and hgb as above and that she will likely need a transfusion arranged for later this week.  They will fax lab orders to [facility] directly.    (I10) Benign essential hypertension  Comment: acute, -140 since admission  -metoprolol dose increased during initial hospital stay  -started on lisinopril prior to hospital discharge and titrated up at TCU  -PRN hydralazine started at TCU  -Started on amlodipine at TCU  Plan: Discontinue spironolactone.  Continue amlodipine 5 mg po at bedtime. Continue lisinopril 40 mg daily, metoprolol (hold if HR <55 and notify provider), imdur. Continue hydralazine 10 mg q8h PRN and contact provider if SBP does not drop below 160 after receiving a dose. VS per TCU policy.       (R53.81) Physical deconditioning  Comment: completed therapy on 5/20- continues to require deangelo lift for transfers, assist with all ADLs, is not ambulating and requires 24 hour care. Spouse unable to care for patient at home, so she will stay at TCU  Plan: Nursing to provide supportive cares at TCU. Social work to assist with d/c planning.    (R41.89) Cognitive impairment  Comment: acute, SLUMS 22/30 at TCU that indicates mild cognitive impairment.  -patient is poor medical historian though  -patient continues to be poor historian  Plan: Nursing to provide supportive cares at TCU.      (I25.10) Coronary artery disease involving native heart, angina presence unspecified, unspecified vessel or lesion type  (E78.5) Dyslipidemia  Comment: chronic, asymptomatic  -history of 2 stents placed  -last ECHO from November 2019 showed: Left ventricular systolic function is normal.The visual ejection fraction is  estimated at 60-65%. The right ventricular systolic function is normal. Mild aortic valve sclerosis, trace aortic regurgitation.  Plan: Continue ASA, imdur, metoprolol, lisinopril. Follow up with cardiology 1 year from last ECHO.     (E03.9) Hypothyroidism  Comment: chronic- lower than preferred, but with acute illness during check  TSH   Date Value Ref Range Status   03/13/2020 0.27 (L) 0.40 - 4.00 mU/L Final     Plan: Continue levothyroxine. Follow up with PCP for recheck and management.       Total time spent with patient visit at the skilled nursing facility was 37 minutes including patient visit, reviewing orders, and phone call to [facility] for coordination of care. Greater than 50% of total time spent with counseling and coordinating care due  discussion with [facility] regarding current lab values and need for transfusion, as well as how frequently they would like to receive labs from patient.       Electronically signed by:  ISMAEL Peña CNP       Video-Visit Details  Type of service:  Video Visit  Video End Time (time video stopped): 8:16AM  Distant Location (provider location):  [place] [facility]         Electronically signed by:  ISMAEL Peña CNP                     Sincerely,        ISMAEL Peña CNP

## 2020-06-08 NOTE — PROGRESS NOTES
" [facility]  Elizabeth Li is being evaluated via a billable video visit due to the restrictions of the Covid-19 pandemic.   The patient has been notified of following:  \"This video visit will be conducted via a call between you and your provider. We have found that certain health care needs can be provided without the need for an in-person physical exam.  This service lets us provide the care you need with a video conversation. If during the course of the call the provider feels a video visit is not appropriate, you will not be charged for this service.\"   The provider has received verbal consent for a Video Visit from the patient and or first contact? Yes  Patient/facility staff would like the video invitation sent by: N/A   Video Start Time: 8:10AM  Which Facility the Patient is at during the time of visit: [facility]     PRIMARY CARE PROVIDER AND CLINIC:  Rebekah Rausch MD, [facility] [address] / [facility] [address]  Chief Complaint   Patient presents with     Hospital F/U     [facility] Medical Record Number:  [id number]  Elizabeth Li  is a 84 year old  (1935), returned to the above facility from  [facility]. Hospital stay 6/3/20 - 6/7/20. .  Admitted to this facility for  rehab, medical management and nursing care.  HPI:    HPI information obtained from: facility chart records, facility staff, patient report and [facility] chart review.   Brief Summary of Hospital Course: Brief Summary of Hospital Course: Elizabeth Li is a 84 year old female with PMH significant for CAD s/p stenting x2, HTN, hyperlipidemia, hypothyroidism, anxiety, depression, and KUSUM. She was hospitalized at [facility] from 4/17/20-4/28/20. She originally presented to the hospital with fatigue, weakness and myalgias and was found to have severe hypercalcemia likely secondary to malignancy. She was treated with IV fluids, subcutaneous " calcitonin, dexamethasone and pamidronate therapy and her calcium level normalized prior to hospital discharge. She was also found to have metastatic carcinoma. Biopsy was completed at L5 which confirmed metastatic carcinoma. She has left breast mass which is likely primary source. She is meeting with oncology to review results later this week. During hospitalization was also found to have metabolic encephalopathy likely due to hypercalcemia and also possibly from UTI for which she was treated with 7 days of ceftriaxone. Also with JENNIFER during admission that peaked at 1.6 and trended down to baseline at discharge- suspected to be secondary to volume depletion and hypercalcemia. With elevated BP throughout her stay and several medications were adjusted/ added. Also with dysphagia- on altered diet. She was discharged to TCU for physical rehab and medical management.               While at TCU she developed loose stools, cramping, gas, as well as low grade temps. COVID 19 was tested on 5/3/20 and was negative. C.diff was ordered, but never collected by facility and eventually stools resolved and are moving well. Had mild leukocytosis as well during this time that resolved on its own. Received 3 days of lasix 20 mg daily for bilateral lower extremity edema, which was improving. Dilaudid was scheduled at 8AM and 8PM and continues q4h PRN. She continues to have elevated BPs- slowly titrating up blood pressure medications. Was started on spironolactone and amlodipine. Received zometa with oncology for hypercalcemia 5/15. Also has had low grade temperatures intermittently at TCU- UA/UC negative from 5/18. COVID-19 5/22 negative. She completed 5 radiation treatments while at TCU as well. With generalized decline, loss of about 20 lb during TCU stay by this point, and no longer able to work with therapy. She also developed some confusion and hallucinations. Discussed with Dr. An and he ordered brain MRI to rule out  metastasis.   Elizabeth was re hospitalized 6/3-6/7 for abnormal labs including hyponatremia and hyperkalemia. MRI of brain was negative for metastasis, infectious work up was negative. Mental status improved as electrolytes normalized. Was started on folic acid supplementation. Also had some urinary retention that required straight catheterization. Palliative care also followed patient during her admission and her code status was changed to DNR/DNI. She was discharged back to [facility].     Updates on Status Since Skilled nursing Admission: Elizabeth today tells me she is doing ok. Reports that she is feeling better than yesterday. She feels her cognition has improved and denies any recent hallucinations. She is oriented to person only today. Reports ongoing poor appetite. Again reaffirms that she does not want to receive tube feeds. Denies any pain at time of visit.     CODE STATUS/ADVANCE DIRECTIVES DISCUSSION:   DNR / DNI  Patient's living condition: lives with spouse  ALLERGIES: Flu virus vaccine and Gluten meal  PAST MEDICAL HISTORY:  has a past medical history of CAD (coronary artery disease), Depression, HTN (hypertension), Hyperlipidemia, Hypothyroidism, IHD (ischemic heart disease), and KUSUM (obstructive sleep apnea).  PAST SURGICAL HISTORY:   has a past surgical history that includes tubal ligation; Colonoscopy (N/A, 1/11/2017); Optical tracking system fusion spine posterior lumbar one level (N/A, 7/17/2019); Coronary angiogram with stents x2 (03/2015); [place] teeth extraction; Tonsillectomy, adenoidectomy, combined; and back surgery.  FAMILY HISTORY: family history includes Blood Disease in her mother; Heart Disease in her brother, father, and sister.  SOCIAL HISTORY:   reports that she quit smoking about 36 years ago. She has never used smokeless tobacco. She reports current alcohol use. She reports that she does not use drugs.  Current Outpatient Medications   Medication Sig Dispense Refill     acetaminophen  (TYLENOL) 500 MG tablet Take 2 tablets (1,000 mg) by mouth 3 times daily       amLODIPine (NORVASC) 5 MG tablet Take 5 mg by mouth At Bedtime       artificial saliva (BIOTENE MT) SOLN solution Swish and spit 2 mLs (2 sprays) in mouth every hour as needed for dry mouth       ascorbic acid (VITAMIN C) 500 MG tablet Take 500 mg by mouth daily (with lunch) Pt takes in afternoon       aspirin (ASA) 81 MG tablet Take 1 tablet (81 mg) by mouth daily 90 tablet 3     bisacodyl (DULCOLAX) 10 MG suppository Place 1 suppository (10 mg) rectally daily as needed for constipation       dexamethasone (DECADRON) 4 MG tablet Take 4 mg by mouth daily (with breakfast)       folic acid (FOLVITE) 1 MG tablet Take 1 tablet (1 mg) by mouth daily       hydrALAZINE (APRESOLINE) 10 MG tablet Take 1 tablet (10 mg) by mouth every 8 hours as needed (SBP>160)       HYDROmorphone (DILAUDID) 2 MG tablet Take 2 mg by mouth 2 times daily       HYDROmorphone (DILAUDID) 2 MG tablet Take 2 mg by mouth every 4 hours as needed for moderate to severe pain Do not give PRN doses within 4 hours of the scheduled dose        isosorbide mononitrate (IMDUR) 30 MG 24 hr tablet TAKE ONE TABLET BY MOUTH EVERY DAY 90 tablet 1     levothyroxine (SYNTHROID/LEVOTHROID) 75 MCG tablet TAKE ONE TABLET BY MOUTH EVERY MORNING (BEFORE BREAKFAST) 90 tablet 0     liothyronine (CYTOMEL) 5 MCG tablet Take 1 tablet (5 mcg) by mouth daily 90 tablet 0     lisinopril (ZESTRIL) 20 MG tablet Take 40 mg by mouth daily       metoprolol tartrate (LOPRESSOR) 100 MG tablet Take 100 mg by mouth 2 times daily Hold if heart rate is < 55       omeprazole (PRILOSEC) 20 MG DR capsule Take 1 capsule (20 mg) by mouth daily       polyethylene glycol (MIRALAX/GLYCOLAX) packet Take 1 packet by mouth nightly as needed for constipation        potassium chloride ER (K-TAB) 20 MEQ CR tablet Take 1 tablet (20 mEq) by mouth daily       senna-docusate (SENOKOT-S/PERICOLACE) 8.6-50 MG tablet Take 1 tablet by  "mouth 2 times daily as needed for constipation       sennosides (SENOKOT) 8.6 MG tablet Take 1 tablet by mouth At Bedtime       simethicone (MYLICON) 125 MG chewable tablet Take 125 mg by mouth 4 times daily as needed for intestinal gas       spironolactone (ALDACTONE) 12.5 mg TABS half-tab Take 12.5 mg by mouth daily       ROS: 7 point ROS of systems including Constitutional, Respiratory, Cardiovascular, Gastroenterology, Genitourinary, Musculoskeletal, Psychiatric were all negative except for pertinent positives as above in HPI    Vitals:/60   Pulse 74   Temp 97.8  F (36.6  C)   Resp 22   Ht 1.575 m (5' 2\")   Wt 65.1 kg (143 lb 9.6 oz)   SpO2 92%   BMI 26.26 kg/m     Limited Visit Exam done given COVID-19 precautions:  GENERAL APPEARANCE:  Alert, pleasant, elderly female lying in bed on exam  HEENT: normocephalic, conjunctivae, lids, pupils and irises normal, moist mucous membranes, nose without drainage or crusting  RESP:  respiratory effort normal, no respiratory distress, patient is on RA  NEURO: no facial asymmetry, no speech deficits and able to follow directions  PSYCH: insight and judgement impaired, memory impaired, affect and mood normal    Lab/Diagnostic data:  Recent labs in [facility] reviewed by me today.     ASSESSMENT/PLAN:  (C79.9) Metastatic carcinoma (H)  (G89.3) Cancer related pain  Comment: acute, patient with breast cancer.    -metastasis found at L5  -completed course of 5 radiation treatments while residing at [facility]  -continues to decline- poor po intake, very weak, requiring max assist for cares.  -denies any pain today at time of visit  Plan: Continue tylenol scheduled. Continue scheduled dilaudid at 8AM and 8PM and q4h PRN for pain management. Continue lidocaine patch. Has neurology follow up scheduled 6/10 to ensure nothing else contributing to her ability to walk.    (G92) Toxic metabolic encephalopathy  (R44.3) Hallucinations  Comment: acute, due to electrolyte abnormalities- " hyponatremia and hyperkalemia-improved after these were normalized- mentation normal today, denies any further hallucinations  -infectious workup negative  -brain MRI negative for metastasis  Plan: Continue to monitor for further symptoms. BMP 6/11- results to be faxed to oncology.     (R62.7) Failure to thrive    (E46) Severe Protein calorie malnutrition  Comment: poor po intake, weight trending down- has lost 20 lb since admission to TCU admit weight 163-->143 lb  -patient does not want to receive tube feeds  Plan: Continue juice supplement. Nursing to encourage fluids. Dietician to continue to follow. Continue to monitor po intake and weights.     (D61.818) Pancytopenia  (D69.6)Thrombocytopenia  (D64.9) anemia  Comment: acute, related to cancer  WBC   Date Value Ref Range Status   06/06/2020 4.0 4.0 - 11.0 10e9/L Final     Platelet Count   Date Value Ref Range Status   06/06/2020 19 (LL) 150 - 450 10e9/L Final     Comment:     .   Critical result, provider not notified due to previous critical result   notification.       Hemoglobin   Date Value Ref Range Status   06/06/2020 8.4 (L) 11.7 - 15.7 g/dL Final   ]  Plan: Discontinue ASA today after discussing with Dr. Bravo. CBC 6/9 at oncology clinic and 6/11- results to be faxed to oncology. Follow up with oncology tomorrow as scheduled. Spoke with Dr. An's nurse today and she will find out how frequently she should have labs checked at TCU. I also updated her on her current platelet and hgb as above and that she will likely need a transfusion arranged for later this week.  They will fax lab orders to [facility] directly.    (I10) Benign essential hypertension  Comment: acute, -140 since admission  -metoprolol dose increased during initial hospital stay  -started on lisinopril prior to hospital discharge and titrated up at TCU  -PRN hydralazine started at TCU  -Started on amlodipine at TCU  Plan: Discontinue spironolactone. Continue amlodipine 5 mg po at  bedtime. Continue lisinopril 40 mg daily, metoprolol (hold if HR <55 and notify provider), imdur. Continue hydralazine 10 mg q8h PRN and contact provider if SBP does not drop below 160 after receiving a dose. VS per TCU policy.       (R53.81) Physical deconditioning  Comment: completed therapy on 5/20- continues to require deangelo lift for transfers, assist with all ADLs, is not ambulating and requires 24 hour care. Spouse unable to care for patient at home, so she will stay at TCU  Plan: Nursing to provide supportive cares at TCU. Social work to assist with d/c planning.    (R41.89) Cognitive impairment  Comment: acute, SLUMS 22/30 at TCU that indicates mild cognitive impairment.  -patient is poor medical historian though  -patient continues to be poor historian  Plan: Nursing to provide supportive cares at TCU.      (I25.10) Coronary artery disease involving native heart, angina presence unspecified, unspecified vessel or lesion type  (E78.5) Dyslipidemia  Comment: chronic, asymptomatic  -history of 2 stents placed  -last ECHO from November 2019 showed: Left ventricular systolic function is normal.The visual ejection fraction is  estimated at 60-65%. The right ventricular systolic function is normal. Mild aortic valve sclerosis, trace aortic regurgitation.  Plan: discontinue ASA. Continue imdur, metoprolol, lisinopril, amlodipine. Follow up with cardiology 1 year from last ECHO. Monitor for chest pain.     (E03.9) Hypothyroidism  Comment: chronic- lower than preferred, but with acute illness during check  TSH   Date Value Ref Range Status   03/13/2020 0.27 (L) 0.40 - 4.00 mU/L Final     Plan: Continue levothyroxine. Follow up with PCP for recheck and management.       Total time spent with patient visit at the skilled nursing facility was 37 minutes including patient visit, reviewing orders, and phone call to [facility] for coordination of care. Greater than 50% of total time spent with counseling and  coordinating care due discussion with [facility] regarding current lab values and need for transfusion, as well as how frequently they would like to receive labs from patient.       Electronically signed by:  ISMAEL Peña CNP       Video-Visit Details  Type of service:  Video Visit  Video End Time (time video stopped): 8:16AM  Distant Location (provider location):  [facility]         Electronically signed by:  ISMAEL Peña CNP

## 2020-06-08 NOTE — PROGRESS NOTES
Discharge to TCU. MELISA CC will follow for care transitions.    Idris Live, [facility]  Clinic Care Coordinator  Ph. [phone]  [email or web address]

## 2020-06-08 NOTE — PROGRESS NOTES
Transition Communication Hand-off for Care Transitions to Next Level of Care Provider    Name: Elizabeth Li  : 1935  MRN #: [id number]  Primary Care Provider: Rebekah Rausch     Primary Clinic: [address]  [facility] [address]     Reason for Hospitalization:  Hyperkalemia [E87.5]  Hyponatremia [E87.1]  Thrombocytopenia (H) [D69.6]  Generalized muscle weakness [M62.81]  Metastatic carcinoma (H) [C79.9]  Anemia, unspecified type [D64.9]  Admit Date/Time: 6/3/2020  5:56 PM  Discharge Date: 2020  Payor Source: Payor: BCBS / Plan: BCBS MEDICARE ADVANTAGE / Product Type: Medicare /     Readmission Assessment Measure (SHAQUILLE) Risk Score/category: Elevated        Reason for Communication Hand-off Referral: Other Metastatic breast cancer    Discharge Plan: Return to TCU     Concern for non-adherence with plan of care: No  Discharge Needs Assessment:  Needs      Most Recent Value   Skilled Nursing Facility  [facility] [phone], Fax: [phone]   PAS Number  -- [Not required as pt returning to skilled facility]        Follow-up specialty is recommended: Yes. Follow up with [facility] as scheduled. Also, follow up with Physical & Occupation therapy while at TCU.     Follow-up plan:    Future Appointments   Date Time Provider Department Center   2020 10:05 AM CSXR1 [facility]    2020 10:30 AM Annabelle Leon PA-C [facility]     Any outstanding tests or procedures:  No. Recommend a repeat hemoglobin at TCU.       Referrals     Future Labs/Procedures    Occupational Therapy Adult Consult     Comments:    Evaluate and treat as clinically indicated.    Reason: severe decondition    Physical Therapy Adult Consult     Comments:    Evaluate and treat as clinically indicated.    Reason:severe decondition          Supplies     Future Labs/Procedures    AntiEmbolism Stockings     Comments:    Bilateral below knee length.On in the  morning, off at night    STRAIGHT TIP URINE CATHETER         Key Recommendations:  CTS identifies patient as high risk due to elevated SHAQUILLE score. Currently admitted for metastatic breast cancer, this is her 2nd admission in 6 months. Per chart review, pt resides at home in a [facility] with her , Oswaldo. Baseline mobility is total care. She remains a total care. She has needed to use the Chaka lift at home for the past 3 weeks.      Her PMH that can effect her SHAQUILLE score includes CAD, depression, HTN, hyperlipidemia, hypothyroidism, IHD & KUSUM. Her PTA medications that can effect her SHAQUILLE score includes ASA, Decadron & Hydromorphone.     She is currently pleasantly confused and her decisional capacity is unreliable. She has severe malnutrition r/t a weight loss of 36 pounds over the last year. She has constipation issues r/t to her opiate use for cancer-related pain.     Kaley Live RN, BSN, CPHN, CM    AVS/Discharge Summary is the source of truth; this is a helpful guide for improved communication of patient story

## 2020-06-12 NOTE — PROGRESS NOTES
"[facility]   Elizabeth Li is being evaluated via a billable video visit due to the restrictions of the Covid-19 pandemic.   The patient has been notified of following:  \"This video visit will be conducted via a call between you and your provider. We have found that certain health care needs can be provided without the need for an in-person physical exam.  This service lets us provide the care you need with a video conversation. If during the course of the call the provider feels a video visit is not appropriate, you will not be charged for this service.\"   The provider has received verbal consent for a Video Visit from the patient or first contact? Yes  Patient  or facility staff would like the video invitation sent by: N/A   Video Start Time: 8:16AM    [facility] Record Number:  [id number]  Place of Location at the time of visit: [facility]   Chief Complaint   Patient presents with     Nursing Home Acute     HPI:  Elizabeth Li  is a 84 year old (1935), who is being seen today for a visit.  HPI information obtained from: facility chart records, facility staff, patient report and [facility] chart review.     Brief Summary of Hospital Course: Brief Summary of Hospital Course: Elizabeth Li is a 84 year old female with PMH significant for CAD s/p stenting x2, HTN, hyperlipidemia, hypothyroidism, anxiety, depression, and KUSUM. She was hospitalized at [facility] from 4/17/20-4/28/20. She originally presented to the hospital with fatigue, weakness and myalgias and was found to have severe hypercalcemia likely secondary to malignancy. She was treated with IV fluids, subcutaneous calcitonin, dexamethasone and pamidronate therapy and her calcium level normalized prior to hospital discharge. She was also found to have metastatic carcinoma. Biopsy was completed at L5 which confirmed metastatic carcinoma. She has left breast mass which is likely " primary source. She is meeting with oncology to review results later this week. During hospitalization was also found to have metabolic encephalopathy likely due to hypercalcemia and also possibly from UTI for which she was treated with 7 days of ceftriaxone. Also with JENNIFER during admission that peaked at 1.6 and trended down to baseline at discharge- suspected to be secondary to volume depletion and hypercalcemia. With elevated BP throughout her stay and several medications were adjusted/ added. Also with dysphagia- on altered diet. She was discharged to TCU for physical rehab and medical management.               While at TCU she developed loose stools, cramping, gas, as well as low grade temps. COVID 19 was tested on 5/3/20 and was negative. C.diff was ordered, but never collected by facility and eventually stools resolved and are moving well. Had mild leukocytosis as well during this time that resolved on its own. Received 3 days of lasix 20 mg daily for bilateral lower extremity edema, which was improving. Dilaudid was scheduled at 8AM and 8PM and continues q4h PRN. She continues to have elevated BPs- slowly titrating up blood pressure medications. Was started on spironolactone and amlodipine. Received zometa with oncology for hypercalcemia 5/15. Also has had low grade temperatures intermittently at TCU- UA/UC negative from 5/18. COVID-19 5/22 negative. She completed 5 radiation treatments while at TCU as well. With generalized decline, loss of about 20 lb during TCU stay by this point, and no longer able to work with therapy. She also developed some confusion and hallucinations. Discussed with Dr. An and he ordered brain MRI to rule out metastasis.              Elizabeth was re hospitalized 6/3-6/7 for abnormal labs including hyponatremia and hyperkalemia. MRI of brain was negative for metastasis, infectious work up was negative. Mental status improved as electrolytes normalized. Was started on folic acid  "supplementation. Also had some urinary retention that required straight catheterization. Palliative care also followed patient during her admission and her code status was changed to DNR/DNI. She was discharged back to [facility].      Today's concern is:  Elizabeth was seen today for routine follow up at TCU. Labs from 6/11 show Hgb 7.4, platelets 20,000. Na dropping to 131, potassium increasing to 4.9, glucose 56, and BUN 31. Patient today is confused, stating that \"isn't where she is supposed to be.\" She denies any pain today, but nursing staff reports it is often worse later in the day when she is out of bed. Continues to have poor po intake ranging from 1-25%. No recent weight. Bowels moving irregularly- last BM 6/9. -145 over past week. Afebrile. HR ins 70s.    Past Medical and Surgical History reviewed in Epic today.  MEDICATIONS:    Current Outpatient Medications   Medication Sig Dispense Refill     acetaminophen (TYLENOL) 500 MG tablet Take 2 tablets (1,000 mg) by mouth 3 times daily       amLODIPine (NORVASC) 5 MG tablet Take 5 mg by mouth At Bedtime       artificial saliva (BIOTENE MT) SOLN solution Swish and spit 2 mLs (2 sprays) in mouth every hour as needed for dry mouth       ascorbic acid (VITAMIN C) 500 MG tablet Take 500 mg by mouth daily (with lunch) Pt takes in afternoon       bisacodyl (DULCOLAX) 10 MG suppository Place 1 suppository (10 mg) rectally daily as needed for constipation       dexamethasone (DECADRON) 4 MG tablet Take 4 mg by mouth daily (with breakfast)       folic acid (FOLVITE) 1 MG tablet Take 1 tablet (1 mg) by mouth daily       hydrALAZINE (APRESOLINE) 10 MG tablet Take 1 tablet (10 mg) by mouth every 8 hours as needed (SBP>160)       HYDROmorphone (DILAUDID) 2 MG tablet Take 2 mg by mouth 2 times daily       HYDROmorphone (DILAUDID) 2 MG tablet Take 2 mg by mouth every 4 hours as needed for moderate to severe pain Do not give PRN doses within 4 hours of the scheduled dose   " "     isosorbide mononitrate (IMDUR) 30 MG 24 hr tablet TAKE ONE TABLET BY MOUTH EVERY DAY 90 tablet 1     levothyroxine (SYNTHROID/LEVOTHROID) 75 MCG tablet TAKE ONE TABLET BY MOUTH EVERY MORNING (BEFORE BREAKFAST) 90 tablet 0     liothyronine (CYTOMEL) 5 MCG tablet Take 1 tablet (5 mcg) by mouth daily 90 tablet 0     lisinopril (ZESTRIL) 20 MG tablet Take 40 mg by mouth daily       metoprolol tartrate (LOPRESSOR) 100 MG tablet Take 100 mg by mouth 2 times daily Hold if heart rate is < 55       omeprazole (PRILOSEC) 20 MG DR capsule Take 1 capsule (20 mg) by mouth daily       polyethylene glycol (MIRALAX/GLYCOLAX) packet Take 1 packet by mouth nightly as needed for constipation        senna-docusate (SENOKOT-S/PERICOLACE) 8.6-50 MG tablet Take 1 tablet by mouth 2 times daily as needed for constipation       sennosides (SENOKOT) 8.6 MG tablet Take 1 tablet by mouth At Bedtime       simethicone (MYLICON) 125 MG chewable tablet Take 125 mg by mouth 4 times daily as needed for intestinal gas       REVIEW OF SYSTEMS: Limited secondary to cognitive impairment but today pt reports as above in HPI  Objective: BP (!) 145/71   Pulse 72   Temp 98.8  F (37.1  C)   Resp 14   Ht 1.575 m (5' 2\")   Wt 65.1 kg (143 lb 9.6 oz)   SpO2 93%   BMI 26.26 kg/m    Limited visit exam done given COVID-19 precautions.   GENERAL APPEARANCE: pleasant, elderly female lying in bed on exam  HEENT: normocephalic, conjunctivae, lids, pupils and irises normal, nose without drainage or crusting  RESP:  respiratory effort normal, no respiratory distress, patient is on RA  NEURO: no facial asymmetry, no speech deficits   PSYCH: insight and judgement impaired, memory impaired, affect and mood normal    Lab/Diagnostic data:  Recent labs in [facility] reviewed by me today.     ASSESSMENT/PLAN:  (R41.0) confusion  (E87.1) Hyponatremia  (R62.7) Failure to thrive    (E46) Severe Protein calorie malnutrition  Comment: poor po intake, weight trending down- has " lost 20 lb since admission to TCU admit weight 163-->143 lb  -patient does not want to receive tube feeds  -Na trending down and potassium starting to trend up since discharge from hospital, also now showing confusion which is a change since visit on Monday- suspect due to poor po intake, dehydration (with BS 56 and CO2 of 16 on labs yesterday)  -had recent hospitalization with acute metabolic encephalopathy and hallucinations-infectious workup negative  -brain MRI negative for metastasis- resolved after electrolyte and fluid resuscitation   Plan: Discussed with Dr. Bravo as well as spouse. Spouse ok with her receiving IV fluids at [facility] to try to prevent another hospitalization. Start D5 normal saline at 100ml/hr to complete 1L bag of fluids. BMP 6/13. Discontinue potassium supplement. Continue juice supplement. Nursing to encourage fluids. Dietician to continue to follow. Continue to monitor po intake and weights- asked nursing to obtain weight, since nothing available in >2 weeks.     (C79.9) Metastatic carcinoma (H)  (G89.3) Cancer related pain  Comment: acute, patient with breast cancer.    -metastasis found at L5  -completed course of 5 radiation treatments while residing at [facility]  -continues to decline- poor po intake, very weak, requiring max assist for cares.  -denies any pain today at time of visit, but worse later in the day  -Started on letrozole by oncology this week  Plan: Discussed patient's current status, as well as ongoing decline today extensively with spouse. He had many questions today which we discussed and would really like Elizabeth to decide about signing on to hospice, however her mentation has not been good. Ad did agree to hospice informational session and had many questions about hospice. I contacted social work at [facility] to get an informational session set up and to answer Ad's questions. Continue tylenol scheduled. Continue scheduled dilaudid at 8AM and 8PM and q4h PRN for pain  management. Continue lidocaine patch.     (D61.818) Pancytopenia  (D69.6)Thrombocytopenia  (D64.9) anemia  Comment: acute, related to recent radiation  Hgb 6/11 7.4  Plt 6/11 20,000  WBC 8.8  Plan: Contacted Dr. An's office today to ensure they had received labs from yesterday. Nurse reports they did receive labs, but she is not sure if Dr. An has reviewed them yet. Likely will need transfusion next week unless counts start to recover. Spouse notified of labs today as well.     (K59.00) Constipation  Comment: no BM in several days  Plan: Miralax STAT now for constipation. Continued scheduled senna. Prune juice also provided by nursing staff today.     (I10) Benign essential hypertension  Comment: acute, BP meeting goal  -metoprolol dose increased during initial hospital stay  -started on lisinopril prior to hospital discharge and titrated up at TCU  -PRN hydralazine started at TCU  -Started on amlodipine at TCU  Plan: Continue amlodipine 5 mg po at bedtime. Continue lisinopril 40 mg daily, metoprolol (hold if HR <55 and notify provider), imdur. Continue hydralazine 10 mg q8h PRN and contact provider if SBP does not drop below 160 after receiving a dose. VS per TCU policy.       (R53.81) Physical deconditioning  Comment: completed therapy on 5/20- continues to require deangelo lift for transfers, assist with all ADLs, is not ambulating and requires 24 hour care. Spouse unable to care for patient at home, so she will stay at TCU- not currently working with therapy  Plan: Nursing to provide supportive cares at TCU. Social work to assist with d/c planning.    (R41.89) Cognitive impairment  Comment: acute, SLUMS 22/30 at TCU that indicates mild cognitive impairment.  -patient is poor medical historian though  -patient continues to be poor historian  Plan: Nursing to provide supportive cares at TCU.      (I25.10) Coronary artery disease involving native heart, angina presence unspecified, unspecified vessel or lesion  type  (E78.5) Dyslipidemia  Comment: chronic, asymptomatic  -history of 2 stents placed  -last ECHO from November 2019 showed: Left ventricular systolic function is normal.The visual ejection fraction is  estimated at 60-65%. The right ventricular systolic function is normal. Mild aortic valve sclerosis, trace aortic regurgitation.  -ASA discontinued at TCU due to low platelets, hgb  Plan:Continue imdur, metoprolol, lisinopril, amlodipine. Follow up with cardiology 1 year from last ECHO. Monitor for chest pain.     (E03.9) Hypothyroidism  Comment: chronic- lower than preferred, but with acute illness during check  TSH   Date Value Ref Range Status   03/13/2020 0.27 (L) 0.40 - 4.00 mU/L Final     Plan: Continue levothyroxine. Follow up with PCP for recheck and management.       Total time spent with patient visit at the skilled nursing facility was 45 minutes including patient visit, reviewing orders, and phone call to [facility] for coordination of care, discussion with nursing staff regarding new orders written today, and discussion with patient's spouse Gene as well as social work. Greater than 50% of total time spent with counseling and coordinating care due to recent lab results, confusion, failure to thrive, metastatic breast cancer as described above.       Electronically signed by:  ISMAEL Peña CNP     Video-Visit Details  Type of service:  Video Visit  Video End Time (time video stopped): 8:24AM  Distant Location (provider location):  [facility]

## 2020-06-12 NOTE — LETTER
"    6/12/2020        RE: Elizabeth Li  [address]  [place] [address]        [place] [facility]   Elizabeth Li is being evaluated via a billable video visit due to the restrictions of the Covid-19 pandemic.   The patient has been notified of following:  \"This video visit will be conducted via a call between you and your provider. We have found that certain health care needs can be provided without the need for an in-person physical exam.  This service lets us provide the care you need with a video conversation. If during the course of the call the provider feels a video visit is not appropriate, you will not be charged for this service.\"   The provider has received verbal consent for a Video Visit from the patient or first contact? Yes  Patient  or facility staff would like the video invitation sent by: N/A   Video Start Time: 8:16AM    [place] Medical Record Number:  [id number]  Place of Location at the time of visit: [facility]   Chief Complaint   Patient presents with     Nursing Home Acute     HPI:  Elizabeth Li  is a 84 year old (1935), who is being seen today for a visit.  HPI information obtained from: facility chart records, facility staff, patient report and [facility] chart review.     Brief Summary of Hospital Course: Brief Summary of Hospital Course: Elizabeth Li is a 84 year old female with PMH significant for CAD s/p stenting x2, HTN, hyperlipidemia, hypothyroidism, anxiety, depression, and KUSUM. She was hospitalized at [facility] from 4/17/20-4/28/20. She originally presented to the hospital with fatigue, weakness and myalgias and was found to have severe hypercalcemia likely secondary to malignancy. She was treated with IV fluids, subcutaneous calcitonin, dexamethasone and pamidronate therapy and her calcium level normalized prior to hospital discharge. She was also found to have metastatic carcinoma. Biopsy " was completed at L5 which confirmed metastatic carcinoma. She has left breast mass which is likely primary source. She is meeting with oncology to review results later this week. During hospitalization was also found to have metabolic encephalopathy likely due to hypercalcemia and also possibly from UTI for which she was treated with 7 days of ceftriaxone. Also with JENNIFER during admission that peaked at 1.6 and trended down to baseline at discharge- suspected to be secondary to volume depletion and hypercalcemia. With elevated BP throughout her stay and several medications were adjusted/ added. Also with dysphagia- on altered diet. She was discharged to TCU for physical rehab and medical management.               While at TCU she developed loose stools, cramping, gas, as well as low grade temps. COVID 19 was tested on 5/3/20 and was negative. C.diff was ordered, but never collected by facility and eventually stools resolved and are moving well. Had mild leukocytosis as well during this time that resolved on its own. Received 3 days of lasix 20 mg daily for bilateral lower extremity edema, which was improving. Dilaudid was scheduled at 8AM and 8PM and continues q4h PRN. She continues to have elevated BPs- slowly titrating up blood pressure medications. Was started on spironolactone and amlodipine. Received zometa with oncology for hypercalcemia 5/15. Also has had low grade temperatures intermittently at TCU- UA/UC negative from 5/18. COVID-19 5/22 negative. She completed 5 radiation treatments while at TCU as well. With generalized decline, loss of about 20 lb during TCU stay by this point, and no longer able to work with therapy. She also developed some confusion and hallucinations. Discussed with Dr. An and he ordered brain MRI to rule out metastasis.              Elizabeth was re hospitalized 6/3-6/7 for abnormal labs including hyponatremia and hyperkalemia. MRI of brain was negative for metastasis, infectious work  "up was negative. Mental status improved as electrolytes normalized. Was started on folic acid supplementation. Also had some urinary retention that required straight catheterization. Palliative care also followed patient during her admission and her code status was changed to DNR/DNI. She was discharged back to [facility].      Today's concern is:  Elizabeth was seen today for routine follow up at TCU. Labs from 6/11 show Hgb 7.4, platelets 20,000. Na dropping to 131, potassium increasing to 4.9, glucose 56, and BUN 31. Patient today is confused, stating that \"isn't where she is supposed to be.\" She denies any pain today, but nursing staff reports it is often worse later in the day when she is out of bed. Continues to have poor po intake ranging from 1-25%. No recent weight. Bowels moving irregularly- last BM 6/9. -145 over past week. Afebrile. HR ins 70s.    Past Medical and Surgical History reviewed in Epic today.  MEDICATIONS:    Current Outpatient Medications   Medication Sig Dispense Refill     acetaminophen (TYLENOL) 500 MG tablet Take 2 tablets (1,000 mg) by mouth 3 times daily       amLODIPine (NORVASC) 5 MG tablet Take 5 mg by mouth At Bedtime       artificial saliva (BIOTENE MT) SOLN solution Swish and spit 2 mLs (2 sprays) in mouth every hour as needed for dry mouth       ascorbic acid (VITAMIN C) 500 MG tablet Take 500 mg by mouth daily (with lunch) Pt takes in afternoon       bisacodyl (DULCOLAX) 10 MG suppository Place 1 suppository (10 mg) rectally daily as needed for constipation       dexamethasone (DECADRON) 4 MG tablet Take 4 mg by mouth daily (with breakfast)       folic acid (FOLVITE) 1 MG tablet Take 1 tablet (1 mg) by mouth daily       hydrALAZINE (APRESOLINE) 10 MG tablet Take 1 tablet (10 mg) by mouth every 8 hours as needed (SBP>160)       HYDROmorphone (DILAUDID) 2 MG tablet Take 2 mg by mouth 2 times daily       HYDROmorphone (DILAUDID) 2 MG tablet Take 2 mg by mouth every 4 hours as " "needed for moderate to severe pain Do not give PRN doses within 4 hours of the scheduled dose        isosorbide mononitrate (IMDUR) 30 MG 24 hr tablet TAKE ONE TABLET BY MOUTH EVERY DAY 90 tablet 1     levothyroxine (SYNTHROID/LEVOTHROID) 75 MCG tablet TAKE ONE TABLET BY MOUTH EVERY MORNING (BEFORE BREAKFAST) 90 tablet 0     liothyronine (CYTOMEL) 5 MCG tablet Take 1 tablet (5 mcg) by mouth daily 90 tablet 0     lisinopril (ZESTRIL) 20 MG tablet Take 40 mg by mouth daily       metoprolol tartrate (LOPRESSOR) 100 MG tablet Take 100 mg by mouth 2 times daily Hold if heart rate is < 55       omeprazole (PRILOSEC) 20 MG DR capsule Take 1 capsule (20 mg) by mouth daily       polyethylene glycol (MIRALAX/GLYCOLAX) packet Take 1 packet by mouth nightly as needed for constipation        senna-docusate (SENOKOT-S/PERICOLACE) 8.6-50 MG tablet Take 1 tablet by mouth 2 times daily as needed for constipation       sennosides (SENOKOT) 8.6 MG tablet Take 1 tablet by mouth At Bedtime       simethicone (MYLICON) 125 MG chewable tablet Take 125 mg by mouth 4 times daily as needed for intestinal gas       REVIEW OF SYSTEMS: Limited secondary to cognitive impairment but today pt reports as above in HPI  Objective: BP (!) 145/71   Pulse 72   Temp 98.8  F (37.1  C)   Resp 14   Ht 1.575 m (5' 2\")   Wt 65.1 kg (143 lb 9.6 oz)   SpO2 93%   BMI 26.26 kg/m    Limited visit exam done given COVID-19 precautions.   GENERAL APPEARANCE: pleasant, elderly female lying in bed on exam  HEENT: normocephalic, conjunctivae, lids, pupils and irises normal, nose without drainage or crusting  RESP:  respiratory effort normal, no respiratory distress, patient is on RA  NEURO: no facial asymmetry, no speech deficits   PSYCH: insight and judgement impaired, memory impaired, affect and mood normal    Lab/Diagnostic data:  Recent labs in [facility] reviewed by me today.     ASSESSMENT/PLAN:  (R41.0) confusion  (E87.1) Hyponatremia  (R62.7) Failure to thrive "    (E46) Severe Protein calorie malnutrition  Comment: poor po intake, weight trending down- has lost 20 lb since admission to TCU admit weight 163-->143 lb  -patient does not want to receive tube feeds  -Na trending down and potassium starting to trend up since discharge from hospital, also now showing confusion which is a change since visit on Monday- suspect due to poor po intake, dehydration (with BS 56 and CO2 of 16 on labs yesterday)  -had recent hospitalization with acute metabolic encephalopathy and hallucinations-infectious workup negative  -brain MRI negative for metastasis- resolved after electrolyte and fluid resuscitation   Plan: Discussed with Dr. Bravo as well as spouse. Spouse ok with her receiving IV fluids at TCU to try to prevent another hospitalization. Start D5 normal saline at 100ml/hr to complete 1L bag of fluids. BMP 6/13. Discontinue potassium supplement. Continue juice supplement. Nursing to encourage fluids. Dietician to continue to follow. Continue to monitor po intake and weights- asked nursing to obtain weight, since nothing available in >2 weeks.     (C79.9) Metastatic carcinoma (H)  (G89.3) Cancer related pain  Comment: acute, patient with breast cancer.    -metastasis found at L5  -completed course of 5 radiation treatments while residing at [facility]  -continues to decline- poor po intake, very weak, requiring max assist for cares.  -denies any pain today at time of visit, but worse later in the day  -Started on letrozole by oncology this week  Plan: Discussed patient's current status, as well as ongoing decline today extensively with spouse. He had many questions today which we discussed and would really like Elizabeth to decide about signing on to hospice, however her mentation has not been good. Ad did agree to hospice informational session and had many questions about hospice. I contacted social work at [facility] to get an informational session set up and to answer Ad's questions.  Continue tylenol scheduled. Continue scheduled dilaudid at 8AM and 8PM and q4h PRN for pain management. Continue lidocaine patch.     (D61.818) Pancytopenia  (D69.6)Thrombocytopenia  (D64.9) anemia  Comment: acute, related to recent radiation  Hgb 6/11 7.4  Plt 6/11 20,000  WBC 8.8  Plan: Contacted Dr. An's office today to ensure they had received labs from yesterday. Nurse reports they did receive labs, but she is not sure if Dr. An has reviewed them yet. Likely will need transfusion next week unless counts start to recover. Spouse notified of labs today as well.     (K59.00) Constipation  Comment: no BM in several days  Plan: Miralax STAT now for constipation. Continued scheduled senna. Prune juice also provided by nursing staff today.     (I10) Benign essential hypertension  Comment: acute, BP meeting goal  -metoprolol dose increased during initial hospital stay  -started on lisinopril prior to hospital discharge and titrated up at TCU  -PRN hydralazine started at TCU  -Started on amlodipine at TCU  Plan: Continue amlodipine 5 mg po at bedtime. Continue lisinopril 40 mg daily, metoprolol (hold if HR <55 and notify provider), imdur. Continue hydralazine 10 mg q8h PRN and contact provider if SBP does not drop below 160 after receiving a dose. VS per TCU policy.       (R53.81) Physical deconditioning  Comment: completed therapy on 5/20- continues to require deangelo lift for transfers, assist with all ADLs, is not ambulating and requires 24 hour care. Spouse unable to care for patient at home, so she will stay at TCU- not currently working with therapy  Plan: Nursing to provide supportive cares at TCU. Social work to assist with d/c planning.    (R41.89) Cognitive impairment  Comment: acute, SLUMS 22/30 at [facility] that indicates mild cognitive impairment.  -patient is poor medical historian though  -patient continues to be poor historian  Plan: Nursing to provide supportive cares at TCU.      (I25.10) Coronary artery  disease involving native heart, angina presence unspecified, unspecified vessel or lesion type  (E78.5) Dyslipidemia  Comment: chronic, asymptomatic  -history of 2 stents placed  -last ECHO from November 2019 showed: Left ventricular systolic function is normal.The visual ejection fraction is  estimated at 60-65%. The right ventricular systolic function is normal. Mild aortic valve sclerosis, trace aortic regurgitation.  -ASA discontinued at TCU due to low platelets, hgb  Plan:Continue imdur, metoprolol, lisinopril, amlodipine. Follow up with cardiology 1 year from last ECHO. Monitor for chest pain.     (E03.9) Hypothyroidism  Comment: chronic- lower than preferred, but with acute illness during check  TSH   Date Value Ref Range Status   03/13/2020 0.27 (L) 0.40 - 4.00 mU/L Final     Plan: Continue levothyroxine. Follow up with PCP for recheck and management.       Total time spent with patient visit at the [facility] nursing [facility] was 45 minutes including patient visit, reviewing orders, and phone call to [place] [facility] for coordination of care, discussion with nursing staff regarding new orders written today, and discussion with patient's spouse Gene as well as social work. Greater than 50% of total time spent with counseling and coordinating care due to recent lab results, confusion, failure to thrive, metastatic breast cancer as described above.       Electronically signed by:  ISMAEL Peña CNP     Video-Visit Details  Type of service:  Video Visit  Video End Time (time video stopped): 8:24AM  Distant Location (provider location):  [place] [facility]             Sincerely,        ISMAEL Peña CNP

## 2020-06-12 NOTE — PATIENT INSTRUCTIONS
Orders  Elizabeth Li  1935    1) Start D5 normal saline at 100ml/hr to complete 1L bag of fluids. Diagnosis: hyponatremia, dehydration  2) [facility] 6/13. Diagnosis: hyponatremia  3) Ok for hospice referral for informational session.    ISMAEL Peña CNP on 6/12/2020 at 2:20 PM

## 2020-06-12 NOTE — TELEPHONE ENCOUNTER
Orders  Elizabeth Li  1935    In addition to orders sent earlier today...  1) Discontinue potassium supplement  2) Give stat dose of miralax now if patient did not have BM today. Diagnosis: constipation  3) Please obtain weight ASAP- she has not had weight checked in >2 weeks and is on weekly weights.    ISMAEL Peña CNP on 6/12/2020 at 3:13 PM

## 2020-06-15 NOTE — LETTER
"    6/15/2020        RE: Elizabeth Li  [address]  [place] [address]        [place] [facility]   Elizabeth Li is being evaluated via a billable video visit due to facility request that providers do not come into the building at this time.   The patient has been notified of following: \"This video visit will be conducted via a call between you and your provider. We have found that certain health care needs can be provided without the need for an in-person physical exam.  This service lets us provide the care you need with a video conversation. If during the course of the call the provider feels a video visit is not appropriate, you will not be charged for this service.\"   The provider has received verbal consent for a Video Visit from the patient or first contact? Yes  Patient  or facility staff would like the video invitation sent by: N/A   Video Start Time: 0811    [place] Medical Record Number:  [id number]  Place of Location at the time of visit: [facility]   Chief Complaint   Patient presents with     RECHECK     HPI:  Elizabeth Li  is a 84 year old (1935), who is being seen today for a visit.  HPI information obtained from: facility chart records, facility staff, patient report and [facility] chart review. Today's concern is:    Metastatic Breast Cancer at L5 S/P Radiation. Brain MRI on 6/4/20 revealed no acute findings. No reports of pain.     Hyponatremia with Failure to Thrive and Severe Protein Calorie Malnutrition. Following today's visit, Sodium 128 on 6/15/20. Previous Sodium 130 on 6/13/20. IV fluids ordered on 6/12/20, but patient refused as \"she didn't know the person\". Today, patient reports she would be agreeable to IV fluids. Upon review of weights, 148.6 lbs on 5/22/20 -> 128.5 lbs on 6/12/20.     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 SLUMS Score 22/30. Answers yes and no to " questions. States she wants to live.     Thrombocytopenia and Anemia. Last Hemoglobin 7.4 and Platelet Count 20 on 6/11/20.     Coronary Artery Disease with Hypertension. Upon review of blood pressures over the past 5 days, systolic range from 124-156, most < 140. Diastolic range from 65-79.    Hypothyroidism. Last TSH 0.27 on 3/13/20. Weights as noted above.    Constipation. Upon review of bowel movements over the past 5 days, had 2 yesterday and 1 today.     Past Medical and Surgical History reviewed in Epic today.  MEDICATIONS:  Current Outpatient Medications   Medication Sig Dispense Refill     acetaminophen (TYLENOL) 500 MG tablet Take 2 tablets (1,000 mg) by mouth 3 times daily       amLODIPine (NORVASC) 5 MG tablet Take 5 mg by mouth At Bedtime       artificial saliva (BIOTENE MT) SOLN solution Swish and spit 2 mLs (2 sprays) in mouth every hour as needed for dry mouth       ascorbic acid (VITAMIN C) 500 MG tablet Take 500 mg by mouth daily (with lunch) Pt takes in afternoon       bisacodyl (DULCOLAX) 10 MG suppository Place 1 suppository (10 mg) rectally daily as needed for constipation       dexamethasone (DECADRON) 4 MG tablet Take 4 mg by mouth daily (with breakfast)       folic acid (FOLVITE) 1 MG tablet Take 1 tablet (1 mg) by mouth daily       hydrALAZINE (APRESOLINE) 10 MG tablet Take 1 tablet (10 mg) by mouth every 8 hours as needed (SBP>160)       HYDROmorphone (DILAUDID) 2 MG tablet Take 2 mg by mouth 2 times daily       HYDROmorphone (DILAUDID) 2 MG tablet Take 2 mg by mouth every 4 hours as needed for moderate to severe pain Do not give PRN doses within 4 hours of the scheduled dose        isosorbide mononitrate (IMDUR) 30 MG 24 hr tablet TAKE ONE TABLET BY MOUTH EVERY DAY 90 tablet 1     levothyroxine (SYNTHROID/LEVOTHROID) 75 MCG tablet TAKE ONE TABLET BY MOUTH EVERY MORNING (BEFORE BREAKFAST) 90 tablet 0     liothyronine (CYTOMEL) 5 MCG tablet Take 1 tablet (5 mcg) by mouth daily 90 tablet 0  "    lisinopril (ZESTRIL) 20 MG tablet Take 40 mg by mouth daily       metoprolol tartrate (LOPRESSOR) 100 MG tablet Take 100 mg by mouth 2 times daily Hold if heart rate is < 55       omeprazole (PRILOSEC) 20 MG DR capsule Take 1 capsule (20 mg) by mouth daily       polyethylene glycol (MIRALAX/GLYCOLAX) packet Take 1 packet by mouth nightly as needed for constipation        senna-docusate (SENOKOT-S/PERICOLACE) 8.6-50 MG tablet Take 1 tablet by mouth 2 times daily as needed for constipation       sennosides (SENOKOT) 8.6 MG tablet Take 1 tablet by mouth At Bedtime       simethicone (MYLICON) 125 MG chewable tablet Take 125 mg by mouth 4 times daily as needed for intestinal gas       REVIEW OF SYSTEMS: Limited secondary to cognitive impairment but today pt reports no pain  Objective: /70   Pulse 71   Temp 97.9  F (36.6  C)   Resp 16   Ht 1.575 m (5' 2\")   Wt 58.3 kg (128 lb 8 oz)   SpO2 93%   BMI 23.50 kg/m    Limited visit exam done given COVID-19 precautions.   GENERAL APPEARANCE:  Alert, in no distress  ENT:  Mouth and posterior oropharynx normal, moist mucous membranes  EYES:  EOM, conjunctivae, lids, pupils and irises normal  RESP:  no respiratory distress  PSYCH:  memory impaired   Labs:   Labs done in SNF are in [employer] EPIC. Please refer to them using EPIC/Care Everywhere.    ASSESSMENT/PLAN:  Metastatic Breast Cancer at L5 S/P Radiation. Followed by Dr. An. Started on Letrozole on 6/10/10. Also takes Dexamethasone. No reports of pain.Acetaminophen and Hydromorphone ordered for pain. Hospice care was discussed with  on 6/12/20 and he initially agreed to a hospice consult, but later declined.  spoke with  today who reported he is not interested in a hospice consult at this time unless patient declines further. Physical and Occupational Therapy ordered for deconditioning. Follow-up with Neurosurgery on 7/27/20 as scheduled.    Hyponatremia with Failure to Thrive and " Severe Protein Calorie Malnutrition. Hyponatremia likely to poor oral intake. Will attempt IV fluids again although patient declined on 6/12/20. Has had 20 lbs weight loss since TCU admission. Dietary following. Will need to continue to re-evaluate goals of care if oral intake does not improve.     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 SLUMS Score 22/30. No recent documentation of hallucinations. Occupational Therapy to continue to evaluate cognitive status. Requires assistance with all ADLs, deangelo life for transfers, non-ambulatory and requires 24 hour care.    Thrombocytopenia and Anemia. Dr. An's office updated regarding last week regarding labs. Repeat CBC ordered for 6/16/20. Continue to monitor. Further management as per Oncology.    Coronary Artery Disease S/P Stent Placement Hyperlipidemia with Hypertension. Most blood pressures < 140/90. Monitor daily. Aspirin discontinued in TCU due to thrombocytopenia. Continue Amlodipine, Isosorbide Mononitrate, Lisinopril and Metoprolol as ordered. Hydralazine available as needed for sbp > 160.    Hypothyroidism. Continue Levothyroxine and Liothyronine as ordered. Defer to PCP for recheck and management.    Constipation. Has had bowel movements the past 2 days, but will increase Senna to 1 tab PO BID due to several days without a bowel movement. Continue Senna-S and Miralax PRN as ordered.    Electronically signed by:  ISMAEL Salazar CNP     Video-Visit Details  Type of service:  Video Visit  Video End Time (time video stopped): 0814  Distant Location (provider location):  [place] [facility]           [place] [facility] TELEPHONE ENCOUNTER    Chief Complaint   Patient presents with     RECHECK     Elizabeth Li is a 84 year old  (1935)    Please See Orders Below:      Increase Senna to 1 tab PO BID Dx: Constipation    Insert peripheral IV    Administer 1 L of D5NS IV @ 100 ml/hr Dx:  Hyponatremia    Repeat BMP on 6/18/20 Dx: Hyponatremia    Electronically signed by:   ISMAEL Salazar CNP        Sincerely,        ISMAEL Salazar CNP

## 2020-06-15 NOTE — PROGRESS NOTES
"[place] [facility]   Elizabeth Li is being evaluated via a billable video visit due to facility request that providers do not come into the building at this time.   The patient has been notified of following: \"This video visit will be conducted via a call between you and your provider. We have found that certain health care needs can be provided without the need for an in-person physical exam.  This service lets us provide the care you need with a video conversation. If during the course of the call the provider feels a video visit is not appropriate, you will not be charged for this service.\"   The provider has received verbal consent for a Video Visit from the patient or first contact? Yes  Patient  or facility staff would like the video invitation sent by: N/A   Video Start Time: 0811    [place] Medical Record Number:  [id number]  Place of Location at the time of visit: [facility]   Chief Complaint   Patient presents with     RECHECK     HPI:  Elizabeth Li  is a 84 year old (1935), who is being seen today for a visit.  HPI information obtained from: facility chart records, facility staff, patient report and [facility] chart review. Today's concern is:    Metastatic Breast Cancer at L5 S/P Radiation. Brain MRI on 6/4/20 revealed no acute findings. No reports of pain.     Hyponatremia with Failure to Thrive and Severe Protein Calorie Malnutrition. Following today's visit, Sodium 128 on 6/15/20. Previous Sodium 130 on 6/13/20. IV fluids ordered on 6/12/20, but patient refused as \"she didn't know the person\". Today, patient reports she would be agreeable to IV fluids. Had some trouble swallowing which was improved with Tums last night. Upon review of weights, 148.6 lbs on 5/22/20 -> 128.5 lbs on 6/12/20.     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 SLUMS Score 22/30. Answers yes and no to questions. States she wants to " live.     Thrombocytopenia and Anemia. Last Hemoglobin 7.4 and Platelet Count 20 on 6/11/20.     Coronary Artery Disease with Hypertension. Upon review of blood pressures over the past 5 days, systolic range from 124-156, most < 140. Diastolic range from 65-79.    Hypothyroidism. Last TSH 0.27 on 3/13/20. Weights as noted above.    Constipation. Upon review of bowel movements over the past 5 days, had 2 yesterday and 1 today.     Past Medical and Surgical History reviewed in Epic today.  MEDICATIONS:  Current Outpatient Medications   Medication Sig Dispense Refill     acetaminophen (TYLENOL) 500 MG tablet Take 2 tablets (1,000 mg) by mouth 3 times daily       amLODIPine (NORVASC) 5 MG tablet Take 5 mg by mouth At Bedtime       artificial saliva (BIOTENE MT) SOLN solution Swish and spit 2 mLs (2 sprays) in mouth every hour as needed for dry mouth       ascorbic acid (VITAMIN C) 500 MG tablet Take 500 mg by mouth daily (with lunch) Pt takes in afternoon       bisacodyl (DULCOLAX) 10 MG suppository Place 1 suppository (10 mg) rectally daily as needed for constipation       dexamethasone (DECADRON) 4 MG tablet Take 4 mg by mouth daily (with breakfast)       folic acid (FOLVITE) 1 MG tablet Take 1 tablet (1 mg) by mouth daily       hydrALAZINE (APRESOLINE) 10 MG tablet Take 1 tablet (10 mg) by mouth every 8 hours as needed (SBP>160)       HYDROmorphone (DILAUDID) 2 MG tablet Take 2 mg by mouth 2 times daily       HYDROmorphone (DILAUDID) 2 MG tablet Take 2 mg by mouth every 4 hours as needed for moderate to severe pain Do not give PRN doses within 4 hours of the scheduled dose        isosorbide mononitrate (IMDUR) 30 MG 24 hr tablet TAKE ONE TABLET BY MOUTH EVERY DAY 90 tablet 1     levothyroxine (SYNTHROID/LEVOTHROID) 75 MCG tablet TAKE ONE TABLET BY MOUTH EVERY MORNING (BEFORE BREAKFAST) 90 tablet 0     liothyronine (CYTOMEL) 5 MCG tablet Take 1 tablet (5 mcg) by mouth daily 90 tablet 0     lisinopril (ZESTRIL) 20 MG  "tablet Take 40 mg by mouth daily       metoprolol tartrate (LOPRESSOR) 100 MG tablet Take 100 mg by mouth 2 times daily Hold if heart rate is < 55       omeprazole (PRILOSEC) 20 MG DR capsule Take 1 capsule (20 mg) by mouth daily       polyethylene glycol (MIRALAX/GLYCOLAX) packet Take 1 packet by mouth nightly as needed for constipation        senna-docusate (SENOKOT-S/PERICOLACE) 8.6-50 MG tablet Take 1 tablet by mouth 2 times daily as needed for constipation       sennosides (SENOKOT) 8.6 MG tablet Take 1 tablet by mouth At Bedtime       simethicone (MYLICON) 125 MG chewable tablet Take 125 mg by mouth 4 times daily as needed for intestinal gas       REVIEW OF SYSTEMS: Limited secondary to cognitive impairment but today pt reports no pain  Objective: /70   Pulse 71   Temp 97.9  F (36.6  C)   Resp 16   Ht 1.575 m (5' 2\")   Wt 58.3 kg (128 lb 8 oz)   SpO2 93%   BMI 23.50 kg/m    Limited visit exam done given COVID-19 precautions.   GENERAL APPEARANCE:  Alert, in no distress  ENT:  Mouth and posterior oropharynx normal, moist mucous membranes  EYES:  EOM, conjunctivae, lids, pupils and irises normal  RESP:  no respiratory distress  PSYCH:  memory impaired   Labs:   Labs done in SNF are in [place] EPIC. Please refer to them using EPIC/Care Everywhere.    ASSESSMENT/PLAN:  Metastatic Breast Cancer at L5 S/P Radiation. Followed by Dr. An. Started on Letrozole on 6/10/10. Also takes Dexamethasone. No reports of pain.Acetaminophen and Hydromorphone ordered for pain. Hospice care was discussed with  on 6/12/20 and he initially agreed to a hospice consult, but later declined.  spoke with  today who reported he is not interested in a hospice consult at this time unless patient declines further. Physical and Occupational Therapy ordered for deconditioning. Follow-up with Neurosurgery on 7/27/20 as scheduled.    Hyponatremia with Failure to Thrive and Severe Protein Calorie " Malnutrition. Hyponatremia likely to poor oral intake. Noted to have trouble swallowing last night which was improved with Calcium Carbonate. If further issues with swallowing, could re-order Speech Therapy, who discharged patient on 5/15/20, versus think about further work-up for etiology such as GERD. Will attempt IV fluids again although patient declined on 6/12/20. Has had 20 lbs weight loss since TCU admission. Dietary following. Will need to continue to re-evaluate goals of care if oral intake does not improve.     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 SLUMS Score 22/30. No recent documentation of hallucinations. Occupational Therapy to continue to evaluate cognitive status. Requires assistance with all ADLs, deangelo life for transfers, non-ambulatory and requires 24 hour care.    Thrombocytopenia and Anemia. Dr. An's office updated regarding last week regarding labs. Repeat CBC ordered for 6/16/20. Continue to monitor. Further management as per Oncology.    Coronary Artery Disease S/P Stent Placement Hyperlipidemia with Hypertension. Most blood pressures < 140/90. Monitor daily. Aspirin discontinued in TCU due to thrombocytopenia. Continue Amlodipine, Isosorbide Mononitrate, Lisinopril and Metoprolol as ordered. Hydralazine available as needed for sbp > 160.    Hypothyroidism. Continue Levothyroxine and Liothyronine as ordered. Defer to PCP for recheck and management.    Constipation. Has had bowel movements the past 2 days, but will increase Senna to 1 tab PO BID due to several days without a bowel movement. Continue Senna-S and Miralax PRN as ordered.    Electronically signed by:  ISMAEL Salazar CNP     Video-Visit Details  Type of service:  Video Visit  Video End Time (time video stopped): 0814  Distant Location (provider location):  [facility]

## 2020-06-16 NOTE — PROGRESS NOTES
[place] [facility] TELEPHONE ENCOUNTER    Chief Complaint   Patient presents with     RECHECK     Elizabeth Li is a 84 year old  (1935)    Please See Orders Below:      Increase Senna to 1 tab PO BID Dx: Constipation    Insert peripheral IV    Administer 1 L of D5NS IV @ 100 ml/hr Dx: Hyponatremia    Repeat BMP on 6/18/20 Dx: Hyponatremia    Electronically signed by:   ISMAEL Salazar CNP

## 2020-06-17 NOTE — TELEPHONE ENCOUNTER
[place] [facility] TELEPHONE ENCOUNTER    Chief Complaint   Patient presents with     *_* Health Care Directive *_*     Elizabeth Li is a 84 year old  (1935),SW called today to report: Spoke to patient and  at length regarding code status, goals of care and hospice enrollment. Son and patient would like to change code status from DNR/DNI to FULL CODE. Have care conference scheduled for Thursday to review progress in therapy, barriers to returning home and goals of care    ASSESSMENT/PLAN    Ok to change code status to FULL CODE.    Signed POLST form and faxed back to SW    Will review goals of care with  on 6/18/20.     Electronically signed by:   ISMAEL Salazar CNP

## 2020-06-18 NOTE — TELEPHONE ENCOUNTER
[place] [facility] TELEPHONE ENCOUNTER    Chief Complaint   Patient presents with     Lab Result Notice     Elizabeth Li is a 84 year old  (1935),Nurse called today to report: Received 1 L of D5NS on 6/16/20 due to hyponatremia with Sodium 128 on 6/15/20. Today, Sodium 131, BUN 41 and Potassium 5.5.     Continues to have poor oral intake. Requires a deangelo lift for transfers and staff assistance for all cares. Completed therapies. States goal is to return home with .     Spoke to , , patient and children during care conference regarding goals of care. Discussed comfort care versus aggressive measures. Reviewed initiating medication to stimulate appetite, feeding tubes, staying at facility versus going home with hospice and private pay services.     Patient reports she wants to do everything she can to live.  states he wants to do whatever patient wants to do as long as she is of sound mind.  reports Oncologist states there is nothing more they can do to cure the cancer. Only treatment going forward is to make her more comfortable. Oncologist wants patient to be stronger before moving forward with any further treatment. Recently started her on Letrozole. Has appointment with Oncology in the coming weeks. Encouraged family to reach out to the team with any questions. Remains FULL CODE.    ASSESSMENT/PLAN    Kayexalate 15 g PO x 1 now Dx: Hyperkalemia    Repeat Potassium on 6/19/20 Dx: Hyperkalemia    Repeat BMP on 6/22/20 Dx: Hyponatremia    ADDENDUM 6/19/20: Received communication from  patient and family consented to Mirtazapine 7.5 mg PO at bedtime to help with appetite and weight gain    New Order 6/19/20:  -Mirtazapine 7.5 mg PO at bedtime Dx: Depression    Electronically signed by:   ISMAEL Salazar CNP

## 2020-06-22 NOTE — PROGRESS NOTES
"[place] [facility]   Elizabeth Li is being evaluated via a billable video visit due to the restrictions of the Covid-19 pandemic.   The patient has been notified of following:  \"This video visit will be conducted via a call between you and your provider. We have found that certain health care needs can be provided without the need for an in-person physical exam.  This service lets us provide the care you need with a video conversation. If during the course of the call the provider feels a video visit is not appropriate, you will not be charged for this service.\"   The provider has received verbal consent for a Video Visit from the patient or first contact? Yes  Patient  or facility staff would like the video invitation sent by: N/A   Video Start Time: 0832    [place] Medical Record Number:  [id number]  Place of Location at the time of visit: [facility]   Chief Complaint   Patient presents with     RECHECK     HPI:  Elizabeth Li  is a 84 year old (1935), who is being seen today for a visit.  HPI information obtained from: facility chart records, facility staff, patient report, [facility] chart review and Care Everywhere [facility] chart review. Today's concern is:     Hypoxia  Congestion of respiratory tract  Carcinoma of breast metastatic to bone, unspecified laterality (H)  Hyponatremia  Adult failure to thrive  Protein-calorie malnutrition, unspecified severity (H)  Toxic metabolic encephalopathy  Hallucinations  Cognitive impairment  Thrombocytopenia (H)  Anemia, unspecified type  Coronary artery disease involving native coronary artery of native heart without angina pectoris  S/P arterial stent  Mixed hyperlipidemia  Essential hypertension  Hypothyroidism, unspecified type  Slow transit constipation     Resident seen today for recheck visit in LTC facility - she notes that she is doing \"okay now\". She had an episode of hypoxia this morning and is complaining " of increased congestion and moist cough. She denies presence of fever or chills. Does have an increase in chronic pain from metastatic breast cancer - on current prn pain management regimen for this at this time. Appetite is down with not feeling well - today's labs improved and stable. Denies CP, palpitations, fatigue, nausea, vomiting, increased SOB/ARELLANO, fever, chills, and/or b/b concerns today.    Past Medical and Surgical History reviewed in Epic today.  MEDICATIONS:  Current Outpatient Medications   Medication Sig Dispense Refill     acetaminophen (TYLENOL) 500 MG tablet Take 2 tablets (1,000 mg) by mouth 3 times daily       amLODIPine (NORVASC) 5 MG tablet Take 5 mg by mouth At Bedtime       artificial saliva (BIOTENE MT) SOLN solution Swish and spit 2 mLs (2 sprays) in mouth every hour as needed for dry mouth       ascorbic acid (VITAMIN C) 500 MG tablet Take 500 mg by mouth daily (with lunch) Pt takes in afternoon       bisacodyl (DULCOLAX) 10 MG suppository Place 1 suppository (10 mg) rectally daily as needed for constipation       dexamethasone (DECADRON) 4 MG tablet Take 4 mg by mouth daily (with breakfast)       folic acid (FOLVITE) 1 MG tablet Take 1 tablet (1 mg) by mouth daily       hydrALAZINE (APRESOLINE) 10 MG tablet Take 1 tablet (10 mg) by mouth every 8 hours as needed (SBP>160)       HYDROmorphone (DILAUDID) 2 MG tablet Take 1 tablet (2 mg) by mouth 2 times daily 30 tablet 0     HYDROmorphone (DILAUDID) 2 MG tablet Take 1 tablet (2 mg) by mouth every 4 hours as needed for moderate to severe pain Do not give PRN doses within 4 hours of the scheduled dose 20 tablet 0     isosorbide mononitrate (IMDUR) 30 MG 24 hr tablet TAKE ONE TABLET BY MOUTH EVERY DAY 90 tablet 1     letrozole (FEMARA) 2.5 MG tablet Take 2.5 mg by mouth daily       levothyroxine (SYNTHROID/LEVOTHROID) 75 MCG tablet TAKE ONE TABLET BY MOUTH EVERY MORNING (BEFORE BREAKFAST) 90 tablet 0     liothyronine (CYTOMEL) 5 MCG tablet Take  "1 tablet (5 mcg) by mouth daily 90 tablet 0     lisinopril (ZESTRIL) 20 MG tablet Take 40 mg by mouth daily       metoprolol tartrate (LOPRESSOR) 100 MG tablet Take 100 mg by mouth 2 times daily Hold if heart rate is < 55       mirtazapine (REMERON) 7.5 MG tablet Take 1 tablet (7.5 mg) by mouth At Bedtime       omeprazole (PRILOSEC) 20 MG DR capsule Take 1 capsule (20 mg) by mouth daily       polyethylene glycol (MIRALAX/GLYCOLAX) packet Take 1 packet by mouth nightly as needed for constipation        senna-docusate (SENOKOT-S/PERICOLACE) 8.6-50 MG tablet Take 1 tablet by mouth 2 times daily as needed for constipation       sennosides (SENOKOT) 8.6 MG tablet Take 1 tablet by mouth 2 times daily        simethicone (MYLICON) 125 MG chewable tablet Take 125 mg by mouth 4 times daily as needed for intestinal gas       REVIEW OF SYSTEMS: 10 point ROS of systems including Constitutional, Eyes, Respiratory, Cardiovascular, Gastroenterology, Genitourinary, Integumentary, Musculoskeletal, Psychiatric were all negative except for pertinent positives noted in my HPI.  Objective: /55   Pulse 78   Temp 98.7  F (37.1  C)   Resp 24   Ht 1.575 m (5' 2\")   Wt 58.3 kg (128 lb 8 oz)   SpO2 93%   BMI 23.50 kg/m    Limited visit exam done given COVID-19 precautions.   GENERAL APPEARANCE:  Alert, in no distress, appears healthy, oriented, cooperative  EYES:  EOM, conjunctivae, lids, pupils and irises normal, wears glasses  RESP:  no respiratory distress, cough - occasional, moist; co PND and increasing secretions, Requiring oxygen at 3lpm - increase in need  CV:  no edema  PSYCH:  oriented to 1-2, insight and judgement impaired, memory impaired , affect and mood normal  Labs:   Recent labs in [facility] reviewed by me today.     ASSESSMENT/PLAN:  Hypoxia and increased congestion  Patient with oxygen levels decreasing this morning requiring placement on 3L of O2 via NC to bring up above 90%. Patient notes occasional moist cough " with increased congestion. No noted fever. No difficulties breathing, denies SOB - will start on regimen of Mucinex ER 600mg PO BID and monitor closely. Swab to r/o COVID19.    Metastatic Breast Cancer at L5 S/P Radiation. Followed by Dr. An. Started on Letrozole on 6/10/10. Also takes Dexamethasone. Reports of mild pain today - on regimen of Acetaminophen and Hydromorphone. Hospice care was discussed with  on 6/12/20 and he initially agreed to a hospice consult, but later declined.  spoke with  today who reported he is not interested in a hospice consult at this time unless patient declines further. Physical and Occupational Therapy ordered for deconditioning. Follow-up with Neurosurgery on 7/27/20 as scheduled.    Hyponatremia and Hyperkalemia with Failure to Thrive and Severe Protein Calorie Malnutrition. Hyponatremia likely to poor oral intake. Noted to have trouble swallowing last night which was improved with Calcium Carbonate. If further issues with swallowing, could re-order Speech Therapy, who discharged patient on 5/15/20, versus think about further work-up for etiology such as GERD. Did receive 1L of D5NS on 6/16 for a Na level of 128 on 6/15 - recheck on 6/18 was 131; today remains stable at 131. Received 15g of Kayexalate on 6/18 for a K+ level of 5.5; recheck today WNL at 5.0. Follow-up BMP on 6/24.    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 SLUMS Score 22/30. No recent documentation of hallucinations. Occupational Therapy to continue to evaluate cognitive status. Requires assistance with all ADLs, deangelo life for transfers, non-ambulatory and requires 24 hour care.    Thrombocytopenia and Anemia. Dr. An's office following closely    Coronary Artery Disease S/P Stent Placement Hyperlipidemia with Hypertension. Most blood pressures < 140/90. Monitor daily. Aspirin discontinued in TCU due to thrombocytopenia. Continue Amlodipine,  Isosorbide Mononitrate, Lisinopril and Metoprolol as ordered. Hydralazine available as needed for sbp > 160.    Hypothyroidism. Continue Levothyroxine and Liothyronine as ordered. Defer to PCP for recheck and management.    Constipation. No complaints re:BMs today - Continue Senna-S and Miralax PRN as ordered.    Electronically signed by:  Dr. Lillie Zeng, APRN, DNP, A/GNP-[facility]  [facility] [address] [address]     Cell: [phone]  Fax: [phone]  Email: [email or web address]       Video-Visit Details  Type of service:  Video Visit  Video End Time (time video stopped): 0845  Distant Location (provider location):  [facility]

## 2020-06-22 NOTE — PATIENT INSTRUCTIONS
[place] [facility] TELEPHONE ENCOUNTER    Chief Complaint   Patient presents with     RECHECK       Elizabethvidal Li is a 84 year old  (1935):    ASSESSMENT/PLAN  Patient seen via virtual visit today for a follow-up visit in TCU. Associated visit orders as below:    Mucinex ER 600mg PO BID x5d - Dx. Cough    CBC 6/23 - Dx. Anemia    BMP 6/23 - Dx. HTN    Electronically signed by:   Dr. Lillie Zeng, APRN, DNP, A/GNP-[facility]  [facility] [address] [address]     Cell: [phone]  Fax: [phone]  Email: [email or web address]

## 2020-06-23 PROBLEM — J96.01 ACUTE RESPIRATORY FAILURE WITH HYPOXIA (H): Status: ACTIVE | Noted: 2020-01-01

## 2020-06-23 NOTE — PROGRESS NOTES
Unable to get adequate ABG sample for lab.  Pt is a very difficult stick. Recommended VBG for follow up blood gas after vent changes.    RT Kathryn on 6/23/2020 at 4:33 AM

## 2020-06-23 NOTE — ED PROVIDER NOTES
History   Chief Complaint:  Shortness of Breath    HPI   History limited due to confusion. History supplemented by EMS.    Elizabeth Li is a 84 year old female with a complex medical history notable for CAD and IDH who presents via EMS with shortness of breath. As per EMS report, the patient developed shortness of breath today. Staff at [facility] noted that her oxygen stats were in the low 80s on room air, prompting them to contact EMS. The patient was also noted to have a cough. In the ED, she denies abdominal pain and chest pain. Of note, the patient does not normally use oxygen at home.     Allergies:  Flu virus vaccine    Medications:    Norvasc  Biotene MT  Dulcolax  Decadron  Folvite  Apresoline  Dilaudid  Imdur  Femara  Levothyroxine  Liothyronine  Lisinopril  Lopressor  Remeron  Prilosec  Miralax/glycolax  Senokot-s/pericolace  mylicon    Past Medical History:    Hypercalcemia  Pneumonia  Obesity with comorbidity  Anxiety  CAD  Depression  Hyperlipidemia  Hypertension  Hypothyroidism  IDH  KUSUM    Past Surgical History:    Back surgery  Coronary angiogram with stents x2  Optical tracking system fusion posterior spine lumber  Tonsillectomy, adenoidectomy, combined  Tubal ligation  [place] teeth extraction    Family History:    Pernious anemia  Heart disease    Social History:  Smoking status- former smoker  Alcohol use- yes  Drug use- no    Marital Status:   [2]       Review of Systems   Unable to perform ROS: Acuity of condition (Limited due to confusion)   Respiratory: Positive for cough and shortness of breath.    Gastrointestinal: Negative for abdominal pain.   All other systems reviewed and are negative.    Physical Exam   Patient Vitals for the past 24 hrs:   BP Temp Temp src Pulse Heart Rate Resp SpO2   06/22/20 2255 (!) 152/62 -- -- 84 83 20 93 %   06/22/20 2250 (!) 158/61 -- -- 86 86 19 95 %   06/22/20 2245 (!) 145/52 -- -- 80 79 19 95 %   06/22/20 2240 (!) 146/54 -- -- 80 82 20 95 %    06/22/20 2230 (!) 159/58 -- -- 85 86 20 96 %   06/22/20 2225 (!) 158/60 -- -- 85 85 20 98 %   06/22/20 2224 -- -- -- -- -- -- 98 %   06/22/20 2215 (!) 154/56 -- -- 83 82 19 98 %   06/22/20 2210 (!) 150/62 -- -- 84 83 19 98 %   06/22/20 2205 (!) 162/63 -- -- 87 87 19 98 %   06/22/20 2200 (!) 159/59 -- -- 86 86 19 98 %   06/22/20 2155 (!) 157/61 -- -- 87 84 14 99 %   06/22/20 2153 -- -- -- -- -- -- 99 %   06/22/20 2150 (!) 148/58 -- -- 85 85 18 99 %   06/22/20 2145 (!) 149/59 -- -- 87 88 17 99 %   06/22/20 2140 (!) 149/56 -- -- 87 88 18 97 %   06/22/20 2135 (!) 151/57 -- -- 88 90 17 99 %   06/22/20 2132 -- -- -- -- -- -- 99 %   06/22/20 2130 (!) 155/62 -- -- 90 89 18 100 %   06/22/20 2125 (!) 169/64 -- -- 89 94 18 97 %   06/22/20 2120 (!) 179/67 -- -- 101 105 21 99 %   06/22/20 2115 (!) 178/70 -- -- 95 93 18 100 %   06/22/20 2110 (!) 174/66 -- -- 93 92 17 100 %   06/22/20 2105 (!) 164/78 -- -- 90 85 17 100 %   06/22/20 2100 (!) 163/64 -- -- 86 89 17 100 %   06/22/20 2055 135/61 -- -- 81 78 17 100 %   06/22/20 2051 -- -- -- -- -- 18 99 %   06/22/20 2000 129/55 97.8  F (36.6  C) Oral 84 84 24 (!) 78 %     Physical Exam  General: Confused, frail and elderly.   Appears acutely ill.     In moderate respiratory distress  HEENT:  Head:  Atraumatic  Ears:  External ears are normal  Mouth/Throat:  Oropharynx is without erythema or exudate and mucous membranes are dry.   Eyes:   Conjunctivae normal and EOM are normal. No scleral icterus.  CV:  Normal rate, regular rhythm, normal heart sounds and radial pulses are 2+ and symmetric.   Resp:  Breath sounds are coarse bilaterally with diffuse crackles.     Non-labored, no retractions or accessory muscle use  GI:  Abdomen is soft, no distension, no tenderness. No rebound or guarding.  No CVA tenderness bilaterally  MS:  Normal range of motion. Trace edema.    Back atraumatic.    No midline cervical, thoracic, or lumbar tenderness  Skin:  Warm and dry.  No rash or lesions  noted.  Neuro: Confused  Globally weak.  GCS: 14  Psych:  Unable to assess due to confusion.     Emergency Department Course   ECG (20:48:46):  Rate 90 bpm. IL interval 146. QRS duration 88. QT/QTc 356/435. P-R-T axes 50 40 -11. Sinus rhythm with PAC's. Nonspecific ST and T wave abnormalities. Interpreted at 2056 by Moses Meza MD.     Imaging:  Radiology findings were communicated with the patient who voiced understanding of the findings.    XR Chest Port 1 View:  Since the prior chest x-ray airspace consolidation has developed within the right perihilar region with air bronchograms concerning for pneumonia. Mild elevation right hemidiaphragm. The left lung is clear. No effusions. Endotracheal tube tip   2.5 cm above the cecilio. Enteric tube extends into the stomach.    As read by Radiology.    CT Chest Pulmonary Embolism w Contrast:  Pending.    Laboratory:  Laboratory findings were communicated with the patient who voiced understanding of the findings.    CBC: HGB 7.2 (L), PLT 11 (LL) o/w WNL (WBC 5.6)  CMP: Sodium 131 (L), CO2 15 A(L), Glucose 138 (H), Urea Nitrogen 50 (H), Albumin 2.4 (L), Protein Total 6.1 (L),  (H) o/w (Creatinine 0.68)  Troponin I: <0.015  Ammonia: <10 (L)  BNP: 3603 (H)  Procalcitonin: 0.67    Blood gas venous (collected 2020): pH 7.31 (L), pCO2 27 (L), pO2 54 (H), Bicarbonate 13 (L), FIO2 15L, Base Deficit 11.8  Blood gas venous (collected 2116): pH 7.19 (LL), pCO2 40, pO2 61 (H), Bicarbonate 15 (L), FIO2 90%, Oxyhemoglobin 83, Base Excess 12.0    Lactic acid (collected 2020): 2.9 (H)  Lactic acid (collected 2116): 3.4 (H)    UA with microscopic: Blood - large, Protein Albumin 100, Leukocyte Esterase - large,  (H), RBC (H), WBC clumps - present, Bacteria - many, Mucous - present o/w negative    Urine culture aerobic bacterial: pending  Blood culture: pending  COVID-19 virus PCR: pending    [facility]    -Intubation    Date/Time: 6/22/2020 8:58 PM  Performed  by: Moses Meza MD  Authorized by: Moses Meza MD       PRE-PROCEDURE DETAILS     Patient status:  Altered mental status    Mallampati score:  II    Pretreatment medications:  None  PROCEDURE DETAILS     Preoxygenation: oxymask.    CPR in progress: no      Intubation method:  Oral    Oral intubation technique:  Video-assisted    Laryngoscope blade:  Mac 4    Tube size (mm):  7.0    Tube type:  Cuffed    Number of attempts:  1    Ventilation between attempts: no      Cricoid pressure: yes      Tube visualized through cords: yes      PLACEMENT ASSESSMENT     ETT to teeth:  22cm    Tube secured with:  ETT machado    Breath sounds:  Equal and absent over the epigastrium    Placement verification: chest rise, condensation, CXR verification, direct visualization, equal breath sounds, ETCO2 detector and tube exhalation      CXR findings:  ETT in proper place  PROCEDURE   Patient Tolerance:  Patient tolerated the procedure well with no immediate complications        Interventions:  2037 LR 1749 mL IV Bolus  2038 Zosyn 4.5 g IV  2041 etomidate 20 mg IV   rocuronium 100 mg IV  2047 propofol infusion IV  2232 vancomycin 1250 mg IV    Emergency Department Course:  The patient presents to the ED via EMS. Patient placed on 15 L oxygen via nonrebreather.    Past medical records, nursing notes, and vitals reviewed.  2003 I performed an exam of the patient as documented above. Patient's oxygen reduced to 10 LPM.      EKG obtained in the ED, see results above.    IV was inserted and blood was drawn for laboratory testing, results above.   The patient provided a urine sample here in the emergency department. This was sent for laboratory testing, findings above.    2058 I intubated the patient as above.    2155 I rechecked the patient.    2206 I spoke to Dr. Gamboa of the hospitalist service who accepts the patient for admission.      The patient was sent for a chest CT while in the emergency department, results above.     Findings  and plan explained to the patient who consents to admission. Discussed the patient with Dr. Gamboa, who will admit the patient to an ICU bed for further monitoring, evaluation, and treatment.    Impression & Plan     The patient has signs of Severe Sepsis REMINDER: Please use septic shock SmartPhrase for Lactate > 4 or a patient requiring vasopressors after initial fluid bolus (meaning persistent hypotension)      If one the following conditions is present, a 30 mL/kg bolus is recommended as part of the 6 hour bundle (IBW can be used for BMI >30, or document refusal/contraindication):      1.   Initial hypotension  defined as 2 bps < 90 or map < 65 in the 6hrs before or 6hrs after time zero.     2.  Lactate >4.     The patient has signs of Severe Sepsis as evidenced by:    1. 2 SIRS criteria, AND  2. Suspected infection, AND   3. Organ dysfunction: Lactic Acid > 2.0    Time severe sepsis diagnosis confirmed: 2020 06/22/20 as this was the time when Lactate resulted, and the level was > 2.0    3 Hour Severe Sepsis Bundle Completion:    1. Initial Lactic Acid Result:   Recent Labs   Lab Test 06/22/20 2116 06/22/20 2020   LACT 3.4* 2.9*     2. Blood Cultures before Antibiotics: Yes  3. Broad Spectrum Antibiotics Administered:  yes       Anti-infectives (From admission through now)    Start     Dose/Rate Route Frequency Ordered Stop    06/22/20 2105  vancomycin (VANCOCIN) 1,250 mg in sodium chloride 0.9 % 250 mL intermittent infusion      1,250 mg  over 90 Minutes Intravenous ONCE 06/22/20 2104 06/22/20 2310 06/22/20 2012  piperacillin-tazobactam (ZOSYN) intermittent infusion 4.5 g      4.5 g  over 30 Minutes Intravenous ONCE 06/22/20 2011 06/22/20 2142          4. Fluid volume administered in ED:  Full 30 mL/kg bolus given (see amount below).    BMI Readings from Last 1 Encounters:   06/22/20 23.50 kg/m      30 mL/kg fluids based on weight: 1,750 mL  30 mL/kg fluids based on IBW (must be >= 60 inches tall): 1,500  mL                Severe Sepsis reassessment:  1. Repeat Lactic Acid Level: 3.4  2. MAP>65 after initial IVF bolus, will continue to monitor fluid status and vital signs    I attest to having performed a repeat sepsis exam and assessment of perfusion at 2100 and the results demonstrate improved perfusion.      Medical Decision Making:  Patient is a 84-year-old female with a complex past medical history pertinent for hyperlipidemia, CAD, hypertension, & hypothyroidism who presents with shortness of breath, cough and fatigue.  Patient requiring 10 to 12 L of oxygen by oximask to maintain O2 saturations above 95% here.  She was moved to one of our stabilization rooms and intubated for acute respiratory failure with severe hypoxia.  I do have high suspicion for infectious etiologies such as pneumonia versus bacteremia vs UTI versus potential COVID-19 in the setting of a worldwide pandemic.  Patient was given broad-spectrum antibiotics with Zosyn and vancomycin.  Resuscitated with 30 cc/kg bolus.  Her initial lactate was concerning for severe sepsis and a repeat lactate following fluid and antibiotic administration shows mild worsening with a lactate of now 3.4.  Thankfully she remains hemodynamically stable although continues to be critically ill requiring ventilatory support.  Urinalysis highly suspicious for possible concurrent urinary tract infection.  Urine culture and blood cultures in process at time of admission.  Chest x-ray confirms appropriate ET tube placement.  There is a large suspected infiltrate to the right lung.  COVID-19 swab in process at time of admission.  CT chest PE study ordered prior to admission due to hx of metastatic breast cancer and possible concern for PE.  Patient with infectious symptoms on arrival and higher suspicion for infectious etiology.  CT chest PE study pending at time of admission.  I updated the patient's  with her critical presentation and need for both intubation and  admission to the ICU.  I spoke with Dr. Gamboa of the hospitalist service who agreed to ICU admission.    Critical Care Time: was 65 minutes for this patient excluding procedures    Diagnosis:    ICD-10-CM    1. Acute respiratory failure with hypoxia (H)  J96.01 CBC with platelets differential     Comprehensive metabolic panel     Blood culture     Blood culture     Procalcitonin     Blood gas venous     UA with Microscopic     Urine Culture Aerobic Bacterial     Ammonia (on ice)     Nt probnp inpatient     Nt probnp inpatient     Troponin I     Troponin I     Lactic acid whole blood     Lactic acid whole blood     Blood gas venous and oxyhgb     Lactic acid whole blood     Lactic acid whole blood     Symptomatic COVID-19 Virus (Coronavirus) by PCR     CANCELED: Lactic acid whole blood     CANCELED: Troponin I (now)     CANCELED: BNP     CANCELED: Lactic acid whole blood   2. Shortness of breath  R06.02    3. Cough  R05    4. Pancytopenia (H)  D61.818    5. Pneumonia of right lower lobe due to infectious organism  J18.9    6. Severe sepsis (H)  A41.9     R65.20          Disposition:  Admitted to the ICU.        Scribe Disclosure:  I, Ashley Abbott, am serving as a scribe at 8:21 PM on 6/22/2020 to document services personally performed by Moses Meza MD based on my observations and the provider's statements to me.     I, Pranav Walker, am serving as a scribe at 11:28 PM on 6/22/2020 to document services personally performed by Moses Meza MD based on my observations and the provider's statements to me.        Moses Meza MD  06/22/20 0974       Moses Meza MD  06/22/20 8152       Moses Meza MD  06/22/20 4019

## 2020-06-23 NOTE — PROGRESS NOTES
Brief Tele-Intensivist note  Patient acidemic and hypoxemic on 55% and +5 PEEP; increased to +8 and will check ABG in a few hours to reassess oxygenation and ventilation (vent rate had previously been increased).     miko peters  June 23, 2020    Addendum  Hb 6.1; 2 units RBC's ordered   jrjuani

## 2020-06-23 NOTE — PROCEDURES
[facility]    Central line    Date/Time: 6/23/2020 9:18 AM  Performed by: Judah Martinez MD  Authorized by: Judah Martinez MD   Indications: vascular access    UNIVERSAL PROTOCOL   Site Marked: NA  Prior Images Obtained and Reviewed:  No  Required items: Required blood products, implants, devices and special equipment available    Patient identity confirmed:  Arm band  NA - No sedation, light sedation, or local anesthesia  Confirmation Checklist:  Patient's identity using two indicators  Time out: Immediately prior to the procedure a time out was called    Universal Protocol: the Joint Commission Universal Protocol was followed    Preparation: Patient was prepped and draped in usual sterile fashion    ESBL (mL):  0         ANESTHESIA    Anesthesia: Local infiltration  Local Anesthetic:  Lidocaine 1% without epinephrine      SEDATION    Patient Sedated: No      Preparation: skin prepped with 2% chlorhexidine  Skin prep agent dried: skin prep agent completely dried prior to procedure  Sterile barriers: all five maximum sterile barriers used - cap, mask, sterile gown, sterile gloves, and large sterile sheet  Hand hygiene: hand hygiene performed prior to central venous catheter insertion  Location details: left internal jugular  Site selection rationale: couldnt turn neck to left  Patient position: Trendelenburg  Catheter type: triple lumen  Pre-procedure: landmarks identified  Ultrasound guidance: yes  Sterile ultrasound techniques: sterile gel and sterile probe covers were used  Number of attempts: 1  Successful placement: yes  Post-procedure: line sutured and dressing applied  Assessment: free fluid flow and blood return through all ports    PROCEDURE   Patient Tolerance:  Patient tolerated the procedure well with no immediate complications  Describe Procedure: Patient already intubated and sedated on propofol.  Consent per phone per  [place] patient at higher risk for bleeding  seconday to platelets of 9 but no bleeding during procedure   Length of time physician/provider present for 1:1 monitoring during sedation: 15

## 2020-06-23 NOTE — PHARMACY-ADMISSION MEDICATION HISTORY
Admission medication history interview status for this patient is complete. See Psychiatric admission navigator for allergy information, prior to admission medications and immunization status.     Medication history interview done via telephone during Covid-19 pandemic, indicate source(s): Patient not interviewable  Medication history resources (including written lists, pill bottles, clinic record):epic list    Changes made to PTA medication list:  Added: mucinex  Deleted: -  Changed: -    Actions taken by pharmacist (provider contacted, etc):None     Additional medication history information:None    Medication reconciliation/reorder completed by provider prior to medication history?  N   (Y/N)       Prior to Admission medications    Medication Sig Last Dose Taking? Auth Provider   acetaminophen (TYLENOL) 500 MG tablet Take 2 tablets (1,000 mg) by mouth 3 times daily 6/22/2020 at Unknown time Yes Javon Venegas MD   amLODIPine (NORVASC) 5 MG tablet Take 5 mg by mouth At Bedtime 6/22/2020 at Unknown time Yes Unknown, Entered By History   artificial saliva (BIOTENE MT) SOLN solution Swish and spit 2 mLs (2 sprays) in mouth every hour as needed for dry mouth  Yes Javon Venegas MD   ascorbic acid (VITAMIN C) 500 MG tablet Take 500 mg by mouth daily (with lunch) Pt takes in afternoon 6/22/2020 at Unknown time Yes Reported, Patient   bisacodyl (DULCOLAX) 10 MG suppository Place 1 suppository (10 mg) rectally daily as needed for constipation  Yes Javon Venegas MD   dexamethasone (DECADRON) 4 MG tablet Take 4 mg by mouth daily (with breakfast) 6/22/2020 at Unknown time Yes Unknown, Entered By History   folic acid (FOLVITE) 1 MG tablet Take 1 tablet (1 mg) by mouth daily 6/22/2020 at Unknown time Yes Toro Solorzano MD   guaiFENesin (MUCINEX) 600 MG 12 hr tablet Take 600 mg by mouth 2 times daily 6/22/2020 at Unknown time Yes Unknown, Entered By History   hydrALAZINE (APRESOLINE) 10 MG  tablet Take 1 tablet (10 mg) by mouth every 8 hours as needed (SBP>160)  Yes Wanda Nguyen APRN CNP   HYDROmorphone (DILAUDID) 2 MG tablet Take 1 tablet (2 mg) by mouth 2 times daily 6/22/2020 at Unknown time Yes Dyana Sandoval APRN CNP   HYDROmorphone (DILAUDID) 2 MG tablet Take 1 tablet (2 mg) by mouth every 4 hours as needed for moderate to severe pain Do not give PRN doses within 4 hours of the scheduled dose  Yes Dyana Sandoval APRN CNP   isosorbide mononitrate (IMDUR) 30 MG 24 hr tablet TAKE ONE TABLET BY MOUTH EVERY DAY 6/22/2020 at Unknown time Yes Rebekah Rausch MD   letrozole (FEMARA) 2.5 MG tablet Take 2.5 mg by mouth daily 6/22/2020 at Unknown time Yes Reported, Patient   levothyroxine (SYNTHROID/LEVOTHROID) 75 MCG tablet TAKE ONE TABLET BY MOUTH EVERY MORNING (BEFORE BREAKFAST) 6/22/2020 at Unknown time Yes Rebekah Rausch MD   liothyronine (CYTOMEL) 5 MCG tablet Take 1 tablet (5 mcg) by mouth daily 6/22/2020 at Unknown time Yes Rebekah Rausch MD   lisinopril (ZESTRIL) 20 MG tablet Take 40 mg by mouth daily 6/22/2020 at Unknown time Yes Reported, Patient   metoprolol tartrate (LOPRESSOR) 100 MG tablet Take 100 mg by mouth 2 times daily Hold if heart rate is < 55 6/22/2020 at Unknown time Yes Unknown, Entered By History   mirtazapine (REMERON) 7.5 MG tablet Take 1 tablet (7.5 mg) by mouth At Bedtime 6/22/2020 at Unknown time Yes Dyana Sandoval APRN CNP   omeprazole (PRILOSEC) 20 MG DR capsule Take 1 capsule (20 mg) by mouth daily 6/22/2020 at Unknown time Yes Wanda Nguyen APRN CNP   polyethylene glycol (MIRALAX/GLYCOLAX) packet Take 1 packet by mouth nightly as needed for constipation   Yes Reported, Patient   senna-docusate (SENOKOT-S/PERICOLACE) 8.6-50 MG tablet Take 1 tablet by mouth 2 times daily as needed for constipation  Patient taking differently: Take 1 tablet by mouth 2 times daily as needed   Yes Dyana Sandoval APRN CNP    sennosides (SENOKOT) 8.6 MG tablet Take 1 tablet by mouth 2 times daily  6/22/2020 at Unknown time Yes Reported, Patient   simethicone (MYLICON) 125 MG chewable tablet Take 125 mg by mouth 4 times daily as needed for intestinal gas  Yes Reported, Patient

## 2020-06-23 NOTE — PROGRESS NOTES
[facility] Intensive Care Progress Note    Date of Service (when I saw the patient): 06/23/2020     Assessment & Plan   Elizabeth Li is a 84 year old female who was admitted on 6/22/2020 pneumonia and respiratory failure    Neurology:  Sedated with propofol and still tachypneic and hypotensive  P: decrease propofol and add iv fentanyl prn bolus     Cardiovascular:  Hypotensive despite  30 ml/kg, but not tachy but elevated lactate, my bedside US shows plump non collapsing IVC and good LVF and no effusion, likely sepsis related hypotension   P: complete blood transfusions and recheck lactate  Central line   Norepinephrine for  MAP > 65  Iv steroids if persistent hypotension  Check random cortisol   -    Pulmonary:      Intubated in ED for hypoxemia and respiratory acidosis.  Last VBG still acidotic.  No air trapping on  x 20 PEEP 8 70%.   Right> L infiltrate.    P: Increase PEEP to 10 when BP stabilizes.    VBG  -    Ventilation Mode: CMV/AC  (Continuous Mandatory Ventilation/ Assist Control)  FiO2 (%): 70 %  Rate Set (breaths/minute): 200 breaths/min  Tidal Volume Set (mL): 370 mL  PEEP (cm H2O): 8 cmH2O  Oxygen Concentration (%): 70 %  Resp: (!) 36      Renal:  Mild JENNIFER, making urine,. Pyuria   -    ID:       Pyuria. Zosyn   Vancomycin and azithro for CAP and UTI.  Await COVID.  Procalcitonin is not high   P:   -    GI;  Malnourished low albumin   -    Nutrition:  Malnourished, start tube feeds tomorrow if not making progress.   -    Endocrine:  Prior hypercalcemia, resolved.  Glucoses ok   P: check cortisol  -    Heme/Onc:  Recent metastatic breast CA, failure to thrive, no chemo.  Repeated hospitalizations and patient changed her code status to full code for now. Severe thrombocytopenia and also anemia.  Lytic bone lesions   P: platelets and RBC transfusions   -    DVT Prophylaxis: Pneumatic Compression Devices  GI Prophylaxis: H2 Blocker    Restraints: Restraints  for medical healing needed: NO    Family update by me today: Yes        Judah Martinez    Time Spent on this Encounter   Billing:  I spent 45  minutes bedside and on the inpatient unit today managing the critical care of Elizabeth Li in relation to the issues listed in this note independent of procedures     Main Plans for Today   Mechanical ventilation  Adjust sedation  Antibiotics  Central line     Interval History   Intubated in ED     Physical Exam   Temp: 100  F (37.8  C) Temp src: Bladder Temp  Min: 97.7  F (36.5  C)  Max: 100  F (37.8  C) BP: 113/47 Pulse: 93 Heart Rate: 92 Resp: (!) 36 SpO2: 98 % O2 Device: Mechanical Ventilator    Vitals:    06/23/20 0000   Weight: 59.1 kg (130 lb 4.7 oz)     I/O last 3 completed shifts:  In: 666.47 [I.V.:666.47]  Out: 775 [Urine:775]    Neurologic: on propofol, barely opens eyes to stimulation   Cardiovascular:   RR w/o  m   Respiratory: clear, slightly labored   GI:  Non distended  Non tender knott draining cloudy urine   Skin/Extremities: poor skin turgor, poor muscle bulk.  Petechiae on chest,.   Lines: No erythema or discharge at entry site for No invasive lines  Current lines are required for patient management    Medications     norepinephrine       propofol (DIPRIVAN) infusion 50 mcg/kg/min (06/23/20 0841)     sodium chloride 100 mL/hr at 06/23/20 0757       azithromycin  500 mg Intravenous Q24H     dexamethasone  6 mg Intravenous Daily     piperacillin-tazobactam  3.375 g Intravenous Q6H     vancomycin (VANCOCIN) IV  1,250 mg Intravenous Q12H       Data   Recent Labs   Lab 06/23/20  0525 06/22/20 2020   WBC 4.0 5.6   HGB 6.1* 7.2*   MCV 98 94   PLT 9* 11*    131*   POTASSIUM 4.8 5.1   CHLORIDE 107 104   CO2 16* 15*   BUN 39* 50*   CR 0.58 0.68   ANIONGAP 11 12   FRANKLIN 8.4* 8.8   * 138*   ALBUMIN 2.1* 2.4*   PROTTOTAL 5.5* 6.1*   BILITOTAL 0.4 0.4   ALKPHOS 70 76   ALT 22 25   AST 93* 107*   TROPI  --  <0.015     Recent Results (from  the past 24 hour(s))   XR Chest Port 1 View    Narrative    EXAM: XR CHEST PORT 1 VW  LOCATION: [facility]  DATE/TIME: 6/22/2020 8:49 PM    INDICATION: Fever. Post intubation.  COMPARISON: CT chest exam 04/21/2020 and chest x-ray 06/03/2020      Impression    IMPRESSION: Since the prior chest x-ray airspace consolidation has developed within the right perihilar region with air bronchograms concerning for pneumonia. Mild elevation right hemidiaphragm. The left lung is clear. No effusions. Endotracheal tube tip   2.5 cm above the cecilio. Enteric tube extends into the stomach.   CT Chest Pulmonary Embolism w Contrast    Narrative    EXAM: CT CHEST PULMONARY EMBOLISM W CONTRAST  LOCATION: [facility]  DATE/TIME: 6/22/2020 11:11 PM    INDICATION: Shortness of breath  PE suspected, high pretest prob  COMPARISON: Chest x-ray 06/22/2020 and CT 04/21/2020  TECHNIQUE: CT chest pulmonary angiogram during arterial phase injection of IV contrast. Multiplanar reformats and MIP reconstructions were performed. Dose reduction techniques were used.   CONTRAST: 52mL Isovue-370    FINDINGS:  ANGIOGRAM CHEST: Pulmonary arteries are prominent in size. No filling defects in the central pulmonary arteries. Respiratory motion slightly reduces peripheral assessment. No thoracic aortic dissection.    LUNGS AND PLEURA: Bilateral perihilar infiltrates (right greater than left). Bilateral subpleural groundglass infiltrates. Bibasilar atelectasis. Trace effusions.    MEDIASTINUM/AXILLAE: Enteric and endotracheal tube. Atherosclerotic aorta. Coronary artery calcification. No significant pericardial effusion.    UPPER ABDOMEN: Cholelithiasis. 1.2 cm left renal hyperdensity presumed left renal cyst.    MUSCULOSKELETAL: Degenerative change osseous structures. Multiple remote rib fractures. Scattered small lytic lesions in the ribs. Multifocal lucencies in the thoracic spine.      Impression    IMPRESSION:  1. Multifocal,  bilateral pulmonary infiltrates including some which are groundglass and peripheral. Imaging features can be seen with (COVID-19)  pneumonia, though are nonspecific and can occur with a variety of infectious and noninfectious processes.   Follow-up to confirm resolution.  2. Enlarged main pulmonary arteries. Correlate for pulmonary hypertension.  No pulmonary embolism.  3. Multiple lytic and osseous lesions again seen. Correlate for history of osseous metastases or myeloma.  4. Cholelithiasis.  5. Hyperdense left renal lesion incompletely characterized on this exam. Nonurgent ultrasound follow-up could be obtained to differentiate complex cyst from solid lesion.

## 2020-06-23 NOTE — PROGRESS NOTES
H&P dictated. 83 yo female with recent diagnosis of metastatic breast cancer. Failure to thrive picture. Admitted with acute hypoxic respiratory failure. CT chest showing bilateral pneumonia. PUI for COVID-19. Was DNR/DNI but recently changed code status to FULL CODE. Intubated in ER. Also cytopenic. Discussed with  over phone. He is agreeable to transfusions etc. Appraised of guarded prognosis. Consult palliative care. Follow lactic acid and blood gas overnight. Discussed with tele-ICU intensivist.

## 2020-06-23 NOTE — PROGRESS NOTES
Keagan ABG from pt's right artery without any compilations.  Pt is a very hard stick, difficult to palpate a good pulse and minimal return of blood.    Jody Carney, RT on 6/23/2020 at 2:15 AM

## 2020-06-23 NOTE — H&P
Admitted:     06/22/2020      PRIMARY CARE PHYSICIAN:  Dr. Rebekah Rausch MD      ASSESSMENT AND PLAN:  This is an 84-year-old female with a past medical history most notable for unfortunate recent diagnosis of metastatic breast cancer with overall picture of failure to thrive, overall decline in condition, pancytopenia, who is being admitted to the hospital now with concerns for acute respiratory failure.  History is obtained from discussion with the ER provider and from the chart review as the patient is currently intubated.      The patient was recently hospitalized at this facility.  The patient has a past medical history of hypertension, hyperlipidemia, coronary artery disease, KUSUM, CAD.  She was recently diagnosed with metastatic breast cancer.  She was hospitalized in April with severe hypercalcemia and was found to have multiple sclerotic lesions in the bone.  Initially, there was concern for multiple myeloma, although further workup she was also found to have a left breast mass and for further evaluation by Oncology was diagnosed with metastatic breast cancer.  The patient was again hospitalized earlier this month with a picture of failure to thrive, also found to have worsening pancytopenia.  She had received palliative radiation to the lumbar spine.  She was felt to be a very poor candidate for chemotherapy and approaching end of life.  She was seen by Palliative Care, although patient's family still wanted to do a trial of rehabilitation and see if she would get stronger.      With this background patient had been in a transitional care facility where she had been noticed to be having increasing shortness of breath recently.  Today, her oxygen saturation was found to be 70% and she was brought into the ER.  She also had a cough.  Unclear about any exposure to COVID-19.  She was brought to the ER and required up to 15 liters of oxygen initially for sats of 79%.  However, with her ongoing respiratory  distress, she was ultimately intubated.  She is being admitted to the Intensive Care Unit.     1.  Acute hypoxemic respiratory failure.  The patient's chest x-ray shows impressive consolidation in the right perihilar area.  This is concerning for pneumonia with possibly nosocomial pathogens with a recent hospitalization and health care exposures.  Certainly other possibilities include COVID-19 infection, although it is not typical for that, and metastatic disease.  Apparently, the patient is undergoing a CT scan of her chest for further evaluation.  At this time, patient is currently intubated and sedated.  We will continue mechanical ventilation support.  Her blood gas or most recently showed still with acidosis, so we will make adjustments to her vent settings and will recheck her ABG here after arrival to the ICU and further adjust as needed.   2.  Continue on broad-spectrum IV antibiotics at this time to cover for different sources given her immunocompromised state, including vancomycin, Zosyn, and azithromycin.   3.  Await results of the CT chest pulmonary angiogram.   4.  Severe sepsis secondary to the above based on the patient's severe lactic acidosis with lactate of 3.4.  She is afebrile, although I suspect she does not have typical response due to her immunocompromised state and deconditioned stage.  She received the complete IV fluid bolus in the ER.  We will follow her serial lactic acid and continue hydration for now cautiously.  Her blood pressure is doing well and appears to be perfusing well currently.   5.  Cytopenia with a hemoglobin of 7.2 and platelet count of 11.  The patient was seen by Oncology during this hospital stay, felt to be due to skeleton and possibly bone marrow involvement as well with recent radiation.  At this point, she is not actively bleeding.  I have discussed with her  who is agreeable with the transfusion.  Transfer for hemoglobin below 7 and platelet count less than  10.   6.  Abnormal urinalysis.  We will follow cultures to evaluate for presence of a UTI.   7.  Hyponatremia.  Suspect prerenal.  Continue IV fluids, recheck this in the morning.   8.  Electrolyte and nutrition.  IV fluids at this time.  Hold off on tube feeds overnight.   9.  Severe malnutrition.   10.  Failure to thrive picture.   11.  Chronic comorbidities including hypertension, hyperlipidemia, coronary artery disease, hypothyroidism, obstructive sleep apnea.   12.  PUI for COVID-19     GOALS OF CARE:     1.  The patient has a very guarded prognosis.  She is not felt to be a good candidate for any further chemotherapy. It appears that she has changed her mind about her code status a few times.  She was DNR/DNI and appears just a few days ago changed again to full code.  Her  is aware of that.  I discussed the patient's critical condition and the fact that she may not make it through this hospitalization. At this point, family is requesting ongoing restorative cares. Continue ongoing discussion about goals of care and consider a palliative approach.   2.  Await COVID-19 testing  3.  Palliative care services will be involved.   4.  Deep venous thrombosis prophylaxis:  None at this time given her very severe thrombocytopenia.   5.  CODE STATUS:  Full code per patient's wishes.  This has been discussed with her  and he is aware of that.  Palliative care consulted.  Continue discussions about this.   6.  Plan of care was discussed with the patient's , Oswaldo.  The patient is currently intubated and sedated.      CHIEF COMPLAINT:  Low oxygen and breathing problems.      HISTORY OF PRESENT ILLNESS:  This is an 82-year-old female who presents to the hospital with concerns for shortness of breath.  She was hypoxic on arrival to the ER.  Currently intubated.  Further details of HPI as above.      PHYSICAL EXAMINATION:   VITAL SIGNS:  Blood pressure is 152/62, heart rate is 84, respiratory rate is 20.   The patient is intubated and sedated on mechanical ventilation.  She does not appear to be overbreathing the vent.  Further detailed exam is deferred at this time given the current coronavirus pandemic and patient's PUI status.  Please refer to the ER provider Dr. Meza's note from 06/22 for further details of physical examination.      The patient's past medical history, surgical history, medication list have been reviewed in Epic.      REVIEW OF SYSTEMS:  Unable to obtain a comprehensive review of systems as the patient is intubated.     Past Medical History    I have reviewed this patient's medical history and updated it with pertinent information if needed.   Past Medical History:   Diagnosis Date     CAD (coronary artery disease)     Severe 2 vessel CAD. PCI with drug-eluting stents x2 to mid LAD on 3/27/15     Depression      HTN (hypertension)      Hyperlipidemia      Hypothyroidism      IHD (ischemic heart disease)      KUSUM (obstructive sleep apnea)     CPAP       Past Surgical History   I have reviewed this patient's surgical history and updated it with pertinent information if needed.  Past Surgical History:   Procedure Laterality Date     BACK SURGERY       COLONOSCOPY N/A 1/11/2017    Procedure: COLONOSCOPY;  Surgeon: Nghia Moss MD;  Location:  GI     Coronary angiogram with stents x2  03/2015    2.5 X 18 mm & 3.0 X mm LUCILA to LAD     OPTICAL TRACKING SYSTEM FUSION POSTERIOR SPINE LUMBAR N/A 7/17/2019    Procedure: L3-L4 TRANSFORAMINAL LUMBAR  INTRABODY FUSION WITH ALLOGRAFT, OPTICAL TRACKING SYSTEM AND MEDTRONIC SOLARA INSTRUMENTATION;  Surgeon: Cole Jansen MD;  Location:  OR     TONSILLECTOMY, ADENOIDECTOMY, COMBINED       TUBAL LIGATION       Dietrich teeth extraction         Prior to Admission Medications   Prior to Admission Medications   Prescriptions Last Dose Informant Patient Reported? Taking?   HYDROmorphone (DILAUDID) 2 MG tablet 6/22/2020 at Unknown time  No Yes   Sig: Take 1  tablet (2 mg) by mouth 2 times daily   HYDROmorphone (DILAUDID) 2 MG tablet   No Yes   Sig: Take 1 tablet (2 mg) by mouth every 4 hours as needed for moderate to severe pain Do not give PRN doses within 4 hours of the scheduled dose   acetaminophen (TYLENOL) 500 MG tablet 6/22/2020 at Unknown time  No Yes   Sig: Take 2 tablets (1,000 mg) by mouth 3 times daily   amLODIPine (NORVASC) 5 MG tablet 6/22/2020 at Unknown time  Yes Yes   Sig: Take 5 mg by mouth At Bedtime   artificial saliva (BIOTENE MT) SOLN solution   No Yes   Sig: Swish and spit 2 mLs (2 sprays) in mouth every hour as needed for dry mouth   ascorbic acid (VITAMIN C) 500 MG tablet 6/22/2020 at Unknown time Self Yes Yes   Sig: Take 500 mg by mouth daily (with lunch) Pt takes in afternoon   bisacodyl (DULCOLAX) 10 MG suppository   No Yes   Sig: Place 1 suppository (10 mg) rectally daily as needed for constipation   dexamethasone (DECADRON) 4 MG tablet 6/22/2020 at Unknown time  Yes Yes   Sig: Take 4 mg by mouth daily (with breakfast)   folic acid (FOLVITE) 1 MG tablet 6/22/2020 at Unknown time  No Yes   Sig: Take 1 tablet (1 mg) by mouth daily   guaiFENesin (MUCINEX) 600 MG 12 hr tablet 6/22/2020 at Unknown time  Yes Yes   Sig: Take 600 mg by mouth 2 times daily    hydrALAZINE (APRESOLINE) 10 MG tablet   No Yes   Sig: Take 1 tablet (10 mg) by mouth every 8 hours as needed (SBP>160)   isosorbide mononitrate (IMDUR) 30 MG 24 hr tablet 6/22/2020 at Unknown time  No Yes   Sig: TAKE ONE TABLET BY MOUTH EVERY DAY   letrozole (FEMARA) 2.5 MG tablet 6/22/2020 at Unknown time  Yes Yes   Sig: Take 2.5 mg by mouth daily   levothyroxine (SYNTHROID/LEVOTHROID) 75 MCG tablet 6/22/2020 at Unknown time  No Yes   Sig: TAKE ONE TABLET BY MOUTH EVERY MORNING (BEFORE BREAKFAST)   liothyronine (CYTOMEL) 5 MCG tablet 6/22/2020 at Unknown time  Yes Yes   Sig: Take 1 tablet (5 mcg) by mouth daily   lisinopril (ZESTRIL) 20 MG tablet 6/22/2020 at Unknown time  Yes Yes   Sig: Take  40 mg by mouth daily   metoprolol tartrate (LOPRESSOR) 100 MG tablet 2020 at Unknown time  Yes Yes   Sig: Take 100 mg by mouth 2 times daily Hold if heart rate is < 55   mirtazapine (REMERON) 7.5 MG tablet 2020 at Unknown time  No Yes   Sig: Take 1 tablet (7.5 mg) by mouth At Bedtime   omeprazole (PRILOSEC) 20 MG DR capsule 2020 at Unknown time  No Yes   Sig: Take 1 capsule (20 mg) by mouth daily   polyethylene glycol (MIRALAX/GLYCOLAX) packet   Yes Yes   Sig: Take 1 packet by mouth nightly as needed for constipation    senna-docusate (SENOKOT-S/PERICOLACE) 8.6-50 MG tablet   No Yes   Sig: Take 1 tablet by mouth 2 times daily as needed for constipation   Patient taking differently: Take 1 tablet by mouth 2 times daily as needed    sennosides (SENOKOT) 8.6 MG tablet 2020 at Unknown time  Yes Yes   Sig: Take 1 tablet by mouth 2 times daily    simethicone (MYLICON) 125 MG chewable tablet   Yes Yes   Sig: Take 125 mg by mouth 4 times daily as needed for intestinal gas      Facility-Administered Medications: None     Allergies   Allergies   Allergen Reactions     Flu Virus Vaccine Other (See Comments)     Viral SX     Gluten Meal        Social History   I have reviewed this patient's social history and updated it with pertinent information if needed.   Social History     Socioeconomic History     Marital status:      Spouse name: Not on file     Number of children: 4     Years of education: Not on file     Highest education level: Not on file   Occupational History     Not on file   Social Needs     Financial resource strain: Not on file     Food insecurity     Worry: Not on file     Inability: Not on file     Transportation needs     Medical: Not on file     Non-medical: Not on file   Tobacco Use     Smoking status: Former Smoker     Last attempt to quit: 1984     Years since quittin.5     Smokeless tobacco: Never Used   Substance and Sexual Activity     Alcohol use: Yes      Alcohol/week: 0.0 standard drinks     Comment: social drinker     Drug use: No     Sexual activity: Not Currently     Partners: Male   Lifestyle     Physical activity     Days per week: Not on file     Minutes per session: Not on file     Stress: Not on file   Relationships     Social connections     Talks on phone: Not on file     Gets together: Not on file     Attends [employer] service: Not on file     Active member of club or organization: Not on file     Attends meetings of clubs or organizations: Not on file     Relationship status: Not on file     Intimate partner violence     Fear of current or ex partner: Not on file     Emotionally abused: Not on file     Physically abused: Not on file     Forced sexual activity: Not on file   Other Topics Concern     Parent/sibling w/ CABG, MI or angioplasty before 65F 55M? Not Asked      Service Not Asked     Blood Transfusions Not Asked     Caffeine Concern Yes     Comment: coffee      Occupational Exposure No     Hobby Hazards No     Sleep Concern No     Stress Concern No     Weight Concern No     Special Diet Yes     Comment: gluten free and dairy free     Back Care No     Exercise No     Comment: on hold , 4/7 starting cardiac rehab     Bike Helmet Not Asked     Seat Belt Yes     Self-Exams Not Asked   Social History Narrative     Not on file       Family History   I have reviewed this patient's family history and updated it with pertinent information if needed.   Family History   Problem Relation Age of Onset     Blood Disease Mother         pernious anemia     Heart Disease Father      Heart Disease Sister      Heart Disease Brother      Colon Cancer No family hx of        Physical Exam       Data   Data reviewed today:  I personally reviewed the chest x-ray image(s) showing hilar consolidation.    Lab data and imaging results from today have been reviewed.                APRIL BAUER MD             D: 06/23/2020   T: 06/23/2020   MT: MISSY      Name:      AMARI AVILEZ   MRN:      [id number]        Account:      [id number]   :      1935        Admitted:     2020                   Document: [id number]       cc: Rebekah Rausch MD

## 2020-06-23 NOTE — PLAN OF CARE
ICU End of Shift Summary.  For vital signs and complete assessments, please see documentation flowsheets.      Pertinent assessments: RR is in the 30s.  Fi02 60-75 this shift.  PIP increased from 30-50.  Propofol and fentanyl given per order and prn. Tele remained SR, decrease in sats, used in line suctioning. Norepinephrine added for MAP support of 65. BPs improved. IV zosyn, vanco given. 3L central line placed, due to high need for access, as above: PRBCs, Platelet infusion, ABX, IVF, norepinephrine. WOCN put in recommendations, orders just before transport.  Pt was placed on a pulsate mattress, turned and repositioned at least every 2 hours for pre-existing pressure ulcer care.     Major Shift Events: Platelets, 9 and Hgb 6.1, gave one unit of PRBCs, hgb then 6.5 and 2nd unit of blood given (total of 2). One unit of platelets were given.  Covid-19 returned positive. Lactic 2.4, See results of blood gases.  Plan (Upcoming Events): Pt was transferred to [place] via [facility] at 1540  Discharge/Transfer Needs: Transfer to [facility] 6/23.     Bedside Shift Report Completed : Yes   Bedside Safety Check Completed: Yes      Right wrist and Left wrist restraints continued 6/23/2020     Clinical Justification: Pulling lines, pulling tubes, and pulling equipment  Less Restrictive Alternative: 1:1 patient care, Repositioning, Re-evaluate equipment, Disguise equipment, Pain management, Alarm, De-escalation, Reorientation  Attending Physician Notified: Yes, Attending Physician's Name: Theo  New orders placed No  Length of Order: 1 Day

## 2020-06-23 NOTE — ED TRIAGE NOTES
"Pt arrives via EMS from [facility]. Per EMS, pt had decreased oxygen saturations at 79% [facility] [place] pt does not normally wear oxygen but EMS noted she was in the \"isolated collins,\" unsure of COVID status. EMS noted she has some baseline confusion. Pt noted to be at 79% on RA which increased to 98% with an oxymask. Pt coughing. Pt alert, oriented to self. ABCs intact  "

## 2020-06-23 NOTE — CONSULTS
WO Nurse Inpatient Pressure Injury Assessment   Reason for consultation: Evaluate and treat Coccyx        ASSESSMENT  Pressure Injury: Coccyx , present on admission ,     Pressure Injury is Stage Unstageable   Contributing factor of the pressure injury: pressure, shear, microclimate, nutrition, age, immobility and moisture  Status: initial assessment after discussion with ICU nurses, pt is COVID + and will be transferred to [place]. Pt has black eschar to coccyx; 1/2 dollar size wound with mepilex in  place  Recommend provider order: na     TREATMENT PLAN  Coccyx  wound: Every 3 days   Cleanse with wound spray,  No sting to perimeter  Mepilex dressing, date  Use No sting to perimeter for adherence  Position for pressure relief Q 2 hrs, float heels    Orders Written  WOC Nurse follow-up plan:signing off  Nursing to notify the Provider(s) and re-consult the WOC Nurse if wound(s) deteriorates or new skin concern.    Patient History  According to provider note(s): 84-year-old female with a past medical history most notable for unfortunate recent diagnosis of metastatic breast cancer with overall picture of failure to thrive, overall decline in condition, pancytopenia, who is being admitted to the hospital now with concerns for acute respiratory failure.  History is obtained from discussion with the ER provider and from the chart review as the patient is currently intubated.      The patient was recently hospitalized at this facility.  The patient has a past medical history of hypertension, hyperlipidemia, coronary artery disease, KUSUM, CAD.  She was recently diagnosed with metastatic breast cancer.  She was hospitalized in April with severe hypercalcemia and was found to have multiple sclerotic lesions in the bone.  Initially, there was concern for multiple myeloma, although further workup she was also found to have a left breast mass and for further evaluation by Oncology was diagnosed with metastatic breast cancer.  " The patient was again hospitalized earlier this month with a picture of failure to thrive, also found to have worsening pancytopenia.  She had received palliative radiation to the lumbar spine.  She was felt to be a very poor candidate for chemotherapy and approaching end of life.  She was seen by Palliative Care, although patient's family still wanted to do a trial of rehabilitation and see if she would get stronger.      With this background patient had been in a transitional care facility where she had been noticed to be having increasing shortness of breath recently.  Today, her oxygen saturation was found to be 70% and she was brought into the ER.  She also had a cough.  Unclear about any exposure to COVID-19.  She was brought to the ER and required up to 15 liters of oxygen initially for sats of 79%.  However, with her ongoing respiratory distress, she was ultimately intubated.  She is being admitted to the Intensive Care Unit. \"      Pt is COVID + and will transfer to [facility]   Objective Data  Containment of urine/stool: Diaper and Indwelling catheter    Current Diet/ Nutrition:  Orders Placed This Encounter      NPO for Medical/Clinical Reasons Except for: No Exceptions      Advance Diet as Tolerated      Output:   I/O last 3 completed shifts:  In: 1612.47 [I.V.:966.47]  Out: 820 [Urine:820]    Risk Assessment:   Sensory Perception: 1-->completely limited  Moisture: 3-->occasionally moist  Activity: 1-->bedfast  Mobility: 1-->completely immobile  Nutrition: 2-->probably inadequate  Friction and Shear: 1-->problem  Pedro Score: 9      Labs:   Recent Labs   Lab 06/23/20  1150 06/23/20  0525   ALBUMIN  --  2.1*   HGB 6.5* 6.1*   WBC  --  4.0       Physical Exam  Wound location:  Coccyx   Date of last Photo unable to take, internet not functioning  Wound History: pt admitted with wound  Measurements (length x width x depth, in cm) RN reports that wound is about 1/2 dollar size; 100% black dry eschar  "   Periwound skin: deep red erythema- blanchable  Temperature: normal   Drainage:, none  Description of drainage: none  Odor: none  Pain: pt intubated    Interventions  Current support surface: Standard  Atmos Air mattress  Current off-loading measures: Foam padding, Pillows under calves and Pillows  Repositioning aid: Pillows  Visual inspection of wound(s) completed by RN earlier  Tube Securement: knott  Wound Care: was done per plan of care.  Supplies: floor stock and discussed with RN  Educated provided: importance of repositioning, plan of care and wound progress  Education provided to: nurse  Discussed importance of:repositioning every 2 hours, off-loading pressure to wound, their role in pressure injury prevention and moisture management  Discussed plan of care with Nurse    Maryam Mary RN, CWON

## 2020-06-23 NOTE — PHARMACY-VANCOMYCIN DOSING SERVICE
Pharmacy Vancomycin Initial Note  Date of Service 2020  Patient's  1935  84 year old, female    Indication: Healthcare-Associated Pneumonia    Current estimated CrCl = Estimated Creatinine Clearance: 59.5 mL/min (based on SCr of 0.68 mg/dL).    Creatinine for last 3 days  2020:  8:20 PM Creatinine 0.68 mg/dL    Recent Vancomycin Level(s) for last 3 days  No results found for requested labs within last 72 hours.      Vancomycin IV Administrations (past 72 hours)                   vancomycin (VANCOCIN) 1,250 mg in sodium chloride 0.9 % 250 mL intermittent infusion (mg) 1,250 mg New Bag 20 223                Nephrotoxins and other renal medications (From now, onward)    Start     Dose/Rate Route Frequency Ordered Stop    20 1000  vancomycin (VANCOCIN) 1,250 mg in sodium chloride 0.9 % 250 mL intermittent infusion      1,250 mg  over 90 Minutes Intravenous EVERY 12 HOURS 20 0028      20 0200  piperacillin-tazobactam (ZOSYN) infusion 3.375 g      3.375 g  over 30 Minutes Intravenous EVERY 6 HOURS 20 0011            Contrast Orders - past 72 hours (72h ago, onward)    Start     Dose/Rate Route Frequency Ordered Stop    20 2312  iopamidol (ISOVUE-370) solution 500 mL      500 mL Intravenous ONCE 20 2311 20 2327                Plan:  1.  Start vancomycin  1250 mg IV q12h.   2.  Goal Trough Level: 15-20 mg/L   3.  Pharmacy will check trough levels as appropriate in 1-3 Days.    4. Serum creatinine levels will be ordered daily for the first week of therapy and at least twice weekly for subsequent weeks.    5. [place] method utilized to dose vancomycin therapy: Method 1    Pranav Mclain [facility]

## 2020-06-23 NOTE — PROGRESS NOTES
RT Note      Per Dr. Meza, increased RR to 20 and decreased tidal volume down to 370 due to VBG results.          Ness Joyner, RRT

## 2020-06-23 NOTE — PROGRESS NOTES
VBG still respiratory acidosis and metabolic acidosis  P: increase TV to 420 and RR to 22.   VBG and lactate at 2 pm after transfusions completed.      -------------------------------------------------------  4:27 PM  Updated Note  Gave phone sign out to Dr Leventhal at [facility] unit regarding the transfer of this patient.      Repeat VBG and lactate showed improvement so no further ventilator adjustments made.  Already got decadron iv, and will get remdesivir.

## 2020-06-23 NOTE — PROGRESS NOTES
RT Note      Patient transported to CT via ventilator with viral filter inline. Patient transported to ICU post CT. In room and on mechanical ventilator settings.        Ness Joyner, RRT

## 2020-06-23 NOTE — PROGRESS NOTES
[facility] ICU VENTILATOR RESPIRATORY NOTE  Date of Admission: 6/22/20  Date of Intubation (most recent): 6/22/20  Reason for Mechanical Ventilation: : Acute respiratory failure  Number of Days on Mechanical Ventilation: 2  Met Criteria for Pressure Support Trial: No pt just intubated today  Significant Events Today: Intubation    Respiratory Therapy  Patient remains intubated on mechanical ventilator with the following settings:    Ventilation Mode: CMV/AC  (Continuous Mandatory Ventilation/ Assist Control)  FiO2 (%): 50 %  Rate Set (breaths/minute): 20 breaths/min  Tidal Volume Set (mL): 370 mL  PEEP (cm H2O): 5 cmH2O  Oxygen Concentration (%): 55 %  Resp: 26    Temp: 98.6  F (37  C) Temp src: Bladder BP: 120/53 Pulse: 77 Heart Rate: 78 Resp: 26 SpO2: 95 % O2 Device: Mechanical Ventilator      BS clear.  Will continue to follow and assess the patient's respiratory needs.    RT Kathryn  6/23/2020 3:58 AM

## 2020-06-23 NOTE — PROGRESS NOTES
RT Note      Patient intubated with 7.0 tube secured 22 at the teeth.    Placed on mechanical ventilation: CMV 18 tidal volume, PEEP of 5, 90% FIO2.  Viral filter in place.     Good color change on exhaled CO2 and auscultated good bilateral breath sounds.          Ness Joyner, RRT

## 2020-06-23 NOTE — DISCHARGE SUMMARY
"[facility]  Hospitalist Discharge Summary       Date of Admission:  6/22/2020  Date of Discharge:  6/23/2020  Discharging Provider: Javon Venegas MD      Discharge Diagnoses   Acute hypoxic respiratory failure  Septic shock  COVID-19 infection  Metastatic breast cancer  Pancytopenia secondary to metastatic disease  Hyponatremia  Severe malnutrition    Follow-ups Needed After Discharge   Follow-up Appointments     Follow Up and recommended labs and tests      TBD             Unresulted Labs Ordered in the Past 30 Days of this Admission     Date and Time Order Name Status Description    6/23/2020 0936 Cortisol In process     6/23/2020 0615 Platelets prepare order unit In process     6/22/2020 2011 Urine Culture Aerobic Bacterial Preliminary     6/22/2020 2011 Blood culture Preliminary     6/22/2020 2011 Blood culture Preliminary       These results will be followed up by [facility]    Discharge Disposition   Transferred to [facility]  Condition at discharge: Critically ill    History of Present Illness   Per admission H&P:  \"This is an 84-year-old female with a past medical history most notable for unfortunate recent diagnosis of metastatic breast cancer with overall picture of failure to thrive, overall decline in condition, pancytopenia, who is being admitted to the hospital now with concerns for acute respiratory failure.  History is obtained from discussion with the ER provider and from the chart review as the patient is currently intubated.      The patient was recently hospitalized at this facility.  The patient has a past medical history of hypertension, hyperlipidemia, coronary artery disease, KUSUM, CAD.  She was recently diagnosed with metastatic breast cancer.  She was hospitalized in April with severe hypercalcemia and was found to have multiple sclerotic lesions in the bone.  Initially, there was concern for multiple myeloma, although further workup she was also found to " "have a left breast mass and for further evaluation by Oncology was diagnosed with metastatic breast cancer.  The patient was again hospitalized earlier this month with a picture of failure to thrive, also found to have worsening pancytopenia.  She had received palliative radiation to the lumbar spine.  She was felt to be a very poor candidate for chemotherapy and approaching end of life.  She was seen by Palliative Care, although patient's family still wanted to do a trial of rehabilitation and see if she would get stronger.      With this background patient had been in a transitional care facility where she had been noticed to be having increasing shortness of breath recently.  Today, her oxygen saturation was found to be 70% and she was brought into the ER.  She also had a cough.  Unclear about any exposure to COVID-19.  She was brought to the ER and required up to 15 liters of oxygen initially for sats of 79%.  However, with her ongoing respiratory distress, she was ultimately intubated.  She is being admitted to the Intensive Care Unit. \"    Hospital Course     As outlined above, patient was intubated in the ED.  CT of the chest in the ED had shown multifocal bilateral infiltrates.  No PE was seen.  She was started on vancomycin and Zosyn.  Her cell counts on admission were notable for a hemoglobin of 6.1 and platelets of 9 and a white count of 4.  Patient has never received any chemotherapy and the cytopenias are all from direct marrow involvement of her cancer.    After patient to the ICU, the patient's blood pressure dropped, requiring placement of central venous catheter and initiation of pressors, consistent with septic shock.  Her COVID-19 swab returned positive, explaining her current acute illness.  She was started on Remdesivir. She will be transferred t [facility] for further cares due to her COVID disease.  aware, made her DNR.     Consultations This Hospital Stay   PHARMACY TO DOSE " VANCO  PHARMACY TO DOSE VANCO  PALLIATIVE CARE ADULT IP CONSULT  WOUND OSTOMY CONTINENCE NURSE  IP CONSULT      Code Status   DNR    Time Spent on this Encounter   I, Javon Venegas MD, personally saw the patient today and spent greater than 30 minutes discharging this patient.       Javon Venegas MD  [facility]  ______________________________________________________________________    Physical Exam   Vital Signs: Temp: 99  F (37.2  C) Temp src: Bladder BP: (!) 81/41 Pulse: 70 Heart Rate: 69 Resp: 25 SpO2: 95 % O2 Device: Mechanical Ventilator    Weight: 130 lbs 4.67 oz      General: Ill appearing, intubated and sedated.     HEENT: ET tube in place.    Pulmonary: Mechanically ventilated.    Skin: Pale       Primary Care Physician   Rebekah Rausch    Discharge Orders      Reason for your hospital stay    Elizabeth was admitted for respiratory failure requiring intubation as well as signs of infection and low blood counts. She tested positive for COVID 19 and will be transferred to [facility].     Follow Up and recommended labs and tests    TBD     Activity - Up with assistive device     Advance Diet as Tolerated    Follow this diet upon discharge: NPO       Significant Results and Procedures   Most Recent 3 CBC's:  Recent Labs   Lab Test 06/23/20  1150 06/23/20  0525 06/22/20 2020 06/06/20  0710   WBC  --  4.0 5.6  --  4.0   HGB 6.5* 6.1* 7.2*   < > 7.5*   MCV  --  98 94  --  96   PLT  --  9* 11*  --  19*    < > = values in this interval not displayed.     Most Recent 3 BMP's:  Recent Labs   Lab Test 06/23/20  0525 06/22/20 2020 06/06/20  1540    131* 137   POTASSIUM 4.8 5.1 4.1   CHLORIDE 107 104 110*   CO2 16* 15* 15*   BUN 39* 50* 11   CR 0.58 0.68 0.45*   ANIONGAP 11 12 12   FRANKLIN 8.4* 8.8 9.7   * 138* 102*     Most Recent 2 LFT's:  Recent Labs   Lab Test 06/23/20  0525 06/22/20 2020   AST 93* 107*   ALT 22 25   ALKPHOS 70 76   BILITOTAL 0.4 0.4     Most  Recent 3 INR's:  Recent Labs   Lab Test 04/23/20  1006 03/27/15  1156   INR 1.26* 0.95     COVID-19 PCR Results    COVID-19 PCR Results 4/21/20 4/21/20 5/3/20 5/22/20 6/22/20 6/22/20    1430 1430   2225 2225   COVID-19 Virus PCR to [facility] - Result Test received-See reflex to IDDL test SARS CoV2 (COVID-19) Virus RT-PCR  Not Detected Not Detected Test received-See reflex to IDDL test SARS CoV2 (COVID-19) Virus RT-PCR    COVID-19 Virus PCR to [facility] - Source Nasopharyngeal  Nasopharyngeal Nasopharyngeal Nasopharyngeal    SARS-CoV-2 Virus Specimen Source  Nasopharyngeal    Nasopharyngeal   SARS-CoV-2 PCR Result  NEGATIVE    POSITIVE (A)   (A) Abnormal value       Comments are available for some flowsheets but are not being displayed.         COVID-19 Antibody Results, Testing for Immunity    COVID-19 Antibody Results, Testing for Immunity   No data to display.          ,   Results for orders placed or performed during the hospital encounter of 06/22/20   XR Chest Port 1 View    Narrative    EXAM: XR CHEST PORT 1 VW  LOCATION: [facility]  DATE/TIME: 6/22/2020 8:49 PM    INDICATION: Fever. Post intubation.  COMPARISON: CT chest exam 04/21/2020 and chest x-ray 06/03/2020      Impression    IMPRESSION: Since the prior chest x-ray airspace consolidation has developed within the right perihilar region with air bronchograms concerning for pneumonia. Mild elevation right hemidiaphragm. The left lung is clear. No effusions. Endotracheal tube tip   2.5 cm above the cecilio. Enteric tube extends into the stomach.   CT Chest Pulmonary Embolism w Contrast    Narrative    EXAM: CT CHEST PULMONARY EMBOLISM W CONTRAST  LOCATION: [facility]  DATE/TIME: 6/22/2020 11:11 PM    INDICATION: Shortness of breath  PE suspected, high pretest prob  COMPARISON: Chest x-ray 06/22/2020 and CT 04/21/2020  TECHNIQUE: CT chest pulmonary angiogram during arterial phase injection of IV contrast. Multiplanar reformats and MIP  reconstructions were performed. Dose reduction techniques were used.   CONTRAST: 52mL Isovue-370    FINDINGS:  ANGIOGRAM CHEST: Pulmonary arteries are prominent in size. No filling defects in the central pulmonary arteries. Respiratory motion slightly reduces peripheral assessment. No thoracic aortic dissection.    LUNGS AND PLEURA: Bilateral perihilar infiltrates (right greater than left). Bilateral subpleural groundglass infiltrates. Bibasilar atelectasis. Trace effusions.    MEDIASTINUM/AXILLAE: Enteric and endotracheal tube. Atherosclerotic aorta. Coronary artery calcification. No significant pericardial effusion.    UPPER ABDOMEN: Cholelithiasis. 1.2 cm left renal hyperdensity presumed left renal cyst.    MUSCULOSKELETAL: Degenerative change osseous structures. Multiple remote rib fractures. Scattered small lytic lesions in the ribs. Multifocal lucencies in the thoracic spine.      Impression    IMPRESSION:  1. Multifocal, bilateral pulmonary infiltrates including some which are groundglass and peripheral. Imaging features can be seen with (COVID-19)  pneumonia, though are nonspecific and can occur with a variety of infectious and noninfectious processes.   Follow-up to confirm resolution.  2. Enlarged main pulmonary arteries. Correlate for pulmonary hypertension.  No pulmonary embolism.  3. Multiple lytic and osseous lesions again seen. Correlate for history of osseous metastases or myeloma.  4. Cholelithiasis.  5. Hyperdense left renal lesion incompletely characterized on this exam. Nonurgent ultrasound follow-up could be obtained to differentiate complex cyst from solid lesion.   XR Chest Port 1 View    Narrative    XR CHEST PORT 1 VW 6/23/2020 10:16 AM    HISTORY: new central line    COMPARISON: Chest x-ray 6/22/2020      Impression    IMPRESSION: Left IJ CVC with tip in the SVC. ETT with tip in good  position. Nasogastric tube with tip in the stomach and side-port at  the GE junction.     Persistent right  perihilar consolidation with slightly improved  aeration. New nodular consolidations in the right lower lung.  Increasing right upper lung interstitial opacities which may be due to  edema. New faint opacity in the left lower lung.    TREVON GARCIA MD       Discharge Medications   Current Discharge Medication List      CONTINUE these medications which have NOT CHANGED    Details   acetaminophen (TYLENOL) 500 MG tablet Take 2 tablets (1,000 mg) by mouth 3 times daily    Associated Diagnoses: Lump in upper outer quadrant of left breast      amLODIPine (NORVASC) 5 MG tablet Take 5 mg by mouth At Bedtime      artificial saliva (BIOTENE MT) SOLN solution Swish and spit 2 mLs (2 sprays) in mouth every hour as needed for dry mouth    Associated Diagnoses: Lump in upper outer quadrant of left breast      ascorbic acid (VITAMIN C) 500 MG tablet Take 500 mg by mouth daily (with lunch) Pt takes in afternoon      bisacodyl (DULCOLAX) 10 MG suppository Place 1 suppository (10 mg) rectally daily as needed for constipation    Associated Diagnoses: Lump in upper outer quadrant of left breast      dexamethasone (DECADRON) 4 MG tablet Take 4 mg by mouth daily (with breakfast)      folic acid (FOLVITE) 1 MG tablet Take 1 tablet (1 mg) by mouth daily  Qty:      Associated Diagnoses: Metastatic carcinoma (H)      hydrALAZINE (APRESOLINE) 10 MG tablet Take 1 tablet (10 mg) by mouth every 8 hours as needed (SBP>160)    Associated Diagnoses: Essential hypertension      !! HYDROmorphone (DILAUDID) 2 MG tablet Take 1 tablet (2 mg) by mouth 2 times daily  Qty: 30 tablet, Refills: 0    Associated Diagnoses: Pain      !! HYDROmorphone (DILAUDID) 2 MG tablet Take 1 tablet (2 mg) by mouth every 4 hours as needed for moderate to severe pain Do not give PRN doses within 4 hours of the scheduled dose  Qty: 20 tablet, Refills: 0    Associated Diagnoses: Pain      isosorbide mononitrate (IMDUR) 30 MG 24 hr tablet TAKE ONE TABLET BY MOUTH EVERY  DAY  Qty: 90 tablet, Refills: 1    Associated Diagnoses: Coronary artery disease involving native coronary artery of native heart without angina pectoris; Status post coronary angioplasty      letrozole (FEMARA) 2.5 MG tablet Take 2.5 mg by mouth daily      levothyroxine (SYNTHROID/LEVOTHROID) 75 MCG tablet TAKE ONE TABLET BY MOUTH EVERY MORNING (BEFORE BREAKFAST)  Qty: 90 tablet, Refills: 0    Associated Diagnoses: Acquired hypothyroidism      liothyronine (CYTOMEL) 5 MCG tablet Take 1 tablet (5 mcg) by mouth daily  Qty: 90 tablet, Refills: 0    Associated Diagnoses: Hypothyroidism, unspecified type      lisinopril (ZESTRIL) 20 MG tablet Take 40 mg by mouth daily      metoprolol tartrate (LOPRESSOR) 100 MG tablet Take 100 mg by mouth 2 times daily Hold if heart rate is < 55      mirtazapine (REMERON) 7.5 MG tablet Take 1 tablet (7.5 mg) by mouth At Bedtime  Qty:      Associated Diagnoses: Mild major depression (H)      omeprazole (PRILOSEC) 20 MG DR capsule Take 1 capsule (20 mg) by mouth daily  Qty:        polyethylene glycol (MIRALAX/GLYCOLAX) packet Take 1 packet by mouth nightly as needed for constipation       senna-docusate (SENOKOT-S/PERICOLACE) 8.6-50 MG tablet Take 1 tablet by mouth 2 times daily as needed for constipation    Associated Diagnoses: Lump in upper outer quadrant of left breast      sennosides (SENOKOT) 8.6 MG tablet Take 1 tablet by mouth 2 times daily       simethicone (MYLICON) 125 MG chewable tablet Take 125 mg by mouth 4 times daily as needed for intestinal gas       !! - Potential duplicate medications found. Please discuss with provider.        Allergies   Allergies   Allergen Reactions     Flu Virus Vaccine Other (See Comments)     Viral SX     Gluten Meal

## 2020-06-23 NOTE — CONSULTS
[facility]    Palliative Care Consultation   Text Page    Date of Admission:  6/22/2020    Assessment & Plan   Elizabeth Li is a 84 year old female who was admitted on 6/22/2020. I was asked by Hospitalist Marsha Gamboa MD to see the patient for goals of care.    Recommendations:  1.  Decisional Capacity -  Unreliable as patient is intubated and sedated. Patient does not have an advance directive. Per  informed consent policy next of kin should be involved in all consent and decision making. Her  Oswaldo Li is next-of-kin.     2.  Severe Malnutrition - She has lost 40 pounds the past year. The patient's admission weight at 130 pounds and she has a documented weight of 170 pounds from an appointment on July 1, 2019.     3.  Cancer related pain - Agree with plan for IV Fentanyl while intubated and sedated. Avoid the use of Oxycodone as the patient previously indicated in previous hospitalization her mental state was better with Dilaudid.     4.  Spiritual Care -  indicates he would like to have anointing of the sick by a  when appropriate per [facility] Protocol .      5.  Care Planning - Patient to transfer to [place] for continuation of care.      Goals of Care: DNR - Restorative care.  in the process of contacting their children about Elizabeth's diagnosis and transfer to [place] for care.     Disease Process/es & Symptoms:  Elizabeth Li is a 84 year old patient admitted with symptoms of worsening shortness of breath and was intubated shortly after arrival to the hospital. Today the patient has been found to be positive for COVID-19. The patient is known to me from her hospitalization April 17 through 28 and her hospitalization [facility] 3 through 7. Her April hospitalization was for severe hypercalcemia of malignancy at which time she underwent a biopsy soft tissue lesion at L5 on 4/23/2020 which was positive for malignancy. She was dismissed to  "[facility] where she has resided since. Her hospitalization in June was for toxic metabolic encephalopathy and failure to thrive in the setting of her metastatic breast cancer and she returned to [facility]. She has a 40 pound weight loss in the past year as she weighs 130 pounds with this admission and she was 170 pounds on at a July 1, 2019 medical appointment.     Findings & plan of care discussed with: Intensivist Judah Martinez MD; bedside nurse Katerina Clayton RN and Hospitalist Javon Venegas MD.  Follow-up plan from palliative team: Palliative care will sign off as the patient will transfer to [place] shortly.   Thank you for involving us in the patient's care.     Lovely Youssef MS, RN, CNS, APRN, ACHPN, FAACVPR  Pain and Palliative Care  Pager [phone]  Office [phone]       Time Spent on this Encounter   Total unit/floor time 2:10 PM until 3:15 PM  minutes, time consisted of the following, examination of the patient, reviewing the record and completing documentation. >50% of time spent in counseling and coordination of care.  Time spend counseling with family consisted of the following topics, goals of care and care planning for discharge.  Time spent in coordination of care with Intensivist Judah Martinez MD; bedside nurse Katerina Clayton RN and Hospitalist Javon Venegas MD.     Primary Care Physician   Rebekah Rausch    Chief Complaint   Shortness of Breath    History is obtained from the electronic health record and patient's spouse    Decision-Making & Goals of Care:  Discussed on June 23, 2020 with Lovely GUEVARA, CNS:     Discussed case with Intensivist Judah Martinez MD. Phoned the patient's /next-of-kin Oswaldo Li at  [phone]. He acknowledged that he last saw Elizabeth \"a couple of weeks ago\" as he had been able to accompany Elizabeth to her medical appointments. Oswaldo was surprised by the positive COVID-19 test results. \"I thought they were " "keeping her isolated from all the other patient's.\"  Oswaldo acknowledged learning more about the current pandemic. \"Oh no, this isn't good at all is it.\" We discussed Elizabeth's code status and Oswaldo request DNR, but continue all cares including intubation if effort to see if Elizabeth will get better. Oswaldo requested anointing of the sick and I did explain the current policy that patient's who are COVID-19 positive are anointed when death is anticipated in the next 24 hours.        Past Medical History   I have reviewed this patient's medical history and updated it with pertinent information if needed.   Past Medical History:   Diagnosis Date     CAD (coronary artery disease)     Severe 2 vessel CAD. PCI with drug-eluting stents x2 to mid LAD on 3/27/15     Depression      HTN (hypertension)      Hyperlipidemia      Hypothyroidism      IHD (ischemic heart disease)      KUSUM (obstructive sleep apnea)     CPAP       Past Surgical History   I have reviewed this patient's surgical history and updated it with pertinent information if needed.  Past Surgical History:   Procedure Laterality Date     BACK SURGERY       COLONOSCOPY N/A 1/11/2017    Procedure: COLONOSCOPY;  Surgeon: Nghia Moss MD;  Location:  GI     Coronary angiogram with stents x2  03/2015    2.5 X 18 mm & 3.0 X mm LUCILA to LAD     OPTICAL TRACKING SYSTEM FUSION POSTERIOR SPINE LUMBAR N/A 7/17/2019    Procedure: L3-L4 TRANSFORAMINAL LUMBAR  INTRABODY FUSION WITH ALLOGRAFT, OPTICAL TRACKING SYSTEM AND MEDTRONIC SOLARA INSTRUMENTATION;  Surgeon: Cole Jansen MD;  Location:  OR     TONSILLECTOMY, ADENOIDECTOMY, COMBINED       TUBAL LIGATION       [place] teeth extraction         Prior to Admission Medications   Prior to Admission Medications   Prescriptions Last Dose Informant Patient Reported? Taking?   HYDROmorphone (DILAUDID) 2 MG tablet   No No   Sig: Take 1 tablet (2 mg) by mouth 2 times daily   HYDROmorphone (DILAUDID) 2 MG tablet   No No "   Sig: Take 1 tablet (2 mg) by mouth every 4 hours as needed for moderate to severe pain Do not give PRN doses within 4 hours of the scheduled dose   acetaminophen (TYLENOL) 500 MG tablet   No No   Sig: Take 2 tablets (1,000 mg) by mouth 3 times daily   amLODIPine (NORVASC) 5 MG tablet   Yes No   Sig: Take 5 mg by mouth At Bedtime   artificial saliva (BIOTENE MT) SOLN solution   No No   Sig: Swish and spit 2 mLs (2 sprays) in mouth every hour as needed for dry mouth   ascorbic acid (VITAMIN C) 500 MG tablet  Self Yes No   Sig: Take 500 mg by mouth daily (with lunch) Pt takes in afternoon   bisacodyl (DULCOLAX) 10 MG suppository   No No   Sig: Place 1 suppository (10 mg) rectally daily as needed for constipation   dexamethasone (DECADRON) 4 MG tablet   Yes No   Sig: Take 4 mg by mouth daily (with breakfast)   folic acid (FOLVITE) 1 MG tablet   No No   Sig: Take 1 tablet (1 mg) by mouth daily   hydrALAZINE (APRESOLINE) 10 MG tablet   No No   Sig: Take 1 tablet (10 mg) by mouth every 8 hours as needed (SBP>160)   isosorbide mononitrate (IMDUR) 30 MG 24 hr tablet   No No   Sig: TAKE ONE TABLET BY MOUTH EVERY DAY   letrozole (FEMARA) 2.5 MG tablet   Yes No   Sig: Take 2.5 mg by mouth daily   levothyroxine (SYNTHROID/LEVOTHROID) 75 MCG tablet   No No   Sig: TAKE ONE TABLET BY MOUTH EVERY MORNING (BEFORE BREAKFAST)   liothyronine (CYTOMEL) 5 MCG tablet   Yes No   Sig: Take 1 tablet (5 mcg) by mouth daily   lisinopril (ZESTRIL) 20 MG tablet   Yes No   Sig: Take 40 mg by mouth daily   metoprolol tartrate (LOPRESSOR) 100 MG tablet   Yes No   Sig: Take 100 mg by mouth 2 times daily Hold if heart rate is < 55   mirtazapine (REMERON) 7.5 MG tablet   No No   Sig: Take 1 tablet (7.5 mg) by mouth At Bedtime   omeprazole (PRILOSEC) 20 MG DR capsule   No No   Sig: Take 1 capsule (20 mg) by mouth daily   polyethylene glycol (MIRALAX/GLYCOLAX) packet   Yes No   Sig: Take 1 packet by mouth nightly as needed for constipation     senna-docusate (SENOKOT-S/PERICOLACE) 8.6-50 MG tablet   No No   Sig: Take 1 tablet by mouth 2 times daily as needed for constipation   Patient taking differently: Take 1 tablet by mouth 2 times daily    sennosides (SENOKOT) 8.6 MG tablet   Yes No   Sig: Take 1 tablet by mouth 2 times daily    simethicone (MYLICON) 125 MG chewable tablet   Yes No   Sig: Take 125 mg by mouth 4 times daily as needed for intestinal gas      Facility-Administered Medications: None     Allergies   Allergies   Allergen Reactions     Flu Virus Vaccine Other (See Comments)     Viral SX     Gluten Meal        Social History   Living situation: Has resided at [facility] since her hospitalization in April  Family system:  and two adult daughters  Functional status: needs help with all ADLs (deangelo lift for transfers prior to this hospitalization)   Employment/education: Retired   History of substance use/abuse:  reports that she quit smoking about 36 years ago. She has never used smokeless tobacco.  reports current alcohol use.  Scientologist affiliation: Orthodoxy  Involvement in robbie community: [facility]    Family History   I have reviewed this patient's family history and updated it with pertinent information if needed.   Family History   Problem Relation Age of Onset     Blood Disease Mother         pernious anemia     Heart Disease Father      Heart Disease Sister      Heart Disease Brother      Colon Cancer No family hx of        Review of Systems   Review of systems not obtained due to patient factors - COVID-19 consulting    Physical Exam   Temp:  [97.7  F (36.5  C)-100.4  F (38  C)] 99  F (37.2  C)  Pulse:  [] 70  Heart Rate:  [] 69  Resp:  [14-36] 25  BP: ()/(33-78) 81/41  FiO2 (%):  [50 %-90 %] 60 %  SpO2:  [78 %-100 %] 95 %  130 lbs 4.67 oz  GEN:  Orally intubated - refer to Intensivist Judah Martinez MD assessment as patient is positive for  COVID-19    Data   Results for orders placed or performed during the hospital encounter of 06/22/20   -Intubation     Status: None    Narrative    Moses Meza MD     6/22/2020 11:34 PM  [facility]    -Intubation    Date/Time: 6/22/2020 8:58 PM  Performed by: Moses Meza MD  Authorized by: Moses Meza MD       PRE-PROCEDURE DETAILS     Patient status:  Altered mental status    Mallampati score:  II    Pretreatment medications:  None  PROCEDURE DETAILS     Preoxygenation: oxymask.    CPR in progress: no      Intubation method:  Oral    Oral intubation technique:  Video-assisted    Laryngoscope blade:  Mac 4    Tube size (mm):  7.0    Tube type:  Cuffed    Number of attempts:  1    Ventilation between attempts: no      Cricoid pressure: yes      Tube visualized through cords: yes      PLACEMENT ASSESSMENT     ETT to teeth:  22cm    Tube secured with:  ETT machado    Breath sounds:  Equal and absent over the epigastrium    Placement verification: chest rise, condensation, CXR verification,   direct visualization, equal breath sounds, ETCO2 detector and tube   exhalation      CXR findings:  ETT in proper place  PROCEDURE   Patient Tolerance:  Patient tolerated the procedure well with no immediate   complications     Central line     Status: None    Narrative    Judah Martinez MD     6/23/2020  9:21 AM  [facility]    Central line    Date/Time: 6/23/2020 9:18 AM  Performed by: Judah Martinez MD  Authorized by: Judah Martinez MD   Indications: vascular access    UNIVERSAL PROTOCOL   Site Marked: NA  Prior Images Obtained and Reviewed:  No  Required items: Required blood products, implants, devices and special   equipment available    Patient identity confirmed:  Arm band  NA - No sedation, light sedation, or local anesthesia  Confirmation Checklist:  Patient's identity using two indicators  Time out: Immediately prior to the procedure a time out was called     Universal Protocol: the Joint Commission Universal Protocol was followed    Preparation: Patient was prepped and draped in usual sterile fashion    ESBL (mL):  0         ANESTHESIA    Anesthesia: Local infiltration  Local Anesthetic:  Lidocaine 1% without epinephrine      SEDATION    Patient Sedated: No      Preparation: skin prepped with 2% chlorhexidine  Skin prep agent dried: skin prep agent completely dried prior to procedure  Sterile barriers: all five maximum sterile barriers used - cap, mask,   sterile gown, sterile gloves, and large sterile sheet  Hand hygiene: hand hygiene performed prior to central venous catheter   insertion  Location details: left internal jugular  Site selection rationale: couldnt turn neck to left  Patient position: Trendelenburg  Catheter type: triple lumen  Pre-procedure: landmarks identified  Ultrasound guidance: yes  Sterile ultrasound techniques: sterile gel and sterile probe covers were   used  Number of attempts: 1  Successful placement: yes  Post-procedure: line sutured and dressing applied  Assessment: free fluid flow and blood return through all ports    PROCEDURE   Patient Tolerance:  Patient tolerated the procedure well with no immediate   complications  Describe Procedure: Patient already intubated and sedated on propofol.    Consent per phone per  Oswaldo patient at higher risk for bleeding   seconday to platelets of 9 but no bleeding during procedure   Length of time physician/provider present for 1:1 monitoring during   sedation: 15   XR Chest Port 1 View     Status: None    Narrative    EXAM: XR CHEST PORT 1 VW  LOCATION: [facility]  DATE/TIME: 6/22/2020 8:49 PM    INDICATION: Fever. Post intubation.  COMPARISON: CT chest exam 04/21/2020 and chest x-ray 06/03/2020      Impression    IMPRESSION: Since the prior chest x-ray airspace consolidation has developed within the right perihilar region with air bronchograms concerning for pneumonia. Mild  elevation right hemidiaphragm. The left lung is clear. No effusions. Endotracheal tube tip   2.5 cm above the cecilio. Enteric tube extends into the stomach.   CT Chest Pulmonary Embolism w Contrast     Status: None    Narrative    EXAM: CT CHEST PULMONARY EMBOLISM W CONTRAST  LOCATION: [facility]  DATE/TIME: 6/22/2020 11:11 PM    INDICATION: Shortness of breath  PE suspected, high pretest prob  COMPARISON: Chest x-ray 06/22/2020 and CT 04/21/2020  TECHNIQUE: CT chest pulmonary angiogram during arterial phase injection of IV contrast. Multiplanar reformats and MIP reconstructions were performed. Dose reduction techniques were used.   CONTRAST: 52mL Isovue-370    FINDINGS:  ANGIOGRAM CHEST: Pulmonary arteries are prominent in size. No filling defects in the central pulmonary arteries. Respiratory motion slightly reduces peripheral assessment. No thoracic aortic dissection.    LUNGS AND PLEURA: Bilateral perihilar infiltrates (right greater than left). Bilateral subpleural groundglass infiltrates. Bibasilar atelectasis. Trace effusions.    MEDIASTINUM/AXILLAE: Enteric and endotracheal tube. Atherosclerotic aorta. Coronary artery calcification. No significant pericardial effusion.    UPPER ABDOMEN: Cholelithiasis. 1.2 cm left renal hyperdensity presumed left renal cyst.    MUSCULOSKELETAL: Degenerative change osseous structures. Multiple remote rib fractures. Scattered small lytic lesions in the ribs. Multifocal lucencies in the thoracic spine.      Impression    IMPRESSION:  1. Multifocal, bilateral pulmonary infiltrates including some which are groundglass and peripheral. Imaging features can be seen with (COVID-19)  pneumonia, though are nonspecific and can occur with a variety of infectious and noninfectious processes.   Follow-up to confirm resolution.  2. Enlarged main pulmonary arteries. Correlate for pulmonary hypertension.  No pulmonary embolism.  3. Multiple lytic and osseous lesions again seen.  Correlate for history of osseous metastases or myeloma.  4. Cholelithiasis.  5. Hyperdense left renal lesion incompletely characterized on this exam. Nonurgent ultrasound follow-up could be obtained to differentiate complex cyst from solid lesion.   XR Chest Port 1 View     Status: None    Narrative    XR CHEST PORT 1 VW 6/23/2020 10:16 AM    HISTORY: new central line    COMPARISON: Chest x-ray 6/22/2020      Impression    IMPRESSION: Left IJ CVC with tip in the SVC. ETT with tip in good  position. Nasogastric tube with tip in the stomach and side-port at  the GE junction.     Persistent right perihilar consolidation with slightly improved  aeration. New nodular consolidations in the right lower lung.  Increasing right upper lung interstitial opacities which may be due to  edema. New faint opacity in the left lower lung.    TREVON GARCIA MD   CBC with platelets differential     Status: Abnormal   Result Value Ref Range    WBC 5.6 4.0 - 11.0 10e9/L    RBC Count 2.28 (L) 3.8 - 5.2 10e12/L    Hemoglobin 7.2 (L) 11.7 - 15.7 g/dL    Hematocrit 21.5 (L) 35.0 - 47.0 %    MCV 94 78 - 100 fl    MCH 31.6 26.5 - 33.0 pg    MCHC 33.5 31.5 - 36.5 g/dL    RDW 18.5 (H) 10.0 - 15.0 %    Platelet Count 11 (LL) 150 - 450 10e9/L    Diff Method Manual Differential     % Neutrophils 77.0 %    % Lymphocytes 12.0 %    % Monocytes 0.0 %    % Eosinophils 0.0 %    % Basophils 0.0 %    % Metamyelocytes 9.0 %    % Myelocytes 2.0 %    Nucleated RBCs 7 (H) 0 /100    Absolute Neutrophil 4.3 1.6 - 8.3 10e9/L    Absolute Lymphocytes 0.7 (L) 0.8 - 5.3 10e9/L    Absolute Monocytes 0.0 0.0 - 1.3 10e9/L    Absolute Eosinophils 0.0 0.0 - 0.7 10e9/L    Absolute Basophils 0.0 0.0 - 0.2 10e9/L    Absolute Metamyelocytes 0.5 (H) 0 10e9/L    Absolute Myelocytes 0.1 (H) 0 10e9/L    Absolute Nucleated RBC 0.4     Anisocytosis Moderate     Microcytes Present     Macrocytes Present     Platelet Estimate       Automated count confirmed.  Platelet morphology is  normal.   Comprehensive metabolic panel     Status: Abnormal   Result Value Ref Range    Sodium 131 (L) 133 - 144 mmol/L    Potassium 5.1 3.4 - 5.3 mmol/L    Chloride 104 94 - 109 mmol/L    Carbon Dioxide 15 (L) 20 - 32 mmol/L    Anion Gap 12 3 - 14 mmol/L    Glucose 138 (H) 70 - 99 mg/dL    Urea Nitrogen 50 (H) 7 - 30 mg/dL    Creatinine 0.68 0.52 - 1.04 mg/dL    GFR Estimate 81 >60 mL/min/[1.73_m2]    GFR Estimate If Black >90 >60 mL/min/[1.73_m2]    Calcium 8.8 8.5 - 10.1 mg/dL    Bilirubin Total 0.4 0.2 - 1.3 mg/dL    Albumin 2.4 (L) 3.4 - 5.0 g/dL    Protein Total 6.1 (L) 6.8 - 8.8 g/dL    Alkaline Phosphatase 76 40 - 150 U/L    ALT 25 0 - 50 U/L     (H) 0 - 45 U/L   Procalcitonin     Status: None   Result Value Ref Range    Procalcitonin 0.67 ng/ml   Blood gas venous     Status: Abnormal   Result Value Ref Range    Ph Venous 7.31 (L) 7.32 - 7.43 pH    PCO2 Venous 27 (L) 40 - 50 mm Hg    PO2 Venous 54 (H) 25 - 47 mm Hg    Bicarbonate Venous 13 (L) 21 - 28 mmol/L    Base Deficit Venous 11.8 mmol/L    FIO2 15L    UA with Microscopic     Status: Abnormal   Result Value Ref Range    Color Urine Orange     Appearance Urine Cloudy     Glucose Urine Negative NEG^Negative mg/dL    Bilirubin Urine Negative NEG^Negative    Ketones Urine Negative NEG^Negative mg/dL    Specific Gravity Urine 1.013 1.003 - 1.035    Blood Urine Large (A) NEG^Negative    pH Urine 7.0 5.0 - 7.0 pH    Protein Albumin Urine 100 (A) NEG^Negative mg/dL    Urobilinogen mg/dL Normal 0.0 - 2.0 mg/dL    Nitrite Urine Negative NEG^Negative    Leukocyte Esterase Urine Large (A) NEG^Negative    Source Catheterized Urine     WBC Urine 137 (H) 0 - 5 /HPF    RBC Urine 20 (H) 0 - 2 /HPF    WBC Clumps Present (A) NEG^Negative /HPF    Bacteria Urine Many (A) NEG^Negative /HPF    Mucous Urine Present (A) NEG^Negative /LPF   Ammonia (on ice)     Status: Abnormal   Result Value Ref Range    Ammonia <10 (L) 10 - 50 umol/L   Nt probnp inpatient     Status:  Abnormal   Result Value Ref Range    N-Terminal Pro BNP Inpatient 3,603 (H) 0 - 1,800 pg/mL   Troponin I     Status: None   Result Value Ref Range    Troponin I ES <0.015 0.000 - 0.045 ug/L   Lactic acid whole blood     Status: Abnormal   Result Value Ref Range    Lactic Acid 2.9 (H) 0.7 - 2.0 mmol/L   Blood gas venous and oxyhgb     Status: Abnormal   Result Value Ref Range    Ph Venous 7.19 (LL) 7.32 - 7.43 pH    PCO2 Venous 40 40 - 50 mm Hg    PO2 Venous 61 (H) 25 - 47 mm Hg    Bicarbonate Venous 15 (L) 21 - 28 mmol/L    FIO2 90%     Oxyhemoglobin Venous 83 %    Base Deficit Venous 12.0 mmol/L   Lactic acid whole blood     Status: Abnormal   Result Value Ref Range    Lactic Acid 3.4 (H) 0.7 - 2.0 mmol/L   Symptomatic COVID-19 Virus (Coronavirus) by PCR     Status: None    Specimen: Nasopharyngeal   Result Value Ref Range    COVID-19 Virus PCR to [facility] - Source Nasopharyngeal     COVID-19 Virus PCR to [facility] - Result       Test received-See reflex to IDDL test SARS CoV2 (COVID-19) Virus RT-PCR   CBC with platelets differential     Status: Abnormal   Result Value Ref Range    WBC 4.0 4.0 - 11.0 10e9/L    RBC Count 2.04 (L) 3.8 - 5.2 10e12/L    Hemoglobin 6.1 (LL) 11.7 - 15.7 g/dL    Hematocrit 19.9 (L) 35.0 - 47.0 %    MCV 98 78 - 100 fl    MCH 29.9 26.5 - 33.0 pg    MCHC 30.7 (L) 31.5 - 36.5 g/dL    RDW 18.2 (H) 10.0 - 15.0 %    Platelet Count 9 (LL) 150 - 450 10e9/L    Diff Method Manual Differential     % Neutrophils 65.0 %    % Lymphocytes 29.0 %    % Monocytes 1.0 %    % Eosinophils 0.0 %    % Basophils 0.0 %    % Metamyelocytes 4.0 %    % Myelocytes 1.0 %    Nucleated RBCs 1 (H) 0 /100    Absolute Neutrophil 2.6 1.6 - 8.3 10e9/L    Absolute Lymphocytes 1.2 0.8 - 5.3 10e9/L    Absolute Monocytes 0.0 0.0 - 1.3 10e9/L    Absolute Eosinophils 0.0 0.0 - 0.7 10e9/L    Absolute Basophils 0.0 0.0 - 0.2 10e9/L    Absolute Metamyelocytes 0.2 (H) 0 10e9/L    Absolute Myelocytes 0.0 0 10e9/L    Absolute Nucleated RBC  0.0     Anisocytosis Moderate     Microcytes Present     Platelet Estimate       Automated count confirmed.  Platelet morphology is normal.   Comprehensive metabolic panel     Status: Abnormal   Result Value Ref Range    Sodium 134 133 - 144 mmol/L    Potassium 4.8 3.4 - 5.3 mmol/L    Chloride 107 94 - 109 mmol/L    Carbon Dioxide 16 (L) 20 - 32 mmol/L    Anion Gap 11 3 - 14 mmol/L    Glucose 141 (H) 70 - 99 mg/dL    Urea Nitrogen 39 (H) 7 - 30 mg/dL    Creatinine 0.58 0.52 - 1.04 mg/dL    GFR Estimate 85 >60 mL/min/[1.73_m2]    GFR Estimate If Black >90 >60 mL/min/[1.73_m2]    Calcium 8.4 (L) 8.5 - 10.1 mg/dL    Bilirubin Total 0.4 0.2 - 1.3 mg/dL    Albumin 2.1 (L) 3.4 - 5.0 g/dL    Protein Total 5.5 (L) 6.8 - 8.8 g/dL    Alkaline Phosphatase 70 40 - 150 U/L    ALT 22 0 - 50 U/L    AST 93 (H) 0 - 45 U/L   Glucose by meter     Status: Abnormal   Result Value Ref Range    Glucose 178 (H) 70 - 99 mg/dL   Lactic acid whole blood     Status: Abnormal   Result Value Ref Range    Lactic Acid 3.7 (H) 0.7 - 2.0 mmol/L   Lactic acid whole blood (AM Draw)     Status: Abnormal   Result Value Ref Range    Lactic Acid 3.3 (H) 0.7 - 2.0 mmol/L   SARS-CoV-2 COVID-19 Virus (Coronavirus) RT-PCR Nasopharyngeal     Status: Abnormal    Specimen: Nasopharyngeal   Result Value Ref Range    SARS-CoV-2 Virus Specimen Source Nasopharyngeal     SARS-CoV-2 PCR Result POSITIVE (AA)     SARS-CoV-2 PCR Comment       Testing was performed using the [employer] COVID-19 Direct Assay on the [employer] L[employer]ison MDX   instrument. Additional information about this Emergency Use Authorization (EUA) assay can   be found via the Lab Guide.     Glucose by meter     Status: Abnormal   Result Value Ref Range    Glucose 155 (H) 70 - 99 mg/dL   Glucose by meter     Status: Abnormal   Result Value Ref Range    Glucose 116 (H) 70 - 99 mg/dL   Blood gas venous with oxyhemoglobin     Status: Abnormal   Result Value Ref Range    Ph Venous 7.22 (L) 7.32 - 7.43 pH     PCO2 Venous 38 (L) 40 - 50 mm Hg    PO2 Venous 27 25 - 47 mm Hg    Bicarbonate Venous 16 (L) 21 - 28 mmol/L    FIO2 70%     Oxyhemoglobin Venous 40 %    Base Deficit Venous 10.8 mmol/L   Lactic acid whole blood     Status: Abnormal   Result Value Ref Range    Lactic Acid 3.7 (H) 0.7 - 2.0 mmol/L   Hemoglobin     Status: Abnormal   Result Value Ref Range    Hemoglobin 6.5 (LL) 11.7 - 15.7 g/dL   Glucose by meter     Status: Abnormal   Result Value Ref Range    Glucose 111 (H) 70 - 99 mg/dL   EKG 12 lead     Status: None   Result Value Ref Range    Interpretation ECG Click View Image link to view waveform and result    Blood component     Status: None   Result Value Ref Range    Unit Number [id number]     Blood Component Type PlateletPheresis,LeukoRed Irrad (Part 2)     Division Number 00     Status of Unit Released to care unit 06/23/2020 0815     Blood Product Code [id number]     Unit Status ISS    ABO/Rh type and screen     Status: None   Result Value Ref Range    Units Ordered 2     ABO O     RH(D) Pos     Antibody Screen Neg     Test Valid Only At [facility]        Specimen Expires 06/26/2020     Crossmatch Red Blood Cells    Blood component     Status: None   Result Value Ref Range    Unit Number [id number]     Blood Component Type Red Blood Cells Leukocyte Reduced     Division Number 00     Status of Unit Released to care unit 06/23/2020 0933     Blood Product Code [id number]     Unit Status ISS    Blood component     Status: None   Result Value Ref Range    Unit Number [id number]     Blood Component Type Red Blood Cells Leukocyte Reduced     Division Number 00     Status of Unit Released to care unit 06/23/2020 1243     Blood Product Code [id number]     Unit Status ISS    Blood culture     Status: None (Preliminary result)    Specimen: Blood    Right Arm   Result Value Ref Range    Specimen Description Blood Right Arm     Culture Micro No growth after 14 hours    Blood culture     Status: None  (Preliminary result)    Specimen: Blood    Right Hand   Result Value Ref Range    Specimen Description Blood Right Hand     Culture Micro No growth after 14 hours    Urine Culture Aerobic Bacterial     Status: None (Preliminary result)    Specimen: Catheterized Urine   Result Value Ref Range    Specimen Description Catheterized Urine     Special Requests Specimen received in preservative     Culture Micro PENDING

## 2020-06-23 NOTE — PROGRESS NOTES
Remdesivir Interdisciplinary Triage Team Note        This patient has been selected using standard criteria to receive remdesivir by the [facility] Interdisciplinary Triage Team based on criteria developed and distributed by the [facility] on May 23, 2020.   This is under the FDA Emergency Use Authorization.      The treatment team should order Remdesivir 200 mg IV x1 and then 100 mg daily for 9 subsequent days if the patient/decision maker consent and if medically indicated.      If the patient will not complete the entire course of Remdesivir or if there are questions, please text or call Bridgette Hernandez at [phone].      UMANG Ramesh, RUSTS  Remdesivir Interdisciplinary Triage Team

## 2020-06-23 NOTE — PLAN OF CARE
ICU End of Shift Summary.  For vital signs and complete assessments, please see documentation flowsheets.     Pertinent assessments: VSS on vent; setting at end of shift RR 20/Tv 370/PEEP 8/FiO2 70%. RASS goal 0 to -1, remains on propofol at 15 mcg/kg/min. Arouses and becomes restless during cares, will squeeze hand when asked. LS coarse this AM, scant amounts of secretions from ETT tube with minimal coughing overnight. Large pressure injury to coccyx found on admission, covered with mepilex with admission date from living facility. Bilateral heels blanchable, pre-existing mepilex in place. PIV x3 with antibiotics, IVF as ordered. Reynoso in place with good UO overnight. No BM this shift, OG in place, clamped. BG elevated, tele ICU notified, no new orders. Hgb this AM 6.1 and plt 9, orders placed for transfusion. Awaiting MD consent with Oswaldo; text page to Admitting Hospitalist this AM, awaiting completion. Awaiting results of COVID-19 swab. Tele SR, rare PAC noted this AM. Continue to monitor.     7:34 AM  ADDENDUM:  BP low this AM, propofol turned off d/t appropriate RASS score, significant change in VS. Type/screen collected this AM, consent via telephone obtained by Dr. Nascimento and writer. Changes in care/patient status provided to oncoming RN's Markel. Continue to monitor.     Major Shift Events: Arrival to ICU, hgb 6.1 with orders to transfuse  Plan (Upcoming Events): Transfuse PRBC, plasma; awaiting COVID 19 results, palliative care consult  Discharge/Transfer Needs: TBD    Bedside Shift Report Completed : Yes   Bedside Safety Check Completed: Yes      Right wrist and Left wrist restraints continued 6/23/2020    Clinical Justification: Pulling lines, pulling tubes, and pulling equipment  Less Restrictive Alternative: 1:1 patient care, Repositioning, Re-evaluate equipment, Disguise equipment, Pain management, Alarm, De-escalation, Reorientation  Attending Physician Notified: Yes,  Attending Physician's Name: Bee   New orders placed No  Length of Order: 1 Day      Terrie Torre RN

## 2020-06-23 NOTE — ED NOTES
Bed: ED11  Expected date: 6/22/20  Expected time: 7:38 PM  Means of arrival: Ambulance  Comments:  Avinash [age] y/o F, difficulty breathing, low oxygen sats

## 2020-06-24 NOTE — PROGRESS NOTES
"Clinic Care Coordination Contact    Situation: Patient chart reviewed by care coordinator.    Background:     Per admission H&P:  \"This is an 84-year-old female with a past medical history most notable for unfortunate recent diagnosis of metastatic breast cancer with overall picture of failure to thrive, overall decline in condition, pancytopenia, who is being admitted to the hospital now with concerns for acute respiratory failure.  History is obtained from discussion with the ER provider and from the chart review as the patient is currently intubated.      She was seen by Palliative Care, although patient's family still wanted to do a trial of rehabilitation and see if she would get stronger.      With this background patient had been in a transitional care facility where she had been noticed to be having increasing shortness of breath recently.  Today, her oxygen saturation was found to be 70% and she was brought into the ER.  She also had a cough.  Unclear about any exposure to COVID-19.  She was brought to the ER and required up to 15 liters of oxygen initially for sats of 79%.  However, with her ongoing respiratory distress, she was ultimately intubated.  She is being admitted to the Intensive Care Unit. \"    Assessment: Pt was admitted to [facility] from [facility] on  6/22/2020 to 6/23/2020. Upon discharge she was transferred to [facility] in critical condition where she remains IP.      Plan/Recommendations: [facility] to route to Clinic Care Coordinator to monitor for discharge plan and assess for needs.        Covering Care Coordinator  DB Bey  Clinic Care Coordinator  [facility]  [facility]  [phone]  [email or web address]        "

## 2020-06-28 LAB
BACTERIA SPEC CULT: NO GROWTH
BACTERIA SPEC CULT: NO GROWTH
SPECIMEN SOURCE: NORMAL
SPECIMEN SOURCE: NORMAL

## 2020-07-01 ENCOUNTER — TELEPHONE (OUTPATIENT)
Dept: INTERNAL MEDICINE | Facility: CLINIC | Age: 85
End: 2020-07-01

## 2020-07-01 NOTE — TELEPHONE ENCOUNTER
[employer] called needing online death certificate signed for patient death at [facility].

## 2020-07-02 NOTE — TELEPHONE ENCOUNTER
Anju from [facility] ([phone]) calling again.  Asking for primary care provider to sign death cert.  She realized that Dr. Watkins was attached to the death record, not Dr. Rausch.  She will attach Dr. Rausch's name on the death record.    Please sign as soon as possible.

## 2020-09-23 NOTE — PATIENT INSTRUCTIONS
Rere Li  1935    1) CBC, BMP STAT today. Diagnosis. Confusion, low grade temps  2) UA/UC via straight cath to rule out UTI due to low grade temps     Wanda Nguyen, ISMAEL CNP on 5/18/2020 at 9:56 AM     dr amaya

## 2020-11-22 ENCOUNTER — HEALTH MAINTENANCE LETTER (OUTPATIENT)
Age: 85
End: 2020-11-22

## 2024-09-24 NOTE — TELEPHONE ENCOUNTER
Ok per Dr. Jansen to resume Aspirin. Called and notified patient. Patient verbalized understanding. No other questions at this time.   No

## (undated) DEVICE — PREP DURAPREP 26ML APL 8630

## (undated) DEVICE — DRAPE IOBAN INCISE 23X17" 6650EZ

## (undated) DEVICE — PACK UNIVERSAL SPLIT 29131

## (undated) DEVICE — ESU PENCIL W/HOLSTER E2350H

## (undated) DEVICE — ESU ELEC BLADE 2.75" COATED/INSULATED E1455

## (undated) DEVICE — SUCTION FRAZIER 12FR W/HANDLE K73

## (undated) DEVICE — GOWN XLG DISP 9545

## (undated) DEVICE — DRSG KERLIX FLUFFS X5

## (undated) DEVICE — MARKER SPHERES PASSIVE MEDT PACK 5 8801075

## (undated) DEVICE — MANIFOLD NEPTUNE 4 PORT 700-20

## (undated) DEVICE — COVER TABLE POLY 65X90" 8186

## (undated) DEVICE — DRAPE SHEET REV FOLD 3/4 9349

## (undated) DEVICE — LINEN TOWEL PACK X5 5464

## (undated) DEVICE — DRAIN JACKSON PRATT 07MM FLAT SU130-1310

## (undated) DEVICE — PACK LARGE SPINE SNE15LSFSE

## (undated) DEVICE — ESU BIPOLAR SEALER AQUAMANTYS 6MM 23-112-1

## (undated) DEVICE — SU VICRYL 0 CT-1 CR 8X18" J740D

## (undated) DEVICE — MIDAS REX DISSECTING TOOL  14MH30

## (undated) DEVICE — GLOVE PROTEXIS BLUE W/NEU-THERA 8.0  2D73EB80

## (undated) DEVICE — GLOVE PROTEXIS W/NEU-THERA 7.5  2D73TE75

## (undated) DEVICE — KIT PATIENT JACKSON 1 SPK10118

## (undated) DEVICE — SU ETHILON 2-0 FS 18" 664H

## (undated) DEVICE — DRAIN JACKSON PRATT RESERVOIR 100ML SU130-1305

## (undated) DEVICE — ESU GROUND PAD UNIVERSAL W/O CORD

## (undated) DEVICE — SPONGE SURGIFOAM 100 1974

## (undated) DEVICE — NDL SPINAL 18GA 3.5" 405184

## (undated) DEVICE — GLOVE PROTEXIS BLUE W/NEU-THERA 6.5  2D73EB65

## (undated) DEVICE — DRAPE COVER C-ARM SEAMLESS SNAP-KAP 03-KP26 LATEX FREE

## (undated) DEVICE — CATH TRAY FOLEY SURESTEP 16FR WDRAIN BAG STLK LATEX A300316A

## (undated) DEVICE — DRAPE MAYO STAND 23X54 8337

## (undated) DEVICE — STPL SKIN 35W ROTATING HEAD PRW35

## (undated) DEVICE — DRAPE MICROSCOPE EQUIP 48X120" 6130VL2

## (undated) DEVICE — SU VICRYL 2-0 CT-2 CR 8X18" J726D

## (undated) DEVICE — POSITIONER PT PRONESAFE HEAD REST W/DERMAPROX INSERT 40599

## (undated) DEVICE — SU VICRYL 2-0 CT-2 27" J333H

## (undated) DEVICE — BLADE BONE MILL STRK 3.2MM FINE 5400-702-000

## (undated) DEVICE — TUBING SUCTION SOFT 20'X3/16" 0036570

## (undated) DEVICE — GLOVE PROTEXIS W/NEU-THERA 6.5  2D73TE65

## (undated) DEVICE — SOL WATER IRRIG 1000ML BOTTLE 2F7114

## (undated) RX ORDER — LABETALOL 20 MG/4 ML (5 MG/ML) INTRAVENOUS SYRINGE
Status: DISPENSED
Start: 2019-01-01

## (undated) RX ORDER — HYDROMORPHONE HYDROCHLORIDE 1 MG/ML
INJECTION, SOLUTION INTRAMUSCULAR; INTRAVENOUS; SUBCUTANEOUS
Status: DISPENSED
Start: 2019-01-01

## (undated) RX ORDER — NEOSTIGMINE METHYLSULFATE 1 MG/ML
VIAL (ML) INJECTION
Status: DISPENSED
Start: 2019-01-01

## (undated) RX ORDER — ONDANSETRON 2 MG/ML
INJECTION INTRAMUSCULAR; INTRAVENOUS
Status: DISPENSED
Start: 2019-01-01

## (undated) RX ORDER — ACETAMINOPHEN 325 MG/1
TABLET ORAL
Status: DISPENSED
Start: 2019-01-01

## (undated) RX ORDER — GLYCOPYRROLATE 0.2 MG/ML
INJECTION, SOLUTION INTRAMUSCULAR; INTRAVENOUS
Status: DISPENSED
Start: 2019-01-01

## (undated) RX ORDER — GABAPENTIN 100 MG/1
CAPSULE ORAL
Status: DISPENSED
Start: 2019-01-01

## (undated) RX ORDER — FENTANYL CITRATE 50 UG/ML
INJECTION, SOLUTION INTRAMUSCULAR; INTRAVENOUS
Status: DISPENSED
Start: 2019-01-01

## (undated) RX ORDER — LIDOCAINE HYDROCHLORIDE 20 MG/ML
INJECTION, SOLUTION EPIDURAL; INFILTRATION; INTRACAUDAL; PERINEURAL
Status: DISPENSED
Start: 2019-01-01

## (undated) RX ORDER — FENTANYL CITRATE 50 UG/ML
INJECTION, SOLUTION INTRAMUSCULAR; INTRAVENOUS
Status: DISPENSED
Start: 2017-01-11

## (undated) RX ORDER — CEFAZOLIN SODIUM 2 G/100ML
INJECTION, SOLUTION INTRAVENOUS
Status: DISPENSED
Start: 2019-01-01

## (undated) RX ORDER — VECURONIUM BROMIDE 1 MG/ML
INJECTION, POWDER, LYOPHILIZED, FOR SOLUTION INTRAVENOUS
Status: DISPENSED
Start: 2019-01-01

## (undated) RX ORDER — DEXAMETHASONE SODIUM PHOSPHATE 4 MG/ML
INJECTION, SOLUTION INTRA-ARTICULAR; INTRALESIONAL; INTRAMUSCULAR; INTRAVENOUS; SOFT TISSUE
Status: DISPENSED
Start: 2019-01-01